# Patient Record
Sex: FEMALE | Race: WHITE | NOT HISPANIC OR LATINO | Employment: FULL TIME | URBAN - METROPOLITAN AREA
[De-identification: names, ages, dates, MRNs, and addresses within clinical notes are randomized per-mention and may not be internally consistent; named-entity substitution may affect disease eponyms.]

---

## 2017-04-21 ENCOUNTER — HOSPITAL ENCOUNTER (OUTPATIENT)
Dept: ONCOLOGY | Facility: CLINIC | Age: 57
Discharge: HOME | End: 2017-04-21
Attending: NURSE PRACTITIONER | Admitting: NURSE PRACTITIONER

## 2017-04-21 LAB
ALBUMIN SERPL-MCNC: 4 G/DL (ref 3.5–4.8)
ALBUMIN/GLOB SERPL: 1.3 {RATIO} (ref 1–1.7)
ALP SERPL-CCNC: 80 IU/L (ref 32–91)
ALT SERPL-CCNC: 20 IU/L (ref 14–54)
ANION GAP SERPL CALC-SCNC: 16.9 MMOL/L (ref 10–20)
AST SERPL-CCNC: 20 IU/L (ref 15–41)
BILIRUB SERPL-MCNC: 0.8 MG/DL (ref 0.3–1.2)
BUN SERPL-MCNC: 18 MG/DL (ref 8–20)
BUN/CREAT SERPL: 16.4 (ref 5.4–26.2)
CALCIUM SERPL-MCNC: 9.1 MG/DL (ref 8.9–10.3)
CEA SERPL-MCNC: NORMAL NG/ML (ref 0–5)
CHLORIDE SERPL-SCNC: 102 MMOL/L (ref 101–111)
CONV CO2: 27 MMOL/L (ref 22–32)
CONV TOTAL PROTEIN: 7.1 G/DL (ref 6.1–7.9)
CREAT UR-MCNC: 1.1 MG/DL (ref 0.4–1)
GLOBULIN UR ELPH-MCNC: 3.1 G/DL (ref 2.5–3.8)
GLUCOSE SERPL-MCNC: 91 MG/DL (ref 65–99)
POTASSIUM SERPL-SCNC: 3.9 MMOL/L (ref 3.6–5.1)
SODIUM SERPL-SCNC: 142 MMOL/L (ref 136–144)

## 2017-08-23 ENCOUNTER — HOSPITAL ENCOUNTER (OUTPATIENT)
Dept: ONCOLOGY | Facility: HOSPITAL | Age: 57
Discharge: HOME | End: 2017-08-23
Attending: INTERNAL MEDICINE | Admitting: INTERNAL MEDICINE

## 2017-09-27 ENCOUNTER — HOSPITAL ENCOUNTER (OUTPATIENT)
Dept: ONCOLOGY | Facility: HOSPITAL | Age: 57
Discharge: HOME | End: 2017-09-27
Attending: INTERNAL MEDICINE | Admitting: INTERNAL MEDICINE

## 2017-10-25 ENCOUNTER — HOSPITAL ENCOUNTER (OUTPATIENT)
Dept: ONCOLOGY | Facility: HOSPITAL | Age: 57
Discharge: HOME | End: 2017-10-25
Attending: INTERNAL MEDICINE | Admitting: INTERNAL MEDICINE

## 2017-10-25 ENCOUNTER — CLINICAL SUPPORT (OUTPATIENT)
Dept: ONCOLOGY | Facility: HOSPITAL | Age: 57
End: 2017-10-25

## 2017-10-25 ENCOUNTER — HOSPITAL ENCOUNTER (OUTPATIENT)
Dept: ONCOLOGY | Facility: CLINIC | Age: 57
Setting detail: INFUSION SERIES
Discharge: HOME | End: 2017-10-25
Attending: INTERNAL MEDICINE | Admitting: INTERNAL MEDICINE

## 2017-10-25 LAB
ALBUMIN SERPL-MCNC: 3.8 G/DL (ref 3.5–4.8)
ALBUMIN/GLOB SERPL: 1.3 {RATIO} (ref 1–1.7)
ALP SERPL-CCNC: 85 IU/L (ref 32–91)
ALT SERPL-CCNC: 18 IU/L (ref 14–54)
ANION GAP SERPL CALC-SCNC: 12.8 MMOL/L (ref 10–20)
AST SERPL-CCNC: 22 IU/L (ref 15–41)
BILIRUB SERPL-MCNC: 0.6 MG/DL (ref 0.3–1.2)
BUN SERPL-MCNC: 16 MG/DL (ref 8–20)
BUN/CREAT SERPL: 16 (ref 5.4–26.2)
CALCIUM SERPL-MCNC: 9.2 MG/DL (ref 8.9–10.3)
CEA SERPL-MCNC: NORMAL NG/ML (ref 0–5)
CHLORIDE SERPL-SCNC: 102 MMOL/L (ref 101–111)
CONV CO2: 27 MMOL/L (ref 22–32)
CONV TOTAL PROTEIN: 6.8 G/DL (ref 6.1–7.9)
CREAT UR-MCNC: 1 MG/DL (ref 0.4–1)
GLOBULIN UR ELPH-MCNC: 3 G/DL (ref 2.5–3.8)
GLUCOSE SERPL-MCNC: 125 MG/DL (ref 65–99)
POTASSIUM SERPL-SCNC: 3.8 MMOL/L (ref 3.6–5.1)
SODIUM SERPL-SCNC: 138 MMOL/L (ref 136–144)

## 2017-10-25 NOTE — PROGRESS NOTES
PATIENTS ONCOLOGY RECORD LOCATED IN [facility]      Subjective     Name:  ANDREAS SIMENTAL     Date:  10/25/2017  Address:  [address] [place] [address] [place] [address]  Home: [phone]  :  1960 AGE:  57 y.o.        RECORDS OBTAINED:  Patients Oncology Record is located in [facility]

## 2017-11-13 ENCOUNTER — HOSPITAL ENCOUNTER (OUTPATIENT)
Dept: MRI IMAGING | Facility: HOSPITAL | Age: 57
Discharge: HOME | End: 2017-11-13
Attending: INTERNAL MEDICINE | Admitting: INTERNAL MEDICINE

## 2017-11-17 ENCOUNTER — HOSPITAL ENCOUNTER (OUTPATIENT)
Dept: ONCOLOGY | Facility: HOSPITAL | Age: 57
Discharge: HOME | End: 2017-11-17
Attending: INTERNAL MEDICINE | Admitting: INTERNAL MEDICINE

## 2017-11-17 LAB — GLUCOSE BLD-MCNC: 92 MG/DL (ref 70–105)

## 2017-11-22 ENCOUNTER — CLINICAL SUPPORT (OUTPATIENT)
Dept: ONCOLOGY | Facility: HOSPITAL | Age: 57
End: 2017-11-22

## 2017-11-22 ENCOUNTER — HOSPITAL ENCOUNTER (OUTPATIENT)
Dept: ONCOLOGY | Facility: HOSPITAL | Age: 57
Discharge: HOME | End: 2017-11-22
Attending: INTERNAL MEDICINE | Admitting: INTERNAL MEDICINE

## 2017-11-22 ENCOUNTER — HOSPITAL ENCOUNTER (OUTPATIENT)
Dept: ONCOLOGY | Facility: CLINIC | Age: 57
Setting detail: INFUSION SERIES
Discharge: HOME | End: 2017-11-22
Attending: INTERNAL MEDICINE | Admitting: INTERNAL MEDICINE

## 2017-11-22 NOTE — PROGRESS NOTES
PATIENTS ONCOLOGY RECORD LOCATED IN [facility]      Subjective     Name:  ANDREAS SIMENTAL     Date:  2017  Address:  [address] [place] [address]  Home: [phone]  :  1960 AGE:  57 y.o.        RECORDS OBTAINED:  Patients Oncology Record is located in [facility]

## 2017-11-27 ENCOUNTER — HOSPITAL ENCOUNTER (OUTPATIENT)
Dept: ONCOLOGY | Facility: HOSPITAL | Age: 57
Discharge: HOME | End: 2017-11-27
Attending: INTERNAL MEDICINE | Admitting: INTERNAL MEDICINE

## 2017-11-27 ENCOUNTER — HOSPITAL ENCOUNTER (OUTPATIENT)
Dept: ONCOLOGY | Facility: CLINIC | Age: 57
Setting detail: INFUSION SERIES
Discharge: HOME | End: 2017-11-27
Attending: INTERNAL MEDICINE | Admitting: INTERNAL MEDICINE

## 2017-11-27 ENCOUNTER — CLINICAL SUPPORT (OUTPATIENT)
Dept: ONCOLOGY | Facility: HOSPITAL | Age: 57
End: 2017-11-27

## 2017-11-27 LAB
ALBUMIN SERPL-MCNC: 3.7 G/DL (ref 3.5–4.8)
ALBUMIN/GLOB SERPL: 1.2 {RATIO} (ref 1–1.7)
ALP SERPL-CCNC: 79 IU/L (ref 32–91)
ALT SERPL-CCNC: 20 IU/L (ref 14–54)
ANION GAP SERPL CALC-SCNC: 11.3 MMOL/L (ref 10–20)
AST SERPL-CCNC: 23 IU/L (ref 15–41)
BILIRUB SERPL-MCNC: 0.5 MG/DL (ref 0.3–1.2)
BUN SERPL-MCNC: 12 MG/DL (ref 8–20)
BUN/CREAT SERPL: 13.3 (ref 5.4–26.2)
CALCIUM SERPL-MCNC: 9.2 MG/DL (ref 8.9–10.3)
CHLORIDE SERPL-SCNC: 105 MMOL/L (ref 101–111)
CONV CO2: 25 MMOL/L (ref 22–32)
CONV TOTAL PROTEIN: 6.8 G/DL (ref 6.1–7.9)
CREAT UR-MCNC: 0.9 MG/DL (ref 0.4–1)
GLOBULIN UR ELPH-MCNC: 3.1 G/DL (ref 2.5–3.8)
GLUCOSE SERPL-MCNC: 96 MG/DL (ref 65–99)
POTASSIUM SERPL-SCNC: 4.3 MMOL/L (ref 3.6–5.1)
SODIUM SERPL-SCNC: 137 MMOL/L (ref 136–144)

## 2017-11-27 NOTE — PROGRESS NOTES
PATIENTS ONCOLOGY RECORD LOCATED IN [facility]      Subjective     Name:  ANDREAS SIMENTAL     Date:  2017  Address:  [address] [place] [address]  Home: [phone]  :  1960 AGE:  57 y.o.        RECORDS OBTAINED:  Patients Oncology Record is located in [facility]

## 2017-11-29 ENCOUNTER — HOSPITAL ENCOUNTER (OUTPATIENT)
Dept: ONCOLOGY | Facility: HOSPITAL | Age: 57
Discharge: HOME | End: 2017-11-29
Attending: INTERNAL MEDICINE | Admitting: INTERNAL MEDICINE

## 2017-11-29 ENCOUNTER — CLINICAL SUPPORT (OUTPATIENT)
Dept: ONCOLOGY | Facility: HOSPITAL | Age: 57
End: 2017-11-29

## 2017-11-29 ENCOUNTER — HOSPITAL ENCOUNTER (OUTPATIENT)
Dept: ONCOLOGY | Facility: CLINIC | Age: 57
Setting detail: INFUSION SERIES
Discharge: HOME | End: 2017-11-29
Attending: INTERNAL MEDICINE | Admitting: INTERNAL MEDICINE

## 2017-11-29 NOTE — PROGRESS NOTES
PATIENTS ONCOLOGY RECORD LOCATED IN [facility]      Subjective     Name:  ANDREAS SIMENTAL     Date:  2017  Address:  [address] [place] [address]  Home: [phone]  :  1960 AGE:  57 y.o.        RECORDS OBTAINED:  Patients Oncology Record is located in [facility]

## 2017-12-04 ENCOUNTER — HOSPITAL ENCOUNTER (OUTPATIENT)
Dept: ONCOLOGY | Facility: HOSPITAL | Age: 57
Discharge: HOME | End: 2017-12-04
Attending: INTERNAL MEDICINE | Admitting: INTERNAL MEDICINE

## 2017-12-04 ENCOUNTER — CLINICAL SUPPORT (OUTPATIENT)
Dept: ONCOLOGY | Facility: HOSPITAL | Age: 57
End: 2017-12-04

## 2017-12-04 ENCOUNTER — HOSPITAL ENCOUNTER (OUTPATIENT)
Dept: ONCOLOGY | Facility: CLINIC | Age: 57
Setting detail: INFUSION SERIES
Discharge: HOME | End: 2017-12-04
Attending: INTERNAL MEDICINE | Admitting: INTERNAL MEDICINE

## 2017-12-04 NOTE — PROGRESS NOTES
PATIENTS ONCOLOGY RECORD LOCATED IN [facility]      Subjective     Name:  ANDREAS SIMENTAL     Date:  2017  Address:  [address] [place] [address]  Home: [phone]  :  1960 AGE:  57 y.o.        RECORDS OBTAINED:  Patients Oncology Record is located in [facility]

## 2017-12-11 ENCOUNTER — CLINICAL SUPPORT (OUTPATIENT)
Dept: ONCOLOGY | Facility: HOSPITAL | Age: 57
End: 2017-12-11

## 2017-12-11 ENCOUNTER — HOSPITAL ENCOUNTER (OUTPATIENT)
Dept: ONCOLOGY | Facility: CLINIC | Age: 57
Setting detail: INFUSION SERIES
Discharge: HOME | End: 2017-12-11
Attending: INTERNAL MEDICINE | Admitting: INTERNAL MEDICINE

## 2017-12-11 ENCOUNTER — HOSPITAL ENCOUNTER (OUTPATIENT)
Dept: ONCOLOGY | Facility: HOSPITAL | Age: 57
Discharge: HOME | End: 2017-12-11
Attending: INTERNAL MEDICINE | Admitting: INTERNAL MEDICINE

## 2017-12-11 NOTE — PROGRESS NOTES
PATIENTS ONCOLOGY RECORD LOCATED IN [facility]      Subjective     Name:  ANDREAS SIMENTAL     Date:  2017  Address:  [address] [place] [address]  Home: [phone]  :  1960 AGE:  57 y.o.        RECORDS OBTAINED:  Patients Oncology Record is located in [facility]

## 2017-12-13 ENCOUNTER — HOSPITAL ENCOUNTER (OUTPATIENT)
Dept: ONCOLOGY | Facility: CLINIC | Age: 57
Setting detail: INFUSION SERIES
Discharge: HOME | End: 2017-12-13
Attending: INTERNAL MEDICINE | Admitting: INTERNAL MEDICINE

## 2017-12-13 ENCOUNTER — CLINICAL SUPPORT (OUTPATIENT)
Dept: ONCOLOGY | Facility: HOSPITAL | Age: 57
End: 2017-12-13

## 2017-12-13 ENCOUNTER — HOSPITAL ENCOUNTER (OUTPATIENT)
Dept: ONCOLOGY | Facility: HOSPITAL | Age: 57
Discharge: HOME | End: 2017-12-13
Attending: INTERNAL MEDICINE | Admitting: INTERNAL MEDICINE

## 2017-12-13 LAB
ALBUMIN SERPL-MCNC: 3.5 G/DL (ref 3.5–4.8)
ALBUMIN/GLOB SERPL: 1.3 {RATIO} (ref 1–1.7)
ALP SERPL-CCNC: 74 IU/L (ref 32–91)
ALT SERPL-CCNC: 35 IU/L (ref 14–54)
ANION GAP SERPL CALC-SCNC: 10.6 MMOL/L (ref 10–20)
AST SERPL-CCNC: 45 IU/L (ref 15–41)
BILIRUB SERPL-MCNC: 0.7 MG/DL (ref 0.3–1.2)
BUN SERPL-MCNC: 29 MG/DL (ref 8–20)
BUN/CREAT SERPL: 24.2 (ref 5.4–26.2)
CALCIUM SERPL-MCNC: 8.6 MG/DL (ref 8.9–10.3)
CHLORIDE SERPL-SCNC: 107 MMOL/L (ref 101–111)
CONV CO2: 25 MMOL/L (ref 22–32)
CONV TOTAL PROTEIN: 6.2 G/DL (ref 6.1–7.9)
CREAT UR-MCNC: 1.2 MG/DL (ref 0.4–1)
GLOBULIN UR ELPH-MCNC: 2.7 G/DL (ref 2.5–3.8)
GLUCOSE SERPL-MCNC: 97 MG/DL (ref 65–99)
MAGNESIUM SERPL-MCNC: 2.5 MG/DL (ref 1.8–2.5)
POTASSIUM SERPL-SCNC: 3.6 MMOL/L (ref 3.6–5.1)
SODIUM SERPL-SCNC: 139 MMOL/L (ref 136–144)

## 2017-12-13 NOTE — PROGRESS NOTES
PATIENTS ONCOLOGY RECORD LOCATED IN [facility]      Subjective     Name:  ANDREAS SIMENTAL     Date:  2017  Address:  [address] [place] [address]  Home: [phone]  :  1960 AGE:  57 y.o.        RECORDS OBTAINED:  Patients Oncology Record is located in [facility]

## 2017-12-20 ENCOUNTER — HOSPITAL ENCOUNTER (OUTPATIENT)
Dept: ONCOLOGY | Facility: CLINIC | Age: 57
Setting detail: INFUSION SERIES
Discharge: HOME | End: 2017-12-20
Attending: NURSE PRACTITIONER | Admitting: NURSE PRACTITIONER

## 2017-12-20 ENCOUNTER — CLINICAL SUPPORT (OUTPATIENT)
Dept: ONCOLOGY | Facility: HOSPITAL | Age: 57
End: 2017-12-20

## 2017-12-20 ENCOUNTER — HOSPITAL ENCOUNTER (OUTPATIENT)
Dept: ONCOLOGY | Facility: HOSPITAL | Age: 57
Discharge: HOME | End: 2017-12-20
Attending: NURSE PRACTITIONER | Admitting: NURSE PRACTITIONER

## 2017-12-20 LAB
IRON SATN MFR SERPL: 8 % (ref 15–50)
IRON SERPL-MCNC: 28 UG/DL (ref 28–170)
MAGNESIUM UR-MCNC: 1.2 % (ref 0.5–1.5)
RETICS/RBC NFR MANUAL: 0.04 10*6/UL
TIBC SERPL-MCNC: 348 UG/DL (ref 228–428)

## 2017-12-20 NOTE — PROGRESS NOTES
PATIENTS ONCOLOGY RECORD LOCATED IN [facility]      Subjective     Name:  ANDREAS SIMENTAL     Date:  2017  Address:  [address] [place] [address]  Home: [phone]  :  1960 AGE:  57 y.o.        RECORDS OBTAINED:  Patients Oncology Record is located in [facility]

## 2017-12-22 LAB — HAPTOGLOB SERPL-MCNC: 105 MG/DL (ref 36–195)

## 2017-12-26 ENCOUNTER — HOSPITAL ENCOUNTER (OUTPATIENT)
Dept: ONCOLOGY | Facility: CLINIC | Age: 57
Setting detail: INFUSION SERIES
Discharge: HOME | End: 2017-12-26
Attending: INTERNAL MEDICINE | Admitting: INTERNAL MEDICINE

## 2017-12-26 ENCOUNTER — HOSPITAL ENCOUNTER (OUTPATIENT)
Dept: ONCOLOGY | Facility: HOSPITAL | Age: 57
Discharge: HOME | End: 2017-12-26
Attending: INTERNAL MEDICINE | Admitting: INTERNAL MEDICINE

## 2017-12-26 ENCOUNTER — CLINICAL SUPPORT (OUTPATIENT)
Dept: ONCOLOGY | Facility: HOSPITAL | Age: 57
End: 2017-12-26

## 2017-12-26 NOTE — PROGRESS NOTES
PATIENTS ONCOLOGY RECORD LOCATED IN [facility]      Subjective     Name:  ANDREAS SIMENTAL     Date:  2017  Address:  [address] [place] [address]  Home: [phone]  :  1960 AGE:  57 y.o.        RECORDS OBTAINED:  Patients Oncology Record is located in [facility]

## 2018-01-02 ENCOUNTER — CLINICAL SUPPORT (OUTPATIENT)
Dept: ONCOLOGY | Facility: HOSPITAL | Age: 58
End: 2018-01-02

## 2018-01-02 ENCOUNTER — HOSPITAL ENCOUNTER (OUTPATIENT)
Dept: ONCOLOGY | Facility: HOSPITAL | Age: 58
Discharge: HOME | End: 2018-01-02
Attending: INTERNAL MEDICINE | Admitting: INTERNAL MEDICINE

## 2018-01-02 ENCOUNTER — HOSPITAL ENCOUNTER (OUTPATIENT)
Dept: ONCOLOGY | Facility: CLINIC | Age: 58
Setting detail: INFUSION SERIES
Discharge: HOME | End: 2018-01-02
Attending: INTERNAL MEDICINE | Admitting: INTERNAL MEDICINE

## 2018-01-02 LAB
ALBUMIN SERPL-MCNC: 3.7 G/DL (ref 3.5–4.8)
ALBUMIN/GLOB SERPL: 1.2 {RATIO} (ref 1–1.7)
ALP SERPL-CCNC: 81 IU/L (ref 32–91)
ALT SERPL-CCNC: 25 IU/L (ref 14–54)
ANION GAP SERPL CALC-SCNC: 12.7 MMOL/L (ref 10–20)
AST SERPL-CCNC: 26 IU/L (ref 15–41)
BACTERIA SPEC AEROBE CULT: NORMAL
BILIRUB SERPL-MCNC: 0.8 MG/DL (ref 0.3–1.2)
BUN SERPL-MCNC: 15 MG/DL (ref 8–20)
BUN/CREAT SERPL: 15 (ref 5.4–26.2)
CALCIUM SERPL-MCNC: 9.2 MG/DL (ref 8.9–10.3)
CHLORIDE SERPL-SCNC: 105 MMOL/L (ref 101–111)
CONV CO2: 24 MMOL/L (ref 22–32)
CONV TOTAL PROTEIN: 6.8 G/DL (ref 6.1–7.9)
CREAT UR-MCNC: 1 MG/DL (ref 0.4–1)
GLOBULIN UR ELPH-MCNC: 3.1 G/DL (ref 2.5–3.8)
GLUCOSE SERPL-MCNC: 90 MG/DL (ref 65–99)
Lab: NORMAL
MAGNESIUM SERPL-MCNC: 2.3 MG/DL (ref 1.8–2.5)
MICRO REPORT STATUS: NORMAL
POTASSIUM SERPL-SCNC: 3.7 MMOL/L (ref 3.6–5.1)
SODIUM SERPL-SCNC: 138 MMOL/L (ref 136–144)
SPECIMEN SOURCE: NORMAL

## 2018-01-02 NOTE — PROGRESS NOTES
PATIENTS ONCOLOGY RECORD LOCATED IN [facility]      Subjective     Name:  ANDREAS SIMENTAL     Date:  2018  Address:  [address] [place] [address]  Home: [phone]  :  1960 AGE:  57 y.o.        RECORDS OBTAINED:  Patients Oncology Record is located in [facility]

## 2018-01-04 ENCOUNTER — HOSPITAL ENCOUNTER (OUTPATIENT)
Dept: ONCOLOGY | Facility: CLINIC | Age: 58
Setting detail: INFUSION SERIES
Discharge: HOME | End: 2018-01-04
Attending: INTERNAL MEDICINE | Admitting: INTERNAL MEDICINE

## 2018-01-04 ENCOUNTER — CLINICAL SUPPORT (OUTPATIENT)
Dept: ONCOLOGY | Facility: HOSPITAL | Age: 58
End: 2018-01-04

## 2018-01-04 NOTE — PROGRESS NOTES
PATIENTS ONCOLOGY RECORD LOCATED IN [facility]      Subjective     Name:  ANDREAS SIMENTAL     Date:  2018  Address:  [address] [place] [address]  Home: [phone]  :  1960 AGE:  57 y.o.        RECORDS OBTAINED:  Patients Oncology Record is located in [facility]

## 2018-01-08 ENCOUNTER — HOSPITAL ENCOUNTER (OUTPATIENT)
Dept: LAB | Facility: HOSPITAL | Age: 58
Discharge: HOME | End: 2018-01-08
Attending: INTERNAL MEDICINE | Admitting: INTERNAL MEDICINE

## 2018-01-08 LAB
ALBUMIN SERPL-MCNC: 3.8 G/DL (ref 3.5–4.8)
ALBUMIN/GLOB SERPL: 1.2 {RATIO} (ref 1–1.7)
ALP SERPL-CCNC: 76 IU/L (ref 32–91)
ALT SERPL-CCNC: 24 IU/L (ref 14–54)
ANION GAP SERPL CALC-SCNC: 14 MMOL/L (ref 10–20)
AST SERPL-CCNC: 18 IU/L (ref 15–41)
BASOPHILS # BLD AUTO: 0 10*3/UL (ref 0–0.2)
BASOPHILS NFR BLD AUTO: 0 % (ref 0–2)
BILIRUB SERPL-MCNC: 0.6 MG/DL (ref 0.3–1.2)
BILIRUB UR QL STRIP: NEGATIVE MG/DL
BUN SERPL-MCNC: 71 MG/DL (ref 8–20)
BUN/CREAT SERPL: 35.5 (ref 5.4–26.2)
CALCIUM SERPL-MCNC: 9 MG/DL (ref 8.9–10.3)
CASTS URNS QL MICRO: NORMAL /[LPF]
CHLORIDE SERPL-SCNC: 109 MMOL/L (ref 101–111)
CK SERPL-CCNC: 8 IU/L (ref 38–234)
COLOR UR: YELLOW
CONV BACTERIA IN URINE MICRO: NEGATIVE
CONV CLARITY OF URINE: CLEAR
CONV CO2: 23 MMOL/L (ref 22–32)
CONV HYALINE CASTS IN URINE MICRO: 3 /[LPF] (ref 0–5)
CONV PROTEIN IN URINE BY AUTOMATED TEST STRIP: NEGATIVE MG/DL
CONV SMALL ROUND CELLS: NORMAL /[HPF]
CONV SMEAR REVIEW: (no result)
CONV TOTAL PROTEIN: 6.9 G/DL (ref 6.1–7.9)
CONV UROBILINOGEN IN URINE BY AUTOMATED TEST STRIP: 0.2 MG/DL
CREAT UR-MCNC: 2 MG/DL (ref 0.4–1)
DIFFERENTIAL METHOD BLD: (no result)
EOSINOPHIL # BLD AUTO: 0 % (ref 0–3)
EOSINOPHIL # BLD AUTO: 0 10*3/UL (ref 0–0.3)
ERYTHROCYTE [DISTWIDTH] IN BLOOD BY AUTOMATED COUNT: 18 % (ref 11.5–14.5)
GLOBULIN UR ELPH-MCNC: 3.1 G/DL (ref 2.5–3.8)
GLUCOSE SERPL-MCNC: 148 MG/DL (ref 65–99)
GLUCOSE UR QL: NEGATIVE MG/DL
HCT VFR BLD AUTO: 33 % (ref 35–49)
HGB BLD-MCNC: 11 G/DL (ref 12–15)
HGB UR QL STRIP: NEGATIVE
KETONES UR QL STRIP: NEGATIVE MG/DL
LEUKOCYTE ESTERASE UR QL STRIP: NEGATIVE
LYMPHOCYTES # BLD AUTO: 0.5 10*3/UL (ref 0.8–4.8)
LYMPHOCYTES NFR BLD AUTO: 21 % (ref 18–42)
MAGNESIUM SERPL-MCNC: 2.5 MG/DL (ref 1.8–2.5)
MCH RBC QN AUTO: 28.6 PG (ref 26–32)
MCHC RBC AUTO-ENTMCNC: 33.4 G/DL (ref 32–36)
MCV RBC AUTO: 85.7 FL (ref 80–94)
MONOCYTES # BLD AUTO: 0 10*3/UL (ref 0.1–1.3)
MONOCYTES NFR BLD AUTO: 1 % (ref 2–11)
NEUTROPHILS # BLD AUTO: 2 10*3/UL (ref 2.3–8.6)
NEUTROPHILS NFR BLD AUTO: 78 % (ref 50–75)
NITRITE UR QL STRIP: NEGATIVE
NRBC BLD AUTO-RTO: 0 /100{WBCS}
PH UR STRIP.AUTO: 5 [PH] (ref 4.5–8)
PHOSPHATE SERPL-MCNC: 5.6 MG/DL (ref 2.4–4.7)
PLATELET # BLD AUTO: (no result) 10*3/UL (ref 150–450)
PLT COMMENT: (no result)
PMV BLD AUTO: 9.3 FL (ref 7.4–10.4)
POTASSIUM SERPL-SCNC: 5 MMOL/L (ref 3.6–5.1)
RBC # BLD AUTO: 3.85 10*6/UL (ref 4–5.4)
RBC #/AREA URNS HPF: 2 /[HPF] (ref 0–3)
SODIUM SERPL-SCNC: 141 MMOL/L (ref 136–144)
SP GR UR: 1.02 (ref 1–1.03)
SPERM URNS QL MICRO: NORMAL /[HPF]
SQUAMOUS SPT QL MICRO: 1 /[HPF] (ref 0–5)
UNIDENT CRYS URNS QL MICRO: NORMAL /[HPF]
WBC # BLD AUTO: 2.5 10*3/UL (ref 4.5–11.5)
WBC #/AREA URNS HPF: 1 /[HPF] (ref 0–5)
YEAST SPEC QL WET PREP: NORMAL /[HPF]

## 2018-01-10 ENCOUNTER — HOSPITAL ENCOUNTER (OUTPATIENT)
Dept: LAB | Facility: HOSPITAL | Age: 58
Discharge: HOME | End: 2018-01-10
Attending: INTERNAL MEDICINE | Admitting: INTERNAL MEDICINE

## 2018-01-10 LAB
ANION GAP SERPL CALC-SCNC: 11.7 MMOL/L (ref 10–20)
BUN SERPL-MCNC: 67 MG/DL (ref 8–20)
BUN/CREAT SERPL: 31.9 (ref 5.4–26.2)
CALCIUM SERPL-MCNC: 8.3 MG/DL (ref 8.9–10.3)
CHLORIDE SERPL-SCNC: 109 MMOL/L (ref 101–111)
CONV CO2: 24 MMOL/L (ref 22–32)
CREAT UR-MCNC: 2.1 MG/DL (ref 0.4–1)
GLUCOSE SERPL-MCNC: 94 MG/DL (ref 65–99)
POTASSIUM SERPL-SCNC: 4.7 MMOL/L (ref 3.6–5.1)
SODIUM SERPL-SCNC: 140 MMOL/L (ref 136–144)

## 2018-01-11 ENCOUNTER — HOSPITAL ENCOUNTER (OUTPATIENT)
Dept: ONCOLOGY | Facility: CLINIC | Age: 58
Setting detail: INFUSION SERIES
Discharge: HOME | End: 2018-01-11
Attending: INTERNAL MEDICINE | Admitting: INTERNAL MEDICINE

## 2018-01-11 ENCOUNTER — HOSPITAL ENCOUNTER (OUTPATIENT)
Dept: ONCOLOGY | Facility: HOSPITAL | Age: 58
Discharge: HOME | End: 2018-01-11
Attending: INTERNAL MEDICINE | Admitting: INTERNAL MEDICINE

## 2018-01-11 ENCOUNTER — CLINICAL SUPPORT (OUTPATIENT)
Dept: ONCOLOGY | Facility: HOSPITAL | Age: 58
End: 2018-01-11

## 2018-01-11 NOTE — PROGRESS NOTES
PATIENTS ONCOLOGY RECORD LOCATED IN [facility]      Subjective     Name:  ANDREAS SIMENTAL     Date:  2018  Address:  [address] [place] [address]  Home: [phone]  :  1960 AGE:  57 y.o.        RECORDS OBTAINED:  Patients Oncology Record is located in [facility]

## 2018-01-15 ENCOUNTER — CLINICAL SUPPORT (OUTPATIENT)
Dept: ONCOLOGY | Facility: HOSPITAL | Age: 58
End: 2018-01-15

## 2018-01-15 ENCOUNTER — HOSPITAL ENCOUNTER (OUTPATIENT)
Dept: ONCOLOGY | Facility: HOSPITAL | Age: 58
Discharge: HOME | End: 2018-01-15
Attending: INTERNAL MEDICINE | Admitting: INTERNAL MEDICINE

## 2018-01-15 ENCOUNTER — HOSPITAL ENCOUNTER (OUTPATIENT)
Dept: ONCOLOGY | Facility: CLINIC | Age: 58
Setting detail: INFUSION SERIES
Discharge: HOME | End: 2018-01-15
Attending: INTERNAL MEDICINE | Admitting: INTERNAL MEDICINE

## 2018-01-15 LAB
ANION GAP SERPL CALC-SCNC: 10.3 MMOL/L (ref 10–20)
BUN SERPL-MCNC: 25 MG/DL (ref 8–20)
BUN/CREAT SERPL: 15.6 (ref 5.4–26.2)
CALCIUM SERPL-MCNC: 8.6 MG/DL (ref 8.9–10.3)
CHLORIDE SERPL-SCNC: 107 MMOL/L (ref 101–111)
CONV CO2: 26 MMOL/L (ref 22–32)
CREAT UR-MCNC: 1.6 MG/DL (ref 0.4–1)
GLUCOSE SERPL-MCNC: 114 MG/DL (ref 65–99)
POTASSIUM SERPL-SCNC: 4.3 MMOL/L (ref 3.6–5.1)
SODIUM SERPL-SCNC: 139 MMOL/L (ref 136–144)

## 2018-01-15 NOTE — PROGRESS NOTES
PATIENTS ONCOLOGY RECORD LOCATED IN [facility]      Subjective     Name:  ANDREAS SIMENTAL     Date:  01/15/2018  Address:  [address] [place] [address]  Home: [phone]  :  1960 AGE:  57 y.o.        RECORDS OBTAINED:  Patients Oncology Record is located in [facility]

## 2018-01-22 ENCOUNTER — CLINICAL SUPPORT (OUTPATIENT)
Dept: ONCOLOGY | Facility: HOSPITAL | Age: 58
End: 2018-01-22

## 2018-01-22 ENCOUNTER — HOSPITAL ENCOUNTER (OUTPATIENT)
Dept: ONCOLOGY | Facility: HOSPITAL | Age: 58
Discharge: HOME | End: 2018-01-22
Attending: INTERNAL MEDICINE | Admitting: INTERNAL MEDICINE

## 2018-01-22 ENCOUNTER — HOSPITAL ENCOUNTER (OUTPATIENT)
Dept: ONCOLOGY | Facility: CLINIC | Age: 58
Setting detail: INFUSION SERIES
Discharge: HOME | End: 2018-01-22
Attending: INTERNAL MEDICINE | Admitting: INTERNAL MEDICINE

## 2018-01-22 LAB
ALBUMIN SERPL-MCNC: 3.5 G/DL (ref 3.5–4.8)
ALBUMIN/GLOB SERPL: 1.1 {RATIO} (ref 1–1.7)
ALP SERPL-CCNC: 77 IU/L (ref 32–91)
ALT SERPL-CCNC: 26 IU/L (ref 14–54)
ANION GAP SERPL CALC-SCNC: 10.9 MMOL/L (ref 10–20)
AST SERPL-CCNC: 26 IU/L (ref 15–41)
BILIRUB SERPL-MCNC: 0.6 MG/DL (ref 0.3–1.2)
BUN SERPL-MCNC: 12 MG/DL (ref 8–20)
BUN/CREAT SERPL: 9.2 (ref 5.4–26.2)
CALCIUM SERPL-MCNC: 8.8 MG/DL (ref 8.9–10.3)
CHLORIDE SERPL-SCNC: 107 MMOL/L (ref 101–111)
CONV CO2: 25 MMOL/L (ref 22–32)
CONV TOTAL PROTEIN: 6.7 G/DL (ref 6.1–7.9)
CREAT UR-MCNC: 1.3 MG/DL (ref 0.4–1)
GLOBULIN UR ELPH-MCNC: 3.2 G/DL (ref 2.5–3.8)
GLUCOSE SERPL-MCNC: 111 MG/DL (ref 65–99)
POTASSIUM SERPL-SCNC: 3.9 MMOL/L (ref 3.6–5.1)
SODIUM SERPL-SCNC: 139 MMOL/L (ref 136–144)

## 2018-01-22 NOTE — PROGRESS NOTES
PATIENTS ONCOLOGY RECORD LOCATED IN [facility]      Subjective     Name:  ANDREAS SIMENTAL     Date:  2018  Address:  [address] [place] [address]  Home: [phone]  :  1960 AGE:  57 y.o.        RECORDS OBTAINED:  Patients Oncology Record is located in [facility]

## 2018-02-05 ENCOUNTER — CLINICAL SUPPORT (OUTPATIENT)
Dept: ONCOLOGY | Facility: HOSPITAL | Age: 58
End: 2018-02-05

## 2018-02-05 ENCOUNTER — HOSPITAL ENCOUNTER (OUTPATIENT)
Dept: ONCOLOGY | Facility: CLINIC | Age: 58
Discharge: HOME | End: 2018-02-05
Attending: INTERNAL MEDICINE | Admitting: INTERNAL MEDICINE

## 2018-02-05 ENCOUNTER — HOSPITAL ENCOUNTER (OUTPATIENT)
Dept: ONCOLOGY | Facility: CLINIC | Age: 58
Setting detail: INFUSION SERIES
Discharge: HOME | End: 2018-02-05
Attending: INTERNAL MEDICINE | Admitting: INTERNAL MEDICINE

## 2018-02-05 NOTE — PROGRESS NOTES
PATIENTS ONCOLOGY RECORD LOCATED IN [facility]      Subjective     Name:  ANDREAS SIMENTAL     Date:  2018  Address:  [address] [place] [address]  Home: [phone]  :  1960 AGE:  57 y.o.        RECORDS OBTAINED:  Patients Oncology Record is located in [facility]

## 2018-03-01 ENCOUNTER — HOSPITAL ENCOUNTER (OUTPATIENT)
Dept: ONCOLOGY | Facility: CLINIC | Age: 58
Setting detail: INFUSION SERIES
Discharge: HOME | End: 2018-03-01
Attending: INTERNAL MEDICINE | Admitting: INTERNAL MEDICINE

## 2018-03-01 ENCOUNTER — CLINICAL SUPPORT (OUTPATIENT)
Dept: ONCOLOGY | Facility: HOSPITAL | Age: 58
End: 2018-03-01

## 2018-03-01 ENCOUNTER — HOSPITAL ENCOUNTER (OUTPATIENT)
Dept: ONCOLOGY | Facility: HOSPITAL | Age: 58
Discharge: HOME | End: 2018-03-01
Attending: INTERNAL MEDICINE | Admitting: INTERNAL MEDICINE

## 2018-03-01 NOTE — PROGRESS NOTES
PATIENTS ONCOLOGY RECORD LOCATED IN [facility]      Subjective     Name:  ANDREAS SIMENTAL     Date:  2018  Address:  [address] [place] [address]  Home: [phone]  :  1960 AGE:  57 y.o.        RECORDS OBTAINED:  Patients Oncology Record is located in [facility]

## 2018-04-02 ENCOUNTER — HOSPITAL ENCOUNTER (OUTPATIENT)
Dept: ONCOLOGY | Facility: CLINIC | Age: 58
Setting detail: INFUSION SERIES
Discharge: HOME | End: 2018-04-02
Attending: INTERNAL MEDICINE | Admitting: INTERNAL MEDICINE

## 2018-04-02 ENCOUNTER — HOSPITAL ENCOUNTER (OUTPATIENT)
Dept: ONCOLOGY | Facility: HOSPITAL | Age: 58
Discharge: HOME | End: 2018-04-02
Attending: INTERNAL MEDICINE | Admitting: INTERNAL MEDICINE

## 2018-04-02 ENCOUNTER — CLINICAL SUPPORT (OUTPATIENT)
Dept: ONCOLOGY | Facility: HOSPITAL | Age: 58
End: 2018-04-02

## 2018-04-02 NOTE — PROGRESS NOTES
PATIENTS ONCOLOGY RECORD LOCATED IN [facility]      Subjective     Name:  ANDREAS SIMENTAL     Date:  2018  Address:  [address] [place] [address]  Home: [phone]  :  1960 AGE:  57 y.o.        RECORDS OBTAINED:  Patients Oncology Record is located in [facility]

## 2018-05-01 ENCOUNTER — HOSPITAL ENCOUNTER (OUTPATIENT)
Dept: ONCOLOGY | Facility: HOSPITAL | Age: 58
Discharge: HOME | End: 2018-05-01
Attending: NURSE PRACTITIONER | Admitting: NURSE PRACTITIONER

## 2018-05-01 ENCOUNTER — HOSPITAL ENCOUNTER (OUTPATIENT)
Dept: ONCOLOGY | Facility: CLINIC | Age: 58
Setting detail: INFUSION SERIES
Discharge: HOME | End: 2018-05-01
Attending: NURSE PRACTITIONER | Admitting: NURSE PRACTITIONER

## 2018-05-01 ENCOUNTER — CLINICAL SUPPORT (OUTPATIENT)
Dept: ONCOLOGY | Facility: HOSPITAL | Age: 58
End: 2018-05-01

## 2018-05-01 LAB
ALBUMIN SERPL-MCNC: 3.6 G/DL (ref 3.5–4.8)
ALBUMIN/GLOB SERPL: 1.1 {RATIO} (ref 1–1.7)
ALP SERPL-CCNC: 78 IU/L (ref 32–91)
ALT SERPL-CCNC: 22 IU/L (ref 14–54)
ANION GAP SERPL CALC-SCNC: 10.4 MMOL/L (ref 10–20)
AST SERPL-CCNC: 30 IU/L (ref 15–41)
BILIRUB SERPL-MCNC: 0.7 MG/DL (ref 0.3–1.2)
BUN SERPL-MCNC: 11 MG/DL (ref 8–20)
BUN/CREAT SERPL: 12.2 (ref 5.4–26.2)
CALCIUM SERPL-MCNC: 8.8 MG/DL (ref 8.9–10.3)
CHLORIDE SERPL-SCNC: 106 MMOL/L (ref 101–111)
CONV CO2: 26 MMOL/L (ref 22–32)
CONV TOTAL PROTEIN: 6.8 G/DL (ref 6.1–7.9)
CREAT UR-MCNC: 0.9 MG/DL (ref 0.4–1)
GLOBULIN UR ELPH-MCNC: 3.2 G/DL (ref 2.5–3.8)
GLUCOSE SERPL-MCNC: 111 MG/DL (ref 65–99)
POTASSIUM SERPL-SCNC: 3.4 MMOL/L (ref 3.6–5.1)
SODIUM SERPL-SCNC: 139 MMOL/L (ref 136–144)

## 2018-05-01 NOTE — PROGRESS NOTES
PATIENTS ONCOLOGY RECORD LOCATED IN [facility]      Subjective     Name:  ANDREAS SIMENTAL     Date:  2018  Address:  [address] [place] [address]  Home: [phone]  :  1960 AGE:  57 y.o.        RECORDS OBTAINED:  Patients Oncology Record is located in [facility]

## 2018-05-31 ENCOUNTER — HOSPITAL ENCOUNTER (OUTPATIENT)
Dept: ONCOLOGY | Facility: CLINIC | Age: 58
Setting detail: INFUSION SERIES
Discharge: HOME | End: 2018-05-31
Attending: INTERNAL MEDICINE | Admitting: INTERNAL MEDICINE

## 2018-05-31 ENCOUNTER — CLINICAL SUPPORT (OUTPATIENT)
Dept: ONCOLOGY | Facility: HOSPITAL | Age: 58
End: 2018-05-31

## 2018-05-31 ENCOUNTER — HOSPITAL ENCOUNTER (OUTPATIENT)
Dept: ONCOLOGY | Facility: HOSPITAL | Age: 58
Discharge: HOME | End: 2018-05-31
Attending: INTERNAL MEDICINE | Admitting: INTERNAL MEDICINE

## 2018-05-31 LAB
ALBUMIN SERPL-MCNC: 3.6 G/DL (ref 3.5–4.8)
ALBUMIN/GLOB SERPL: 1.2 {RATIO} (ref 1–1.7)
ALP SERPL-CCNC: 82 IU/L (ref 32–91)
ALT SERPL-CCNC: 24 IU/L (ref 14–54)
ANION GAP SERPL CALC-SCNC: 9.7 MMOL/L (ref 10–20)
AST SERPL-CCNC: 28 IU/L (ref 15–41)
BILIRUB SERPL-MCNC: 0.7 MG/DL (ref 0.3–1.2)
BUN SERPL-MCNC: 19 MG/DL (ref 8–20)
BUN/CREAT SERPL: 17.3 (ref 5.4–26.2)
CALCIUM SERPL-MCNC: 9.1 MG/DL (ref 8.9–10.3)
CHLORIDE SERPL-SCNC: 104 MMOL/L (ref 101–111)
CONV CO2: 27 MMOL/L (ref 22–32)
CONV TOTAL PROTEIN: 6.6 G/DL (ref 6.1–7.9)
CREAT UR-MCNC: 1.1 MG/DL (ref 0.4–1)
GLOBULIN UR ELPH-MCNC: 3 G/DL (ref 2.5–3.8)
GLUCOSE SERPL-MCNC: 96 MG/DL (ref 65–99)
POTASSIUM SERPL-SCNC: 3.7 MMOL/L (ref 3.6–5.1)
SODIUM SERPL-SCNC: 137 MMOL/L (ref 136–144)

## 2018-05-31 NOTE — PROGRESS NOTES
PATIENTS ONCOLOGY RECORD LOCATED IN [facility]      Subjective     Name:  ANDREAS SIMENTAL     Date:  2018  Address:  [address] [place] [address]  Home: [phone]  :  1960 AGE:  57 y.o.        RECORDS OBTAINED:  Patients Oncology Record is located in [facility]

## 2018-06-21 ENCOUNTER — HOSPITAL ENCOUNTER (OUTPATIENT)
Dept: ONCOLOGY | Facility: CLINIC | Age: 58
Setting detail: INFUSION SERIES
Discharge: HOME | End: 2018-06-21
Attending: NURSE PRACTITIONER | Admitting: NURSE PRACTITIONER

## 2018-06-21 ENCOUNTER — HOSPITAL ENCOUNTER (OUTPATIENT)
Dept: ONCOLOGY | Facility: HOSPITAL | Age: 58
Discharge: HOME | End: 2018-06-21
Attending: NURSE PRACTITIONER | Admitting: NURSE PRACTITIONER

## 2018-06-21 ENCOUNTER — CLINICAL SUPPORT (OUTPATIENT)
Dept: ONCOLOGY | Facility: HOSPITAL | Age: 58
End: 2018-06-21

## 2018-06-21 LAB
ALBUMIN SERPL-MCNC: 3.8 G/DL (ref 3.5–4.8)
ALBUMIN/GLOB SERPL: 1.5 {RATIO} (ref 1–1.7)
ALP SERPL-CCNC: 84 IU/L (ref 32–91)
ALT SERPL-CCNC: 23 IU/L (ref 14–54)
ANION GAP SERPL CALC-SCNC: 10.6 MMOL/L (ref 10–20)
AST SERPL-CCNC: 36 IU/L (ref 15–41)
BILIRUB SERPL-MCNC: 0.7 MG/DL (ref 0.3–1.2)
BUN SERPL-MCNC: 14 MG/DL (ref 8–20)
BUN/CREAT SERPL: 14 (ref 5.4–26.2)
CALCIUM SERPL-MCNC: 8.9 MG/DL (ref 8.9–10.3)
CHLORIDE SERPL-SCNC: 108 MMOL/L (ref 101–111)
CONV CO2: 24 MMOL/L (ref 22–32)
CONV TOTAL PROTEIN: 6.4 G/DL (ref 6.1–7.9)
CREAT UR-MCNC: 1 MG/DL (ref 0.4–1)
GLOBULIN UR ELPH-MCNC: 2.6 G/DL (ref 2.5–3.8)
GLUCOSE SERPL-MCNC: 83 MG/DL (ref 65–99)
IRON SATN MFR SERPL: 14 % (ref 15–50)
IRON SERPL-MCNC: 52 UG/DL (ref 28–170)
POTASSIUM SERPL-SCNC: 3.6 MMOL/L (ref 3.6–5.1)
SODIUM SERPL-SCNC: 139 MMOL/L (ref 136–144)
TIBC SERPL-MCNC: 374 UG/DL (ref 228–428)

## 2018-06-21 NOTE — PROGRESS NOTES
PATIENTS ONCOLOGY RECORD LOCATED IN [facility]      Subjective     Name:  ANDREAS SIMENTAL     Date:  2018  Address:  [address] [place] [address]  Home: [phone]  :  1960 AGE:  57 y.o.        RECORDS OBTAINED:  Patients Oncology Record is located in [facility]

## 2018-07-26 ENCOUNTER — HOSPITAL ENCOUNTER (OUTPATIENT)
Dept: ONCOLOGY | Facility: CLINIC | Age: 58
Setting detail: INFUSION SERIES
Discharge: HOME | End: 2018-07-26
Attending: INTERNAL MEDICINE | Admitting: INTERNAL MEDICINE

## 2018-07-26 ENCOUNTER — CLINICAL SUPPORT (OUTPATIENT)
Dept: ONCOLOGY | Facility: HOSPITAL | Age: 58
End: 2018-07-26

## 2018-07-26 NOTE — PROGRESS NOTES
PATIENTS ONCOLOGY RECORD LOCATED IN [facility]      Subjective     Name:  ANDREAS SIMENTAL     Date:  2018  Address:  [address] [place] [address]  Home: [phone]  :  1960 AGE:  57 y.o.        RECORDS OBTAINED:  Patients Oncology Record is located in [facility]

## 2018-08-23 ENCOUNTER — HOSPITAL ENCOUNTER (OUTPATIENT)
Dept: ONCOLOGY | Facility: CLINIC | Age: 58
Setting detail: INFUSION SERIES
Discharge: HOME | End: 2018-08-23
Attending: INTERNAL MEDICINE | Admitting: INTERNAL MEDICINE

## 2018-08-23 ENCOUNTER — CLINICAL SUPPORT (OUTPATIENT)
Dept: ONCOLOGY | Facility: HOSPITAL | Age: 58
End: 2018-08-23

## 2018-08-23 NOTE — PROGRESS NOTES
PATIENTS ONCOLOGY RECORD LOCATED IN [facility]      Subjective     Name:  ANDREAS SIMENTAL     Date:  2018  Address:  [address] [place] [address]  Home: [phone]  :  1960 AGE:  57 y.o.        RECORDS OBTAINED:  Patients Oncology Record is located in [facility]

## 2018-09-18 ENCOUNTER — HOSPITAL ENCOUNTER (OUTPATIENT)
Dept: ONCOLOGY | Facility: CLINIC | Age: 58
Setting detail: INFUSION SERIES
Discharge: HOME | End: 2018-09-18
Attending: INTERNAL MEDICINE | Admitting: INTERNAL MEDICINE

## 2018-09-18 ENCOUNTER — CLINICAL SUPPORT (OUTPATIENT)
Dept: ONCOLOGY | Facility: HOSPITAL | Age: 58
End: 2018-09-18

## 2018-09-18 ENCOUNTER — HOSPITAL ENCOUNTER (OUTPATIENT)
Dept: ONCOLOGY | Facility: HOSPITAL | Age: 58
Discharge: HOME | End: 2018-09-18
Attending: INTERNAL MEDICINE | Admitting: INTERNAL MEDICINE

## 2018-09-18 LAB
ALBUMIN SERPL-MCNC: 3.7 G/DL (ref 3.5–4.8)
ALBUMIN/GLOB SERPL: 1.5 {RATIO} (ref 1–1.7)
ALP SERPL-CCNC: 71 IU/L (ref 32–91)
ALT SERPL-CCNC: 22 IU/L (ref 14–54)
ANION GAP SERPL CALC-SCNC: 12.7 MMOL/L (ref 10–20)
AST SERPL-CCNC: 28 IU/L (ref 15–41)
BILIRUB SERPL-MCNC: 1 MG/DL (ref 0.3–1.2)
BUN SERPL-MCNC: 17 MG/DL (ref 8–20)
BUN/CREAT SERPL: 15.5 (ref 5.4–26.2)
CALCIUM SERPL-MCNC: 9.2 MG/DL (ref 8.9–10.3)
CHLORIDE SERPL-SCNC: 105 MMOL/L (ref 101–111)
CONV CO2: 27 MMOL/L (ref 22–32)
CONV TOTAL PROTEIN: 6.2 G/DL (ref 6.1–7.9)
CREAT UR-MCNC: 1.1 MG/DL (ref 0.4–1)
GLOBULIN UR ELPH-MCNC: 2.5 G/DL (ref 2.5–3.8)
GLUCOSE SERPL-MCNC: 164 MG/DL (ref 65–99)
POTASSIUM SERPL-SCNC: 3.7 MMOL/L (ref 3.6–5.1)
SODIUM SERPL-SCNC: 141 MMOL/L (ref 136–144)

## 2018-09-18 NOTE — PROGRESS NOTES
PATIENTS ONCOLOGY RECORD LOCATED IN [facility]      Subjective     Name:  ANDREAS SIMENTAL     Date:  2018  Address:  [address] [place] [address]  Home: [phone]  :  1960 AGE:  57 y.o.        RECORDS OBTAINED:  Patients Oncology Record is located in [facility]

## 2018-10-16 ENCOUNTER — HOSPITAL ENCOUNTER (OUTPATIENT)
Dept: ONCOLOGY | Facility: HOSPITAL | Age: 58
Discharge: HOME | End: 2018-10-16
Attending: NURSE PRACTITIONER | Admitting: NURSE PRACTITIONER

## 2018-10-16 ENCOUNTER — CLINICAL SUPPORT (OUTPATIENT)
Dept: ONCOLOGY | Facility: HOSPITAL | Age: 58
End: 2018-10-16

## 2018-10-16 ENCOUNTER — HOSPITAL ENCOUNTER (OUTPATIENT)
Dept: ONCOLOGY | Facility: CLINIC | Age: 58
Setting detail: INFUSION SERIES
Discharge: HOME | End: 2018-10-16
Attending: NURSE PRACTITIONER | Admitting: NURSE PRACTITIONER

## 2018-10-16 NOTE — PROGRESS NOTES
PATIENTS ONCOLOGY RECORD LOCATED IN [facility]      Subjective     Name:  ANDREAS SIMENTAL     Date:  10/16/2018  Address:  [address] [place] [address] [place] [address]  Home: [phone]  :  1960 AGE:  58 y.o.        RECORDS OBTAINED:  Patients Oncology Record is located in [facility]

## 2018-11-29 ENCOUNTER — CLINICAL SUPPORT (OUTPATIENT)
Dept: ONCOLOGY | Facility: HOSPITAL | Age: 58
End: 2018-11-29

## 2018-11-29 ENCOUNTER — HOSPITAL ENCOUNTER (OUTPATIENT)
Dept: ONCOLOGY | Facility: CLINIC | Age: 58
Setting detail: INFUSION SERIES
Discharge: HOME | End: 2018-11-29
Attending: INTERNAL MEDICINE | Admitting: INTERNAL MEDICINE

## 2018-11-29 NOTE — PROGRESS NOTES
PATIENTS ONCOLOGY RECORD LOCATED IN [facility]      Subjective     Name:  ANDREAS SIMENTAL     Date:  2018  Address:  [address] [place] [address]  Home: [unfilled]  :  1960 AGE:  58 y.o.        RECORDS OBTAINED:  Patients Oncology Record is located in [facility]

## 2018-12-24 ENCOUNTER — CLINICAL SUPPORT (OUTPATIENT)
Dept: ONCOLOGY | Facility: HOSPITAL | Age: 58
End: 2018-12-24

## 2018-12-24 ENCOUNTER — HOSPITAL ENCOUNTER (OUTPATIENT)
Dept: ONCOLOGY | Facility: CLINIC | Age: 58
Setting detail: INFUSION SERIES
Discharge: HOME | End: 2018-12-24
Attending: INTERNAL MEDICINE | Admitting: INTERNAL MEDICINE

## 2018-12-24 NOTE — PROGRESS NOTES
PATIENTS ONCOLOGY RECORD LOCATED IN [facility]      Subjective     Name:  ANDREAS SIMENTAL     Date:  2018  Address:  [address] [place] [address]  Home: [unfilled]  :  1960 AGE:  58 y.o.        RECORDS OBTAINED:  Patients Oncology Record is located in [facility]

## 2019-01-21 ENCOUNTER — CLINICAL SUPPORT (OUTPATIENT)
Dept: ONCOLOGY | Facility: HOSPITAL | Age: 59
End: 2019-01-21

## 2019-01-21 ENCOUNTER — HOSPITAL ENCOUNTER (OUTPATIENT)
Dept: ONCOLOGY | Facility: HOSPITAL | Age: 59
Discharge: HOME | End: 2019-01-21
Attending: INTERNAL MEDICINE | Admitting: INTERNAL MEDICINE

## 2019-01-21 ENCOUNTER — HOSPITAL ENCOUNTER (OUTPATIENT)
Dept: ONCOLOGY | Facility: CLINIC | Age: 59
Setting detail: INFUSION SERIES
Discharge: HOME | End: 2019-01-21
Attending: INTERNAL MEDICINE | Admitting: INTERNAL MEDICINE

## 2019-01-21 LAB
ALBUMIN SERPL-MCNC: 3.9 G/DL (ref 3.5–4.8)
ALBUMIN/GLOB SERPL: 1.3 {RATIO} (ref 1–1.7)
ALP SERPL-CCNC: 87 IU/L (ref 32–91)
ALT SERPL-CCNC: 22 IU/L (ref 14–54)
ANION GAP SERPL CALC-SCNC: 15.5 MMOL/L (ref 10–20)
AST SERPL-CCNC: 29 IU/L (ref 15–41)
BILIRUB SERPL-MCNC: 0.5 MG/DL (ref 0.3–1.2)
BUN SERPL-MCNC: 17 MG/DL (ref 8–20)
BUN/CREAT SERPL: 17 (ref 5.4–26.2)
CALCIUM SERPL-MCNC: 9.3 MG/DL (ref 8.9–10.3)
CHLORIDE SERPL-SCNC: 104 MMOL/L (ref 101–111)
CONV CO2: 23 MMOL/L (ref 22–32)
CONV TOTAL PROTEIN: 6.8 G/DL (ref 6.1–7.9)
CREAT UR-MCNC: 1 MG/DL (ref 0.4–1)
GLOBULIN UR ELPH-MCNC: 2.9 G/DL (ref 2.5–3.8)
GLUCOSE SERPL-MCNC: 150 MG/DL (ref 65–99)
POTASSIUM SERPL-SCNC: 3.5 MMOL/L (ref 3.6–5.1)
SODIUM SERPL-SCNC: 139 MMOL/L (ref 136–144)

## 2019-01-21 NOTE — PROGRESS NOTES
PATIENTS ONCOLOGY RECORD LOCATED IN [facility]      Subjective     Name:  ANDREAS SIMENTAL     Date:  2019  Address:  [address] [place] [address]  Home: [unfilled]  :  1960 AGE:  58 y.o.        RECORDS OBTAINED:  Patients Oncology Record is located in [facility]

## 2019-02-19 ENCOUNTER — CLINICAL SUPPORT (OUTPATIENT)
Dept: ONCOLOGY | Facility: HOSPITAL | Age: 59
End: 2019-02-19

## 2019-02-19 ENCOUNTER — HOSPITAL ENCOUNTER (OUTPATIENT)
Dept: ONCOLOGY | Facility: CLINIC | Age: 59
Setting detail: INFUSION SERIES
Discharge: HOME | End: 2019-02-19
Attending: INTERNAL MEDICINE | Admitting: INTERNAL MEDICINE

## 2019-02-19 NOTE — PROGRESS NOTES
PATIENTS ONCOLOGY RECORD LOCATED IN [facility]      Subjective     Name:  ANDREAS SIMENTAL     Date:  2019  Address:  [address] [place] [address]  Home: [unfilled]  :  1960 AGE:  58 y.o.        RECORDS OBTAINED:  Patients Oncology Record is located in [facility]

## 2019-03-26 ENCOUNTER — HOSPITAL ENCOUNTER (OUTPATIENT)
Dept: ONCOLOGY | Facility: CLINIC | Age: 59
Setting detail: INFUSION SERIES
Discharge: HOME | End: 2019-03-26
Attending: INTERNAL MEDICINE | Admitting: INTERNAL MEDICINE

## 2019-03-26 ENCOUNTER — CLINICAL SUPPORT (OUTPATIENT)
Dept: ONCOLOGY | Facility: HOSPITAL | Age: 59
End: 2019-03-26

## 2019-03-26 NOTE — PROGRESS NOTES
PATIENTS ONCOLOGY RECORD LOCATED IN [facility]      Subjective     Name:  ANDREAS SIMENTAL     Date:  2019  Address:  [address] [place] [address]  Home: [unfilled]  :  1960 AGE:  58 y.o.        RECORDS OBTAINED:  Patients Oncology Record is located in [facility]

## 2019-04-29 ENCOUNTER — CLINICAL SUPPORT (OUTPATIENT)
Dept: ONCOLOGY | Facility: HOSPITAL | Age: 59
End: 2019-04-29

## 2019-04-29 ENCOUNTER — HOSPITAL ENCOUNTER (OUTPATIENT)
Dept: ONCOLOGY | Facility: CLINIC | Age: 59
Setting detail: INFUSION SERIES
Discharge: HOME | End: 2019-04-29
Attending: INTERNAL MEDICINE | Admitting: INTERNAL MEDICINE

## 2019-04-29 NOTE — PROGRESS NOTES
PATIENTS ONCOLOGY RECORD LOCATED IN [facility]      Subjective     Name:  ANDREAS SIMENTAL     Date:  2019  Address:  [address] [place] [address] [place] [address]  Home: [unfilled]  :  1960 AGE:  58 y.o.        RECORDS OBTAINED:  Patients Oncology Record is located in [facility]

## 2019-06-03 ENCOUNTER — HOSPITAL ENCOUNTER (OUTPATIENT)
Dept: ONCOLOGY | Facility: HOSPITAL | Age: 59
Discharge: HOME | End: 2019-06-03
Attending: NURSE PRACTITIONER | Admitting: NURSE PRACTITIONER

## 2019-06-03 ENCOUNTER — HOSPITAL ENCOUNTER (OUTPATIENT)
Dept: ONCOLOGY | Facility: CLINIC | Age: 59
Setting detail: INFUSION SERIES
Discharge: HOME | End: 2019-06-03
Attending: NURSE PRACTITIONER | Admitting: NURSE PRACTITIONER

## 2019-06-03 LAB
ALBUMIN SERPL-MCNC: 3.6 G/DL (ref 3.5–4.8)
ALBUMIN/GLOB SERPL: 1.1 {RATIO} (ref 1–1.7)
ALP SERPL-CCNC: 80 IU/L (ref 32–91)
ALT SERPL-CCNC: 16 IU/L (ref 14–54)
ANION GAP SERPL CALC-SCNC: 15.6 MMOL/L (ref 10–20)
AST SERPL-CCNC: 21 IU/L (ref 15–41)
BILIRUB SERPL-MCNC: 0.6 MG/DL (ref 0.3–1.2)
BUN SERPL-MCNC: 14 MG/DL (ref 8–20)
BUN/CREAT SERPL: 15.6 (ref 5.4–26.2)
CALCIUM SERPL-MCNC: 8.8 MG/DL (ref 8.9–10.3)
CEA SERPL-MCNC: 0.7 NG/ML (ref 0–5)
CHLORIDE SERPL-SCNC: 104 MMOL/L (ref 101–111)
CONV CO2: 22 MMOL/L (ref 22–32)
CONV TOTAL PROTEIN: 7 G/DL (ref 6.1–7.9)
CREAT UR-MCNC: 0.9 MG/DL (ref 0.4–1)
GLOBULIN UR ELPH-MCNC: 3.4 G/DL (ref 2.5–3.8)
GLUCOSE SERPL-MCNC: 89 MG/DL (ref 65–99)
POTASSIUM SERPL-SCNC: 3.6 MMOL/L (ref 3.6–5.1)
SODIUM SERPL-SCNC: 138 MMOL/L (ref 136–144)

## 2019-06-21 ENCOUNTER — OFFICE VISIT (OUTPATIENT)
Dept: ONCOLOGY | Facility: CLINIC | Age: 59
End: 2019-06-21

## 2019-06-21 VITALS
SYSTOLIC BLOOD PRESSURE: 171 MMHG | DIASTOLIC BLOOD PRESSURE: 79 MMHG | TEMPERATURE: 98.2 F | RESPIRATION RATE: 18 BRPM | HEART RATE: 96 BPM | WEIGHT: 138 LBS | BODY MASS INDEX: 25.4 KG/M2 | HEIGHT: 62 IN

## 2019-06-21 DIAGNOSIS — T50.8X5D ALLERGIC REACTION TO CONTRAST MATERIAL, SUBSEQUENT ENCOUNTER: ICD-10-CM

## 2019-06-21 DIAGNOSIS — D64.9 ANEMIA, UNSPECIFIED TYPE: ICD-10-CM

## 2019-06-21 DIAGNOSIS — C18.7 MALIGNANT NEOPLASM OF SIGMOID COLON (HCC): Primary | ICD-10-CM

## 2019-06-21 PROBLEM — C18.9 CANCER OF COLON (HCC): Status: ACTIVE | Noted: 2019-06-21

## 2019-06-21 PROBLEM — T50.8X5A ALLERGIC REACTION TO CONTRAST DYE: Status: ACTIVE | Noted: 2019-06-21

## 2019-06-21 PROCEDURE — 99215 OFFICE O/P EST HI 40 MIN: CPT | Performed by: INTERNAL MEDICINE

## 2019-06-21 PROCEDURE — G0463 HOSPITAL OUTPT CLINIC VISIT: HCPCS | Performed by: INTERNAL MEDICINE

## 2019-06-21 RX ORDER — ATROPINE SULFATE 1 MG/ML
0.25 INJECTION, SOLUTION INTRAMUSCULAR; INTRAVENOUS; SUBCUTANEOUS
Status: CANCELLED | OUTPATIENT
Start: 2019-07-08

## 2019-06-21 RX ORDER — FLUOROURACIL 50 MG/ML
400 INJECTION, SOLUTION INTRAVENOUS ONCE
Status: CANCELLED | OUTPATIENT
Start: 2019-07-08

## 2019-06-21 RX ORDER — PALONOSETRON 0.05 MG/ML
0.25 INJECTION, SOLUTION INTRAVENOUS ONCE
Status: CANCELLED | OUTPATIENT
Start: 2019-07-08

## 2019-06-21 RX ORDER — SODIUM CHLORIDE 9 MG/ML
250 INJECTION, SOLUTION INTRAVENOUS ONCE
Status: CANCELLED | OUTPATIENT
Start: 2019-07-08

## 2019-06-21 NOTE — PROGRESS NOTES
Subjective /cc:Patient is here to follow up on his colon cancer. VINI Fischer present during office visit. Reports to pain in right sided chest wall. Its almost always painful and gets worse with deep breaths and sneezing. Nothing seems to make it better.       History of Present Illness   1. Colon cancer with liver metastasis diagnosis established January 2012.  • Ms. Massey claimed to have developed intermittent diarrhea and constipation in July 2011. She eventually saw Dr. Diana Spencer in January 2012. A chemistry profile revealed normal liver enzymes.  CEA on 1/2/12 was 5.9 ng/mL (< 5, smoker); hemoglobin was 12.1 gm/dL, and MCV 86.2 fl.  A CT scan of the abdomen and pelvis was performed on 1/2/12 revealing innumerable mass lesions throughout the liver, thickening of the distal sigmoid colon wall, nonspecific non-pathologically enlarged lymph nodes in the sigmoid mesentery and left iliac chain. She was referred to Dr. Regina Pack and had a repeat CEA done on 1/9/12 revealing a value of 7.5 ng/mL. A colonoscopy was performed on 1/9/12 by Dr. Pack revealing 10 cm distal colon mass, which on biopsy revealed the presence of invasive moderately differentiated adenocarcinoma which demonstrated micro satellite stability. The patient was hospitalized on 1/13/12 and underwent laparoscopic low anterior resection with mobilization of splenic flexure, which on pathology revealed invasive, moderate to focally poorly differentiated adenocarcinoma of the rectosigmoid colon with invasion through muscularis propria and extension into mesocolon. The surgical margins were free of tumor. Mesenteric lymph nodes, 14, were negative for metastatic tumor. Lymphovascular invasion was not identified. An [id number] was placed on 1/17/12 and a CT-guided liver biopsy was performed the same day by ·Dr. Hernandez Espino. Pathology revealed metastatic adenocarcinoma consistent with colonic primary. The patient was then  referred here for further evaluation.  • 2/13/12 - Order to start chemotherapy to consist of Leucovorin 500mg IV day 1, Oxaliplatin 120 mg IV day1, Avastin 230 mg IV day 1, 5FU 575mg IV day 1 followed by 345 mg CIV, cycle q 2 weeks.  • 2/13/12 - CEA 5.6 (H).  • 2/13/12 - Chest x-ray revealed no acute cardiopulmonary disease.  • 2/17/12 - CT abdomen and pelvis revealed no evidence of metastatic disease of the chest, innumerable hepatic metastases.  Bowel staple line at the rectosigmoid junction with adjacent stranding of fat consistent with postoperative change.  • 2/18/12 - KRAS testing performed on rectum biopsy: no mutation detected.  • 2/21/12 - Patient started Chemotherapy cycle #1 consisting of Avastin, Leucovorin, 5FU, and Oxaliplatin.  • 2/23/12 - CT chest revealed no pulmonary embolus, no active lung disease. Hepatic nodularity.  • 2/24/12 - Per note from [facility] patient enrolled in clinical trail per Dr. Eldridge randomized to the control arm of the study. Which consist of standard FOLFOX chemotherapy that will be given at our office.  • 2124/12 - PET scan revealed interval resection of known primary colonic carcinoma at the rectosigmoid junction without evidence of residual tumor in this location. The patient is believed to be status post resection of the previously described left internal iliac lymph node without evidence of hypermetabolic metastatic lymphadenopathy. Innumerable hypermetabolic hepatic metastases.  • 3/6/12 - Patient started Chemotherapy cycle #2 consisting of Avastin, Leucovorin, 5FU, and Oxaliplatin  • 3/20/12 - Patient   started   Chemotherapy    cycle   #3     consisting    of   Avastin, leucovorin, 5FU, and Oxaliplatin  • 4/3/12 - Patient started Chemotherapy cycle #4 consisting of Avastin, Leucovorin, 5FU, and Oxaliplatin  • 4/3/12 - Orders written to decrease chemotherapy dose by 20% at her next cycle and to give Neulasta injection day after each cycle.  Hemoglobin 11.9.  • 4/3112 -  CEA 1.0 (N).  • 4/6/12 - CT C/A/P - Chest - unremarkable, Abdomen/Pelvis - Overall moderate to marked decrease in the size of the multiple hepatic metastatic lesions. This is when compared to a study of 1/2/12. Post-op changes in the region of the rectosigmoid junction Small amount of free fluid in the cul-de-sac with a small amount of fluid apparently within the endometrial cavity. These findings are not unusual premenopausal.                                                                                           • 4/17/12 - Patient started on cycle 5 of Oxaliplatin, Leucovorin, 5-FU, Avastin.  • 5/1/12 - Patient started on cycle 6 of Oxaliplatin, Leucovorin, 5-FU, Avastin.  • 5/14/12 - CEA 1.0 (N).  • 5/17/12 - Patient started on cycle 7 of Oxaliplatin, Leucovorin, 5-FU, Avastin.  • 5/29/12 - 5/31/12 - Patient admitted to [facility] due to acute febrile illness, sepsis syndrome, urinary tract infection, CA colon with liver mets, leukocytosis, hypotension, Elevated liver function tests, and acute kidney injury. TSH 8.66 (H), Ferritin 528(H), Folate 24.8 (H), Haptoglobin 312 (H), Iron 40 (N), Hep panel negative, Relic count1.42 (N), TIBC 281 (N), 812 1500(H), Lipase 22(N). Chest x-ray revealed no acute cardiopulmonary abnormality.  • 5/29/12 - Patient started on cycle 8 of Oxaliplatin, Leucovorin, 5-FU, Avastin.  • 6/13/12 - Patient started on cycle 9 of Oxaliplalin, Leucovorin, 5-FU, Avastin.  • 6/25/12 - CT scan of the abdomen and pelvis with hepatic lesions appearing smaller as compared to the prior study, which could relate to underlying metastatic disease. Sclerotic lesion of the right iliac bone that was present previously and could relate to bone mets. Postsurgical change.  • 6/26/12 - Patient started on cycle 10 of Oxaliplatin, Leucovorin, 5-FU, Avastin.  • 7/10/12 - Patient started on cycle 11 of Leucovorin, 5-FU, Oxaliplatin, Avaslin.  • 7/17/12 - Bone scan no evidence of metastatic disease including  sclerotic lesion right Iliac area.  • 7/24/12 - CEA 0.7.  • 7/24/12 - Patient started on cycle 12 of Leucovorin, 5-FU, Oxaliplatin, Avastin.  • 8/7/12 - Patient started on cycle 13 of Leucovorin, 5-FU, Oxaliplatin, Avastin.  • 8/21/12 - Patient started on cycle 14 of Leucovorin, 5-FU, Oxaliplatin, Avastin.  • 8/28/12 - CT scan of the abdomen and pelvis revealed stable size and appearance of multiple hypodense hepatic lesions, likely representing metastatic disease. Otherwise stable abdomen and pelvis.  • 8/23/12 - CEA 0.8 (N).  • 9/4/12 - Orders written to continue chemotherapy until seen by Dr. Eldridge.  • 9/11/12 - Patient started on cycle 15 of Leucovorin, 5-FU, Oxaliplatin, Avastin.  • 9/11/12 - Orders written to give chemotherapy today with a dose reduction of 20% due to low ANC.  • 9/18/12 - Orders written to discontinue chemotherapy and start maintenance Xeloda 1500 mg po bid x 14 days cycle q 3 weeks, per Dr. Eldridge.  • 11/7/12 - Patient evaluation by Dr. Montoya which recommended hepatic directed radiation therapy. She underwent TheraSpheres targeting the right lobe. Will treat the left hepatic lobe in 3 to 4 weeks if she is able to tolerate this procedure.  • 12/20/12 - CEA 1.3 (N).  • 12/31/12 - CT scan of the abdomen and pelvis revealed that there has been a significant partial response in both lobes of the liver.  Activity of remaining lesions is uncertain and could be assessed by follow-up studies for PET/CT correlation.  Heterogeneous enhancement of the right lobe of the liver is consistent with previous embolization therapy.  No evidence of extrahepatic metastatic disease.  Focal non-enhancement and bulging of the lateral wall of the gallbladder body.       • 1/22/13 - CEA 0.8.  • 1/22/13 - AST 52, ALT 47, ALK PHOS 157.  • 3/25/13 - Xeloda dose decreased to 1,250 mg p.o. b.i.d. x 14 days to cycle every three weeks due to hand foot syndrome.  • 4/27/13 - CT scan of abdomen and pelvis revealed overall  continued improvement in hepatic metastatic disease.  The single largest lesion in the right lobe is stable in appearance when compared to prior study although other lesions within the liver are more hypodense and some are smaller suggesting continued response to treatment.  Many of these lesions may no longer harbor viable malignancy.  A follow-up PET/CT scan could be performed to further evaluate the full extent of the viable metastasis versus continued attenuation on CT follow-up.  Contracted gallbladder with either focal areas of bulging of the wall or necrosis.  The appearance of the current exam is more suggesting a bulging rather than necrosis.  • 5/6/13 - Order written to decrease Xeloda to 1000 mg by mouth twice a day ×14 days every three weeks due to hand-foot syndrome and chemotherapy induced cytopenias and diarrhea.  • 5/28/13 - PET/CT scan negative for hypermetabolic foci in the neck, chest, abdomen, or pelvis to suggest recurrent, progressive, or metastatic tumor.  • 7/9/13 - CEA 2.4 (N).  • 8/5/13 - CT of the chest showed no evidence of metastatic disease.  CT scan of abdomen and pelvis show continued improvement in metastatic disease involving both lobes of the liver.  No evidence of extrahepatic metastatic disease in the abdomen or pelvis.  The gallbladder is contracted.  The previously suspected defect of the gallbladder wall against the liver surface is smaller.  There is no evidence of gallbladder leak into the peritoneal cavity.  Contained perforation is not excluded but is increasingly less likely.  • 12/7/13 - CT scan of the abdomen and pelvis revealed stable appearance of hepatic metastatic disease, most of which is likely treated.  A PET/CT scan could be performed to evaluate for any sites of active metastatic disease.  No evidence of recurrent disease at the anastomosis.  The gallbladder is contracted and not optimally evaluated on this exam, but is stable in appearance compared to prior  exam.  • 12/23/13 - CEA 1.4 normal.  • 4/28/14 - CEA 1.6 (N).  • 6/26/14 - Patient underwent a colonoscopy by Dr. Pack, which revealed no evidence of recurrence.  • 7/15/14 - Patient advised to continue chemotherapy with Xeloda.  • 7/30/14 - Echocardiogram revealed left ventricular size and contractility is within normal limits and ejection fraction is 55-60%.  • 8/18/14 - CT scan of the abdomen and pelvis revealed stable previously treated metastases in both lobes of the liver.  The activity of individual lesions is not determined and may be assessed by follow-up CT scans are PET/CT correlation.  No evidence of extrahepatic metastatic disease.  No signs of local tumor recurrence at the bowel resection site.  • 8/24/14 - Xeloda discontinued due to remission of disease.  • 9/16/14 - CEA 1.0 (N).  • 11/26/14 - CEA 1  • 12/23/14 - CT abdomen and pelvis with contrast: There are several tiny low attenuation lesions within the liver, but overall are believed to be unchanged compared to the prior exam in April 2013.  • 12/23/14 - CXR: No acute process seen.  • 12/23/14 - CT abdomen and pelvis with several tiny low attenuation lesions in the liver, unchanged in comparison to prior CT’s.  Chest x-ray with no acute process seen.    • 5/26/15 - WBC 6, hemoglobin 12.1, platelet count 173,000, MCV 85.8.  • 6/25/15 - CT scan of the abdomen and pelvis, right lobe of the liver is decreased in size and the margin is nodular. Three metal densities in the area of the hepatic artery. Duodenal lipoma. Gallbladder contracted.   • 8/24/15 - CEA 0.8 (0-5). Comprehensive metabolic panel normal.    • 2/15/16 - CEA 1.0 (0-5). Comprehensive metabolic panel with potassium 3.3 (3.6-5.1). Patient asked to go on potassium supplements but wanted to try high potassium diet.     • 8/15/16 - Patient advised to have the Infusaport removed if CT is stable. CEA 0.9 (N). CMP normal, creatinine 1.0. 9/2/16 - CT scan of the chest, abdomen and pelvis  without contrast revealed stable chest CT with no evidence of mediastinal, hilar or pulmonary metastases or mass. Interval improvement of the appearance of the liver with no hepatic metastases seen and no evidence of adrenal enlargement or upper abdominal lymphadenopathy. Post treatment change was noted to the right lobe of the liver and hepatic artery, stable duodenal lipoma, moderate stool suggesting constipation.   • 10/6/16 - Infusaport removal by Dr. Pack.   • 12/22/16 - WBC 5.17, hemoglobin 13.2, MCV 86.5, platelets 234,000, ANC 3.3.   • 4/21/17 - CEA 0.9 (0-3). CMP with insignificant abnormalities - creatinine 1.1.   • 8/23/17 - WBC 6.79, hemoglobin 11.7, MCV 86.8, platelets 185,000, ANC 3.78.    • 9/27/17 - CT chest, abdomen and pelvis with mild coronary artery calcification, enlarging low-attenuation ill-defined mass in the left hepatic lobe. More ill-defined area of hypoattenuation within liver segments 2-3. Evidence of partial distal colon resection with patent anastomosis.   • 10/16/17 - CT-guided liver biopsy by Jay Baer M.D. revealed on pathology metastatic adenocarcinoma consistent with colon primary.   • 10/25/17 - Orders written for mFOLFOX6 plus Bevacizumab, Oxaliplatin 85 mg/M2 IV piggyback day 1, Leucovorin 400 mg/M2 IV piggyback day 1, 5- mg/M2 IV piggyback day 1 then 2400 mg/M2 CIV over 46 hours, Bevacizumab 5 mg/kg IV day 1 to repeat cycle every two weeks. CEA 1.2 (0-5).    • 11/2/17 - Infusaport placement under fluoroscopic guidance by Regina Pack M.D.   • 11/13/17 - MRI abdomen with 5.8 cm heterogenously enhancing lesion within hepatic segment 4 compatible with recently-biopsied lesion. Just adjacent to this is an 8 mm satellite lesion. No hepatic lesion identified within segments 2 or 3.   • 11/17/17 - PET scan with large hypermetabolic mass in liver segment 4 compatible with metastasis.   • 11/27/17 - Patient received cycle 1 chemotherapy.   • 12/11/17 - Patient  received cycle 2 chemotherapy.   • 12/13/17 - Patient called complaining of feeling of her jaw being tight and tongue thick along with some leg cramps on the prior day, 12/12/17. The patient took Benadryl and reported the symptoms improved throughout the day.   • 12/20/17 - Patient reports fever following both chemotherapy infusions. She reports a temperature of 101 post the 12/11/17 infusion that resolved; however, the patient continued to have flu-like achiness for several days afterward. Blood cultures ordered to be obtained with next port access. Dexamethasone 4 mg p.o. b.i.d. x3 days on days 2-4 of chemotherapy ordered due to neuropathies with cramping of the jaw and some nausea. Z-Pack prescribed for URI and Benzonatate prescribed for cough.   • 12/27/17 - Patient seen in followup by Doug Eldridge M.D. with plans of repeating CT scan of the liver post third FOLFOX treatment.   • 1/2/18 - Creatinine 1.0 (0.4-1.0).  Patient received cycle 3 chemotherapy.   • 1/8/18 - Chemotherapy delayed and Nephrology consulted for acute rise in creatinine from 1.0 to 2.5.   • 1/10/18 - Creatinine 2.1 (0.4-1.0).    • 1/15/18 - Creatinine 1.6 (0.4-1.0).    • 1/16/18 - Received orders from Dr. Riojas’s office for patient to receive Mucomyst 1200 mg p.o. b.i.d. for four days starting the day before CT scans in the future as well as IV hydration with normal saline 150 cc per hour for two hours prior to procedure and for four hours postprocedure.   • 1/22/18 - Current chemotherapy discontinued. New chemotherapy orders written for Irinotecan 180 mg/M2 IV piggyback day 1, Leucovorin 400 mg/M2 IV piggyback day 1, 5- mg/M2 IV piggyback day 1 followed by 2400 mg/M2 CIV days 1-3 and Bevacizumab 5 mg/kg IV day 1 to repeat cycle every two weeks. Chemotherapy to start post CT abdomen to determine whether patient would go for surgery now or later. Creatinine 1.3 (0.4-1.0).    • 1/29/18 - CT chest with new 2 mm nodule in the right  middle lobe and 6 mm short axis anterior epicardial lymph node. CT abdomen and pelvis with two new metastases of the left lobe of the liver, largest measuring 4.4 cm.   • 2/5/18 - Chemotherapy placed on hold pending surgical resection of liver lesions.   • 2/8/18 - Patient underwent diagnostic laparoscopy, laparoscopic intraoperative ultrasound of the liver, exploratory laparotomy, left hepatectomy, intraoperative ultrasound and omental pedicle flap by Doug Eldridge II, M.D. at [facility] with pathology revealing the left hepatic lobectomy specimen to contain a single nodule of metastatic adenocarcinoma consistent with colonic primary 4.5 cm in size with surgical margins negative for carcinoma. Tumor consisted of 30% viable tumor, 60% necrosis and 10% peripheral fibrosis.   • 3/1/18 - Discussed options in detail with the patient. Mutually decided to treat with adjuvant Xeloda for six months at a dose of 1000 mg p.o. b.i.d. x14 days every 21 days based on her previous dose that she tolerated. CEA 0.8 (0-5). Liver enzymes normal.    • 3/1/18 - Patient started cycle 1 chemotherapy.   • 5/12/18 - CT chest with stable punctate indeterminate right upper lobe and right middle lobe nodules and abdomen and pelvis with interval left partial hepatectomy with no signs of active disease in the liver.   • 5/31/18 - Patient claims to be taking Xeloda two weeks on, three weeks off. Asked to change to two weeks on, one week off. Patient starting cycle 3 Capecitabine (Xeloda).    • 6/21/18 - Reporting grade I palmar erythema with Capecitabine. Advised to use Urea Lotion t.i.d., cold therapy and to call for worsening symptoms.   • 7/28/18 - CT abdomen and pelvis at [facility] revealed slight increasing geographic areas of low attenuation within the inferior aspect of the hepatic segment 5 without obvious well-defined lesions and areas of hepatic steatosis are more likely favored. No evidence of metastatic disease  elsewhere in the abdomen or pelvis.   • 9/18/18 - Patient currently on cycle 6 Capecitabine (Xeloda). Asked to discontinue after completion of this cycle.  CEA 1.0 (0.5-1.5).   • 10/8/18 - [facility] changes noted in the chest, stable appearance of the liver without metastatic disease detected, development of small ventral hernia was noted without evidence of bowel obstruction, stable-appearing intraluminal fatty lesion of the duodenum was present.   • 1/21/19 - CEA 1.1 (0-3 non-smoker).    • 1/29/19 - BRAF negative performed on biopsy originally dated 10/16/17.   • 5/13/19 - CT chest, abdomen and pelvis with contrast at [facility] showed metastatic disease to the chest with a new conglomerate of likely necrotic subcarinal lymphadenopathy measuring 2.7 cm and new right hilar lymphadenopathy measuring 7 mm. There were multiple new right upper lobe lung nodules with solid appearance. One nodule measured 8 mm. A second nodule had a solid component measuring 5 mm. There was a subpleural nodule anteriorly in the right upper lobe that measured 7 mm and a new left lower lobe nodule measuring 5 mm. There was nodular contour of the liver with increased hyperdensity of the liver measuring 2.3 cm where it had previously measured 2 cm and an ill-defined region of hyperdensity anteriorly in the right lobe of the liver that was unchanged. There were periumbilical hernias containing mesenteric fat and loops of bowel without evidence of obstruction. There was a lipoma in the duodenum. There were no mesenteric or retroperitoneal lymphadenopathy present. There was evidence of a probable mesenteric implant at the right upper quadrant abdomen adjacent to the right hemidiaphragm measuring 2.3 x 2.1 cm. This had increased in size from the prior exam and was subtly seen on the prior exam measuring 8 mm. Patient seen by Dr. Doug Eldridge at [facility] with note  stating there was evidence of pulmonary progression and possible abdominal progression with recommendation to followup with Medical Oncology to resume systemic chemotherapy.   • 5/16/19 - PET CT at [facility] showed hypermetabolic subcarinal mass concerning for metastatic disease. There were scattered pulmonary densities extending down to the right hilar region which were hypermetabolic ranging in SUV of 5.1 to 5.3. This was concerning for metastatic disease, although inflammatory process was considered a second possibility. There were several tiny nodular densities seen in the lungs which did not demonstrate significant metabolic activity but could be inflammatory or metastatic. There we intercostal masses on the right concerning for metastatic disease. There was a presumed peritoneal implant on the right upper quadrant between the liver and the diaphragm that was hypermetabolic and concerning for metastatic disease. There were some small subcentimeter lymph nodes in the left neck that were non-specific and indeterminate and felt to be reactive versus metastatic. There was hypermetabolic activity seen in the soft tissues and bone marrow.   • 6/3/19 - Reviewed recent CT scans and PET scan showing likely progression with mesenteric implants and likely metastatic lymphadenopathy and lung nodules. Patient with recent bronchitis and infectious symptoms. Will plan to give a course of antibiotics. Discussed CT-guided biopsy of the anterior right upper quadrant mesenteric mass to confirm presence of malignancy. Discussed past treatments and side effects with plan to pursue further systemic therapy once biopsy-proven progression is confirmed. If biopsy is negative will plan to follow on repeat scan.  CEA 0.7 (0-5).  • 6/13/19 CT-guided core biopsy of abdominal mesenteric soft tissue mass by Jay Baer MD revealed metastatic adenocarcinoma consistent with colon primary.  • 6/21/19 discussed results of biopsy with the  patient and recommended systemic chemotherapy.  Discussed starting FOLFOX 6 again versus FOLFIRI.  Given the fact that she did have elevation of renal function as well as neuropathy with FOLFOX in the past mutually decided to treat her with FOLFIRI plus bevacizumab.  2. Anemia and intermittent thrombocytopenia established February 2012.  • 2/13/12 - Relic count 0.8(N), Iron 14 (L), TIBC 245 (L), % sat 6 (L), Ferritin 376 (H), Hemoglobin 9.3 (L).  • 2/28/12 - Hemoglobin 8.8 (L), Retic count 1.1 (N), Iron 28(L), TIBC 311 (N), %sat 9 (L), Ferritin 320 (H), Vitamin b12 453, Folate > 1000(N), Haptoglobin 403 (H). Patient started on Procrit 40,000 units weekly.  • 5/29/12 - 5/31/12 - Patient admitted to [facility] due to acute febrile illness, sepsis syndrome, Urinary tract infection, CA colon with liver mets, leukocytosis, hypotension, Elevated liver function tests, and acute kidney Injury. TSH 8.66 (H), Ferritin 528(H), Folate 24.8 (H), Haptoglobin 312 (H), Iron 40 (N), Hep panel negative, Retic count 1.42 (N), TIBC 281 (N), 812 1500(H), Lipase 22(N),  • 9/4/12 - Hemoglobin 10.6.  • 10/02/12 - [id number], hematocrit 31.2  • 12/20/12 - Folate RBC Hematocrit 28.8 (N), Folate 782 (N). Retic 2.93 (H), Haptoglobin 54 (N), Iron 50 (N), TIBC 363 (N), Iron Sat% 14 (L), Vitamin 812 893 (N), Ferritin 296 (N)  • 7/9/13 - Hemoglobin 12.7, Hematocrit 35.8.  • 12/23/13 - Hemoglobin 13.3, hematocrit 37.1, MCV of 94.6.  Ferritin 280.4 (N).  • 7/15/14 - Hemoglobin 11.9, hematocrit 34.1, MCV 96.3.  • 11/16/15 - WBC 7.3, hemoglobin 12.1, platelet count 205,000. Anemia resolved.   • 8/15/16 - Ferritin 86 (N), folate 17.5 (N), haptoglobin 142 (N), retic 2.18 (H), Vitamin B12 747 (N), iron 49 (N),  (N), iron saturation 15% (N), SPEP with normal electrophoresis pattern.   • 12/20/17 - Hemoglobin 9.5. Anemia labs ordered. Procrit 40,000 units subq weekly ordered for chemotherapy-induced anemia. Iron 28 (), TIBC 348  "(228-428), iron saturation 8 (15-50), ferritin 293 (), folate >24.8 (5.9-24.8), Vitamin B12 of 516 (211-911), haptoglobin 105 (), retic count 1.2 (0.5-1.5).           • 3/1/18 - Ferritin 135 (), creatinine 1.1 (0.4-1.0).     • 6/21/18 - Hemoglobin 11.3, MCV 91.6. Ferritin 40 (), iron 52 (), TIBC 374 (228-428), iron saturation 14 (15-50).         Past Medical History, Past Surgical History, Social History, Family History have been reviewed and are without significant changes except as mentioned.    Review of Systems   Constitutional: Negative for activity change, chills and fever.   HENT: Negative for ear discharge, mouth sores, nosebleeds and sore throat.    Eyes: Negative for photophobia and visual disturbance.   Respiratory: Negative for cough, choking, shortness of breath and wheezing.         Pain in her right sided chest wall that feels deeper than that.    Cardiovascular: Negative for chest pain and palpitations.   Gastrointestinal: Negative for abdominal pain, diarrhea, nausea and vomiting.   Genitourinary: Negative for dysuria.   Musculoskeletal: Negative for neck stiffness.   Skin: Negative for rash.   Neurological: Negative for seizures, syncope and speech difficulty.   Psychiatric/Behavioral: Negative for agitation, confusion and hallucinations.      A comprehensive 14 point review of systems was performed and was negative except as mentioned.    Medications:  The current medication list was reviewed in the EMR    ALLERGIES:    Allergies   Allergen Reactions   • Iodinated Diagnostic Agents Hives and Itching     PT HAD SOME HIVES DURING SCAN.  PRIOR TO SCAN 8-3-2013.   • Latex Rash     Blisters    • Penicillins Hives       Objective      Vitals:    06/21/19 1153   BP: 171/79   Pulse: 96   Resp: 18   Temp: 98.2 °F (36.8 °C)   Weight: 62.6 kg (138 lb)   Height: 157.5 cm (62\")   PainSc: 10-Worst pain ever   PainLoc: Comment: right rib. comes and goes.     No flowsheet data " found.    Physical Exam   Constitutional: She is oriented to person, place, and time.   HENT:   Head: Normocephalic and atraumatic.   Dental fillings   Eyes: Conjunctivae and EOM are normal. Right eye exhibits no discharge. No scleral icterus.   Neck: Normal range of motion. Neck supple. No thyromegaly present.   Cardiovascular: Normal rate, regular rhythm and normal heart sounds.   No murmur heard.  Pulmonary/Chest: Effort normal. No stridor. No respiratory distress. She has no wheezes.   Abdominal: Soft. Bowel sounds are normal. She exhibits no mass. There is no tenderness. There is no rebound and no guarding.   vertical midline upper abdominal scar. Liver edges non-tender.    Musculoskeletal: Normal range of motion.   Lymphadenopathy:     She has no cervical adenopathy.   Neurological: She is alert and oriented to person, place, and time.   Skin: Skin is warm. No rash noted. No erythema.   Psychiatric: She has a normal mood and affect. Her behavior is normal.   Nursing note and vitals reviewed.       RECENT LABS:  Hematology WBC   Date Value Ref Range Status   06/13/2019 5.9 4.5 - 11.5 10*3/uL Final   02/12/2018 5.46 4.5 - 11.0 10*3/uL Final     RBC   Date Value Ref Range Status   06/13/2019 4.08 4.00 - 5.40 10*6/uL Final   02/12/2018 2.82 (L) 4.0 - 5.2 10*6/uL Final     Hemoglobin   Date Value Ref Range Status   06/13/2019 11.9 (L) 12.0 - 15.0 g/dL Final   02/12/2018 8.1 (L) 12.0 - 16.0 g/dL Final     Hematocrit   Date Value Ref Range Status   06/13/2019 35.0 35 - 49 % Final   02/12/2018 25.3 (L) 36.0 - 46.0 % Final     Platelets   Date Value Ref Range Status   06/13/2019 162 150 - 450 10*3/uL Final   02/12/2018 157 140 - 440 10*3/uL Final     Lab Results   Component Value Date    GLUCOSE 89 06/03/2019    BUN 14 06/03/2019    CREATININE 0.9 06/03/2019    BCR 15.6 06/03/2019    K 3.6 06/03/2019    CO2 22 06/03/2019    CALCIUM 8.8 (L) 06/03/2019    ALBUMIN 3.6 06/03/2019    LABIL2 1.1 06/03/2019    AST 21  06/03/2019    ALT 16 06/03/2019              Assessment/Plan    Encounter Diagnoses   Name Primary?   • Malignant neoplasm of sigmoid colon (CMS/HCC) Yes   • Anemia, unspecified type    • Allergic reaction to contrast material, subsequent encounter        I let her know that her cancer is back. It is in multiple areas. I talked to her about restarting chemotherapy. I explained that in one way we bigg at her past chemo and think brandt we should used the same chemo because we know it works. However; it caused her problems with her kidneys. I let her know that I do think the best option is to try something new due to possible resistace. She is in agreement with that. Her family member that is present asked if surgery is an option and I explained that it is not due to her multiple areas around her body. I talked to her about the new regimen can cause diarrhea. The treatment plan is every two weeks. That will continue for about 5-6 weeks and then a CT scan would need to be done. That will let us know how she is responding to the Avastin regimen.  We will monitor her hemoglobin during treatment and use Procrit as needed.  We will need to obtain CTs without contrast or premedicate her.  We will see her in follow-up in 1 month after she has started on chemotherapy.    I have reviewed and validated the information above.   Mark Perera M.D., F.A.C.P.                  6/21/2019

## 2019-07-02 ENCOUNTER — TELEPHONE (OUTPATIENT)
Dept: ONCOLOGY | Facility: HOSPITAL | Age: 59
End: 2019-07-02

## 2019-07-08 ENCOUNTER — HOSPITAL ENCOUNTER (OUTPATIENT)
Dept: ONCOLOGY | Facility: HOSPITAL | Age: 59
Setting detail: INFUSION SERIES
Discharge: HOME | End: 2019-07-08

## 2019-07-08 ENCOUNTER — DOCUMENTATION (OUTPATIENT)
Dept: ONCOLOGY | Facility: HOSPITAL | Age: 59
End: 2019-07-08

## 2019-07-08 ENCOUNTER — TELEPHONE (OUTPATIENT)
Dept: ONCOLOGY | Facility: CLINIC | Age: 59
End: 2019-07-08

## 2019-07-08 VITALS
WEIGHT: 138 LBS | SYSTOLIC BLOOD PRESSURE: 167 MMHG | DIASTOLIC BLOOD PRESSURE: 81 MMHG | HEIGHT: 62 IN | RESPIRATION RATE: 18 BRPM | HEART RATE: 87 BPM | TEMPERATURE: 97.7 F | BODY MASS INDEX: 25.4 KG/M2

## 2019-07-08 DIAGNOSIS — C18.7 MALIGNANT NEOPLASM OF SIGMOID COLON (HCC): Primary | ICD-10-CM

## 2019-07-08 LAB
ALBUMIN SERPL-MCNC: 3.6 G/DL (ref 3.5–4.8)
ALBUMIN/GLOB SERPL: 1.2 G/DL (ref 1–1.7)
ALP SERPL-CCNC: 85 U/L (ref 32–91)
ALT SERPL W P-5'-P-CCNC: 21 U/L (ref 14–54)
ANION GAP SERPL CALCULATED.3IONS-SCNC: 11.4 MMOL/L (ref 10–20)
AST SERPL-CCNC: 22 U/L (ref 15–41)
BASOPHILS # BLD AUTO: 0.02 10*3/MM3 (ref 0–0.2)
BASOPHILS NFR BLD AUTO: 0.3 % (ref 0–1.5)
BILIRUB SERPL-MCNC: 0.8 MG/DL (ref 0.3–1.2)
BILIRUB UR QL STRIP: NEGATIVE
BUN BLD-MCNC: 13 MG/DL (ref 8–20)
BUN/CREAT SERPL: 13 (ref 5.4–26.2)
CALCIUM SPEC-SCNC: 9 MG/DL (ref 8.9–10.3)
CHLORIDE SERPL-SCNC: 107 MMOL/L (ref 101–111)
CLARITY UR: CLEAR
CO2 SERPL-SCNC: 24 MMOL/L (ref 22–32)
COLOR UR: YELLOW
CREAT BLD-MCNC: 1 MG/DL (ref 0.4–1)
DEPRECATED RDW RBC AUTO: 43.9 FL (ref 37–54)
EOSINOPHIL # BLD AUTO: 0.18 10*3/MM3 (ref 0–0.4)
EOSINOPHIL NFR BLD AUTO: 2.8 % (ref 0.3–6.2)
ERYTHROCYTE [DISTWIDTH] IN BLOOD BY AUTOMATED COUNT: 14.1 % (ref 12.3–15.4)
GFR SERPL CREATININE-BSD FRML MDRD: 57 ML/MIN/1.73
GLOBULIN UR ELPH-MCNC: 3.1 GM/DL (ref 2.5–3.8)
GLUCOSE BLD-MCNC: 126 MG/DL (ref 65–99)
GLUCOSE UR STRIP-MCNC: NEGATIVE MG/DL
HCT VFR BLD AUTO: 38.3 % (ref 34–46.6)
HGB BLD-MCNC: 12.7 G/DL (ref 12–15.9)
HGB UR QL STRIP.AUTO: NEGATIVE
KETONES UR QL STRIP: NEGATIVE
LEUKOCYTE ESTERASE UR QL STRIP.AUTO: NEGATIVE
LYMPHOCYTES # BLD AUTO: 1 10*3/MM3 (ref 0.7–3.1)
LYMPHOCYTES NFR BLD AUTO: 15.3 % (ref 19.6–45.3)
MCH RBC QN AUTO: 28.9 PG (ref 26.6–33)
MCHC RBC AUTO-ENTMCNC: 33.2 G/DL (ref 31.5–35.7)
MCV RBC AUTO: 87 FL (ref 79–97)
MONOCYTES # BLD AUTO: 0.79 10*3/MM3 (ref 0.1–0.9)
MONOCYTES NFR BLD AUTO: 12.1 % (ref 5–12)
NEUTROPHILS # BLD AUTO: 4.54 10*3/MM3 (ref 1.7–7)
NEUTROPHILS NFR BLD AUTO: 69.5 % (ref 42.7–76)
NITRITE UR QL STRIP: NEGATIVE
PH UR STRIP.AUTO: 6.5 [PH] (ref 5–8)
PLATELET # BLD AUTO: 138 10*3/MM3 (ref 140–450)
PMV BLD AUTO: 9.9 FL (ref 6–12)
POTASSIUM BLD-SCNC: 3.4 MMOL/L (ref 3.6–5.1)
PROT SERPL-MCNC: 6.7 G/DL (ref 6.1–7.9)
PROT UR QL STRIP: NEGATIVE
RBC # BLD AUTO: 4.4 10*6/MM3 (ref 3.77–5.28)
SODIUM BLD-SCNC: 139 MMOL/L (ref 136–144)
SP GR UR STRIP: 1.01 (ref 1–1.03)
UROBILINOGEN UR QL STRIP: NORMAL
WBC NRBC COR # BLD: 6.53 10*3/MM3 (ref 3.4–10.8)

## 2019-07-08 PROCEDURE — 25010000002 DEXAMETHASONE SODIUM PHOSPHATE 120 MG/30ML SOLUTION: Performed by: INTERNAL MEDICINE

## 2019-07-08 PROCEDURE — 25010000002 LEUCOVORIN CALCIUM PER 50 MG: Performed by: INTERNAL MEDICINE

## 2019-07-08 PROCEDURE — 81003 URINALYSIS AUTO W/O SCOPE: CPT | Performed by: INTERNAL MEDICINE

## 2019-07-08 PROCEDURE — 85025 COMPLETE CBC W/AUTO DIFF WBC: CPT | Performed by: INTERNAL MEDICINE

## 2019-07-08 PROCEDURE — 25010000002 FLUOROURACIL PER 500 MG: Performed by: INTERNAL MEDICINE

## 2019-07-08 PROCEDURE — 25010000002 FOSAPREPITANT PER 1 MG: Performed by: INTERNAL MEDICINE

## 2019-07-08 PROCEDURE — 96413 CHEMO IV INFUSION 1 HR: CPT | Performed by: INTERNAL MEDICINE

## 2019-07-08 PROCEDURE — 96411 CHEMO IV PUSH ADDL DRUG: CPT | Performed by: INTERNAL MEDICINE

## 2019-07-08 PROCEDURE — 25010000002 PALONOSETRON 0.25 MG/5ML SOLUTION PREFILLED SYRINGE: Performed by: INTERNAL MEDICINE

## 2019-07-08 PROCEDURE — 80053 COMPREHEN METABOLIC PANEL: CPT | Performed by: INTERNAL MEDICINE

## 2019-07-08 PROCEDURE — 25010000002 BEVACIZUMAB PER 10 MG: Performed by: INTERNAL MEDICINE

## 2019-07-08 PROCEDURE — 96367 TX/PROPH/DG ADDL SEQ IV INF: CPT | Performed by: INTERNAL MEDICINE

## 2019-07-08 PROCEDURE — 25010000002 IRINOTECAN PER 20 MG: Performed by: INTERNAL MEDICINE

## 2019-07-08 PROCEDURE — 96368 THER/DIAG CONCURRENT INF: CPT | Performed by: INTERNAL MEDICINE

## 2019-07-08 PROCEDURE — 96375 TX/PRO/DX INJ NEW DRUG ADDON: CPT | Performed by: INTERNAL MEDICINE

## 2019-07-08 PROCEDURE — 96415 CHEMO IV INFUSION ADDL HR: CPT | Performed by: INTERNAL MEDICINE

## 2019-07-08 PROCEDURE — 96417 CHEMO IV INFUS EACH ADDL SEQ: CPT | Performed by: INTERNAL MEDICINE

## 2019-07-08 PROCEDURE — 96416 CHEMO PROLONG INFUSE W/PUMP: CPT | Performed by: INTERNAL MEDICINE

## 2019-07-08 RX ORDER — TRAMADOL HYDROCHLORIDE 50 MG/1
50 TABLET ORAL EVERY 6 HOURS PRN
Qty: 30 TABLET | Refills: 0 | Status: SHIPPED | OUTPATIENT
Start: 2019-07-08 | End: 2019-12-11

## 2019-07-08 RX ORDER — PROMETHAZINE HYDROCHLORIDE 12.5 MG/1
25 TABLET ORAL EVERY 6 HOURS PRN
Qty: 30 TABLET | Refills: 2 | Status: SHIPPED | OUTPATIENT
Start: 2019-07-08 | End: 2019-07-29 | Stop reason: SDUPTHER

## 2019-07-08 RX ORDER — ATROPINE SULFATE 1 MG/ML
0.25 INJECTION, SOLUTION INTRAMUSCULAR; INTRAVENOUS; SUBCUTANEOUS
Status: DISCONTINUED | OUTPATIENT
Start: 2019-07-08 | End: 2019-07-08

## 2019-07-08 RX ORDER — SODIUM CHLORIDE 9 MG/ML
250 INJECTION, SOLUTION INTRAVENOUS ONCE
Status: COMPLETED | OUTPATIENT
Start: 2019-07-08 | End: 2019-07-08

## 2019-07-08 RX ORDER — ONDANSETRON 4 MG/1
8 TABLET, FILM COATED ORAL EVERY 8 HOURS PRN
Qty: 30 TABLET | Refills: 2 | Status: SHIPPED | OUTPATIENT
Start: 2019-07-08 | End: 2019-07-29 | Stop reason: SDUPTHER

## 2019-07-08 RX ORDER — PALONOSETRON HYDROCHLORIDE 0.05 MG/ML
0.25 INJECTION, SOLUTION INTRAVENOUS ONCE
Status: COMPLETED | OUTPATIENT
Start: 2019-07-08 | End: 2019-07-08

## 2019-07-08 RX ORDER — FLUOROURACIL 50 MG/ML
400 INJECTION, SOLUTION INTRAVENOUS ONCE
Status: COMPLETED | OUTPATIENT
Start: 2019-07-08 | End: 2019-07-08

## 2019-07-08 RX ADMIN — FLUOROURACIL 3910 MG: 50 INJECTION, SOLUTION INTRAVENOUS at 14:33

## 2019-07-08 RX ADMIN — ATROPINE SULFATE 295 MG: 0.4 INJECTION, SOLUTION INTRAMUSCULAR; INTRAVENOUS; SUBCUTANEOUS at 12:53

## 2019-07-08 RX ADMIN — LEUCOVORIN CALCIUM 650 MG: 350 INJECTION, POWDER, LYOPHILIZED, FOR SOLUTION INTRAMUSCULAR; INTRAVENOUS at 12:51

## 2019-07-08 RX ADMIN — BEVACIZUMAB 310 MG: 400 INJECTION, SOLUTION INTRAVENOUS at 11:01

## 2019-07-08 RX ADMIN — PALONOSETRON 0.25 MG: 0.25 INJECTION, SOLUTION INTRAVENOUS at 09:49

## 2019-07-08 RX ADMIN — SODIUM CHLORIDE 250 ML: 900 INJECTION, SOLUTION INTRAVENOUS at 09:47

## 2019-07-08 RX ADMIN — FLUOROURACIL 650 MG: 50 INJECTION, SOLUTION INTRAVENOUS at 14:33

## 2019-07-08 RX ADMIN — SODIUM CHLORIDE 100 ML: 900 INJECTION, SOLUTION INTRAVENOUS at 09:51

## 2019-07-08 RX ADMIN — DEXAMETHASONE SODIUM PHOSPHATE 12 MG: 4 INJECTION, SOLUTION INTRA-ARTICULAR; INTRALESIONAL; INTRAMUSCULAR; INTRAVENOUS; SOFT TISSUE at 10:39

## 2019-07-08 NOTE — PROGRESS NOTES
"Case Management/ Note    Patient Name: Samantha Massey  YOB: 1960  MRN #: [id number]    OSW met with patient and her friend, Molly. She is alert and oriented to person, place and time. She is pleasant. Mood and affect are congruent. She talked about her change in regimen. She said she does well but has, \"my moments\". At times, she has friends who come along beside her who provide support. Molly said, \"She's in denial\" and Samantha added that from the beginning she has always wanted this to go away and at times, she does not want to think about this. Feelings were validated and supportive care provided.     She has questions about SSD. She works full time and admits this is hard to do as her job is physically demanding. She wants to know if she is able to work part time and collect SSD. We discussed this and she was encouraged to call SSA office for all her questions. She was given a SSA booklet. She is concerned with the side effects for this regimen will be such that she will not be able to work as she did with the last regimen. Currently, her basic needs are met and she is not concerned about medical bills as these are paid. However, she said she would struggle to meet basic needs if she choose to work part time. Molly said she helps with the bills and assured her their basic needs would be met.     She was given SSA booklet, managing stress booklet, community resources, resources for support group. OSW will remain available.       Electronically signed by:   Kristi Hendricks LCSW, OSW-C  07/08/19, 9:46 AM        "

## 2019-07-08 NOTE — PROGRESS NOTES
[facility] Medical Oncology     Education for Administration of Chemotherapy and/or Biotherapy     07/08/19    Samantha Massey  [id number]    Ms.Lillian ARETHA Massey is here today for education on their upcoming Chemotherapy and/or Biotherapy.     I will be going over their treatment options, obtain signed consent and answer any questions that they may have in regards to the administration of Avastin (bevacizumab), Irinotecan (camptosar) Leucovorin,  Fluorouracil (5-Fluorouracil). (Specific drug names listed below).     Samantha ARETHA Bryson has already consulted with Dr. Mark Perera for the treatment of metastatic colon cancer. The provider has gone over the same treatment options with the patient and answered their question prior to today's visit.   The goal of the treatment is to:    [] Cure my cancer - means treatment that kills cancer cells to the point my doctor                                     cannot find them in my body and they will not grow back.    [x] Control my cancer - means treatment that keeps cancer from spreading or growing.    [x] Relieve my cancer symptoms - means treatment that helps problems such as pain or                                     pressure.     This treatment has been explained to Samantha Massey. Alternative methods of treatment, if any, have been explained to Samantha Massey as have the benefits and risks of each. Based on the physician's explanation of the benefits and risks of this treatment and any alternatives available, The patient agrees that the potential benefit's out weighs the risks involved. I have explained to the patient the most likely complications that might occur from this treatment. The patient understands that along with the treatment additional medications may be necessary to lesson the side effects. Possible side effect may include but are not limited to, any of the following, or a combination of the following:      Allergic Reaction Vision/Eye Changes  Sexual Effects    [x]  High Blood Pressure  [x]  Skin and nail changes  [x]  Menopausal symptoms   []  Hearing Loss []  Ulceration at injection site [x]  Menstrual irregularities   [x]  Fatigue []  Skin rash   [x]  Fertility effects   [x]  Constipation  [x]  Diarrhea [x]  Hair loss  [x]  Light/temperature sensitivity []  Heart damage  []  Liver damage   [x]  Loss of appetite [x]  Dizziness []  Lung damage   [x]  Mouth Sores [x]  Muscle aching or weakness []  Kidney damage   [x]  Taste Changes []  Forgetfulness []  Nerve damage   [x]  Nausea or Vomiting [x]  Risk of blood clots [x]  Weight gain/loss   []  Secondary malignancies []  Risk of anemia  [x]  Risk of bleeding/bruising      While receiving treatment, it has been explained to the patient with regards to their blood counts. This may include but not limited to CBC, Neutropenia ,Anemia, Thrombocytopenia. This handout has been explained and given to the patient.     It was explained to the patient about nutrition and how important it is while undergoing Chemotherapy and/or Biotherapy. Certain medications will be prescribed during the treatment which may change the way foods taste or smell. These changes may cause poor or no appetite. Food is fuel for your body, and if it does not get the fuel it needs, your body may become mal-nourished, which can lead to sever fatigue.It was discussed with the patient about calories and how to add high-calorie foods to their diet.  Protein was also mentioned in regards to how this will help make new cells for the body. Information was given to Samantha Massey in regards to some good protein sources.   We also discussed with the patient how important it was to drink/eat every 2-3 hours while awake. We discussed fluid intake of at least 6 8 ounce glassed of liquids per day to stay hydrated. Some of those are listed below:     Water  Juice (fruit or vegetable)  Soda Sport Drinks Soup   Milk  Ensure, Boost, Glucerna Ice Cream  Popjean pierre Scanlonllo   Milkshakes Pudding  Gatorade Sherbert Yogurt     It has been discussed with the patient the risks of becoming pregnant while receiving Chemotherapy and/or Biotherapy. We also discussed the importance of using reliable barrier methods while participating in intimate activities as this may expose their partners to a potentially harmful drug.     Further home instructions were given to the patient in regards to symptoms, treatment and how to handle those situations as well as when to contact the treatment team or the providers office.     Samantha Massey was given handouts on:   1. Complete Blood Counts and terminology  2. Nutrition during Cancer Therapy   3. Home Instructions  4. Patient instructions for home pumps and Emergency kit provided    I have discussed and gone over the full consent with the patient and answered all their questions regarding the medication they are to receive.  Written information has been provided and reviewed with Samantha Massey. The patient and their family had a chance to ask any questions about the treatment medications and are satisfied with the information that was provided to them.     The patient has read and completed the consent form. They understand the possible risks and benefits of the recommended treatment plan and voluntarily agree to undergo the planned treatment. Should they change their mind and decide to stop treatment at any time, they will notify the providers office.       Nory Palmer RN  07/08/2019  9:08 AM

## 2019-07-08 NOTE — PROGRESS NOTES
Patient with hypokalemia (potassium 3.4).  Instruct patient on high potassium diet.  If patient is having diarrhea or vomiting, then send potassium chloride 10 mEq by mouth daily to her pharmacy and check BMP in 1 week.

## 2019-07-08 NOTE — PROGRESS NOTES
Pt here for treatment and reports pain level of 6 to right abdomen,  Reports started after trying to keep granddaughter out of pool and keep her safe.  Reviewed with Dai Dutta NP and new orders noted and reviewed with pt.  Reviewed risk for constipation with pt and advised prescription sent to pharmacy.

## 2019-07-08 NOTE — TELEPHONE ENCOUNTER
----- Message from FATUMA Fuens sent at 7/8/2019 12:46 PM EDT -----  Patient with hypokalemia (potassium 3.4).  Instruct patient on high potassium diet.  If patient is having diarrhea or vomiting, then send potassium chloride 10 mEq by mouth daily to her pharmacy and check BMP in 1 week.

## 2019-07-10 ENCOUNTER — HOSPITAL ENCOUNTER (OUTPATIENT)
Dept: ONCOLOGY | Facility: HOSPITAL | Age: 59
Setting detail: INFUSION SERIES
Discharge: HOME | End: 2019-07-10

## 2019-07-10 VITALS
WEIGHT: 138 LBS | HEART RATE: 80 BPM | TEMPERATURE: 97.7 F | HEIGHT: 62 IN | DIASTOLIC BLOOD PRESSURE: 71 MMHG | SYSTOLIC BLOOD PRESSURE: 157 MMHG | BODY MASS INDEX: 25.4 KG/M2 | RESPIRATION RATE: 16 BRPM

## 2019-07-10 DIAGNOSIS — C18.7 MALIGNANT NEOPLASM OF SIGMOID COLON (HCC): Primary | ICD-10-CM

## 2019-07-10 PROCEDURE — 96523 IRRIG DRUG DELIVERY DEVICE: CPT | Performed by: INTERNAL MEDICINE

## 2019-07-10 RX ORDER — SODIUM CHLORIDE 0.9 % (FLUSH) 0.9 %
10 SYRINGE (ML) INJECTION AS NEEDED
Status: CANCELLED | OUTPATIENT
Start: 2019-07-10

## 2019-07-10 RX ORDER — SODIUM CHLORIDE 0.9 % (FLUSH) 0.9 %
10 SYRINGE (ML) INJECTION AS NEEDED
Status: DISCONTINUED | OUTPATIENT
Start: 2019-07-10 | End: 2019-07-11 | Stop reason: HOSPADM

## 2019-07-10 RX ADMIN — Medication 500 UNITS: at 15:49

## 2019-07-10 RX ADMIN — Medication 10 ML: at 15:49

## 2019-07-18 DIAGNOSIS — C18.7 MALIGNANT NEOPLASM OF SIGMOID COLON (HCC): ICD-10-CM

## 2019-07-18 RX ORDER — SODIUM CHLORIDE 9 MG/ML
250 INJECTION, SOLUTION INTRAVENOUS ONCE
Status: CANCELLED | OUTPATIENT
Start: 2019-07-22

## 2019-07-18 RX ORDER — PALONOSETRON 0.05 MG/ML
0.25 INJECTION, SOLUTION INTRAVENOUS ONCE
Status: CANCELLED | OUTPATIENT
Start: 2019-07-22

## 2019-07-18 RX ORDER — ATROPINE SULFATE 1 MG/ML
0.25 INJECTION, SOLUTION INTRAMUSCULAR; INTRAVENOUS; SUBCUTANEOUS
Status: CANCELLED | OUTPATIENT
Start: 2019-07-22

## 2019-07-18 RX ORDER — FLUOROURACIL 50 MG/ML
400 INJECTION, SOLUTION INTRAVENOUS ONCE
Status: CANCELLED | OUTPATIENT
Start: 2019-07-22

## 2019-07-22 ENCOUNTER — HOSPITAL ENCOUNTER (OUTPATIENT)
Dept: ONCOLOGY | Facility: HOSPITAL | Age: 59
Setting detail: INFUSION SERIES
Discharge: HOME | End: 2019-07-22

## 2019-07-22 VITALS
HEIGHT: 62 IN | TEMPERATURE: 97.6 F | SYSTOLIC BLOOD PRESSURE: 152 MMHG | RESPIRATION RATE: 16 BRPM | BODY MASS INDEX: 25.76 KG/M2 | WEIGHT: 140 LBS | HEART RATE: 97 BPM | DIASTOLIC BLOOD PRESSURE: 87 MMHG

## 2019-07-22 DIAGNOSIS — C18.7 MALIGNANT NEOPLASM OF SIGMOID COLON (HCC): Primary | ICD-10-CM

## 2019-07-22 LAB
ALBUMIN SERPL-MCNC: 3.5 G/DL (ref 3.5–4.8)
ALBUMIN/GLOB SERPL: 1.3 G/DL (ref 1–1.7)
ALP SERPL-CCNC: 93 U/L (ref 32–91)
ALT SERPL W P-5'-P-CCNC: 43 U/L (ref 14–54)
ANION GAP SERPL CALCULATED.3IONS-SCNC: 14.7 MMOL/L (ref 5–15)
AST SERPL-CCNC: 41 U/L (ref 15–41)
BASOPHILS # BLD AUTO: 0.01 10*3/MM3 (ref 0–0.2)
BASOPHILS NFR BLD AUTO: 0.5 % (ref 0–1.5)
BILIRUB SERPL-MCNC: 0.8 MG/DL (ref 0.3–1.2)
BILIRUB UR QL STRIP: NEGATIVE
BUN BLD-MCNC: 12 MG/DL (ref 8–20)
BUN/CREAT SERPL: 10.9 (ref 5.4–26.2)
CALCIUM SPEC-SCNC: 8.5 MG/DL (ref 8.9–10.3)
CHLORIDE SERPL-SCNC: 106 MMOL/L (ref 101–111)
CLARITY UR: CLEAR
CO2 SERPL-SCNC: 23 MMOL/L (ref 22–32)
COLOR UR: YELLOW
CREAT BLD-MCNC: 1.1 MG/DL (ref 0.4–1)
DEPRECATED RDW RBC AUTO: 42 FL (ref 37–54)
EOSINOPHIL # BLD AUTO: 0.1 10*3/MM3 (ref 0–0.4)
EOSINOPHIL NFR BLD AUTO: 4.6 % (ref 0.3–6.2)
ERYTHROCYTE [DISTWIDTH] IN BLOOD BY AUTOMATED COUNT: 14.2 % (ref 12.3–15.4)
GFR SERPL CREATININE-BSD FRML MDRD: 51 ML/MIN/1.73
GLOBULIN UR ELPH-MCNC: 2.8 GM/DL (ref 2.5–3.8)
GLUCOSE BLD-MCNC: 193 MG/DL (ref 65–99)
GLUCOSE UR STRIP-MCNC: NEGATIVE MG/DL
HCT VFR BLD AUTO: 36.7 % (ref 34–46.6)
HGB BLD-MCNC: 12.2 G/DL (ref 12–15.9)
HGB UR QL STRIP.AUTO: NEGATIVE
KETONES UR QL STRIP: NEGATIVE
LEUKOCYTE ESTERASE UR QL STRIP.AUTO: NEGATIVE
LYMPHOCYTES # BLD AUTO: 0.68 10*3/MM3 (ref 0.7–3.1)
LYMPHOCYTES NFR BLD AUTO: 31.5 % (ref 19.6–45.3)
MAGNESIUM SERPL-MCNC: 2.1 MG/DL (ref 1.8–2.5)
MCH RBC QN AUTO: 28.9 PG (ref 26.6–33)
MCHC RBC AUTO-ENTMCNC: 33.2 G/DL (ref 31.5–35.7)
MCV RBC AUTO: 87 FL (ref 79–97)
MONOCYTES # BLD AUTO: 0.24 10*3/MM3 (ref 0.1–0.9)
MONOCYTES NFR BLD AUTO: 11.1 % (ref 5–12)
NEUTROPHILS # BLD AUTO: 1.13 10*3/MM3 (ref 1.7–7)
NEUTROPHILS NFR BLD AUTO: 52.3 % (ref 42.7–76)
NITRITE UR QL STRIP: NEGATIVE
PH UR STRIP.AUTO: 6 [PH] (ref 5–8)
PLATELET # BLD AUTO: 142 10*3/MM3 (ref 140–450)
PMV BLD AUTO: 9.4 FL (ref 6–12)
POTASSIUM BLD-SCNC: 3.7 MMOL/L (ref 3.6–5.1)
PROT SERPL-MCNC: 6.3 G/DL (ref 6.1–7.9)
PROT UR QL STRIP: NEGATIVE
RBC # BLD AUTO: 4.22 10*6/MM3 (ref 3.77–5.28)
SODIUM BLD-SCNC: 140 MMOL/L (ref 136–144)
SP GR UR STRIP: 1.01 (ref 1–1.03)
UROBILINOGEN UR QL STRIP: NORMAL
WBC NRBC COR # BLD: 2.16 10*3/MM3 (ref 3.4–10.8)

## 2019-07-22 PROCEDURE — 25010000002 FOSAPREPITANT PER 1 MG: Performed by: NURSE PRACTITIONER

## 2019-07-22 PROCEDURE — 80053 COMPREHEN METABOLIC PANEL: CPT | Performed by: INTERNAL MEDICINE

## 2019-07-22 PROCEDURE — 36591 DRAW BLOOD OFF VENOUS DEVICE: CPT | Performed by: INTERNAL MEDICINE

## 2019-07-22 PROCEDURE — 25010000002 DEXAMETHASONE SODIUM PHOSPHATE 120 MG/30ML SOLUTION: Performed by: NURSE PRACTITIONER

## 2019-07-22 PROCEDURE — 25010000002 IRINOTECAN PER 20 MG: Performed by: NURSE PRACTITIONER

## 2019-07-22 PROCEDURE — 25010000002 LEUCOVORIN CALCIUM PER 50 MG: Performed by: NURSE PRACTITIONER

## 2019-07-22 PROCEDURE — 96415 CHEMO IV INFUSION ADDL HR: CPT | Performed by: INTERNAL MEDICINE

## 2019-07-22 PROCEDURE — 25010000002 PALONOSETRON 0.25 MG/5ML SOLUTION PREFILLED SYRINGE: Performed by: NURSE PRACTITIONER

## 2019-07-22 PROCEDURE — 25010000002 FLUOROURACIL PER 500 MG: Performed by: NURSE PRACTITIONER

## 2019-07-22 PROCEDURE — 25010000002 ATROPINE PER 0.01 MG: Performed by: NURSE PRACTITIONER

## 2019-07-22 PROCEDURE — 85025 COMPLETE CBC W/AUTO DIFF WBC: CPT | Performed by: NURSE PRACTITIONER

## 2019-07-22 PROCEDURE — 83735 ASSAY OF MAGNESIUM: CPT | Performed by: INTERNAL MEDICINE

## 2019-07-22 PROCEDURE — 25010000002 BEVACIZUMAB PER 10 MG: Performed by: NURSE PRACTITIONER

## 2019-07-22 PROCEDURE — 96411 CHEMO IV PUSH ADDL DRUG: CPT | Performed by: INTERNAL MEDICINE

## 2019-07-22 PROCEDURE — 96417 CHEMO IV INFUS EACH ADDL SEQ: CPT | Performed by: INTERNAL MEDICINE

## 2019-07-22 PROCEDURE — 81003 URINALYSIS AUTO W/O SCOPE: CPT | Performed by: NURSE PRACTITIONER

## 2019-07-22 PROCEDURE — 96416 CHEMO PROLONG INFUSE W/PUMP: CPT | Performed by: INTERNAL MEDICINE

## 2019-07-22 PROCEDURE — 96375 TX/PRO/DX INJ NEW DRUG ADDON: CPT | Performed by: INTERNAL MEDICINE

## 2019-07-22 PROCEDURE — 96368 THER/DIAG CONCURRENT INF: CPT | Performed by: INTERNAL MEDICINE

## 2019-07-22 PROCEDURE — 96413 CHEMO IV INFUSION 1 HR: CPT | Performed by: INTERNAL MEDICINE

## 2019-07-22 RX ORDER — ATROPINE SULFATE 1 MG/ML
0.25 INJECTION, SOLUTION INTRAMUSCULAR; INTRAVENOUS; SUBCUTANEOUS
Status: DISCONTINUED | OUTPATIENT
Start: 2019-07-22 | End: 2019-07-22 | Stop reason: SDUPTHER

## 2019-07-22 RX ORDER — SODIUM CHLORIDE 9 MG/ML
250 INJECTION, SOLUTION INTRAVENOUS ONCE
Status: COMPLETED | OUTPATIENT
Start: 2019-07-22 | End: 2019-07-22

## 2019-07-22 RX ORDER — PALONOSETRON HYDROCHLORIDE 0.05 MG/ML
0.25 INJECTION, SOLUTION INTRAVENOUS ONCE
Status: COMPLETED | OUTPATIENT
Start: 2019-07-22 | End: 2019-07-22

## 2019-07-22 RX ORDER — FLUOROURACIL 50 MG/ML
400 INJECTION, SOLUTION INTRAVENOUS ONCE
Status: COMPLETED | OUTPATIENT
Start: 2019-07-22 | End: 2019-07-22

## 2019-07-22 RX ADMIN — SODIUM CHLORIDE 250 ML: 900 INJECTION, SOLUTION INTRAVENOUS at 09:03

## 2019-07-22 RX ADMIN — FLUOROURACIL 3910 MG: 50 INJECTION, SOLUTION INTRAVENOUS at 13:02

## 2019-07-22 RX ADMIN — LEUCOVORIN CALCIUM 650 MG: 350 INJECTION, POWDER, LYOPHILIZED, FOR SOLUTION INTRAMUSCULAR; INTRAVENOUS at 11:10

## 2019-07-22 RX ADMIN — DEXAMETHASONE SODIUM PHOSPHATE 12 MG: 4 INJECTION, SOLUTION INTRA-ARTICULAR; INTRALESIONAL; INTRAMUSCULAR; INTRAVENOUS; SOFT TISSUE at 09:45

## 2019-07-22 RX ADMIN — PALONOSETRON 0.25 MG: 0.25 INJECTION, SOLUTION INTRAVENOUS at 09:03

## 2019-07-22 RX ADMIN — BEVACIZUMAB 310 MG: 400 INJECTION, SOLUTION INTRAVENOUS at 10:06

## 2019-07-22 RX ADMIN — FLUOROURACIL 650 MG: 50 INJECTION, SOLUTION INTRAVENOUS at 12:56

## 2019-07-22 RX ADMIN — SODIUM CHLORIDE 100 ML: 9 INJECTION, SOLUTION INTRAVENOUS at 09:05

## 2019-07-22 RX ADMIN — ATROPINE SULFATE 295 MG: 0.4 INJECTION, SOLUTION INTRAMUSCULAR; INTRAVENOUS; SUBCUTANEOUS at 11:10

## 2019-07-24 ENCOUNTER — HOSPITAL ENCOUNTER (OUTPATIENT)
Dept: ONCOLOGY | Facility: HOSPITAL | Age: 59
Setting detail: INFUSION SERIES
Discharge: HOME | End: 2019-07-24

## 2019-07-24 ENCOUNTER — APPOINTMENT (OUTPATIENT)
Dept: ONCOLOGY | Facility: HOSPITAL | Age: 59
End: 2019-07-24

## 2019-07-24 VITALS
TEMPERATURE: 97.5 F | SYSTOLIC BLOOD PRESSURE: 183 MMHG | WEIGHT: 140 LBS | RESPIRATION RATE: 18 BRPM | HEART RATE: 87 BPM | DIASTOLIC BLOOD PRESSURE: 89 MMHG | HEIGHT: 62 IN | BODY MASS INDEX: 25.76 KG/M2

## 2019-07-24 DIAGNOSIS — C18.7 MALIGNANT NEOPLASM OF SIGMOID COLON (HCC): Primary | ICD-10-CM

## 2019-07-24 PROCEDURE — 96523 IRRIG DRUG DELIVERY DEVICE: CPT | Performed by: INTERNAL MEDICINE

## 2019-07-24 RX ORDER — SODIUM CHLORIDE 0.9 % (FLUSH) 0.9 %
10 SYRINGE (ML) INJECTION AS NEEDED
Status: CANCELLED | OUTPATIENT
Start: 2019-07-24

## 2019-07-24 RX ORDER — SODIUM CHLORIDE 0.9 % (FLUSH) 0.9 %
10 SYRINGE (ML) INJECTION AS NEEDED
Status: DISCONTINUED | OUTPATIENT
Start: 2019-07-24 | End: 2019-07-25 | Stop reason: HOSPADM

## 2019-07-24 RX ADMIN — HEPARIN 500 UNITS: 100 SYRINGE at 15:30

## 2019-07-24 RX ADMIN — Medication 10 ML: at 15:30

## 2019-07-29 ENCOUNTER — OFFICE VISIT (OUTPATIENT)
Dept: ONCOLOGY | Facility: CLINIC | Age: 59
End: 2019-07-29

## 2019-07-29 ENCOUNTER — HOSPITAL ENCOUNTER (OUTPATIENT)
Dept: ONCOLOGY | Facility: HOSPITAL | Age: 59
Discharge: HOME | End: 2019-07-29
Admitting: INTERNAL MEDICINE

## 2019-07-29 VITALS
RESPIRATION RATE: 16 BRPM | HEIGHT: 62 IN | BODY MASS INDEX: 26.02 KG/M2 | WEIGHT: 141.4 LBS | TEMPERATURE: 98 F | DIASTOLIC BLOOD PRESSURE: 92 MMHG | SYSTOLIC BLOOD PRESSURE: 182 MMHG | HEART RATE: 109 BPM

## 2019-07-29 DIAGNOSIS — T45.1X5A CHEMOTHERAPY-INDUCED THROMBOCYTOPENIA: ICD-10-CM

## 2019-07-29 DIAGNOSIS — C18.7 MALIGNANT NEOPLASM OF SIGMOID COLON (HCC): Primary | ICD-10-CM

## 2019-07-29 DIAGNOSIS — D69.59 CHEMOTHERAPY-INDUCED THROMBOCYTOPENIA: ICD-10-CM

## 2019-07-29 DIAGNOSIS — J02.9 ACUTE SORE THROAT: ICD-10-CM

## 2019-07-29 DIAGNOSIS — T45.1X5A CHEMOTHERAPY-INDUCED NAUSEA: ICD-10-CM

## 2019-07-29 DIAGNOSIS — R11.0 CHEMOTHERAPY-INDUCED NAUSEA: ICD-10-CM

## 2019-07-29 DIAGNOSIS — T73.2XXA FATIGUE DUE TO EXPOSURE, INITIAL ENCOUNTER: ICD-10-CM

## 2019-07-29 DIAGNOSIS — D64.9 ANEMIA, UNSPECIFIED TYPE: ICD-10-CM

## 2019-07-29 DIAGNOSIS — Z51.11 ENCOUNTER FOR CHEMOTHERAPY MANAGEMENT: ICD-10-CM

## 2019-07-29 LAB
BASOPHILS # BLD AUTO: 0.02 10*3/MM3 (ref 0–0.2)
BASOPHILS NFR BLD AUTO: 0.5 % (ref 0–1.5)
DEPRECATED RDW RBC AUTO: 43 FL (ref 37–54)
EOSINOPHIL # BLD AUTO: 0.1 10*3/MM3 (ref 0–0.4)
EOSINOPHIL NFR BLD AUTO: 2.7 % (ref 0.3–6.2)
ERYTHROCYTE [DISTWIDTH] IN BLOOD BY AUTOMATED COUNT: 14.1 % (ref 12.3–15.4)
HCT VFR BLD AUTO: 37.7 % (ref 34–46.6)
HGB BLD-MCNC: 12.6 G/DL (ref 12–15.9)
LYMPHOCYTES # BLD AUTO: 1.26 10*3/MM3 (ref 0.7–3.1)
LYMPHOCYTES NFR BLD AUTO: 33.9 % (ref 19.6–45.3)
MCH RBC QN AUTO: 28.6 PG (ref 26.6–33)
MCHC RBC AUTO-ENTMCNC: 33.4 G/DL (ref 31.5–35.7)
MCV RBC AUTO: 85.5 FL (ref 79–97)
MONOCYTES # BLD AUTO: 0.57 10*3/MM3 (ref 0.1–0.9)
MONOCYTES NFR BLD AUTO: 15.3 % (ref 5–12)
NEUTROPHILS # BLD AUTO: 1.77 10*3/MM3 (ref 1.7–7)
NEUTROPHILS NFR BLD AUTO: 47.6 % (ref 42.7–76)
PLATELET # BLD AUTO: 129 10*3/MM3 (ref 140–450)
PMV BLD AUTO: 9.1 FL (ref 6–12)
RBC # BLD AUTO: 4.41 10*6/MM3 (ref 3.77–5.28)
TSH SERPL DL<=0.05 MIU/L-ACNC: 4.13 MIU/ML (ref 0.34–5.6)
WBC NRBC COR # BLD: 3.72 10*3/MM3 (ref 3.4–10.8)

## 2019-07-29 PROCEDURE — 99215 OFFICE O/P EST HI 40 MIN: CPT | Performed by: INTERNAL MEDICINE

## 2019-07-29 PROCEDURE — 84443 ASSAY THYROID STIM HORMONE: CPT | Performed by: NURSE PRACTITIONER

## 2019-07-29 PROCEDURE — 85025 COMPLETE CBC W/AUTO DIFF WBC: CPT | Performed by: INTERNAL MEDICINE

## 2019-07-29 PROCEDURE — 36415 COLL VENOUS BLD VENIPUNCTURE: CPT | Performed by: INTERNAL MEDICINE

## 2019-07-29 RX ORDER — PROMETHAZINE HYDROCHLORIDE 12.5 MG/1
25 TABLET ORAL EVERY 6 HOURS PRN
Qty: 30 TABLET | Refills: 2
Start: 2019-07-29 | End: 2019-12-11

## 2019-07-29 RX ORDER — AMERICAN GINSENG ROOT
200 POWDER (GRAM) MISCELLANEOUS 2 TIMES DAILY
Qty: 1 BOTTLE | Refills: 2 | Status: SHIPPED | OUTPATIENT
Start: 2019-07-29 | End: 2021-11-01

## 2019-07-29 RX ORDER — ONDANSETRON 4 MG/1
8 TABLET, FILM COATED ORAL EVERY 8 HOURS PRN
Qty: 30 TABLET | Refills: 2
Start: 2019-07-29 | End: 2019-12-11

## 2019-07-29 NOTE — PROGRESS NOTES
Hematology/Oncology Outpatient Follow Up    PATIENT NAME:Samantha Massey  :1960  MRN: [id number]  PRIMARY CARE PHYSICIAN: Diana Spencer MD  REFERRING PHYSICIAN: Diana Spencer MD    Chief Complaint   Patient presents with   • Follow-up     for colon cancer      HISTORY OF PRESENT ILLNESS:   1. Colon cancer with liver metastasis diagnosis established 2012.  · Ms. Massey claimed to have developed intermittent diarrhea and constipation in 2011. She eventually saw Dr. Diana Spencer in 2012. A chemistry profile revealed normal liver enzymes.  CEA on 12 was 5.9 ng/mL (< 5, smoker); hemoglobin was 12.1 gm/dL, and MCV 86.2 fl.  A CT scan of the abdomen and pelvis was performed on 12 revealing innumerable mass lesions throughout the liver, thickening of the distal sigmoid colon wall, nonspecific non-pathologically enlarged lymph nodes in the sigmoid mesentery and left iliac chain. She was referred to Dr. Regina Pack and had a repeat CEA done on 12 revealing a value of 7.5 ng/mL. A colonoscopy was performed on 12 by Dr. Pack revealing 10 cm distal colon mass, which on biopsy revealed the presence of invasive moderately differentiated adenocarcinoma which demonstrated micro satellite stability. The patient was hospitalized on 12 and underwent laparoscopic low anterior resection with mobilization of splenic flexure, which on pathology revealed invasive, moderate to focally poorly differentiated adenocarcinoma of the rectosigmoid colon with invasion through muscularis propria and extension into mesocolon. The surgical margins were free of tumor. Mesenteric lymph nodes, 14, were negative for metastatic tumor. Lymphovascular invasion was not identified. An padml-s-dago was placed on 12 and a CT-guided liver biopsy was performed the same day by ·Dr. Hernandez Espino. Pathology revealed metastatic adenocarcinoma consistent with colonic primary. The patient was  then referred here for further evaluation.  · 2/13/12 - Order to start chemotherapy to consist of Leucovorin 500mg IV day 1, Oxaliplatin 120 mg IV day1, Avastin 230 mg IV day 1, 5FU 575mg IV day 1 followed by 345 mg CIV, cycle q 2 weeks.  · 2/13/12 - CEA 5.6 (H).  · 2/13/12 - Chest x-ray revealed no acute cardiopulmonary disease.  · 2/17/12 - CT abdomen and pelvis revealed no evidence of metastatic disease of the chest, innumerable hepatic metastases.  Bowel staple line at the rectosigmoid junction with adjacent stranding of fat consistent with postoperative change.  · 2/18/12 - KRAS testing performed on rectum biopsy: no mutation detected.  · 2/21/12 - Patient started Chemotherapy cycle #1 consisting of Avastin, Leucovorin, 5FU, and Oxaliplatin.  · 2/23/12 - CT chest revealed no pulmonary embolus, no active lung disease. Hepatic nodularity.  · 2/24/12 - Per note from [facility] patient enrolled in clinical trail per Dr. Eldridge randomized to the control arm of the study. Which consist of standard FOLFOX chemotherapy that will be given at our office.  · 2124/12 - PET scan revealed interval resection of known primary colonic carcinoma at the rectosigmoid junction without evidence of residual tumor in this location. The patient is believed to be status post resection of the previously described left internal iliac lymph node without evidence of hypermetabolic metastatic lymphadenopathy. Innumerable hypermetabolic hepatic metastases.  · 3/6/12 - Patient started Chemotherapy cycle #2 consisting of Avastin, Leucovorin, 5FU, and Oxaliplatin  · 3/20/12 - Patient   started   Chemotherapy    cycle   #3     consisting    of   Avastin, leucovorin, 5FU, and Oxaliplatin  · 4/3/12 - Patient started Chemotherapy cycle #4 consisting of Avastin, Leucovorin, 5FU, and Oxaliplatin  · 4/3/12 - Orders written to decrease chemotherapy dose by 20% at her next cycle and to give Neulasta injection day after each cycle.  Hemoglobin 11.9.  · 4/3112  - CEA 1.0 (N).  · 4/6/12 - CT C/A/P - Chest - unremarkable, Abdomen/Pelvis - Overall moderate to marked decrease in the size of the multiple hepatic metastatic lesions. This is when compared to a study of 1/2/12. Post-op changes in the region of the rectosigmoid junction Small amount of free fluid in the cul-de-sac with a small amount of fluid apparently within the endometrial cavity. These findings are not unusual premenopausal.                                                                                           · 4/17/12 - Patient started on cycle 5 of Oxaliplatin, Leucovorin, 5-FU, Avastin.  · 5/1/12 - Patient started on cycle 6 of Oxaliplatin, Leucovorin, 5-FU, Avastin.  · 5/14/12 - CEA 1.0 (N).  · 5/17/12 - Patient started on cycle 7 of Oxaliplatin, Leucovorin, 5-FU, Avastin.  · 5/29/12 - 5/31/12 - Patient admitted to [facility] due to acute febrile illness, sepsis syndrome, urinary tract infection, CA colon with liver mets, leukocytosis, hypotension, Elevated liver function tests, and acute kidney injury. TSH 8.66 (H), Ferritin 528(H), Folate 24.8 (H), Haptoglobin 312 (H), Iron 40 (N), Hep panel negative, Relic count1.42 (N), TIBC 281 (N), 812 1500(H), Lipase 22(N). Chest x-ray revealed no acute cardiopulmonary abnormality.  · 5/29/12 - Patient started on cycle 8 of Oxaliplatin, Leucovorin, 5-FU, Avastin.  · 6/13/12 - Patient started on cycle 9 of Oxaliplalin, Leucovorin, 5-FU, Avastin.  · 6/25/12 - CT scan of the abdomen and pelvis with hepatic lesions appearing smaller as compared to the prior study, which could relate to underlying metastatic disease. Sclerotic lesion of the right iliac bone that was present previously and could relate to bone mets. Postsurgical change.  · 6/26/12 - Patient started on cycle 10 of Oxaliplatin, Leucovorin, 5-FU, Avastin.  · 7/10/12 - Patient started on cycle 11 of Leucovorin, 5-FU, Oxaliplatin, Avaslin.  · 7/17/12 - Bone scan no evidence of metastatic disease  including sclerotic lesion right Iliac area.  · 7/24/12 - CEA 0.7.  · 7/24/12 - Patient started on cycle 12 of Leucovorin, 5-FU, Oxaliplatin, Avastin.  · 8/7/12 - Patient started on cycle 13 of Leucovorin, 5-FU, Oxaliplatin, Avastin.  · 8/21/12 - Patient started on cycle 14 of Leucovorin, 5-FU, Oxaliplatin, Avastin.  · 8/28/12 - CT scan of the abdomen and pelvis revealed stable size and appearance of multiple hypodense hepatic lesions, likely representing metastatic disease. Otherwise stable abdomen and pelvis.  · 8/23/12 - CEA 0.8 (N).  · 9/4/12 - Orders written to continue chemotherapy until seen by Dr. Eldridge.  · 9/11/12 - Patient started on cycle 15 of Leucovorin, 5-FU, Oxaliplatin, Avastin.  · 9/11/12 - Orders written to give chemotherapy today with a dose reduction of 20% due to low ANC.  · 9/18/12 - Orders written to discontinue chemotherapy and start maintenance Xeloda 1500 mg po bid x 14 days cycle q 3 weeks, per Dr. Eldridge.  · 11/7/12 - Patient evaluation by Dr. Montoya which recommended hepatic directed radiation therapy. She underwent TheraSpheres targeting the right lobe. Will treat the left hepatic lobe in 3 to 4 weeks if she is able to tolerate this procedure.  · 12/20/12 - CEA 1.3 (N).  · 12/31/12 - CT scan of the abdomen and pelvis revealed that there has been a significant partial response in both lobes of the liver.  Activity of remaining lesions is uncertain and could be assessed by follow-up studies for PET/CT correlation.  Heterogeneous enhancement of the right lobe of the liver is consistent with previous embolization therapy.  No evidence of extrahepatic metastatic disease.  Focal non-enhancement and bulging of the lateral wall of the gallbladder body.       · 1/22/13 - CEA 0.8.  · 1/22/13 - AST 52, ALT 47, ALK PHOS 157.  · 3/25/13 - Xeloda dose decreased to 1,250 mg p.o. b.i.d. x 14 days to cycle every three weeks due to hand foot syndrome.  · 4/27/13 - CT scan of abdomen and pelvis revealed  overall continued improvement in hepatic metastatic disease.  The single largest lesion in the right lobe is stable in appearance when compared to prior study although other lesions within the liver are more hypodense and some are smaller suggesting continued response to treatment.  Many of these lesions may no longer harbor viable malignancy.  A follow-up PET/CT scan could be performed to further evaluate the full extent of the viable metastasis versus continued attenuation on CT follow-up.  Contracted gallbladder with either focal areas of bulging of the wall or necrosis.  The appearance of the current exam is more suggesting a bulging rather than necrosis.  · 5/6/13 - Order written to decrease Xeloda to 1000 mg by mouth twice a day ×14 days every three weeks due to hand-foot syndrome and chemotherapy induced cytopenias and diarrhea.  · 5/28/13 - PET/CT scan negative for hypermetabolic foci in the neck, chest, abdomen, or pelvis to suggest recurrent, progressive, or metastatic tumor.  · 7/9/13 - CEA 2.4 (N).  · 8/5/13 - CT of the chest showed no evidence of metastatic disease.  CT scan of abdomen and pelvis show continued improvement in metastatic disease involving both lobes of the liver.  No evidence of extrahepatic metastatic disease in the abdomen or pelvis.  The gallbladder is contracted.  The previously suspected defect of the gallbladder wall against the liver surface is smaller.  There is no evidence of gallbladder leak into the peritoneal cavity.  Contained perforation is not excluded but is increasingly less likely.  · 12/7/13 - CT scan of the abdomen and pelvis revealed stable appearance of hepatic metastatic disease, most of which is likely treated.  A PET/CT scan could be performed to evaluate for any sites of active metastatic disease.  No evidence of recurrent disease at the anastomosis.  The gallbladder is contracted and not optimally evaluated on this exam, but is stable in appearance compared to  prior exam.  · 12/23/13 - CEA 1.4 normal.  · 4/28/14 - CEA 1.6 (N).  · 6/26/14 - Patient underwent a colonoscopy by Dr. Pack, which revealed no evidence of recurrence.  · 7/15/14 - Patient advised to continue chemotherapy with Xeloda.  · 7/30/14 - Echocardiogram revealed left ventricular size and contractility is within normal limits and ejection fraction is 55-60%.  · 8/18/14 - CT scan of the abdomen and pelvis revealed stable previously treated metastases in both lobes of the liver.  The activity of individual lesions is not determined and may be assessed by follow-up CT scans are PET/CT correlation.  No evidence of extrahepatic metastatic disease.  No signs of local tumor recurrence at the bowel resection site.  · 8/24/14 - Xeloda discontinued due to remission of disease.  · 9/16/14 - CEA 1.0 (N).  · 11/26/14 - CEA 1  · 12/23/14 - CT abdomen and pelvis with contrast: There are several tiny low attenuation lesions within the liver, but overall are believed to be unchanged compared to the prior exam in April 2013.  · 12/23/14 - CXR: No acute process seen.  · 12/23/14 - CT abdomen and pelvis with several tiny low attenuation lesions in the liver, unchanged in comparison to prior CT’s.  Chest x-ray with no acute process seen.    · 5/26/15 - WBC 6, hemoglobin 12.1, platelet count 173,000, MCV 85.8.  · 6/25/15 - CT scan of the abdomen and pelvis, right lobe of the liver is decreased in size and the margin is nodular. Three metal densities in the area of the hepatic artery. Duodenal lipoma. Gallbladder contracted.   · 8/24/15 - CEA 0.8 (0-5). Comprehensive metabolic panel normal.    · 2/15/16 - CEA 1.0 (0-5). Comprehensive metabolic panel with potassium 3.3 (3.6-5.1). Patient asked to go on potassium supplements but wanted to try high potassium diet.     · 8/15/16 - Patient advised to have the Infusaport removed if CT is stable. CEA 0.9 (N). CMP normal, creatinine 1.0. 9/2/16 - CT scan of the chest, abdomen and  pelvis without contrast revealed stable chest CT with no evidence of mediastinal, hilar or pulmonary metastases or mass. Interval improvement of the appearance of the liver with no hepatic metastases seen and no evidence of adrenal enlargement or upper abdominal lymphadenopathy. Post treatment change was noted to the right lobe of the liver and hepatic artery, stable duodenal lipoma, moderate stool suggesting constipation.   · 10/6/16 - Infusaport removal by Dr. Pack.   · 12/22/16 - WBC 5.17, hemoglobin 13.2, MCV 86.5, platelets 234,000, ANC 3.3.   · 4/21/17 - CEA 0.9 (0-3). CMP with insignificant abnormalities - creatinine 1.1.   · 8/23/17 - WBC 6.79, hemoglobin 11.7, MCV 86.8, platelets 185,000, ANC 3.78.    · 9/27/17 - CT chest, abdomen and pelvis with mild coronary artery calcification, enlarging low-attenuation ill-defined mass in the left hepatic lobe. More ill-defined area of hypoattenuation within liver segments 2-3. Evidence of partial distal colon resection with patent anastomosis.   · 10/16/17 - CT-guided liver biopsy by Jay Baer M.D. revealed on pathology metastatic adenocarcinoma consistent with colon primary.   · 10/25/17 - Orders written for mFOLFOX6 plus Bevacizumab, Oxaliplatin 85 mg/M2 IV piggyback day 1, Leucovorin 400 mg/M2 IV piggyback day 1, 5- mg/M2 IV piggyback day 1 then 2400 mg/M2 CIV over 46 hours, Bevacizumab 5 mg/kg IV day 1 to repeat cycle every two weeks. CEA 1.2 (0-5).    · 11/2/17 - Infusaport placement under fluoroscopic guidance by Regina Pack M.D.   · 11/13/17 - MRI abdomen with 5.8 cm heterogenously enhancing lesion within hepatic segment 4 compatible with recently-biopsied lesion. Just adjacent to this is an 8 mm satellite lesion. No hepatic lesion identified within segments 2 or 3.   · 11/17/17 - PET scan with large hypermetabolic mass in liver segment 4 compatible with metastasis.   · 11/27/17 - Patient received cycle 1 chemotherapy.   · 12/11/17 -  Patient received cycle 2 chemotherapy.   · 12/13/17 - Patient called complaining of feeling of her jaw being tight and tongue thick along with some leg cramps on the prior day, 12/12/17. The patient took Benadryl and reported the symptoms improved throughout the day.   · 12/20/17 - Patient reports fever following both chemotherapy infusions. She reports a temperature of 101 post the 12/11/17 infusion that resolved; however, the patient continued to have flu-like achiness for several days afterward. Blood cultures ordered to be obtained with next port access. Dexamethasone 4 mg p.o. b.i.d. x3 days on days 2-4 of chemotherapy ordered due to neuropathies with cramping of the jaw and some nausea. Z-Pack prescribed for URI and Benzonatate prescribed for cough.   · 12/27/17 - Patient seen in followup by Doug Eldridge M.D. with plans of repeating CT scan of the liver post third FOLFOX treatment.   · 1/2/18 - Creatinine 1.0 (0.4-1.0).  Patient received cycle 3 chemotherapy.   · 1/8/18 - Chemotherapy delayed and Nephrology consulted for acute rise in creatinine from 1.0 to 2.5.   · 1/10/18 - Creatinine 2.1 (0.4-1.0).    · 1/15/18 - Creatinine 1.6 (0.4-1.0).    · 1/16/18 - Received orders from Dr. Riojas’s office for patient to receive Mucomyst 1200 mg p.o. b.i.d. for four days starting the day before CT scans in the future as well as IV hydration with normal saline 150 cc per hour for two hours prior to procedure and for four hours postprocedure.   · 1/22/18 - Current chemotherapy discontinued. New chemotherapy orders written for Irinotecan 180 mg/M2 IV piggyback day 1, Leucovorin 400 mg/M2 IV piggyback day 1, 5- mg/M2 IV piggyback day 1 followed by 2400 mg/M2 CIV days 1-3 and Bevacizumab 5 mg/kg IV day 1 to repeat cycle every two weeks. Chemotherapy to start post CT abdomen to determine whether patient would go for surgery now or later. Creatinine 1.3 (0.4-1.0).    · 1/29/18 - CT chest with new 2 mm nodule in the  right middle lobe and 6 mm short axis anterior epicardial lymph node. CT abdomen and pelvis with two new metastases of the left lobe of the liver, largest measuring 4.4 cm.   · 2/5/18 - Chemotherapy placed on hold pending surgical resection of liver lesions.   · 2/8/18 - Patient underwent diagnostic laparoscopy, laparoscopic intraoperative ultrasound of the liver, exploratory laparotomy, left hepatectomy, intraoperative ultrasound and omental pedicle flap by Doug Eldridge II, M.D. at [facility] with pathology revealing the left hepatic lobectomy specimen to contain a single nodule of metastatic adenocarcinoma consistent with colonic primary 4.5 cm in size with surgical margins negative for carcinoma. Tumor consisted of 30% viable tumor, 60% necrosis and 10% peripheral fibrosis.   · 3/1/18 - Discussed options in detail with the patient. Mutually decided to treat with adjuvant Xeloda for six months at a dose of 1000 mg p.o. b.i.d. x14 days every 21 days based on her previous dose that she tolerated. CEA 0.8 (0-5). Liver enzymes normal.    · 3/1/18 - Patient started cycle 1 chemotherapy.   · 5/12/18 - CT chest with stable punctate indeterminate right upper lobe and right middle lobe nodules and abdomen and pelvis with interval left partial hepatectomy with no signs of active disease in the liver.   · 5/31/18 - Patient claims to be taking Xeloda two weeks on, three weeks off. Asked to change to two weeks on, one week off. Patient starting cycle 3 Capecitabine (Xeloda).    · 6/21/18 - Reporting grade I palmar erythema with Capecitabine. Advised to use Urea Lotion t.i.d., cold therapy and to call for worsening symptoms.   · 7/28/18 - CT abdomen and pelvis at [facility] revealed slight increasing geographic areas of low attenuation within the inferior aspect of the hepatic segment 5 without obvious well-defined lesions and areas of hepatic steatosis are more likely favored. No evidence of metastatic  disease elsewhere in the abdomen or pelvis.   · 9/18/18 - Patient currently on cycle 6 Capecitabine (Xeloda). Asked to discontinue after completion of this cycle.  CEA 1.0 (0.5-1.5).   · 10/8/18 - CT chest, abdomen and pelvis at [facility] with stable chronic changes noted in the chest, stable appearance of the liver without metastatic disease detected, development of small ventral hernia was noted without evidence of bowel obstruction, stable-appearing intraluminal fatty lesion of the duodenum was present.   · 1/21/19 - CEA 1.1 (0-3 non-smoker).    · 1/29/19 - BRAF negative performed on biopsy originally dated 10/16/17.   · 5/13/19 - CT chest, abdomen and pelvis with contrast at [facility] showed metastatic disease to the chest with a new conglomerate of likely necrotic subcarinal lymphadenopathy measuring 2.7 cm and new right hilar lymphadenopathy measuring 7 mm. There were multiple new right upper lobe lung nodules with solid appearance. One nodule measured 8 mm. A second nodule had a solid component measuring 5 mm. There was a subpleural nodule anteriorly in the right upper lobe that measured 7 mm and a new left lower lobe nodule measuring 5 mm. There was nodular contour of the liver with increased hyperdensity of the liver measuring 2.3 cm where it had previously measured 2 cm and an ill-defined region of hyperdensity anteriorly in the right lobe of the liver that was unchanged. There were periumbilical hernias containing mesenteric fat and loops of bowel without evidence of obstruction. There was a lipoma in the duodenum. There were no mesenteric or retroperitoneal lymphadenopathy present. There was evidence of a probable mesenteric implant at the right upper quadrant abdomen adjacent to the right hemidiaphragm measuring 2.3 x 2.1 cm. This had increased in size from the prior exam and was subtly seen on the prior exam measuring 8 mm. Patient seen by Dr. Doug Eldridge at [facility] with  note stating there was evidence of pulmonary progression and possible abdominal progression with recommendation to followup with Medical Oncology to resume systemic chemotherapy.   · 5/16/19 - PET CT at [facility] showed hypermetabolic subcarinal mass concerning for metastatic disease. There were scattered pulmonary densities extending down to the right hilar region which were hypermetabolic ranging in SUV of 5.1 to 5.3. This was concerning for metastatic disease, although inflammatory process was considered a second possibility. There were several tiny nodular densities seen in the lungs which did not demonstrate significant metabolic activity but could be inflammatory or metastatic. There we intercostal masses on the right concerning for metastatic disease. There was a presumed peritoneal implant on the right upper quadrant between the liver and the diaphragm that was hypermetabolic and concerning for metastatic disease. There were some small subcentimeter lymph nodes in the left neck that were non-specific and indeterminate and felt to be reactive versus metastatic. There was hypermetabolic activity seen in the soft tissues and bone marrow.   · 6/3/19 - Reviewed recent CT scans and PET scan showing likely progression with mesenteric implants and likely metastatic lymphadenopathy and lung nodules. Patient with recent bronchitis and infectious symptoms. Will plan to give a course of antibiotics. Discussed CT-guided biopsy of the anterior right upper quadrant mesenteric mass to confirm presence of malignancy. Discussed past treatments and side effects with plan to pursue further systemic therapy once biopsy-proven progression is confirmed. If biopsy is negative will plan to follow on repeat scan.  CEA 0.7 (0-5).  · 6/13/19 CT-guided core biopsy of abdominal mesenteric soft tissue mass by Jay Baer MD revealed metastatic adenocarcinoma consistent with colon primary.  · 6/21/19 discussed results of biopsy with  the patient and recommended systemic chemotherapy.  Discussed starting FOLFOX 6 again versus FOLFIRI.  Given the fact that she did have elevation of renal function as well as neuropathy with FOLFOX in the past mutually decided to treat her with FOLFIRI plus bevacizumab.  · 7/8/2019-FOLFIRI and Avastin cycle 1 initiated.  · 7/22/2019-FOLFIRI and Avastin cycle 2 initiated.  · 7/29/2019-Patient tolerating treatment with some expected mild cytopenias and nausea.     2. Anemia and intermittent thrombocytopenia established February 2012.  · 2/13/12 - Relic count 0.8(N), Iron 14 (L), TIBC 245 (L), % sat 6 (L), Ferritin 376 (H), Hemoglobin 9.3 (L).  · 2/28/12 - Hemoglobin 8.8 (L), Retic count 1.1 (N), Iron 28(L), TIBC 311 (N), %sat 9 (L), Ferritin 320 (H), Vitamin b12 453, Folate > 1000(N), Haptoglobin 403 (H). Patient started on Procrit 40,000 units weekly.  · 5/29/12 - 5/31/12 - Patient admitted to [facility] due to acute febrile illness, sepsis syndrome, Urinary tract infection, CA colon with liver mets, leukocytosis, hypotension, Elevated liver function tests, and acute kidney Injury. TSH 8.66 (H), Ferritin 528(H), Folate 24.8 (H), Haptoglobin 312 (H), Iron 40 (N), Hep panel negative, Retic count 1.42 (N), TIBC 281 (N), 812 1500(H), Lipase 22(N),  · 9/4/12 - Hemoglobin 10.6.  · 10/02/12 - [id number], hematocrit 31.2  · 12/20/12 - Folate RBC Hematocrit 28.8 (N), Folate 782 (N). Retic 2.93 (H), Haptoglobin 54 (N), Iron 50 (N), TIBC 363 (N), Iron Sat% 14 (L), Vitamin 812 893 (N), Ferritin 296 (N)  · 7/9/13 - Hemoglobin 12.7, Hematocrit 35.8.  · 12/23/13 - Hemoglobin 13.3, hematocrit 37.1, MCV of 94.6.  Ferritin 280.4 (N).  · 7/15/14 - Hemoglobin 11.9, hematocrit 34.1, MCV 96.3.  · 11/16/15 - WBC 7.3, hemoglobin 12.1, platelet count 205,000. Anemia resolved.   · 8/15/16 - Ferritin 86 (N), folate 17.5 (N), haptoglobin 142 (N), retic 2.18 (H), Vitamin B12 747 (N), iron 49 (N),  (N), iron saturation 15% (N),  SPEP with normal electrophoresis pattern.   · 12/20/17 - Hemoglobin 9.5. Anemia labs ordered. Procrit 40,000 units subq weekly ordered for chemotherapy-induced anemia. Iron 28 (), TIBC 348 (228-428), iron saturation 8 (15-50), ferritin 293 (), folate >24.8 (5.9-24.8), Vitamin B12 of 516 (211-911), haptoglobin 105 (), retic count 1.2 (0.5-1.5).           · 3/1/18 - Ferritin 135 (), creatinine 1.1 (0.4-1.0).     · 6/21/18 - Hemoglobin 11.3, MCV 91.6. Ferritin 40 (), iron 52 (), TIBC 374 (228-428), iron saturation 14 (15-50).         Past Medical History:   Diagnosis Date   • History of colon cancer, stage IV     Stage SHELL with progression   • Radiculopathy 07/2012    Right L5/S1   • Thyromegaly 10/2013     Past Surgical History:   Procedure Laterality Date   • CYST REMOVAL  1998    cyst removed from bacck in 1998       Current Outpatient Medications:   •  American Ginseng powder, 200 mg 2 (Two) Times a Day., Disp: 1 bottle, Rfl: 2  •  ondansetron (ZOFRAN) 4 MG tablet, Take 2 tablets by mouth Every 8 (Eight) Hours As Needed for Nausea or Vomiting., Disp: 30 tablet, Rfl: 2  •  promethazine (PHENERGAN) 12.5 MG tablet, Take 2 tablets by mouth Every 6 (Six) Hours As Needed for Nausea or Vomiting (Take 0.5-1 tablet every 6 hours as needed)., Disp: 30 tablet, Rfl: 2  •  traMADol (ULTRAM) 50 MG tablet, Take 1 tablet by mouth Every 6 (Six) Hours As Needed for Moderate Pain ., Disp: 30 tablet, Rfl: 0    Allergies   Allergen Reactions   • Hydrocodone Nausea And Vomiting   • Iodinated Diagnostic Agents Hives and Itching     PT HAD SOME HIVES DURING SCAN.  PRIOR TO SCAN 8-3-2013.   • Latex Rash     Blisters    • Penicillins Hives       Family History   Problem Relation Age of Onset   • Cancer Sister         Bladder cancer     Cancer-related family history includes Cancer in her sister.    Social History     Tobacco Use   • Smoking status: Former Smoker     Years: 30.00     Last attempt to quit:  2012     Years since quittin.5   Substance Use Topics   • Alcohol use: Yes     Frequency: Never     Comment: socially   • Drug use: Not on file     I have reviewed the history of present illness, past medical history, family history, social history, lab results, all notes and other records since the patient was last seen on 19.    SUBJECTIVE: Patient is here for follow up of colon cancer.  She has noticed fatigue since starting chemotherapy.  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  She had some right sided pain in early July after reaching for her granddaughter who was headed towards a pool.  That has resolved.  She had a few mouth sores which have resolved.  She has a mild sore scratchy throat.  She has some intermittent nausea which has been controlled with PRN antiemetics.        REVIEW OF SYSTEMS:  Review of Systems   Constitutional: Positive for fatigue. Negative for activity change, chills, fever and unexpected weight change.   HENT: Positive for rhinorrhea ( Blood mixed nasal drainage) and sore throat. Negative for dental problem, ear pain, mouth sores and nosebleeds.    Eyes: Negative for photophobia and visual disturbance.   Respiratory: Negative for cough, chest tightness, shortness of breath, wheezing and stridor.    Cardiovascular: Negative for chest pain, palpitations and leg swelling.   Gastrointestinal: Negative for abdominal pain, blood in stool, diarrhea, nausea and vomiting.   Endocrine: Negative for cold intolerance and heat intolerance.   Genitourinary: Negative for difficulty urinating, dysuria, frequency and hematuria.   Musculoskeletal: Negative for joint swelling and neck stiffness.   Skin: Negative for color change and rash.   Neurological: Negative for seizures, syncope, weakness, light-headedness and headaches.   Hematological: Negative for adenopathy. Does not bruise/bleed easily.        No obvious bleeding   Psychiatric/Behavioral: Negative for agitation, confusion and  "hallucinations. The patient is not nervous/anxious.    All other systems reviewed and are negative.      OBJECTIVE:    Vitals:    07/29/19 1643   BP: (!) 182/92   Pulse: 109   Resp: 16   Temp: 98 °F (36.7 °C)   Weight: 64.1 kg (141 lb 6.4 oz)   Height: 157.5 cm (62\")   PainSc: 0-No pain       ECOG  (1) Restricted in physically strenuous activity, ambulatory and able to do work of light nature    Physical Exam   Constitutional: She is oriented to person, place, and time. She appears well-developed and well-nourished. No distress.   HENT:   Head: Normocephalic and atraumatic.   Nose: Nose normal.   Mouth/Throat: Oropharynx is clear and moist. No oropharyngeal exudate.   Dental fillings.    Eyes: Conjunctivae and EOM are normal. Pupils are equal, round, and reactive to light. Right eye exhibits no discharge. Left eye exhibits no discharge. No scleral icterus.   Neck: Normal range of motion. Neck supple. No thyromegaly present.   Cardiovascular: Normal rate, regular rhythm, normal heart sounds and intact distal pulses. Exam reveals no gallop and no friction rub.   No murmur heard.  Pulmonary/Chest: Effort normal and breath sounds normal. No stridor. No respiratory distress. She has no wheezes. She has no rales.   Abdominal: Soft. Bowel sounds are normal. She exhibits no mass. There is no tenderness. There is no rebound and no guarding.   Vertical midline upper abdominal scar.    Musculoskeletal: Normal range of motion. She exhibits no edema, tenderness or deformity.   Lymphadenopathy:     She has no cervical adenopathy.   Neurological: She is alert and oriented to person, place, and time. She exhibits normal muscle tone. Coordination normal.   Skin: Skin is warm and dry. Capillary refill takes less than 2 seconds. No rash noted. She is not diaphoretic. No erythema. No pallor.   Psychiatric: She has a normal mood and affect. Her behavior is normal.   Nursing note and vitals reviewed.      RECENT LABS  WBC   Date Value " Ref Range Status   07/29/2019 3.72 3.40 - 10.80 10*3/mm3 Final   02/12/2018 5.46 4.5 - 11.0 10*3/uL Final     RBC   Date Value Ref Range Status   07/29/2019 4.41 3.77 - 5.28 10*6/mm3 Final   02/12/2018 2.82 (L) 4.0 - 5.2 10*6/uL Final     Hemoglobin   Date Value Ref Range Status   07/29/2019 12.6 12.0 - 15.9 g/dL Final   02/12/2018 8.1 (L) 12.0 - 16.0 g/dL Final     Hematocrit   Date Value Ref Range Status   07/29/2019 37.7 34.0 - 46.6 % Final   02/12/2018 25.3 (L) 36.0 - 46.0 % Final     MCV   Date Value Ref Range Status   07/29/2019 85.5 79.0 - 97.0 fL Final   02/12/2018 89.7 80.0 - 100.0 fL Final     MCH   Date Value Ref Range Status   07/29/2019 28.6 26.6 - 33.0 pg Final   02/12/2018 28.7 26.0 - 34.0 pg Final     MCHC   Date Value Ref Range Status   07/29/2019 33.4 31.5 - 35.7 g/dL Final   02/12/2018 32.0 31.0 - 37.0 g/dL Final     RDW   Date Value Ref Range Status   07/29/2019 14.1 12.3 - 15.4 % Final   02/12/2018 17.5 (H) 12.0 - 16.8 % Final     RDW-SD   Date Value Ref Range Status   07/29/2019 43.0 37.0 - 54.0 fl Final     MPV   Date Value Ref Range Status   07/29/2019 9.1 6.0 - 12.0 fL Final   02/12/2018 10.0 6.7 - 10.8 fL Final     Platelets   Date Value Ref Range Status   07/29/2019 129 (L) 140 - 450 10*3/mm3 Final   02/12/2018 157 140 - 440 10*3/uL Final     Neutrophil Rel %   Date Value Ref Range Status   02/12/2018 67.2 45 - 80 % Final   02/11/2018 73.3 45 - 80 % Final     Neutrophil %   Date Value Ref Range Status   07/29/2019 47.6 42.7 - 76.0 % Final     Lymphocyte Rel %   Date Value Ref Range Status   02/12/2018 18.5 15 - 50 % Final     Lymphocyte %   Date Value Ref Range Status   07/29/2019 33.9 19.6 - 45.3 % Final     Monocyte Rel %   Date Value Ref Range Status   02/12/2018 11.2 0 - 15 % Final     Monocyte %   Date Value Ref Range Status   07/29/2019 15.3 (H) 5.0 - 12.0 % Final     Eosinophil %   Date Value Ref Range Status   07/29/2019 2.7 0.3 - 6.2 % Final   02/12/2018 2.7 0 - 7 % Final      Basophil Rel %   Date Value Ref Range Status   02/12/2018 0.2 0 - 2 % Final     Basophil %   Date Value Ref Range Status   07/29/2019 0.5 0.0 - 1.5 % Final     Immature Grans %   Date Value Ref Range Status   02/12/2018 0.2 (H) 0 % Final     Neutrophils Absolute   Date Value Ref Range Status   02/12/2018 3.67 2.0 - 8.8 10*3/uL Final     Neutrophils, Absolute   Date Value Ref Range Status   07/29/2019 1.77 1.70 - 7.00 10*3/mm3 Final     Lymphocytes Absolute   Date Value Ref Range Status   02/12/2018 1.01 0.7 - 5.5 10*3/uL Final     Lymphocytes, Absolute   Date Value Ref Range Status   07/29/2019 1.26 0.70 - 3.10 10*3/mm3 Final     Monocytes Absolute   Date Value Ref Range Status   02/12/2018 0.61 0.0 - 1.7 10*3/uL Final     Monocytes, Absolute   Date Value Ref Range Status   07/29/2019 0.57 0.10 - 0.90 10*3/mm3 Final     Eosinophils Absolute   Date Value Ref Range Status   02/12/2018 0.15 0.0 - 0.8 10*3/uL Final     Eosinophils, Absolute   Date Value Ref Range Status   07/29/2019 0.10 0.00 - 0.40 10*3/mm3 Final     Basophils Absolute   Date Value Ref Range Status   02/12/2018 0.01 0.0 - 0.2 10*3/uL Final     Basophils, Absolute   Date Value Ref Range Status   07/29/2019 0.02 0.00 - 0.20 10*3/mm3 Final     Immature Grans, Absolute   Date Value Ref Range Status   02/12/2018 0.01 <1 10*3/uL Final     nRBC   Date Value Ref Range Status   06/13/2019 0 0 /100[WBCs] Final   02/12/2018 0 0 /100(WBC) Final       Lab Results   Component Value Date    GLUCOSE 193 (H) 07/22/2019    BUN 12 07/22/2019    CREATININE 1.10 (H) 07/22/2019    EGFRIFNONA 51 (L) 07/22/2019    BCR 10.9 07/22/2019    K 3.7 07/22/2019    CO2 23.0 07/22/2019    CALCIUM 8.5 (L) 07/22/2019    ALBUMIN 3.50 07/22/2019    LABIL2 1.1 06/03/2019    AST 41 07/22/2019    ALT 43 07/22/2019     ASSESSMENT:    Assessment/Plan     Malignant neoplasm of sigmoid colon (CMS/HCC)  - CBC & Differential  - CBC Auto Differential  - CT Chest Without Contrast  - CT Abdomen  Pelvis Without Contrast    Chemotherapy-induced thrombocytopenia    Anemia, unspecified type    Encounter for chemotherapy management    Acute sore throat    Fatigue due to exposure, initial encounter  - TSH  - American Ginseng powder  - TSH  - TSH    Chemotherapy-induced nausea    Chart reviewed with noted mild neutropenia and intermittent thrombocytopenia post chemotherapy.  Her hemoglobin okay.  She has some fairly significant fatigue but is able to continue working at present.  She has some mild nausea controlled with PRN antiemetics and has some heartburn symptoms.  She has not experienced any significant diarrhea.  She has some bloody mixed nasal drainage without overt nosebleeds.  She denies fever chills.  She treated a mouth sore that has resolved and has a scratchy throat. She is declining Molly's Magic lotion prescription at present.  She will be due for follow-up CT scans in about 6 to 7 weeks.    PLAN:  1. TSH today.  2. American ginseng 200 mg by mouth twice a day for fatigue.  3. Continue chemotherapy at current doses and schedule.  4. Plan to add Neulasta showed significant neutropenia occur with a future cycle.  5. Continue PRN antiemetics.  I have advised her to start over-the-counter Pepcid, Prilosec or something similar.     Electronically signed by FATUMA Funes, 07/29/19, 5:44 PM.    I have reviewed labs results, imaging, vitals, and medications with the patient today. Will follow up in one month with the nurse practitioner and two months with me.     Patient verbalized understanding and is in agreement of the above plan.    I have personally performed a face-to-face diagnostic evaluation on this patient.  I have discussed the case with Iram Reina NP, have edited/reviewed the note,  and agree with the care plan.  The patient's main complaint seems to be 1 of fatigue.  On examination she has no abdominal tenderness and her blood counts are significant for antiplatelet cytopenias  postchemotherapy.  Overall for stage IV colon cancer, she seems to be tolerating FOLFIRI plus Avastin well.        Mark Perera M.D., F.A.C.P.     Much of the above report is an electronic transcription//translation of the spoken language to printed text using Dragon Software. As such, the subtleties and finesse of the spoken language may permit erroneous, or at times, nonsensical words or phrases to be inadvertently transcribed; thus changes may be made at a later date to rectify these errors.

## 2019-08-01 DIAGNOSIS — C18.7 MALIGNANT NEOPLASM OF SIGMOID COLON (HCC): ICD-10-CM

## 2019-08-01 RX ORDER — PALONOSETRON 0.05 MG/ML
0.25 INJECTION, SOLUTION INTRAVENOUS ONCE
Status: CANCELLED | OUTPATIENT
Start: 2019-08-12

## 2019-08-01 RX ORDER — FLUOROURACIL 50 MG/ML
400 INJECTION, SOLUTION INTRAVENOUS ONCE
Status: CANCELLED | OUTPATIENT
Start: 2019-08-12

## 2019-08-01 RX ORDER — SODIUM CHLORIDE 9 MG/ML
250 INJECTION, SOLUTION INTRAVENOUS ONCE
Status: CANCELLED | OUTPATIENT
Start: 2019-08-12

## 2019-08-01 RX ORDER — ATROPINE SULFATE 1 MG/ML
0.25 INJECTION, SOLUTION INTRAMUSCULAR; INTRAVENOUS; SUBCUTANEOUS
Status: CANCELLED | OUTPATIENT
Start: 2019-08-12

## 2019-08-05 ENCOUNTER — HOSPITAL ENCOUNTER (OUTPATIENT)
Dept: ONCOLOGY | Facility: HOSPITAL | Age: 59
Setting detail: INFUSION SERIES
Discharge: HOME | End: 2019-08-05

## 2019-08-05 VITALS
HEIGHT: 62 IN | WEIGHT: 140.8 LBS | RESPIRATION RATE: 16 BRPM | TEMPERATURE: 97.7 F | SYSTOLIC BLOOD PRESSURE: 166 MMHG | DIASTOLIC BLOOD PRESSURE: 89 MMHG | BODY MASS INDEX: 25.91 KG/M2 | HEART RATE: 80 BPM

## 2019-08-05 DIAGNOSIS — C18.7 MALIGNANT NEOPLASM OF SIGMOID COLON (HCC): Primary | ICD-10-CM

## 2019-08-05 DIAGNOSIS — D70.1 CHEMOTHERAPY-INDUCED NEUTROPENIA (HCC): Primary | ICD-10-CM

## 2019-08-05 DIAGNOSIS — T73.2XXA FATIGUE DUE TO EXPOSURE, INITIAL ENCOUNTER: ICD-10-CM

## 2019-08-05 DIAGNOSIS — T45.1X5A CHEMOTHERAPY-INDUCED NEUTROPENIA (HCC): Primary | ICD-10-CM

## 2019-08-05 LAB
ALBUMIN SERPL-MCNC: 3.3 G/DL (ref 3.5–4.8)
ALBUMIN/GLOB SERPL: 1.2 G/DL (ref 1–1.7)
ALP SERPL-CCNC: 84 U/L (ref 32–91)
ALT SERPL W P-5'-P-CCNC: 28 U/L (ref 14–54)
ANION GAP SERPL CALCULATED.3IONS-SCNC: 12.6 MMOL/L (ref 5–15)
AST SERPL-CCNC: 20 U/L (ref 15–41)
BASOPHILS # BLD AUTO: 0.03 10*3/MM3 (ref 0–0.2)
BASOPHILS NFR BLD AUTO: 1.3 % (ref 0–1.5)
BILIRUB SERPL-MCNC: 0.7 MG/DL (ref 0.3–1.2)
BILIRUB UR QL STRIP: NEGATIVE
BUN BLD-MCNC: 14 MG/DL (ref 8–20)
BUN/CREAT SERPL: 15.6 (ref 5.4–26.2)
CALCIUM SPEC-SCNC: 8.7 MG/DL (ref 8.9–10.3)
CHLORIDE SERPL-SCNC: 105 MMOL/L (ref 101–111)
CLARITY UR: CLEAR
CO2 SERPL-SCNC: 23 MMOL/L (ref 22–32)
COLOR UR: YELLOW
CREAT BLD-MCNC: 0.9 MG/DL (ref 0.4–1)
DEPRECATED RDW RBC AUTO: 44.7 FL (ref 37–54)
EOSINOPHIL # BLD AUTO: 0.04 10*3/MM3 (ref 0–0.4)
EOSINOPHIL NFR BLD AUTO: 1.7 % (ref 0.3–6.2)
ERYTHROCYTE [DISTWIDTH] IN BLOOD BY AUTOMATED COUNT: 15.2 % (ref 12.3–15.4)
GFR SERPL CREATININE-BSD FRML MDRD: 65 ML/MIN/1.73
GLOBULIN UR ELPH-MCNC: 2.8 GM/DL (ref 2.5–3.8)
GLUCOSE BLD-MCNC: 136 MG/DL (ref 65–99)
GLUCOSE UR STRIP-MCNC: NEGATIVE MG/DL
HCT VFR BLD AUTO: 33.8 % (ref 34–46.6)
HGB BLD-MCNC: 11.1 G/DL (ref 12–15.9)
HGB UR QL STRIP.AUTO: NEGATIVE
KETONES UR QL STRIP: NEGATIVE
LEUKOCYTE ESTERASE UR QL STRIP.AUTO: NEGATIVE
LYMPHOCYTES # BLD AUTO: 0.98 10*3/MM3 (ref 0.7–3.1)
LYMPHOCYTES NFR BLD AUTO: 42.2 % (ref 19.6–45.3)
MCH RBC QN AUTO: 28.9 PG (ref 26.6–33)
MCHC RBC AUTO-ENTMCNC: 32.8 G/DL (ref 31.5–35.7)
MCV RBC AUTO: 88 FL (ref 79–97)
MONOCYTES # BLD AUTO: 0.58 10*3/MM3 (ref 0.1–0.9)
MONOCYTES NFR BLD AUTO: 25 % (ref 5–12)
NEUTROPHILS # BLD AUTO: 0.69 10*3/MM3 (ref 1.7–7)
NEUTROPHILS NFR BLD AUTO: 29.8 % (ref 42.7–76)
NITRITE UR QL STRIP: NEGATIVE
PH UR STRIP.AUTO: 6 [PH] (ref 5–8)
PLATELET # BLD AUTO: 155 10*3/MM3 (ref 140–450)
PMV BLD AUTO: 9.1 FL (ref 6–12)
POTASSIUM BLD-SCNC: 3.6 MMOL/L (ref 3.6–5.1)
PROT SERPL-MCNC: 6.1 G/DL (ref 6.1–7.9)
PROT UR QL STRIP: NEGATIVE
RBC # BLD AUTO: 3.84 10*6/MM3 (ref 3.77–5.28)
SODIUM BLD-SCNC: 137 MMOL/L (ref 136–144)
SP GR UR STRIP: 1.01 (ref 1–1.03)
TSH SERPL DL<=0.05 MIU/L-ACNC: 6.05 MIU/ML (ref 0.34–5.6)
UROBILINOGEN UR QL STRIP: NORMAL
WBC NRBC COR # BLD: 2.32 10*3/MM3 (ref 3.4–10.8)

## 2019-08-05 PROCEDURE — 84443 ASSAY THYROID STIM HORMONE: CPT | Performed by: NURSE PRACTITIONER

## 2019-08-05 PROCEDURE — 36591 DRAW BLOOD OFF VENOUS DEVICE: CPT | Performed by: INTERNAL MEDICINE

## 2019-08-05 PROCEDURE — 81003 URINALYSIS AUTO W/O SCOPE: CPT | Performed by: NURSE PRACTITIONER

## 2019-08-05 PROCEDURE — 80053 COMPREHEN METABOLIC PANEL: CPT | Performed by: NURSE PRACTITIONER

## 2019-08-05 PROCEDURE — 85025 COMPLETE CBC W/AUTO DIFF WBC: CPT | Performed by: NURSE PRACTITIONER

## 2019-08-05 RX ORDER — SODIUM CHLORIDE 0.9 % (FLUSH) 0.9 %
10 SYRINGE (ML) INJECTION AS NEEDED
Status: CANCELLED | OUTPATIENT
Start: 2019-08-05

## 2019-08-05 RX ORDER — SODIUM CHLORIDE 0.9 % (FLUSH) 0.9 %
10 SYRINGE (ML) INJECTION AS NEEDED
Status: DISCONTINUED | OUTPATIENT
Start: 2019-08-05 | End: 2019-08-06 | Stop reason: HOSPADM

## 2019-08-05 RX ADMIN — Medication 10 ML: at 09:18

## 2019-08-05 NOTE — PROGRESS NOTES
Patient in office for C3 FOLFIRI/Avastin.  ANC 0.69.  Received notification from Dr. Perera to hold treatment for one week and add Neulasta to treatment plan.

## 2019-08-05 NOTE — PROGRESS NOTES
Pt here for C3D1 Avastin/Folfiri.  ANC 0.69 and reviewed with Cristy Pizarro NP.  Pt to have treatment held today and will add Neulasta.   Reviewed with pt and pt v/u and agreement.

## 2019-08-12 ENCOUNTER — HOSPITAL ENCOUNTER (OUTPATIENT)
Dept: ONCOLOGY | Facility: HOSPITAL | Age: 59
Setting detail: INFUSION SERIES
Discharge: HOME | End: 2019-08-12

## 2019-08-12 ENCOUNTER — TELEPHONE (OUTPATIENT)
Dept: ONCOLOGY | Facility: CLINIC | Age: 59
End: 2019-08-12

## 2019-08-12 VITALS
WEIGHT: 141 LBS | RESPIRATION RATE: 18 BRPM | HEART RATE: 85 BPM | HEIGHT: 62 IN | SYSTOLIC BLOOD PRESSURE: 159 MMHG | DIASTOLIC BLOOD PRESSURE: 84 MMHG | TEMPERATURE: 97.7 F | BODY MASS INDEX: 25.95 KG/M2

## 2019-08-12 DIAGNOSIS — C18.7 MALIGNANT NEOPLASM OF SIGMOID COLON (HCC): Primary | ICD-10-CM

## 2019-08-12 LAB
ALBUMIN SERPL-MCNC: 3.7 G/DL (ref 3.5–4.8)
ALBUMIN/GLOB SERPL: 1.3 G/DL (ref 1–1.7)
ALP SERPL-CCNC: 93 U/L (ref 32–91)
ALT SERPL W P-5'-P-CCNC: 22 U/L (ref 14–54)
ANION GAP SERPL CALCULATED.3IONS-SCNC: 12.6 MMOL/L (ref 5–15)
AST SERPL-CCNC: 25 U/L (ref 15–41)
BASOPHILS # BLD AUTO: 0.02 10*3/MM3 (ref 0–0.2)
BASOPHILS NFR BLD AUTO: 0.5 % (ref 0–1.5)
BILIRUB SERPL-MCNC: 0.5 MG/DL (ref 0.3–1.2)
BILIRUB UR QL STRIP: ABNORMAL
BUN BLD-MCNC: 14 MG/DL (ref 8–20)
BUN/CREAT SERPL: 14 (ref 5.4–26.2)
CALCIUM SPEC-SCNC: 9 MG/DL (ref 8.9–10.3)
CHLORIDE SERPL-SCNC: 109 MMOL/L (ref 101–111)
CLARITY UR: CLEAR
CO2 SERPL-SCNC: 22 MMOL/L (ref 22–32)
COLOR UR: ABNORMAL
CREAT BLD-MCNC: 1 MG/DL (ref 0.4–1)
DEPRECATED RDW RBC AUTO: 51.4 FL (ref 37–54)
EOSINOPHIL # BLD AUTO: 0.15 10*3/MM3 (ref 0–0.4)
EOSINOPHIL NFR BLD AUTO: 3.7 % (ref 0.3–6.2)
ERYTHROCYTE [DISTWIDTH] IN BLOOD BY AUTOMATED COUNT: 16.8 % (ref 12.3–15.4)
GFR SERPL CREATININE-BSD FRML MDRD: 57 ML/MIN/1.73
GLOBULIN UR ELPH-MCNC: 2.9 GM/DL (ref 2.5–3.8)
GLUCOSE BLD-MCNC: 149 MG/DL (ref 65–99)
GLUCOSE UR STRIP-MCNC: NEGATIVE MG/DL
HCT VFR BLD AUTO: 36.8 % (ref 34–46.6)
HGB BLD-MCNC: 12.5 G/DL (ref 12–15.9)
HGB UR QL STRIP.AUTO: NEGATIVE
KETONES UR QL STRIP: ABNORMAL
LEUKOCYTE ESTERASE UR QL STRIP.AUTO: ABNORMAL
LYMPHOCYTES # BLD AUTO: 1.31 10*3/MM3 (ref 0.7–3.1)
LYMPHOCYTES NFR BLD AUTO: 32 % (ref 19.6–45.3)
MCH RBC QN AUTO: 29.9 PG (ref 26.6–33)
MCHC RBC AUTO-ENTMCNC: 34 G/DL (ref 31.5–35.7)
MCV RBC AUTO: 88 FL (ref 79–97)
MONOCYTES # BLD AUTO: 0.71 10*3/MM3 (ref 0.1–0.9)
MONOCYTES NFR BLD AUTO: 17.4 % (ref 5–12)
NEUTROPHILS # BLD AUTO: 1.9 10*3/MM3 (ref 1.7–7)
NEUTROPHILS NFR BLD AUTO: 46.4 % (ref 42.7–76)
NITRITE UR QL STRIP: NEGATIVE
PH UR STRIP.AUTO: 5.5 [PH] (ref 5–8)
PLATELET # BLD AUTO: 170 10*3/MM3 (ref 140–450)
PMV BLD AUTO: 9.8 FL (ref 6–12)
POTASSIUM BLD-SCNC: 3.6 MMOL/L (ref 3.6–5.1)
PROT SERPL-MCNC: 6.6 G/DL (ref 6.1–7.9)
PROT UR QL STRIP: ABNORMAL
RBC # BLD AUTO: 4.18 10*6/MM3 (ref 3.77–5.28)
SODIUM BLD-SCNC: 140 MMOL/L (ref 136–144)
SP GR UR STRIP: 1.02 (ref 1–1.03)
UROBILINOGEN UR QL STRIP: ABNORMAL
WBC NRBC COR # BLD: 4.09 10*3/MM3 (ref 3.4–10.8)

## 2019-08-12 PROCEDURE — 25010000002 ATROPINE PER 0.01 MG: Performed by: NURSE PRACTITIONER

## 2019-08-12 PROCEDURE — 96375 TX/PRO/DX INJ NEW DRUG ADDON: CPT | Performed by: INTERNAL MEDICINE

## 2019-08-12 PROCEDURE — 96368 THER/DIAG CONCURRENT INF: CPT | Performed by: INTERNAL MEDICINE

## 2019-08-12 PROCEDURE — 96415 CHEMO IV INFUSION ADDL HR: CPT | Performed by: INTERNAL MEDICINE

## 2019-08-12 PROCEDURE — 96417 CHEMO IV INFUS EACH ADDL SEQ: CPT | Performed by: INTERNAL MEDICINE

## 2019-08-12 PROCEDURE — 25010000002 FLUOROURACIL PER 500 MG: Performed by: NURSE PRACTITIONER

## 2019-08-12 PROCEDURE — 96367 TX/PROPH/DG ADDL SEQ IV INF: CPT | Performed by: INTERNAL MEDICINE

## 2019-08-12 PROCEDURE — 81003 URINALYSIS AUTO W/O SCOPE: CPT | Performed by: NURSE PRACTITIONER

## 2019-08-12 PROCEDURE — 25010000002 BEVACIZUMAB PER 10 MG: Performed by: NURSE PRACTITIONER

## 2019-08-12 PROCEDURE — 25010000002 IRINOTECAN PER 20 MG: Performed by: NURSE PRACTITIONER

## 2019-08-12 PROCEDURE — 25010000002 FOSAPREPITANT PER 1 MG: Performed by: NURSE PRACTITIONER

## 2019-08-12 PROCEDURE — 25010000002 DEXAMETHASONE SODIUM PHOSPHATE 120 MG/30ML SOLUTION: Performed by: NURSE PRACTITIONER

## 2019-08-12 PROCEDURE — 96411 CHEMO IV PUSH ADDL DRUG: CPT | Performed by: INTERNAL MEDICINE

## 2019-08-12 PROCEDURE — 80053 COMPREHEN METABOLIC PANEL: CPT | Performed by: NURSE PRACTITIONER

## 2019-08-12 PROCEDURE — 85025 COMPLETE CBC W/AUTO DIFF WBC: CPT | Performed by: NURSE PRACTITIONER

## 2019-08-12 PROCEDURE — 25010000002 PALONOSETRON 0.25 MG/5ML SOLUTION PREFILLED SYRINGE: Performed by: NURSE PRACTITIONER

## 2019-08-12 PROCEDURE — 25010000002 LEUCOVORIN CALCIUM PER 50 MG: Performed by: NURSE PRACTITIONER

## 2019-08-12 PROCEDURE — 96416 CHEMO PROLONG INFUSE W/PUMP: CPT | Performed by: INTERNAL MEDICINE

## 2019-08-12 PROCEDURE — 96413 CHEMO IV INFUSION 1 HR: CPT | Performed by: INTERNAL MEDICINE

## 2019-08-12 RX ORDER — PALONOSETRON HYDROCHLORIDE 0.05 MG/ML
0.25 INJECTION, SOLUTION INTRAVENOUS ONCE
Status: COMPLETED | OUTPATIENT
Start: 2019-08-12 | End: 2019-08-12

## 2019-08-12 RX ORDER — ATROPINE SULFATE 1 MG/ML
0.25 INJECTION, SOLUTION INTRAMUSCULAR; INTRAVENOUS; SUBCUTANEOUS
Status: DISCONTINUED | OUTPATIENT
Start: 2019-08-12 | End: 2019-08-12 | Stop reason: SDUPTHER

## 2019-08-12 RX ORDER — SODIUM CHLORIDE 9 MG/ML
250 INJECTION, SOLUTION INTRAVENOUS ONCE
Status: COMPLETED | OUTPATIENT
Start: 2019-08-12 | End: 2019-08-12

## 2019-08-12 RX ORDER — FLUOROURACIL 50 MG/ML
400 INJECTION, SOLUTION INTRAVENOUS ONCE
Status: COMPLETED | OUTPATIENT
Start: 2019-08-12 | End: 2019-08-12

## 2019-08-12 RX ADMIN — FLUOROURACIL 3910 MG: 50 INJECTION, SOLUTION INTRAVENOUS at 13:22

## 2019-08-12 RX ADMIN — SODIUM CHLORIDE 100 ML: 900 INJECTION, SOLUTION INTRAVENOUS at 09:27

## 2019-08-12 RX ADMIN — PALONOSETRON 0.25 MG: 0.25 INJECTION, SOLUTION INTRAVENOUS at 09:25

## 2019-08-12 RX ADMIN — BEVACIZUMAB 320 MG: 400 INJECTION, SOLUTION INTRAVENOUS at 10:53

## 2019-08-12 RX ADMIN — LEUCOVORIN CALCIUM 650 MG: 350 INJECTION, POWDER, LYOPHILIZED, FOR SOLUTION INTRAMUSCULAR; INTRAVENOUS at 11:31

## 2019-08-12 RX ADMIN — DEXAMETHASONE SODIUM PHOSPHATE 12 MG: 4 INJECTION, SOLUTION INTRA-ARTICULAR; INTRALESIONAL; INTRAMUSCULAR; INTRAVENOUS; SOFT TISSUE at 10:10

## 2019-08-12 RX ADMIN — FLUOROURACIL 650 MG: 50 INJECTION, SOLUTION INTRAVENOUS at 13:20

## 2019-08-12 RX ADMIN — ATROPINE SULFATE 0.4 MG: 0.4 INJECTION, SOLUTION INTRAMUSCULAR; INTRAVENOUS; SUBCUTANEOUS at 11:32

## 2019-08-12 RX ADMIN — SODIUM CHLORIDE 250 ML: 900 INJECTION, SOLUTION INTRAVENOUS at 09:24

## 2019-08-12 NOTE — TELEPHONE ENCOUNTER
Discussed an Avastin copay card with patient.  She states that she has a secondary policy/cancer policy.  She is going to bring the paper work with her and have me review.  We put the copay card on hold until we determine if this is a secondary policy or if it is a cancer policy.

## 2019-08-14 ENCOUNTER — HOSPITAL ENCOUNTER (OUTPATIENT)
Dept: ONCOLOGY | Facility: HOSPITAL | Age: 59
Setting detail: INFUSION SERIES
Discharge: HOME | End: 2019-08-14

## 2019-08-14 VITALS
BODY MASS INDEX: 25.95 KG/M2 | RESPIRATION RATE: 18 BRPM | TEMPERATURE: 97.4 F | WEIGHT: 141 LBS | DIASTOLIC BLOOD PRESSURE: 89 MMHG | HEART RATE: 87 BPM | SYSTOLIC BLOOD PRESSURE: 155 MMHG | HEIGHT: 62 IN

## 2019-08-14 DIAGNOSIS — C18.7 MALIGNANT NEOPLASM OF SIGMOID COLON (HCC): Primary | ICD-10-CM

## 2019-08-14 PROCEDURE — 96377 APPLICATON ON-BODY INJECTOR: CPT | Performed by: INTERNAL MEDICINE

## 2019-08-14 PROCEDURE — 25010000002 PEGFILGRASTIM 6 MG/0.6ML PREFILLED SYRINGE KIT: Performed by: INTERNAL MEDICINE

## 2019-08-14 RX ORDER — LORATADINE 10 MG/1
10 TABLET ORAL DAILY
COMMUNITY
End: 2023-02-17

## 2019-08-14 RX ORDER — SODIUM CHLORIDE 0.9 % (FLUSH) 0.9 %
10 SYRINGE (ML) INJECTION AS NEEDED
Status: CANCELLED | OUTPATIENT
Start: 2019-08-14

## 2019-08-14 RX ORDER — SODIUM CHLORIDE 0.9 % (FLUSH) 0.9 %
10 SYRINGE (ML) INJECTION AS NEEDED
Status: DISCONTINUED | OUTPATIENT
Start: 2019-08-14 | End: 2019-08-15 | Stop reason: HOSPADM

## 2019-08-14 RX ADMIN — Medication 10 ML: at 15:41

## 2019-08-14 RX ADMIN — PEGFILGRASTIM 6 MG: KIT SUBCUTANEOUS at 15:56

## 2019-08-19 ENCOUNTER — HOSPITAL ENCOUNTER (OUTPATIENT)
Dept: ONCOLOGY | Facility: HOSPITAL | Age: 59
Discharge: HOME | End: 2019-08-19
Admitting: NURSE PRACTITIONER

## 2019-08-19 VITALS — SYSTOLIC BLOOD PRESSURE: 188 MMHG | DIASTOLIC BLOOD PRESSURE: 89 MMHG | HEART RATE: 110 BPM | TEMPERATURE: 98 F

## 2019-08-19 DIAGNOSIS — C18.7 MALIGNANT NEOPLASM OF SIGMOID COLON (HCC): Primary | ICD-10-CM

## 2019-08-19 LAB
BASOPHILS # BLD AUTO: 0.03 10*3/MM3 (ref 0–0.2)
BASOPHILS NFR BLD AUTO: 0.2 % (ref 0–1.5)
DEPRECATED RDW RBC AUTO: 49.9 FL (ref 37–54)
EOSINOPHIL # BLD AUTO: 0.27 10*3/MM3 (ref 0–0.4)
EOSINOPHIL NFR BLD AUTO: 1.9 % (ref 0.3–6.2)
ERYTHROCYTE [DISTWIDTH] IN BLOOD BY AUTOMATED COUNT: 15.9 % (ref 12.3–15.4)
HCT VFR BLD AUTO: 34 % (ref 34–46.6)
HGB BLD-MCNC: 11.3 G/DL (ref 12–15.9)
LYMPHOCYTES # BLD AUTO: 3.07 10*3/MM3 (ref 0.7–3.1)
LYMPHOCYTES NFR BLD AUTO: 21.8 % (ref 19.6–45.3)
MCH RBC QN AUTO: 28.8 PG (ref 26.6–33)
MCHC RBC AUTO-ENTMCNC: 33.2 G/DL (ref 31.5–35.7)
MCV RBC AUTO: 86.5 FL (ref 79–97)
MONOCYTES # BLD AUTO: 2.25 10*3/MM3 (ref 0.1–0.9)
MONOCYTES NFR BLD AUTO: 16 % (ref 5–12)
NEUTROPHILS # BLD AUTO: 8.45 10*3/MM3 (ref 1.7–7)
NEUTROPHILS NFR BLD AUTO: 60.1 % (ref 42.7–76)
PLATELET # BLD AUTO: 121 10*3/MM3 (ref 140–450)
PMV BLD AUTO: 10.4 FL (ref 6–12)
RBC # BLD AUTO: 3.93 10*6/MM3 (ref 3.77–5.28)
WBC NRBC COR # BLD: 14.07 10*3/MM3 (ref 3.4–10.8)

## 2019-08-19 PROCEDURE — 96523 IRRIG DRUG DELIVERY DEVICE: CPT | Performed by: INTERNAL MEDICINE

## 2019-08-19 PROCEDURE — 85025 COMPLETE CBC W/AUTO DIFF WBC: CPT | Performed by: NURSE PRACTITIONER

## 2019-08-19 RX ORDER — SODIUM CHLORIDE 0.9 % (FLUSH) 0.9 %
10 SYRINGE (ML) INJECTION AS NEEDED
Status: DISCONTINUED | OUTPATIENT
Start: 2019-08-19 | End: 2019-08-20 | Stop reason: HOSPADM

## 2019-08-19 RX ORDER — SODIUM CHLORIDE 0.9 % (FLUSH) 0.9 %
10 SYRINGE (ML) INJECTION AS NEEDED
Status: CANCELLED | OUTPATIENT
Start: 2019-08-19

## 2019-08-19 RX ADMIN — Medication 30 ML: at 15:38

## 2019-08-19 RX ADMIN — HEPARIN 500 UNITS: 100 SYRINGE at 15:42

## 2019-08-23 DIAGNOSIS — C18.7 MALIGNANT NEOPLASM OF SIGMOID COLON (HCC): ICD-10-CM

## 2019-08-23 RX ORDER — SODIUM CHLORIDE 9 MG/ML
250 INJECTION, SOLUTION INTRAVENOUS ONCE
Status: CANCELLED | OUTPATIENT
Start: 2019-08-26

## 2019-08-23 RX ORDER — FLUOROURACIL 50 MG/ML
400 INJECTION, SOLUTION INTRAVENOUS ONCE
Status: CANCELLED | OUTPATIENT
Start: 2019-08-26

## 2019-08-23 RX ORDER — PALONOSETRON 0.05 MG/ML
0.25 INJECTION, SOLUTION INTRAVENOUS ONCE
Status: CANCELLED | OUTPATIENT
Start: 2019-08-26

## 2019-08-23 RX ORDER — ATROPINE SULFATE 1 MG/ML
0.25 INJECTION, SOLUTION INTRAMUSCULAR; INTRAVENOUS; SUBCUTANEOUS
Status: CANCELLED | OUTPATIENT
Start: 2019-08-26

## 2019-08-26 ENCOUNTER — TELEPHONE (OUTPATIENT)
Dept: ONCOLOGY | Facility: CLINIC | Age: 59
End: 2019-08-26

## 2019-08-26 ENCOUNTER — HOSPITAL ENCOUNTER (OUTPATIENT)
Dept: ONCOLOGY | Facility: HOSPITAL | Age: 59
Setting detail: INFUSION SERIES
Discharge: HOME | End: 2019-08-26

## 2019-08-26 VITALS
BODY MASS INDEX: 25.57 KG/M2 | DIASTOLIC BLOOD PRESSURE: 85 MMHG | SYSTOLIC BLOOD PRESSURE: 136 MMHG | TEMPERATURE: 98.1 F | HEART RATE: 90 BPM | WEIGHT: 139.8 LBS

## 2019-08-26 DIAGNOSIS — C18.7 MALIGNANT NEOPLASM OF SIGMOID COLON (HCC): Primary | ICD-10-CM

## 2019-08-26 LAB
ALBUMIN SERPL-MCNC: 3.6 G/DL (ref 3.5–4.8)
ALBUMIN/GLOB SERPL: 1.3 G/DL (ref 1–1.7)
ALP SERPL-CCNC: 133 U/L (ref 32–91)
ALT SERPL W P-5'-P-CCNC: 35 U/L (ref 14–54)
ANION GAP SERPL CALCULATED.3IONS-SCNC: 14.7 MMOL/L (ref 5–15)
AST SERPL-CCNC: 25 U/L (ref 15–41)
BASOPHILS # BLD AUTO: 0.04 10*3/MM3 (ref 0–0.2)
BASOPHILS NFR BLD AUTO: 0.5 % (ref 0–1.5)
BILIRUB SERPL-MCNC: 0.8 MG/DL (ref 0.3–1.2)
BILIRUB UR QL STRIP: NEGATIVE
BUN BLD-MCNC: 17 MG/DL (ref 8–20)
BUN/CREAT SERPL: 15.5 (ref 5.4–26.2)
CALCIUM SPEC-SCNC: 8.6 MG/DL (ref 8.9–10.3)
CHLORIDE SERPL-SCNC: 106 MMOL/L (ref 101–111)
CLARITY UR: CLEAR
CO2 SERPL-SCNC: 24 MMOL/L (ref 22–32)
COLOR UR: YELLOW
CREAT BLD-MCNC: 1.1 MG/DL (ref 0.4–1)
DEPRECATED RDW RBC AUTO: 54.6 FL (ref 37–54)
EOSINOPHIL # BLD AUTO: 0.18 10*3/MM3 (ref 0–0.4)
EOSINOPHIL NFR BLD AUTO: 2.2 % (ref 0.3–6.2)
ERYTHROCYTE [DISTWIDTH] IN BLOOD BY AUTOMATED COUNT: 17.5 % (ref 12.3–15.4)
GFR SERPL CREATININE-BSD FRML MDRD: 51 ML/MIN/1.73
GLOBULIN UR ELPH-MCNC: 2.7 GM/DL (ref 2.5–3.8)
GLUCOSE BLD-MCNC: 152 MG/DL (ref 65–99)
GLUCOSE UR STRIP-MCNC: NEGATIVE MG/DL
HCT VFR BLD AUTO: 36.4 % (ref 34–46.6)
HGB BLD-MCNC: 11.7 G/DL (ref 12–15.9)
HGB UR QL STRIP.AUTO: NEGATIVE
KETONES UR QL STRIP: NEGATIVE
LEUKOCYTE ESTERASE UR QL STRIP.AUTO: NEGATIVE
LYMPHOCYTES # BLD AUTO: 1.63 10*3/MM3 (ref 0.7–3.1)
LYMPHOCYTES NFR BLD AUTO: 20.2 % (ref 19.6–45.3)
MCH RBC QN AUTO: 28.7 PG (ref 26.6–33)
MCHC RBC AUTO-ENTMCNC: 32.1 G/DL (ref 31.5–35.7)
MCV RBC AUTO: 89.4 FL (ref 79–97)
MONOCYTES # BLD AUTO: 0.63 10*3/MM3 (ref 0.1–0.9)
MONOCYTES NFR BLD AUTO: 7.8 % (ref 5–12)
NEUTROPHILS # BLD AUTO: 5.58 10*3/MM3 (ref 1.7–7)
NEUTROPHILS NFR BLD AUTO: 69.3 % (ref 42.7–76)
NITRITE UR QL STRIP: NEGATIVE
PH UR STRIP.AUTO: 6.5 [PH] (ref 5–8)
PLATELET # BLD AUTO: 132 10*3/MM3 (ref 140–450)
PMV BLD AUTO: 9.2 FL (ref 6–12)
POTASSIUM BLD-SCNC: 3.7 MMOL/L (ref 3.6–5.1)
PROT SERPL-MCNC: 6.3 G/DL (ref 6.1–7.9)
PROT UR QL STRIP: NEGATIVE
RBC # BLD AUTO: 4.07 10*6/MM3 (ref 3.77–5.28)
SODIUM BLD-SCNC: 141 MMOL/L (ref 136–144)
SP GR UR STRIP: 1.01 (ref 1–1.03)
UROBILINOGEN UR QL STRIP: NORMAL
WBC NRBC COR # BLD: 8.06 10*3/MM3 (ref 3.4–10.8)

## 2019-08-26 PROCEDURE — 25010000002 ATROPINE PER 0.01 MG: Performed by: NURSE PRACTITIONER

## 2019-08-26 PROCEDURE — 25010000002 FOSAPREPITANT PER 1 MG: Performed by: NURSE PRACTITIONER

## 2019-08-26 PROCEDURE — 81003 URINALYSIS AUTO W/O SCOPE: CPT | Performed by: NURSE PRACTITIONER

## 2019-08-26 PROCEDURE — 25010000002 LEUCOVORIN CALCIUM PER 50 MG: Performed by: NURSE PRACTITIONER

## 2019-08-26 PROCEDURE — 25010000002 FLUOROURACIL PER 500 MG: Performed by: NURSE PRACTITIONER

## 2019-08-26 PROCEDURE — 25010000002 PALONOSETRON 0.25 MG/5ML SOLUTION PREFILLED SYRINGE: Performed by: NURSE PRACTITIONER

## 2019-08-26 PROCEDURE — 96415 CHEMO IV INFUSION ADDL HR: CPT | Performed by: INTERNAL MEDICINE

## 2019-08-26 PROCEDURE — 96417 CHEMO IV INFUS EACH ADDL SEQ: CPT | Performed by: INTERNAL MEDICINE

## 2019-08-26 PROCEDURE — 96368 THER/DIAG CONCURRENT INF: CPT | Performed by: INTERNAL MEDICINE

## 2019-08-26 PROCEDURE — 96411 CHEMO IV PUSH ADDL DRUG: CPT | Performed by: INTERNAL MEDICINE

## 2019-08-26 PROCEDURE — 25010000002 DEXAMETHASONE SODIUM PHOSPHATE 120 MG/30ML SOLUTION: Performed by: NURSE PRACTITIONER

## 2019-08-26 PROCEDURE — 85025 COMPLETE CBC W/AUTO DIFF WBC: CPT | Performed by: NURSE PRACTITIONER

## 2019-08-26 PROCEDURE — 96367 TX/PROPH/DG ADDL SEQ IV INF: CPT | Performed by: INTERNAL MEDICINE

## 2019-08-26 PROCEDURE — 96413 CHEMO IV INFUSION 1 HR: CPT | Performed by: INTERNAL MEDICINE

## 2019-08-26 PROCEDURE — 96416 CHEMO PROLONG INFUSE W/PUMP: CPT | Performed by: INTERNAL MEDICINE

## 2019-08-26 PROCEDURE — 80053 COMPREHEN METABOLIC PANEL: CPT | Performed by: NURSE PRACTITIONER

## 2019-08-26 PROCEDURE — 25010000002 BEVACIZUMAB PER 10 MG: Performed by: NURSE PRACTITIONER

## 2019-08-26 PROCEDURE — 96375 TX/PRO/DX INJ NEW DRUG ADDON: CPT | Performed by: INTERNAL MEDICINE

## 2019-08-26 PROCEDURE — 25010000002 IRINOTECAN PER 20 MG: Performed by: NURSE PRACTITIONER

## 2019-08-26 RX ORDER — FLUOROURACIL 50 MG/ML
400 INJECTION, SOLUTION INTRAVENOUS ONCE
Status: COMPLETED | OUTPATIENT
Start: 2019-08-26 | End: 2019-08-26

## 2019-08-26 RX ORDER — PALONOSETRON HYDROCHLORIDE 0.05 MG/ML
0.25 INJECTION, SOLUTION INTRAVENOUS ONCE
Status: COMPLETED | OUTPATIENT
Start: 2019-08-26 | End: 2019-08-26

## 2019-08-26 RX ORDER — SODIUM CHLORIDE 9 MG/ML
250 INJECTION, SOLUTION INTRAVENOUS ONCE
Status: COMPLETED | OUTPATIENT
Start: 2019-08-26 | End: 2019-08-26

## 2019-08-26 RX ADMIN — SODIUM CHLORIDE 250 ML: 900 INJECTION, SOLUTION INTRAVENOUS at 08:58

## 2019-08-26 RX ADMIN — PALONOSETRON 0.25 MG: 0.25 INJECTION, SOLUTION INTRAVENOUS at 09:00

## 2019-08-26 RX ADMIN — FLUOROURACIL 650 MG: 50 INJECTION, SOLUTION INTRAVENOUS at 12:15

## 2019-08-26 RX ADMIN — ATROPINE SULFATE 295 MG: 0.4 INJECTION, SOLUTION INTRAMUSCULAR; INTRAVENOUS; SUBCUTANEOUS at 10:38

## 2019-08-26 RX ADMIN — SODIUM CHLORIDE 100 ML: 900 INJECTION, SOLUTION INTRAVENOUS at 09:03

## 2019-08-26 RX ADMIN — BEVACIZUMAB 310 MG: 400 INJECTION, SOLUTION INTRAVENOUS at 09:57

## 2019-08-26 RX ADMIN — DEXAMETHASONE SODIUM PHOSPHATE 12 MG: 4 INJECTION, SOLUTION INTRA-ARTICULAR; INTRALESIONAL; INTRAMUSCULAR; INTRAVENOUS; SOFT TISSUE at 09:35

## 2019-08-26 RX ADMIN — FLUOROURACIL 3910 MG: 50 INJECTION, SOLUTION INTRAVENOUS at 12:17

## 2019-08-26 RX ADMIN — LEUCOVORIN CALCIUM 650 MG: 350 INJECTION, POWDER, LYOPHILIZED, FOR SOLUTION INTRAMUSCULAR; INTRAVENOUS at 10:38

## 2019-08-26 NOTE — TELEPHONE ENCOUNTER
Patient has cancer policy.  I provided her with copies of radiology reports to supply to her insurer.  After review of her accounts, she does not need a copay card at this time.  Her insurance is paying 100%.

## 2019-08-27 NOTE — PROGRESS NOTES
Hematology/Oncology Outpatient Follow Up    PATIENT NAME:Samantha Massey  :1960  MRN: [id number]  PRIMARY CARE PHYSICIAN: Diana Spencer MD  REFERRING PHYSICIAN: Diana Spencer MD    Chief Complaint   Patient presents with   • Follow-up     Malignant neoplasm of sigmoid colon,  Chemotherapy-induced thrombocytopenia, Anemia, Encounter for chemotherapy management, Fatigue due to exposure,  Chemotherapy-induced nausea          HISTORY OF PRESENT ILLNESS:   1. Colon cancer with liver metastasis diagnosis established 2012.  · Ms. Massey claimed to have developed intermittent diarrhea and constipation in 2011. She eventually saw Dr. Diana Spencer in 2012. A chemistry profile revealed normal liver enzymes.  CEA on 12 was 5.9 ng/mL (< 5, smoker); hemoglobin was 12.1 gm/dL, and MCV 86.2 fl.  A CT scan of the abdomen and pelvis was performed on 12 revealing innumerable mass lesions throughout the liver, thickening of the distal sigmoid colon wall, nonspecific non-pathologically enlarged lymph nodes in the sigmoid mesentery and left iliac chain. She was referred to Dr. Regina Pack and had a repeat CEA done on 12 revealing a value of 7.5 ng/mL. A colonoscopy was performed on 12 by Dr. Pack revealing 10 cm distal colon mass, which on biopsy revealed the presence of invasive moderately differentiated adenocarcinoma which demonstrated micro satellite stability. The patient was hospitalized on 12 and underwent laparoscopic low anterior resection with mobilization of splenic flexure, which on pathology revealed invasive, moderate to focally poorly differentiated adenocarcinoma of the rectosigmoid colon with invasion through muscularis propria and extension into mesocolon. The surgical margins were free of tumor. Mesenteric lymph nodes, 14, were negative for metastatic tumor. Lymphovascular invasion was not identified. An [id number] was placed on 12 and a  CT-guided liver biopsy was performed the same day by ·Dr. Hernandez Espino. Pathology revealed metastatic adenocarcinoma consistent with colonic primary. The patient was then referred here for further evaluation.  · 2/13/12 - Order to start chemotherapy to consist of Leucovorin 500mg IV day 1, Oxaliplatin 120 mg IV day1, Avastin 230 mg IV day 1, 5FU 575mg IV day 1 followed by 345 mg CIV, cycle q 2 weeks.  · 2/13/12 - CEA 5.6 (H).  · 2/13/12 - Chest x-ray revealed no acute cardiopulmonary disease.  · 2/17/12 - CT abdomen and pelvis revealed no evidence of metastatic disease of the chest, innumerable hepatic metastases.  Bowel staple line at the rectosigmoid junction with adjacent stranding of fat consistent with postoperative change.  · 2/18/12 - KRAS testing performed on rectum biopsy: no mutation detected.  · 2/21/12 - Patient started Chemotherapy cycle #1 consisting of Avastin, Leucovorin, 5FU, and Oxaliplatin.  · 2/23/12 - CT chest revealed no pulmonary embolus, no active lung disease. Hepatic nodularity.  · 2/24/12 - Per note from [facility] patient enrolled in clinical trail per Dr. Eldridge randomized to the control arm of the study. Which consist of standard FOLFOX chemotherapy that will be given at our office.  · 2124/12 - PET scan revealed interval resection of known primary colonic carcinoma at the rectosigmoid junction without evidence of residual tumor in this location. The patient is believed to be status post resection of the previously described left internal iliac lymph node without evidence of hypermetabolic metastatic lymphadenopathy. Innumerable hypermetabolic hepatic metastases.  · 3/6/12 - Patient started Chemotherapy cycle #2 consisting of Avastin, Leucovorin, 5FU, and Oxaliplatin  · 3/20/12 - Patient   started   Chemotherapy    cycle   #3     consisting    of   Avastin, leucovorin, 5FU, and Oxaliplatin  · 4/3/12 - Patient started Chemotherapy cycle #4 consisting of Avastin, Leucovorin, 5FU, and  Oxaliplatin  · 4/3/12 - Orders written to decrease chemotherapy dose by 20% at her next cycle and to give Neulasta injection day after each cycle.  Hemoglobin 11.9.  · 4/3112 - CEA 1.0 (N).  · 4/6/12 - CT C/A/P - Chest - unremarkable, Abdomen/Pelvis - Overall moderate to marked decrease in the size of the multiple hepatic metastatic lesions. This is when compared to a study of 1/2/12. Post-op changes in the region of the rectosigmoid junction Small amount of free fluid in the cul-de-sac with a small amount of fluid apparently within the endometrial cavity. These findings are not unusual premenopausal.                                                                                           · 4/17/12 - Patient started on cycle 5 of Oxaliplatin, Leucovorin, 5-FU, Avastin.  · 5/1/12 - Patient started on cycle 6 of Oxaliplatin, Leucovorin, 5-FU, Avastin.  · 5/14/12 - CEA 1.0 (N).  · 5/17/12 - Patient started on cycle 7 of Oxaliplatin, Leucovorin, 5-FU, Avastin.  · 5/29/12 - 5/31/12 - Patient admitted to [facility] due to acute febrile illness, sepsis syndrome, urinary tract infection, CA colon with liver mets, leukocytosis, hypotension, Elevated liver function tests, and acute kidney injury. TSH 8.66 (H), Ferritin 528(H), Folate 24.8 (H), Haptoglobin 312 (H), Iron 40 (N), Hep panel negative, Relic count1.42 (N), TIBC 281 (N), 812 1500(H), Lipase 22(N). Chest x-ray revealed no acute cardiopulmonary abnormality.  · 5/29/12 - Patient started on cycle 8 of Oxaliplatin, Leucovorin, 5-FU, Avastin.  · 6/13/12 - Patient started on cycle 9 of Oxaliplalin, Leucovorin, 5-FU, Avastin.  · 6/25/12 - CT scan of the abdomen and pelvis with hepatic lesions appearing smaller as compared to the prior study, which could relate to underlying metastatic disease. Sclerotic lesion of the right iliac bone that was present previously and could relate to bone mets. Postsurgical change.  · 6/26/12 - Patient started on cycle 10 of Oxaliplatin,  Leucovorin, 5-FU, Avastin.  · 7/10/12 - Patient started on cycle 11 of Leucovorin, 5-FU, Oxaliplatin, Avaslin.  · 7/17/12 - Bone scan no evidence of metastatic disease including sclerotic lesion right Iliac area.  · 7/24/12 - CEA 0.7.  · 7/24/12 - Patient started on cycle 12 of Leucovorin, 5-FU, Oxaliplatin, Avastin.  · 8/7/12 - Patient started on cycle 13 of Leucovorin, 5-FU, Oxaliplatin, Avastin.  · 8/21/12 - Patient started on cycle 14 of Leucovorin, 5-FU, Oxaliplatin, Avastin.  · 8/28/12 - CT scan of the abdomen and pelvis revealed stable size and appearance of multiple hypodense hepatic lesions, likely representing metastatic disease. Otherwise stable abdomen and pelvis.  · 8/23/12 - CEA 0.8 (N).  · 9/4/12 - Orders written to continue chemotherapy until seen by Dr. Eldridge.  · 9/11/12 - Patient started on cycle 15 of Leucovorin, 5-FU, Oxaliplatin, Avastin.  · 9/11/12 - Orders written to give chemotherapy today with a dose reduction of 20% due to low ANC.  · 9/18/12 - Orders written to discontinue chemotherapy and start maintenance Xeloda 1500 mg po bid x 14 days cycle q 3 weeks, per Dr. Eldridge.  · 11/7/12 - Patient evaluation by Dr. Montoya which recommended hepatic directed radiation therapy. She underwent TheraSpheres targeting the right lobe. Will treat the left hepatic lobe in 3 to 4 weeks if she is able to tolerate this procedure.  · 12/20/12 - CEA 1.3 (N).  · 12/31/12 - CT scan of the abdomen and pelvis revealed that there has been a significant partial response in both lobes of the liver.  Activity of remaining lesions is uncertain and could be assessed by follow-up studies for PET/CT correlation.  Heterogeneous enhancement of the right lobe of the liver is consistent with previous embolization therapy.  No evidence of extrahepatic metastatic disease.  Focal non-enhancement and bulging of the lateral wall of the gallbladder body.       · 1/22/13 - CEA 0.8.  · 1/22/13 - AST 52, ALT 47, ALK PHOS  157.  · 3/25/13 - Xeloda dose decreased to 1,250 mg p.o. b.i.d. x 14 days to cycle every three weeks due to hand foot syndrome.  · 4/27/13 - CT scan of abdomen and pelvis revealed overall continued improvement in hepatic metastatic disease.  The single largest lesion in the right lobe is stable in appearance when compared to prior study although other lesions within the liver are more hypodense and some are smaller suggesting continued response to treatment.  Many of these lesions may no longer harbor viable malignancy.  A follow-up PET/CT scan could be performed to further evaluate the full extent of the viable metastasis versus continued attenuation on CT follow-up.  Contracted gallbladder with either focal areas of bulging of the wall or necrosis.  The appearance of the current exam is more suggesting a bulging rather than necrosis.  · 5/6/13 - Order written to decrease Xeloda to 1000 mg by mouth twice a day ×14 days every three weeks due to hand-foot syndrome and chemotherapy induced cytopenias and diarrhea.  · 5/28/13 - PET/CT scan negative for hypermetabolic foci in the neck, chest, abdomen, or pelvis to suggest recurrent, progressive, or metastatic tumor.  · 7/9/13 - CEA 2.4 (N).  · 8/5/13 - CT of the chest showed no evidence of metastatic disease.  CT scan of abdomen and pelvis show continued improvement in metastatic disease involving both lobes of the liver.  No evidence of extrahepatic metastatic disease in the abdomen or pelvis.  The gallbladder is contracted.  The previously suspected defect of the gallbladder wall against the liver surface is smaller.  There is no evidence of gallbladder leak into the peritoneal cavity.  Contained perforation is not excluded but is increasingly less likely.  · 12/7/13 - CT scan of the abdomen and pelvis revealed stable appearance of hepatic metastatic disease, most of which is likely treated.  A PET/CT scan could be performed to evaluate for any sites of active  metastatic disease.  No evidence of recurrent disease at the anastomosis.  The gallbladder is contracted and not optimally evaluated on this exam, but is stable in appearance compared to prior exam.  · 12/23/13 - CEA 1.4 normal.  · 4/28/14 - CEA 1.6 (N).  · 6/26/14 - Patient underwent a colonoscopy by Dr. Pack, which revealed no evidence of recurrence.  · 7/15/14 - Patient advised to continue chemotherapy with Xeloda.  · 7/30/14 - Echocardiogram revealed left ventricular size and contractility is within normal limits and ejection fraction is 55-60%.  · 8/18/14 - CT scan of the abdomen and pelvis revealed stable previously treated metastases in both lobes of the liver.  The activity of individual lesions is not determined and may be assessed by follow-up CT scans are PET/CT correlation.  No evidence of extrahepatic metastatic disease.  No signs of local tumor recurrence at the bowel resection site.  · 8/24/14 - Xeloda discontinued due to remission of disease.  · 9/16/14 - CEA 1.0 (N).  · 11/26/14 - CEA 1  · 12/23/14 - CT abdomen and pelvis with contrast: There are several tiny low attenuation lesions within the liver, but overall are believed to be unchanged compared to the prior exam in April 2013.  · 12/23/14 - CXR: No acute process seen.  · 12/23/14 - CT abdomen and pelvis with several tiny low attenuation lesions in the liver, unchanged in comparison to prior CT’s.  Chest x-ray with no acute process seen.    · 5/26/15 - WBC 6, hemoglobin 12.1, platelet count 173,000, MCV 85.8.  · 6/25/15 - CT scan of the abdomen and pelvis, right lobe of the liver is decreased in size and the margin is nodular. Three metal densities in the area of the hepatic artery. Duodenal lipoma. Gallbladder contracted.   · 8/24/15 - CEA 0.8 (0-5). Comprehensive metabolic panel normal.    · 2/15/16 - CEA 1.0 (0-5). Comprehensive metabolic panel with potassium 3.3 (3.6-5.1). Patient asked to go on potassium supplements but wanted to try  high potassium diet.     · 8/15/16 - Patient advised to have the Infusaport removed if CT is stable. CEA 0.9 (N). CMP normal, creatinine 1.0. 9/2/16 - CT scan of the chest, abdomen and pelvis without contrast revealed stable chest CT with no evidence of mediastinal, hilar or pulmonary metastases or mass. Interval improvement of the appearance of the liver with no hepatic metastases seen and no evidence of adrenal enlargement or upper abdominal lymphadenopathy. Post treatment change was noted to the right lobe of the liver and hepatic artery, stable duodenal lipoma, moderate stool suggesting constipation.   · 10/6/16 - Infusaport removal by Dr. Pack.   · 12/22/16 - WBC 5.17, hemoglobin 13.2, MCV 86.5, platelets 234,000, ANC 3.3.   · 4/21/17 - CEA 0.9 (0-3). CMP with insignificant abnormalities - creatinine 1.1.   · 8/23/17 - WBC 6.79, hemoglobin 11.7, MCV 86.8, platelets 185,000, ANC 3.78.    · 9/27/17 - CT chest, abdomen and pelvis with mild coronary artery calcification, enlarging low-attenuation ill-defined mass in the left hepatic lobe. More ill-defined area of hypoattenuation within liver segments 2-3. Evidence of partial distal colon resection with patent anastomosis.   · 10/16/17 - CT-guided liver biopsy by Jay Baer M.D. revealed on pathology metastatic adenocarcinoma consistent with colon primary.   · 10/25/17 - Orders written for mFOLFOX6 plus Bevacizumab, Oxaliplatin 85 mg/M2 IV piggyback day 1, Leucovorin 400 mg/M2 IV piggyback day 1, 5- mg/M2 IV piggyback day 1 then 2400 mg/M2 CIV over 46 hours, Bevacizumab 5 mg/kg IV day 1 to repeat cycle every two weeks. CEA 1.2 (0-5).    · 11/2/17 - Infusaport placement under fluoroscopic guidance by Regina Pack M.D.   · 11/13/17 - MRI abdomen with 5.8 cm heterogenously enhancing lesion within hepatic segment 4 compatible with recently-biopsied lesion. Just adjacent to this is an 8 mm satellite lesion. No hepatic lesion identified within  segments 2 or 3.   · 11/17/17 - PET scan with large hypermetabolic mass in liver segment 4 compatible with metastasis.   · 11/27/17 - Patient received cycle 1 chemotherapy.   · 12/11/17 - Patient received cycle 2 chemotherapy.   · 12/13/17 - Patient called complaining of feeling of her jaw being tight and tongue thick along with some leg cramps on the prior day, 12/12/17. The patient took Benadryl and reported the symptoms improved throughout the day.   · 12/20/17 - Patient reports fever following both chemotherapy infusions. She reports a temperature of 101 post the 12/11/17 infusion that resolved; however, the patient continued to have flu-like achiness for several days afterward. Blood cultures ordered to be obtained with next port access. Dexamethasone 4 mg p.o. b.i.d. x3 days on days 2-4 of chemotherapy ordered due to neuropathies with cramping of the jaw and some nausea. Z-Pack prescribed for URI and Benzonatate prescribed for cough.   · 12/27/17 - Patient seen in followup by Doug Eldridge M.D. with plans of repeating CT scan of the liver post third FOLFOX treatment.   · 1/2/18 - Creatinine 1.0 (0.4-1.0).  Patient received cycle 3 chemotherapy.   · 1/8/18 - Chemotherapy delayed and Nephrology consulted for acute rise in creatinine from 1.0 to 2.5.   · 1/10/18 - Creatinine 2.1 (0.4-1.0).    · 1/15/18 - Creatinine 1.6 (0.4-1.0).    · 1/16/18 - Received orders from Dr. Riojas’s office for patient to receive Mucomyst 1200 mg p.o. b.i.d. for four days starting the day before CT scans in the future as well as IV hydration with normal saline 150 cc per hour for two hours prior to procedure and for four hours postprocedure.   · 1/22/18 - Current chemotherapy discontinued. New chemotherapy orders written for Irinotecan 180 mg/M2 IV piggyback day 1, Leucovorin 400 mg/M2 IV piggyback day 1, 5- mg/M2 IV piggyback day 1 followed by 2400 mg/M2 CIV days 1-3 and Bevacizumab 5 mg/kg IV day 1 to repeat cycle every two  weeks. Chemotherapy to start post CT abdomen to determine whether patient would go for surgery now or later. Creatinine 1.3 (0.4-1.0).    · 1/29/18 - CT chest with new 2 mm nodule in the right middle lobe and 6 mm short axis anterior epicardial lymph node. CT abdomen and pelvis with two new metastases of the left lobe of the liver, largest measuring 4.4 cm.   · 2/5/18 - Chemotherapy placed on hold pending surgical resection of liver lesions.   · 2/8/18 - Patient underwent diagnostic laparoscopy, laparoscopic intraoperative ultrasound of the liver, exploratory laparotomy, left hepatectomy, intraoperative ultrasound and omental pedicle flap by Doug Eldridge II, M.D. at [facility] with pathology revealing the left hepatic lobectomy specimen to contain a single nodule of metastatic adenocarcinoma consistent with colonic primary 4.5 cm in size with surgical margins negative for carcinoma. Tumor consisted of 30% viable tumor, 60% necrosis and 10% peripheral fibrosis.   · 3/1/18 - Discussed options in detail with the patient. Mutually decided to treat with adjuvant Xeloda for six months at a dose of 1000 mg p.o. b.i.d. x14 days every 21 days based on her previous dose that she tolerated. CEA 0.8 (0-5). Liver enzymes normal.    · 3/1/18 - Patient started cycle 1 chemotherapy.   · 5/12/18 - CT chest with stable punctate indeterminate right upper lobe and right middle lobe nodules and abdomen and pelvis with interval left partial hepatectomy with no signs of active disease in the liver.   · 5/31/18 - Patient claims to be taking Xeloda two weeks on, three weeks off. Asked to change to two weeks on, one week off. Patient starting cycle 3 Capecitabine (Xeloda).    · 6/21/18 - Reporting grade I palmar erythema with Capecitabine. Advised to use Urea Lotion t.i.d., cold therapy and to call for worsening symptoms.   · 7/28/18 - CT abdomen and pelvis at [facility] revealed slight increasing geographic areas of low  attenuation within the inferior aspect of the hepatic segment 5 without obvious well-defined lesions and areas of hepatic steatosis are more likely favored. No evidence of metastatic disease elsewhere in the abdomen or pelvis.   · 9/18/18 - Patient currently on cycle 6 Capecitabine (Xeloda). Asked to discontinue after completion of this cycle.  CEA 1.0 (0.5-1.5).   · 10/8/18 - CT chest, abdomen and pelvis at [facility] with stable chronic changes noted in the chest, stable appearance of the liver without metastatic disease detected, development of small ventral hernia was noted without evidence of bowel obstruction, stable-appearing intraluminal fatty lesion of the duodenum was present.   · 1/21/19 - CEA 1.1 (0-3 non-smoker).    · 1/29/19 - BRAF negative performed on biopsy originally dated 10/16/17.   · 5/13/19 - CT chest, abdomen and pelvis with contrast at [facility] showed metastatic disease to the chest with a new conglomerate of likely necrotic subcarinal lymphadenopathy measuring 2.7 cm and new right hilar lymphadenopathy measuring 7 mm. There were multiple new right upper lobe lung nodules with solid appearance. One nodule measured 8 mm. A second nodule had a solid component measuring 5 mm. There was a subpleural nodule anteriorly in the right upper lobe that measured 7 mm and a new left lower lobe nodule measuring 5 mm. There was nodular contour of the liver with increased hyperdensity of the liver measuring 2.3 cm where it had previously measured 2 cm and an ill-defined region of hyperdensity anteriorly in the right lobe of the liver that was unchanged. There were periumbilical hernias containing mesenteric fat and loops of bowel without evidence of obstruction. There was a lipoma in the duodenum. There were no mesenteric or retroperitoneal lymphadenopathy present. There was evidence of a probable mesenteric implant at the right upper quadrant abdomen adjacent to the right hemidiaphragm measuring  2.3 x 2.1 cm. This had increased in size from the prior exam and was subtly seen on the prior exam measuring 8 mm. Patient seen by Dr. Doug Eldridge at [facility] with note stating there was evidence of pulmonary progression and possible abdominal progression with recommendation to followup with Medical Oncology to resume systemic chemotherapy.   · 5/16/19 - PET CT at [facility] showed hypermetabolic subcarinal mass concerning for metastatic disease. There were scattered pulmonary densities extending down to the right hilar region which were hypermetabolic ranging in SUV of 5.1 to 5.3. This was concerning for metastatic disease, although inflammatory process was considered a second possibility. There were several tiny nodular densities seen in the lungs which did not demonstrate significant metabolic activity but could be inflammatory or metastatic. There we intercostal masses on the right concerning for metastatic disease. There was a presumed peritoneal implant on the right upper quadrant between the liver and the diaphragm that was hypermetabolic and concerning for metastatic disease. There were some small subcentimeter lymph nodes in the left neck that were non-specific and indeterminate and felt to be reactive versus metastatic. There was hypermetabolic activity seen in the soft tissues and bone marrow.   · 6/3/19 - Reviewed recent CT scans and PET scan showing likely progression with mesenteric implants and likely metastatic lymphadenopathy and lung nodules. Patient with recent bronchitis and infectious symptoms. Will plan to give a course of antibiotics. Discussed CT-guided biopsy of the anterior right upper quadrant mesenteric mass to confirm presence of malignancy. Discussed past treatments and side effects with plan to pursue further systemic therapy once biopsy-proven progression is confirmed. If biopsy is negative will plan to follow on repeat scan.  CEA 0.7 (0-5).  · 6/13/19  CT-guided core biopsy of abdominal mesenteric soft tissue mass by Jay Baer MD revealed metastatic adenocarcinoma consistent with colon primary.  · 6/21/19 discussed results of biopsy with the patient and recommended systemic chemotherapy.  Discussed starting FOLFOX 6 again versus FOLFIRI.  Given the fact that she did have elevation of renal function as well as neuropathy with FOLFOX in the past mutually decided to treat her with FOLFIRI plus bevacizumab.  · 7/8/2019-FOLFIRI and Avastin cycle 1 initiated.  · 7/22/2019-FOLFIRI and Avastin cycle 2 initiated.  · 7/29/2019-Patient tolerating treatment with some expected mild cytopenias and nausea.  · 8/5/2019- cycle 3 chemotherapy delayed x1 week for neutropenia (ANC 0.69).  Neulasta added to treatment plan.  · 8/12/2019- cycle 3 FOLFIRI and Avastin with Neulasta support initiated.   · 8/26/2019-cycle 4 FOLFIRI and Avastin with Neulasta support initiated.     2. Anemia and intermittent thrombocytopenia established February 2012.  · 2/13/12 - Relic count 0.8(N), Iron 14 (L), TIBC 245 (L), % sat 6 (L), Ferritin 376 (H), Hemoglobin 9.3 (L).  · 2/28/12 - Hemoglobin 8.8 (L), Retic count 1.1 (N), Iron 28(L), TIBC 311 (N), %sat 9 (L), Ferritin 320 (H), Vitamin b12 453, Folate > 1000(N), Haptoglobin 403 (H). Patient started on Procrit 40,000 units weekly.  · 5/29/12 - 5/31/12 - Patient admitted to [facility] due to acute febrile illness, sepsis syndrome, Urinary tract infection, CA colon with liver mets, leukocytosis, hypotension, Elevated liver function tests, and acute kidney Injury. TSH 8.66 (H), Ferritin 528(H), Folate 24.8 (H), Haptoglobin 312 (H), Iron 40 (N), Hep panel negative, Retic count 1.42 (N), TIBC 281 (N), 812 1500(H), Lipase 22(N),  · 9/4/12 - Hemoglobin 10.6.  · 10/02/12 - [id number], hematocrit 31.2  · 12/20/12 - Folate RBC Hematocrit 28.8 (N), Folate 782 (N). Retic 2.93 (H), Haptoglobin 54 (N), Iron 50 (N), TIBC 363 (N), Iron Sat% 14 (L), Vitamin  812 893 (N), Ferritin 296 (N)  · 7/9/13 - Hemoglobin 12.7, Hematocrit 35.8.  · 12/23/13 - Hemoglobin 13.3, hematocrit 37.1, MCV of 94.6.  Ferritin 280.4 (N).  · 7/15/14 - Hemoglobin 11.9, hematocrit 34.1, MCV 96.3.  · 11/16/15 - WBC 7.3, hemoglobin 12.1, platelet count 205,000. Anemia resolved.   · 8/15/16 - Ferritin 86 (N), folate 17.5 (N), haptoglobin 142 (N), retic 2.18 (H), Vitamin B12 747 (N), iron 49 (N),  (N), iron saturation 15% (N), SPEP with normal electrophoresis pattern.   · 12/20/17 - Hemoglobin 9.5. Anemia labs ordered. Procrit 40,000 units subq weekly ordered for chemotherapy-induced anemia. Iron 28 (), TIBC 348 (228-428), iron saturation 8 (15-50), ferritin 293 (), folate >24.8 (5.9-24.8), Vitamin B12 of 516 (211-911), haptoglobin 105 (), retic count 1.2 (0.5-1.5).           · 3/1/18 - Ferritin 135 (), creatinine 1.1 (0.4-1.0).     · 6/21/18 - Hemoglobin 11.3, MCV 91.6. Ferritin 40 (), iron 52 (), TIBC 374 (228-428), iron saturation 14 (15-50).         Past Medical History:   Diagnosis Date   • History of colon cancer, stage IV     Stage SHELL with progression   • Radiculopathy 07/2012    Right L5/S1   • Thyromegaly 10/2013       Past Surgical History:   Procedure Laterality Date   • CYST REMOVAL  1998    cyst removed from bacck in 1998       Current Outpatient Medications:   •  American Ginseng powder, 200 mg 2 (Two) Times a Day., Disp: 1 bottle, Rfl: 2  •  Loperamide HCl (IMODIUM PO), Take  by mouth., Disp: , Rfl:   •  loratadine (CLARITIN) 10 MG tablet, Take 10 mg by mouth Daily., Disp: , Rfl:   •  ondansetron (ZOFRAN) 4 MG tablet, Take 2 tablets by mouth Every 8 (Eight) Hours As Needed for Nausea or Vomiting., Disp: 30 tablet, Rfl: 2  •  promethazine (PHENERGAN) 12.5 MG tablet, Take 2 tablets by mouth Every 6 (Six) Hours As Needed for Nausea or Vomiting (Take 0.5-1 tablet every 6 hours as needed)., Disp: 30 tablet, Rfl: 2  •  traMADol (ULTRAM) 50 MG  tablet, Take 1 tablet by mouth Every 6 (Six) Hours As Needed for Moderate Pain ., Disp: 30 tablet, Rfl: 0    Allergies   Allergen Reactions   • Hydrocodone Nausea And Vomiting   • Iodinated Diagnostic Agents Hives and Itching     PT HAD SOME HIVES DURING SCAN.  PRIOR TO SCAN 8-3-2013.   • Latex Rash     Blisters    • Penicillins Hives       Family History   Problem Relation Age of Onset   • Cancer Sister         Bladder cancer       Cancer-related family history includes Cancer in her sister.    Social History     Tobacco Use   • Smoking status: Former Smoker     Years: 30.00     Last attempt to quit: 2012     Years since quittin.6   Substance Use Topics   • Alcohol use: Yes     Frequency: Never     Comment: socially   • Drug use: Not on file       I have reviewed the history of present illness, past medical history, family history, social history, lab results, all notes and other records since the patient was last seen on 2019.    SUBJECTIVE:  Patient states that she is taking Claritin with Neulasta and she does not have bone pains. She states that she has constipation and diarrhea on and off.  She is trying to drink as much fluids as possible. She states that she feels like her skin is getting darker. She still has fatigue and American Ginseng is helping some.     Mari Michaels CMA ([facility]) was present during office visit.           REVIEW OF SYSTEMS:  Review of Systems   Constitutional: Positive for fatigue. Negative for activity change, appetite change, chills, diaphoresis, fever and unexpected weight change.   HENT: Negative for congestion, mouth sores, nosebleeds, rhinorrhea, sinus pain, sore throat and trouble swallowing.    Eyes: Negative.  Negative for discharge and visual disturbance.   Respiratory: Positive for shortness of breath (with exertion. ). Negative for cough and chest tightness.    Cardiovascular: Negative for chest pain, palpitations and leg swelling.   Gastrointestinal: Positive for  "constipation and diarrhea. Negative for abdominal pain, blood in stool, nausea and vomiting.        Hernia, achy   Endocrine: Negative for cold intolerance and heat intolerance.   Genitourinary: Negative for difficulty urinating, dysuria and hematuria.   Musculoskeletal: Negative for arthralgias and joint swelling.   Skin: Positive for color change (darker). Negative for rash and wound.   Neurological: Negative for dizziness, weakness, numbness and headaches.   Hematological: Does not bruise/bleed easily.   Psychiatric/Behavioral: Negative for confusion. The patient is not nervous/anxious.    All other systems reviewed and are negative.      OBJECTIVE:    Vitals:    08/28/19 1519   BP: 170/85   Pulse: 82   Resp: 16   Temp: 97.9 °F (36.6 °C)   Weight: 64.5 kg (142 lb 3.2 oz)   Height: 152.4 cm (60\")   PainSc: 0-No pain       ECOG  (1) Restricted in physically strenuous activity, ambulatory and able to do work of light nature    Physical Exam   Constitutional: She is oriented to person, place, and time. She appears well-developed and well-nourished. No distress.   Female present during office visit.    HENT:   Head: Normocephalic and atraumatic.   Nose: Nose normal.   Mouth/Throat: Oropharynx is clear and moist. No oropharyngeal exudate.   Dental fillings.   Eyes: Conjunctivae, EOM and lids are normal. Pupils are equal, round, and reactive to light. Right eye exhibits no discharge. Left eye exhibits no discharge. No scleral icterus.   Neck: Normal range of motion. Neck supple. No thyromegaly present.   Cardiovascular: Normal rate, regular rhythm, normal heart sounds and intact distal pulses.   No murmur heard.  Right chest wall port   Pulmonary/Chest: Effort normal and breath sounds normal. No respiratory distress.   Abdominal: Soft. Normal appearance and bowel sounds are normal. She exhibits no distension and no mass. There is no tenderness. There is no rebound and no guarding. A hernia ( Soft nontender right " abdominal wall hernia) is present.   Genitourinary:   Genitourinary Comments: Deferred.   Musculoskeletal: Normal range of motion. She exhibits no edema, tenderness or deformity.   Lymphadenopathy:     She has no cervical adenopathy.     She has no axillary adenopathy.        Right: No supraclavicular adenopathy present.        Left: No supraclavicular adenopathy present.   Neurological: She is alert and oriented to person, place, and time. Coordination normal.   Skin: Skin is warm and dry. Capillary refill takes less than 2 seconds. No bruising, no petechiae and no rash noted. She is not diaphoretic. No erythema. No pallor.   Mild hyperpigmentation of the skin   Psychiatric: She has a normal mood and affect. Her speech is normal and behavior is normal. Judgment and thought content normal. Cognition and memory are normal.   Nursing note and vitals reviewed.    RECENT LABS  WBC   Date Value Ref Range Status   08/26/2019 8.06 3.40 - 10.80 10*3/mm3 Final   02/12/2018 5.46 4.5 - 11.0 10*3/uL Final     RBC   Date Value Ref Range Status   08/26/2019 4.07 3.77 - 5.28 10*6/mm3 Final   02/12/2018 2.82 (L) 4.0 - 5.2 10*6/uL Final     Hemoglobin   Date Value Ref Range Status   08/26/2019 11.7 (L) 12.0 - 15.9 g/dL Final   02/12/2018 8.1 (L) 12.0 - 16.0 g/dL Final     Hematocrit   Date Value Ref Range Status   08/26/2019 36.4 34.0 - 46.6 % Final   02/12/2018 25.3 (L) 36.0 - 46.0 % Final     MCV   Date Value Ref Range Status   08/26/2019 89.4 79.0 - 97.0 fL Final   02/12/2018 89.7 80.0 - 100.0 fL Final     MCH   Date Value Ref Range Status   08/26/2019 28.7 26.6 - 33.0 pg Final   02/12/2018 28.7 26.0 - 34.0 pg Final     MCHC   Date Value Ref Range Status   08/26/2019 32.1 31.5 - 35.7 g/dL Final   02/12/2018 32.0 31.0 - 37.0 g/dL Final     RDW   Date Value Ref Range Status   08/26/2019 17.5 (H) 12.3 - 15.4 % Final   02/12/2018 17.5 (H) 12.0 - 16.8 % Final     RDW-SD   Date Value Ref Range Status   08/26/2019 54.6 (H) 37.0 - 54.0  fl Final     MPV   Date Value Ref Range Status   08/26/2019 9.2 6.0 - 12.0 fL Final   02/12/2018 10.0 6.7 - 10.8 fL Final     Platelets   Date Value Ref Range Status   08/26/2019 132 (L) 140 - 450 10*3/mm3 Final   02/12/2018 157 140 - 440 10*3/uL Final     Neutrophil Rel %   Date Value Ref Range Status   02/12/2018 67.2 45 - 80 % Final   02/11/2018 73.3 45 - 80 % Final     Neutrophil %   Date Value Ref Range Status   08/26/2019 69.3 42.7 - 76.0 % Final     Lymphocyte Rel %   Date Value Ref Range Status   02/12/2018 18.5 15 - 50 % Final     Lymphocyte %   Date Value Ref Range Status   08/26/2019 20.2 19.6 - 45.3 % Final     Monocyte Rel %   Date Value Ref Range Status   02/12/2018 11.2 0 - 15 % Final     Monocyte %   Date Value Ref Range Status   08/26/2019 7.8 5.0 - 12.0 % Final     Eosinophil %   Date Value Ref Range Status   08/26/2019 2.2 0.3 - 6.2 % Final   02/12/2018 2.7 0 - 7 % Final     Basophil Rel %   Date Value Ref Range Status   02/12/2018 0.2 0 - 2 % Final     Basophil %   Date Value Ref Range Status   08/26/2019 0.5 0.0 - 1.5 % Final     Immature Grans %   Date Value Ref Range Status   02/12/2018 0.2 (H) 0 % Final     Neutrophils Absolute   Date Value Ref Range Status   02/12/2018 3.67 2.0 - 8.8 10*3/uL Final     Neutrophils, Absolute   Date Value Ref Range Status   08/26/2019 5.58 1.70 - 7.00 10*3/mm3 Final     Lymphocytes Absolute   Date Value Ref Range Status   02/12/2018 1.01 0.7 - 5.5 10*3/uL Final     Lymphocytes, Absolute   Date Value Ref Range Status   08/26/2019 1.63 0.70 - 3.10 10*3/mm3 Final     Monocytes Absolute   Date Value Ref Range Status   02/12/2018 0.61 0.0 - 1.7 10*3/uL Final     Monocytes, Absolute   Date Value Ref Range Status   08/26/2019 0.63 0.10 - 0.90 10*3/mm3 Final     Eosinophils Absolute   Date Value Ref Range Status   02/12/2018 0.15 0.0 - 0.8 10*3/uL Final     Eosinophils, Absolute   Date Value Ref Range Status   08/26/2019 0.18 0.00 - 0.40 10*3/mm3 Final     Basophils  Absolute   Date Value Ref Range Status   02/12/2018 0.01 0.0 - 0.2 10*3/uL Final     Basophils, Absolute   Date Value Ref Range Status   08/26/2019 0.04 0.00 - 0.20 10*3/mm3 Final     Immature Grans, Absolute   Date Value Ref Range Status   02/12/2018 0.01 <1 10*3/uL Final     nRBC   Date Value Ref Range Status   06/13/2019 0 0 /100[WBCs] Final   02/12/2018 0 0 /100(WBC) Final       Lab Results   Component Value Date    GLUCOSE 152 (H) 08/26/2019    BUN 17 08/26/2019    CREATININE 1.10 (H) 08/26/2019    EGFRIFNONA 51 (L) 08/26/2019    BCR 15.5 08/26/2019    K 3.7 08/26/2019    CO2 24.0 08/26/2019    CALCIUM 8.6 (L) 08/26/2019    ALBUMIN 3.60 08/26/2019    LABIL2 1.1 06/03/2019    AST 25 08/26/2019    ALT 35 08/26/2019         ASSESSMENT:  Malignant neoplasm of sigmoid colon (CMS/HCC)    Chemotherapy-induced thrombocytopenia    Anemia, unspecified type    Encounter for chemotherapy management    Fatigue due to exposure, initial encounter    Chemotherapy-induced nausea      Cycle 3 of chemotherapy was delayed due to neutropenia.  Neulasta was added and subsequent cycle was not delayed.  She has had some thrombocytopenia that has not been dose-limiting to date.  She had some mild fluctuating anemia.  She remains fatigued but the American ginseng is helping some.  TSH done with her last visit was within the normal range however a repeat was slightly elevated and will be followed.  She has had some constipation but also some diarrhea. There has not been significant protein in her urine to date.  Two days ago her creatinine was mildly elevated and she is advised to increase oral fluids.    PLAN:     · Continue American ginseng 200 mg by mouth twice a day for fatigue.  · Continue FOLFIRI, Avastin, and Neulasta.    · Increase fluid intake.       I have reviewed labs results, imaging, vitals, and medications with the patient today and have reviewed information entered by Mari Michaels CMA ([facility]).   Will follow up in one  months with the physician and a CBC.     Patient verbalized understanding and is in agreement of the above plan.    Electronically signed by FATUMA Funes, 08/28/19, 4:09 PM.    Much of the above report is an electronic transcription/translation of the spoken language to printed text using Dragon Software. As such, the subtleties and finesse of the spoken language may permit erroneous, or at times, nonsensical words or phrases to be inadvertently transcribed; thus changes may be made at a later date to rectify these errors.

## 2019-08-28 ENCOUNTER — HOSPITAL ENCOUNTER (OUTPATIENT)
Dept: ONCOLOGY | Facility: HOSPITAL | Age: 59
Setting detail: INFUSION SERIES
Discharge: HOME | End: 2019-08-28

## 2019-08-28 ENCOUNTER — HOSPITAL ENCOUNTER (OUTPATIENT)
Dept: ONCOLOGY | Facility: HOSPITAL | Age: 59
Discharge: HOME | End: 2019-08-28

## 2019-08-28 ENCOUNTER — OFFICE VISIT (OUTPATIENT)
Dept: ONCOLOGY | Facility: CLINIC | Age: 59
End: 2019-08-28

## 2019-08-28 ENCOUNTER — APPOINTMENT (OUTPATIENT)
Dept: LAB | Facility: HOSPITAL | Age: 59
End: 2019-08-28

## 2019-08-28 VITALS
HEART RATE: 82 BPM | WEIGHT: 142.2 LBS | DIASTOLIC BLOOD PRESSURE: 85 MMHG | RESPIRATION RATE: 16 BRPM | BODY MASS INDEX: 27.92 KG/M2 | TEMPERATURE: 97.9 F | HEIGHT: 60 IN | SYSTOLIC BLOOD PRESSURE: 170 MMHG

## 2019-08-28 DIAGNOSIS — C18.7 MALIGNANT NEOPLASM OF SIGMOID COLON (HCC): Primary | ICD-10-CM

## 2019-08-28 DIAGNOSIS — T45.1X5A CHEMOTHERAPY-INDUCED THROMBOCYTOPENIA: ICD-10-CM

## 2019-08-28 DIAGNOSIS — Z51.11 ENCOUNTER FOR CHEMOTHERAPY MANAGEMENT: ICD-10-CM

## 2019-08-28 DIAGNOSIS — T73.2XXA FATIGUE DUE TO EXPOSURE, INITIAL ENCOUNTER: ICD-10-CM

## 2019-08-28 DIAGNOSIS — D69.59 CHEMOTHERAPY-INDUCED THROMBOCYTOPENIA: ICD-10-CM

## 2019-08-28 DIAGNOSIS — D64.9 ANEMIA, UNSPECIFIED TYPE: ICD-10-CM

## 2019-08-28 DIAGNOSIS — R11.0 CHEMOTHERAPY-INDUCED NAUSEA: ICD-10-CM

## 2019-08-28 DIAGNOSIS — T45.1X5A CHEMOTHERAPY-INDUCED NAUSEA: ICD-10-CM

## 2019-08-28 PROCEDURE — 96372 THER/PROPH/DIAG INJ SC/IM: CPT | Performed by: NURSE PRACTITIONER

## 2019-08-28 PROCEDURE — 99215 OFFICE O/P EST HI 40 MIN: CPT | Performed by: NURSE PRACTITIONER

## 2019-08-28 PROCEDURE — 25010000002 PEGFILGRASTIM 6 MG/0.6ML PREFILLED SYRINGE KIT: Performed by: NURSE PRACTITIONER

## 2019-08-28 PROCEDURE — G0463 HOSPITAL OUTPT CLINIC VISIT: HCPCS | Performed by: NURSE PRACTITIONER

## 2019-08-28 RX ORDER — SODIUM CHLORIDE 0.9 % (FLUSH) 0.9 %
10 SYRINGE (ML) INJECTION AS NEEDED
Status: DISCONTINUED | OUTPATIENT
Start: 2019-08-28 | End: 2019-08-29 | Stop reason: HOSPADM

## 2019-08-28 RX ORDER — SODIUM CHLORIDE 0.9 % (FLUSH) 0.9 %
10 SYRINGE (ML) INJECTION AS NEEDED
Status: CANCELLED | OUTPATIENT
Start: 2019-08-28

## 2019-08-28 RX ADMIN — Medication 10 ML: at 16:23

## 2019-08-28 RX ADMIN — PEGFILGRASTIM 6 MG: KIT SUBCUTANEOUS at 16:23

## 2019-09-03 ENCOUNTER — LAB (OUTPATIENT)
Dept: LAB | Facility: HOSPITAL | Age: 59
End: 2019-09-03

## 2019-09-03 DIAGNOSIS — C18.7 MALIGNANT NEOPLASM OF SIGMOID COLON (HCC): ICD-10-CM

## 2019-09-03 LAB
BASOPHILS # BLD AUTO: 0.03 10*3/MM3 (ref 0–0.2)
BASOPHILS NFR BLD AUTO: 0.3 % (ref 0–1.5)
DEPRECATED RDW RBC AUTO: 53.3 FL (ref 37–54)
EOSINOPHIL # BLD AUTO: 0.27 10*3/MM3 (ref 0–0.4)
EOSINOPHIL NFR BLD AUTO: 2.4 % (ref 0.3–6.2)
ERYTHROCYTE [DISTWIDTH] IN BLOOD BY AUTOMATED COUNT: 16.7 % (ref 12.3–15.4)
HCT VFR BLD AUTO: 33.6 % (ref 34–46.6)
HGB BLD-MCNC: 10.9 G/DL (ref 12–15.9)
LYMPHOCYTES # BLD AUTO: 1.72 10*3/MM3 (ref 0.7–3.1)
LYMPHOCYTES NFR BLD AUTO: 15 % (ref 19.6–45.3)
MCH RBC QN AUTO: 28.9 PG (ref 26.6–33)
MCHC RBC AUTO-ENTMCNC: 32.4 G/DL (ref 31.5–35.7)
MCV RBC AUTO: 89.1 FL (ref 79–97)
MONOCYTES # BLD AUTO: 1.93 10*3/MM3 (ref 0.1–0.9)
MONOCYTES NFR BLD AUTO: 16.9 % (ref 5–12)
NEUTROPHILS # BLD AUTO: 7.48 10*3/MM3 (ref 1.7–7)
NEUTROPHILS NFR BLD AUTO: 65.4 % (ref 42.7–76)
PLATELET # BLD AUTO: 107 10*3/MM3 (ref 140–450)
PMV BLD AUTO: 10 FL (ref 6–12)
RBC # BLD AUTO: 3.77 10*6/MM3 (ref 3.77–5.28)
WBC NRBC COR # BLD: 11.43 10*3/MM3 (ref 3.4–10.8)

## 2019-09-03 PROCEDURE — 85025 COMPLETE CBC W/AUTO DIFF WBC: CPT | Performed by: NURSE PRACTITIONER

## 2019-09-03 PROCEDURE — 36415 COLL VENOUS BLD VENIPUNCTURE: CPT | Performed by: NURSE PRACTITIONER

## 2019-09-05 DIAGNOSIS — C18.7 MALIGNANT NEOPLASM OF SIGMOID COLON (HCC): ICD-10-CM

## 2019-09-05 RX ORDER — FLUOROURACIL 50 MG/ML
400 INJECTION, SOLUTION INTRAVENOUS ONCE
Status: CANCELLED | OUTPATIENT
Start: 2019-09-09

## 2019-09-05 RX ORDER — PALONOSETRON 0.05 MG/ML
0.25 INJECTION, SOLUTION INTRAVENOUS ONCE
Status: CANCELLED | OUTPATIENT
Start: 2019-09-09

## 2019-09-05 RX ORDER — ATROPINE SULFATE 1 MG/ML
0.25 INJECTION, SOLUTION INTRAMUSCULAR; INTRAVENOUS; SUBCUTANEOUS
Status: CANCELLED | OUTPATIENT
Start: 2019-09-09

## 2019-09-05 RX ORDER — SODIUM CHLORIDE 9 MG/ML
250 INJECTION, SOLUTION INTRAVENOUS ONCE
Status: CANCELLED | OUTPATIENT
Start: 2019-09-09

## 2019-09-09 ENCOUNTER — HOSPITAL ENCOUNTER (OUTPATIENT)
Dept: ONCOLOGY | Facility: HOSPITAL | Age: 59
Setting detail: INFUSION SERIES
Discharge: HOME | End: 2019-09-09

## 2019-09-09 ENCOUNTER — HOSPITAL ENCOUNTER (OUTPATIENT)
Dept: PET IMAGING | Facility: HOSPITAL | Age: 59
Discharge: HOME | End: 2019-09-09
Admitting: NURSE PRACTITIONER

## 2019-09-09 VITALS
SYSTOLIC BLOOD PRESSURE: 137 MMHG | RESPIRATION RATE: 18 BRPM | DIASTOLIC BLOOD PRESSURE: 90 MMHG | HEART RATE: 99 BPM | BODY MASS INDEX: 25.4 KG/M2 | TEMPERATURE: 98.2 F | HEIGHT: 62 IN | WEIGHT: 138 LBS

## 2019-09-09 DIAGNOSIS — C18.7 MALIGNANT NEOPLASM OF SIGMOID COLON (HCC): Primary | ICD-10-CM

## 2019-09-09 DIAGNOSIS — C18.7 MALIGNANT NEOPLASM OF SIGMOID COLON (HCC): ICD-10-CM

## 2019-09-09 LAB
ALBUMIN SERPL-MCNC: 3.5 G/DL (ref 3.5–4.8)
ALBUMIN/GLOB SERPL: 1.2 G/DL (ref 1–1.7)
ALP SERPL-CCNC: 150 U/L (ref 32–91)
ALT SERPL W P-5'-P-CCNC: 32 U/L (ref 14–54)
ANION GAP SERPL CALCULATED.3IONS-SCNC: 11.6 MMOL/L (ref 5–15)
AST SERPL-CCNC: 29 U/L (ref 15–41)
BASOPHILS # BLD AUTO: 0.03 10*3/MM3 (ref 0–0.2)
BASOPHILS NFR BLD AUTO: 0.3 % (ref 0–1.5)
BILIRUB SERPL-MCNC: 0.7 MG/DL (ref 0.3–1.2)
BILIRUB UR QL STRIP: NEGATIVE
BUN BLD-MCNC: 11 MG/DL (ref 8–20)
BUN/CREAT SERPL: 10 (ref 5.4–26.2)
CALCIUM SPEC-SCNC: 8.9 MG/DL (ref 8.9–10.3)
CHLORIDE SERPL-SCNC: 108 MMOL/L (ref 101–111)
CLARITY UR: CLEAR
CO2 SERPL-SCNC: 24 MMOL/L (ref 22–32)
COLOR UR: YELLOW
CREAT BLD-MCNC: 1.1 MG/DL (ref 0.4–1)
DEPRECATED RDW RBC AUTO: 59.2 FL (ref 37–54)
EOSINOPHIL # BLD AUTO: 0.13 10*3/MM3 (ref 0–0.4)
EOSINOPHIL NFR BLD AUTO: 1.2 % (ref 0.3–6.2)
ERYTHROCYTE [DISTWIDTH] IN BLOOD BY AUTOMATED COUNT: 18.5 % (ref 12.3–15.4)
GFR SERPL CREATININE-BSD FRML MDRD: 51 ML/MIN/1.73
GLOBULIN UR ELPH-MCNC: 3 GM/DL (ref 2.5–3.8)
GLUCOSE BLD-MCNC: 163 MG/DL (ref 65–99)
GLUCOSE UR STRIP-MCNC: NEGATIVE MG/DL
HCT VFR BLD AUTO: 36.9 % (ref 34–46.6)
HGB BLD-MCNC: 12.4 G/DL (ref 12–15.9)
HGB UR QL STRIP.AUTO: NEGATIVE
KETONES UR QL STRIP: NEGATIVE
LEUKOCYTE ESTERASE UR QL STRIP.AUTO: NEGATIVE
LYMPHOCYTES # BLD AUTO: 1.48 10*3/MM3 (ref 0.7–3.1)
LYMPHOCYTES NFR BLD AUTO: 13.5 % (ref 19.6–45.3)
MCH RBC QN AUTO: 30.4 PG (ref 26.6–33)
MCHC RBC AUTO-ENTMCNC: 33.6 G/DL (ref 31.5–35.7)
MCV RBC AUTO: 90.4 FL (ref 79–97)
MONOCYTES # BLD AUTO: 0.86 10*3/MM3 (ref 0.1–0.9)
MONOCYTES NFR BLD AUTO: 7.8 % (ref 5–12)
NEUTROPHILS # BLD AUTO: 8.47 10*3/MM3 (ref 1.7–7)
NEUTROPHILS NFR BLD AUTO: 77.2 % (ref 42.7–76)
NITRITE UR QL STRIP: NEGATIVE
PH UR STRIP.AUTO: 6.5 [PH] (ref 5–8)
PLATELET # BLD AUTO: 150 10*3/MM3 (ref 140–450)
PMV BLD AUTO: 9.5 FL (ref 6–12)
POTASSIUM BLD-SCNC: 3.6 MMOL/L (ref 3.6–5.1)
PROT SERPL-MCNC: 6.5 G/DL (ref 6.1–7.9)
PROT UR QL STRIP: NEGATIVE
RBC # BLD AUTO: 4.08 10*6/MM3 (ref 3.77–5.28)
SODIUM BLD-SCNC: 140 MMOL/L (ref 136–144)
SP GR UR STRIP: <=1.005 (ref 1–1.03)
UROBILINOGEN UR QL STRIP: NORMAL
WBC NRBC COR # BLD: 10.97 10*3/MM3 (ref 3.4–10.8)

## 2019-09-09 PROCEDURE — 25010000002 BEVACIZUMAB PER 10 MG: Performed by: NURSE PRACTITIONER

## 2019-09-09 PROCEDURE — 25010000002 PALONOSETRON 0.25 MG/5ML SOLUTION PREFILLED SYRINGE: Performed by: NURSE PRACTITIONER

## 2019-09-09 PROCEDURE — 71250 CT THORAX DX C-: CPT

## 2019-09-09 PROCEDURE — 74176 CT ABD & PELVIS W/O CONTRAST: CPT

## 2019-09-09 PROCEDURE — 25010000002 ATROPINE PER 0.01 MG: Performed by: NURSE PRACTITIONER

## 2019-09-09 PROCEDURE — 80053 COMPREHEN METABOLIC PANEL: CPT | Performed by: NURSE PRACTITIONER

## 2019-09-09 PROCEDURE — 81003 URINALYSIS AUTO W/O SCOPE: CPT | Performed by: NURSE PRACTITIONER

## 2019-09-09 PROCEDURE — 96413 CHEMO IV INFUSION 1 HR: CPT | Performed by: INTERNAL MEDICINE

## 2019-09-09 PROCEDURE — 25010000002 LEUCOVORIN CALCIUM PER 50 MG: Performed by: NURSE PRACTITIONER

## 2019-09-09 PROCEDURE — 96416 CHEMO PROLONG INFUSE W/PUMP: CPT | Performed by: INTERNAL MEDICINE

## 2019-09-09 PROCEDURE — 96417 CHEMO IV INFUS EACH ADDL SEQ: CPT | Performed by: INTERNAL MEDICINE

## 2019-09-09 PROCEDURE — 25010000002 IRINOTECAN PER 20 MG: Performed by: NURSE PRACTITIONER

## 2019-09-09 PROCEDURE — 96415 CHEMO IV INFUSION ADDL HR: CPT | Performed by: INTERNAL MEDICINE

## 2019-09-09 PROCEDURE — 25010000002 FOSAPREPITANT PER 1 MG: Performed by: NURSE PRACTITIONER

## 2019-09-09 PROCEDURE — 25010000002 DEXAMETHASONE SODIUM PHOSPHATE 120 MG/30ML SOLUTION: Performed by: NURSE PRACTITIONER

## 2019-09-09 PROCEDURE — 96411 CHEMO IV PUSH ADDL DRUG: CPT | Performed by: INTERNAL MEDICINE

## 2019-09-09 PROCEDURE — 96368 THER/DIAG CONCURRENT INF: CPT | Performed by: INTERNAL MEDICINE

## 2019-09-09 PROCEDURE — 96375 TX/PRO/DX INJ NEW DRUG ADDON: CPT | Performed by: INTERNAL MEDICINE

## 2019-09-09 PROCEDURE — 85025 COMPLETE CBC W/AUTO DIFF WBC: CPT | Performed by: NURSE PRACTITIONER

## 2019-09-09 PROCEDURE — 96367 TX/PROPH/DG ADDL SEQ IV INF: CPT | Performed by: INTERNAL MEDICINE

## 2019-09-09 PROCEDURE — 25010000002 FLUOROURACIL PER 500 MG: Performed by: NURSE PRACTITIONER

## 2019-09-09 RX ORDER — FLUOROURACIL 50 MG/ML
400 INJECTION, SOLUTION INTRAVENOUS ONCE
Status: COMPLETED | OUTPATIENT
Start: 2019-09-09 | End: 2019-09-09

## 2019-09-09 RX ORDER — SODIUM CHLORIDE 0.9 % (FLUSH) 0.9 %
10 SYRINGE (ML) INJECTION AS NEEDED
Status: CANCELLED | OUTPATIENT
Start: 2019-09-09

## 2019-09-09 RX ORDER — ATROPINE SULFATE 1 MG/ML
0.25 INJECTION, SOLUTION INTRAMUSCULAR; INTRAVENOUS; SUBCUTANEOUS
Status: DISCONTINUED | OUTPATIENT
Start: 2019-09-09 | End: 2019-09-09

## 2019-09-09 RX ORDER — SODIUM CHLORIDE 9 MG/ML
250 INJECTION, SOLUTION INTRAVENOUS ONCE
Status: COMPLETED | OUTPATIENT
Start: 2019-09-09 | End: 2019-09-09

## 2019-09-09 RX ORDER — PALONOSETRON HYDROCHLORIDE 0.05 MG/ML
0.25 INJECTION, SOLUTION INTRAVENOUS ONCE
Status: COMPLETED | OUTPATIENT
Start: 2019-09-09 | End: 2019-09-09

## 2019-09-09 RX ADMIN — BEVACIZUMAB 310 MG: 400 INJECTION, SOLUTION INTRAVENOUS at 12:16

## 2019-09-09 RX ADMIN — SODIUM CHLORIDE 250 ML: 900 INJECTION, SOLUTION INTRAVENOUS at 10:36

## 2019-09-09 RX ADMIN — LEUCOVORIN CALCIUM 650 MG: 350 INJECTION, POWDER, LYOPHILIZED, FOR SOLUTION INTRAMUSCULAR; INTRAVENOUS at 13:03

## 2019-09-09 RX ADMIN — DEXAMETHASONE SODIUM PHOSPHATE 12 MG: 4 INJECTION, SOLUTION INTRA-ARTICULAR; INTRALESIONAL; INTRAMUSCULAR; INTRAVENOUS; SOFT TISSUE at 11:42

## 2019-09-09 RX ADMIN — PALONOSETRON 0.25 MG: 0.25 INJECTION, SOLUTION INTRAVENOUS at 10:36

## 2019-09-09 RX ADMIN — SODIUM CHLORIDE 100 ML: 900 INJECTION, SOLUTION INTRAVENOUS at 10:37

## 2019-09-09 RX ADMIN — FLUOROURACIL 3910 MG: 50 INJECTION, SOLUTION INTRAVENOUS at 14:49

## 2019-09-09 RX ADMIN — ATROPINE SULFATE 295 MG: 0.4 INJECTION, SOLUTION INTRAMUSCULAR; INTRAVENOUS; SUBCUTANEOUS at 13:03

## 2019-09-09 RX ADMIN — FLUOROURACIL 650 MG: 50 INJECTION, SOLUTION INTRAVENOUS at 14:49

## 2019-09-09 NOTE — PROGRESS NOTES
Pleaes ask radiology to compare CT chest abdomen pelvis from 9/9/2019 with PET CT at [facility] on 5/16/2019 and CTs at [facility] on 5/13/2019.

## 2019-09-10 ENCOUNTER — HOSPITAL ENCOUNTER (OUTPATIENT)
Dept: OTHER | Facility: HOSPITAL | Age: 59
Discharge: HOME | End: 2019-09-10

## 2019-09-10 ENCOUNTER — APPOINTMENT (OUTPATIENT)
Dept: CT IMAGING | Facility: HOSPITAL | Age: 59
End: 2019-09-10

## 2019-09-10 DIAGNOSIS — Z00.6 EXAMINATION FOR NORMAL COMPARISON OR CONTROL IN CLINICAL RESEARCH: ICD-10-CM

## 2019-09-11 ENCOUNTER — HOSPITAL ENCOUNTER (OUTPATIENT)
Dept: ONCOLOGY | Facility: HOSPITAL | Age: 59
Setting detail: INFUSION SERIES
Discharge: HOME | End: 2019-09-11

## 2019-09-11 VITALS
WEIGHT: 141 LBS | SYSTOLIC BLOOD PRESSURE: 127 MMHG | HEART RATE: 86 BPM | DIASTOLIC BLOOD PRESSURE: 78 MMHG | TEMPERATURE: 97.7 F | RESPIRATION RATE: 18 BRPM | HEIGHT: 62 IN | BODY MASS INDEX: 25.95 KG/M2

## 2019-09-11 DIAGNOSIS — C18.7 MALIGNANT NEOPLASM OF SIGMOID COLON (HCC): Primary | ICD-10-CM

## 2019-09-11 PROCEDURE — 96377 APPLICATON ON-BODY INJECTOR: CPT | Performed by: INTERNAL MEDICINE

## 2019-09-11 PROCEDURE — 25010000002 PEGFILGRASTIM 6 MG/0.6ML PREFILLED SYRINGE KIT: Performed by: INTERNAL MEDICINE

## 2019-09-11 RX ORDER — SODIUM CHLORIDE 0.9 % (FLUSH) 0.9 %
10 SYRINGE (ML) INJECTION AS NEEDED
Status: DISCONTINUED | OUTPATIENT
Start: 2019-09-11 | End: 2019-09-12 | Stop reason: HOSPADM

## 2019-09-11 RX ORDER — SODIUM CHLORIDE 0.9 % (FLUSH) 0.9 %
10 SYRINGE (ML) INJECTION AS NEEDED
Status: CANCELLED | OUTPATIENT
Start: 2019-09-11

## 2019-09-11 RX ADMIN — SODIUM CHLORIDE, PRESERVATIVE FREE 500 UNITS: 5 INJECTION INTRAVENOUS at 15:55

## 2019-09-11 RX ADMIN — Medication 10 ML: at 15:55

## 2019-09-11 RX ADMIN — PEGFILGRASTIM 6 MG: KIT SUBCUTANEOUS at 15:58

## 2019-09-17 NOTE — PROGRESS NOTES
Hematology/Oncology Outpatient Follow Up    PATIENT NAME:Samantha Massey  :1960  MRN: [id number]  PRIMARY CARE PHYSICIAN: Diana Spencer MD  REFERRING PHYSICIAN: Diana Spencer MD    Chief Complaint   Patient presents with   • Follow-up     Colon cancer with liver metastasis       HISTORY OF PRESENT ILLNESS:   1. Colon cancer with liver metastasis diagnosis established 2012.  · Ms. Massey claimed to have developed intermittent diarrhea and constipation in 2011. She eventually saw Dr. Diana Spencer in 2012. A chemistry profile revealed normal liver enzymes.  CEA on 12 was 5.9 ng/mL (< 5, smoker); hemoglobin was 12.1 gm/dL, and MCV 86.2 fl.  A CT scan of the abdomen and pelvis was performed on 12 revealing innumerable mass lesions throughout the liver, thickening of the distal sigmoid colon wall, nonspecific non-pathologically enlarged lymph nodes in the sigmoid mesentery and left iliac chain. She was referred to Dr. Regina Pack and had a repeat CEA done on 12 revealing a value of 7.5 ng/mL. A colonoscopy was performed on 12 by Dr. Pack revealing 10 cm distal colon mass, which on biopsy revealed the presence of invasive moderately differentiated adenocarcinoma which demonstrated micro satellite stability. The patient was hospitalized on 12 and underwent laparoscopic low anterior resection with mobilization of splenic flexure, which on pathology revealed invasive, moderate to focally poorly differentiated adenocarcinoma of the rectosigmoid colon with invasion through muscularis propria and extension into mesocolon. The surgical margins were free of tumor. Mesenteric lymph nodes, 14, were negative for metastatic tumor. Lymphovascular invasion was not identified. An [id number] was placed on 12 and a CT-guided liver biopsy was performed the same day by ·Dr. Hernandez Espino. Pathology revealed metastatic adenocarcinoma consistent with colonic primary.  The patient was then referred here for further evaluation.  · 2/13/12 - Order to start chemotherapy to consist of Leucovorin 500mg IV day 1, Oxaliplatin 120 mg IV day1, Avastin 230 mg IV day 1, 5FU 575mg IV day 1 followed by 345 mg CIV, cycle q 2 weeks.  · 2/13/12 - CEA 5.6 (H).  · 2/13/12 - Chest x-ray revealed no acute cardiopulmonary disease.  · 2/17/12 - CT abdomen and pelvis revealed no evidence of metastatic disease of the chest, innumerable hepatic metastases.  Bowel staple line at the rectosigmoid junction with adjacent stranding of fat consistent with postoperative change.  · 2/18/12 - KRAS testing performed on rectum biopsy: no mutation detected.  · 2/21/12 - Patient started Chemotherapy cycle #1 consisting of Avastin, Leucovorin, 5FU, and Oxaliplatin.  · 2/23/12 - CT chest revealed no pulmonary embolus, no active lung disease. Hepatic nodularity.  · 2/24/12 - Per note from [facility] patient enrolled in clinical trail per Dr. Eldridge randomized to the control arm of the study. Which consist of standard FOLFOX chemotherapy that will be given at our office.  · 2124/12 - PET scan revealed interval resection of known primary colonic carcinoma at the rectosigmoid junction without evidence of residual tumor in this location. The patient is believed to be status post resection of the previously described left internal iliac lymph node without evidence of hypermetabolic metastatic lymphadenopathy. Innumerable hypermetabolic hepatic metastases.  · 3/6/12 - Patient started Chemotherapy cycle #2 consisting of Avastin, Leucovorin, 5FU, and Oxaliplatin  · 3/20/12 - Patient   started   Chemotherapy    cycle   #3     consisting    of   Avastin, leucovorin, 5FU, and Oxaliplatin  · 4/3/12 - Patient started Chemotherapy cycle #4 consisting of Avastin, Leucovorin, 5FU, and Oxaliplatin  · 4/3/12 - Orders written to decrease chemotherapy dose by 20% at her next cycle and to give Neulasta injection day after each cycle.  Hemoglobin  11.9.  · 4/3112 - CEA 1.0 (N).  · 4/6/12 - CT C/A/P - Chest - unremarkable, Abdomen/Pelvis - Overall moderate to marked decrease in the size of the multiple hepatic metastatic lesions. This is when compared to a study of 1/2/12. Post-op changes in the region of the rectosigmoid junction Small amount of free fluid in the cul-de-sac with a small amount of fluid apparently within the endometrial cavity. These findings are not unusual premenopausal.                                                                                           · 4/17/12 - Patient started on cycle 5 of Oxaliplatin, Leucovorin, 5-FU, Avastin.  · 5/1/12 - Patient started on cycle 6 of Oxaliplatin, Leucovorin, 5-FU, Avastin.  · 5/14/12 - CEA 1.0 (N).  · 5/17/12 - Patient started on cycle 7 of Oxaliplatin, Leucovorin, 5-FU, Avastin.  · 5/29/12 - 5/31/12 - Patient admitted to [facility] due to acute febrile illness, sepsis syndrome, urinary tract infection, CA colon with liver mets, leukocytosis, hypotension, Elevated liver function tests, and acute kidney injury. TSH 8.66 (H), Ferritin 528(H), Folate 24.8 (H), Haptoglobin 312 (H), Iron 40 (N), Hep panel negative, Relic count1.42 (N), TIBC 281 (N), 812 1500(H), Lipase 22(N). Chest x-ray revealed no acute cardiopulmonary abnormality.  · 5/29/12 - Patient started on cycle 8 of Oxaliplatin, Leucovorin, 5-FU, Avastin.  · 6/13/12 - Patient started on cycle 9 of Oxaliplalin, Leucovorin, 5-FU, Avastin.  · 6/25/12 - CT scan of the abdomen and pelvis with hepatic lesions appearing smaller as compared to the prior study, which could relate to underlying metastatic disease. Sclerotic lesion of the right iliac bone that was present previously and could relate to bone mets. Postsurgical change.  · 6/26/12 - Patient started on cycle 10 of Oxaliplatin, Leucovorin, 5-FU, Avastin.  · 7/10/12 - Patient started on cycle 11 of Leucovorin, 5-FU, Oxaliplatin, Avaslin.  · 7/17/12 - Bone scan no evidence of metastatic  disease including sclerotic lesion right Iliac area.  · 7/24/12 - CEA 0.7.  · 7/24/12 - Patient started on cycle 12 of Leucovorin, 5-FU, Oxaliplatin, Avastin.  · 8/7/12 - Patient started on cycle 13 of Leucovorin, 5-FU, Oxaliplatin, Avastin.  · 8/21/12 - Patient started on cycle 14 of Leucovorin, 5-FU, Oxaliplatin, Avastin.  · 8/28/12 - CT scan of the abdomen and pelvis revealed stable size and appearance of multiple hypodense hepatic lesions, likely representing metastatic disease. Otherwise stable abdomen and pelvis.  · 8/23/12 - CEA 0.8 (N).  · 9/4/12 - Orders written to continue chemotherapy until seen by Dr. Eldridge.  · 9/11/12 - Patient started on cycle 15 of Leucovorin, 5-FU, Oxaliplatin, Avastin.  · 9/11/12 - Orders written to give chemotherapy today with a dose reduction of 20% due to low ANC.  · 9/18/12 - Orders written to discontinue chemotherapy and start maintenance Xeloda 1500 mg po bid x 14 days cycle q 3 weeks, per Dr. Eldridge.  · 11/7/12 - Patient evaluation by Dr. Montoya which recommended hepatic directed radiation therapy. She underwent TheraSpheres targeting the right lobe. Will treat the left hepatic lobe in 3 to 4 weeks if she is able to tolerate this procedure.  · 12/20/12 - CEA 1.3 (N).  · 12/31/12 - CT scan of the abdomen and pelvis revealed that there has been a significant partial response in both lobes of the liver.  Activity of remaining lesions is uncertain and could be assessed by follow-up studies for PET/CT correlation.  Heterogeneous enhancement of the right lobe of the liver is consistent with previous embolization therapy.  No evidence of extrahepatic metastatic disease.  Focal non-enhancement and bulging of the lateral wall of the gallbladder body.       · 1/22/13 - CEA 0.8.  · 1/22/13 - AST 52, ALT 47, ALK PHOS 157.  · 3/25/13 - Xeloda dose decreased to 1,250 mg p.o. b.i.d. x 14 days to cycle every three weeks due to hand foot syndrome.  · 4/27/13 - CT scan of abdomen and pelvis  revealed overall continued improvement in hepatic metastatic disease.  The single largest lesion in the right lobe is stable in appearance when compared to prior study although other lesions within the liver are more hypodense and some are smaller suggesting continued response to treatment.  Many of these lesions may no longer harbor viable malignancy.  A follow-up PET/CT scan could be performed to further evaluate the full extent of the viable metastasis versus continued attenuation on CT follow-up.  Contracted gallbladder with either focal areas of bulging of the wall or necrosis.  The appearance of the current exam is more suggesting a bulging rather than necrosis.  · 5/6/13 - Order written to decrease Xeloda to 1000 mg by mouth twice a day ×14 days every three weeks due to hand-foot syndrome and chemotherapy induced cytopenias and diarrhea.  · 5/28/13 - PET/CT scan negative for hypermetabolic foci in the neck, chest, abdomen, or pelvis to suggest recurrent, progressive, or metastatic tumor.  · 7/9/13 - CEA 2.4 (N).  · 8/5/13 - CT of the chest showed no evidence of metastatic disease.  CT scan of abdomen and pelvis show continued improvement in metastatic disease involving both lobes of the liver.  No evidence of extrahepatic metastatic disease in the abdomen or pelvis.  The gallbladder is contracted.  The previously suspected defect of the gallbladder wall against the liver surface is smaller.  There is no evidence of gallbladder leak into the peritoneal cavity.  Contained perforation is not excluded but is increasingly less likely.  · 12/7/13 - CT scan of the abdomen and pelvis revealed stable appearance of hepatic metastatic disease, most of which is likely treated.  A PET/CT scan could be performed to evaluate for any sites of active metastatic disease.  No evidence of recurrent disease at the anastomosis.  The gallbladder is contracted and not optimally evaluated on this exam, but is stable in appearance  compared to prior exam.  · 12/23/13 - CEA 1.4 normal.  · 4/28/14 - CEA 1.6 (N).  · 6/26/14 - Patient underwent a colonoscopy by Dr. Pack, which revealed no evidence of recurrence.  · 7/15/14 - Patient advised to continue chemotherapy with Xeloda.  · 7/30/14 - Echocardiogram revealed left ventricular size and contractility is within normal limits and ejection fraction is 55-60%.  · 8/18/14 - CT scan of the abdomen and pelvis revealed stable previously treated metastases in both lobes of the liver.  The activity of individual lesions is not determined and may be assessed by follow-up CT scans are PET/CT correlation.  No evidence of extrahepatic metastatic disease.  No signs of local tumor recurrence at the bowel resection site.  · 8/24/14 - Xeloda discontinued due to remission of disease.  · 9/16/14 - CEA 1.0 (N).  · 11/26/14 - CEA 1  · 12/23/14 - CT abdomen and pelvis with contrast: There are several tiny low attenuation lesions within the liver, but overall are believed to be unchanged compared to the prior exam in April 2013.  · 12/23/14 - CXR: No acute process seen.  · 12/23/14 - CT abdomen and pelvis with several tiny low attenuation lesions in the liver, unchanged in comparison to prior CT’s.  Chest x-ray with no acute process seen.    · 5/26/15 - WBC 6, hemoglobin 12.1, platelet count 173,000, MCV 85.8.  · 6/25/15 - CT scan of the abdomen and pelvis, right lobe of the liver is decreased in size and the margin is nodular. Three metal densities in the area of the hepatic artery. Duodenal lipoma. Gallbladder contracted.   · 8/24/15 - CEA 0.8 (0-5). Comprehensive metabolic panel normal.    · 2/15/16 - CEA 1.0 (0-5). Comprehensive metabolic panel with potassium 3.3 (3.6-5.1). Patient asked to go on potassium supplements but wanted to try high potassium diet.     · 8/15/16 - Patient advised to have the Infusaport removed if CT is stable. CEA 0.9 (N). CMP normal, creatinine 1.0. 9/2/16 - CT scan of the chest,  abdomen and pelvis without contrast revealed stable chest CT with no evidence of mediastinal, hilar or pulmonary metastases or mass. Interval improvement of the appearance of the liver with no hepatic metastases seen and no evidence of adrenal enlargement or upper abdominal lymphadenopathy. Post treatment change was noted to the right lobe of the liver and hepatic artery, stable duodenal lipoma, moderate stool suggesting constipation.   · 10/6/16 - Infusaport removal by Dr. Pack.   · 12/22/16 - WBC 5.17, hemoglobin 13.2, MCV 86.5, platelets 234,000, ANC 3.3.   · 4/21/17 - CEA 0.9 (0-3). CMP with insignificant abnormalities - creatinine 1.1.   · 8/23/17 - WBC 6.79, hemoglobin 11.7, MCV 86.8, platelets 185,000, ANC 3.78.    · 9/27/17 - CT chest, abdomen and pelvis with mild coronary artery calcification, enlarging low-attenuation ill-defined mass in the left hepatic lobe. More ill-defined area of hypoattenuation within liver segments 2-3. Evidence of partial distal colon resection with patent anastomosis.   · 10/16/17 - CT-guided liver biopsy by Jay Baer M.D. revealed on pathology metastatic adenocarcinoma consistent with colon primary.   · 10/25/17 - Orders written for mFOLFOX6 plus Bevacizumab, Oxaliplatin 85 mg/M2 IV piggyback day 1, Leucovorin 400 mg/M2 IV piggyback day 1, 5- mg/M2 IV piggyback day 1 then 2400 mg/M2 CIV over 46 hours, Bevacizumab 5 mg/kg IV day 1 to repeat cycle every two weeks. CEA 1.2 (0-5).    · 11/2/17 - Infusaport placement under fluoroscopic guidance by Regina Pack M.D.   · 11/13/17 - MRI abdomen with 5.8 cm heterogenously enhancing lesion within hepatic segment 4 compatible with recently-biopsied lesion. Just adjacent to this is an 8 mm satellite lesion. No hepatic lesion identified within segments 2 or 3.   · 11/17/17 - PET scan with large hypermetabolic mass in liver segment 4 compatible with metastasis.   · 11/27/17 - Patient received cycle 1 chemotherapy.    · 12/11/17 - Patient received cycle 2 chemotherapy.   · 12/13/17 - Patient called complaining of feeling of her jaw being tight and tongue thick along with some leg cramps on the prior day, 12/12/17. The patient took Benadryl and reported the symptoms improved throughout the day.   · 12/20/17 - Patient reports fever following both chemotherapy infusions. She reports a temperature of 101 post the 12/11/17 infusion that resolved; however, the patient continued to have flu-like achiness for several days afterward. Blood cultures ordered to be obtained with next port access. Dexamethasone 4 mg p.o. b.i.d. x3 days on days 2-4 of chemotherapy ordered due to neuropathies with cramping of the jaw and some nausea. Z-Pack prescribed for URI and Benzonatate prescribed for cough.   · 12/27/17 - Patient seen in followup by Doug Eldridge M.D. with plans of repeating CT scan of the liver post third FOLFOX treatment.   · 1/2/18 - Creatinine 1.0 (0.4-1.0).  Patient received cycle 3 chemotherapy.   · 1/8/18 - Chemotherapy delayed and Nephrology consulted for acute rise in creatinine from 1.0 to 2.5.   · 1/10/18 - Creatinine 2.1 (0.4-1.0).    · 1/15/18 - Creatinine 1.6 (0.4-1.0).    · 1/16/18 - Received orders from Dr. Riojas’s office for patient to receive Mucomyst 1200 mg p.o. b.i.d. for four days starting the day before CT scans in the future as well as IV hydration with normal saline 150 cc per hour for two hours prior to procedure and for four hours postprocedure.   · 1/22/18 - Current chemotherapy discontinued. New chemotherapy orders written for Irinotecan 180 mg/M2 IV piggyback day 1, Leucovorin 400 mg/M2 IV piggyback day 1, 5- mg/M2 IV piggyback day 1 followed by 2400 mg/M2 CIV days 1-3 and Bevacizumab 5 mg/kg IV day 1 to repeat cycle every two weeks. Chemotherapy to start post CT abdomen to determine whether patient would go for surgery now or later. Creatinine 1.3 (0.4-1.0).    · 1/29/18 - CT chest with new 2 mm  nodule in the right middle lobe and 6 mm short axis anterior epicardial lymph node. CT abdomen and pelvis with two new metastases of the left lobe of the liver, largest measuring 4.4 cm.   · 2/5/18 - Chemotherapy placed on hold pending surgical resection of liver lesions.   · 2/8/18 - Patient underwent diagnostic laparoscopy, laparoscopic intraoperative ultrasound of the liver, exploratory laparotomy, left hepatectomy, intraoperative ultrasound and omental pedicle flap by Doug Eldridge II, M.D. at [facility] with pathology revealing the left hepatic lobectomy specimen to contain a single nodule of metastatic adenocarcinoma consistent with colonic primary 4.5 cm in size with surgical margins negative for carcinoma. Tumor consisted of 30% viable tumor, 60% necrosis and 10% peripheral fibrosis.   · 3/1/18 - Discussed options in detail with the patient. Mutually decided to treat with adjuvant Xeloda for six months at a dose of 1000 mg p.o. b.i.d. x14 days every 21 days based on her previous dose that she tolerated. CEA 0.8 (0-5). Liver enzymes normal.    · 3/1/18 - Patient started cycle 1 chemotherapy.   · 5/12/18 - CT chest with stable punctate indeterminate right upper lobe and right middle lobe nodules and abdomen and pelvis with interval left partial hepatectomy with no signs of active disease in the liver.   · 5/31/18 - Patient claims to be taking Xeloda two weeks on, three weeks off. Asked to change to two weeks on, one week off. Patient starting cycle 3 Capecitabine (Xeloda).    · 6/21/18 - Reporting grade I palmar erythema with Capecitabine. Advised to use Urea Lotion t.i.d., cold therapy and to call for worsening symptoms.   · 7/28/18 - CT abdomen and pelvis at [facility] revealed slight increasing geographic areas of low attenuation within the inferior aspect of the hepatic segment 5 without obvious well-defined lesions and areas of hepatic steatosis are more likely favored. No evidence of  metastatic disease elsewhere in the abdomen or pelvis.   · 9/18/18 - Patient currently on cycle 6 Capecitabine (Xeloda). Asked to discontinue after completion of this cycle.  CEA 1.0 (0.5-1.5).   · 10/8/18 - CT chest, abdomen and pelvis at [facility] with stable chronic changes noted in the chest, stable appearance of the liver without metastatic disease detected, development of small ventral hernia was noted without evidence of bowel obstruction, stable-appearing intraluminal fatty lesion of the duodenum was present.   · 1/21/19 - CEA 1.1 (0-3 non-smoker).    · 1/29/19 - BRAF negative performed on biopsy originally dated 10/16/17.   · 5/13/19 - CT chest, abdomen and pelvis with contrast at [facility] showed metastatic disease to the chest with a new conglomerate of likely necrotic subcarinal lymphadenopathy measuring 2.7 cm and new right hilar lymphadenopathy measuring 7 mm. There were multiple new right upper lobe lung nodules with solid appearance. One nodule measured 8 mm. A second nodule had a solid component measuring 5 mm. There was a subpleural nodule anteriorly in the right upper lobe that measured 7 mm and a new left lower lobe nodule measuring 5 mm. There was nodular contour of the liver with increased hyperdensity of the liver measuring 2.3 cm where it had previously measured 2 cm and an ill-defined region of hyperdensity anteriorly in the right lobe of the liver that was unchanged. There were periumbilical hernias containing mesenteric fat and loops of bowel without evidence of obstruction. There was a lipoma in the duodenum. There were no mesenteric or retroperitoneal lymphadenopathy present. There was evidence of a probable mesenteric implant at the right upper quadrant abdomen adjacent to the right hemidiaphragm measuring 2.3 x 2.1 cm. This had increased in size from the prior exam and was subtly seen on the prior exam measuring 8 mm. Patient seen by Dr. Doug Eldridge at [facility]  Surgery with note stating there was evidence of pulmonary progression and possible abdominal progression with recommendation to followup with Medical Oncology to resume systemic chemotherapy.   · 5/16/19 - PET CT at [facility] showed hypermetabolic subcarinal mass concerning for metastatic disease. There were scattered pulmonary densities extending down to the right hilar region which were hypermetabolic ranging in SUV of 5.1 to 5.3. This was concerning for metastatic disease, although inflammatory process was considered a second possibility. There were several tiny nodular densities seen in the lungs which did not demonstrate significant metabolic activity but could be inflammatory or metastatic. There we intercostal masses on the right concerning for metastatic disease. There was a presumed peritoneal implant on the right upper quadrant between the liver and the diaphragm that was hypermetabolic and concerning for metastatic disease. There were some small subcentimeter lymph nodes in the left neck that were non-specific and indeterminate and felt to be reactive versus metastatic. There was hypermetabolic activity seen in the soft tissues and bone marrow.   · 6/3/19 - Reviewed recent CT scans and PET scan showing likely progression with mesenteric implants and likely metastatic lymphadenopathy and lung nodules. Patient with recent bronchitis and infectious symptoms. Will plan to give a course of antibiotics. Discussed CT-guided biopsy of the anterior right upper quadrant mesenteric mass to confirm presence of malignancy. Discussed past treatments and side effects with plan to pursue further systemic therapy once biopsy-proven progression is confirmed. If biopsy is negative will plan to follow on repeat scan.  CEA 0.7 (0-5).  · 6/13/19 CT-guided core biopsy of abdominal mesenteric soft tissue mass by Jay Baer MD revealed metastatic adenocarcinoma consistent with colon primary.  · 6/21/19 discussed results of  biopsy with the patient and recommended systemic chemotherapy.  Discussed starting FOLFOX 6 again versus FOLFIRI.  Given the fact that she did have elevation of renal function as well as neuropathy with FOLFOX in the past mutually decided to treat her with FOLFIRI plus bevacizumab.  · 7/8/2019-FOLFIRI and Avastin cycle 1 initiated.  · 7/22/2019-FOLFIRI and Avastin cycle 2 initiated.  · 7/29/2019-Patient tolerating treatment with some expected mild cytopenias and nausea.  · 8/5/2019- cycle 3 chemotherapy delayed x1 week for neutropenia (ANC 0.69).  Neulasta added to treatment plan.  TSH 6.05 (0.34-5.6).  · 8/12/2019- cycle 3 FOLFIRI and Avastin with Neulasta support initiated.   · 8/26/2019-cycle 4 FOLFIRI and Avastin with Neulasta support initiated.   · 9/9/19 -cycle 5 FOLFIRI plus bevacizumab (Avastin) given.  CT chest, abdomen and pelvis: suspicious for metastatic disease in the chest. New enlarged subcarinal lymph node have developed. New small right upper lobe and left lower lobe nodules have developed. No convincing evidence of recurrent or metastatic disease in the abdomen or pelvis. There are 2 new ventral abdominal wall hernias containing nonobstructed bowel.  Additional CT findings include: Left hepatectomy without new focal liver lesions seen, distal colectomy with colocolic anastomosis, presumed gastroduodenal artery embolization changes, duodenal lipoma.  · 9/18/2019 discussed CT results with the patient.  While awaiting comparison CTs to more recent ones from [place], discussed change in Avastin to cetuximab while continuing FOLFIRI to which patient is in agreement.     2. Anemia and intermittent thrombocytopenia established February 2012.  · 2/13/12 - Retic count 0.8(N), Iron 14 (L), TIBC 245 (L), % sat 6 (L), Ferritin 376 (H), Hemoglobin 9.3 (L).  · 2/28/12 - Hemoglobin 8.8 (L), Retic count 1.1 (N), Iron 28(L), TIBC 311 (N), %sat 9 (L), Ferritin 320 (H), Vitamin b12 453, Folate > 1000(N), Haptoglobin  403 (H). Patient started on Procrit 40,000 units weekly.  · 5/29/12 - 5/31/12 - Patient admitted to [facility] due to acute febrile illness, sepsis syndrome, Urinary tract infection, CA colon with liver mets, leukocytosis, hypotension, Elevated liver function tests, and acute kidney Injury. TSH 8.66 (H), Ferritin 528(H), Folate 24.8 (H), Haptoglobin 312 (H), Iron 40 (N), Hep panel negative, Retic count 1.42 (N), TIBC 281 (N), 812 1500(H), Lipase 22(N),  · 9/4/12 - Hemoglobin 10.6.  · 10/02/12 - [id number], hematocrit 31.2  · 12/20/12 - Folate RBC Hematocrit 28.8 (N), Folate 782 (N). Retic 2.93 (H), Haptoglobin 54 (N), Iron 50 (N), TIBC 363 (N), Iron Sat% 14 (L), Vitamin 812 893 (N), Ferritin 296 (N)  · 7/9/13 - Hemoglobin 12.7, Hematocrit 35.8.  · 12/23/13 - Hemoglobin 13.3, hematocrit 37.1, MCV of 94.6.  Ferritin 280.4 (N).  · 7/15/14 - Hemoglobin 11.9, hematocrit 34.1, MCV 96.3.  · 11/16/15 - WBC 7.3, hemoglobin 12.1, platelet count 205,000. Anemia resolved.   · 8/15/16 - Ferritin 86 (N), folate 17.5 (N), haptoglobin 142 (N), retic 2.18 (H), Vitamin B12 747 (N), iron 49 (N),  (N), iron saturation 15% (N), SPEP with normal electrophoresis pattern.   · 12/20/17 - Hemoglobin 9.5. Anemia labs ordered. Procrit 40,000 units subq weekly ordered for chemotherapy-induced anemia. Iron 28 (), TIBC 348 (228-428), iron saturation 8 (15-50), ferritin 293 (), folate >24.8 (5.9-24.8), Vitamin B12 of 516 (211-911), haptoglobin 105 (), retic count 1.2 (0.5-1.5).           · 3/1/18 - Ferritin 135 (), creatinine 1.1 (0.4-1.0).     · 6/21/18 - Hemoglobin 11.3, MCV 91.6. Ferritin 40 (), iron 52 (), TIBC 374 (228-428), iron saturation 14 (15-50).         Past Medical History:   Diagnosis Date   • History of colon cancer, stage IV     Stage SHELL with progression   • Radiculopathy 07/2012    Right L5/S1   • Thyromegaly 10/2013       Past Surgical History:   Procedure Laterality Date   •  CYST REMOVAL      cyst removed from bacck in        Current Outpatient Medications:   •  American Ginseng powder, 200 mg 2 (Two) Times a Day., Disp: 1 bottle, Rfl: 2  •  Loperamide HCl (IMODIUM PO), Take  by mouth., Disp: , Rfl:   •  loratadine (CLARITIN) 10 MG tablet, Take 10 mg by mouth Daily., Disp: , Rfl:   •  ondansetron (ZOFRAN) 4 MG tablet, Take 2 tablets by mouth Every 8 (Eight) Hours As Needed for Nausea or Vomiting., Disp: 30 tablet, Rfl: 2  •  promethazine (PHENERGAN) 12.5 MG tablet, Take 2 tablets by mouth Every 6 (Six) Hours As Needed for Nausea or Vomiting (Take 0.5-1 tablet every 6 hours as needed)., Disp: 30 tablet, Rfl: 2  •  traMADol (ULTRAM) 50 MG tablet, Take 1 tablet by mouth Every 6 (Six) Hours As Needed for Moderate Pain ., Disp: 30 tablet, Rfl: 0    Allergies   Allergen Reactions   • Hydrocodone Nausea And Vomiting   • Iodinated Diagnostic Agents Hives and Itching     PT HAD SOME HIVES DURING SCAN.  PRIOR TO SCAN 8-3-2013.   • Latex Rash     Blisters    • Penicillins Hives       Family History   Problem Relation Age of Onset   • Cancer Sister         Bladder cancer       Cancer-related family history includes Cancer in her sister.    Social History     Tobacco Use   • Smoking status: Former Smoker     Years: 30.00     Last attempt to quit: 2012     Years since quittin.7   Substance Use Topics   • Alcohol use: Yes     Frequency: Never     Comment: socially   • Drug use: Not on file       I have reviewed the history of present illness, past medical history, family history, social history, lab results, all notes and other records since the patient was last seen on 19.    SUBJECTIVE: Patient is here for follow up of colon cancer. Reports that she has a lot of indigestion. Soy milk seems to help that. The medications she was given for that did not help so she stopped those.     VINI Fischer present during office visit.       REVIEW OF SYSTEMS:  Review of Systems  "  Constitutional: Negative for activity change, appetite change, chills, diaphoresis, fever and unexpected weight change.   HENT: Negative for congestion, mouth sores, nosebleeds, rhinorrhea, sinus pain, sore throat and trouble swallowing.    Eyes: Negative.  Negative for discharge and visual disturbance.   Respiratory: Negative for cough and chest tightness.    Cardiovascular: Negative for chest pain, palpitations and leg swelling.   Gastrointestinal: Negative for abdominal pain, blood in stool, nausea and vomiting.        Reflux   Endocrine: Negative for cold intolerance and heat intolerance.   Genitourinary: Negative for difficulty urinating, dysuria and hematuria.   Musculoskeletal: Negative for arthralgias and joint swelling.   Skin: Negative for rash and wound.   Neurological: Negative for dizziness, weakness, numbness and headaches.   Hematological: Does not bruise/bleed easily.   Psychiatric/Behavioral: Negative for confusion. The patient is not nervous/anxious.    All other systems reviewed and are negative.      OBJECTIVE:    Vitals:    09/18/19 1633   BP: 149/77   Pulse: 106   Resp: 16   Temp: 97.5 °F (36.4 °C)   Weight: 64 kg (141 lb)   Height: 157.5 cm (62\")   PainSc: 0-No pain       ECOG  (1) Restricted in physically strenuous activity, ambulatory and able to do work of light nature    Physical Exam   Constitutional: No distress.   Female and child accompanied patient today.    HENT:   Mouth/Throat: No oropharyngeal exudate.   Dental fillings.   Eyes: Right eye exhibits no discharge. Left eye exhibits no discharge. No scleral icterus.   Neck: No thyromegaly present.   Cardiovascular:   No murmur heard.  Pulmonary/Chest: No respiratory distress.   Right chest wall port.   Abdominal: She exhibits no distension and no mass. There is no tenderness. There is no rebound and no guarding.   Musculoskeletal: She exhibits no edema, tenderness or deformity.   Lymphadenopathy:     She has no cervical adenopathy. "     She has no axillary adenopathy.        Right: No supraclavicular adenopathy present.        Left: No supraclavicular adenopathy present.   Neurological: Coordination normal.   Skin: No bruising, no petechiae and no rash noted. She is not diaphoretic. No erythema. No pallor.     RECENT LABS  WBC   Date Value Ref Range Status   09/18/2019 12.52 (H) 3.40 - 10.80 10*3/mm3 Final   02/12/2018 5.46 4.5 - 11.0 10*3/uL Final     RBC   Date Value Ref Range Status   09/18/2019 3.79 3.77 - 5.28 10*6/mm3 Final   02/12/2018 2.82 (L) 4.0 - 5.2 10*6/uL Final     Hemoglobin   Date Value Ref Range Status   09/18/2019 11.8 (L) 12.0 - 15.9 g/dL Final   02/12/2018 8.1 (L) 12.0 - 16.0 g/dL Final     Hematocrit   Date Value Ref Range Status   09/18/2019 34.7 34.0 - 46.6 % Final   02/12/2018 25.3 (L) 36.0 - 46.0 % Final     MCV   Date Value Ref Range Status   09/18/2019 91.6 79.0 - 97.0 fL Final   02/12/2018 89.7 80.0 - 100.0 fL Final     MCH   Date Value Ref Range Status   09/18/2019 31.1 26.6 - 33.0 pg Final   02/12/2018 28.7 26.0 - 34.0 pg Final     MCHC   Date Value Ref Range Status   09/18/2019 34.0 31.5 - 35.7 g/dL Final   02/12/2018 32.0 31.0 - 37.0 g/dL Final     RDW   Date Value Ref Range Status   09/18/2019 17.1 (H) 12.3 - 15.4 % Final   02/12/2018 17.5 (H) 12.0 - 16.8 % Final     RDW-SD   Date Value Ref Range Status   09/18/2019 55.1 (H) 37.0 - 54.0 fl Final     MPV   Date Value Ref Range Status   09/18/2019 10.7 6.0 - 12.0 fL Final   02/12/2018 10.0 6.7 - 10.8 fL Final     Platelets   Date Value Ref Range Status   09/18/2019 118 (L) 140 - 450 10*3/mm3 Final   02/12/2018 157 140 - 440 10*3/uL Final     Neutrophil Rel %   Date Value Ref Range Status   02/12/2018 67.2 45 - 80 % Final   02/11/2018 73.3 45 - 80 % Final     Neutrophil %   Date Value Ref Range Status   09/18/2019 68.6 42.7 - 76.0 % Final     Lymphocyte Rel %   Date Value Ref Range Status   02/12/2018 18.5 15 - 50 % Final     Lymphocyte %   Date Value Ref Range  Status   09/18/2019 16.0 (L) 19.6 - 45.3 % Final     Monocyte Rel %   Date Value Ref Range Status   02/12/2018 11.2 0 - 15 % Final     Monocyte %   Date Value Ref Range Status   09/18/2019 13.6 (H) 5.0 - 12.0 % Final     Eosinophil %   Date Value Ref Range Status   09/18/2019 1.5 0.3 - 6.2 % Final   02/12/2018 2.7 0 - 7 % Final     Basophil Rel %   Date Value Ref Range Status   02/12/2018 0.2 0 - 2 % Final     Basophil %   Date Value Ref Range Status   09/18/2019 0.3 0.0 - 1.5 % Final     Immature Grans %   Date Value Ref Range Status   02/12/2018 0.2 (H) 0 % Final     Neutrophils Absolute   Date Value Ref Range Status   02/12/2018 3.67 2.0 - 8.8 10*3/uL Final     Neutrophils, Absolute   Date Value Ref Range Status   09/18/2019 8.59 (H) 1.70 - 7.00 10*3/mm3 Final     Lymphocytes Absolute   Date Value Ref Range Status   02/12/2018 1.01 0.7 - 5.5 10*3/uL Final     Lymphocytes, Absolute   Date Value Ref Range Status   09/18/2019 2.00 0.70 - 3.10 10*3/mm3 Final     Monocytes Absolute   Date Value Ref Range Status   02/12/2018 0.61 0.0 - 1.7 10*3/uL Final     Monocytes, Absolute   Date Value Ref Range Status   09/18/2019 1.70 (H) 0.10 - 0.90 10*3/mm3 Final     Eosinophils Absolute   Date Value Ref Range Status   02/12/2018 0.15 0.0 - 0.8 10*3/uL Final     Eosinophils, Absolute   Date Value Ref Range Status   09/18/2019 0.19 0.00 - 0.40 10*3/mm3 Final     Basophils Absolute   Date Value Ref Range Status   02/12/2018 0.01 0.0 - 0.2 10*3/uL Final     Basophils, Absolute   Date Value Ref Range Status   09/18/2019 0.04 0.00 - 0.20 10*3/mm3 Final     Immature Grans, Absolute   Date Value Ref Range Status   02/12/2018 0.01 <1 10*3/uL Final     nRBC   Date Value Ref Range Status   06/13/2019 0 0 /100[WBCs] Final   02/12/2018 0 0 /100(WBC) Final       Lab Results   Component Value Date    GLUCOSE 163 (H) 09/09/2019    BUN 11 09/09/2019    CREATININE 1.10 (H) 09/09/2019    EGFRIFNONA 51 (L) 09/09/2019    BCR 10.0 09/09/2019    K  3.6 09/09/2019    CO2 24.0 09/09/2019    CALCIUM 8.9 09/09/2019    ALBUMIN 3.50 09/09/2019    LABIL2 1.1 06/03/2019    AST 29 09/09/2019    ALT 32 09/09/2019         Malignant neoplasm of sigmoid colon (CMS/HCC)  - CBC & Differential  - CBC Auto Differential    Chemotherapy-induced thrombocytopenia    Anemia, unspecified type  - TSH    Encounter for chemotherapy management    Chemotherapy-induced nausea    Allergic reaction to contrast material, subsequent encounter    Fatigue due to exposure, subsequent encounter      ASSESSMENT:  Have discussed CT results with the patient.  While waiting for comparison, have suggested substituting bevacizumab with cetuximab while continuing the FOLFIRI regimen.  The patient is tolerating current chemotherapy well except for cytopenias which are manageable.  She is also having some indigestion which she is controlling with soy milk as she claims that PPIs have not helped much.  Will check paradigm testing on the patient.  CEA is not being checked routinely as she has never had a rise in it.  TSH will be followed.      PLAN:  Continue chemo with FOLFIRI but change bevacizumab to cetuximab..   Obtain CT comparison addendum.  Paradigm testing.  TSH next visit.       I have reviewed lab results, imaging, vitals, and medications with the patient today. Will follow up in 1 month.     Patient verbalized understanding and is in agreement of the above plan.    I have reviewed and agree with the above information.   Mark Perera M.D., F.A.C.P.     Much of the above report is an electronic transcription/translation of the spoken language to printed text using Dragon Software. As such, the subtleties and finesse of the spoken language may permit erroneous, or at times, nonsensical words or phrases to be inadvertently transcribed; thus changes may be made at a later date to rectify these errors.

## 2019-09-18 ENCOUNTER — APPOINTMENT (OUTPATIENT)
Dept: LAB | Facility: HOSPITAL | Age: 59
End: 2019-09-18

## 2019-09-18 ENCOUNTER — OFFICE VISIT (OUTPATIENT)
Dept: ONCOLOGY | Facility: CLINIC | Age: 59
End: 2019-09-18

## 2019-09-18 VITALS
BODY MASS INDEX: 25.95 KG/M2 | TEMPERATURE: 97.5 F | DIASTOLIC BLOOD PRESSURE: 77 MMHG | WEIGHT: 141 LBS | SYSTOLIC BLOOD PRESSURE: 149 MMHG | HEART RATE: 106 BPM | RESPIRATION RATE: 16 BRPM | HEIGHT: 62 IN

## 2019-09-18 DIAGNOSIS — T45.1X5A CHEMOTHERAPY-INDUCED NAUSEA: ICD-10-CM

## 2019-09-18 DIAGNOSIS — T73.2XXD FATIGUE DUE TO EXPOSURE, SUBSEQUENT ENCOUNTER: ICD-10-CM

## 2019-09-18 DIAGNOSIS — R11.0 CHEMOTHERAPY-INDUCED NAUSEA: ICD-10-CM

## 2019-09-18 DIAGNOSIS — T50.8X5D ALLERGIC REACTION TO CONTRAST MATERIAL, SUBSEQUENT ENCOUNTER: ICD-10-CM

## 2019-09-18 DIAGNOSIS — Z51.11 ENCOUNTER FOR CHEMOTHERAPY MANAGEMENT: ICD-10-CM

## 2019-09-18 DIAGNOSIS — D69.59 CHEMOTHERAPY-INDUCED THROMBOCYTOPENIA: ICD-10-CM

## 2019-09-18 DIAGNOSIS — D64.9 ANEMIA, UNSPECIFIED TYPE: ICD-10-CM

## 2019-09-18 DIAGNOSIS — C18.7 MALIGNANT NEOPLASM OF SIGMOID COLON (HCC): Primary | ICD-10-CM

## 2019-09-18 DIAGNOSIS — T45.1X5A CHEMOTHERAPY-INDUCED THROMBOCYTOPENIA: ICD-10-CM

## 2019-09-18 LAB
BASOPHILS # BLD AUTO: 0.04 10*3/MM3 (ref 0–0.2)
BASOPHILS NFR BLD AUTO: 0.3 % (ref 0–1.5)
DEPRECATED RDW RBC AUTO: 55.1 FL (ref 37–54)
EOSINOPHIL # BLD AUTO: 0.19 10*3/MM3 (ref 0–0.4)
EOSINOPHIL NFR BLD AUTO: 1.5 % (ref 0.3–6.2)
ERYTHROCYTE [DISTWIDTH] IN BLOOD BY AUTOMATED COUNT: 17.1 % (ref 12.3–15.4)
HCT VFR BLD AUTO: 34.7 % (ref 34–46.6)
HGB BLD-MCNC: 11.8 G/DL (ref 12–15.9)
LYMPHOCYTES # BLD AUTO: 2 10*3/MM3 (ref 0.7–3.1)
LYMPHOCYTES NFR BLD AUTO: 16 % (ref 19.6–45.3)
MCH RBC QN AUTO: 31.1 PG (ref 26.6–33)
MCHC RBC AUTO-ENTMCNC: 34 G/DL (ref 31.5–35.7)
MCV RBC AUTO: 91.6 FL (ref 79–97)
MONOCYTES # BLD AUTO: 1.7 10*3/MM3 (ref 0.1–0.9)
MONOCYTES NFR BLD AUTO: 13.6 % (ref 5–12)
NEUTROPHILS # BLD AUTO: 8.59 10*3/MM3 (ref 1.7–7)
NEUTROPHILS NFR BLD AUTO: 68.6 % (ref 42.7–76)
PLATELET # BLD AUTO: 118 10*3/MM3 (ref 140–450)
PMV BLD AUTO: 10.7 FL (ref 6–12)
RBC # BLD AUTO: 3.79 10*6/MM3 (ref 3.77–5.28)
WBC NRBC COR # BLD: 12.52 10*3/MM3 (ref 3.4–10.8)

## 2019-09-18 PROCEDURE — 85025 COMPLETE CBC W/AUTO DIFF WBC: CPT | Performed by: INTERNAL MEDICINE

## 2019-09-18 PROCEDURE — 99215 OFFICE O/P EST HI 40 MIN: CPT | Performed by: INTERNAL MEDICINE

## 2019-09-18 PROCEDURE — 36415 COLL VENOUS BLD VENIPUNCTURE: CPT | Performed by: INTERNAL MEDICINE

## 2019-09-19 NOTE — PROGRESS NOTES
Treatment plan entered per Dr. Perera for Cetuximab 500 mg/kg IV day 1, Irinotecan 180 mg/mw IV day 1, Leucovorin 400 mg/m2 IV day 1, 5FU 400 mg/m2 bolus day 1 and 2400 mg/m2 CIV x 46 hours with Neulasta support on day 3 to cycle every 14 days.

## 2019-09-20 ENCOUNTER — TELEPHONE (OUTPATIENT)
Dept: ONCOLOGY | Facility: CLINIC | Age: 59
End: 2019-09-20

## 2019-09-20 DIAGNOSIS — C18.7 MALIGNANT NEOPLASM OF SIGMOID COLON (HCC): ICD-10-CM

## 2019-09-20 RX ORDER — ATROPINE SULFATE 1 MG/ML
0.25 INJECTION, SOLUTION INTRAMUSCULAR; INTRAVENOUS; SUBCUTANEOUS
Status: CANCELLED | OUTPATIENT
Start: 2019-09-23

## 2019-09-20 RX ORDER — PALONOSETRON 0.05 MG/ML
0.25 INJECTION, SOLUTION INTRAVENOUS ONCE
Status: CANCELLED | OUTPATIENT
Start: 2019-09-23

## 2019-09-20 RX ORDER — FLUOROURACIL 50 MG/ML
400 INJECTION, SOLUTION INTRAVENOUS ONCE
Status: CANCELLED | OUTPATIENT
Start: 2019-09-23

## 2019-09-20 RX ORDER — SODIUM CHLORIDE 9 MG/ML
250 INJECTION, SOLUTION INTRAVENOUS ONCE
Status: CANCELLED | OUTPATIENT
Start: 2019-09-23

## 2019-09-20 NOTE — TELEPHONE ENCOUNTER
Called pt to let her know that her CT scan comparison came back and it is showing stable disease in some areas and improved in some and Dr. Perera wants her to continue on the treatment she was on. Had to [facility] for her to call me back. mh

## 2019-09-22 DIAGNOSIS — C18.7 MALIGNANT NEOPLASM OF SIGMOID COLON (HCC): ICD-10-CM

## 2019-09-22 RX ORDER — ATROPINE SULFATE 1 MG/ML
0.25 INJECTION, SOLUTION INTRAMUSCULAR; INTRAVENOUS; SUBCUTANEOUS
Status: CANCELLED | OUTPATIENT
Start: 2019-10-07

## 2019-09-22 RX ORDER — SODIUM CHLORIDE 9 MG/ML
250 INJECTION, SOLUTION INTRAVENOUS ONCE
Status: CANCELLED | OUTPATIENT
Start: 2019-10-07

## 2019-09-22 RX ORDER — PALONOSETRON 0.05 MG/ML
0.25 INJECTION, SOLUTION INTRAVENOUS ONCE
Status: CANCELLED | OUTPATIENT
Start: 2019-10-07

## 2019-09-22 RX ORDER — FLUOROURACIL 50 MG/ML
400 INJECTION, SOLUTION INTRAVENOUS ONCE
Status: CANCELLED | OUTPATIENT
Start: 2019-10-07

## 2019-09-23 ENCOUNTER — HOSPITAL ENCOUNTER (OUTPATIENT)
Dept: ONCOLOGY | Facility: HOSPITAL | Age: 59
Setting detail: INFUSION SERIES
Discharge: HOME | End: 2019-09-23

## 2019-09-23 ENCOUNTER — LAB REQUISITION (OUTPATIENT)
Dept: LAB | Facility: HOSPITAL | Age: 59
End: 2019-09-23

## 2019-09-23 VITALS
BODY MASS INDEX: 25.58 KG/M2 | HEART RATE: 94 BPM | RESPIRATION RATE: 18 BRPM | TEMPERATURE: 97.6 F | WEIGHT: 139 LBS | HEIGHT: 62 IN | DIASTOLIC BLOOD PRESSURE: 82 MMHG | SYSTOLIC BLOOD PRESSURE: 135 MMHG

## 2019-09-23 DIAGNOSIS — C18.7 MALIGNANT NEOPLASM OF SIGMOID COLON (HCC): Primary | ICD-10-CM

## 2019-09-23 DIAGNOSIS — C18.9 MALIGNANT NEOPLASM OF COLON (HCC): ICD-10-CM

## 2019-09-23 DIAGNOSIS — D64.9 ANEMIA, UNSPECIFIED TYPE: ICD-10-CM

## 2019-09-23 LAB
ALBUMIN SERPL-MCNC: 3.5 G/DL (ref 3.5–4.8)
ALBUMIN/GLOB SERPL: 1.3 G/DL (ref 1–1.7)
ALP SERPL-CCNC: 149 U/L (ref 32–91)
ALT SERPL W P-5'-P-CCNC: 34 U/L (ref 14–54)
ANION GAP SERPL CALCULATED.3IONS-SCNC: 12.9 MMOL/L (ref 5–15)
AST SERPL-CCNC: 30 U/L (ref 15–41)
BASOPHILS # BLD AUTO: 0.04 10*3/MM3 (ref 0–0.2)
BASOPHILS NFR BLD AUTO: 0.4 % (ref 0–1.5)
BILIRUB SERPL-MCNC: 0.6 MG/DL (ref 0.3–1.2)
BILIRUB UR QL STRIP: ABNORMAL
BUN BLD-MCNC: 13 MG/DL (ref 8–20)
BUN/CREAT SERPL: 11.8 (ref 5.4–26.2)
CALCIUM SPEC-SCNC: 8.7 MG/DL (ref 8.9–10.3)
CHLORIDE SERPL-SCNC: 107 MMOL/L (ref 101–111)
CLARITY UR: CLEAR
CO2 SERPL-SCNC: 25 MMOL/L (ref 22–32)
COLOR UR: YELLOW
CREAT BLD-MCNC: 1.1 MG/DL (ref 0.4–1)
DEPRECATED RDW RBC AUTO: 59 FL (ref 37–54)
EOSINOPHIL # BLD AUTO: 0.18 10*3/MM3 (ref 0–0.4)
EOSINOPHIL NFR BLD AUTO: 1.9 % (ref 0.3–6.2)
ERYTHROCYTE [DISTWIDTH] IN BLOOD BY AUTOMATED COUNT: 18.2 % (ref 12.3–15.4)
GFR SERPL CREATININE-BSD FRML MDRD: 51 ML/MIN/1.73
GLOBULIN UR ELPH-MCNC: 2.6 GM/DL (ref 2.5–3.8)
GLUCOSE BLD-MCNC: 118 MG/DL (ref 65–99)
GLUCOSE UR STRIP-MCNC: NEGATIVE MG/DL
HCT VFR BLD AUTO: 36.8 % (ref 34–46.6)
HGB BLD-MCNC: 12 G/DL (ref 12–15.9)
HGB UR QL STRIP.AUTO: NEGATIVE
KETONES UR QL STRIP: ABNORMAL
LEUKOCYTE ESTERASE UR QL STRIP.AUTO: NEGATIVE
LYMPHOCYTES # BLD AUTO: 1.42 10*3/MM3 (ref 0.7–3.1)
LYMPHOCYTES NFR BLD AUTO: 14.6 % (ref 19.6–45.3)
MCH RBC QN AUTO: 30.4 PG (ref 26.6–33)
MCHC RBC AUTO-ENTMCNC: 32.6 G/DL (ref 31.5–35.7)
MCV RBC AUTO: 93.2 FL (ref 79–97)
MONOCYTES # BLD AUTO: 1.06 10*3/MM3 (ref 0.1–0.9)
MONOCYTES NFR BLD AUTO: 10.9 % (ref 5–12)
NEUTROPHILS # BLD AUTO: 7 10*3/MM3 (ref 1.7–7)
NEUTROPHILS NFR BLD AUTO: 72.2 % (ref 42.7–76)
NITRITE UR QL STRIP: NEGATIVE
PH UR STRIP.AUTO: 6 [PH] (ref 5–8)
PLATELET # BLD AUTO: 158 10*3/MM3 (ref 140–450)
PMV BLD AUTO: 9.6 FL (ref 6–12)
POTASSIUM BLD-SCNC: 3.9 MMOL/L (ref 3.6–5.1)
PROT SERPL-MCNC: 6.1 G/DL (ref 6.1–7.9)
PROT UR QL STRIP: NEGATIVE
RBC # BLD AUTO: 3.95 10*6/MM3 (ref 3.77–5.28)
SODIUM BLD-SCNC: 141 MMOL/L (ref 136–144)
SP GR UR STRIP: 1.02 (ref 1–1.03)
TSH SERPL DL<=0.05 MIU/L-ACNC: 4.14 UIU/ML (ref 0.34–5.6)
UROBILINOGEN UR QL STRIP: ABNORMAL
WBC NRBC COR # BLD: 9.7 10*3/MM3 (ref 3.4–10.8)

## 2019-09-23 PROCEDURE — 96368 THER/DIAG CONCURRENT INF: CPT | Performed by: INTERNAL MEDICINE

## 2019-09-23 PROCEDURE — 96411 CHEMO IV PUSH ADDL DRUG: CPT | Performed by: INTERNAL MEDICINE

## 2019-09-23 PROCEDURE — 25010000002 DEXAMETHASONE SODIUM PHOSPHATE 120 MG/30ML SOLUTION: Performed by: INTERNAL MEDICINE

## 2019-09-23 PROCEDURE — 96416 CHEMO PROLONG INFUSE W/PUMP: CPT | Performed by: INTERNAL MEDICINE

## 2019-09-23 PROCEDURE — 25010000002 PALONOSETRON 0.25 MG/5ML SOLUTION PREFILLED SYRINGE: Performed by: INTERNAL MEDICINE

## 2019-09-23 PROCEDURE — 96417 CHEMO IV INFUS EACH ADDL SEQ: CPT | Performed by: INTERNAL MEDICINE

## 2019-09-23 PROCEDURE — 25010000002 FOSAPREPITANT PER 1 MG: Performed by: INTERNAL MEDICINE

## 2019-09-23 PROCEDURE — 85025 COMPLETE CBC W/AUTO DIFF WBC: CPT | Performed by: INTERNAL MEDICINE

## 2019-09-23 PROCEDURE — 84443 ASSAY THYROID STIM HORMONE: CPT | Performed by: INTERNAL MEDICINE

## 2019-09-23 PROCEDURE — 25010000002 BEVACIZUMAB PER 10 MG: Performed by: INTERNAL MEDICINE

## 2019-09-23 PROCEDURE — 25010000002 IRINOTECAN PER 20 MG: Performed by: INTERNAL MEDICINE

## 2019-09-23 PROCEDURE — 25010000002 FLUOROURACIL PER 500 MG: Performed by: INTERNAL MEDICINE

## 2019-09-23 PROCEDURE — 25010000002 LEUCOVORIN CALCIUM PER 50 MG: Performed by: INTERNAL MEDICINE

## 2019-09-23 PROCEDURE — 25010000002 ATROPINE PER 0.01 MG: Performed by: INTERNAL MEDICINE

## 2019-09-23 PROCEDURE — 96367 TX/PROPH/DG ADDL SEQ IV INF: CPT | Performed by: INTERNAL MEDICINE

## 2019-09-23 PROCEDURE — 96375 TX/PRO/DX INJ NEW DRUG ADDON: CPT | Performed by: INTERNAL MEDICINE

## 2019-09-23 PROCEDURE — 81003 URINALYSIS AUTO W/O SCOPE: CPT | Performed by: NURSE PRACTITIONER

## 2019-09-23 PROCEDURE — 80053 COMPREHEN METABOLIC PANEL: CPT | Performed by: INTERNAL MEDICINE

## 2019-09-23 PROCEDURE — 96415 CHEMO IV INFUSION ADDL HR: CPT | Performed by: INTERNAL MEDICINE

## 2019-09-23 PROCEDURE — 96413 CHEMO IV INFUSION 1 HR: CPT | Performed by: INTERNAL MEDICINE

## 2019-09-23 RX ORDER — SODIUM CHLORIDE 0.9 % (FLUSH) 0.9 %
10 SYRINGE (ML) INJECTION AS NEEDED
Status: DISCONTINUED | OUTPATIENT
Start: 2019-09-23 | End: 2019-09-24 | Stop reason: HOSPADM

## 2019-09-23 RX ORDER — SODIUM CHLORIDE 0.9 % (FLUSH) 0.9 %
10 SYRINGE (ML) INJECTION AS NEEDED
Status: CANCELLED | OUTPATIENT
Start: 2019-09-23

## 2019-09-23 RX ORDER — FLUOROURACIL 50 MG/ML
400 INJECTION, SOLUTION INTRAVENOUS ONCE
Status: COMPLETED | OUTPATIENT
Start: 2019-09-23 | End: 2019-09-23

## 2019-09-23 RX ORDER — PALONOSETRON HYDROCHLORIDE 0.05 MG/ML
0.25 INJECTION, SOLUTION INTRAVENOUS ONCE
Status: COMPLETED | OUTPATIENT
Start: 2019-09-23 | End: 2019-09-23

## 2019-09-23 RX ADMIN — DEXAMETHASONE SODIUM PHOSPHATE 12 MG: 4 INJECTION, SOLUTION INTRA-ARTICULAR; INTRALESIONAL; INTRAMUSCULAR; INTRAVENOUS; SOFT TISSUE at 09:12

## 2019-09-23 RX ADMIN — BEVACIZUMAB 320 MG: 400 INJECTION, SOLUTION INTRAVENOUS at 09:29

## 2019-09-23 RX ADMIN — SODIUM CHLORIDE 100 ML: 900 INJECTION, SOLUTION INTRAVENOUS at 08:38

## 2019-09-23 RX ADMIN — PALONOSETRON 0.25 MG: 0.25 INJECTION, SOLUTION INTRAVENOUS at 08:38

## 2019-09-23 RX ADMIN — LEUCOVORIN CALCIUM 660 MG: 350 INJECTION, POWDER, LYOPHILIZED, FOR SOLUTION INTRAMUSCULAR; INTRAVENOUS at 10:10

## 2019-09-23 RX ADMIN — FLUOROURACIL 3960 MG: 50 INJECTION, SOLUTION INTRAVENOUS at 11:54

## 2019-09-23 RX ADMIN — ATROPINE SULFATE 295 MG: 0.4 INJECTION, SOLUTION INTRAMUSCULAR; INTRAVENOUS; SUBCUTANEOUS at 10:10

## 2019-09-23 RX ADMIN — FLUOROURACIL 660 MG: 50 INJECTION, SOLUTION INTRAVENOUS at 11:54

## 2019-09-23 NOTE — PROGRESS NOTES
Patient returned call.  Explained that once the CT comparison was received it was noted that she is responding to the current treatment and Dr. Perera would like to continue the same treatment without change at this time.  Patient v/u.

## 2019-09-25 ENCOUNTER — HOSPITAL ENCOUNTER (OUTPATIENT)
Dept: ONCOLOGY | Facility: HOSPITAL | Age: 59
Setting detail: INFUSION SERIES
Discharge: HOME | End: 2019-09-25

## 2019-09-25 VITALS
TEMPERATURE: 97.6 F | HEIGHT: 62 IN | DIASTOLIC BLOOD PRESSURE: 79 MMHG | RESPIRATION RATE: 18 BRPM | BODY MASS INDEX: 25.42 KG/M2 | SYSTOLIC BLOOD PRESSURE: 134 MMHG | HEART RATE: 93 BPM

## 2019-09-25 DIAGNOSIS — C18.7 MALIGNANT NEOPLASM OF SIGMOID COLON (HCC): Primary | ICD-10-CM

## 2019-09-25 PROCEDURE — 25010000002 PEGFILGRASTIM 6 MG/0.6ML PREFILLED SYRINGE KIT: Performed by: NURSE PRACTITIONER

## 2019-09-25 PROCEDURE — 96377 APPLICATON ON-BODY INJECTOR: CPT | Performed by: INTERNAL MEDICINE

## 2019-09-25 RX ORDER — SODIUM CHLORIDE 0.9 % (FLUSH) 0.9 %
10 SYRINGE (ML) INJECTION AS NEEDED
Status: DISCONTINUED | OUTPATIENT
Start: 2019-09-25 | End: 2019-09-26 | Stop reason: HOSPADM

## 2019-09-25 RX ORDER — SODIUM CHLORIDE 0.9 % (FLUSH) 0.9 %
10 SYRINGE (ML) INJECTION AS NEEDED
Status: CANCELLED | OUTPATIENT
Start: 2019-09-25

## 2019-09-25 RX ADMIN — Medication 500 UNITS: at 15:54

## 2019-09-25 RX ADMIN — PEGFILGRASTIM 6 MG: KIT SUBCUTANEOUS at 15:53

## 2019-09-25 RX ADMIN — Medication 10 ML: at 15:54

## 2019-09-25 NOTE — PROGRESS NOTES
Patient is here for 5FU CIV D/C. She does not have any new complaints of side effects. She states she feels fatigue which is how she normally feels after chemo.

## 2019-09-30 ENCOUNTER — LAB (OUTPATIENT)
Dept: LAB | Facility: HOSPITAL | Age: 59
End: 2019-09-30

## 2019-09-30 DIAGNOSIS — C18.7 MALIGNANT NEOPLASM OF SIGMOID COLON (HCC): ICD-10-CM

## 2019-09-30 LAB
BASOPHILS # BLD AUTO: 0.03 10*3/MM3 (ref 0–0.2)
BASOPHILS NFR BLD AUTO: 0.3 % (ref 0–1.5)
DEPRECATED RDW RBC AUTO: 54.3 FL (ref 37–54)
EOSINOPHIL # BLD AUTO: 0.25 10*3/MM3 (ref 0–0.4)
EOSINOPHIL NFR BLD AUTO: 2.4 % (ref 0.3–6.2)
ERYTHROCYTE [DISTWIDTH] IN BLOOD BY AUTOMATED COUNT: 16.7 % (ref 12.3–15.4)
HCT VFR BLD AUTO: 32.2 % (ref 34–46.6)
HGB BLD-MCNC: 10.5 G/DL (ref 12–15.9)
LAB AP CASE REPORT: NORMAL
LYMPHOCYTES # BLD AUTO: 1.76 10*3/MM3 (ref 0.7–3.1)
LYMPHOCYTES NFR BLD AUTO: 16.8 % (ref 19.6–45.3)
MCH RBC QN AUTO: 30.1 PG (ref 26.6–33)
MCHC RBC AUTO-ENTMCNC: 32.6 G/DL (ref 31.5–35.7)
MCV RBC AUTO: 92.3 FL (ref 79–97)
MONOCYTES # BLD AUTO: 1.49 10*3/MM3 (ref 0.1–0.9)
MONOCYTES NFR BLD AUTO: 14.2 % (ref 5–12)
NEUTROPHILS # BLD AUTO: 6.96 10*3/MM3 (ref 1.7–7)
NEUTROPHILS NFR BLD AUTO: 66.3 % (ref 42.7–76)
PATH REPORT.FINAL DX SPEC: NORMAL
PLATELET # BLD AUTO: 93 10*3/MM3 (ref 140–450)
PMV BLD AUTO: 10.3 FL (ref 6–12)
RBC # BLD AUTO: 3.49 10*6/MM3 (ref 3.77–5.28)
WBC NRBC COR # BLD: 10.49 10*3/MM3 (ref 3.4–10.8)

## 2019-09-30 PROCEDURE — 85025 COMPLETE CBC W/AUTO DIFF WBC: CPT | Performed by: NURSE PRACTITIONER

## 2019-09-30 PROCEDURE — 36415 COLL VENOUS BLD VENIPUNCTURE: CPT | Performed by: NURSE PRACTITIONER

## 2019-10-07 ENCOUNTER — HOSPITAL ENCOUNTER (OUTPATIENT)
Dept: ONCOLOGY | Facility: HOSPITAL | Age: 59
Setting detail: INFUSION SERIES
Discharge: HOME | End: 2019-10-07

## 2019-10-07 VITALS
SYSTOLIC BLOOD PRESSURE: 146 MMHG | HEIGHT: 62 IN | RESPIRATION RATE: 18 BRPM | WEIGHT: 138 LBS | TEMPERATURE: 98.3 F | DIASTOLIC BLOOD PRESSURE: 75 MMHG | BODY MASS INDEX: 25.4 KG/M2 | HEART RATE: 101 BPM

## 2019-10-07 DIAGNOSIS — C18.7 MALIGNANT NEOPLASM OF SIGMOID COLON (HCC): Primary | ICD-10-CM

## 2019-10-07 LAB
ALBUMIN SERPL-MCNC: 3.4 G/DL (ref 3.5–4.8)
ALBUMIN/GLOB SERPL: 1.3 G/DL (ref 1–1.7)
ALP SERPL-CCNC: 140 U/L (ref 32–91)
ALT SERPL W P-5'-P-CCNC: 32 U/L (ref 14–54)
ANION GAP SERPL CALCULATED.3IONS-SCNC: 11.3 MMOL/L (ref 5–15)
AST SERPL-CCNC: 28 U/L (ref 15–41)
BASOPHILS # BLD AUTO: 0.05 10*3/MM3 (ref 0–0.2)
BASOPHILS NFR BLD AUTO: 0.4 % (ref 0–1.5)
BILIRUB SERPL-MCNC: 0.5 MG/DL (ref 0.3–1.2)
BILIRUB UR QL STRIP: ABNORMAL
BUN BLD-MCNC: 14 MG/DL (ref 8–20)
BUN/CREAT SERPL: 15.6 (ref 5.4–26.2)
CALCIUM SPEC-SCNC: 8.3 MG/DL (ref 8.9–10.3)
CHLORIDE SERPL-SCNC: 108 MMOL/L (ref 101–111)
CLARITY UR: CLEAR
CO2 SERPL-SCNC: 23 MMOL/L (ref 22–32)
COLOR UR: YELLOW
CREAT BLD-MCNC: 0.9 MG/DL (ref 0.4–1)
DEPRECATED RDW RBC AUTO: 57.1 FL (ref 37–54)
EOSINOPHIL # BLD AUTO: 0.22 10*3/MM3 (ref 0–0.4)
EOSINOPHIL NFR BLD AUTO: 1.6 % (ref 0.3–6.2)
ERYTHROCYTE [DISTWIDTH] IN BLOOD BY AUTOMATED COUNT: 17.2 % (ref 12.3–15.4)
GFR SERPL CREATININE-BSD FRML MDRD: 65 ML/MIN/1.73
GLOBULIN UR ELPH-MCNC: 2.6 GM/DL (ref 2.5–3.8)
GLUCOSE BLD-MCNC: 141 MG/DL (ref 65–99)
GLUCOSE UR STRIP-MCNC: NEGATIVE MG/DL
HCT VFR BLD AUTO: 33.7 % (ref 34–46.6)
HGB BLD-MCNC: 11 G/DL (ref 12–15.9)
HGB UR QL STRIP.AUTO: NEGATIVE
KETONES UR QL STRIP: ABNORMAL
LEUKOCYTE ESTERASE UR QL STRIP.AUTO: NEGATIVE
LYMPHOCYTES # BLD AUTO: 1.23 10*3/MM3 (ref 0.7–3.1)
LYMPHOCYTES NFR BLD AUTO: 8.9 % (ref 19.6–45.3)
MCH RBC QN AUTO: 30.7 PG (ref 26.6–33)
MCHC RBC AUTO-ENTMCNC: 32.6 G/DL (ref 31.5–35.7)
MCV RBC AUTO: 94.1 FL (ref 79–97)
MONOCYTES # BLD AUTO: 1.18 10*3/MM3 (ref 0.1–0.9)
MONOCYTES NFR BLD AUTO: 8.5 % (ref 5–12)
NEUTROPHILS # BLD AUTO: 11.17 10*3/MM3 (ref 1.7–7)
NEUTROPHILS NFR BLD AUTO: 80.6 % (ref 42.7–76)
NITRITE UR QL STRIP: NEGATIVE
PH UR STRIP.AUTO: 6 [PH] (ref 5–8)
PLATELET # BLD AUTO: 145 10*3/MM3 (ref 140–450)
PMV BLD AUTO: 9.6 FL (ref 6–12)
POTASSIUM BLD-SCNC: 3.3 MMOL/L (ref 3.6–5.1)
PROT SERPL-MCNC: 6 G/DL (ref 6.1–7.9)
PROT UR QL STRIP: ABNORMAL
RBC # BLD AUTO: 3.58 10*6/MM3 (ref 3.77–5.28)
SODIUM BLD-SCNC: 139 MMOL/L (ref 136–144)
SP GR UR STRIP: 1.02 (ref 1–1.03)
UROBILINOGEN UR QL STRIP: ABNORMAL
WBC NRBC COR # BLD: 13.85 10*3/MM3 (ref 3.4–10.8)

## 2019-10-07 PROCEDURE — 96409 CHEMO IV PUSH SNGL DRUG: CPT | Performed by: INTERNAL MEDICINE

## 2019-10-07 PROCEDURE — 25010000002 LEUCOVORIN CALCIUM PER 50 MG: Performed by: INTERNAL MEDICINE

## 2019-10-07 PROCEDURE — 96416 CHEMO PROLONG INFUSE W/PUMP: CPT | Performed by: INTERNAL MEDICINE

## 2019-10-07 PROCEDURE — 25010000002 FLUOROURACIL PER 500 MG: Performed by: INTERNAL MEDICINE

## 2019-10-07 PROCEDURE — 96417 CHEMO IV INFUS EACH ADDL SEQ: CPT | Performed by: INTERNAL MEDICINE

## 2019-10-07 PROCEDURE — 25010000002 IRINOTECAN PER 20 MG: Performed by: INTERNAL MEDICINE

## 2019-10-07 PROCEDURE — 96415 CHEMO IV INFUSION ADDL HR: CPT | Performed by: INTERNAL MEDICINE

## 2019-10-07 PROCEDURE — 96375 TX/PRO/DX INJ NEW DRUG ADDON: CPT | Performed by: INTERNAL MEDICINE

## 2019-10-07 PROCEDURE — 25010000002 BEVACIZUMAB PER 10 MG: Performed by: NURSE PRACTITIONER

## 2019-10-07 PROCEDURE — 80053 COMPREHEN METABOLIC PANEL: CPT | Performed by: NURSE PRACTITIONER

## 2019-10-07 PROCEDURE — 25010000002 ATROPINE PER 0.01 MG: Performed by: INTERNAL MEDICINE

## 2019-10-07 PROCEDURE — 96413 CHEMO IV INFUSION 1 HR: CPT | Performed by: INTERNAL MEDICINE

## 2019-10-07 PROCEDURE — 96366 THER/PROPH/DIAG IV INF ADDON: CPT | Performed by: INTERNAL MEDICINE

## 2019-10-07 PROCEDURE — 25010000002 FOSAPREPITANT PER 1 MG: Performed by: NURSE PRACTITIONER

## 2019-10-07 PROCEDURE — 85025 COMPLETE CBC W/AUTO DIFF WBC: CPT | Performed by: NURSE PRACTITIONER

## 2019-10-07 PROCEDURE — 25010000002 DEXAMETHASONE SODIUM PHOSPHATE 120 MG/30ML SOLUTION: Performed by: NURSE PRACTITIONER

## 2019-10-07 PROCEDURE — 81003 URINALYSIS AUTO W/O SCOPE: CPT | Performed by: INTERNAL MEDICINE

## 2019-10-07 PROCEDURE — 96367 TX/PROPH/DG ADDL SEQ IV INF: CPT | Performed by: INTERNAL MEDICINE

## 2019-10-07 PROCEDURE — 25010000002 PALONOSETRON 0.25 MG/5ML SOLUTION PREFILLED SYRINGE: Performed by: NURSE PRACTITIONER

## 2019-10-07 RX ORDER — ATROPINE SULFATE 1 MG/ML
0.25 INJECTION, SOLUTION INTRAMUSCULAR; INTRAVENOUS; SUBCUTANEOUS
Status: DISCONTINUED | OUTPATIENT
Start: 2019-10-07 | End: 2019-10-07

## 2019-10-07 RX ORDER — HYDROCHLOROTHIAZIDE 12.5 MG/1
12.5 CAPSULE, GELATIN COATED ORAL EVERY MORNING
Qty: 30 CAPSULE | Refills: 3 | Status: SHIPPED | OUTPATIENT
Start: 2019-10-07 | End: 2020-11-28

## 2019-10-07 RX ORDER — PALONOSETRON HYDROCHLORIDE 0.05 MG/ML
0.25 INJECTION, SOLUTION INTRAVENOUS ONCE
Status: COMPLETED | OUTPATIENT
Start: 2019-10-07 | End: 2019-10-07

## 2019-10-07 RX ORDER — SODIUM CHLORIDE 0.9 % (FLUSH) 0.9 %
10 SYRINGE (ML) INJECTION AS NEEDED
Status: CANCELLED | OUTPATIENT
Start: 2019-10-07

## 2019-10-07 RX ORDER — FLUOROURACIL 50 MG/ML
400 INJECTION, SOLUTION INTRAVENOUS ONCE
Status: COMPLETED | OUTPATIENT
Start: 2019-10-07 | End: 2019-10-07

## 2019-10-07 RX ORDER — SODIUM CHLORIDE 9 MG/ML
250 INJECTION, SOLUTION INTRAVENOUS ONCE
Status: DISCONTINUED | OUTPATIENT
Start: 2019-10-07 | End: 2019-10-08 | Stop reason: HOSPADM

## 2019-10-07 RX ORDER — SODIUM CHLORIDE 0.9 % (FLUSH) 0.9 %
10 SYRINGE (ML) INJECTION AS NEEDED
Status: DISCONTINUED | OUTPATIENT
Start: 2019-10-07 | End: 2019-10-08 | Stop reason: HOSPADM

## 2019-10-07 RX ORDER — POTASSIUM CHLORIDE 750 MG/1
10 TABLET, FILM COATED, EXTENDED RELEASE ORAL DAILY
Qty: 30 TABLET | Refills: 1 | Status: SHIPPED | OUTPATIENT
Start: 2019-10-07 | End: 2019-10-21

## 2019-10-07 RX ADMIN — FLUOROURACIL 660 MG: 50 INJECTION, SOLUTION INTRAVENOUS at 14:40

## 2019-10-07 RX ADMIN — BEVACIZUMAB 320 MG: 400 INJECTION, SOLUTION INTRAVENOUS at 10:08

## 2019-10-07 RX ADMIN — DEXAMETHASONE SODIUM PHOSPHATE 12 MG: 4 INJECTION, SOLUTION INTRA-ARTICULAR; INTRALESIONAL; INTRAMUSCULAR; INTRAVENOUS; SOFT TISSUE at 09:40

## 2019-10-07 RX ADMIN — LEUCOVORIN CALCIUM 660 MG: 350 INJECTION, POWDER, LYOPHILIZED, FOR SOLUTION INTRAMUSCULAR; INTRAVENOUS at 10:52

## 2019-10-07 RX ADMIN — SODIUM CHLORIDE 100 ML: 900 INJECTION, SOLUTION INTRAVENOUS at 09:06

## 2019-10-07 RX ADMIN — PALONOSETRON 0.25 MG: 0.25 INJECTION, SOLUTION INTRAVENOUS at 09:05

## 2019-10-07 RX ADMIN — ATROPINE SULFATE 295 MG: 0.4 INJECTION, SOLUTION INTRAMUSCULAR; INTRAVENOUS; SUBCUTANEOUS at 13:00

## 2019-10-07 RX ADMIN — FLUOROURACIL 3960 MG: 50 INJECTION, SOLUTION INTRAVENOUS at 14:42

## 2019-10-07 NOTE — PROGRESS NOTES
Pt here today for chemo with elevated SPB at 146. Consulted with DUC Perez NP and orders to treat today with Avastin, plus add HCTZ 12.5 mg daily. Pt instructed to take new medication daily and check her B/P daily at the [employer] she works at, she [employer]. HALEY

## 2019-10-07 NOTE — PROGRESS NOTES
Patient with hypokalemia (3.3).  Please notify her that a prescription of potassium chloride 10 mEq by mouth daily was sent to her pharmacy.  (Message sent to clinical pool–Dr. Perera)

## 2019-10-08 ENCOUNTER — TELEPHONE (OUTPATIENT)
Dept: ONCOLOGY | Facility: CLINIC | Age: 59
End: 2019-10-08

## 2019-10-08 NOTE — TELEPHONE ENCOUNTER
----- Message from FATUMA Funes sent at 10/7/2019  1:39 PM EDT -----  Patient with hypokalemia (3.3).  Please notify her that a prescription of potassium chloride 10 mEq by mouth daily was sent to her pharmacy.  (Message sent to clinical pool–Dr. Perera)

## 2019-10-08 NOTE — TELEPHONE ENCOUNTER
Spoke with the patient regarding her low potassium level. She is in agreement to  the RX for KCL 10meq to take once a day.

## 2019-10-09 ENCOUNTER — HOSPITAL ENCOUNTER (OUTPATIENT)
Dept: ONCOLOGY | Facility: HOSPITAL | Age: 59
Setting detail: INFUSION SERIES
Discharge: HOME | End: 2019-10-09

## 2019-10-09 VITALS
DIASTOLIC BLOOD PRESSURE: 85 MMHG | HEIGHT: 62 IN | BODY MASS INDEX: 25.21 KG/M2 | SYSTOLIC BLOOD PRESSURE: 146 MMHG | HEART RATE: 85 BPM | TEMPERATURE: 98.6 F | WEIGHT: 137 LBS | RESPIRATION RATE: 18 BRPM

## 2019-10-09 DIAGNOSIS — C18.7 MALIGNANT NEOPLASM OF SIGMOID COLON (HCC): Primary | ICD-10-CM

## 2019-10-09 PROCEDURE — 25010000002 PEGFILGRASTIM 6 MG/0.6ML PREFILLED SYRINGE KIT: Performed by: NURSE PRACTITIONER

## 2019-10-09 PROCEDURE — 96523 IRRIG DRUG DELIVERY DEVICE: CPT | Performed by: INTERNAL MEDICINE

## 2019-10-09 RX ORDER — SODIUM CHLORIDE 0.9 % (FLUSH) 0.9 %
10 SYRINGE (ML) INJECTION AS NEEDED
Status: CANCELLED | OUTPATIENT
Start: 2019-10-09

## 2019-10-09 RX ORDER — SODIUM CHLORIDE 0.9 % (FLUSH) 0.9 %
10 SYRINGE (ML) INJECTION AS NEEDED
Status: DISCONTINUED | OUTPATIENT
Start: 2019-10-09 | End: 2019-10-10 | Stop reason: HOSPADM

## 2019-10-09 RX ADMIN — HEPARIN 500 UNITS: 100 SYRINGE at 15:24

## 2019-10-09 RX ADMIN — Medication 10 ML: at 15:23

## 2019-10-09 RX ADMIN — PEGFILGRASTIM 6 MG: KIT SUBCUTANEOUS at 15:22

## 2019-10-09 NOTE — PROGRESS NOTES
Patient to office today for civ dc. Patient has not complaints. Pump infused completely and blood return noted on de access. Neulasta applied to patient left arm

## 2019-10-16 ENCOUNTER — OFFICE VISIT (OUTPATIENT)
Dept: ONCOLOGY | Facility: CLINIC | Age: 59
End: 2019-10-16

## 2019-10-16 ENCOUNTER — APPOINTMENT (OUTPATIENT)
Dept: LAB | Facility: HOSPITAL | Age: 59
End: 2019-10-16

## 2019-10-16 VITALS
HEIGHT: 62 IN | WEIGHT: 139.2 LBS | DIASTOLIC BLOOD PRESSURE: 80 MMHG | HEART RATE: 96 BPM | BODY MASS INDEX: 25.62 KG/M2 | RESPIRATION RATE: 16 BRPM | SYSTOLIC BLOOD PRESSURE: 157 MMHG | TEMPERATURE: 97.6 F

## 2019-10-16 DIAGNOSIS — T45.1X5A CHEMOTHERAPY-INDUCED THROMBOCYTOPENIA: ICD-10-CM

## 2019-10-16 DIAGNOSIS — Z51.11 ENCOUNTER FOR CHEMOTHERAPY MANAGEMENT: ICD-10-CM

## 2019-10-16 DIAGNOSIS — D64.9 ANEMIA, UNSPECIFIED TYPE: ICD-10-CM

## 2019-10-16 DIAGNOSIS — C18.7 MALIGNANT NEOPLASM OF SIGMOID COLON (HCC): Primary | ICD-10-CM

## 2019-10-16 DIAGNOSIS — T50.8X5D ALLERGIC REACTION TO CONTRAST MATERIAL, SUBSEQUENT ENCOUNTER: ICD-10-CM

## 2019-10-16 DIAGNOSIS — D69.59 CHEMOTHERAPY-INDUCED THROMBOCYTOPENIA: ICD-10-CM

## 2019-10-16 LAB
BASOPHILS # BLD AUTO: 0.02 10*3/MM3 (ref 0–0.2)
BASOPHILS NFR BLD AUTO: 0.2 % (ref 0–1.5)
DEPRECATED RDW RBC AUTO: 53.1 FL (ref 37–54)
EOSINOPHIL # BLD AUTO: 0.26 10*3/MM3 (ref 0–0.4)
EOSINOPHIL NFR BLD AUTO: 2.6 % (ref 0.3–6.2)
ERYTHROCYTE [DISTWIDTH] IN BLOOD BY AUTOMATED COUNT: 16 % (ref 12.3–15.4)
HCT VFR BLD AUTO: 31.6 % (ref 34–46.6)
HGB BLD-MCNC: 10.5 G/DL (ref 12–15.9)
LYMPHOCYTES # BLD AUTO: 1.33 10*3/MM3 (ref 0.7–3.1)
LYMPHOCYTES NFR BLD AUTO: 13.1 % (ref 19.6–45.3)
MCH RBC QN AUTO: 31.4 PG (ref 26.6–33)
MCHC RBC AUTO-ENTMCNC: 33.2 G/DL (ref 31.5–35.7)
MCV RBC AUTO: 94.6 FL (ref 79–97)
MONOCYTES # BLD AUTO: 1.54 10*3/MM3 (ref 0.1–0.9)
MONOCYTES NFR BLD AUTO: 15.2 % (ref 5–12)
NEUTROPHILS # BLD AUTO: 6.98 10*3/MM3 (ref 1.7–7)
NEUTROPHILS NFR BLD AUTO: 68.9 % (ref 42.7–76)
PLATELET # BLD AUTO: 115 10*3/MM3 (ref 140–450)
PMV BLD AUTO: 10.9 FL (ref 6–12)
RBC # BLD AUTO: 3.34 10*6/MM3 (ref 3.77–5.28)
WBC NRBC COR # BLD: 10.13 10*3/MM3 (ref 3.4–10.8)

## 2019-10-16 PROCEDURE — 85025 COMPLETE CBC W/AUTO DIFF WBC: CPT | Performed by: INTERNAL MEDICINE

## 2019-10-16 PROCEDURE — 99215 OFFICE O/P EST HI 40 MIN: CPT | Performed by: INTERNAL MEDICINE

## 2019-10-16 PROCEDURE — 36415 COLL VENOUS BLD VENIPUNCTURE: CPT | Performed by: INTERNAL MEDICINE

## 2019-10-16 NOTE — PROGRESS NOTES
Hematology/Oncology Outpatient Follow Up    PATIENT NAME:Samantha Massey  :1960  MRN: [id number]  PRIMARY CARE PHYSICIAN: Diana Spencer MD  REFERRING PHYSICIAN: Diana Spencer MD    Chief Complaint   Patient presents with   • Follow-up     for colon cancer with mets      HISTORY OF PRESENT ILLNESS:   1. Colon cancer with liver metastasis diagnosis established 2012.  · Ms. Massey claimed to have developed intermittent diarrhea and constipation in 2011. She eventually saw Dr. Diana Spencer in 2012. A chemistry profile revealed normal liver enzymes.  CEA on 12 was 5.9 ng/mL (< 5, smoker); hemoglobin was 12.1 gm/dL, and MCV 86.2 fl.  A CT scan of the abdomen and pelvis was performed on 12 revealing innumerable mass lesions throughout the liver, thickening of the distal sigmoid colon wall, nonspecific non-pathologically enlarged lymph nodes in the sigmoid mesentery and left iliac chain. She was referred to Dr. Regina Pack and had a repeat CEA done on 12 revealing a value of 7.5 ng/mL. A colonoscopy was performed on 12 by Dr. Pack revealing 10 cm distal colon mass, which on biopsy revealed the presence of invasive moderately differentiated adenocarcinoma which demonstrated micro satellite stability. The patient was hospitalized on 12 and underwent laparoscopic low anterior resection with mobilization of splenic flexure, which on pathology revealed invasive, moderate to focally poorly differentiated adenocarcinoma of the rectosigmoid colon with invasion through muscularis propria and extension into mesocolon. The surgical margins were free of tumor. Mesenteric lymph nodes, 14, were negative for metastatic tumor. Lymphovascular invasion was not identified. An [id number] was placed on 12 and a CT-guided liver biopsy was performed the same day by ·Dr. Hernandez Espino. Pathology revealed metastatic adenocarcinoma consistent with colonic primary. The  patient was then referred here for further evaluation.  · 2/13/12 - Order to start chemotherapy to consist of Leucovorin 500mg IV day 1, Oxaliplatin 120 mg IV day1, Avastin 230 mg IV day 1, 5FU 575mg IV day 1 followed by 345 mg CIV, cycle q 2 weeks.  · 2/13/12 - CEA 5.6 (H).  · 2/13/12 - Chest x-ray revealed no acute cardiopulmonary disease.  · 2/17/12 - CT abdomen and pelvis revealed no evidence of metastatic disease of the chest, innumerable hepatic metastases.  Bowel staple line at the rectosigmoid junction with adjacent stranding of fat consistent with postoperative change.  · 2/18/12 - KRAS testing performed on rectum biopsy: no mutation detected.  · 2/21/12 - Patient started Chemotherapy cycle #1 consisting of Avastin, Leucovorin, 5FU, and Oxaliplatin.  · 2/23/12 - CT chest revealed no pulmonary embolus, no active lung disease. Hepatic nodularity.  · 2/24/12 - Per note from [facility] patient enrolled in clinical trail per Dr. Eldridge randomized to the control arm of the study. Which consist of standard FOLFOX chemotherapy that will be given at our office.  · 2124/12 - PET scan revealed interval resection of known primary colonic carcinoma at the rectosigmoid junction without evidence of residual tumor in this location. The patient is believed to be status post resection of the previously described left internal iliac lymph node without evidence of hypermetabolic metastatic lymphadenopathy. Innumerable hypermetabolic hepatic metastases.  · 3/6/12 - Patient started Chemotherapy cycle #2 consisting of Avastin, Leucovorin, 5FU, and Oxaliplatin  · 3/20/12 - Patient   started   Chemotherapy    cycle   #3     consisting    of   Avastin, leucovorin, 5FU, and Oxaliplatin  · 4/3/12 - Patient started Chemotherapy cycle #4 consisting of Avastin, Leucovorin, 5FU, and Oxaliplatin  · 4/3/12 - Orders written to decrease chemotherapy dose by 20% at her next cycle and to give Neulasta injection day after each cycle.  Hemoglobin  11.9.  · 4/3112 - CEA 1.0 (N).  · 4/6/12 - CT C/A/P - Chest - unremarkable, Abdomen/Pelvis - Overall moderate to marked decrease in the size of the multiple hepatic metastatic lesions. This is when compared to a study of 1/2/12. Post-op changes in the region of the rectosigmoid junction Small amount of free fluid in the cul-de-sac with a small amount of fluid apparently within the endometrial cavity. These findings are not unusual premenopausal.                                                                                           · 4/17/12 - Patient started on cycle 5 of Oxaliplatin, Leucovorin, 5-FU, Avastin.  · 5/1/12 - Patient started on cycle 6 of Oxaliplatin, Leucovorin, 5-FU, Avastin.  · 5/14/12 - CEA 1.0 (N).  · 5/17/12 - Patient started on cycle 7 of Oxaliplatin, Leucovorin, 5-FU, Avastin.  · 5/29/12 - 5/31/12 - Patient admitted to [facility] due to acute febrile illness, sepsis syndrome, urinary tract infection, CA colon with liver mets, leukocytosis, hypotension, Elevated liver function tests, and acute kidney injury. TSH 8.66 (H), Ferritin 528(H), Folate 24.8 (H), Haptoglobin 312 (H), Iron 40 (N), Hep panel negative, Relic count1.42 (N), TIBC 281 (N), 812 1500(H), Lipase 22(N). Chest x-ray revealed no acute cardiopulmonary abnormality.  · 5/29/12 - Patient started on cycle 8 of Oxaliplatin, Leucovorin, 5-FU, Avastin.  · 6/13/12 - Patient started on cycle 9 of Oxaliplalin, Leucovorin, 5-FU, Avastin.  · 6/25/12 - CT scan of the abdomen and pelvis with hepatic lesions appearing smaller as compared to the prior study, which could relate to underlying metastatic disease. Sclerotic lesion of the right iliac bone that was present previously and could relate to bone mets. Postsurgical change.  · 6/26/12 - Patient started on cycle 10 of Oxaliplatin, Leucovorin, 5-FU, Avastin.  · 7/10/12 - Patient started on cycle 11 of Leucovorin, 5-FU, Oxaliplatin, Avaslin.  · 7/17/12 - Bone scan no evidence of metastatic  disease including sclerotic lesion right Iliac area.  · 7/24/12 - CEA 0.7.  · 7/24/12 - Patient started on cycle 12 of Leucovorin, 5-FU, Oxaliplatin, Avastin.  · 8/7/12 - Patient started on cycle 13 of Leucovorin, 5-FU, Oxaliplatin, Avastin.  · 8/21/12 - Patient started on cycle 14 of Leucovorin, 5-FU, Oxaliplatin, Avastin.  · 8/28/12 - CT scan of the abdomen and pelvis revealed stable size and appearance of multiple hypodense hepatic lesions, likely representing metastatic disease. Otherwise stable abdomen and pelvis.  · 8/23/12 - CEA 0.8 (N).  · 9/4/12 - Orders written to continue chemotherapy until seen by Dr. Eldridge.  · 9/11/12 - Patient started on cycle 15 of Leucovorin, 5-FU, Oxaliplatin, Avastin.  · 9/11/12 - Orders written to give chemotherapy today with a dose reduction of 20% due to low ANC.  · 9/18/12 - Orders written to discontinue chemotherapy and start maintenance Xeloda 1500 mg po bid x 14 days cycle q 3 weeks, per Dr. Eldridge.  · 11/7/12 - Patient evaluation by Dr. Montoya which recommended hepatic directed radiation therapy. She underwent TheraSpheres targeting the right lobe. Will treat the left hepatic lobe in 3 to 4 weeks if she is able to tolerate this procedure.  · 12/20/12 - CEA 1.3 (N).  · 12/31/12 - CT scan of the abdomen and pelvis revealed that there has been a significant partial response in both lobes of the liver.  Activity of remaining lesions is uncertain and could be assessed by follow-up studies for PET/CT correlation.  Heterogeneous enhancement of the right lobe of the liver is consistent with previous embolization therapy.  No evidence of extrahepatic metastatic disease.  Focal non-enhancement and bulging of the lateral wall of the gallbladder body.       · 1/22/13 - CEA 0.8.  · 1/22/13 - AST 52, ALT 47, ALK PHOS 157.  · 3/25/13 - Xeloda dose decreased to 1,250 mg p.o. b.i.d. x 14 days to cycle every three weeks due to hand foot syndrome.  · 4/27/13 - CT scan of abdomen and pelvis  revealed overall continued improvement in hepatic metastatic disease.  The single largest lesion in the right lobe is stable in appearance when compared to prior study although other lesions within the liver are more hypodense and some are smaller suggesting continued response to treatment.  Many of these lesions may no longer harbor viable malignancy.  A follow-up PET/CT scan could be performed to further evaluate the full extent of the viable metastasis versus continued attenuation on CT follow-up.  Contracted gallbladder with either focal areas of bulging of the wall or necrosis.  The appearance of the current exam is more suggesting a bulging rather than necrosis.  · 5/6/13 - Order written to decrease Xeloda to 1000 mg by mouth twice a day ×14 days every three weeks due to hand-foot syndrome and chemotherapy induced cytopenias and diarrhea.  · 5/28/13 - PET/CT scan negative for hypermetabolic foci in the neck, chest, abdomen, or pelvis to suggest recurrent, progressive, or metastatic tumor.  · 7/9/13 - CEA 2.4 (N).  · 8/5/13 - CT of the chest showed no evidence of metastatic disease.  CT scan of abdomen and pelvis show continued improvement in metastatic disease involving both lobes of the liver.  No evidence of extrahepatic metastatic disease in the abdomen or pelvis.  The gallbladder is contracted.  The previously suspected defect of the gallbladder wall against the liver surface is smaller.  There is no evidence of gallbladder leak into the peritoneal cavity.  Contained perforation is not excluded but is increasingly less likely.  · 12/7/13 - CT scan of the abdomen and pelvis revealed stable appearance of hepatic metastatic disease, most of which is likely treated.  A PET/CT scan could be performed to evaluate for any sites of active metastatic disease.  No evidence of recurrent disease at the anastomosis.  The gallbladder is contracted and not optimally evaluated on this exam, but is stable in appearance  compared to prior exam.  · 12/23/13 - CEA 1.4 normal.  · 4/28/14 - CEA 1.6 (N).  · 6/26/14 - Patient underwent a colonoscopy by Dr. Pack, which revealed no evidence of recurrence.  · 7/15/14 - Patient advised to continue chemotherapy with Xeloda.  · 7/30/14 - Echocardiogram revealed left ventricular size and contractility is within normal limits and ejection fraction is 55-60%.  · 8/18/14 - CT scan of the abdomen and pelvis revealed stable previously treated metastases in both lobes of the liver.  The activity of individual lesions is not determined and may be assessed by follow-up CT scans are PET/CT correlation.  No evidence of extrahepatic metastatic disease.  No signs of local tumor recurrence at the bowel resection site.  · 8/24/14 - Xeloda discontinued due to remission of disease.  · 9/16/14 - CEA 1.0 (N).  · 11/26/14 - CEA 1  · 12/23/14 - CT abdomen and pelvis with contrast: There are several tiny low attenuation lesions within the liver, but overall are believed to be unchanged compared to the prior exam in April 2013.  · 12/23/14 - CXR: No acute process seen.  · 12/23/14 - CT abdomen and pelvis with several tiny low attenuation lesions in the liver, unchanged in comparison to prior CT’s.  Chest x-ray with no acute process seen.    · 5/26/15 - WBC 6, hemoglobin 12.1, platelet count 173,000, MCV 85.8.  · 6/25/15 - CT scan of the abdomen and pelvis, right lobe of the liver is decreased in size and the margin is nodular. Three metal densities in the area of the hepatic artery. Duodenal lipoma. Gallbladder contracted.   · 8/24/15 - CEA 0.8 (0-5). Comprehensive metabolic panel normal.    · 2/15/16 - CEA 1.0 (0-5). Comprehensive metabolic panel with potassium 3.3 (3.6-5.1). Patient asked to go on potassium supplements but wanted to try high potassium diet.     · 8/15/16 - Patient advised to have the Infusaport removed if CT is stable. CEA 0.9 (N). CMP normal, creatinine 1.0. 9/2/16 - CT scan of the chest,  abdomen and pelvis without contrast revealed stable chest CT with no evidence of mediastinal, hilar or pulmonary metastases or mass. Interval improvement of the appearance of the liver with no hepatic metastases seen and no evidence of adrenal enlargement or upper abdominal lymphadenopathy. Post treatment change was noted to the right lobe of the liver and hepatic artery, stable duodenal lipoma, moderate stool suggesting constipation.   · 10/6/16 - Infusaport removal by Dr. Pack.   · 12/22/16 - WBC 5.17, hemoglobin 13.2, MCV 86.5, platelets 234,000, ANC 3.3.   · 4/21/17 - CEA 0.9 (0-3). CMP with insignificant abnormalities - creatinine 1.1.   · 8/23/17 - WBC 6.79, hemoglobin 11.7, MCV 86.8, platelets 185,000, ANC 3.78.    · 9/27/17 - CT chest, abdomen and pelvis with mild coronary artery calcification, enlarging low-attenuation ill-defined mass in the left hepatic lobe. More ill-defined area of hypoattenuation within liver segments 2-3. Evidence of partial distal colon resection with patent anastomosis.   · 10/16/17 - CT-guided liver biopsy by Jay Baer M.D. revealed on pathology metastatic adenocarcinoma consistent with colon primary.   · 10/25/17 - Orders written for mFOLFOX6 plus Bevacizumab, Oxaliplatin 85 mg/M2 IV piggyback day 1, Leucovorin 400 mg/M2 IV piggyback day 1, 5- mg/M2 IV piggyback day 1 then 2400 mg/M2 CIV over 46 hours, Bevacizumab 5 mg/kg IV day 1 to repeat cycle every two weeks. CEA 1.2 (0-5).    · 11/2/17 - Infusaport placement under fluoroscopic guidance by Regina Pack M.D.   · 11/13/17 - MRI abdomen with 5.8 cm heterogenously enhancing lesion within hepatic segment 4 compatible with recently-biopsied lesion. Just adjacent to this is an 8 mm satellite lesion. No hepatic lesion identified within segments 2 or 3.   · 11/17/17 - PET scan with large hypermetabolic mass in liver segment 4 compatible with metastasis.   · 11/27/17 - Patient received cycle 1 chemotherapy.    · 12/11/17 - Patient received cycle 2 chemotherapy.   · 12/13/17 - Patient called complaining of feeling of her jaw being tight and tongue thick along with some leg cramps on the prior day, 12/12/17. The patient took Benadryl and reported the symptoms improved throughout the day.   · 12/20/17 - Patient reports fever following both chemotherapy infusions. She reports a temperature of 101 post the 12/11/17 infusion that resolved; however, the patient continued to have flu-like achiness for several days afterward. Blood cultures ordered to be obtained with next port access. Dexamethasone 4 mg p.o. b.i.d. x3 days on days 2-4 of chemotherapy ordered due to neuropathies with cramping of the jaw and some nausea. Z-Pack prescribed for URI and Benzonatate prescribed for cough.   · 12/27/17 - Patient seen in followup by Doug Eldridge M.D. with plans of repeating CT scan of the liver post third FOLFOX treatment.   · 1/2/18 - Creatinine 1.0 (0.4-1.0).  Patient received cycle 3 chemotherapy.   · 1/8/18 - Chemotherapy delayed and Nephrology consulted for acute rise in creatinine from 1.0 to 2.5.   · 1/10/18 - Creatinine 2.1 (0.4-1.0).    · 1/15/18 - Creatinine 1.6 (0.4-1.0).    · 1/16/18 - Received orders from Dr. Riojas’s office for patient to receive Mucomyst 1200 mg p.o. b.i.d. for four days starting the day before CT scans in the future as well as IV hydration with normal saline 150 cc per hour for two hours prior to procedure and for four hours postprocedure.   · 1/22/18 - Current chemotherapy discontinued. New chemotherapy orders written for Irinotecan 180 mg/M2 IV piggyback day 1, Leucovorin 400 mg/M2 IV piggyback day 1, 5- mg/M2 IV piggyback day 1 followed by 2400 mg/M2 CIV days 1-3 and Bevacizumab 5 mg/kg IV day 1 to repeat cycle every two weeks. Chemotherapy to start post CT abdomen to determine whether patient would go for surgery now or later. Creatinine 1.3 (0.4-1.0).    · 1/29/18 - CT chest with new 2 mm  nodule in the right middle lobe and 6 mm short axis anterior epicardial lymph node. CT abdomen and pelvis with two new metastases of the left lobe of the liver, largest measuring 4.4 cm.   · 2/5/18 - Chemotherapy placed on hold pending surgical resection of liver lesions.   · 2/8/18 - Patient underwent diagnostic laparoscopy, laparoscopic intraoperative ultrasound of the liver, exploratory laparotomy, left hepatectomy, intraoperative ultrasound and omental pedicle flap by Doug Eldridge II, M.D. at [facility] with pathology revealing the left hepatic lobectomy specimen to contain a single nodule of metastatic adenocarcinoma consistent with colonic primary 4.5 cm in size with surgical margins negative for carcinoma. Tumor consisted of 30% viable tumor, 60% necrosis and 10% peripheral fibrosis.   · 3/1/18 - Discussed options in detail with the patient. Mutually decided to treat with adjuvant Xeloda for six months at a dose of 1000 mg p.o. b.i.d. x14 days every 21 days based on her previous dose that she tolerated. CEA 0.8 (0-5). Liver enzymes normal.   · 3/1/18 - Patient started cycle 1 chemotherapy.   · 5/12/18 - CT chest with stable punctate indeterminate right upper lobe and right middle lobe nodules and abdomen and pelvis with interval left partial hepatectomy with no signs of active disease in the liver.   · 5/31/18 - Patient claims to be taking Xeloda two weeks on, three weeks off. Asked to change to two weeks on, one week off. Patient starting cycle 3 Capecitabine (Xeloda).    · 6/21/18 - Reporting grade I palmar erythema with Capecitabine. Advised to use Urea Lotion t.i.d., cold therapy and to call for worsening symptoms.   · 7/28/18 - CT abdomen and pelvis at [facility] revealed slight increasing geographic areas of low attenuation within the inferior aspect of the hepatic segment 5 without obvious well-defined lesions and areas of hepatic steatosis are more likely favored. No evidence of  metastatic disease elsewhere in the abdomen or pelvis.   · 9/18/18 - Patient currently on cycle 6 Capecitabine (Xeloda). Asked to discontinue after completion of this cycle.  CEA 1.0 (0.5-1.5).   · 10/8/18 - CT chest, abdomen and pelvis at [facility] with stable chronic changes noted in the chest, stable appearance of the liver without metastatic disease detected, development of small ventral hernia was noted without evidence of bowel obstruction, stable-appearing intraluminal fatty lesion of the duodenum was present.   · 1/21/19 - CEA 1.1 (0-3 non-smoker).    · 1/29/19 - BRAF negative performed on biopsy originally dated 10/16/17.   · 5/13/19 - CT chest, abdomen and pelvis with contrast at [facility] showed metastatic disease to the chest with a new conglomerate of likely necrotic subcarinal lymphadenopathy measuring 2.7 cm and new right hilar lymphadenopathy measuring 7 mm. There were multiple new right upper lobe lung nodules with solid appearance. One nodule measured 8 mm. A second nodule had a solid component measuring 5 mm. There was a subpleural nodule anteriorly in the right upper lobe that measured 7 mm and a new left lower lobe nodule measuring 5 mm. There was nodular contour of the liver with increased hyperdensity of the liver measuring 2.3 cm where it had previously measured 2 cm and an ill-defined region of hyperdensity anteriorly in the right lobe of the liver that was unchanged. There were periumbilical hernias containing mesenteric fat and loops of bowel without evidence of obstruction. There was a lipoma in the duodenum. There were no mesenteric or retroperitoneal lymphadenopathy present. There was evidence of a probable mesenteric implant at the right upper quadrant abdomen adjacent to the right hemidiaphragm measuring 2.3 x 2.1 cm. This had increased in size from the prior exam and was subtly seen on the prior exam measuring 8 mm. Patient seen by Dr. Doug Eldridge at [facility]  Surgery with note stating there was evidence of pulmonary progression and possible abdominal progression with recommendation to followup with Medical Oncology to resume systemic chemotherapy.   · 5/16/19 - PET CT at [facility] showed hypermetabolic subcarinal mass concerning for metastatic disease. There were scattered pulmonary densities extending down to the right hilar region which were hypermetabolic ranging in SUV of 5.1 to 5.3. This was concerning for metastatic disease, although inflammatory process was considered a second possibility. There were several tiny nodular densities seen in the lungs which did not demonstrate significant metabolic activity but could be inflammatory or metastatic. There we intercostal masses on the right concerning for metastatic disease. There was a presumed peritoneal implant on the right upper quadrant between the liver and the diaphragm that was hypermetabolic and concerning for metastatic disease. There were some small subcentimeter lymph nodes in the left neck that were non-specific and indeterminate and felt to be reactive versus metastatic. There was hypermetabolic activity seen in the soft tissues and bone marrow.   · 6/3/19 - Reviewed recent CT scans and PET scan showing likely progression with mesenteric implants and likely metastatic lymphadenopathy and lung nodules. Patient with recent bronchitis and infectious symptoms. Will plan to give a course of antibiotics. Discussed CT-guided biopsy of the anterior right upper quadrant mesenteric mass to confirm presence of malignancy. Discussed past treatments and side effects with plan to pursue further systemic therapy once biopsy-proven progression is confirmed. If biopsy is negative will plan to follow on repeat scan.  CEA 0.7 (0-5).  · 6/13/19 CT-guided core biopsy of abdominal mesenteric soft tissue mass by Jay Baer MD revealed metastatic adenocarcinoma consistent with colon primary.  · 6/21/19 discussed results of  biopsy with the patient and recommended systemic chemotherapy.  Discussed starting FOLFOX 6 again versus FOLFIRI.  Given the fact that she did have elevation of renal function as well as neuropathy with FOLFOX in the past mutually decided to treat her with FOLFIRI plus bevacizumab.  · 7/8/2019-FOLFIRI and Avastin cycle 1 initiated.  · 7/22/2019-FOLFIRI and Avastin cycle 2 initiated.  · 7/29/2019-Patient tolerating treatment with some expected mild cytopenias and nausea.  · 8/5/2019- cycle 3 chemotherapy delayed x1 week for neutropenia (ANC 0.69).  Neulasta added to treatment plan.  TSH 6.05 (0.34-5.6).  · 8/12/2019- cycle 3 FOLFIRI and Avastin with Neulasta support initiated.   · 8/26/2019-cycle 4 FOLFIRI and Avastin with Neulasta support initiated.   · 9/9/19 -cycle 5 FOLFIRI plus bevacizumab (Avastin) given.  CT chest, abdomen and pelvis: suspicious for metastatic disease in the chest. New enlarged subcarinal lymph node have developed. New small right upper lobe and left lower lobe nodules have developed. No convincing evidence of recurrent or metastatic disease in the abdomen or pelvis. There are 2 new ventral abdominal wall hernias containing nonobstructed bowel.  Additional CT findings include: Left hepatectomy without new focal liver lesions seen, distal colectomy with colocolic anastomosis, presumed gastroduodenal artery embolization changes, duodenal lipoma.   · 9/18/2019 discussed CT results with the patient.  While awaiting comparison CTs to more recent ones from [place], discussed change in Avastin to cetuximab while continuing FOLFIRI to which patient is in agreement.   · 9/20/19 - ADDENDUM on CT scan of chest, abdomen and pelvis: comparison with outside CT chest, abdomen and pelvis from [facility] dated 5/13/19. There appears to be slight improvement since 5/13/19. Subcarinal adenopathy has slightly diminished, and a right upper quadrant mesenteric implant is no longer visualized. Pulmonary nodules  are unchanged in size and number. Previously described low density areas in the liver are not visible on today's examination, which may be simply due to the lack of IV contrast.  Given the CT findings change in chemotherapy canceled and patient to continue on FOLFIRI plus bevacizumab.  · 9/23/19 - TSH 4.140 (0.340-5.600).  Cycle 6 chemotherapy initiated.   · 9/27/2019 [facility] testing revealed 6 actionable genomic findings including APC R876*, APC [id number], AURKA gain, CCND3 gain, SMAD4 loss, TP53 R273H.  BRAF, K-shannon, NRAS and PIK3CA were wild-type.  Additional findings included HER2, PDL1, TRKpan negative and PTEN, MGMT and TOPO1 positive.  8 therapies with potential increased benefit included capecitabine, cetuximab, irinotecan, oxaliplatin, Panitumumab, regorafenib, everolimus and topotecan.  · 10/7/2019 cycle 7 chemotherapy initiated.    2. Anemia and intermittent thrombocytopenia established February 2012.  · 2/13/12 - Retic count 0.8(N), Iron 14 (L), TIBC 245 (L), % sat 6 (L), Ferritin 376 (H), Hemoglobin 9.3 (L).  · 2/28/12 - Hemoglobin 8.8 (L), Retic count 1.1 (N), Iron 28(L), TIBC 311 (N), %sat 9 (L), Ferritin 320 (H), Vitamin b12 453, Folate > 1000(N), Haptoglobin 403 (H). Patient started on Procrit 40,000 units weekly.  · 5/29/12 - 5/31/12 - Patient admitted to [facility] due to acute febrile illness, sepsis syndrome, Urinary tract infection, CA colon with liver mets, leukocytosis, hypotension, Elevated liver function tests, and acute kidney Injury. TSH 8.66 (H), Ferritin 528(H), Folate 24.8 (H), Haptoglobin 312 (H), Iron 40 (N), Hep panel negative, Retic count 1.42 (N), TIBC 281 (N), 812 1500(H), Lipase 22(N),  · 9/4/12 - Hemoglobin 10.6.  · 10/02/12 - Acjcexqdzb44.8, hematocrit 31.2  · 12/20/12 - Folate RBC Hematocrit 28.8 (N), Folate 782 (N). Retic 2.93 (H), Haptoglobin 54 (N), Iron 50 (N), TIBC 363 (N), Iron Sat% 14 (L), Vitamin 812 893 (N), Ferritin 296 (N)  · 7/9/13 - Hemoglobin 12.7,  Hematocrit 35.8.  · 12/23/13 - Hemoglobin 13.3, hematocrit 37.1, MCV of 94.6.  Ferritin 280.4 (N).  · 7/15/14 - Hemoglobin 11.9, hematocrit 34.1, MCV 96.3.  · 11/16/15 - WBC 7.3, hemoglobin 12.1, platelet count 205,000. Anemia resolved.   · 8/15/16 - Ferritin 86 (N), folate 17.5 (N), haptoglobin 142 (N), retic 2.18 (H), Vitamin B12 747 (N), iron 49 (N),  (N), iron saturation 15% (N), SPEP with normal electrophoresis pattern.   · 12/20/17 - Hemoglobin 9.5. Anemia labs ordered. Procrit 40,000 units subq weekly ordered for chemotherapy-induced anemia. Iron 28 (), TIBC 348 (228-428), iron saturation 8 (15-50), ferritin 293 (), folate >24.8 (5.9-24.8), Vitamin B12 of 516 (211-911), haptoglobin 105 (), retic count 1.2 (0.5-1.5).  · 3/1/18 - Ferritin 135 (), creatinine 1.1 (0.4-1.0).     · 6/21/18 - Hemoglobin 11.3, MCV 91.6. Ferritin 40 (), iron 52 (), TIBC 374 (228-428), iron saturation 14 (15-50).         Past Medical History:   Diagnosis Date   • History of colon cancer, stage IV     Stage SHELL with progression   • Radiculopathy 07/2012    Right L5/S1   • Thyromegaly 10/2013       Past Surgical History:   Procedure Laterality Date   • CYST REMOVAL  1998    cyst removed from bacck in 1998       Current Outpatient Medications:   •  American Ginseng powder, 200 mg 2 (Two) Times a Day., Disp: 1 bottle, Rfl: 2  •  hydroCHLOROthiazide (MICROZIDE) 12.5 MG capsule, Take 1 capsule by mouth Every Morning., Disp: 30 capsule, Rfl: 3  •  Loperamide HCl (IMODIUM PO), Take  by mouth., Disp: , Rfl:   •  loratadine (CLARITIN) 10 MG tablet, Take 10 mg by mouth Daily., Disp: , Rfl:   •  ondansetron (ZOFRAN) 4 MG tablet, Take 2 tablets by mouth Every 8 (Eight) Hours As Needed for Nausea or Vomiting., Disp: 30 tablet, Rfl: 2  •  potassium chloride (K-DUR) 10 MEQ CR tablet, Take 1 tablet by mouth Daily., Disp: 30 tablet, Rfl: 1  •  promethazine (PHENERGAN) 12.5 MG tablet, Take 2 tablets by mouth  Every 6 (Six) Hours As Needed for Nausea or Vomiting (Take 0.5-1 tablet every 6 hours as needed)., Disp: 30 tablet, Rfl: 2  •  traMADol (ULTRAM) 50 MG tablet, Take 1 tablet by mouth Every 6 (Six) Hours As Needed for Moderate Pain ., Disp: 30 tablet, Rfl: 0    Allergies   Allergen Reactions   • Hydrocodone Nausea And Vomiting   • Iodinated Diagnostic Agents Hives and Itching     PT HAD SOME HIVES DURING SCAN.  PRIOR TO SCAN 8-3-2013.   • Latex Rash     Blisters    • Penicillins Hives       Family History   Problem Relation Age of Onset   • Cancer Sister         Bladder cancer       Cancer-related family history includes Cancer in her sister.    Social History     Tobacco Use   • Smoking status: Former Smoker     Years: 30.00     Last attempt to quit: 2012     Years since quittin.7   Substance Use Topics   • Alcohol use: Yes     Frequency: Never     Comment: socially   • Drug use: Not on file       I have reviewed the history of present illness, past medical history, family history, social history, lab results, all notes and other records since the patient was last seen on 19.    SUBJECTIVE: Patient is here for follow up of colon cancer with mets. Reports to fatigue. Is taking American Ginseng and thinks it is helping. 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       Adult female and young female accompanied patient today.   VINI Huizar present during office visit.        REVIEW OF SYSTEMS:  Review of Systems   Constitutional: Positive for fatigue. Negative for activity change, appetite change, chills, diaphoresis, fever and unexpected weight change.   HENT: Negative for congestion, ear pain, mouth sores, nosebleeds, rhinorrhea, sinus pain, sore throat and trouble swallowing.    Eyes: Negative for photophobia, discharge and visual disturbance.   Respiratory: Negative for cough, chest tightness, wheezing and stridor.    Cardiovascular: Negative  "for chest pain, palpitations and leg swelling.   Gastrointestinal: Negative for abdominal pain, blood in stool, diarrhea, nausea and vomiting.   Endocrine: Negative for cold intolerance and heat intolerance.   Genitourinary: Negative for difficulty urinating, dysuria and hematuria.   Musculoskeletal: Negative for arthralgias, joint swelling and neck stiffness.   Skin: Negative for color change, rash and wound.   Neurological: Negative for dizziness, seizures, syncope, weakness, numbness and headaches.   Hematological: Negative for adenopathy. Does not bruise/bleed easily.        No obvious bleeding   Psychiatric/Behavioral: Negative for agitation, confusion and hallucinations. The patient is not nervous/anxious.        OBJECTIVE:    Vitals:    10/16/19 1624   BP: 157/80   Pulse: 96   Resp: 16   Temp: 97.6 °F (36.4 °C)   Weight: 63.1 kg (139 lb 3.2 oz)   Height: 157.5 cm (62\")   PainSc: 0-No pain       ECOG  (1) Restricted in physically strenuous activity, ambulatory and able to do work of light nature    Physical Exam   Constitutional: She is oriented to person, place, and time. No distress.   HENT:   Head: Normocephalic and atraumatic.   Mouth/Throat: No oropharyngeal exudate.   Dental fillings.   Eyes: Conjunctivae and EOM are normal. Right eye exhibits no discharge. Left eye exhibits no discharge. No scleral icterus.   Neck: Normal range of motion. Neck supple. No thyromegaly present.   Cardiovascular: Normal rate, regular rhythm and normal heart sounds. Exam reveals no gallop and no friction rub.   No murmur heard.  Pulmonary/Chest: Effort normal. No stridor. No respiratory distress. She has no wheezes.   Right chest wall port.   Abdominal: Soft. Bowel sounds are normal. She exhibits no distension and no mass. There is no tenderness. There is no rebound and no guarding.   Musculoskeletal: Normal range of motion. She exhibits no edema, tenderness or deformity.   Lymphadenopathy:     She has no cervical " adenopathy.     She has no axillary adenopathy.        Right: No supraclavicular adenopathy present.        Left: No supraclavicular adenopathy present.   Neurological: She is alert and oriented to person, place, and time. She exhibits normal muscle tone. Coordination normal.   Skin: Skin is warm. No bruising, no petechiae and no rash noted. She is not diaphoretic. No erythema. No pallor.   Psychiatric: She has a normal mood and affect. Her behavior is normal.   Nursing note and vitals reviewed.    RECENT LABS  WBC   Date Value Ref Range Status   10/16/2019 10.13 3.40 - 10.80 10*3/mm3 Final   02/12/2018 5.46 4.5 - 11.0 10*3/uL Final     RBC   Date Value Ref Range Status   10/16/2019 3.34 (L) 3.77 - 5.28 10*6/mm3 Final   02/12/2018 2.82 (L) 4.0 - 5.2 10*6/uL Final     Hemoglobin   Date Value Ref Range Status   10/16/2019 10.5 (L) 12.0 - 15.9 g/dL Final   02/12/2018 8.1 (L) 12.0 - 16.0 g/dL Final     Hematocrit   Date Value Ref Range Status   10/16/2019 31.6 (L) 34.0 - 46.6 % Final   02/12/2018 25.3 (L) 36.0 - 46.0 % Final     MCV   Date Value Ref Range Status   10/16/2019 94.6 79.0 - 97.0 fL Final   02/12/2018 89.7 80.0 - 100.0 fL Final     MCH   Date Value Ref Range Status   10/16/2019 31.4 26.6 - 33.0 pg Final   02/12/2018 28.7 26.0 - 34.0 pg Final     MCHC   Date Value Ref Range Status   10/16/2019 33.2 31.5 - 35.7 g/dL Final   02/12/2018 32.0 31.0 - 37.0 g/dL Final     RDW   Date Value Ref Range Status   10/16/2019 16.0 (H) 12.3 - 15.4 % Final   02/12/2018 17.5 (H) 12.0 - 16.8 % Final     RDW-SD   Date Value Ref Range Status   10/16/2019 53.1 37.0 - 54.0 fl Final     MPV   Date Value Ref Range Status   10/16/2019 10.9 6.0 - 12.0 fL Final   02/12/2018 10.0 6.7 - 10.8 fL Final     Platelets   Date Value Ref Range Status   10/16/2019 115 (L) 140 - 450 10*3/mm3 Final   02/12/2018 157 140 - 440 10*3/uL Final     Neutrophil Rel %   Date Value Ref Range Status   02/12/2018 67.2 45 - 80 % Final   02/11/2018 73.3 45 - 80  % Final     Neutrophil %   Date Value Ref Range Status   10/16/2019 68.9 42.7 - 76.0 % Final     Lymphocyte Rel %   Date Value Ref Range Status   02/12/2018 18.5 15 - 50 % Final     Lymphocyte %   Date Value Ref Range Status   10/16/2019 13.1 (L) 19.6 - 45.3 % Final     Monocyte Rel %   Date Value Ref Range Status   02/12/2018 11.2 0 - 15 % Final     Monocyte %   Date Value Ref Range Status   10/16/2019 15.2 (H) 5.0 - 12.0 % Final     Eosinophil %   Date Value Ref Range Status   10/16/2019 2.6 0.3 - 6.2 % Final   02/12/2018 2.7 0 - 7 % Final     Basophil Rel %   Date Value Ref Range Status   02/12/2018 0.2 0 - 2 % Final     Basophil %   Date Value Ref Range Status   10/16/2019 0.2 0.0 - 1.5 % Final     Immature Grans %   Date Value Ref Range Status   02/12/2018 0.2 (H) 0 % Final     Neutrophils Absolute   Date Value Ref Range Status   02/12/2018 3.67 2.0 - 8.8 10*3/uL Final     Neutrophils, Absolute   Date Value Ref Range Status   10/16/2019 6.98 1.70 - 7.00 10*3/mm3 Final     Lymphocytes Absolute   Date Value Ref Range Status   02/12/2018 1.01 0.7 - 5.5 10*3/uL Final     Lymphocytes, Absolute   Date Value Ref Range Status   10/16/2019 1.33 0.70 - 3.10 10*3/mm3 Final     Monocytes Absolute   Date Value Ref Range Status   02/12/2018 0.61 0.0 - 1.7 10*3/uL Final     Monocytes, Absolute   Date Value Ref Range Status   10/16/2019 1.54 (H) 0.10 - 0.90 10*3/mm3 Final     Eosinophils Absolute   Date Value Ref Range Status   02/12/2018 0.15 0.0 - 0.8 10*3/uL Final     Eosinophils, Absolute   Date Value Ref Range Status   10/16/2019 0.26 0.00 - 0.40 10*3/mm3 Final     Basophils Absolute   Date Value Ref Range Status   02/12/2018 0.01 0.0 - 0.2 10*3/uL Final     Basophils, Absolute   Date Value Ref Range Status   10/16/2019 0.02 0.00 - 0.20 10*3/mm3 Final     Immature Grans, Absolute   Date Value Ref Range Status   02/12/2018 0.01 <1 10*3/uL Final     nRBC   Date Value Ref Range Status   06/13/2019 0 0 /100[WBCs] Final    02/12/2018 0 0 /100(WBC) Final       Lab Results   Component Value Date    GLUCOSE 141 (H) 10/07/2019    BUN 14 10/07/2019    CREATININE 0.90 10/07/2019    EGFRIFNONA 65 10/07/2019    BCR 15.6 10/07/2019    K 3.3 (L) 10/07/2019    CO2 23.0 10/07/2019    CALCIUM 8.3 (L) 10/07/2019    ALBUMIN 3.40 (L) 10/07/2019    LABIL2 1.1 06/03/2019    AST 28 10/07/2019    ALT 32 10/07/2019         Malignant neoplasm of sigmoid colon (CMS/HCC)  - CBC & Differential  - CBC Auto Differential  - NM Pet Skull Base To Mid Thigh    Chemotherapy-induced thrombocytopenia    Anemia, unspecified type    Encounter for chemotherapy management    Allergic reaction to contrast material, subsequent encounter      ASSESSMENT:  The patient claims to be tolerating chemotherapy well except for fatigue for which she takes American ginseng with improvement.  Her last TSH level was normal.  Have discussed the CT comparison which had appeared better and hence I have asked her to continue on current chemotherapy regimen with plans of doing a PET scan before the end of the year.  She claims that a child at school was diagnosed with whooping cough and she is not sure if she has been in contact with the child or not.  Asked her to watch for any cough and to let us know so we can give her a Z-Tulio.      PLAN:  Continue chemo.   Plan on PET scan in December.          I have reviewed lab results, imaging, vitals and medications with the patient today. Will follow up in 1 month with NP.     Patient verbalized understanding and is in agreement of the above plan.    I have reviewed and agree with the above information.   Mark Perera M.D., F.A.C.P.     Much of the above report is an electronic transcription/translation of the spoken language to printed text using Dragon Software. As such, the subtleties and finesse of the spoken language may permit erroneous, or at times, nonsensical words or phrases to be inadvertently transcribed; thus changes may be  made at a later date to rectify these errors.

## 2019-10-18 DIAGNOSIS — C18.7 MALIGNANT NEOPLASM OF SIGMOID COLON (HCC): ICD-10-CM

## 2019-10-18 RX ORDER — FLUOROURACIL 50 MG/ML
400 INJECTION, SOLUTION INTRAVENOUS ONCE
Status: CANCELLED | OUTPATIENT
Start: 2019-10-21

## 2019-10-18 RX ORDER — PALONOSETRON 0.05 MG/ML
0.25 INJECTION, SOLUTION INTRAVENOUS ONCE
Status: CANCELLED | OUTPATIENT
Start: 2019-10-21

## 2019-10-18 RX ORDER — ATROPINE SULFATE 1 MG/ML
0.25 INJECTION, SOLUTION INTRAMUSCULAR; INTRAVENOUS; SUBCUTANEOUS
Status: CANCELLED | OUTPATIENT
Start: 2019-10-21

## 2019-10-18 RX ORDER — SODIUM CHLORIDE 9 MG/ML
250 INJECTION, SOLUTION INTRAVENOUS ONCE
Status: CANCELLED | OUTPATIENT
Start: 2019-10-21

## 2019-10-21 ENCOUNTER — HOSPITAL ENCOUNTER (OUTPATIENT)
Dept: ONCOLOGY | Facility: HOSPITAL | Age: 59
Setting detail: INFUSION SERIES
Discharge: HOME | End: 2019-10-21

## 2019-10-21 VITALS
SYSTOLIC BLOOD PRESSURE: 123 MMHG | TEMPERATURE: 97.9 F | WEIGHT: 137 LBS | HEART RATE: 102 BPM | BODY MASS INDEX: 25.21 KG/M2 | HEIGHT: 62 IN | RESPIRATION RATE: 18 BRPM | DIASTOLIC BLOOD PRESSURE: 82 MMHG

## 2019-10-21 DIAGNOSIS — C18.7 MALIGNANT NEOPLASM OF SIGMOID COLON (HCC): Primary | ICD-10-CM

## 2019-10-21 DIAGNOSIS — E87.6 HYPOKALEMIA: Primary | ICD-10-CM

## 2019-10-21 LAB
ALBUMIN SERPL-MCNC: 3.8 G/DL (ref 3.5–5.2)
ALBUMIN/GLOB SERPL: 1.6 G/DL
ALP SERPL-CCNC: 132 U/L (ref 39–117)
ALT SERPL W P-5'-P-CCNC: 25 U/L (ref 1–33)
ANION GAP SERPL CALCULATED.3IONS-SCNC: 11 MMOL/L (ref 5–15)
AST SERPL-CCNC: 18 U/L (ref 1–32)
BASOPHILS # BLD AUTO: 0.05 10*3/MM3 (ref 0–0.2)
BASOPHILS NFR BLD AUTO: 0.6 % (ref 0–1.5)
BILIRUB SERPL-MCNC: 0.3 MG/DL (ref 0.2–1.2)
BILIRUB UR QL STRIP: NEGATIVE
BUN BLD-MCNC: 12 MG/DL (ref 6–20)
BUN/CREAT SERPL: 12.2 (ref 7–25)
CALCIUM SPEC-SCNC: 8.5 MG/DL (ref 8.6–10.5)
CHLORIDE SERPL-SCNC: 101 MMOL/L (ref 98–107)
CLARITY UR: CLEAR
CO2 SERPL-SCNC: 30 MMOL/L (ref 22–29)
COLOR UR: YELLOW
CREAT BLD-MCNC: 0.98 MG/DL (ref 0.57–1)
DEPRECATED RDW RBC AUTO: 54.5 FL (ref 37–54)
EOSINOPHIL # BLD AUTO: 0.29 10*3/MM3 (ref 0–0.4)
EOSINOPHIL NFR BLD AUTO: 3.6 % (ref 0.3–6.2)
ERYTHROCYTE [DISTWIDTH] IN BLOOD BY AUTOMATED COUNT: 16.4 % (ref 12.3–15.4)
GFR SERPL CREATININE-BSD FRML MDRD: 58 ML/MIN/1.73
GLOBULIN UR ELPH-MCNC: 2.4 GM/DL
GLUCOSE BLD-MCNC: 110 MG/DL (ref 65–99)
GLUCOSE UR STRIP-MCNC: NEGATIVE MG/DL
HCT VFR BLD AUTO: 34.2 % (ref 34–46.6)
HGB BLD-MCNC: 11.3 G/DL (ref 12–15.9)
HGB UR QL STRIP.AUTO: NEGATIVE
KETONES UR QL STRIP: NEGATIVE
LEUKOCYTE ESTERASE UR QL STRIP.AUTO: NEGATIVE
LYMPHOCYTES # BLD AUTO: 1.23 10*3/MM3 (ref 0.7–3.1)
LYMPHOCYTES NFR BLD AUTO: 15.2 % (ref 19.6–45.3)
MAGNESIUM SERPL-MCNC: 2 MG/DL (ref 1.6–2.6)
MCH RBC QN AUTO: 31 PG (ref 26.6–33)
MCHC RBC AUTO-ENTMCNC: 33 G/DL (ref 31.5–35.7)
MCV RBC AUTO: 94 FL (ref 79–97)
MONOCYTES # BLD AUTO: 1.03 10*3/MM3 (ref 0.1–0.9)
MONOCYTES NFR BLD AUTO: 12.8 % (ref 5–12)
NEUTROPHILS # BLD AUTO: 5.47 10*3/MM3 (ref 1.7–7)
NEUTROPHILS NFR BLD AUTO: 67.8 % (ref 42.7–76)
NITRITE UR QL STRIP: NEGATIVE
PH UR STRIP.AUTO: 7 [PH] (ref 5–8)
PLATELET # BLD AUTO: 171 10*3/MM3 (ref 140–450)
PMV BLD AUTO: 10.1 FL (ref 6–12)
POTASSIUM BLD-SCNC: 3.3 MMOL/L (ref 3.5–5.2)
PROT SERPL-MCNC: 6.2 G/DL (ref 6–8.5)
PROT UR QL STRIP: NEGATIVE
RBC # BLD AUTO: 3.64 10*6/MM3 (ref 3.77–5.28)
SODIUM BLD-SCNC: 142 MMOL/L (ref 136–145)
SP GR UR STRIP: 1.01 (ref 1–1.03)
UROBILINOGEN UR QL STRIP: NORMAL
WBC NRBC COR # BLD: 8.07 10*3/MM3 (ref 3.4–10.8)

## 2019-10-21 PROCEDURE — 25010000002 IRINOTECAN PER 20 MG: Performed by: INTERNAL MEDICINE

## 2019-10-21 PROCEDURE — 96413 CHEMO IV INFUSION 1 HR: CPT | Performed by: INTERNAL MEDICINE

## 2019-10-21 PROCEDURE — 85025 COMPLETE CBC W/AUTO DIFF WBC: CPT | Performed by: INTERNAL MEDICINE

## 2019-10-21 PROCEDURE — 96416 CHEMO PROLONG INFUSE W/PUMP: CPT | Performed by: INTERNAL MEDICINE

## 2019-10-21 PROCEDURE — 96368 THER/DIAG CONCURRENT INF: CPT | Performed by: INTERNAL MEDICINE

## 2019-10-21 PROCEDURE — 25010000002 FOSAPREPITANT PER 1 MG: Performed by: INTERNAL MEDICINE

## 2019-10-21 PROCEDURE — 25010000002 LEUCOVORIN CALCIUM PER 50 MG: Performed by: INTERNAL MEDICINE

## 2019-10-21 PROCEDURE — 96367 TX/PROPH/DG ADDL SEQ IV INF: CPT | Performed by: INTERNAL MEDICINE

## 2019-10-21 PROCEDURE — 96415 CHEMO IV INFUSION ADDL HR: CPT | Performed by: INTERNAL MEDICINE

## 2019-10-21 PROCEDURE — 25010000002 DEXAMETHASONE SODIUM PHOSPHATE 120 MG/30ML SOLUTION: Performed by: INTERNAL MEDICINE

## 2019-10-21 PROCEDURE — 96375 TX/PRO/DX INJ NEW DRUG ADDON: CPT | Performed by: INTERNAL MEDICINE

## 2019-10-21 PROCEDURE — 81003 URINALYSIS AUTO W/O SCOPE: CPT | Performed by: INTERNAL MEDICINE

## 2019-10-21 PROCEDURE — 25010000002 PALONOSETRON 0.25 MG/5ML SOLUTION PREFILLED SYRINGE: Performed by: INTERNAL MEDICINE

## 2019-10-21 PROCEDURE — 80053 COMPREHEN METABOLIC PANEL: CPT | Performed by: INTERNAL MEDICINE

## 2019-10-21 PROCEDURE — 25010000002 FLUOROURACIL PER 500 MG: Performed by: INTERNAL MEDICINE

## 2019-10-21 PROCEDURE — 96417 CHEMO IV INFUS EACH ADDL SEQ: CPT | Performed by: INTERNAL MEDICINE

## 2019-10-21 PROCEDURE — 83735 ASSAY OF MAGNESIUM: CPT | Performed by: INTERNAL MEDICINE

## 2019-10-21 PROCEDURE — 25010000002 BEVACIZUMAB PER 10 MG: Performed by: INTERNAL MEDICINE

## 2019-10-21 PROCEDURE — 96411 CHEMO IV PUSH ADDL DRUG: CPT | Performed by: INTERNAL MEDICINE

## 2019-10-21 PROCEDURE — 25010000002 ATROPINE PER 0.01 MG: Performed by: INTERNAL MEDICINE

## 2019-10-21 RX ORDER — ATROPINE SULFATE 1 MG/ML
0.25 INJECTION, SOLUTION INTRAMUSCULAR; INTRAVENOUS; SUBCUTANEOUS
Status: DISCONTINUED | OUTPATIENT
Start: 2019-10-21 | End: 2019-10-21

## 2019-10-21 RX ORDER — FLUOROURACIL 50 MG/ML
400 INJECTION, SOLUTION INTRAVENOUS ONCE
Status: COMPLETED | OUTPATIENT
Start: 2019-10-21 | End: 2019-10-21

## 2019-10-21 RX ORDER — POTASSIUM CHLORIDE 750 MG/1
20 TABLET, FILM COATED, EXTENDED RELEASE ORAL DAILY
Qty: 30 TABLET | Refills: 1
Start: 2019-10-21 | End: 2019-12-11 | Stop reason: SDUPTHER

## 2019-10-21 RX ORDER — PALONOSETRON HYDROCHLORIDE 0.05 MG/ML
0.25 INJECTION, SOLUTION INTRAVENOUS ONCE
Status: COMPLETED | OUTPATIENT
Start: 2019-10-21 | End: 2019-10-21

## 2019-10-21 RX ORDER — SODIUM CHLORIDE 9 MG/ML
250 INJECTION, SOLUTION INTRAVENOUS ONCE
Status: COMPLETED | OUTPATIENT
Start: 2019-10-21 | End: 2019-10-21

## 2019-10-21 RX ADMIN — BEVACIZUMAB 320 MG: 400 INJECTION, SOLUTION INTRAVENOUS at 10:03

## 2019-10-21 RX ADMIN — LEUCOVORIN CALCIUM 660 MG: 350 INJECTION, POWDER, LYOPHILIZED, FOR SOLUTION INTRAMUSCULAR; INTRAVENOUS at 10:40

## 2019-10-21 RX ADMIN — FLUOROURACIL 660 MG: 50 INJECTION, SOLUTION INTRAVENOUS at 12:24

## 2019-10-21 RX ADMIN — SODIUM CHLORIDE 250 ML: 0.9 INJECTION, SOLUTION INTRAVENOUS at 08:54

## 2019-10-21 RX ADMIN — FLUOROURACIL 3960 MG: 50 INJECTION, SOLUTION INTRAVENOUS at 12:25

## 2019-10-21 RX ADMIN — PALONOSETRON 0.25 MG: 0.25 INJECTION, SOLUTION INTRAVENOUS at 08:54

## 2019-10-21 RX ADMIN — SODIUM CHLORIDE 100 ML: 900 INJECTION, SOLUTION INTRAVENOUS at 08:55

## 2019-10-21 RX ADMIN — ATROPINE SULFATE 295 MG: 0.4 INJECTION, SOLUTION INTRAMUSCULAR; INTRAVENOUS; SUBCUTANEOUS at 10:40

## 2019-10-21 RX ADMIN — DEXAMETHASONE SODIUM PHOSPHATE 12 MG: 4 INJECTION, SOLUTION INTRA-ARTICULAR; INTRALESIONAL; INTRAMUSCULAR; INTRAVENOUS; SOFT TISSUE at 09:28

## 2019-10-22 DIAGNOSIS — C18.7 MALIGNANT NEOPLASM OF SIGMOID COLON (HCC): ICD-10-CM

## 2019-10-22 RX ORDER — ATROPINE SULFATE 1 MG/ML
0.25 INJECTION, SOLUTION INTRAMUSCULAR; INTRAVENOUS; SUBCUTANEOUS
Status: CANCELLED | OUTPATIENT
Start: 2019-11-18

## 2019-10-22 RX ORDER — PALONOSETRON 0.05 MG/ML
0.25 INJECTION, SOLUTION INTRAVENOUS ONCE
Status: CANCELLED | OUTPATIENT
Start: 2019-11-04

## 2019-10-22 RX ORDER — SODIUM CHLORIDE 9 MG/ML
250 INJECTION, SOLUTION INTRAVENOUS ONCE
Status: CANCELLED | OUTPATIENT
Start: 2019-11-04

## 2019-10-22 RX ORDER — ATROPINE SULFATE 1 MG/ML
0.25 INJECTION, SOLUTION INTRAMUSCULAR; INTRAVENOUS; SUBCUTANEOUS
Status: CANCELLED | OUTPATIENT
Start: 2019-11-04

## 2019-10-22 RX ORDER — FLUOROURACIL 50 MG/ML
400 INJECTION, SOLUTION INTRAVENOUS ONCE
Status: CANCELLED | OUTPATIENT
Start: 2019-11-18

## 2019-10-22 RX ORDER — SODIUM CHLORIDE 9 MG/ML
250 INJECTION, SOLUTION INTRAVENOUS ONCE
Status: CANCELLED | OUTPATIENT
Start: 2019-11-18

## 2019-10-22 RX ORDER — FLUOROURACIL 50 MG/ML
400 INJECTION, SOLUTION INTRAVENOUS ONCE
Status: CANCELLED | OUTPATIENT
Start: 2019-11-04

## 2019-10-22 RX ORDER — PALONOSETRON 0.05 MG/ML
0.25 INJECTION, SOLUTION INTRAVENOUS ONCE
Status: CANCELLED | OUTPATIENT
Start: 2019-11-18

## 2019-10-23 ENCOUNTER — HOSPITAL ENCOUNTER (OUTPATIENT)
Dept: ONCOLOGY | Facility: HOSPITAL | Age: 59
Setting detail: INFUSION SERIES
Discharge: HOME | End: 2019-10-23

## 2019-10-23 VITALS
WEIGHT: 137 LBS | RESPIRATION RATE: 18 BRPM | HEIGHT: 62 IN | BODY MASS INDEX: 25.21 KG/M2 | SYSTOLIC BLOOD PRESSURE: 131 MMHG | HEART RATE: 80 BPM | TEMPERATURE: 97.6 F | DIASTOLIC BLOOD PRESSURE: 82 MMHG

## 2019-10-23 DIAGNOSIS — C18.7 MALIGNANT NEOPLASM OF SIGMOID COLON (HCC): Primary | ICD-10-CM

## 2019-10-23 PROCEDURE — 25010000002 PEGFILGRASTIM 6 MG/0.6ML PREFILLED SYRINGE KIT: Performed by: INTERNAL MEDICINE

## 2019-10-23 PROCEDURE — 96377 APPLICATON ON-BODY INJECTOR: CPT | Performed by: INTERNAL MEDICINE

## 2019-10-23 RX ORDER — SODIUM CHLORIDE 0.9 % (FLUSH) 0.9 %
10 SYRINGE (ML) INJECTION AS NEEDED
Status: CANCELLED | OUTPATIENT
Start: 2019-10-23

## 2019-10-23 RX ORDER — SODIUM CHLORIDE 0.9 % (FLUSH) 0.9 %
10 SYRINGE (ML) INJECTION AS NEEDED
Status: DISCONTINUED | OUTPATIENT
Start: 2019-10-23 | End: 2019-10-24 | Stop reason: HOSPADM

## 2019-10-23 RX ADMIN — Medication 10 ML: at 15:32

## 2019-10-23 RX ADMIN — HEPARIN 500 UNITS: 100 SYRINGE at 15:32

## 2019-10-23 RX ADMIN — PEGFILGRASTIM 6 MG: KIT SUBCUTANEOUS at 15:32

## 2019-10-28 ENCOUNTER — LAB (OUTPATIENT)
Dept: LAB | Facility: HOSPITAL | Age: 59
End: 2019-10-28

## 2019-10-28 DIAGNOSIS — E87.6 HYPOKALEMIA: ICD-10-CM

## 2019-10-28 DIAGNOSIS — C18.7 MALIGNANT NEOPLASM OF SIGMOID COLON (HCC): ICD-10-CM

## 2019-10-28 LAB
BASOPHILS # BLD AUTO: 0.03 10*3/MM3 (ref 0–0.2)
BASOPHILS NFR BLD AUTO: 0.2 % (ref 0–1.5)
DEPRECATED RDW RBC AUTO: 49.5 FL (ref 37–54)
EOSINOPHIL # BLD AUTO: 0.26 10*3/MM3 (ref 0–0.4)
EOSINOPHIL NFR BLD AUTO: 2.1 % (ref 0.3–6.2)
ERYTHROCYTE [DISTWIDTH] IN BLOOD BY AUTOMATED COUNT: 15.1 % (ref 12.3–15.4)
HCT VFR BLD AUTO: 28.6 % (ref 34–46.6)
HGB BLD-MCNC: 9.4 G/DL (ref 12–15.9)
LYMPHOCYTES # BLD AUTO: 1.67 10*3/MM3 (ref 0.7–3.1)
LYMPHOCYTES NFR BLD AUTO: 13.5 % (ref 19.6–45.3)
MCH RBC QN AUTO: 31 PG (ref 26.6–33)
MCHC RBC AUTO-ENTMCNC: 32.9 G/DL (ref 31.5–35.7)
MCV RBC AUTO: 94.4 FL (ref 79–97)
MONOCYTES # BLD AUTO: 1.41 10*3/MM3 (ref 0.1–0.9)
MONOCYTES NFR BLD AUTO: 11.4 % (ref 5–12)
NEUTROPHILS # BLD AUTO: 8.99 10*3/MM3 (ref 1.7–7)
NEUTROPHILS NFR BLD AUTO: 72.8 % (ref 42.7–76)
PLATELET # BLD AUTO: 103 10*3/MM3 (ref 140–450)
PMV BLD AUTO: 10.5 FL (ref 6–12)
POTASSIUM BLD-SCNC: 3.4 MMOL/L (ref 3.5–5.2)
RBC # BLD AUTO: 3.03 10*6/MM3 (ref 3.77–5.28)
WBC NRBC COR # BLD: 12.36 10*3/MM3 (ref 3.4–10.8)

## 2019-10-28 PROCEDURE — 36415 COLL VENOUS BLD VENIPUNCTURE: CPT

## 2019-10-28 PROCEDURE — 85025 COMPLETE CBC W/AUTO DIFF WBC: CPT

## 2019-10-28 PROCEDURE — 84132 ASSAY OF SERUM POTASSIUM: CPT | Performed by: NURSE PRACTITIONER

## 2019-11-04 ENCOUNTER — HOSPITAL ENCOUNTER (OUTPATIENT)
Dept: ONCOLOGY | Facility: HOSPITAL | Age: 59
Setting detail: INFUSION SERIES
Discharge: HOME | End: 2019-11-04

## 2019-11-04 VITALS
WEIGHT: 136 LBS | HEART RATE: 114 BPM | RESPIRATION RATE: 18 BRPM | DIASTOLIC BLOOD PRESSURE: 81 MMHG | TEMPERATURE: 97.6 F | HEIGHT: 62 IN | BODY MASS INDEX: 25.03 KG/M2 | SYSTOLIC BLOOD PRESSURE: 113 MMHG

## 2019-11-04 DIAGNOSIS — C18.7 MALIGNANT NEOPLASM OF SIGMOID COLON (HCC): Primary | ICD-10-CM

## 2019-11-04 LAB
ALBUMIN SERPL-MCNC: 3.7 G/DL (ref 3.5–5.2)
ALBUMIN/GLOB SERPL: 1.4 G/DL
ALP SERPL-CCNC: 155 U/L (ref 39–117)
ALT SERPL W P-5'-P-CCNC: 20 U/L (ref 1–33)
ANION GAP SERPL CALCULATED.3IONS-SCNC: 11 MMOL/L (ref 5–15)
AST SERPL-CCNC: 20 U/L (ref 1–32)
BASOPHILS # BLD AUTO: 0.04 10*3/MM3 (ref 0–0.2)
BASOPHILS NFR BLD AUTO: 0.5 % (ref 0–1.5)
BILIRUB SERPL-MCNC: 0.3 MG/DL (ref 0.2–1.2)
BILIRUB UR QL STRIP: NEGATIVE
BUN BLD-MCNC: 13 MG/DL (ref 6–20)
BUN/CREAT SERPL: 15.1 (ref 7–25)
CALCIUM SPEC-SCNC: 9.6 MG/DL (ref 8.6–10.5)
CHLORIDE SERPL-SCNC: 103 MMOL/L (ref 98–107)
CLARITY UR: CLEAR
CO2 SERPL-SCNC: 26 MMOL/L (ref 22–29)
COLOR UR: YELLOW
CREAT BLD-MCNC: 0.86 MG/DL (ref 0.57–1)
DEPRECATED RDW RBC AUTO: 54.3 FL (ref 37–54)
EOSINOPHIL # BLD AUTO: 0.17 10*3/MM3 (ref 0–0.4)
EOSINOPHIL NFR BLD AUTO: 2.1 % (ref 0.3–6.2)
ERYTHROCYTE [DISTWIDTH] IN BLOOD BY AUTOMATED COUNT: 16.5 % (ref 12.3–15.4)
GFR SERPL CREATININE-BSD FRML MDRD: 68 ML/MIN/1.73
GLOBULIN UR ELPH-MCNC: 2.6 GM/DL
GLUCOSE BLD-MCNC: 108 MG/DL (ref 65–99)
GLUCOSE UR STRIP-MCNC: NEGATIVE MG/DL
HCT VFR BLD AUTO: 31.9 % (ref 34–46.6)
HGB BLD-MCNC: 10.4 G/DL (ref 12–15.9)
HGB UR QL STRIP.AUTO: NEGATIVE
KETONES UR QL STRIP: NEGATIVE
LEUKOCYTE ESTERASE UR QL STRIP.AUTO: NEGATIVE
LYMPHOCYTES # BLD AUTO: 1.04 10*3/MM3 (ref 0.7–3.1)
LYMPHOCYTES NFR BLD AUTO: 13 % (ref 19.6–45.3)
MCH RBC QN AUTO: 31 PG (ref 26.6–33)
MCHC RBC AUTO-ENTMCNC: 32.6 G/DL (ref 31.5–35.7)
MCV RBC AUTO: 95.2 FL (ref 79–97)
MONOCYTES # BLD AUTO: 0.8 10*3/MM3 (ref 0.1–0.9)
MONOCYTES NFR BLD AUTO: 10 % (ref 5–12)
NEUTROPHILS # BLD AUTO: 5.97 10*3/MM3 (ref 1.7–7)
NEUTROPHILS NFR BLD AUTO: 74.4 % (ref 42.7–76)
NITRITE UR QL STRIP: NEGATIVE
PH UR STRIP.AUTO: 6.5 [PH] (ref 5–8)
PLATELET # BLD AUTO: 181 10*3/MM3 (ref 140–450)
PMV BLD AUTO: 9.2 FL (ref 6–12)
POTASSIUM BLD-SCNC: 3.8 MMOL/L (ref 3.5–5.2)
PROT SERPL-MCNC: 6.3 G/DL (ref 6–8.5)
PROT UR QL STRIP: NEGATIVE
RBC # BLD AUTO: 3.35 10*6/MM3 (ref 3.77–5.28)
SODIUM BLD-SCNC: 140 MMOL/L (ref 136–145)
SP GR UR STRIP: 1.02 (ref 1–1.03)
UROBILINOGEN UR QL STRIP: NORMAL
WBC NRBC COR # BLD: 8.02 10*3/MM3 (ref 3.4–10.8)

## 2019-11-04 PROCEDURE — 80053 COMPREHEN METABOLIC PANEL: CPT | Performed by: NURSE PRACTITIONER

## 2019-11-04 PROCEDURE — 96415 CHEMO IV INFUSION ADDL HR: CPT | Performed by: INTERNAL MEDICINE

## 2019-11-04 PROCEDURE — 96375 TX/PRO/DX INJ NEW DRUG ADDON: CPT | Performed by: INTERNAL MEDICINE

## 2019-11-04 PROCEDURE — 96416 CHEMO PROLONG INFUSE W/PUMP: CPT | Performed by: INTERNAL MEDICINE

## 2019-11-04 PROCEDURE — 81003 URINALYSIS AUTO W/O SCOPE: CPT | Performed by: NURSE PRACTITIONER

## 2019-11-04 PROCEDURE — 25010000002 BEVACIZUMAB PER 10 MG: Performed by: NURSE PRACTITIONER

## 2019-11-04 PROCEDURE — 96417 CHEMO IV INFUS EACH ADDL SEQ: CPT | Performed by: INTERNAL MEDICINE

## 2019-11-04 PROCEDURE — 96413 CHEMO IV INFUSION 1 HR: CPT | Performed by: INTERNAL MEDICINE

## 2019-11-04 PROCEDURE — 25010000002 FLUOROURACIL PER 500 MG: Performed by: NURSE PRACTITIONER

## 2019-11-04 PROCEDURE — 25010000002 FOSAPREPITANT PER 1 MG: Performed by: NURSE PRACTITIONER

## 2019-11-04 PROCEDURE — 25010000002 IRINOTECAN PER 20 MG: Performed by: NURSE PRACTITIONER

## 2019-11-04 PROCEDURE — 25010000002 PALONOSETRON 0.25 MG/5ML SOLUTION PREFILLED SYRINGE: Performed by: NURSE PRACTITIONER

## 2019-11-04 PROCEDURE — 25010000002 ATROPINE PER 0.01 MG: Performed by: NURSE PRACTITIONER

## 2019-11-04 PROCEDURE — 96368 THER/DIAG CONCURRENT INF: CPT | Performed by: INTERNAL MEDICINE

## 2019-11-04 PROCEDURE — 96411 CHEMO IV PUSH ADDL DRUG: CPT | Performed by: INTERNAL MEDICINE

## 2019-11-04 PROCEDURE — 85025 COMPLETE CBC W/AUTO DIFF WBC: CPT | Performed by: NURSE PRACTITIONER

## 2019-11-04 PROCEDURE — 25010000002 LEUCOVORIN CALCIUM PER 50 MG: Performed by: NURSE PRACTITIONER

## 2019-11-04 PROCEDURE — 25010000002 DEXAMETHASONE SODIUM PHOSPHATE 120 MG/30ML SOLUTION: Performed by: NURSE PRACTITIONER

## 2019-11-04 PROCEDURE — 96367 TX/PROPH/DG ADDL SEQ IV INF: CPT | Performed by: INTERNAL MEDICINE

## 2019-11-04 RX ORDER — FLUOROURACIL 50 MG/ML
400 INJECTION, SOLUTION INTRAVENOUS ONCE
Status: COMPLETED | OUTPATIENT
Start: 2019-11-04 | End: 2019-11-04

## 2019-11-04 RX ORDER — ATROPINE SULFATE 1 MG/ML
0.25 INJECTION, SOLUTION INTRAMUSCULAR; INTRAVENOUS; SUBCUTANEOUS
Status: DISCONTINUED | OUTPATIENT
Start: 2019-11-04 | End: 2019-11-04 | Stop reason: SDUPTHER

## 2019-11-04 RX ORDER — PALONOSETRON HYDROCHLORIDE 0.05 MG/ML
0.25 INJECTION, SOLUTION INTRAVENOUS ONCE
Status: COMPLETED | OUTPATIENT
Start: 2019-11-04 | End: 2019-11-04

## 2019-11-04 RX ADMIN — FLUOROURACIL 660 MG: 50 INJECTION, SOLUTION INTRAVENOUS at 12:19

## 2019-11-04 RX ADMIN — PALONOSETRON 0.25 MG: 0.25 INJECTION, SOLUTION INTRAVENOUS at 09:07

## 2019-11-04 RX ADMIN — SODIUM CHLORIDE 100 ML: 900 INJECTION, SOLUTION INTRAVENOUS at 09:26

## 2019-11-04 RX ADMIN — ATROPINE SULFATE 295 MG: 0.4 INJECTION, SOLUTION INTRAMUSCULAR; INTRAVENOUS; SUBCUTANEOUS at 10:44

## 2019-11-04 RX ADMIN — DEXAMETHASONE SODIUM PHOSPHATE 12 MG: 4 INJECTION, SOLUTION INTRA-ARTICULAR; INTRALESIONAL; INTRAMUSCULAR; INTRAVENOUS; SOFT TISSUE at 09:07

## 2019-11-04 RX ADMIN — BEVACIZUMAB 320 MG: 400 INJECTION, SOLUTION INTRAVENOUS at 10:00

## 2019-11-04 RX ADMIN — FLUOROURACIL 3960 MG: 50 INJECTION, SOLUTION INTRAVENOUS at 12:19

## 2019-11-04 RX ADMIN — LEUCOVORIN CALCIUM 660 MG: 350 INJECTION, POWDER, LYOPHILIZED, FOR SOLUTION INTRAMUSCULAR; INTRAVENOUS at 10:43

## 2019-11-06 ENCOUNTER — HOSPITAL ENCOUNTER (OUTPATIENT)
Dept: ONCOLOGY | Facility: HOSPITAL | Age: 59
Setting detail: INFUSION SERIES
Discharge: HOME | End: 2019-11-06

## 2019-11-06 VITALS
TEMPERATURE: 97.8 F | BODY MASS INDEX: 25.21 KG/M2 | SYSTOLIC BLOOD PRESSURE: 112 MMHG | DIASTOLIC BLOOD PRESSURE: 74 MMHG | WEIGHT: 137 LBS | HEART RATE: 93 BPM | HEIGHT: 62 IN | RESPIRATION RATE: 18 BRPM

## 2019-11-06 DIAGNOSIS — C18.7 MALIGNANT NEOPLASM OF SIGMOID COLON (HCC): Primary | ICD-10-CM

## 2019-11-06 PROCEDURE — 25010000002 PEGFILGRASTIM 6 MG/0.6ML PREFILLED SYRINGE KIT: Performed by: NURSE PRACTITIONER

## 2019-11-06 PROCEDURE — 96377 APPLICATON ON-BODY INJECTOR: CPT | Performed by: INTERNAL MEDICINE

## 2019-11-06 RX ORDER — SODIUM CHLORIDE 0.9 % (FLUSH) 0.9 %
10 SYRINGE (ML) INJECTION AS NEEDED
Status: CANCELLED | OUTPATIENT
Start: 2019-11-06

## 2019-11-06 RX ORDER — SODIUM CHLORIDE 0.9 % (FLUSH) 0.9 %
10 SYRINGE (ML) INJECTION AS NEEDED
Status: DISCONTINUED | OUTPATIENT
Start: 2019-11-06 | End: 2019-11-07 | Stop reason: HOSPADM

## 2019-11-06 RX ADMIN — Medication 10 ML: at 15:58

## 2019-11-06 RX ADMIN — PEGFILGRASTIM 6 MG: KIT SUBCUTANEOUS at 15:59

## 2019-11-06 RX ADMIN — HEPARIN 500 UNITS: 100 SYRINGE at 15:58

## 2019-11-13 ENCOUNTER — OFFICE VISIT (OUTPATIENT)
Dept: ONCOLOGY | Facility: CLINIC | Age: 59
End: 2019-11-13

## 2019-11-13 ENCOUNTER — APPOINTMENT (OUTPATIENT)
Dept: LAB | Facility: HOSPITAL | Age: 59
End: 2019-11-13

## 2019-11-13 VITALS
HEIGHT: 62 IN | DIASTOLIC BLOOD PRESSURE: 72 MMHG | RESPIRATION RATE: 18 BRPM | WEIGHT: 136 LBS | BODY MASS INDEX: 25.03 KG/M2 | SYSTOLIC BLOOD PRESSURE: 150 MMHG | TEMPERATURE: 98.4 F | HEART RATE: 116 BPM

## 2019-11-13 DIAGNOSIS — Z79.899 ENCOUNTER FOR LONG-TERM (CURRENT) USE OF MEDICATIONS: ICD-10-CM

## 2019-11-13 DIAGNOSIS — C18.7 MALIGNANT NEOPLASM OF SIGMOID COLON (HCC): Primary | ICD-10-CM

## 2019-11-13 DIAGNOSIS — D69.59 CHEMOTHERAPY-INDUCED THROMBOCYTOPENIA: ICD-10-CM

## 2019-11-13 DIAGNOSIS — Z51.11 ENCOUNTER FOR CHEMOTHERAPY MANAGEMENT: ICD-10-CM

## 2019-11-13 DIAGNOSIS — T45.1X5A CHEMOTHERAPY-INDUCED THROMBOCYTOPENIA: ICD-10-CM

## 2019-11-13 DIAGNOSIS — D64.9 ANEMIA, UNSPECIFIED TYPE: ICD-10-CM

## 2019-11-13 DIAGNOSIS — T50.8X5D ALLERGIC REACTION TO CONTRAST MATERIAL, SUBSEQUENT ENCOUNTER: ICD-10-CM

## 2019-11-13 LAB
BASOPHILS # BLD AUTO: 0.03 10*3/MM3 (ref 0–0.2)
BASOPHILS NFR BLD AUTO: 0.2 % (ref 0–1.5)
DEPRECATED RDW RBC AUTO: 53 FL (ref 37–54)
EOSINOPHIL # BLD AUTO: 0.17 10*3/MM3 (ref 0–0.4)
EOSINOPHIL NFR BLD AUTO: 1.3 % (ref 0.3–6.2)
ERYTHROCYTE [DISTWIDTH] IN BLOOD BY AUTOMATED COUNT: 16 % (ref 12.3–15.4)
HCT VFR BLD AUTO: 30.7 % (ref 34–46.6)
HGB BLD-MCNC: 9.8 G/DL (ref 12–15.9)
LYMPHOCYTES # BLD AUTO: 1.77 10*3/MM3 (ref 0.7–3.1)
LYMPHOCYTES NFR BLD AUTO: 14 % (ref 19.6–45.3)
MCH RBC QN AUTO: 30.5 PG (ref 26.6–33)
MCHC RBC AUTO-ENTMCNC: 31.9 G/DL (ref 31.5–35.7)
MCV RBC AUTO: 95.6 FL (ref 79–97)
MONOCYTES # BLD AUTO: 1.76 10*3/MM3 (ref 0.1–0.9)
MONOCYTES NFR BLD AUTO: 14 % (ref 5–12)
NEUTROPHILS # BLD AUTO: 8.88 10*3/MM3 (ref 1.7–7)
NEUTROPHILS NFR BLD AUTO: 70.5 % (ref 42.7–76)
PLATELET # BLD AUTO: 128 10*3/MM3 (ref 140–450)
PMV BLD AUTO: 9.8 FL (ref 6–12)
RBC # BLD AUTO: 3.21 10*6/MM3 (ref 3.77–5.28)
WBC NRBC COR # BLD: 12.61 10*3/MM3 (ref 3.4–10.8)

## 2019-11-13 PROCEDURE — 99215 OFFICE O/P EST HI 40 MIN: CPT | Performed by: NURSE PRACTITIONER

## 2019-11-13 PROCEDURE — 85025 COMPLETE CBC W/AUTO DIFF WBC: CPT | Performed by: NURSE PRACTITIONER

## 2019-11-13 PROCEDURE — 36415 COLL VENOUS BLD VENIPUNCTURE: CPT | Performed by: NURSE PRACTITIONER

## 2019-11-15 ENCOUNTER — TELEPHONE (OUTPATIENT)
Dept: ONCOLOGY | Facility: CLINIC | Age: 59
End: 2019-11-15

## 2019-11-15 NOTE — TELEPHONE ENCOUNTER
Pt called and LVM wanting to know when her chemo appointments are. I called pt back and LVM letting her know when her chemo was.

## 2019-11-15 NOTE — TELEPHONE ENCOUNTER
Received call from the patient stating that she was unsure if she had a chemotherapy appointment for 11/18/19.  Explained that she does have an 0800 appointment.  Patient marely/u.

## 2019-11-18 ENCOUNTER — HOSPITAL ENCOUNTER (OUTPATIENT)
Dept: ONCOLOGY | Facility: HOSPITAL | Age: 59
Setting detail: INFUSION SERIES
Discharge: HOME | End: 2019-11-18

## 2019-11-18 VITALS
DIASTOLIC BLOOD PRESSURE: 77 MMHG | TEMPERATURE: 97.8 F | WEIGHT: 135 LBS | HEART RATE: 97 BPM | BODY MASS INDEX: 24.84 KG/M2 | HEIGHT: 62 IN | SYSTOLIC BLOOD PRESSURE: 129 MMHG | RESPIRATION RATE: 16 BRPM

## 2019-11-18 DIAGNOSIS — C18.7 MALIGNANT NEOPLASM OF SIGMOID COLON (HCC): Primary | ICD-10-CM

## 2019-11-18 DIAGNOSIS — D64.9 ANEMIA, UNSPECIFIED TYPE: ICD-10-CM

## 2019-11-18 LAB
ALBUMIN SERPL-MCNC: 3.9 G/DL (ref 3.5–5.2)
ALBUMIN/GLOB SERPL: 1.6 G/DL
ALP SERPL-CCNC: 146 U/L (ref 39–117)
ALT SERPL W P-5'-P-CCNC: 20 U/L (ref 1–33)
ANION GAP SERPL CALCULATED.3IONS-SCNC: 11 MMOL/L (ref 5–15)
AST SERPL-CCNC: 20 U/L (ref 1–32)
BASOPHILS # BLD AUTO: 0.03 10*3/MM3 (ref 0–0.2)
BASOPHILS NFR BLD AUTO: 0.4 % (ref 0–1.5)
BILIRUB SERPL-MCNC: 0.3 MG/DL (ref 0.2–1.2)
BILIRUB UR QL STRIP: NEGATIVE
BUN BLD-MCNC: 12 MG/DL (ref 6–20)
BUN/CREAT SERPL: 13.8 (ref 7–25)
CALCIUM SPEC-SCNC: 9.3 MG/DL (ref 8.6–10.5)
CHLORIDE SERPL-SCNC: 106 MMOL/L (ref 98–107)
CLARITY UR: CLEAR
CO2 SERPL-SCNC: 25 MMOL/L (ref 22–29)
COLOR UR: YELLOW
CREAT BLD-MCNC: 0.87 MG/DL (ref 0.57–1)
DEPRECATED RDW RBC AUTO: 58.4 FL (ref 37–54)
EOSINOPHIL # BLD AUTO: 0.17 10*3/MM3 (ref 0–0.4)
EOSINOPHIL NFR BLD AUTO: 2.3 % (ref 0.3–6.2)
ERYTHROCYTE [DISTWIDTH] IN BLOOD BY AUTOMATED COUNT: 17.2 % (ref 12.3–15.4)
FERRITIN SERPL-MCNC: 198.3 NG/ML (ref 13–150)
FOLATE SERPL-MCNC: >20 NG/ML (ref 4.78–24.2)
GFR SERPL CREATININE-BSD FRML MDRD: 67 ML/MIN/1.73
GLOBULIN UR ELPH-MCNC: 2.4 GM/DL
GLUCOSE BLD-MCNC: 125 MG/DL (ref 65–99)
GLUCOSE UR STRIP-MCNC: NEGATIVE MG/DL
HCT VFR BLD AUTO: 33.7 % (ref 34–46.6)
HGB BLD-MCNC: 10.8 G/DL (ref 12–15.9)
HGB UR QL STRIP.AUTO: NEGATIVE
IRON 24H UR-MRATE: 63 MCG/DL (ref 37–145)
IRON SATN MFR SERPL: 15 % (ref 20–50)
KETONES UR QL STRIP: NEGATIVE
LEUKOCYTE ESTERASE UR QL STRIP.AUTO: NEGATIVE
LYMPHOCYTES # BLD AUTO: 1 10*3/MM3 (ref 0.7–3.1)
LYMPHOCYTES NFR BLD AUTO: 13.4 % (ref 19.6–45.3)
MCH RBC QN AUTO: 31.1 PG (ref 26.6–33)
MCHC RBC AUTO-ENTMCNC: 32 G/DL (ref 31.5–35.7)
MCV RBC AUTO: 97.1 FL (ref 79–97)
MONOCYTES # BLD AUTO: 0.74 10*3/MM3 (ref 0.1–0.9)
MONOCYTES NFR BLD AUTO: 9.9 % (ref 5–12)
NEUTROPHILS # BLD AUTO: 5.52 10*3/MM3 (ref 1.7–7)
NEUTROPHILS NFR BLD AUTO: 74 % (ref 42.7–76)
NITRITE UR QL STRIP: NEGATIVE
PH UR STRIP.AUTO: 7 [PH] (ref 5–8)
PLATELET # BLD AUTO: 140 10*3/MM3 (ref 140–450)
PMV BLD AUTO: 9.9 FL (ref 6–12)
POTASSIUM BLD-SCNC: 4.1 MMOL/L (ref 3.5–5.2)
PROT SERPL-MCNC: 6.3 G/DL (ref 6–8.5)
PROT UR QL STRIP: NEGATIVE
RBC # BLD AUTO: 3.47 10*6/MM3 (ref 3.77–5.28)
SODIUM BLD-SCNC: 142 MMOL/L (ref 136–145)
SP GR UR STRIP: 1.02 (ref 1–1.03)
TIBC SERPL-MCNC: 410 MCG/DL (ref 298–536)
TRANSFERRIN SERPL-MCNC: 275 MG/DL (ref 200–360)
UROBILINOGEN UR QL STRIP: NORMAL
WBC NRBC COR # BLD: 7.46 10*3/MM3 (ref 3.4–10.8)

## 2019-11-18 PROCEDURE — 96417 CHEMO IV INFUS EACH ADDL SEQ: CPT | Performed by: INTERNAL MEDICINE

## 2019-11-18 PROCEDURE — 81003 URINALYSIS AUTO W/O SCOPE: CPT | Performed by: NURSE PRACTITIONER

## 2019-11-18 PROCEDURE — 85025 COMPLETE CBC W/AUTO DIFF WBC: CPT | Performed by: NURSE PRACTITIONER

## 2019-11-18 PROCEDURE — 96413 CHEMO IV INFUSION 1 HR: CPT | Performed by: INTERNAL MEDICINE

## 2019-11-18 PROCEDURE — 96415 CHEMO IV INFUSION ADDL HR: CPT | Performed by: INTERNAL MEDICINE

## 2019-11-18 PROCEDURE — 25010000002 LEUCOVORIN CALCIUM PER 50 MG: Performed by: NURSE PRACTITIONER

## 2019-11-18 PROCEDURE — 96367 TX/PROPH/DG ADDL SEQ IV INF: CPT | Performed by: INTERNAL MEDICINE

## 2019-11-18 PROCEDURE — 82728 ASSAY OF FERRITIN: CPT | Performed by: NURSE PRACTITIONER

## 2019-11-18 PROCEDURE — 25010000002 BEVACIZUMAB 100 MG/4ML SOLUTION 4 ML VIAL: Performed by: NURSE PRACTITIONER

## 2019-11-18 PROCEDURE — 96375 TX/PRO/DX INJ NEW DRUG ADDON: CPT | Performed by: INTERNAL MEDICINE

## 2019-11-18 PROCEDURE — 80053 COMPREHEN METABOLIC PANEL: CPT | Performed by: NURSE PRACTITIONER

## 2019-11-18 PROCEDURE — 25010000002 IRINOTECAN PER 20 MG: Performed by: NURSE PRACTITIONER

## 2019-11-18 PROCEDURE — 83540 ASSAY OF IRON: CPT | Performed by: NURSE PRACTITIONER

## 2019-11-18 PROCEDURE — 25010000002 FLUOROURACIL PER 500 MG: Performed by: NURSE PRACTITIONER

## 2019-11-18 PROCEDURE — 25010000002 FOSAPREPITANT PER 1 MG: Performed by: NURSE PRACTITIONER

## 2019-11-18 PROCEDURE — 25010000002 PALONOSETRON 0.25 MG/5ML SOLUTION PREFILLED SYRINGE: Performed by: NURSE PRACTITIONER

## 2019-11-18 PROCEDURE — 25010000002 ATROPINE PER 0.01 MG: Performed by: NURSE PRACTITIONER

## 2019-11-18 PROCEDURE — 96411 CHEMO IV PUSH ADDL DRUG: CPT | Performed by: INTERNAL MEDICINE

## 2019-11-18 PROCEDURE — 96416 CHEMO PROLONG INFUSE W/PUMP: CPT | Performed by: INTERNAL MEDICINE

## 2019-11-18 PROCEDURE — 82746 ASSAY OF FOLIC ACID SERUM: CPT | Performed by: NURSE PRACTITIONER

## 2019-11-18 PROCEDURE — 84466 ASSAY OF TRANSFERRIN: CPT | Performed by: NURSE PRACTITIONER

## 2019-11-18 PROCEDURE — 96368 THER/DIAG CONCURRENT INF: CPT | Performed by: INTERNAL MEDICINE

## 2019-11-18 PROCEDURE — 25010000002 DEXAMETHASONE SODIUM PHOSPHATE 120 MG/30ML SOLUTION: Performed by: NURSE PRACTITIONER

## 2019-11-18 RX ORDER — FLUOROURACIL 50 MG/ML
400 INJECTION, SOLUTION INTRAVENOUS ONCE
Status: COMPLETED | OUTPATIENT
Start: 2019-11-18 | End: 2019-11-18

## 2019-11-18 RX ORDER — PALONOSETRON HYDROCHLORIDE 0.05 MG/ML
0.25 INJECTION, SOLUTION INTRAVENOUS ONCE
Status: COMPLETED | OUTPATIENT
Start: 2019-11-18 | End: 2019-11-18

## 2019-11-18 RX ORDER — SODIUM CHLORIDE 9 MG/ML
250 INJECTION, SOLUTION INTRAVENOUS ONCE
Status: DISCONTINUED | OUTPATIENT
Start: 2019-11-18 | End: 2019-11-19 | Stop reason: HOSPADM

## 2019-11-18 RX ORDER — ATROPINE SULFATE 1 MG/ML
0.25 INJECTION, SOLUTION INTRAMUSCULAR; INTRAVENOUS; SUBCUTANEOUS
Status: DISCONTINUED | OUTPATIENT
Start: 2019-11-18 | End: 2019-11-18 | Stop reason: SDUPTHER

## 2019-11-18 RX ADMIN — DEXAMETHASONE SODIUM PHOSPHATE 12 MG: 4 INJECTION, SOLUTION INTRA-ARTICULAR; INTRALESIONAL; INTRAMUSCULAR; INTRAVENOUS; SOFT TISSUE at 09:23

## 2019-11-18 RX ADMIN — FLUOROURACIL 660 MG: 50 INJECTION, SOLUTION INTRAVENOUS at 12:12

## 2019-11-18 RX ADMIN — PALONOSETRON 0.25 MG: 0.25 INJECTION, SOLUTION INTRAVENOUS at 08:47

## 2019-11-18 RX ADMIN — BEVACIZUMAB 300 MG: 100 INJECTION, SOLUTION INTRAVENOUS at 09:43

## 2019-11-18 RX ADMIN — LEUCOVORIN CALCIUM 660 MG: 350 INJECTION, POWDER, LYOPHILIZED, FOR SOLUTION INTRAMUSCULAR; INTRAVENOUS at 10:29

## 2019-11-18 RX ADMIN — FLUOROURACIL 3960 MG: 50 INJECTION, SOLUTION INTRAVENOUS at 12:15

## 2019-11-18 RX ADMIN — ATROPINE SULFATE 295 MG: 0.4 INJECTION, SOLUTION INTRAMUSCULAR; INTRAVENOUS; SUBCUTANEOUS at 10:26

## 2019-11-18 RX ADMIN — SODIUM CHLORIDE 100 ML: 900 INJECTION, SOLUTION INTRAVENOUS at 08:49

## 2019-11-20 ENCOUNTER — RESULTS ENCOUNTER (OUTPATIENT)
Dept: ONCOLOGY | Facility: CLINIC | Age: 59
End: 2019-11-20

## 2019-11-20 ENCOUNTER — HOSPITAL ENCOUNTER (OUTPATIENT)
Dept: ONCOLOGY | Facility: HOSPITAL | Age: 59
Setting detail: INFUSION SERIES
Discharge: HOME | End: 2019-11-20

## 2019-11-20 VITALS
TEMPERATURE: 98 F | HEART RATE: 88 BPM | RESPIRATION RATE: 18 BRPM | HEIGHT: 62 IN | DIASTOLIC BLOOD PRESSURE: 83 MMHG | SYSTOLIC BLOOD PRESSURE: 139 MMHG | BODY MASS INDEX: 24.69 KG/M2

## 2019-11-20 DIAGNOSIS — D64.9 ANEMIA, UNSPECIFIED TYPE: ICD-10-CM

## 2019-11-20 DIAGNOSIS — C18.7 MALIGNANT NEOPLASM OF SIGMOID COLON (HCC): Primary | ICD-10-CM

## 2019-11-20 DIAGNOSIS — Z79.899 ENCOUNTER FOR LONG-TERM (CURRENT) USE OF MEDICATIONS: ICD-10-CM

## 2019-11-20 PROCEDURE — 96377 APPLICATON ON-BODY INJECTOR: CPT

## 2019-11-20 PROCEDURE — 25010000002 PEGFILGRASTIM 6 MG/0.6ML PREFILLED SYRINGE KIT: Performed by: NURSE PRACTITIONER

## 2019-11-20 PROCEDURE — 96523 IRRIG DRUG DELIVERY DEVICE: CPT | Performed by: NURSE PRACTITIONER

## 2019-11-20 RX ORDER — SODIUM CHLORIDE 0.9 % (FLUSH) 0.9 %
10 SYRINGE (ML) INJECTION AS NEEDED
Status: DISCONTINUED | OUTPATIENT
Start: 2019-11-20 | End: 2019-11-21 | Stop reason: HOSPADM

## 2019-11-20 RX ORDER — SODIUM CHLORIDE 0.9 % (FLUSH) 0.9 %
10 SYRINGE (ML) INJECTION AS NEEDED
Status: CANCELLED | OUTPATIENT
Start: 2019-11-20

## 2019-11-20 RX ADMIN — HEPARIN 500 UNITS: 100 SYRINGE at 15:41

## 2019-11-20 RX ADMIN — PEGFILGRASTIM 6 MG: KIT SUBCUTANEOUS at 15:49

## 2019-11-20 RX ADMIN — Medication 10 ML: at 15:41

## 2019-11-25 ENCOUNTER — LAB (OUTPATIENT)
Dept: LAB | Facility: HOSPITAL | Age: 59
End: 2019-11-25

## 2019-11-25 DIAGNOSIS — C18.7 MALIGNANT NEOPLASM OF SIGMOID COLON (HCC): ICD-10-CM

## 2019-11-25 LAB
BASOPHILS # BLD AUTO: 0.02 10*3/MM3 (ref 0–0.2)
BASOPHILS NFR BLD AUTO: 0.2 % (ref 0–1.5)
DEPRECATED RDW RBC AUTO: 53.2 FL (ref 37–54)
EOSINOPHIL # BLD AUTO: 0.2 10*3/MM3 (ref 0–0.4)
EOSINOPHIL NFR BLD AUTO: 2 % (ref 0.3–6.2)
ERYTHROCYTE [DISTWIDTH] IN BLOOD BY AUTOMATED COUNT: 15.7 % (ref 12.3–15.4)
HCT VFR BLD AUTO: 32.4 % (ref 34–46.6)
HGB BLD-MCNC: 10.3 G/DL (ref 12–15.9)
LYMPHOCYTES # BLD AUTO: 1.58 10*3/MM3 (ref 0.7–3.1)
LYMPHOCYTES NFR BLD AUTO: 15.5 % (ref 19.6–45.3)
MCH RBC QN AUTO: 30.7 PG (ref 26.6–33)
MCHC RBC AUTO-ENTMCNC: 31.8 G/DL (ref 31.5–35.7)
MCV RBC AUTO: 96.7 FL (ref 79–97)
MONOCYTES # BLD AUTO: 1.48 10*3/MM3 (ref 0.1–0.9)
MONOCYTES NFR BLD AUTO: 14.5 % (ref 5–12)
NEUTROPHILS # BLD AUTO: 6.92 10*3/MM3 (ref 1.7–7)
NEUTROPHILS NFR BLD AUTO: 67.8 % (ref 42.7–76)
PLATELET # BLD AUTO: 71 10*3/MM3 (ref 140–450)
PMV BLD AUTO: 10.7 FL (ref 6–12)
RBC # BLD AUTO: 3.35 10*6/MM3 (ref 3.77–5.28)
WBC NRBC COR # BLD: 10.2 10*3/MM3 (ref 3.4–10.8)

## 2019-11-25 PROCEDURE — 36415 COLL VENOUS BLD VENIPUNCTURE: CPT

## 2019-11-25 PROCEDURE — 85025 COMPLETE CBC W/AUTO DIFF WBC: CPT

## 2019-12-02 ENCOUNTER — HOSPITAL ENCOUNTER (OUTPATIENT)
Dept: PET IMAGING | Facility: HOSPITAL | Age: 59
Discharge: HOME | End: 2019-12-02
Admitting: INTERNAL MEDICINE

## 2019-12-02 ENCOUNTER — HOSPITAL ENCOUNTER (OUTPATIENT)
Dept: ONCOLOGY | Facility: HOSPITAL | Age: 59
Setting detail: INFUSION SERIES
Discharge: HOME | End: 2019-12-02

## 2019-12-02 VITALS
TEMPERATURE: 97.8 F | WEIGHT: 135 LBS | RESPIRATION RATE: 18 BRPM | HEIGHT: 62 IN | DIASTOLIC BLOOD PRESSURE: 83 MMHG | HEART RATE: 97 BPM | SYSTOLIC BLOOD PRESSURE: 128 MMHG | BODY MASS INDEX: 24.84 KG/M2

## 2019-12-02 DIAGNOSIS — C18.7 MALIGNANT NEOPLASM OF SIGMOID COLON (HCC): ICD-10-CM

## 2019-12-02 DIAGNOSIS — C18.7 MALIGNANT NEOPLASM OF SIGMOID COLON (HCC): Primary | ICD-10-CM

## 2019-12-02 LAB
ALBUMIN SERPL-MCNC: 3.9 G/DL (ref 3.5–5.2)
ALBUMIN/GLOB SERPL: 1.4 G/DL
ALP SERPL-CCNC: 145 U/L (ref 39–117)
ALT SERPL W P-5'-P-CCNC: 30 U/L (ref 1–33)
ANION GAP SERPL CALCULATED.3IONS-SCNC: 14 MMOL/L (ref 5–15)
AST SERPL-CCNC: 25 U/L (ref 1–32)
BASOPHILS # BLD AUTO: 0.03 10*3/MM3 (ref 0–0.2)
BASOPHILS NFR BLD AUTO: 0.3 % (ref 0–1.5)
BILIRUB SERPL-MCNC: 0.4 MG/DL (ref 0.2–1.2)
BILIRUB UR QL STRIP: NEGATIVE
BUN BLD-MCNC: 10 MG/DL (ref 6–20)
BUN/CREAT SERPL: 11.1 (ref 7–25)
CALCIUM SPEC-SCNC: 8.9 MG/DL (ref 8.6–10.5)
CHLORIDE SERPL-SCNC: 103 MMOL/L (ref 98–107)
CLARITY UR: CLEAR
CO2 SERPL-SCNC: 23 MMOL/L (ref 22–29)
COLOR UR: YELLOW
CREAT BLD-MCNC: 0.9 MG/DL (ref 0.57–1)
DEPRECATED RDW RBC AUTO: 57.7 FL (ref 37–54)
EOSINOPHIL # BLD AUTO: 0.19 10*3/MM3 (ref 0–0.4)
EOSINOPHIL NFR BLD AUTO: 2.1 % (ref 0.3–6.2)
ERYTHROCYTE [DISTWIDTH] IN BLOOD BY AUTOMATED COUNT: 16.9 % (ref 12.3–15.4)
GFR SERPL CREATININE-BSD FRML MDRD: 64 ML/MIN/1.73
GLOBULIN UR ELPH-MCNC: 2.7 GM/DL
GLUCOSE BLD-MCNC: 150 MG/DL (ref 65–99)
GLUCOSE BLDC GLUCOMTR-MCNC: 89 MG/DL (ref 70–105)
GLUCOSE UR STRIP-MCNC: NEGATIVE MG/DL
HCT VFR BLD AUTO: 36 % (ref 34–46.6)
HGB BLD-MCNC: 11.6 G/DL (ref 12–15.9)
HGB UR QL STRIP.AUTO: NEGATIVE
KETONES UR QL STRIP: NEGATIVE
LEUKOCYTE ESTERASE UR QL STRIP.AUTO: NEGATIVE
LYMPHOCYTES # BLD AUTO: 1.35 10*3/MM3 (ref 0.7–3.1)
LYMPHOCYTES NFR BLD AUTO: 14.8 % (ref 19.6–45.3)
MCH RBC QN AUTO: 31 PG (ref 26.6–33)
MCHC RBC AUTO-ENTMCNC: 32.2 G/DL (ref 31.5–35.7)
MCV RBC AUTO: 96.3 FL (ref 79–97)
MONOCYTES # BLD AUTO: 0.68 10*3/MM3 (ref 0.1–0.9)
MONOCYTES NFR BLD AUTO: 7.5 % (ref 5–12)
NEUTROPHILS # BLD AUTO: 6.85 10*3/MM3 (ref 1.7–7)
NEUTROPHILS NFR BLD AUTO: 75.3 % (ref 42.7–76)
NITRITE UR QL STRIP: NEGATIVE
PH UR STRIP.AUTO: 5.5 [PH] (ref 5–8)
PLATELET # BLD AUTO: 128 10*3/MM3 (ref 140–450)
PMV BLD AUTO: 9.8 FL (ref 6–12)
POTASSIUM BLD-SCNC: 3.2 MMOL/L (ref 3.5–5.2)
PROT SERPL-MCNC: 6.6 G/DL (ref 6–8.5)
PROT UR QL STRIP: NEGATIVE
RBC # BLD AUTO: 3.74 10*6/MM3 (ref 3.77–5.28)
SODIUM BLD-SCNC: 140 MMOL/L (ref 136–145)
SP GR UR STRIP: 1.02 (ref 1–1.03)
UROBILINOGEN UR QL STRIP: NORMAL
WBC NRBC COR # BLD: 9.1 10*3/MM3 (ref 3.4–10.8)

## 2019-12-02 PROCEDURE — 96367 TX/PROPH/DG ADDL SEQ IV INF: CPT | Performed by: INTERNAL MEDICINE

## 2019-12-02 PROCEDURE — 25010000002 ATROPINE PER 0.01 MG: Performed by: INTERNAL MEDICINE

## 2019-12-02 PROCEDURE — 96415 CHEMO IV INFUSION ADDL HR: CPT | Performed by: INTERNAL MEDICINE

## 2019-12-02 PROCEDURE — 96411 CHEMO IV PUSH ADDL DRUG: CPT | Performed by: INTERNAL MEDICINE

## 2019-12-02 PROCEDURE — 81003 URINALYSIS AUTO W/O SCOPE: CPT | Performed by: NURSE PRACTITIONER

## 2019-12-02 PROCEDURE — 25010000002 LEUCOVORIN CALCIUM PER 50 MG: Performed by: INTERNAL MEDICINE

## 2019-12-02 PROCEDURE — 85025 COMPLETE CBC W/AUTO DIFF WBC: CPT | Performed by: NURSE PRACTITIONER

## 2019-12-02 PROCEDURE — 82962 GLUCOSE BLOOD TEST: CPT

## 2019-12-02 PROCEDURE — 96416 CHEMO PROLONG INFUSE W/PUMP: CPT | Performed by: INTERNAL MEDICINE

## 2019-12-02 PROCEDURE — 25010000002 BEVACIZUMAB PER 10 MG: Performed by: INTERNAL MEDICINE

## 2019-12-02 PROCEDURE — 96413 CHEMO IV INFUSION 1 HR: CPT | Performed by: INTERNAL MEDICINE

## 2019-12-02 PROCEDURE — 96375 TX/PRO/DX INJ NEW DRUG ADDON: CPT | Performed by: INTERNAL MEDICINE

## 2019-12-02 PROCEDURE — 0 FLUDEOXYGLUCOSE F18 SOLUTION: Performed by: INTERNAL MEDICINE

## 2019-12-02 PROCEDURE — 25010000002 PALONOSETRON 0.25 MG/5ML SOLUTION PREFILLED SYRINGE: Performed by: NURSE PRACTITIONER

## 2019-12-02 PROCEDURE — 78815 PET IMAGE W/CT SKULL-THIGH: CPT

## 2019-12-02 PROCEDURE — A9552 F18 FDG: HCPCS | Performed by: INTERNAL MEDICINE

## 2019-12-02 PROCEDURE — 96417 CHEMO IV INFUS EACH ADDL SEQ: CPT | Performed by: INTERNAL MEDICINE

## 2019-12-02 PROCEDURE — 25010000002 FLUOROURACIL PER 500 MG: Performed by: INTERNAL MEDICINE

## 2019-12-02 PROCEDURE — 25010000002 DEXAMETHASONE SODIUM PHOSPHATE 120 MG/30ML SOLUTION: Performed by: INTERNAL MEDICINE

## 2019-12-02 PROCEDURE — 25010000002 FOSAPREPITANT PER 1 MG: Performed by: INTERNAL MEDICINE

## 2019-12-02 PROCEDURE — 96368 THER/DIAG CONCURRENT INF: CPT | Performed by: INTERNAL MEDICINE

## 2019-12-02 PROCEDURE — 80053 COMPREHEN METABOLIC PANEL: CPT | Performed by: NURSE PRACTITIONER

## 2019-12-02 PROCEDURE — 25010000002 IRINOTECAN PER 20 MG: Performed by: INTERNAL MEDICINE

## 2019-12-02 RX ORDER — PALONOSETRON 0.05 MG/ML
0.25 INJECTION, SOLUTION INTRAVENOUS ONCE
Status: CANCELLED | OUTPATIENT
Start: 2019-12-02

## 2019-12-02 RX ORDER — ATROPINE SULFATE 1 MG/ML
0.25 INJECTION, SOLUTION INTRAMUSCULAR; INTRAVENOUS; SUBCUTANEOUS
Status: CANCELLED | OUTPATIENT
Start: 2019-12-02

## 2019-12-02 RX ORDER — PALONOSETRON 0.05 MG/ML
0.25 INJECTION, SOLUTION INTRAVENOUS ONCE
Status: DISCONTINUED | OUTPATIENT
Start: 2019-12-02 | End: 2019-12-02 | Stop reason: SDUPTHER

## 2019-12-02 RX ORDER — SODIUM CHLORIDE 9 MG/ML
250 INJECTION, SOLUTION INTRAVENOUS ONCE
Status: DISCONTINUED | OUTPATIENT
Start: 2019-12-02 | End: 2019-12-03 | Stop reason: HOSPADM

## 2019-12-02 RX ORDER — SODIUM CHLORIDE 9 MG/ML
250 INJECTION, SOLUTION INTRAVENOUS ONCE
Status: CANCELLED | OUTPATIENT
Start: 2019-12-02

## 2019-12-02 RX ORDER — ATROPINE SULFATE 1 MG/ML
0.25 INJECTION, SOLUTION INTRAMUSCULAR; INTRAVENOUS; SUBCUTANEOUS
Status: DISCONTINUED | OUTPATIENT
Start: 2019-12-02 | End: 2019-12-02 | Stop reason: SDUPTHER

## 2019-12-02 RX ORDER — FLUOROURACIL 50 MG/ML
400 INJECTION, SOLUTION INTRAVENOUS ONCE
Status: COMPLETED | OUTPATIENT
Start: 2019-12-02 | End: 2019-12-02

## 2019-12-02 RX ORDER — FLUOROURACIL 50 MG/ML
400 INJECTION, SOLUTION INTRAVENOUS ONCE
Status: CANCELLED | OUTPATIENT
Start: 2019-12-02

## 2019-12-02 RX ORDER — POTASSIUM CHLORIDE 750 MG/1
20 TABLET, FILM COATED, EXTENDED RELEASE ORAL DAILY
Qty: 60 TABLET | Refills: 3 | Status: SHIPPED | OUTPATIENT
Start: 2019-12-02 | End: 2020-11-28

## 2019-12-02 RX ORDER — PALONOSETRON HYDROCHLORIDE 0.05 MG/ML
0.25 INJECTION, SOLUTION INTRAVENOUS ONCE
Status: COMPLETED | OUTPATIENT
Start: 2019-12-02 | End: 2019-12-02

## 2019-12-02 RX ADMIN — LEUCOVORIN CALCIUM 660 MG: 350 INJECTION, POWDER, LYOPHILIZED, FOR SOLUTION INTRAMUSCULAR; INTRAVENOUS at 12:49

## 2019-12-02 RX ADMIN — FLUDEOXYGLUCOSE F18 1 DOSE: 300 INJECTION INTRAVENOUS at 07:53

## 2019-12-02 RX ADMIN — ATROPINE SULFATE 295 MG: 0.4 INJECTION, SOLUTION INTRAMUSCULAR; INTRAVENOUS; SUBCUTANEOUS at 12:41

## 2019-12-02 RX ADMIN — DEXAMETHASONE SODIUM PHOSPHATE 12 MG: 4 INJECTION, SOLUTION INTRA-ARTICULAR; INTRALESIONAL; INTRAMUSCULAR; INTRAVENOUS; SOFT TISSUE at 11:29

## 2019-12-02 RX ADMIN — PALONOSETRON 0.25 MG: 0.25 INJECTION, SOLUTION INTRAVENOUS at 10:52

## 2019-12-02 RX ADMIN — FLUOROURACIL 3960 MG: 50 INJECTION, SOLUTION INTRAVENOUS at 14:29

## 2019-12-02 RX ADMIN — SODIUM CHLORIDE 100 ML: 900 INJECTION, SOLUTION INTRAVENOUS at 10:56

## 2019-12-02 RX ADMIN — FLUOROURACIL 660 MG: 50 INJECTION, SOLUTION INTRAVENOUS at 14:27

## 2019-12-02 RX ADMIN — BEVACIZUMAB 320 MG: 400 INJECTION, SOLUTION INTRAVENOUS at 11:50

## 2019-12-02 NOTE — PROGRESS NOTES
Please ensure the patient has been taking her potassium supplementation 20meq twice a day and if so please have her take an additional dose today and tomorrow. Please notify MD/NP if patient having significant diarrhea, nausea, or vomiting.  Message sent to [facility].

## 2019-12-04 ENCOUNTER — HOSPITAL ENCOUNTER (OUTPATIENT)
Dept: ONCOLOGY | Facility: HOSPITAL | Age: 59
Setting detail: INFUSION SERIES
Discharge: HOME | End: 2019-12-04

## 2019-12-04 DIAGNOSIS — C18.7 MALIGNANT NEOPLASM OF SIGMOID COLON (HCC): Primary | ICD-10-CM

## 2019-12-04 PROCEDURE — 25010000002 PEGFILGRASTIM 6 MG/0.6ML PREFILLED SYRINGE KIT: Performed by: INTERNAL MEDICINE

## 2019-12-04 PROCEDURE — 96377 APPLICATON ON-BODY INJECTOR: CPT | Performed by: INTERNAL MEDICINE

## 2019-12-04 RX ORDER — SODIUM CHLORIDE 0.9 % (FLUSH) 0.9 %
10 SYRINGE (ML) INJECTION AS NEEDED
Status: DISCONTINUED | OUTPATIENT
Start: 2019-12-04 | End: 2019-12-05 | Stop reason: HOSPADM

## 2019-12-04 RX ORDER — SODIUM CHLORIDE 0.9 % (FLUSH) 0.9 %
10 SYRINGE (ML) INJECTION AS NEEDED
Status: CANCELLED | OUTPATIENT
Start: 2019-12-04

## 2019-12-04 RX ADMIN — HEPARIN 500 UNITS: 100 SYRINGE at 15:43

## 2019-12-04 RX ADMIN — Medication 10 ML: at 15:43

## 2019-12-04 RX ADMIN — PEGFILGRASTIM 6 MG: KIT SUBCUTANEOUS at 15:41

## 2019-12-04 NOTE — PROGRESS NOTES
Patient is here for 5FU CIV D/C. She states she has tolerated treatment fine and just feels tired. She is getting neulasta today.

## 2019-12-09 NOTE — PROGRESS NOTES
Hematology/Oncology Outpatient Follow Up    PATIENT NAME:Samantha Massey  :1960  MRN: [id number]  PRIMARY CARE PHYSICIAN: Diana Spencer MD  REFERRING PHYSICIAN: Diana Spencer MD    Chief Complaint   Patient presents with   • Follow-up     Colon cancer with liver metastasis   • Follow-up     Anemia and intermittent thrombocytopenia       HISTORY OF PRESENT ILLNESS:   1. Colon cancer with liver metastasis: diagnosis established 2012  · The patient claimed to have developed intermittent diarrhea and constipation in 2011. She eventually saw Dr. Diana Spencer in 2012. A chemistry profile revealed normal liver enzymes.  CEA on 12 was 5.9 ng/mL (< 5, smoker); hemoglobin was 12.1 gm/dL, and MCV 86.2 fl.  A CT scan of the abdomen and pelvis was performed on 12 revealing innumerable mass lesions throughout the liver, thickening of the distal sigmoid colon wall, nonspecific non-pathologically enlarged lymph nodes in the sigmoid mesentery and left iliac chain. She was referred to Dr. Regina Pack and had a repeat CEA done on 12 revealing a value of 7.5 ng/mL. A colonoscopy was performed on 12 by Dr. Pack revealing 10 cm distal colon mass, which on biopsy revealed the presence of invasive moderately differentiated adenocarcinoma which demonstrated micro satellite stability. The patient was hospitalized on 12 and underwent laparoscopic low anterior resection with mobilization of splenic flexure, which on pathology revealed invasive, moderate to focally poorly differentiated adenocarcinoma of the rectosigmoid colon with invasion through muscularis propria and extension into mesocolon. The surgical margins were free of tumor. Mesenteric lymph nodes, 14, were negative for metastatic tumor. Lymphovascular invasion was not identified. An kapia-r-ypud was placed on 12 and a CT-guided liver biopsy was performed the same day by ·Dr. Hernandez Espino. Pathology revealed  metastatic adenocarcinoma consistent with colonic primary. The patient was then referred here for further evaluation.  · 2/13/12 - Order to start chemotherapy to consist of Leucovorin 500mg IV day 1, Oxaliplatin 120 mg IV day1, Avastin 230 mg IV day 1, 5FU 575mg IV day 1 followed by 345 mg CIV, cycle q 2 weeks.  · 2/13/12 - CEA 5.6 (H).  · 2/13/12 - Chest x-ray revealed no acute cardiopulmonary disease.  · 2/17/12 - CT abdomen and pelvis revealed no evidence of metastatic disease of the chest, innumerable hepatic metastases.  Bowel staple line at the rectosigmoid junction with adjacent stranding of fat consistent with postoperative change.  · 2/18/12 - KRAS testing performed on rectum biopsy: no mutation detected.  · 2/21/12 - Patient started Chemotherapy cycle #1 consisting of Avastin, Leucovorin, 5FU, and Oxaliplatin.  · 2/23/12 - CT chest revealed no pulmonary embolus, no active lung disease. Hepatic nodularity.  · 2/24/12 - Per note from [facility] patient enrolled in clinical trail per Dr. Eldridge randomized to the control arm of the study. Which consist of standard FOLFOX chemotherapy that will be given at our office.  · 2124/12 - PET scan revealed interval resection of known primary colonic carcinoma at the rectosigmoid junction without evidence of residual tumor in this location. The patient is believed to be status post resection of the previously described left internal iliac lymph node without evidence of hypermetabolic metastatic lymphadenopathy. Innumerable hypermetabolic hepatic metastases.  · 3/6/12 - Patient started Chemotherapy cycle #2 consisting of Avastin, Leucovorin, 5FU, and Oxaliplatin  · 3/20/12 - Patient   started   Chemotherapy    cycle   #3     consisting    of   Avastin, leucovorin, 5FU, and Oxaliplatin  · 4/3/12 - Patient started Chemotherapy cycle #4 consisting of Avastin, Leucovorin, 5FU, and Oxaliplatin  · 4/3/12 - Orders written to decrease chemotherapy dose by 20% at her next cycle and  to give Neulasta injection day after each cycle.  Hemoglobin 11.9.  · 4/3112 - CEA 1.0 (N).  · 4/6/12 - CT C/A/P - Chest - unremarkable, Abdomen/Pelvis - Overall moderate to marked decrease in the size of the multiple hepatic metastatic lesions. This is when compared to a study of 1/2/12. Post-op changes in the region of the rectosigmoid junction Small amount of free fluid in the cul-de-sac with a small amount of fluid apparently within the endometrial cavity. These findings are not unusual premenopausal.                                                                                           · 4/17/12 - Patient started on cycle 5 of Oxaliplatin, Leucovorin, 5-FU, Avastin.  · 5/1/12 - Patient started on cycle 6 of Oxaliplatin, Leucovorin, 5-FU, Avastin.  · 5/14/12 - CEA 1.0 (N).  · 5/17/12 - Patient started on cycle 7 of Oxaliplatin, Leucovorin, 5-FU, Avastin.  · 5/29/12 - 5/31/12 - Patient admitted to [facility] due to acute febrile illness, sepsis syndrome, urinary tract infection, CA colon with liver mets, leukocytosis, hypotension, Elevated liver function tests, and acute kidney injury. TSH 8.66 (H), Ferritin 528(H), Folate 24.8 (H), Haptoglobin 312 (H), Iron 40 (N), Hep panel negative, Relic count1.42 (N), TIBC 281 (N), 812 1500(H), Lipase 22(N). Chest x-ray revealed no acute cardiopulmonary abnormality.  · 5/29/12 - Patient started on cycle 8 of Oxaliplatin, Leucovorin, 5-FU, Avastin.  · 6/13/12 - Patient started on cycle 9 of Oxaliplalin, Leucovorin, 5-FU, Avastin.  · 6/25/12 - CT scan of the abdomen and pelvis with hepatic lesions appearing smaller as compared to the prior study, which could relate to underlying metastatic disease. Sclerotic lesion of the right iliac bone that was present previously and could relate to bone mets. Postsurgical change.  · 6/26/12 - Patient started on cycle 10 of Oxaliplatin, Leucovorin, 5-FU, Avastin.  · 7/10/12 - Patient started on cycle 11 of Leucovorin, 5-FU,  Oxaliplatin, Avaslin.  · 7/17/12 - Bone scan no evidence of metastatic disease including sclerotic lesion right Iliac area.  · 7/24/12 - CEA 0.7.  · 7/24/12 - Patient started on cycle 12 of Leucovorin, 5-FU, Oxaliplatin, Avastin.  · 8/7/12 - Patient started on cycle 13 of Leucovorin, 5-FU, Oxaliplatin, Avastin.  · 8/21/12 - Patient started on cycle 14 of Leucovorin, 5-FU, Oxaliplatin, Avastin.  · 8/28/12 - CT scan of the abdomen and pelvis revealed stable size and appearance of multiple hypodense hepatic lesions, likely representing metastatic disease. Otherwise stable abdomen and pelvis.  · 8/23/12 - CEA 0.8 (N).  · 9/4/12 - Orders written to continue chemotherapy until seen by Dr. Eldridge.  · 9/11/12 - Patient started on cycle 15 of Leucovorin, 5-FU, Oxaliplatin, Avastin.  · 9/11/12 - Orders written to give chemotherapy today with a dose reduction of 20% due to low ANC.  · 9/18/12 - Orders written to discontinue chemotherapy and start maintenance Xeloda 1500 mg po bid x 14 days cycle q 3 weeks, per Dr. Eldridge.  · 11/7/12 - Patient evaluation by Dr. Montoya which recommended hepatic directed radiation therapy. She underwent TheraSpheres targeting the right lobe. Will treat the left hepatic lobe in 3 to 4 weeks if she is able to tolerate this procedure.  · 12/20/12 - CEA 1.3 (N).  · 12/31/12 - CT scan of the abdomen and pelvis revealed that there has been a significant partial response in both lobes of the liver.  Activity of remaining lesions is uncertain and could be assessed by follow-up studies for PET/CT correlation.  Heterogeneous enhancement of the right lobe of the liver is consistent with previous embolization therapy.  No evidence of extrahepatic metastatic disease.  Focal non-enhancement and bulging of the lateral wall of the gallbladder body.       · 1/22/13 - CEA 0.8.  · 1/22/13 - AST 52, ALT 47, ALK PHOS 157.  · 3/25/13 - Xeloda dose decreased to 1,250 mg p.o. b.i.d. x 14 days to cycle every three weeks  due to hand foot syndrome.  · 4/27/13 - CT scan of abdomen and pelvis revealed overall continued improvement in hepatic metastatic disease.  The single largest lesion in the right lobe is stable in appearance when compared to prior study although other lesions within the liver are more hypodense and some are smaller suggesting continued response to treatment.  Many of these lesions may no longer harbor viable malignancy.  A follow-up PET/CT scan could be performed to further evaluate the full extent of the viable metastasis versus continued attenuation on CT follow-up.  Contracted gallbladder with either focal areas of bulging of the wall or necrosis.  The appearance of the current exam is more suggesting a bulging rather than necrosis.  · 5/6/13 - Order written to decrease Xeloda to 1000 mg by mouth twice a day ×14 days every three weeks due to hand-foot syndrome and chemotherapy induced cytopenias and diarrhea.  · 5/28/13 - PET/CT scan negative for hypermetabolic foci in the neck, chest, abdomen, or pelvis to suggest recurrent, progressive, or metastatic tumor.  · 7/9/13 - CEA 2.4 (N).  · 8/5/13 - CT of the chest showed no evidence of metastatic disease.  CT scan of abdomen and pelvis show continued improvement in metastatic disease involving both lobes of the liver.  No evidence of extrahepatic metastatic disease in the abdomen or pelvis.  The gallbladder is contracted.  The previously suspected defect of the gallbladder wall against the liver surface is smaller.  There is no evidence of gallbladder leak into the peritoneal cavity.  Contained perforation is not excluded but is increasingly less likely.  · 12/7/13 - CT scan of the abdomen and pelvis revealed stable appearance of hepatic metastatic disease, most of which is likely treated.  A PET/CT scan could be performed to evaluate for any sites of active metastatic disease.  No evidence of recurrent disease at the anastomosis.  The gallbladder is contracted and  not optimally evaluated on this exam, but is stable in appearance compared to prior exam.  · 12/23/13 - CEA 1.4 normal.  · 4/28/14 - CEA 1.6 (N).  · 6/26/14 - Patient underwent a colonoscopy by Dr. Pack, which revealed no evidence of recurrence.  · 7/15/14 - Patient advised to continue chemotherapy with Xeloda.  · 7/30/14 - Echocardiogram revealed left ventricular size and contractility is within normal limits and ejection fraction is 55-60%.  · 8/18/14 - CT scan of the abdomen and pelvis revealed stable previously treated metastases in both lobes of the liver.  The activity of individual lesions is not determined and may be assessed by follow-up CT scans are PET/CT correlation.  No evidence of extrahepatic metastatic disease.  No signs of local tumor recurrence at the bowel resection site.  · 8/24/14 - Xeloda discontinued due to remission of disease.  · 9/16/14 - CEA 1.0 (N).  · 11/26/14 - CEA 1  · 12/23/14 - CT abdomen and pelvis with contrast: There are several tiny low attenuation lesions within the liver, but overall are believed to be unchanged compared to the prior exam in April 2013.  · 12/23/14 - CXR: No acute process seen.  · 12/23/14 - CT abdomen and pelvis with several tiny low attenuation lesions in the liver, unchanged in comparison to prior CT’s.  Chest x-ray with no acute process seen.    · 5/26/15 - WBC 6, hemoglobin 12.1, platelet count 173,000, MCV 85.8.  · 6/25/15 - CT scan of the abdomen and pelvis, right lobe of the liver is decreased in size and the margin is nodular. Three metal densities in the area of the hepatic artery. Duodenal lipoma. Gallbladder contracted.   · 8/24/15 - CEA 0.8 (0-5). Comprehensive metabolic panel normal.    · 2/15/16 - CEA 1.0 (0-5). Comprehensive metabolic panel with potassium 3.3 (3.6-5.1). Patient asked to go on potassium supplements but wanted to try high potassium diet.     · 8/15/16 - Patient advised to have the Infusaport removed if CT is stable. CEA 0.9  (N). CMP normal, creatinine 1.0. 9/2/16 - CT scan of the chest, abdomen and pelvis without contrast revealed stable chest CT with no evidence of mediastinal, hilar or pulmonary metastases or mass. Interval improvement of the appearance of the liver with no hepatic metastases seen and no evidence of adrenal enlargement or upper abdominal lymphadenopathy. Post treatment change was noted to the right lobe of the liver and hepatic artery, stable duodenal lipoma, moderate stool suggesting constipation.   · 10/6/16 - Infusaport removal by Dr. Pack.   · 12/22/16 - WBC 5.17, hemoglobin 13.2, MCV 86.5, platelets 234,000, ANC 3.3.   · 4/21/17 - CEA 0.9 (0-3). CMP with insignificant abnormalities - creatinine 1.1.   · 8/23/17 - WBC 6.79, hemoglobin 11.7, MCV 86.8, platelets 185,000, ANC 3.78.    · 9/27/17 - CT chest, abdomen and pelvis with mild coronary artery calcification, enlarging low-attenuation ill-defined mass in the left hepatic lobe. More ill-defined area of hypoattenuation within liver segments 2-3. Evidence of partial distal colon resection with patent anastomosis.   · 10/16/17 - CT-guided liver biopsy by Jay Baer M.D. revealed on pathology metastatic adenocarcinoma consistent with colon primary.   · 10/25/17 - Orders written for mFOLFOX6 plus Bevacizumab, Oxaliplatin 85 mg/M2 IV piggyback day 1, Leucovorin 400 mg/M2 IV piggyback day 1, 5- mg/M2 IV piggyback day 1 then 2400 mg/M2 CIV over 46 hours, Bevacizumab 5 mg/kg IV day 1 to repeat cycle every two weeks. CEA 1.2 (0-5).    · 11/2/17 - Infusaport placement under fluoroscopic guidance by Regina Pack M.D.   · 11/13/17 - MRI abdomen with 5.8 cm heterogenously enhancing lesion within hepatic segment 4 compatible with recently-biopsied lesion. Just adjacent to this is an 8 mm satellite lesion. No hepatic lesion identified within segments 2 or 3.   · 11/17/17 - PET scan with large hypermetabolic mass in liver segment 4 compatible with  metastasis.   · 11/27/17 - Patient received cycle 1 chemotherapy.   · 12/11/17 - Patient received cycle 2 chemotherapy.   · 12/13/17 - Patient called complaining of feeling of her jaw being tight and tongue thick along with some leg cramps on the prior day, 12/12/17. The patient took Benadryl and reported the symptoms improved throughout the day.   · 12/20/17 - Patient reports fever following both chemotherapy infusions. She reports a temperature of 101 post the 12/11/17 infusion that resolved; however, the patient continued to have flu-like achiness for several days afterward. Blood cultures ordered to be obtained with next port access. Dexamethasone 4 mg p.o. b.i.d. x3 days on days 2-4 of chemotherapy ordered due to neuropathies with cramping of the jaw and some nausea. Z-Pack prescribed for URI and Benzonatate prescribed for cough.   · 12/27/17 - Patient seen in followup by Doug Eldridge M.D. with plans of repeating CT scan of the liver post third FOLFOX treatment.   · 1/2/18 - Creatinine 1.0 (0.4-1.0).  Patient received cycle 3 chemotherapy.   · 1/8/18 - Chemotherapy delayed and Nephrology consulted for acute rise in creatinine from 1.0 to 2.5.   · 1/10/18 - Creatinine 2.1 (0.4-1.0).    · 1/15/18 - Creatinine 1.6 (0.4-1.0).    · 1/16/18 - Received orders from Dr. Riojas’s office for patient to receive Mucomyst 1200 mg p.o. b.i.d. for four days starting the day before CT scans in the future as well as IV hydration with normal saline 150 cc per hour for two hours prior to procedure and for four hours postprocedure.   · 1/22/18 - Current chemotherapy discontinued. New chemotherapy orders written for Irinotecan 180 mg/M2 IV piggyback day 1, Leucovorin 400 mg/M2 IV piggyback day 1, 5- mg/M2 IV piggyback day 1 followed by 2400 mg/M2 CIV days 1-3 and Bevacizumab 5 mg/kg IV day 1 to repeat cycle every two weeks. Chemotherapy to start post CT abdomen to determine whether patient would go for surgery now or later.  Creatinine 1.3 (0.4-1.0).    · 1/29/18 - CT chest with new 2 mm nodule in the right middle lobe and 6 mm short axis anterior epicardial lymph node. CT abdomen and pelvis with two new metastases of the left lobe of the liver, largest measuring 4.4 cm.   · 2/5/18 - Chemotherapy placed on hold pending surgical resection of liver lesions.   · 2/8/18 - Patient underwent diagnostic laparoscopy, laparoscopic intraoperative ultrasound of the liver, exploratory laparotomy, left hepatectomy, intraoperative ultrasound and omental pedicle flap by Doug Eldridge II, M.D. at [facility] with pathology revealing the left hepatic lobectomy specimen to contain a single nodule of metastatic adenocarcinoma consistent with colonic primary 4.5 cm in size with surgical margins negative for carcinoma. Tumor consisted of 30% viable tumor, 60% necrosis and 10% peripheral fibrosis.   · 3/1/18 - Discussed options in detail with the patient. Mutually decided to treat with adjuvant Xeloda for six months at a dose of 1000 mg p.o. b.i.d. x14 days every 21 days based on her previous dose that she tolerated. CEA 0.8 (0-5). Liver enzymes normal.   · 3/1/18 - Patient started cycle 1 chemotherapy.   · 5/12/18 - CT chest with stable punctate indeterminate right upper lobe and right middle lobe nodules and abdomen and pelvis with interval left partial hepatectomy with no signs of active disease in the liver.   · 5/31/18 - Patient claims to be taking Xeloda two weeks on, three weeks off. Asked to change to two weeks on, one week off. Patient starting cycle 3 Capecitabine (Xeloda).    · 6/21/18 - Reporting grade I palmar erythema with Capecitabine. Advised to use Urea Lotion t.i.d., cold therapy and to call for worsening symptoms.   · 7/28/18 - CT abdomen and pelvis at [facility] revealed slight increasing geographic areas of low attenuation within the inferior aspect of the hepatic segment 5 without obvious well-defined lesions and areas  of hepatic steatosis are more likely favored. No evidence of metastatic disease elsewhere in the abdomen or pelvis.   · 9/18/18 - Patient currently on cycle 6 Capecitabine (Xeloda). Asked to discontinue after completion of this cycle.  CEA 1.0 (0.5-1.5).   · 10/8/18 - CT chest, abdomen and pelvis at [facility] with stable chronic changes noted in the chest, stable appearance of the liver without metastatic disease detected, development of small ventral hernia was noted without evidence of bowel obstruction, stable-appearing intraluminal fatty lesion of the duodenum was present.   · 1/21/19 - CEA 1.1 (0-3 non-smoker).    · 1/29/19 - BRAF negative performed on biopsy originally dated 10/16/17.   · 5/13/19 - CT chest, abdomen and pelvis with contrast at [facility] showed metastatic disease to the chest with a new conglomerate of likely necrotic subcarinal lymphadenopathy measuring 2.7 cm and new right hilar lymphadenopathy measuring 7 mm. There were multiple new right upper lobe lung nodules with solid appearance. One nodule measured 8 mm. A second nodule had a solid component measuring 5 mm. There was a subpleural nodule anteriorly in the right upper lobe that measured 7 mm and a new left lower lobe nodule measuring 5 mm. There was nodular contour of the liver with increased hyperdensity of the liver measuring 2.3 cm where it had previously measured 2 cm and an ill-defined region of hyperdensity anteriorly in the right lobe of the liver that was unchanged. There were periumbilical hernias containing mesenteric fat and loops of bowel without evidence of obstruction. There was a lipoma in the duodenum. There were no mesenteric or retroperitoneal lymphadenopathy present. There was evidence of a probable mesenteric implant at the right upper quadrant abdomen adjacent to the right hemidiaphragm measuring 2.3 x 2.1 cm. This had increased in size from the prior exam and was subtly seen on the prior exam measuring 8  mm. Patient seen by Dr. Doug Eldridge at [facility] with note stating there was evidence of pulmonary progression and possible abdominal progression with recommendation to followup with Medical Oncology to resume systemic chemotherapy.   · 5/16/19 - PET CT at [facility] showed hypermetabolic subcarinal mass concerning for metastatic disease. There were scattered pulmonary densities extending down to the right hilar region which were hypermetabolic ranging in SUV of 5.1 to 5.3. This was concerning for metastatic disease, although inflammatory process was considered a second possibility. There were several tiny nodular densities seen in the lungs which did not demonstrate significant metabolic activity but could be inflammatory or metastatic. There we intercostal masses on the right concerning for metastatic disease. There was a presumed peritoneal implant on the right upper quadrant between the liver and the diaphragm that was hypermetabolic and concerning for metastatic disease. There were some small subcentimeter lymph nodes in the left neck that were non-specific and indeterminate and felt to be reactive versus metastatic. There was hypermetabolic activity seen in the soft tissues and bone marrow.   · 6/3/19 - Reviewed recent CT scans and PET scan showing likely progression with mesenteric implants and likely metastatic lymphadenopathy and lung nodules. Patient with recent bronchitis and infectious symptoms. Will plan to give a course of antibiotics. Discussed CT-guided biopsy of the anterior right upper quadrant mesenteric mass to confirm presence of malignancy. Discussed past treatments and side effects with plan to pursue further systemic therapy once biopsy-proven progression is confirmed. If biopsy is negative will plan to follow on repeat scan.  CEA 0.7 (0-5).  · 6/13/19 CT-guided core biopsy of abdominal mesenteric soft tissue mass by Jay Baer MD revealed metastatic adenocarcinoma  consistent with colon primary.  · 6/21/19 discussed results of biopsy with the patient and recommended systemic chemotherapy.  Discussed starting FOLFOX 6 again versus FOLFIRI.  Given the fact that she did have elevation of renal function as well as neuropathy with FOLFOX in the past mutually decided to treat her with FOLFIRI plus bevacizumab.  · 7/8/2019-FOLFIRI and Avastin cycle 1 initiated.  · 7/22/2019-FOLFIRI and Avastin cycle 2 initiated.  · 7/29/2019-Patient tolerating treatment with some expected mild cytopenias and nausea.  · 8/5/2019- cycle 3 chemotherapy delayed x1 week for neutropenia (ANC 0.69).  Neulasta added to treatment plan.  TSH 6.05 (0.34-5.6).  · 8/12/2019- cycle 3 FOLFIRI and Avastin with Neulasta support initiated.   · 8/26/2019-cycle 4 FOLFIRI and Avastin with Neulasta support initiated.   · 9/9/19 -cycle 5 FOLFIRI plus bevacizumab (Avastin) given.  CT chest, abdomen and pelvis: suspicious for metastatic disease in the chest. New enlarged subcarinal lymph node have developed. New small right upper lobe and left lower lobe nodules have developed. No convincing evidence of recurrent or metastatic disease in the abdomen or pelvis. There are 2 new ventral abdominal wall hernias containing nonobstructed bowel.  Additional CT findings include: Left hepatectomy without new focal liver lesions seen, distal colectomy with colocolic anastomosis, presumed gastroduodenal artery embolization changes, duodenal lipoma.   · 9/18/2019 discussed CT results with the patient.  While awaiting comparison CTs to more recent ones from [place], discussed change in Avastin to cetuximab while continuing FOLFIRI to which patient is in agreement.   · 9/20/19 - ADDENDUM on CT scan of chest, abdomen and pelvis: comparison with outside CT chest, abdomen and pelvis from [facility] dated 5/13/19. There appears to be slight improvement since 5/13/19. Subcarinal adenopathy has slightly diminished, and a right upper quadrant  mesenteric implant is no longer visualized. Pulmonary nodules are unchanged in size and number. Previously described low density areas in the liver are not visible on today's examination, which may be simply due to the lack of IV contrast.  Given the CT findings change in chemotherapy canceled and patient to continue on FOLFIRI plus bevacizumab.  · 9/23/19 - TSH 4.140 (0.340-5.600).  Cycle 6 chemotherapy initiated.   · 9/27/2019 [facility] testing revealed 6 actionable genomic findings including APC R876*, APC [id number], AURKA gain, CCND3 gain, SMAD4 loss, TP53 R273H.  BRAF, K-shannon, NRAS and PIK3CA were wild-type.  Additional findings included HER2, PDL1, TRKpan negative and PTEN, MGMT and TOPO1 positive.  8 therapies with potential increased benefit included capecitabine, cetuximab, irinotecan, oxaliplatin, Panitumumab, regorafenib, everolimus and topotecan.  · 10/7/2019 cycle 7 chemotherapy initiated.  · 10/21/2019- cycle 8 chemotherapy with FOLFIRI and Avastin with Neulasta support administered.  · 11/4/2019 cycle 9 chemotherapy with FOLFIRI and Avastin with Neulasta support administered.  · 11/18/2019 cycle 10 chemotherapy with FOLFIRI and Avastin with Neulasta support administered.  · 12/2/2019 cycle 11 chemotherapy with FOLFIRI and Avastin with Neulasta support administered.  · 12/2/19 PET/CT - Findings indicate positive response to therapy since the PET/CT of 05/16/2019. The subcarinal jacques mass has significantly diminished in size and isno longer FDG avid. No abnormal right or left hilar jacques uptake is identified and no pathologically enlarged hilar lymph nodes are seen.  FDG avid right upper lobe pulmonary nodule seen on the previous 05/16/2019 PET/CT has largely resolved. A 4 mm nodule remains, diminished in size. No FDG avid pulmonary nodules are identified.. The peritoneal or intercostal hypermetabolic soft tissue implants described on the previous PET/CT have resolved.5. No evidence of metastatic  disease in liver. Partial left hepatectomy. Diffuse hypermetabolic activity throughout the axial and appendicularskeleton without associated osteolytic or osteosclerotic abnormality.These findings may represent reactive marrow response to chemotherapy.Diffuse marrow malignant infiltration could have a similar imaging appearance in the absence of history of recent chemotherapy administration.  · 12/11/2019 - discontinued treatment with FOLFIRI and Avastin with Neulasta support with plan to change to maintenance 5FU+Avastin.    2. Anemia and intermittent thrombocytopenia: established February 2012  · 2/13/12 - Retic count 0.8(N), Iron 14 (L), TIBC 245 (L), % sat 6 (L), Ferritin 376 (H), Hemoglobin 9.3 (L).  · 2/28/12 - Hemoglobin 8.8 (L), Retic count 1.1 (N), Iron 28(L), TIBC 311 (N), %sat 9 (L), Ferritin 320 (H), Vitamin b12 453, Folate > 1000(N), Haptoglobin 403 (H). Patient started on Procrit 40,000 units weekly.  · 5/29/12 - 5/31/12 - Patient admitted to [facility] due to acute febrile illness, sepsis syndrome, Urinary tract infection, CA colon with liver mets, leukocytosis, hypotension, Elevated liver function tests, and acute kidney Injury. TSH 8.66 (H), Ferritin 528(H), Folate 24.8 (H), Haptoglobin 312 (H), Iron 40 (N), Hep panel negative, Retic count 1.42 (N), TIBC 281 (N), 812 1500(H), Lipase 22(N),  · 9/4/12 - Hemoglobin 10.6.  · 10/02/12 - Iicelixxka02.8, hematocrit 31.2  · 12/20/12 - Folate RBC Hematocrit 28.8 (N), Folate 782 (N). Retic 2.93 (H), Haptoglobin 54 (N), Iron 50 (N), TIBC 363 (N), Iron Sat% 14 (L), Vitamin 812 893 (N), Ferritin 296 (N)  · 7/9/13 - Hemoglobin 12.7, Hematocrit 35.8.  · 12/23/13 - Hemoglobin 13.3, hematocrit 37.1, MCV of 94.6.  Ferritin 280.4 (N).  · 7/15/14 - Hemoglobin 11.9, hematocrit 34.1, MCV 96.3.  · 11/16/15 - WBC 7.3, hemoglobin 12.1, platelet count 205,000. Anemia resolved.   · 8/15/16 - Ferritin 86 (N), folate 17.5 (N), haptoglobin 142 (N), retic 2.18 (H), Vitamin  B12 747 (N), iron 49 (N),  (N), iron saturation 15% (N), SPEP with normal electrophoresis pattern.   · 12/20/17 - Hemoglobin 9.5. Anemia labs ordered. Procrit 40,000 units subq weekly ordered for chemotherapy-induced anemia. Iron 28 (), TIBC 348 (228-428), iron saturation 8 (15-50), ferritin 293 (), folate >24.8 (5.9-24.8), Vitamin B12 of 516 (211-911), haptoglobin 105 (), retic count 1.2 (0.5-1.5).  · 3/1/18 - Ferritin 135 (), creatinine 1.1 (0.4-1.0).     · 6/21/18 - Hemoglobin 11.3, MCV 91.6. Ferritin 40 (), iron 52 (), TIBC 374 (228-428), iron saturation 14 (15-50).      · 12/11/19 - Hemoglobin 12.5, MCV 93.2, platelets 105,000    Past Medical History:   Diagnosis Date   • History of colon cancer, stage IV     Stage SHELL with progression   • Radiculopathy 07/2012    Right L5/S1   • Thyromegaly 10/2013       Past Surgical History:   Procedure Laterality Date   • CYST REMOVAL  1998    cyst removed from [facility] in 1998       Current Outpatient Medications:   •  American Ginseng powder, 200 mg 2 (Two) Times a Day., Disp: 1 bottle, Rfl: 2  •  dimenhyDRINATE (DRAMAMINE PO), Take  by mouth., Disp: , Rfl:   •  hydroCHLOROthiazide (MICROZIDE) 12.5 MG capsule, Take 1 capsule by mouth Every Morning., Disp: 30 capsule, Rfl: 3  •  Loperamide HCl (IMODIUM PO), Take  by mouth., Disp: , Rfl:   •  loratadine (CLARITIN) 10 MG tablet, Take 10 mg by mouth Daily., Disp: , Rfl:   •  potassium chloride (K-DUR) 10 MEQ CR tablet, Take 2 tablets by mouth Daily., Disp: 60 tablet, Rfl: 3    Allergies   Allergen Reactions   • Hydrocodone Nausea And Vomiting   • Iodinated Diagnostic Agents Hives and Itching     PT HAD SOME HIVES DURING SCAN.  PRIOR TO SCAN 8-3-2013.   • Latex Rash     Blisters    • Penicillins Hives       Family History   Problem Relation Age of Onset   • Cancer Sister         Bladder cancer       Cancer-related family history includes Cancer in her sister.    Social History      Tobacco Use   • Smoking status: Former Smoker     Years: 30.00     Last attempt to quit: 2012     Years since quittin.9   • Smokeless tobacco: Never Used   Substance Use Topics   • Alcohol use: Yes     Frequency: Never     Comment: socially   • Drug use: Defer       I have reviewed the history of present illness, past medical history, family history, social history, lab results, all notes, and other records since the patient was last seen on 19.    SUBJECTIVE:   The patient presented for a scheduled follow up appointment accompanied by her roommate to discuss her PET/CT results.  She complained of a recent GI illness with nausea, vomiting, and diarrhea.  She was no longer vomiting today but was ill starting Monday of this week. Felt tired.  Prior to that she had felt well other than some chronic abdominal pain with standing. She also had increased bouts of diarrhea after the past two cycles.  Has a hernia on her right side that bothers her and would like to eventually see surgeon.  She was not interested in pursuing an EGD as suggested at her last office visit.  Denied any fevers.         REVIEW OF SYSTEMS:  Review of Systems   Constitutional: Positive for fatigue. Negative for activity change, appetite change, chills, diaphoresis, fever and unexpected weight change.   HENT: Positive for postnasal drip. Negative for congestion, ear pain, mouth sores, nosebleeds (occasional nosebleeds ), rhinorrhea, sinus pain, sore throat and trouble swallowing.         Sinus drainage   Eyes: Negative.  Negative for photophobia, discharge and visual disturbance (after chemo ).   Respiratory: Negative for cough, chest tightness, shortness of breath, wheezing and stridor.    Cardiovascular: Negative for chest pain, palpitations and leg swelling.   Gastrointestinal: Positive for abdominal pain, diarrhea ( with last couple of cycles ), nausea and vomiting. Negative for blood in stool and constipation.   Endocrine:  "Negative for cold intolerance and heat intolerance.   Genitourinary: Negative for difficulty urinating, dysuria and hematuria.   Musculoskeletal: Negative for arthralgias, back pain, gait problem, joint swelling, neck pain and neck stiffness.   Skin: Negative for color change, rash and wound.        Dry skin    Neurological: Negative for dizziness, seizures, syncope, weakness, numbness and headaches.   Hematological: Negative for adenopathy. Does not bruise/bleed easily.        No obvious bleeding   Psychiatric/Behavioral: Negative for agitation, confusion and hallucinations. The patient is not nervous/anxious.    All other systems reviewed and are negative.    OBJECTIVE:  Vitals:  Vitals:    12/11/19 1557   BP: 118/80   Pulse: (!) 134   Resp: 18   Temp: 97.7 °F (36.5 °C)   Weight: 58.5 kg (129 lb)   Height: 157.5 cm (62\")   PainSc: 10-Worst pain ever   PainLoc: Abdomen       ECOG  Eastern Cooperative Oncology Group (ECOG, [place], World Health Organization) performance scale  0 Fully active; no performance restrictions.  1 Strenuous physical activity restricted; fully ambulatory and able to carry out light work.  2 Capable of all self-care but unable to carry out any work activities. Up and about >50% of waking hours.  3 Capable of only limited self-care; confined to bed or chair >50% of waking hours.  4 Completely disabled; cannot carry out any self-care; totally confined to bed or chair.    Physical Exam   Constitutional: She is oriented to person, place, and time. She appears well-developed and well-nourished. No distress.   HENT:   Head: Normocephalic and atraumatic.   Nose: Nose normal.   Mouth/Throat: Oropharynx is clear and moist. No oropharyngeal exudate.   Eyes: Pupils are equal, round, and reactive to light. Conjunctivae, EOM and lids are normal. Right eye exhibits no discharge. Left eye exhibits no discharge. No scleral icterus.   Neck: Normal range of motion. Neck supple. No thyromegaly present. "   Cardiovascular: Normal rate, regular rhythm, normal heart sounds and intact distal pulses. Exam reveals no gallop and no friction rub.   No murmur heard.  Pulmonary/Chest: Effort normal and breath sounds normal. No stridor. No respiratory distress. She has no wheezes.   Right chest wall port.   Abdominal: Soft. Normal appearance and bowel sounds are normal. She exhibits no distension and no mass. There is no tenderness. There is no rebound and no guarding. A hernia ( right side ) is present.   Genitourinary:   Genitourinary Comments: Deferred.   Musculoskeletal: Normal range of motion. She exhibits no edema, tenderness or deformity.   Lymphadenopathy:        Head (right side): No preauricular adenopathy present.   Neurological: She is alert and oriented to person, place, and time. She exhibits normal muscle tone. Coordination normal.   Skin: Skin is warm and dry. Capillary refill takes less than 2 seconds. No bruising, no petechiae and no rash noted. She is not diaphoretic. No erythema. No pallor.   Hyperpigmentation of the skin   Psychiatric: She has a normal mood and affect. Her speech is normal and behavior is normal. Judgment and thought content normal. Cognition and memory are normal.   Nursing note and vitals reviewed.    RECENT LABS  WBC   Date Value Ref Range Status   12/11/2019 5.90 3.40 - 10.80 10*3/mm3 Final   02/12/2018 5.46 4.5 - 11.0 10*3/uL Final     RBC   Date Value Ref Range Status   12/11/2019 4.14 3.77 - 5.28 10*6/mm3 Final   02/12/2018 2.82 (L) 4.0 - 5.2 10*6/uL Final     Hemoglobin   Date Value Ref Range Status   12/11/2019 12.5 12.0 - 15.9 g/dL Final   02/12/2018 8.1 (L) 12.0 - 16.0 g/dL Final     Hematocrit   Date Value Ref Range Status   12/11/2019 38.6 34.0 - 46.6 % Final   02/12/2018 25.3 (L) 36.0 - 46.0 % Final     MCV   Date Value Ref Range Status   12/11/2019 93.2 79.0 - 97.0 fL Final   02/12/2018 89.7 80.0 - 100.0 fL Final     MCH   Date Value Ref Range Status   12/11/2019 30.2 26.6 -  33.0 pg Final   02/12/2018 28.7 26.0 - 34.0 pg Final     MCHC   Date Value Ref Range Status   12/11/2019 32.4 31.5 - 35.7 g/dL Final   02/12/2018 32.0 31.0 - 37.0 g/dL Final     RDW   Date Value Ref Range Status   12/11/2019 16.4 (H) 12.3 - 15.4 % Final   02/12/2018 17.5 (H) 12.0 - 16.8 % Final     RDW-SD   Date Value Ref Range Status   12/11/2019 54.0 37.0 - 54.0 fl Final     MPV   Date Value Ref Range Status   12/11/2019 10.8 6.0 - 12.0 fL Final   02/12/2018 10.0 6.7 - 10.8 fL Final     Platelets   Date Value Ref Range Status   12/11/2019 105 (L) 140 - 450 10*3/mm3 Final   02/12/2018 157 140 - 440 10*3/uL Final     Neutrophil Rel %   Date Value Ref Range Status   02/12/2018 67.2 45 - 80 % Final     Neutrophil %   Date Value Ref Range Status   12/11/2019 57.1 42.7 - 76.0 % Final     Lymphocyte Rel %   Date Value Ref Range Status   02/12/2018 18.5 15 - 50 % Final     Lymphocyte %   Date Value Ref Range Status   12/11/2019 19.5 (L) 19.6 - 45.3 % Final     Monocyte Rel %   Date Value Ref Range Status   02/12/2018 11.2 0 - 15 % Final     Monocyte %   Date Value Ref Range Status   12/11/2019 22.4 (H) 5.0 - 12.0 % Final     Eosinophil %   Date Value Ref Range Status   12/11/2019 0.7 0.3 - 6.2 % Final   02/12/2018 2.7 0 - 7 % Final     Basophil Rel %   Date Value Ref Range Status   02/12/2018 0.2 0 - 2 % Final     Basophil %   Date Value Ref Range Status   12/11/2019 0.3 0.0 - 1.5 % Final     Immature Grans %   Date Value Ref Range Status   02/12/2018 0.2 (H) 0 % Final     Neutrophils Absolute   Date Value Ref Range Status   02/12/2018 3.67 2.0 - 8.8 10*3/uL Final     Neutrophils, Absolute   Date Value Ref Range Status   12/11/2019 3.37 1.70 - 7.00 10*3/mm3 Final     Lymphocytes Absolute   Date Value Ref Range Status   02/12/2018 1.01 0.7 - 5.5 10*3/uL Final     Lymphocytes, Absolute   Date Value Ref Range Status   12/11/2019 1.15 0.70 - 3.10 10*3/mm3 Final     Monocytes Absolute   Date Value Ref Range Status   02/12/2018  0.61 0.0 - 1.7 10*3/uL Final     Monocytes, Absolute   Date Value Ref Range Status   12/11/2019 1.32 (H) 0.10 - 0.90 10*3/mm3 Final     Eosinophils Absolute   Date Value Ref Range Status   02/12/2018 0.15 0.0 - 0.8 10*3/uL Final     Eosinophils, Absolute   Date Value Ref Range Status   12/11/2019 0.04 0.00 - 0.40 10*3/mm3 Final     Basophils Absolute   Date Value Ref Range Status   02/12/2018 0.01 0.0 - 0.2 10*3/uL Final     Basophils, Absolute   Date Value Ref Range Status   12/11/2019 0.02 0.00 - 0.20 10*3/mm3 Final     Immature Grans, Absolute   Date Value Ref Range Status   02/12/2018 0.01 <1 10*3/uL Final     nRBC   Date Value Ref Range Status   06/13/2019 0 0 /100[WBCs] Final   02/12/2018 0 0 /100(WBC) Final       Lab Results   Component Value Date    GLUCOSE 150 (H) 12/02/2019    BUN 10 12/02/2019    CREATININE 0.90 12/02/2019    EGFRIFNONA 64 12/02/2019    BCR 11.1 12/02/2019    K 3.2 (L) 12/02/2019    CO2 23.0 12/02/2019    CALCIUM 8.9 12/02/2019    ALBUMIN 3.90 12/02/2019    LABIL2 1.1 06/03/2019    AST 25 12/02/2019    ALT 30 12/02/2019     ASSESSMENT:  1. Colon cancer with liver metastasis diagnosis established January 2012.  K-shannon BRAF and NRAS wild-type.  2. Anemia and intermittent thrombocytopenia  3. Recent GI virus with nausea and vomiting - patient had prior GI issues     Malignant neoplasm of sigmoid colon (CMS/HCC)  - CBC & Differential  - CBC Auto Differential  - Comprehensive Metabolic Panel  - CEA  - CEA    Anemia, unspecified type  - Ferritin  - Iron Profile  - Vitamin B12  - Folate  - Magnesium  - Methylmalonic Acid, Serum  - Methylmalonic Acid, Serum    PLAN:  1. CBC, CMP, magnesium, ferritin, folate, iron profile, vitamin B12, MMA, and CEA today    2. Discontinue treatment with FOLFIRI + Avastin + Neulasta   3. Change treatment to maintenance 5FU + Avastin, next chemotherapy is due 12/16/19 - message sent to Earline Holden to change treatment plan   4. Reviewed fever precautions   5. Plan  repeat CT scans in around 3 months   6. RTC in 4 weeks     Electronically signed by ARNOLDO White, 12/11/19, 4:35 PM.    Patient seen and examined.  Agree with the above documentation by nurse practitioner.  Changes made.  Plan discussed.  Patient has metastatic colon cancer, and has been responding extremely well to FOLFIRI plus Avastin combination.  Reviewed the recent CT scan results.  She has a near complete response and therefore I will change the patient's treatment to maintenance 5-FU with Avastin.  This will help minimize treatment-related toxicities including diarrhea and will also help patient tolerate the treatments much longer..  Plan discussed and explained..  Will treat order anemia work-up           I  have reviewed lab results, imaging, vitals and medications with the patient today.    Patient verbalized understanding and is in agreement of the above plan.

## 2019-12-11 ENCOUNTER — APPOINTMENT (OUTPATIENT)
Dept: LAB | Facility: HOSPITAL | Age: 59
End: 2019-12-11

## 2019-12-11 ENCOUNTER — HOSPITAL ENCOUNTER (OUTPATIENT)
Dept: ONCOLOGY | Facility: HOSPITAL | Age: 59
Discharge: HOME | End: 2019-12-11
Admitting: NURSE PRACTITIONER

## 2019-12-11 ENCOUNTER — OFFICE VISIT (OUTPATIENT)
Dept: ONCOLOGY | Facility: CLINIC | Age: 59
End: 2019-12-11

## 2019-12-11 VITALS
HEART RATE: 134 BPM | TEMPERATURE: 97.7 F | SYSTOLIC BLOOD PRESSURE: 118 MMHG | HEIGHT: 62 IN | BODY MASS INDEX: 23.74 KG/M2 | WEIGHT: 129 LBS | DIASTOLIC BLOOD PRESSURE: 80 MMHG | RESPIRATION RATE: 18 BRPM

## 2019-12-11 DIAGNOSIS — D64.9 ANEMIA, UNSPECIFIED TYPE: Primary | ICD-10-CM

## 2019-12-11 DIAGNOSIS — C18.7 MALIGNANT NEOPLASM OF SIGMOID COLON (HCC): ICD-10-CM

## 2019-12-11 LAB
ALBUMIN SERPL-MCNC: 3.8 G/DL (ref 3.5–5.2)
ALBUMIN/GLOB SERPL: 1.4 G/DL
ALP SERPL-CCNC: 121 U/L (ref 39–117)
ALT SERPL W P-5'-P-CCNC: 22 U/L (ref 1–33)
ANION GAP SERPL CALCULATED.3IONS-SCNC: 11 MMOL/L (ref 5–15)
AST SERPL-CCNC: 14 U/L (ref 1–32)
BASOPHILS # BLD AUTO: 0.02 10*3/MM3 (ref 0–0.2)
BASOPHILS NFR BLD AUTO: 0.3 % (ref 0–1.5)
BILIRUB SERPL-MCNC: 0.8 MG/DL (ref 0.2–1.2)
BUN BLD-MCNC: 22 MG/DL (ref 6–20)
BUN/CREAT SERPL: 22 (ref 7–25)
CALCIUM SPEC-SCNC: 8.2 MG/DL (ref 8.6–10.5)
CHLORIDE SERPL-SCNC: 101 MMOL/L (ref 98–107)
CO2 SERPL-SCNC: 26 MMOL/L (ref 22–29)
CREAT BLD-MCNC: 1 MG/DL (ref 0.57–1)
DEPRECATED RDW RBC AUTO: 54 FL (ref 37–54)
EOSINOPHIL # BLD AUTO: 0.04 10*3/MM3 (ref 0–0.4)
EOSINOPHIL NFR BLD AUTO: 0.7 % (ref 0.3–6.2)
ERYTHROCYTE [DISTWIDTH] IN BLOOD BY AUTOMATED COUNT: 16.4 % (ref 12.3–15.4)
FERRITIN SERPL-MCNC: 490.6 NG/ML (ref 13–150)
GFR SERPL CREATININE-BSD FRML MDRD: 57 ML/MIN/1.73
GLOBULIN UR ELPH-MCNC: 2.7 GM/DL
GLUCOSE BLD-MCNC: 117 MG/DL (ref 65–99)
HCT VFR BLD AUTO: 38.6 % (ref 34–46.6)
HGB BLD-MCNC: 12.5 G/DL (ref 12–15.9)
IRON 24H UR-MRATE: 45 MCG/DL (ref 37–145)
IRON SATN MFR SERPL: 12 % (ref 20–50)
LYMPHOCYTES # BLD AUTO: 1.15 10*3/MM3 (ref 0.7–3.1)
LYMPHOCYTES NFR BLD AUTO: 19.5 % (ref 19.6–45.3)
MAGNESIUM SERPL-MCNC: 1.9 MG/DL (ref 1.6–2.6)
MCH RBC QN AUTO: 30.2 PG (ref 26.6–33)
MCHC RBC AUTO-ENTMCNC: 32.4 G/DL (ref 31.5–35.7)
MCV RBC AUTO: 93.2 FL (ref 79–97)
MONOCYTES # BLD AUTO: 1.32 10*3/MM3 (ref 0.1–0.9)
MONOCYTES NFR BLD AUTO: 22.4 % (ref 5–12)
NEUTROPHILS # BLD AUTO: 3.37 10*3/MM3 (ref 1.7–7)
NEUTROPHILS NFR BLD AUTO: 57.1 % (ref 42.7–76)
PLATELET # BLD AUTO: 105 10*3/MM3 (ref 140–450)
PMV BLD AUTO: 10.8 FL (ref 6–12)
POTASSIUM BLD-SCNC: 2.8 MMOL/L (ref 3.5–5.2)
PROT SERPL-MCNC: 6.5 G/DL (ref 6–8.5)
RBC # BLD AUTO: 4.14 10*6/MM3 (ref 3.77–5.28)
SODIUM BLD-SCNC: 138 MMOL/L (ref 136–145)
TIBC SERPL-MCNC: 368 MCG/DL (ref 298–536)
TRANSFERRIN SERPL-MCNC: 247 MG/DL (ref 200–360)
WBC NRBC COR # BLD: 5.9 10*3/MM3 (ref 3.4–10.8)

## 2019-12-11 PROCEDURE — 80053 COMPREHEN METABOLIC PANEL: CPT | Performed by: NURSE PRACTITIONER

## 2019-12-11 PROCEDURE — 36415 COLL VENOUS BLD VENIPUNCTURE: CPT | Performed by: INTERNAL MEDICINE

## 2019-12-11 PROCEDURE — 82746 ASSAY OF FOLIC ACID SERUM: CPT | Performed by: NURSE PRACTITIONER

## 2019-12-11 PROCEDURE — 83735 ASSAY OF MAGNESIUM: CPT | Performed by: NURSE PRACTITIONER

## 2019-12-11 PROCEDURE — 99215 OFFICE O/P EST HI 40 MIN: CPT | Performed by: NURSE PRACTITIONER

## 2019-12-11 PROCEDURE — 82378 CARCINOEMBRYONIC ANTIGEN: CPT | Performed by: NURSE PRACTITIONER

## 2019-12-11 PROCEDURE — 82728 ASSAY OF FERRITIN: CPT | Performed by: NURSE PRACTITIONER

## 2019-12-11 PROCEDURE — 82607 VITAMIN B-12: CPT | Performed by: NURSE PRACTITIONER

## 2019-12-11 PROCEDURE — 85025 COMPLETE CBC W/AUTO DIFF WBC: CPT | Performed by: INTERNAL MEDICINE

## 2019-12-11 PROCEDURE — 83540 ASSAY OF IRON: CPT | Performed by: NURSE PRACTITIONER

## 2019-12-11 PROCEDURE — 84466 ASSAY OF TRANSFERRIN: CPT | Performed by: NURSE PRACTITIONER

## 2019-12-11 RX ORDER — FLUOROURACIL 50 MG/ML
400 INJECTION, SOLUTION INTRAVENOUS ONCE
Status: CANCELLED | OUTPATIENT
Start: 2019-12-16

## 2019-12-11 RX ORDER — PALONOSETRON 0.05 MG/ML
0.25 INJECTION, SOLUTION INTRAVENOUS ONCE
Status: CANCELLED | OUTPATIENT
Start: 2019-12-16

## 2019-12-11 RX ORDER — SODIUM CHLORIDE 9 MG/ML
250 INJECTION, SOLUTION INTRAVENOUS ONCE
Status: CANCELLED | OUTPATIENT
Start: 2019-12-16

## 2019-12-11 RX ORDER — ATROPINE SULFATE 1 MG/ML
0.25 INJECTION, SOLUTION INTRAMUSCULAR; INTRAVENOUS; SUBCUTANEOUS
Status: CANCELLED | OUTPATIENT
Start: 2019-12-16

## 2019-12-12 DIAGNOSIS — D50.9 IRON DEFICIENCY ANEMIA, UNSPECIFIED IRON DEFICIENCY ANEMIA TYPE: Primary | ICD-10-CM

## 2019-12-12 PROBLEM — E87.6 HYPOKALEMIA: Status: ACTIVE | Noted: 2019-12-12

## 2019-12-12 LAB
CEA SERPL-MCNC: 1.47 NG/ML
FOLATE SERPL-MCNC: >20 NG/ML (ref 4.78–24.2)
VIT B12 BLD-MCNC: >2000 PG/ML (ref 211–946)

## 2019-12-12 RX ORDER — MULTIVIT WITH MINERALS/LUTEIN
250 TABLET ORAL DAILY
Qty: 30 TABLET | Refills: 5 | Status: SHIPPED | OUTPATIENT
Start: 2019-12-12 | End: 2021-11-01

## 2019-12-12 RX ORDER — FERROUS SULFATE 325(65) MG
325 TABLET ORAL
Qty: 30 TABLET | Refills: 5 | Status: SHIPPED | OUTPATIENT
Start: 2019-12-12 | End: 2020-11-28

## 2019-12-16 ENCOUNTER — HOSPITAL ENCOUNTER (OUTPATIENT)
Dept: ONCOLOGY | Facility: HOSPITAL | Age: 59
Setting detail: INFUSION SERIES
Discharge: HOME | End: 2019-12-16

## 2019-12-16 ENCOUNTER — HOSPITAL ENCOUNTER (OUTPATIENT)
Dept: ONCOLOGY | Facility: HOSPITAL | Age: 59
Setting detail: INFUSION SERIES
End: 2019-12-16

## 2019-12-16 VITALS
TEMPERATURE: 97.7 F | RESPIRATION RATE: 18 BRPM | HEIGHT: 67 IN | HEART RATE: 102 BPM | WEIGHT: 132 LBS | DIASTOLIC BLOOD PRESSURE: 81 MMHG | BODY MASS INDEX: 20.72 KG/M2 | SYSTOLIC BLOOD PRESSURE: 115 MMHG

## 2019-12-16 DIAGNOSIS — C18.7 MALIGNANT NEOPLASM OF SIGMOID COLON (HCC): Primary | ICD-10-CM

## 2019-12-16 LAB
ALBUMIN SERPL-MCNC: 3.6 G/DL (ref 3.5–5.2)
ALBUMIN/GLOB SERPL: 1.5 G/DL
ALP SERPL-CCNC: 138 U/L (ref 39–117)
ALT SERPL W P-5'-P-CCNC: 14 U/L (ref 1–33)
ANION GAP SERPL CALCULATED.3IONS-SCNC: 11 MMOL/L (ref 5–15)
AST SERPL-CCNC: 15 U/L (ref 1–32)
BASOPHILS # BLD AUTO: 0.01 10*3/MM3 (ref 0–0.2)
BASOPHILS NFR BLD AUTO: 0.1 % (ref 0–1.5)
BILIRUB SERPL-MCNC: 0.2 MG/DL (ref 0.2–1.2)
BILIRUB UR QL STRIP: ABNORMAL
BUN BLD-MCNC: 9 MG/DL (ref 6–20)
BUN/CREAT SERPL: 10.5 (ref 7–25)
CALCIUM SPEC-SCNC: 8.7 MG/DL (ref 8.6–10.5)
CHLORIDE SERPL-SCNC: 106 MMOL/L (ref 98–107)
CLARITY UR: CLEAR
CO2 SERPL-SCNC: 24 MMOL/L (ref 22–29)
COLOR UR: YELLOW
CREAT BLD-MCNC: 0.86 MG/DL (ref 0.57–1)
DEPRECATED RDW RBC AUTO: 57.3 FL (ref 37–54)
EOSINOPHIL # BLD AUTO: 0.09 10*3/MM3 (ref 0–0.4)
EOSINOPHIL NFR BLD AUTO: 1.2 % (ref 0.3–6.2)
ERYTHROCYTE [DISTWIDTH] IN BLOOD BY AUTOMATED COUNT: 16.9 % (ref 12.3–15.4)
GFR SERPL CREATININE-BSD FRML MDRD: 68 ML/MIN/1.73
GLOBULIN UR ELPH-MCNC: 2.4 GM/DL
GLUCOSE BLD-MCNC: 137 MG/DL (ref 65–99)
GLUCOSE UR STRIP-MCNC: NEGATIVE MG/DL
HCT VFR BLD AUTO: 31.9 % (ref 34–46.6)
HGB BLD-MCNC: 10.2 G/DL (ref 12–15.9)
HGB UR QL STRIP.AUTO: NEGATIVE
KETONES UR QL STRIP: NEGATIVE
LEUKOCYTE ESTERASE UR QL STRIP.AUTO: NEGATIVE
LYMPHOCYTES # BLD AUTO: 1.16 10*3/MM3 (ref 0.7–3.1)
LYMPHOCYTES NFR BLD AUTO: 15.8 % (ref 19.6–45.3)
Lab: NORMAL
MCH RBC QN AUTO: 31 PG (ref 26.6–33)
MCHC RBC AUTO-ENTMCNC: 32 G/DL (ref 31.5–35.7)
MCV RBC AUTO: 97 FL (ref 79–97)
METHYLMALONATE SERPL-SCNC: 338 NMOL/L (ref 0–378)
MONOCYTES # BLD AUTO: 0.81 10*3/MM3 (ref 0.1–0.9)
MONOCYTES NFR BLD AUTO: 11.1 % (ref 5–12)
NEUTROPHILS # BLD AUTO: 5.25 10*3/MM3 (ref 1.7–7)
NEUTROPHILS NFR BLD AUTO: 71.8 % (ref 42.7–76)
NITRITE UR QL STRIP: NEGATIVE
PH UR STRIP.AUTO: 6 [PH] (ref 5–8)
PLATELET # BLD AUTO: 146 10*3/MM3 (ref 140–450)
PMV BLD AUTO: 10.1 FL (ref 6–12)
POTASSIUM BLD-SCNC: 3.6 MMOL/L (ref 3.5–5.2)
PROT SERPL-MCNC: 6 G/DL (ref 6–8.5)
PROT UR QL STRIP: NEGATIVE
RBC # BLD AUTO: 3.29 10*6/MM3 (ref 3.77–5.28)
SODIUM BLD-SCNC: 141 MMOL/L (ref 136–145)
SP GR UR STRIP: 1.02 (ref 1–1.03)
UROBILINOGEN UR QL STRIP: ABNORMAL
WBC NRBC COR # BLD: 7.32 10*3/MM3 (ref 3.4–10.8)

## 2019-12-16 PROCEDURE — 81003 URINALYSIS AUTO W/O SCOPE: CPT | Performed by: INTERNAL MEDICINE

## 2019-12-16 PROCEDURE — 80053 COMPREHEN METABOLIC PANEL: CPT | Performed by: INTERNAL MEDICINE

## 2019-12-16 PROCEDURE — 36591 DRAW BLOOD OFF VENOUS DEVICE: CPT

## 2019-12-16 PROCEDURE — 25010000002 LEUCOVORIN CALCIUM PER 50 MG: Performed by: INTERNAL MEDICINE

## 2019-12-16 PROCEDURE — 96366 THER/PROPH/DIAG IV INF ADDON: CPT | Performed by: INTERNAL MEDICINE

## 2019-12-16 PROCEDURE — 25010000002 DEXAMETHASONE SODIUM PHOSPHATE 120 MG/30ML SOLUTION: Performed by: INTERNAL MEDICINE

## 2019-12-16 PROCEDURE — 85025 COMPLETE CBC W/AUTO DIFF WBC: CPT | Performed by: INTERNAL MEDICINE

## 2019-12-16 PROCEDURE — 96416 CHEMO PROLONG INFUSE W/PUMP: CPT | Performed by: INTERNAL MEDICINE

## 2019-12-16 PROCEDURE — 96411 CHEMO IV PUSH ADDL DRUG: CPT | Performed by: INTERNAL MEDICINE

## 2019-12-16 PROCEDURE — 25010000002 FLUOROURACIL PER 500 MG: Performed by: INTERNAL MEDICINE

## 2019-12-16 PROCEDURE — 25010000002 BEVACIZUMAB 100 MG/4ML SOLUTION 4 ML VIAL: Performed by: INTERNAL MEDICINE

## 2019-12-16 PROCEDURE — 96413 CHEMO IV INFUSION 1 HR: CPT | Performed by: INTERNAL MEDICINE

## 2019-12-16 PROCEDURE — 96367 TX/PROPH/DG ADDL SEQ IV INF: CPT | Performed by: INTERNAL MEDICINE

## 2019-12-16 RX ORDER — FLUOROURACIL 50 MG/ML
400 INJECTION, SOLUTION INTRAVENOUS ONCE
Status: COMPLETED | OUTPATIENT
Start: 2019-12-16 | End: 2019-12-16

## 2019-12-16 RX ORDER — ONDANSETRON HYDROCHLORIDE 8 MG/1
8 TABLET, FILM COATED ORAL 3 TIMES DAILY PRN
Qty: 30 TABLET | Refills: 5 | Status: SHIPPED | OUTPATIENT
Start: 2019-12-16 | End: 2019-12-18

## 2019-12-16 RX ORDER — SODIUM CHLORIDE 0.9 % (FLUSH) 0.9 %
10 SYRINGE (ML) INJECTION AS NEEDED
Status: DISCONTINUED | OUTPATIENT
Start: 2019-12-16 | End: 2019-12-17 | Stop reason: HOSPADM

## 2019-12-16 RX ORDER — FLUOROURACIL 50 MG/ML
400 INJECTION, SOLUTION INTRAVENOUS ONCE
Status: CANCELLED | OUTPATIENT
Start: 2019-12-16

## 2019-12-16 RX ORDER — SODIUM CHLORIDE 0.9 % (FLUSH) 0.9 %
10 SYRINGE (ML) INJECTION AS NEEDED
Status: CANCELLED | OUTPATIENT
Start: 2019-12-16

## 2019-12-16 RX ADMIN — BEVACIZUMAB 290 MG: 100 INJECTION, SOLUTION INTRAVENOUS at 09:29

## 2019-12-16 RX ADMIN — FLUOROURACIL 640 MG: 50 INJECTION, SOLUTION INTRAVENOUS at 12:13

## 2019-12-16 RX ADMIN — LEUCOVORIN CALCIUM 640 MG: 350 INJECTION, POWDER, LYOPHILIZED, FOR SOLUTION INTRAMUSCULAR; INTRAVENOUS at 10:08

## 2019-12-16 RX ADMIN — DEXAMETHASONE SODIUM PHOSPHATE 12 MG: 4 INJECTION, SOLUTION INTRA-ARTICULAR; INTRALESIONAL; INTRAMUSCULAR; INTRAVENOUS; SOFT TISSUE at 08:45

## 2019-12-16 RX ADMIN — FLUOROURACIL 3820 MG: 50 INJECTION, SOLUTION INTRAVENOUS at 12:15

## 2019-12-16 NOTE — PROGRESS NOTES
Patient to office today for chemo. Patient states that her diarrhea has subsided and she has been eating with no difficulties for the past two days. Patient states that she normally takes 10 meq Potassium po daily but has increased to 20 meq po daily for the past 3 days. Patient asked to repeat labs first today to see if potassium has improved before getting IV potassium. Notified NP. Patient repeat potassium level is 3.6 ,notified Lydia and order to hold IV potassium infusion received.

## 2019-12-18 ENCOUNTER — HOSPITAL ENCOUNTER (OUTPATIENT)
Dept: ONCOLOGY | Facility: HOSPITAL | Age: 59
Setting detail: INFUSION SERIES
Discharge: HOME | End: 2019-12-18

## 2019-12-18 VITALS
WEIGHT: 134 LBS | RESPIRATION RATE: 18 BRPM | BODY MASS INDEX: 24.66 KG/M2 | TEMPERATURE: 97.8 F | SYSTOLIC BLOOD PRESSURE: 167 MMHG | DIASTOLIC BLOOD PRESSURE: 75 MMHG | HEART RATE: 93 BPM | HEIGHT: 62 IN

## 2019-12-18 DIAGNOSIS — C18.7 MALIGNANT NEOPLASM OF SIGMOID COLON (HCC): Primary | ICD-10-CM

## 2019-12-18 PROCEDURE — 96523 IRRIG DRUG DELIVERY DEVICE: CPT | Performed by: NURSE PRACTITIONER

## 2019-12-18 RX ORDER — SODIUM CHLORIDE 0.9 % (FLUSH) 0.9 %
10 SYRINGE (ML) INJECTION AS NEEDED
Status: CANCELLED | OUTPATIENT
Start: 2019-12-18

## 2019-12-18 RX ORDER — SODIUM CHLORIDE 0.9 % (FLUSH) 0.9 %
10 SYRINGE (ML) INJECTION AS NEEDED
Status: DISCONTINUED | OUTPATIENT
Start: 2019-12-18 | End: 2019-12-19 | Stop reason: HOSPADM

## 2019-12-18 RX ADMIN — HEPARIN 500 UNITS: 100 SYRINGE at 15:39

## 2019-12-18 RX ADMIN — Medication 10 ML: at 15:39

## 2019-12-23 ENCOUNTER — LAB (OUTPATIENT)
Dept: LAB | Facility: HOSPITAL | Age: 59
End: 2019-12-23

## 2019-12-23 DIAGNOSIS — C18.7 MALIGNANT NEOPLASM OF SIGMOID COLON (HCC): ICD-10-CM

## 2019-12-23 LAB
BASOPHILS # BLD AUTO: 0.03 10*3/MM3 (ref 0–0.2)
BASOPHILS NFR BLD AUTO: 0.6 % (ref 0–1.5)
DEPRECATED RDW RBC AUTO: 52.8 FL (ref 37–54)
EOSINOPHIL # BLD AUTO: 0.19 10*3/MM3 (ref 0–0.4)
EOSINOPHIL NFR BLD AUTO: 3.5 % (ref 0.3–6.2)
ERYTHROCYTE [DISTWIDTH] IN BLOOD BY AUTOMATED COUNT: 15.7 % (ref 12.3–15.4)
HCT VFR BLD AUTO: 31.4 % (ref 34–46.6)
HGB BLD-MCNC: 10.3 G/DL (ref 12–15.9)
LYMPHOCYTES # BLD AUTO: 1.27 10*3/MM3 (ref 0.7–3.1)
LYMPHOCYTES NFR BLD AUTO: 23.3 % (ref 19.6–45.3)
MCH RBC QN AUTO: 31.6 PG (ref 26.6–33)
MCHC RBC AUTO-ENTMCNC: 32.8 G/DL (ref 31.5–35.7)
MCV RBC AUTO: 96.3 FL (ref 79–97)
MONOCYTES # BLD AUTO: 0.49 10*3/MM3 (ref 0.1–0.9)
MONOCYTES NFR BLD AUTO: 9 % (ref 5–12)
NEUTROPHILS # BLD AUTO: 3.47 10*3/MM3 (ref 1.7–7)
NEUTROPHILS NFR BLD AUTO: 63.6 % (ref 42.7–76)
PLATELET # BLD AUTO: 148 10*3/MM3 (ref 140–450)
PMV BLD AUTO: 9.1 FL (ref 6–12)
RBC # BLD AUTO: 3.26 10*6/MM3 (ref 3.77–5.28)
WBC NRBC COR # BLD: 5.45 10*3/MM3 (ref 3.4–10.8)

## 2019-12-23 PROCEDURE — 85025 COMPLETE CBC W/AUTO DIFF WBC: CPT

## 2019-12-23 PROCEDURE — 36415 COLL VENOUS BLD VENIPUNCTURE: CPT

## 2019-12-30 DIAGNOSIS — C18.7 MALIGNANT NEOPLASM OF SIGMOID COLON (HCC): ICD-10-CM

## 2019-12-30 RX ORDER — FLUOROURACIL 50 MG/ML
400 INJECTION, SOLUTION INTRAVENOUS ONCE
Status: CANCELLED | OUTPATIENT
Start: 2019-12-31

## 2019-12-30 RX ORDER — SODIUM CHLORIDE 9 MG/ML
250 INJECTION, SOLUTION INTRAVENOUS ONCE
Status: CANCELLED | OUTPATIENT
Start: 2019-12-31

## 2019-12-31 ENCOUNTER — HOSPITAL ENCOUNTER (OUTPATIENT)
Dept: ONCOLOGY | Facility: HOSPITAL | Age: 59
Setting detail: INFUSION SERIES
Discharge: HOME | End: 2019-12-31

## 2019-12-31 VITALS
DIASTOLIC BLOOD PRESSURE: 77 MMHG | RESPIRATION RATE: 18 BRPM | BODY MASS INDEX: 24.29 KG/M2 | SYSTOLIC BLOOD PRESSURE: 122 MMHG | HEIGHT: 62 IN | WEIGHT: 132 LBS | HEART RATE: 94 BPM

## 2019-12-31 DIAGNOSIS — C18.7 MALIGNANT NEOPLASM OF SIGMOID COLON (HCC): Primary | ICD-10-CM

## 2019-12-31 LAB
ALBUMIN SERPL-MCNC: 3.9 G/DL (ref 3.5–5.2)
ALBUMIN/GLOB SERPL: 1.5 G/DL
ALP SERPL-CCNC: 98 U/L (ref 39–117)
ALT SERPL W P-5'-P-CCNC: 47 U/L (ref 1–33)
ANION GAP SERPL CALCULATED.3IONS-SCNC: 9 MMOL/L (ref 5–15)
AST SERPL-CCNC: 42 U/L (ref 1–32)
BASOPHILS # BLD AUTO: 0.04 10*3/MM3 (ref 0–0.2)
BASOPHILS NFR BLD AUTO: 1 % (ref 0–1.5)
BILIRUB SERPL-MCNC: 0.6 MG/DL (ref 0.2–1.2)
BILIRUB UR QL STRIP: NEGATIVE
BUN BLD-MCNC: 12 MG/DL (ref 6–20)
BUN/CREAT SERPL: 15.6 (ref 7–25)
CALCIUM SPEC-SCNC: 9.3 MG/DL (ref 8.6–10.5)
CHLORIDE SERPL-SCNC: 107 MMOL/L (ref 98–107)
CLARITY UR: CLEAR
CO2 SERPL-SCNC: 24 MMOL/L (ref 22–29)
COLOR UR: YELLOW
CREAT BLD-MCNC: 0.77 MG/DL (ref 0.57–1)
DEPRECATED RDW RBC AUTO: 52.9 FL (ref 37–54)
EOSINOPHIL # BLD AUTO: 0.11 10*3/MM3 (ref 0–0.4)
EOSINOPHIL NFR BLD AUTO: 2.6 % (ref 0.3–6.2)
ERYTHROCYTE [DISTWIDTH] IN BLOOD BY AUTOMATED COUNT: 16 % (ref 12.3–15.4)
GFR SERPL CREATININE-BSD FRML MDRD: 77 ML/MIN/1.73
GLOBULIN UR ELPH-MCNC: 2.6 GM/DL
GLUCOSE BLD-MCNC: 91 MG/DL (ref 65–99)
GLUCOSE UR STRIP-MCNC: NEGATIVE MG/DL
HCT VFR BLD AUTO: 34 % (ref 34–46.6)
HGB BLD-MCNC: 11.1 G/DL (ref 12–15.9)
HGB UR QL STRIP.AUTO: NEGATIVE
KETONES UR QL STRIP: NEGATIVE
LEUKOCYTE ESTERASE UR QL STRIP.AUTO: NEGATIVE
LYMPHOCYTES # BLD AUTO: 1.09 10*3/MM3 (ref 0.7–3.1)
LYMPHOCYTES NFR BLD AUTO: 26.2 % (ref 19.6–45.3)
MCH RBC QN AUTO: 30.7 PG (ref 26.6–33)
MCHC RBC AUTO-ENTMCNC: 32.6 G/DL (ref 31.5–35.7)
MCV RBC AUTO: 93.9 FL (ref 79–97)
MONOCYTES # BLD AUTO: 0.86 10*3/MM3 (ref 0.1–0.9)
MONOCYTES NFR BLD AUTO: 20.7 % (ref 5–12)
NEUTROPHILS # BLD AUTO: 2.06 10*3/MM3 (ref 1.7–7)
NEUTROPHILS NFR BLD AUTO: 49.5 % (ref 42.7–76)
NITRITE UR QL STRIP: NEGATIVE
PH UR STRIP.AUTO: 6 [PH] (ref 5–8)
PLATELET # BLD AUTO: 119 10*3/MM3 (ref 140–450)
PMV BLD AUTO: 10 FL (ref 6–12)
POTASSIUM BLD-SCNC: 4.2 MMOL/L (ref 3.5–5.2)
PROT SERPL-MCNC: 6.5 G/DL (ref 6–8.5)
PROT UR QL STRIP: NEGATIVE
RBC # BLD AUTO: 3.62 10*6/MM3 (ref 3.77–5.28)
SODIUM BLD-SCNC: 140 MMOL/L (ref 136–145)
SP GR UR STRIP: 1.01 (ref 1–1.03)
UROBILINOGEN UR QL STRIP: NORMAL
WBC NRBC COR # BLD: 4.16 10*3/MM3 (ref 3.4–10.8)

## 2019-12-31 PROCEDURE — 96416 CHEMO PROLONG INFUSE W/PUMP: CPT | Performed by: INTERNAL MEDICINE

## 2019-12-31 PROCEDURE — 85025 COMPLETE CBC W/AUTO DIFF WBC: CPT | Performed by: NURSE PRACTITIONER

## 2019-12-31 PROCEDURE — 80053 COMPREHEN METABOLIC PANEL: CPT | Performed by: NURSE PRACTITIONER

## 2019-12-31 PROCEDURE — 96411 CHEMO IV PUSH ADDL DRUG: CPT | Performed by: INTERNAL MEDICINE

## 2019-12-31 PROCEDURE — 96413 CHEMO IV INFUSION 1 HR: CPT | Performed by: INTERNAL MEDICINE

## 2019-12-31 PROCEDURE — 25010000002 DEXAMETHASONE SODIUM PHOSPHATE 120 MG/30ML SOLUTION: Performed by: NURSE PRACTITIONER

## 2019-12-31 PROCEDURE — 96367 TX/PROPH/DG ADDL SEQ IV INF: CPT | Performed by: INTERNAL MEDICINE

## 2019-12-31 PROCEDURE — 25010000002 BEVACIZUMAB 100 MG/4ML SOLUTION 4 ML VIAL: Performed by: NURSE PRACTITIONER

## 2019-12-31 PROCEDURE — 81003 URINALYSIS AUTO W/O SCOPE: CPT | Performed by: NURSE PRACTITIONER

## 2019-12-31 PROCEDURE — 25010000002 LEUCOVORIN CALCIUM PER 50 MG: Performed by: NURSE PRACTITIONER

## 2019-12-31 PROCEDURE — 25010000002 FLUOROURACIL PER 500 MG: Performed by: NURSE PRACTITIONER

## 2019-12-31 RX ORDER — FLUOROURACIL 50 MG/ML
400 INJECTION, SOLUTION INTRAVENOUS ONCE
Status: COMPLETED | OUTPATIENT
Start: 2019-12-31 | End: 2019-12-31

## 2019-12-31 RX ADMIN — DEXAMETHASONE SODIUM PHOSPHATE 12 MG: 4 INJECTION, SOLUTION INTRA-ARTICULAR; INTRALESIONAL; INTRAMUSCULAR; INTRAVENOUS; SOFT TISSUE at 14:27

## 2019-12-31 RX ADMIN — FLUOROURACIL 3860 MG: 50 INJECTION, SOLUTION INTRAVENOUS at 16:23

## 2019-12-31 RX ADMIN — LEUCOVORIN CALCIUM 640 MG: 350 INJECTION, POWDER, LYOPHILIZED, FOR SOLUTION INTRAMUSCULAR; INTRAVENOUS at 15:35

## 2019-12-31 RX ADMIN — FLUOROURACIL 640 MG: 50 INJECTION, SOLUTION INTRAVENOUS at 16:17

## 2019-12-31 RX ADMIN — BEVACIZUMAB 300 MG: 100 INJECTION, SOLUTION INTRAVENOUS at 14:49

## 2020-01-02 ENCOUNTER — HOSPITAL ENCOUNTER (OUTPATIENT)
Dept: ONCOLOGY | Facility: HOSPITAL | Age: 60
Setting detail: INFUSION SERIES
Discharge: HOME | End: 2020-01-02

## 2020-01-02 VITALS
DIASTOLIC BLOOD PRESSURE: 77 MMHG | HEART RATE: 105 BPM | SYSTOLIC BLOOD PRESSURE: 138 MMHG | RESPIRATION RATE: 18 BRPM | BODY MASS INDEX: 24.48 KG/M2 | WEIGHT: 133 LBS | TEMPERATURE: 98.3 F | HEIGHT: 62 IN

## 2020-01-02 DIAGNOSIS — C18.7 MALIGNANT NEOPLASM OF SIGMOID COLON (HCC): Primary | ICD-10-CM

## 2020-01-02 PROCEDURE — 96523 IRRIG DRUG DELIVERY DEVICE: CPT | Performed by: NURSE PRACTITIONER

## 2020-01-02 RX ORDER — SODIUM CHLORIDE 0.9 % (FLUSH) 0.9 %
10 SYRINGE (ML) INJECTION AS NEEDED
Status: CANCELLED | OUTPATIENT
Start: 2020-01-02

## 2020-01-02 RX ORDER — SODIUM CHLORIDE 0.9 % (FLUSH) 0.9 %
10 SYRINGE (ML) INJECTION AS NEEDED
Status: DISCONTINUED | OUTPATIENT
Start: 2020-01-02 | End: 2020-01-03 | Stop reason: HOSPADM

## 2020-01-02 RX ADMIN — Medication 10 ML: at 15:20

## 2020-01-02 RX ADMIN — HEPARIN 500 UNITS: 100 SYRINGE at 15:20

## 2020-01-02 NOTE — PROGRESS NOTES
Hematology/Oncology Outpatient Follow Up    PATIENT NAME:Samantha Massey  :1960  MRN: [id number]  PRIMARY CARE PHYSICIAN: Diana Spencer MD  REFERRING PHYSICIAN: Diana Spencer MD    Chief Complaint   Patient presents with   • Follow-up     Malignant neoplasm of sigmoid colon      HISTORY OF PRESENT ILLNESS:   1. Colon cancer with liver metastasis: diagnosis established 2012  · The patient claimed to have developed intermittent diarrhea and constipation in 2011. She eventually saw Dr. Diana Spencer in 2012. A chemistry profile revealed normal liver enzymes.  CEA on 12 was 5.9 ng/mL (< 5, smoker); hemoglobin was 12.1 gm/dL, and MCV 86.2 fl.  A CT scan of the abdomen and pelvis was performed on 12 revealing innumerable mass lesions throughout the liver, thickening of the distal sigmoid colon wall, nonspecific non-pathologically enlarged lymph nodes in the sigmoid mesentery and left iliac chain. She was referred to Dr. Regina Pack and had a repeat CEA done on 12 revealing a value of 7.5 ng/mL. A colonoscopy was performed on 12 by Dr. Pack revealing 10 cm distal colon mass, which on biopsy revealed the presence of invasive moderately differentiated adenocarcinoma which demonstrated micro satellite stability. The patient was hospitalized on 12 and underwent laparoscopic low anterior resection with mobilization of splenic flexure, which on pathology revealed invasive, moderate to focally poorly differentiated adenocarcinoma of the rectosigmoid colon with invasion through muscularis propria and extension into mesocolon. The surgical margins were free of tumor. Mesenteric lymph nodes, 14, were negative for metastatic tumor. Lymphovascular invasion was not identified. An [id number] was placed on 12 and a CT-guided liver biopsy was performed the same day by ·Dr. Hernandez Espino. Pathology revealed metastatic adenocarcinoma consistent with colonic primary.  The patient was then referred here for further evaluation.  · 2/13/12 - Order to start chemotherapy to consist of Leucovorin 500mg IV day 1, Oxaliplatin 120 mg IV day1, Avastin 230 mg IV day 1, 5FU 575mg IV day 1 followed by 345 mg CIV, cycle q 2 weeks.  · 2/13/12 - CEA 5.6 (H).  · 2/13/12 - Chest x-ray revealed no acute cardiopulmonary disease.  · 2/17/12 - CT abdomen and pelvis revealed no evidence of metastatic disease of the chest, innumerable hepatic metastases.  Bowel staple line at the rectosigmoid junction with adjacent stranding of fat consistent with postoperative change.  · 2/18/12 - KRAS testing performed on rectum biopsy: no mutation detected.  · 2/21/12 - Patient started Chemotherapy cycle #1 consisting of Avastin, Leucovorin, 5FU, and Oxaliplatin.  · 2/23/12 - CT chest revealed no pulmonary embolus, no active lung disease. Hepatic nodularity.  · 2/24/12 - Per note from [facility] patient enrolled in clinical trail per Dr. Eldridge randomized to the control arm of the study. Which consist of standard FOLFOX chemotherapy that will be given at our office.  · 2124/12 - PET scan revealed interval resection of known primary colonic carcinoma at the rectosigmoid junction without evidence of residual tumor in this location. The patient is believed to be status post resection of the previously described left internal iliac lymph node without evidence of hypermetabolic metastatic lymphadenopathy. Innumerable hypermetabolic hepatic metastases.  · 3/6/12 - Patient started Chemotherapy cycle #2 consisting of Avastin, Leucovorin, 5FU, and Oxaliplatin  · 3/20/12 - Patient   started   Chemotherapy    cycle   #3     consisting    of   Avastin, leucovorin, 5FU, and Oxaliplatin  · 4/3/12 - Patient started Chemotherapy cycle #4 consisting of Avastin, Leucovorin, 5FU, and Oxaliplatin  · 4/3/12 - Orders written to decrease chemotherapy dose by 20% at her next cycle and to give Neulasta injection day after each cycle.  Hemoglobin  11.9.  · 4/3112 - CEA 1.0 (N).  · 4/6/12 - CT C/A/P - Chest - unremarkable, Abdomen/Pelvis - Overall moderate to marked decrease in the size of the multiple hepatic metastatic lesions. This is when compared to a study of 1/2/12. Post-op changes in the region of the rectosigmoid junction Small amount of free fluid in the cul-de-sac with a small amount of fluid apparently within the endometrial cavity. These findings are not unusual premenopausal.                                                                                           · 4/17/12 - Patient started on cycle 5 of Oxaliplatin, Leucovorin, 5-FU, Avastin.  · 5/1/12 - Patient started on cycle 6 of Oxaliplatin, Leucovorin, 5-FU, Avastin.  · 5/14/12 - CEA 1.0 (N).  · 5/17/12 - Patient started on cycle 7 of Oxaliplatin, Leucovorin, 5-FU, Avastin.  · 5/29/12 - 5/31/12 - Patient admitted to [facility] due to acute febrile illness, sepsis syndrome, urinary tract infection, CA colon with liver mets, leukocytosis, hypotension, Elevated liver function tests, and acute kidney injury. TSH 8.66 (H), Ferritin 528(H), Folate 24.8 (H), Haptoglobin 312 (H), Iron 40 (N), Hep panel negative, Relic count1.42 (N), TIBC 281 (N), 812 1500(H), Lipase 22(N). Chest x-ray revealed no acute cardiopulmonary abnormality.  · 5/29/12 - Patient started on cycle 8 of Oxaliplatin, Leucovorin, 5-FU, Avastin.  · 6/13/12 - Patient started on cycle 9 of Oxaliplalin, Leucovorin, 5-FU, Avastin.  · 6/25/12 - CT scan of the abdomen and pelvis with hepatic lesions appearing smaller as compared to the prior study, which could relate to underlying metastatic disease. Sclerotic lesion of the right iliac bone that was present previously and could relate to bone mets. Postsurgical change.  · 6/26/12 - Patient started on cycle 10 of Oxaliplatin, Leucovorin, 5-FU, Avastin.  · 7/10/12 - Patient started on cycle 11 of Leucovorin, 5-FU, Oxaliplatin, Avaslin.  · 7/17/12 - Bone scan no evidence of metastatic  disease including sclerotic lesion right Iliac area.  · 7/24/12 - CEA 0.7.  · 7/24/12 - Patient started on cycle 12 of Leucovorin, 5-FU, Oxaliplatin, Avastin.  · 8/7/12 - Patient started on cycle 13 of Leucovorin, 5-FU, Oxaliplatin, Avastin.  · 8/21/12 - Patient started on cycle 14 of Leucovorin, 5-FU, Oxaliplatin, Avastin.  · 8/28/12 - CT scan of the abdomen and pelvis revealed stable size and appearance of multiple hypodense hepatic lesions, likely representing metastatic disease. Otherwise stable abdomen and pelvis.  · 8/23/12 - CEA 0.8 (N).  · 9/4/12 - Orders written to continue chemotherapy until seen by Dr. Eldridge.  · 9/11/12 - Patient started on cycle 15 of Leucovorin, 5-FU, Oxaliplatin, Avastin.  · 9/11/12 - Orders written to give chemotherapy today with a dose reduction of 20% due to low ANC.  · 9/18/12 - Orders written to discontinue chemotherapy and start maintenance Xeloda 1500 mg po bid x 14 days cycle q 3 weeks, per Dr. Eldridge.  · 11/7/12 - Patient evaluation by Dr. Montoay which recommended hepatic directed radiation therapy. She underwent TheraSpheres targeting the right lobe. Will treat the left hepatic lobe in 3 to 4 weeks if she is able to tolerate this procedure.  · 12/20/12 - CEA 1.3 (N).  · 12/31/12 - CT scan of the abdomen and pelvis revealed that there has been a significant partial response in both lobes of the liver.  Activity of remaining lesions is uncertain and could be assessed by follow-up studies for PET/CT correlation.  Heterogeneous enhancement of the right lobe of the liver is consistent with previous embolization therapy.  No evidence of extrahepatic metastatic disease.  Focal non-enhancement and bulging of the lateral wall of the gallbladder body.       · 1/22/13 - CEA 0.8.  · 1/22/13 - AST 52, ALT 47, ALK PHOS 157.  · 3/25/13 - Xeloda dose decreased to 1,250 mg p.o. b.i.d. x 14 days to cycle every three weeks due to hand foot syndrome.  · 4/27/13 - CT scan of abdomen and pelvis  revealed overall continued improvement in hepatic metastatic disease.  The single largest lesion in the right lobe is stable in appearance when compared to prior study although other lesions within the liver are more hypodense and some are smaller suggesting continued response to treatment.  Many of these lesions may no longer harbor viable malignancy.  A follow-up PET/CT scan could be performed to further evaluate the full extent of the viable metastasis versus continued attenuation on CT follow-up.  Contracted gallbladder with either focal areas of bulging of the wall or necrosis.  The appearance of the current exam is more suggesting a bulging rather than necrosis.  · 5/6/13 - Order written to decrease Xeloda to 1000 mg by mouth twice a day ×14 days every three weeks due to hand-foot syndrome and chemotherapy induced cytopenias and diarrhea.  · 5/28/13 - PET/CT scan negative for hypermetabolic foci in the neck, chest, abdomen, or pelvis to suggest recurrent, progressive, or metastatic tumor.  · 7/9/13 - CEA 2.4 (N).  · 8/5/13 - CT of the chest showed no evidence of metastatic disease.  CT scan of abdomen and pelvis show continued improvement in metastatic disease involving both lobes of the liver.  No evidence of extrahepatic metastatic disease in the abdomen or pelvis.  The gallbladder is contracted.  The previously suspected defect of the gallbladder wall against the liver surface is smaller.  There is no evidence of gallbladder leak into the peritoneal cavity.  Contained perforation is not excluded but is increasingly less likely.  · 12/7/13 - CT scan of the abdomen and pelvis revealed stable appearance of hepatic metastatic disease, most of which is likely treated.  A PET/CT scan could be performed to evaluate for any sites of active metastatic disease.  No evidence of recurrent disease at the anastomosis.  The gallbladder is contracted and not optimally evaluated on this exam, but is stable in appearance  compared to prior exam.  · 12/23/13 - CEA 1.4 normal.  · 4/28/14 - CEA 1.6 (N).  · 6/26/14 - Patient underwent a colonoscopy by Dr. Pack, which revealed no evidence of recurrence.  · 7/15/14 - Patient advised to continue chemotherapy with Xeloda.  · 7/30/14 - Echocardiogram revealed left ventricular size and contractility is within normal limits and ejection fraction is 55-60%.  · 8/18/14 - CT scan of the abdomen and pelvis revealed stable previously treated metastases in both lobes of the liver.  The activity of individual lesions is not determined and may be assessed by follow-up CT scans are PET/CT correlation.  No evidence of extrahepatic metastatic disease.  No signs of local tumor recurrence at the bowel resection site.  · 8/24/14 - Xeloda discontinued due to remission of disease.  · 9/16/14 - CEA 1.0 (N).  · 11/26/14 - CEA 1  · 12/23/14 - CT abdomen and pelvis with contrast: There are several tiny low attenuation lesions within the liver, but overall are believed to be unchanged compared to the prior exam in April 2013.  · 12/23/14 - CXR: No acute process seen.  · 12/23/14 - CT abdomen and pelvis with several tiny low attenuation lesions in the liver, unchanged in comparison to prior CT’s.  Chest x-ray with no acute process seen.    · 5/26/15 - WBC 6, hemoglobin 12.1, platelet count 173,000, MCV 85.8.  · 6/25/15 - CT scan of the abdomen and pelvis, right lobe of the liver is decreased in size and the margin is nodular. Three metal densities in the area of the hepatic artery. Duodenal lipoma. Gallbladder contracted.   · 8/24/15 - CEA 0.8 (0-5). Comprehensive metabolic panel normal.    · 2/15/16 - CEA 1.0 (0-5). Comprehensive metabolic panel with potassium 3.3 (3.6-5.1). Patient asked to go on potassium supplements but wanted to try high potassium diet.     · 8/15/16 - Patient advised to have the Infusaport removed if CT is stable. CEA 0.9 (N). CMP normal, creatinine 1.0. 9/2/16 - CT scan of the chest,  abdomen and pelvis without contrast revealed stable chest CT with no evidence of mediastinal, hilar or pulmonary metastases or mass. Interval improvement of the appearance of the liver with no hepatic metastases seen and no evidence of adrenal enlargement or upper abdominal lymphadenopathy. Post treatment change was noted to the right lobe of the liver and hepatic artery, stable duodenal lipoma, moderate stool suggesting constipation.   · 10/6/16 - Infusaport removal by Dr. Pack.   · 12/22/16 - WBC 5.17, hemoglobin 13.2, MCV 86.5, platelets 234,000, ANC 3.3.   · 4/21/17 - CEA 0.9 (0-3). CMP with insignificant abnormalities - creatinine 1.1.   · 8/23/17 - WBC 6.79, hemoglobin 11.7, MCV 86.8, platelets 185,000, ANC 3.78.    · 9/27/17 - CT chest, abdomen and pelvis with mild coronary artery calcification, enlarging low-attenuation ill-defined mass in the left hepatic lobe. More ill-defined area of hypoattenuation within liver segments 2-3. Evidence of partial distal colon resection with patent anastomosis.   · 10/16/17 - CT-guided liver biopsy by Jay Baer M.D. revealed on pathology metastatic adenocarcinoma consistent with colon primary.   · 10/25/17 - Orders written for mFOLFOX6 plus Bevacizumab, Oxaliplatin 85 mg/M2 IV piggyback day 1, Leucovorin 400 mg/M2 IV piggyback day 1, 5- mg/M2 IV piggyback day 1 then 2400 mg/M2 CIV over 46 hours, Bevacizumab 5 mg/kg IV day 1 to repeat cycle every two weeks. CEA 1.2 (0-5).    · 11/2/17 - Infusaport placement under fluoroscopic guidance by Regina Pack M.D.   · 11/13/17 - MRI abdomen with 5.8 cm heterogenously enhancing lesion within hepatic segment 4 compatible with recently-biopsied lesion. Just adjacent to this is an 8 mm satellite lesion. No hepatic lesion identified within segments 2 or 3.   · 11/17/17 - PET scan with large hypermetabolic mass in liver segment 4 compatible with metastasis.   · 11/27/17 - Patient received cycle 1 chemotherapy.    · 12/11/17 - Patient received cycle 2 chemotherapy.   · 12/13/17 - Patient called complaining of feeling of her jaw being tight and tongue thick along with some leg cramps on the prior day, 12/12/17. The patient took Benadryl and reported the symptoms improved throughout the day.   · 12/20/17 - Patient reports fever following both chemotherapy infusions. She reports a temperature of 101 post the 12/11/17 infusion that resolved; however, the patient continued to have flu-like achiness for several days afterward. Blood cultures ordered to be obtained with next port access. Dexamethasone 4 mg p.o. b.i.d. x3 days on days 2-4 of chemotherapy ordered due to neuropathies with cramping of the jaw and some nausea. Z-Pack prescribed for URI and Benzonatate prescribed for cough.   · 12/27/17 - Patient seen in followup by Doug Eldridge M.D. with plans of repeating CT scan of the liver post third FOLFOX treatment.   · 1/2/18 - Creatinine 1.0 (0.4-1.0).  Patient received cycle 3 chemotherapy.   · 1/8/18 - Chemotherapy delayed and Nephrology consulted for acute rise in creatinine from 1.0 to 2.5.   · 1/10/18 - Creatinine 2.1 (0.4-1.0).    · 1/15/18 - Creatinine 1.6 (0.4-1.0).    · 1/16/18 - Received orders from Dr. Riojas’s office for patient to receive Mucomyst 1200 mg p.o. b.i.d. for four days starting the day before CT scans in the future as well as IV hydration with normal saline 150 cc per hour for two hours prior to procedure and for four hours postprocedure.   · 1/22/18 - Current chemotherapy discontinued. New chemotherapy orders written for Irinotecan 180 mg/M2 IV piggyback day 1, Leucovorin 400 mg/M2 IV piggyback day 1, 5- mg/M2 IV piggyback day 1 followed by 2400 mg/M2 CIV days 1-3 and Bevacizumab 5 mg/kg IV day 1 to repeat cycle every two weeks. Chemotherapy to start post CT abdomen to determine whether patient would go for surgery now or later. Creatinine 1.3 (0.4-1.0).    · 1/29/18 - CT chest with new 2 mm  nodule in the right middle lobe and 6 mm short axis anterior epicardial lymph node. CT abdomen and pelvis with two new metastases of the left lobe of the liver, largest measuring 4.4 cm.   · 2/5/18 - Chemotherapy placed on hold pending surgical resection of liver lesions.   · 2/8/18 - Patient underwent diagnostic laparoscopy, laparoscopic intraoperative ultrasound of the liver, exploratory laparotomy, left hepatectomy, intraoperative ultrasound and omental pedicle flap by Doug Eldridge II, M.D. at [facility] with pathology revealing the left hepatic lobectomy specimen to contain a single nodule of metastatic adenocarcinoma consistent with colonic primary 4.5 cm in size with surgical margins negative for carcinoma. Tumor consisted of 30% viable tumor, 60% necrosis and 10% peripheral fibrosis.   · 3/1/18 - Discussed options in detail with the patient. Mutually decided to treat with adjuvant Xeloda for six months at a dose of 1000 mg p.o. b.i.d. x14 days every 21 days based on her previous dose that she tolerated. CEA 0.8 (0-5). Liver enzymes normal.   · 3/1/18 - Patient started cycle 1 chemotherapy.   · 5/12/18 - CT chest with stable punctate indeterminate right upper lobe and right middle lobe nodules and abdomen and pelvis with interval left partial hepatectomy with no signs of active disease in the liver.   · 5/31/18 - Patient claims to be taking Xeloda two weeks on, three weeks off. Asked to change to two weeks on, one week off. Patient starting cycle 3 Capecitabine (Xeloda).    · 6/21/18 - Reporting grade I palmar erythema with Capecitabine. Advised to use Urea Lotion t.i.d., cold therapy and to call for worsening symptoms.   · 7/28/18 - CT abdomen and pelvis at [facility] revealed slight increasing geographic areas of low attenuation within the inferior aspect of the hepatic segment 5 without obvious well-defined lesions and areas of hepatic steatosis are more likely favored. No evidence of  metastatic disease elsewhere in the abdomen or pelvis.   · 9/18/18 - Patient currently on cycle 6 Capecitabine (Xeloda). Asked to discontinue after completion of this cycle.  CEA 1.0 (0.5-1.5).   · 10/8/18 - CT chest, abdomen and pelvis at [facility] with stable chronic changes noted in the chest, stable appearance of the liver without metastatic disease detected, development of small ventral hernia was noted without evidence of bowel obstruction, stable-appearing intraluminal fatty lesion of the duodenum was present.   · 1/21/19 - CEA 1.1 (0-3 non-smoker).    · 1/29/19 - BRAF negative performed on biopsy originally dated 10/16/17.   · 5/13/19 - CT chest, abdomen and pelvis with contrast at [facility] showed metastatic disease to the chest with a new conglomerate of likely necrotic subcarinal lymphadenopathy measuring 2.7 cm and new right hilar lymphadenopathy measuring 7 mm. There were multiple new right upper lobe lung nodules with solid appearance. One nodule measured 8 mm. A second nodule had a solid component measuring 5 mm. There was a subpleural nodule anteriorly in the right upper lobe that measured 7 mm and a new left lower lobe nodule measuring 5 mm. There was nodular contour of the liver with increased hyperdensity of the liver measuring 2.3 cm where it had previously measured 2 cm and an ill-defined region of hyperdensity anteriorly in the right lobe of the liver that was unchanged. There were periumbilical hernias containing mesenteric fat and loops of bowel without evidence of obstruction. There was a lipoma in the duodenum. There were no mesenteric or retroperitoneal lymphadenopathy present. There was evidence of a probable mesenteric implant at the right upper quadrant abdomen adjacent to the right hemidiaphragm measuring 2.3 x 2.1 cm. This had increased in size from the prior exam and was subtly seen on the prior exam measuring 8 mm. Patient seen by Dr. Doug Eldridge at [facility] with note stating there was evidence of pulmonary progression and possible abdominal progression with recommendation to followup with Medical Oncology to resume systemic chemotherapy.   · 5/16/19 - PET CT at [facility] showed hypermetabolic subcarinal mass concerning for metastatic disease. There were scattered pulmonary densities extending down to the right hilar region which were hypermetabolic ranging in SUV of 5.1 to 5.3. This was concerning for metastatic disease, although inflammatory process was considered a second possibility. There were several tiny nodular densities seen in the lungs which did not demonstrate significant metabolic activity but could be inflammatory or metastatic. There we intercostal masses on the right concerning for metastatic disease. There was a presumed peritoneal implant on the right upper quadrant between the liver and the diaphragm that was hypermetabolic and concerning for metastatic disease. There were some small subcentimeter lymph nodes in the left neck that were non-specific and indeterminate and felt to be reactive versus metastatic. There was hypermetabolic activity seen in the soft tissues and bone marrow.   · 6/3/19 - Reviewed recent CT scans and PET scan showing likely progression with mesenteric implants and likely metastatic lymphadenopathy and lung nodules. Patient with recent bronchitis and infectious symptoms. Will plan to give a course of antibiotics. Discussed CT-guided biopsy of the anterior right upper quadrant mesenteric mass to confirm presence of malignancy. Discussed past treatments and side effects with plan to pursue further systemic therapy once biopsy-proven progression is confirmed. If biopsy is negative will plan to follow on repeat scan.  CEA 0.7 (0-5).  · 6/13/19 CT-guided core biopsy of abdominal mesenteric soft tissue mass by Jay Baer MD revealed metastatic adenocarcinoma consistent with colon primary.  · 6/21/19 discussed results of  biopsy with the patient and recommended systemic chemotherapy.  Discussed starting FOLFOX 6 again versus FOLFIRI.  Given the fact that she did have elevation of renal function as well as neuropathy with FOLFOX in the past mutually decided to treat her with FOLFIRI plus bevacizumab.  · 7/8/2019-FOLFIRI and Avastin cycle 1 initiated.  · 7/22/2019-FOLFIRI and Avastin cycle 2 initiated.  · 7/29/2019-Patient tolerating treatment with some expected mild cytopenias and nausea.  · 8/5/2019- cycle 3 chemotherapy delayed x1 week for neutropenia (ANC 0.69).  Neulasta added to treatment plan.  TSH 6.05 (0.34-5.6).  · 8/12/2019- cycle 3 FOLFIRI and Avastin with Neulasta support initiated.   · 8/26/2019-cycle 4 FOLFIRI and Avastin with Neulasta support initiated.   · 9/9/19 -cycle 5 FOLFIRI plus bevacizumab (Avastin) given.  CT chest, abdomen and pelvis: suspicious for metastatic disease in the chest. New enlarged subcarinal lymph node have developed. New small right upper lobe and left lower lobe nodules have developed. No convincing evidence of recurrent or metastatic disease in the abdomen or pelvis. There are 2 new ventral abdominal wall hernias containing nonobstructed bowel.  Additional CT findings include: Left hepatectomy without new focal liver lesions seen, distal colectomy with colocolic anastomosis, presumed gastroduodenal artery embolization changes, duodenal lipoma.   · 9/18/2019 discussed CT results with the patient.  While awaiting comparison CTs to more recent ones from [place], discussed change in Avastin to cetuximab while continuing FOLFIRI to which patient is in agreement.   · 9/20/19 - ADDENDUM on CT scan of chest, abdomen and pelvis: comparison with outside CT chest, abdomen and pelvis from [facility] dated 5/13/19. There appears to be slight improvement since 5/13/19. Subcarinal adenopathy has slightly diminished, and a right upper quadrant mesenteric implant is no longer visualized. Pulmonary nodules  are unchanged in size and number. Previously described low density areas in the liver are not visible on today's examination, which may be simply due to the lack of IV contrast.  Given the CT findings change in chemotherapy canceled and patient to continue on FOLFIRI plus bevacizumab.  · 9/23/19 - TSH 4.140 (0.340-5.600).  Cycle 6 chemotherapy initiated.   · 9/27/2019 [facility] testing revealed 6 actionable genomic findings including APC R876*, APC [id number], AURKA gain, CCND3 gain, SMAD4 loss, TP53 R273H.  BRAF, K-shannon, NRAS and PIK3CA were wild-type.  Additional findings included HER2, PDL1, TRKpan negative and PTEN, MGMT and TOPO1 positive.  8 therapies with potential increased benefit included capecitabine, cetuximab, irinotecan, oxaliplatin, Panitumumab, regorafenib, everolimus and topotecan.  · 10/7/2019 cycle 7 chemotherapy initiated.  · 10/21/2019- cycle 8 chemotherapy with FOLFIRI and Avastin with Neulasta support administered.  · 11/4/2019 cycle 9 chemotherapy with FOLFIRI and Avastin with Neulasta support administered.  · 11/18/2019 cycle 10 chemotherapy with FOLFIRI and Avastin with Neulasta support administered.  · 12/2/2019 cycle 11 chemotherapy with FOLFIRI and Avastin with Neulasta support administered.  · 12/2/19 PET/CT - Findings indicate positive response to therapy since the PET/CT of 05/16/2019. The subcarinal jacques mass has significantly diminished in size and isno longer FDG avid. No abnormal right or left hilar jacques uptake is identified and no pathologically enlarged hilar lymph nodes are seen.  FDG avid right upper lobe pulmonary nodule seen on the previous 05/16/2019 PET/CT has largely resolved. A 4 mm nodule remains, diminished in size. No FDG avid pulmonary nodules are identified.. The peritoneal or intercostal hypermetabolic soft tissue implants described on the previous PET/CT have resolved.5. No evidence of metastatic disease in liver. Partial left hepatectomy. Diffuse hypermetabolic  activity throughout the axial and appendicularskeleton without associated osteolytic or osteosclerotic abnormality.These findings may represent reactive marrow response to chemotherapy.Diffuse marrow malignant infiltration could have a similar imaging appearance in the absence of history of recent chemotherapy administration.  · 12/11/2019 -switched chemotherapy from FOLFIRI and Avastin with Neulasta support with plan to change to maintenance 5FU+Avastin.  CEA 1.47  · 12/11/2019 iron 45, ferritin 490 high, iron saturation 12% low, TIBC 368, folate greater than 20, B12 greater than 2000,  · 12/16/2019 patient received maintenance 5-FU plus Avastin  · 12/31/2019 patient received maintenance 5-FU plus Avastin, WBC 4.1, hemoglobin 11.1, platelets 119, creatinine 0.77  · 1/8/2020 WBC 4.8, hemoglobin 10.9, platelets 123    2. Anemia and intermittent thrombocytopenia: established February 2012  · 2/13/12 - Retic count 0.8(N), Iron 14 (L), TIBC 245 (L), % sat 6 (L), Ferritin 376 (H), Hemoglobin 9.3 (L).  · 2/28/12 - Hemoglobin 8.8 (L), Retic count 1.1 (N), Iron 28(L), TIBC 311 (N), %sat 9 (L), Ferritin 320 (H), Vitamin b12 453, Folate > 1000(N), Haptoglobin 403 (H). Patient started on Procrit 40,000 units weekly.  · 5/29/12 - 5/31/12 - Patient admitted to [facility] due to acute febrile illness, sepsis syndrome, Urinary tract infection, CA colon with liver mets, leukocytosis, hypotension, Elevated liver function tests, and acute kidney Injury. TSH 8.66 (H), Ferritin 528(H), Folate 24.8 (H), Haptoglobin 312 (H), Iron 40 (N), Hep panel negative, Retic count 1.42 (N), TIBC 281 (N), 812 1500(H), Lipase 22(N),  · 9/4/12 - Hemoglobin 10.6.  · 10/02/12 - Yyhrsnuicr83.8, hematocrit 31.2  · 12/20/12 - Folate RBC Hematocrit 28.8 (N), Folate 782 (N). Retic 2.93 (H), Haptoglobin 54 (N), Iron 50 (N), TIBC 363 (N), Iron Sat% 14 (L), Vitamin 812 893 (N), Ferritin 296 (N)  · 7/9/13 - Hemoglobin 12.7, Hematocrit 35.8.  · 12/23/13 -  Hemoglobin 13.3, hematocrit 37.1, MCV of 94.6.  Ferritin 280.4 (N).  · 7/15/14 - Hemoglobin 11.9, hematocrit 34.1, MCV 96.3.  · 11/16/15 - WBC 7.3, hemoglobin 12.1, platelet count 205,000. Anemia resolved.   · 8/15/16 - Ferritin 86 (N), folate 17.5 (N), haptoglobin 142 (N), retic 2.18 (H), Vitamin B12 747 (N), iron 49 (N),  (N), iron saturation 15% (N), SPEP with normal electrophoresis pattern.   · 12/20/17 - Hemoglobin 9.5. Anemia labs ordered. Procrit 40,000 units subq weekly ordered for chemotherapy-induced anemia. Iron 28 (), TIBC 348 (228-428), iron saturation 8 (15-50), ferritin 293 (), folate >24.8 (5.9-24.8), Vitamin B12 of 516 (211-911), haptoglobin 105 (), retic count 1.2 (0.5-1.5).  · 3/1/18 - Ferritin 135 (), creatinine 1.1 (0.4-1.0).     · 6/21/18 - Hemoglobin 11.3, MCV 91.6. Ferritin 40 (), iron 52 (), TIBC 374 (228-428), iron saturation 14 (15-50).      · 12/11/19 - Hemoglobin 12.5, MCV 93.2, platelets 105,000  · 12/12/19-Cycle 11 FOLFIRI + Avastin + Neulasta   · 12/16/19-Cycle 1 FOLFIRI + Avastin + Neulasta   · 12/31/19-Cycle 2 FOLFIRI + Avastin + Neulasta     Past Medical History:   Diagnosis Date   • History of colon cancer, stage IV     Stage SHELL with progression   • Radiculopathy 07/2012    Right L5/S1   • Thyromegaly 10/2013       Past Surgical History:   Procedure Laterality Date   • CYST REMOVAL  1998    cyst removed from [facility] in 1998       Current Outpatient Medications:   •  American Ginseng powder, 200 mg 2 (Two) Times a Day., Disp: 1 bottle, Rfl: 2  •  dimenhyDRINATE (DRAMAMINE PO), Take  by mouth., Disp: , Rfl:   •  ferrous sulfate 325 (65 FE) MG tablet, Take 1 tablet by mouth Daily With Breakfast. Take along with vitamin C., Disp: 30 tablet, Rfl: 5  •  hydroCHLOROthiazide (MICROZIDE) 12.5 MG capsule, Take 1 capsule by mouth Every Morning., Disp: 30 capsule, Rfl: 3  •  Loperamide HCl (IMODIUM PO), Take  by mouth., Disp: , Rfl:   •  loratadine  (CLARITIN) 10 MG tablet, Take 10 mg by mouth Daily., Disp: , Rfl:   •  potassium chloride (K-DUR) 10 MEQ CR tablet, Take 2 tablets by mouth Daily., Disp: 60 tablet, Rfl: 3  •  vitamin C (ASCORBIC ACID) 250 MG tablet, Take 1 tablet by mouth Daily. Take along with oral iron., Disp: 30 tablet, Rfl: 5    Allergies   Allergen Reactions   • Hydrocodone Nausea And Vomiting   • Iodinated Diagnostic Agents Hives and Itching     PT HAD SOME HIVES DURING SCAN.  PRIOR TO SCAN 8-3-2013.   • Latex Rash     Blisters    • Penicillins Hives       Family History   Problem Relation Age of Onset   • Cancer Sister         Bladder cancer       Cancer-related family history includes Cancer in her sister.    Social History     Tobacco Use   • Smoking status: Former Smoker     Years: 30.00     Last attempt to quit: 2012     Years since quittin.0   • Smokeless tobacco: Never Used   Substance Use Topics   • Alcohol use: Yes     Frequency: Never     Comment: socially   • Drug use: Defer       I have reviewed the history of present illness, past medical history, family history, social history, lab results, all notes, and other records since the patient was last seen on 19.    SUBJECTIVE: 20  59 year old female presents to the office for a follow up. She is accompanied by her female friend and granddaughter. She states that she has been having less nausea, but it mostly depends on chemo. She states she takes medication for her diarrhea and constipation. She states she comes back Monday for Cycle 3 of her chemo. Her friend states it is more of a maintenance plan. She states she is overall feeling well.     Patient seen and examined.  Agree with the above documentation by nurse practitioner.  Changes made.  Plan discussed.  Patient has metastatic colon cancer, and has been responding extremely well to FOLFIRI plus Avastin combination.  Reviewed the recent CT scan results.  She has a near complete response and therefore I will  change the patient's treatment to maintenance 5-FU with Avastin.  This will help minimize treatment-related toxicities including diarrhea and will also help patient tolerate the treatments much longer..  Plan discussed and explained..  Will treat order anemia work-up      REVIEW OF SYSTEMS:  Review of Systems   Constitutional: Positive for fatigue. Negative for activity change, appetite change, chills, diaphoresis, fever and unexpected weight change.   HENT: Positive for postnasal drip. Negative for congestion, ear pain, mouth sores, nosebleeds (occasional nosebleeds ), rhinorrhea, sinus pain, sore throat and trouble swallowing.         Sinus drainage   Eyes: Negative.  Negative for photophobia, discharge and visual disturbance (after chemo ).   Respiratory: Negative for cough, chest tightness, shortness of breath, wheezing and stridor.    Cardiovascular: Negative for chest pain, palpitations and leg swelling.   Gastrointestinal: Positive for abdominal pain, diarrhea ( with last couple of cycles ), nausea (less nausea; depends on chemo) and vomiting. Negative for blood in stool and constipation.   Endocrine: Negative for cold intolerance and heat intolerance.   Genitourinary: Negative for difficulty urinating, dysuria and hematuria.   Musculoskeletal: Negative for arthralgias, back pain, gait problem, joint swelling, neck pain and neck stiffness.   Skin: Negative for color change, rash and wound.        Dry skin    Neurological: Negative for dizziness, seizures, syncope, weakness, numbness and headaches.   Hematological: Negative for adenopathy. Does not bruise/bleed easily.        No obvious bleeding   Psychiatric/Behavioral: Negative for agitation, confusion and hallucinations. The patient is not nervous/anxious.    All other systems reviewed and are negative.    OBJECTIVE:  Vitals:  Vitals:    01/08/20 1633   BP: 146/91   Pulse: 105   Resp: 18   Temp: 97.9 °F (36.6 °C)   Weight: 60.3 kg (133 lb)   Height: 157.5  "cm (62\")   PainSc: 0-No pain       ECOG  Eastern Cooperative Oncology Group (ECOG, [place], World Health Organization) performance scale  0 Fully active; no performance restrictions.  1 Strenuous physical activity restricted; fully ambulatory and able to carry out light work.  2 Capable of all self-care but unable to carry out any work activities. Up and about >50% of waking hours.  3 Capable of only limited self-care; confined to bed or chair >50% of waking hours.  4 Completely disabled; cannot carry out any self-care; totally confined to bed or chair.    Physical Exam   Constitutional: She is oriented to person, place, and time. She appears well-developed and well-nourished. No distress.   HENT:   Head: Normocephalic and atraumatic.   Nose: Nose normal.   Mouth/Throat: Oropharynx is clear and moist. No oropharyngeal exudate.   Eyes: Pupils are equal, round, and reactive to light. Conjunctivae, EOM and lids are normal. Right eye exhibits no discharge. Left eye exhibits no discharge. No scleral icterus.   Neck: Normal range of motion. Neck supple. No thyromegaly present.   Cardiovascular: Normal rate, regular rhythm, normal heart sounds and intact distal pulses. Exam reveals no gallop and no friction rub.   No murmur heard.  Pulmonary/Chest: Effort normal and breath sounds normal. No stridor. No respiratory distress. She has no wheezes.   Right chest wall port.   Abdominal: Soft. Normal appearance and bowel sounds are normal. She exhibits no distension and no mass. There is no tenderness. There is no rebound and no guarding. A hernia ( right side ) is present.   Genitourinary:   Genitourinary Comments: Deferred.   Musculoskeletal: Normal range of motion. She exhibits no edema, tenderness or deformity.   Lymphadenopathy:        Head (right side): No preauricular adenopathy present.   Neurological: She is alert and oriented to person, place, and time. She exhibits normal muscle tone. Coordination normal.   Skin: Skin " is warm and dry. Capillary refill takes less than 2 seconds. No bruising, no petechiae and no rash noted. She is not diaphoretic. No erythema. No pallor.   Hyperpigmentation of the skin   Psychiatric: She has a normal mood and affect. Her speech is normal and behavior is normal. Judgment and thought content normal. Cognition and memory are normal.   Nursing note and vitals reviewed.    RECENT LABS  Lab Results - Last 18 Months   Lab Units 01/08/20  1529 12/31/19  1316 12/23/19  1511   WBC 10*3/mm3 4.80 4.16 5.45   HEMOGLOBIN g/dL 10.9* 11.1* 10.3*   HEMATOCRIT % 33.4* 34.0 31.4*   PLATELETS 10*3/mm3 123* 119* 148   MCV fL 93.3 93.9 96.3     Lab Results - Last 18 Months   Lab Units 12/31/19  1316 12/16/19  0808 12/11/19  1649   SODIUM mmol/L 140 141 138   POTASSIUM mmol/L 4.2 3.6 2.8*   CHLORIDE mmol/L 107 106 101   CO2 mmol/L 24.0 24.0 26.0   BUN mg/dL 12 9 22*   CREATININE mg/dL 0.77 0.86 1.00   CALCIUM mg/dL 9.3 8.7 8.2*   BILIRUBIN mg/dL 0.6 0.2 0.8   ALK PHOS U/L 98 138* 121*   ALT (SGPT) U/L 47* 14 22   AST (SGOT) U/L 42* 15 14   GLUCOSE mg/dL 91 137* 117*       Lab Results   Component Value Date    GLUCOSE 91 12/31/2019    BUN 12 12/31/2019    CREATININE 0.77 12/31/2019    EGFRIFNONA 77 12/31/2019    BCR 15.6 12/31/2019    K 4.2 12/31/2019    CO2 24.0 12/31/2019    CALCIUM 9.3 12/31/2019    ALBUMIN 3.90 12/31/2019    LABIL2 1.1 06/03/2019    AST 42 (H) 12/31/2019    ALT 47 (H) 12/31/2019     Lab Results   Component Value Date    IRON 45 12/11/2019    TIBC 368 12/11/2019    FERRITIN 490.60 (H) 12/11/2019     Lab Results   Component Value Date    FOLATE >20.00 12/11/2019     No results found for: OCCULTBLD  Lab Results   Component Value Date    RETICCTPCT 1.20 12/20/2017     Lab Results   Component Value Date    EGFTEOTM57 >2,000 (H) 12/11/2019     No results found for: SPEP, UPEP  No results found for: LDH, URICACID  No results found for: MARJORIE, RF, SEDRATE  Lab Results   Component Value Date    HAPTOGLOBIN 105  12/20/2017     Lab Results   Component Value Date    PTT 25.4 (L) 06/13/2019    INR 0.9 06/13/2019     No results found for:   Lab Results   Component Value Date    CEA 1.47 12/11/2019     No components found for: CA-19-9  No results found for: PSA    ASSESSMENT:  1. Colon cancer with liver metastasis diagnosis established January 2012.  K-shannon BRAF and NRAS wild-type.  Patient received multiple lines of chemotherapy over the years.  PET CT scan in December 2018 showed significant response to FOLFIRI plus Avastin and treatment was de-intensified to 5-FU leucovorin plus Avastin.  2. Anemia and intermittent thrombocytopenia: Due to myelosuppression from chemotherapy..  We have de-intensified her chemo  3. Iron deficiency    PLAN:    1. Advised patient to start taking oral iron 1 tablet daily.  If tolerated, may increase to twice a day.  2. Continue 5-FU leucovorin plus Avastin  3. Plan repeat CT scans in around 2-3 months   4. Monitoring closely for chemotherapy related adverse events  5. RTC in 4 weeks          I  have reviewed lab results, imaging, vitals and medications with the patient today.    Patient verbalized understanding and is in agreement of the above plan.

## 2020-01-06 ENCOUNTER — APPOINTMENT (OUTPATIENT)
Dept: LAB | Facility: HOSPITAL | Age: 60
End: 2020-01-06

## 2020-01-08 ENCOUNTER — OFFICE VISIT (OUTPATIENT)
Dept: LAB | Facility: HOSPITAL | Age: 60
End: 2020-01-08

## 2020-01-08 ENCOUNTER — OFFICE VISIT (OUTPATIENT)
Dept: ONCOLOGY | Facility: CLINIC | Age: 60
End: 2020-01-08

## 2020-01-08 VITALS
WEIGHT: 133 LBS | DIASTOLIC BLOOD PRESSURE: 91 MMHG | SYSTOLIC BLOOD PRESSURE: 146 MMHG | TEMPERATURE: 97.9 F | RESPIRATION RATE: 18 BRPM | HEART RATE: 105 BPM | HEIGHT: 62 IN | BODY MASS INDEX: 24.48 KG/M2

## 2020-01-08 DIAGNOSIS — E61.1 IRON DEFICIENCY: ICD-10-CM

## 2020-01-08 DIAGNOSIS — Z51.11 ENCOUNTER FOR CHEMOTHERAPY MANAGEMENT: Primary | ICD-10-CM

## 2020-01-08 DIAGNOSIS — C19 METASTATIC COLORECTAL CANCER: ICD-10-CM

## 2020-01-08 DIAGNOSIS — C18.7 MALIGNANT NEOPLASM OF SIGMOID COLON (HCC): ICD-10-CM

## 2020-01-08 DIAGNOSIS — D64.9 ANEMIA, UNSPECIFIED TYPE: ICD-10-CM

## 2020-01-08 PROBLEM — C22.9 LIVER CANCER: Status: ACTIVE | Noted: 2018-01-10

## 2020-01-08 LAB
BASOPHILS # BLD AUTO: 0.03 10*3/MM3 (ref 0–0.2)
BASOPHILS NFR BLD AUTO: 0.6 % (ref 0–1.5)
DEPRECATED RDW RBC AUTO: 49.3 FL (ref 37–54)
EOSINOPHIL # BLD AUTO: 0.44 10*3/MM3 (ref 0–0.4)
EOSINOPHIL NFR BLD AUTO: 9.2 % (ref 0.3–6.2)
ERYTHROCYTE [DISTWIDTH] IN BLOOD BY AUTOMATED COUNT: 15.3 % (ref 12.3–15.4)
HCT VFR BLD AUTO: 33.4 % (ref 34–46.6)
HGB BLD-MCNC: 10.9 G/DL (ref 12–15.9)
LYMPHOCYTES # BLD AUTO: 1.46 10*3/MM3 (ref 0.7–3.1)
LYMPHOCYTES NFR BLD AUTO: 30.4 % (ref 19.6–45.3)
MCH RBC QN AUTO: 30.4 PG (ref 26.6–33)
MCHC RBC AUTO-ENTMCNC: 32.6 G/DL (ref 31.5–35.7)
MCV RBC AUTO: 93.3 FL (ref 79–97)
MONOCYTES # BLD AUTO: 0.58 10*3/MM3 (ref 0.1–0.9)
MONOCYTES NFR BLD AUTO: 12.1 % (ref 5–12)
NEUTROPHILS # BLD AUTO: 2.29 10*3/MM3 (ref 1.7–7)
NEUTROPHILS NFR BLD AUTO: 47.7 % (ref 42.7–76)
PLATELET # BLD AUTO: 123 10*3/MM3 (ref 140–450)
PMV BLD AUTO: 9.7 FL (ref 6–12)
RBC # BLD AUTO: 3.58 10*6/MM3 (ref 3.77–5.28)
WBC NRBC COR # BLD: 4.8 10*3/MM3 (ref 3.4–10.8)

## 2020-01-08 PROCEDURE — 85025 COMPLETE CBC W/AUTO DIFF WBC: CPT

## 2020-01-08 PROCEDURE — 36415 COLL VENOUS BLD VENIPUNCTURE: CPT

## 2020-01-08 PROCEDURE — 99215 OFFICE O/P EST HI 40 MIN: CPT | Performed by: INTERNAL MEDICINE

## 2020-01-09 DIAGNOSIS — C18.7 MALIGNANT NEOPLASM OF SIGMOID COLON (HCC): ICD-10-CM

## 2020-01-09 RX ORDER — FLUOROURACIL 50 MG/ML
400 INJECTION, SOLUTION INTRAVENOUS ONCE
Status: CANCELLED | OUTPATIENT
Start: 2020-01-13

## 2020-01-09 RX ORDER — SODIUM CHLORIDE 9 MG/ML
250 INJECTION, SOLUTION INTRAVENOUS ONCE
Status: CANCELLED | OUTPATIENT
Start: 2020-01-13

## 2020-01-13 ENCOUNTER — HOSPITAL ENCOUNTER (OUTPATIENT)
Dept: ONCOLOGY | Facility: HOSPITAL | Age: 60
Setting detail: INFUSION SERIES
Discharge: HOME | End: 2020-01-13

## 2020-01-13 VITALS
WEIGHT: 132 LBS | TEMPERATURE: 97.9 F | BODY MASS INDEX: 24.29 KG/M2 | HEIGHT: 62 IN | RESPIRATION RATE: 18 BRPM | SYSTOLIC BLOOD PRESSURE: 128 MMHG | HEART RATE: 99 BPM | DIASTOLIC BLOOD PRESSURE: 89 MMHG

## 2020-01-13 DIAGNOSIS — C18.7 MALIGNANT NEOPLASM OF SIGMOID COLON (HCC): Primary | ICD-10-CM

## 2020-01-13 LAB
ALBUMIN SERPL-MCNC: 4.2 G/DL (ref 3.5–5.2)
ALBUMIN/GLOB SERPL: 1.6 G/DL
ALP SERPL-CCNC: 80 U/L (ref 39–117)
ALT SERPL W P-5'-P-CCNC: 20 U/L (ref 1–33)
ANION GAP SERPL CALCULATED.3IONS-SCNC: 11 MMOL/L (ref 5–15)
AST SERPL-CCNC: 23 U/L (ref 1–32)
BASOPHILS # BLD AUTO: 0.02 10*3/MM3 (ref 0–0.2)
BASOPHILS NFR BLD AUTO: 0.4 % (ref 0–1.5)
BILIRUB SERPL-MCNC: 0.6 MG/DL (ref 0.2–1.2)
BILIRUB UR QL STRIP: NEGATIVE
BUN BLD-MCNC: 13 MG/DL (ref 6–20)
BUN/CREAT SERPL: 14 (ref 7–25)
CALCIUM SPEC-SCNC: 9.4 MG/DL (ref 8.6–10.5)
CHLORIDE SERPL-SCNC: 104 MMOL/L (ref 98–107)
CLARITY UR: CLEAR
CO2 SERPL-SCNC: 23 MMOL/L (ref 22–29)
COLOR UR: YELLOW
CREAT BLD-MCNC: 0.93 MG/DL (ref 0.57–1)
DEPRECATED RDW RBC AUTO: 50.7 FL (ref 37–54)
EOSINOPHIL # BLD AUTO: 0.26 10*3/MM3 (ref 0–0.4)
EOSINOPHIL NFR BLD AUTO: 5.2 % (ref 0.3–6.2)
ERYTHROCYTE [DISTWIDTH] IN BLOOD BY AUTOMATED COUNT: 16.1 % (ref 12.3–15.4)
GFR SERPL CREATININE-BSD FRML MDRD: 62 ML/MIN/1.73
GLOBULIN UR ELPH-MCNC: 2.7 GM/DL
GLUCOSE BLD-MCNC: 100 MG/DL (ref 65–99)
GLUCOSE UR STRIP-MCNC: NEGATIVE MG/DL
HCT VFR BLD AUTO: 34.4 % (ref 34–46.6)
HGB BLD-MCNC: 11.3 G/DL (ref 12–15.9)
HGB UR QL STRIP.AUTO: NEGATIVE
KETONES UR QL STRIP: NEGATIVE
LEUKOCYTE ESTERASE UR QL STRIP.AUTO: NEGATIVE
LYMPHOCYTES # BLD AUTO: 1.31 10*3/MM3 (ref 0.7–3.1)
LYMPHOCYTES NFR BLD AUTO: 26.1 % (ref 19.6–45.3)
MCH RBC QN AUTO: 30.5 PG (ref 26.6–33)
MCHC RBC AUTO-ENTMCNC: 32.8 G/DL (ref 31.5–35.7)
MCV RBC AUTO: 93 FL (ref 79–97)
MONOCYTES # BLD AUTO: 0.62 10*3/MM3 (ref 0.1–0.9)
MONOCYTES NFR BLD AUTO: 12.4 % (ref 5–12)
NEUTROPHILS # BLD AUTO: 2.8 10*3/MM3 (ref 1.7–7)
NEUTROPHILS NFR BLD AUTO: 55.9 % (ref 42.7–76)
NITRITE UR QL STRIP: NEGATIVE
PH UR STRIP.AUTO: 5.5 [PH] (ref 5–8)
PLATELET # BLD AUTO: 130 10*3/MM3 (ref 140–450)
PMV BLD AUTO: 9.7 FL (ref 6–12)
POTASSIUM BLD-SCNC: 3.7 MMOL/L (ref 3.5–5.2)
PROT SERPL-MCNC: 6.9 G/DL (ref 6–8.5)
PROT UR QL STRIP: NEGATIVE
RBC # BLD AUTO: 3.7 10*6/MM3 (ref 3.77–5.28)
SODIUM BLD-SCNC: 138 MMOL/L (ref 136–145)
SP GR UR STRIP: 1.01 (ref 1–1.03)
UROBILINOGEN UR QL STRIP: NORMAL
WBC NRBC COR # BLD: 5.01 10*3/MM3 (ref 3.4–10.8)

## 2020-01-13 PROCEDURE — 25010000002 LEUCOVORIN CALCIUM PER 50 MG: Performed by: INTERNAL MEDICINE

## 2020-01-13 PROCEDURE — 25010000002 FLUOROURACIL PER 500 MG: Performed by: INTERNAL MEDICINE

## 2020-01-13 PROCEDURE — 25010000002 DEXAMETHASONE SODIUM PHOSPHATE 120 MG/30ML SOLUTION: Performed by: INTERNAL MEDICINE

## 2020-01-13 PROCEDURE — 96411 CHEMO IV PUSH ADDL DRUG: CPT | Performed by: INTERNAL MEDICINE

## 2020-01-13 PROCEDURE — 80053 COMPREHEN METABOLIC PANEL: CPT | Performed by: INTERNAL MEDICINE

## 2020-01-13 PROCEDURE — 96367 TX/PROPH/DG ADDL SEQ IV INF: CPT | Performed by: INTERNAL MEDICINE

## 2020-01-13 PROCEDURE — 96416 CHEMO PROLONG INFUSE W/PUMP: CPT | Performed by: INTERNAL MEDICINE

## 2020-01-13 PROCEDURE — 81003 URINALYSIS AUTO W/O SCOPE: CPT | Performed by: INTERNAL MEDICINE

## 2020-01-13 PROCEDURE — 85025 COMPLETE CBC W/AUTO DIFF WBC: CPT | Performed by: INTERNAL MEDICINE

## 2020-01-13 PROCEDURE — 25010000002 BEVACIZUMAB 100 MG/4ML SOLUTION 4 ML VIAL: Performed by: INTERNAL MEDICINE

## 2020-01-13 PROCEDURE — 96413 CHEMO IV INFUSION 1 HR: CPT | Performed by: INTERNAL MEDICINE

## 2020-01-13 RX ORDER — SODIUM CHLORIDE 0.9 % (FLUSH) 0.9 %
10 SYRINGE (ML) INJECTION AS NEEDED
Status: CANCELLED | OUTPATIENT
Start: 2020-01-13

## 2020-01-13 RX ORDER — SODIUM CHLORIDE 0.9 % (FLUSH) 0.9 %
10 SYRINGE (ML) INJECTION AS NEEDED
Status: DISCONTINUED | OUTPATIENT
Start: 2020-01-13 | End: 2020-01-14 | Stop reason: HOSPADM

## 2020-01-13 RX ORDER — FLUOROURACIL 50 MG/ML
400 INJECTION, SOLUTION INTRAVENOUS ONCE
Status: COMPLETED | OUTPATIENT
Start: 2020-01-13 | End: 2020-01-13

## 2020-01-13 RX ORDER — SODIUM CHLORIDE 9 MG/ML
250 INJECTION, SOLUTION INTRAVENOUS ONCE
Status: DISCONTINUED | OUTPATIENT
Start: 2020-01-13 | End: 2020-01-14 | Stop reason: HOSPADM

## 2020-01-13 RX ADMIN — LEUCOVORIN CALCIUM 640 MG: 350 INJECTION, POWDER, LYOPHILIZED, FOR SOLUTION INTRAMUSCULAR; INTRAVENOUS at 14:33

## 2020-01-13 RX ADMIN — BEVACIZUMAB 300 MG: 100 INJECTION, SOLUTION INTRAVENOUS at 13:50

## 2020-01-13 RX ADMIN — FLUOROURACIL 640 MG: 50 INJECTION, SOLUTION INTRAVENOUS at 15:08

## 2020-01-13 RX ADMIN — DEXAMETHASONE SODIUM PHOSPHATE 12 MG: 4 INJECTION, SOLUTION INTRA-ARTICULAR; INTRALESIONAL; INTRAMUSCULAR; INTRAVENOUS; SOFT TISSUE at 13:29

## 2020-01-13 RX ADMIN — FLUOROURACIL 3860 MG: 50 INJECTION, SOLUTION INTRAVENOUS at 15:08

## 2020-01-15 ENCOUNTER — HOSPITAL ENCOUNTER (OUTPATIENT)
Dept: ONCOLOGY | Facility: HOSPITAL | Age: 60
Setting detail: INFUSION SERIES
Discharge: HOME | End: 2020-01-15

## 2020-01-15 VITALS
TEMPERATURE: 97.8 F | DIASTOLIC BLOOD PRESSURE: 84 MMHG | HEART RATE: 102 BPM | HEIGHT: 62 IN | WEIGHT: 133 LBS | SYSTOLIC BLOOD PRESSURE: 132 MMHG | BODY MASS INDEX: 24.48 KG/M2 | RESPIRATION RATE: 18 BRPM

## 2020-01-15 DIAGNOSIS — C18.7 MALIGNANT NEOPLASM OF SIGMOID COLON (HCC): Primary | ICD-10-CM

## 2020-01-15 PROCEDURE — 36593 DECLOT VASCULAR DEVICE: CPT | Performed by: INTERNAL MEDICINE

## 2020-01-15 RX ORDER — SODIUM CHLORIDE 0.9 % (FLUSH) 0.9 %
10 SYRINGE (ML) INJECTION AS NEEDED
OUTPATIENT
Start: 2020-01-15

## 2020-01-15 RX ORDER — SODIUM CHLORIDE 0.9 % (FLUSH) 0.9 %
10 SYRINGE (ML) INJECTION AS NEEDED
Status: DISCONTINUED | OUTPATIENT
Start: 2020-01-15 | End: 2020-01-16 | Stop reason: HOSPADM

## 2020-01-15 RX ORDER — HEPARIN SODIUM (PORCINE) LOCK FLUSH IV SOLN 100 UNIT/ML 100 UNIT/ML
500 SOLUTION INTRAVENOUS AS NEEDED
OUTPATIENT
Start: 2020-01-15

## 2020-01-15 RX ADMIN — Medication 10 ML: at 15:40

## 2020-01-15 RX ADMIN — HEPARIN 500 UNITS: 100 SYRINGE at 15:40

## 2020-01-22 ENCOUNTER — TELEPHONE (OUTPATIENT)
Dept: ONCOLOGY | Facility: CLINIC | Age: 60
End: 2020-01-22

## 2020-01-27 ENCOUNTER — APPOINTMENT (OUTPATIENT)
Dept: ONCOLOGY | Facility: HOSPITAL | Age: 60
End: 2020-01-27

## 2020-01-29 ENCOUNTER — APPOINTMENT (OUTPATIENT)
Dept: ONCOLOGY | Facility: HOSPITAL | Age: 60
End: 2020-01-29

## 2020-01-30 RX ORDER — HYDROCHLOROTHIAZIDE 12.5 MG/1
CAPSULE, GELATIN COATED ORAL
Qty: 30 CAPSULE | Refills: 3 | OUTPATIENT
Start: 2020-01-30

## 2020-03-09 ENCOUNTER — LAB REQUISITION (OUTPATIENT)
Dept: LAB | Facility: HOSPITAL | Age: 60
End: 2020-03-09

## 2020-03-09 DIAGNOSIS — C18.9 MALIGNANT NEOPLASM OF COLON, UNSPECIFIED (HCC): ICD-10-CM

## 2020-03-09 LAB
BASOPHILS # BLD AUTO: 0 10*3/MM3 (ref 0–0.2)
BASOPHILS NFR BLD AUTO: 0.7 % (ref 0–1.5)
DEPRECATED RDW RBC AUTO: 53.8 FL (ref 37–54)
EOSINOPHIL # BLD AUTO: 0.3 10*3/MM3 (ref 0–0.4)
EOSINOPHIL NFR BLD AUTO: 5.9 % (ref 0.3–6.2)
ERYTHROCYTE [DISTWIDTH] IN BLOOD BY AUTOMATED COUNT: 16.8 % (ref 12.3–15.4)
HCT VFR BLD AUTO: 36 % (ref 34–46.6)
HGB BLD-MCNC: 12.1 G/DL (ref 12–15.9)
LYMPHOCYTES # BLD AUTO: 1.2 10*3/MM3 (ref 0.7–3.1)
LYMPHOCYTES NFR BLD AUTO: 21.6 % (ref 19.6–45.3)
MCH RBC QN AUTO: 30.9 PG (ref 26.6–33)
MCHC RBC AUTO-ENTMCNC: 33.7 G/DL (ref 31.5–35.7)
MCV RBC AUTO: 91.8 FL (ref 79–97)
MONOCYTES # BLD AUTO: 0.7 10*3/MM3 (ref 0.1–0.9)
MONOCYTES NFR BLD AUTO: 12.8 % (ref 5–12)
NEUTROPHILS # BLD AUTO: 3.2 10*3/MM3 (ref 1.7–7)
NEUTROPHILS NFR BLD AUTO: 59 % (ref 42.7–76)
NRBC BLD AUTO-RTO: 0 /100 WBC (ref 0–0.2)
PLATELET # BLD AUTO: 139 10*3/MM3 (ref 140–450)
PMV BLD AUTO: 7.4 FL (ref 6–12)
RBC # BLD AUTO: 3.92 10*6/MM3 (ref 3.77–5.28)
WBC NRBC COR # BLD: 5.4 10*3/MM3 (ref 3.4–10.8)

## 2020-03-09 PROCEDURE — 85025 COMPLETE CBC W/AUTO DIFF WBC: CPT | Performed by: NURSE PRACTITIONER

## 2020-05-01 ENCOUNTER — TELEPHONE (OUTPATIENT)
Dept: FAMILY MEDICINE CLINIC | Facility: CLINIC | Age: 60
End: 2020-05-01

## 2020-05-01 DIAGNOSIS — L03.115 CELLULITIS OF RIGHT LOWER EXTREMITY: Primary | ICD-10-CM

## 2020-05-01 DIAGNOSIS — T63.441A ALLERGIC REACTION TO BEE STING: ICD-10-CM

## 2020-05-01 RX ORDER — CLINDAMYCIN HYDROCHLORIDE 300 MG/1
300 CAPSULE ORAL 3 TIMES DAILY
Qty: 30 CAPSULE | Refills: 0 | Status: SHIPPED | OUTPATIENT
Start: 2020-05-01 | End: 2020-09-09

## 2020-05-01 RX ORDER — PREDNISONE 10 MG/1
10 TABLET ORAL DAILY
Qty: 10 TABLET | Refills: 0 | Status: SHIPPED | OUTPATIENT
Start: 2020-05-01 | End: 2021-01-12 | Stop reason: HOSPADM

## 2020-05-01 NOTE — TELEPHONE ENCOUNTER
Mary calls to report a 1 day hx of inflammation over the mid portion of her right thigh. She reports feeling a insect bite there 2 days ago and since she has had increasing redness and swelling about the area, now with blisters. She denies fever. She is on ChemoRx for rectal cancer and has been dong well     Imp Cellulitis vs large wheel and Flare rxn from insect bite will cover for both with Clindamycin and system steroids at low dose.      Follow closely. She reports a oncology apt 05/04. She will present to urgent care or ED if sxs worsen. Especially fever shoud develop.

## 2020-08-17 ENCOUNTER — OFFICE VISIT (OUTPATIENT)
Dept: SURGERY | Facility: CLINIC | Age: 60
End: 2020-08-17

## 2020-08-17 VITALS
SYSTOLIC BLOOD PRESSURE: 195 MMHG | OXYGEN SATURATION: 98 % | WEIGHT: 135 LBS | HEIGHT: 62 IN | HEART RATE: 99 BPM | TEMPERATURE: 97.1 F | DIASTOLIC BLOOD PRESSURE: 112 MMHG | BODY MASS INDEX: 24.84 KG/M2

## 2020-08-17 DIAGNOSIS — T80.219A INFECTION OF VENOUS ACCESS PORT, INITIAL ENCOUNTER: Primary | ICD-10-CM

## 2020-08-17 PROCEDURE — 99204 OFFICE O/P NEW MOD 45 MIN: CPT | Performed by: SURGERY

## 2020-08-17 RX ORDER — SODIUM CHLORIDE 9 MG/ML
100 INJECTION, SOLUTION INTRAVENOUS CONTINUOUS
Status: CANCELLED | OUTPATIENT
Start: 2020-08-17

## 2020-08-17 RX ORDER — CHLORHEXIDINE GLUCONATE 0.12 MG/ML
15 RINSE ORAL EVERY 12 HOURS SCHEDULED
Status: CANCELLED | OUTPATIENT
Start: 2020-08-17

## 2020-08-18 RX ORDER — CIPROFLOXACIN 500 MG/1
500 TABLET, FILM COATED ORAL 2 TIMES DAILY
COMMUNITY
End: 2020-09-09

## 2020-08-18 NOTE — PROGRESS NOTES
GENERAL SURGERY CONSULTATION NOTE    Consult requested by: Dr. Elizalde    Patient Care Team:  Diana Spencer MD as PCP - General  Diana Spencer MD as PCP - Family Medicine    Reason for consult: port removal    Subjective     Patient is a 59 y.o. female presents with skin erosion overlying her Port-A-Cath.  The patient had a history of colon cancer with liver metastasis diagnosed initially in January 2012.  Since that time she has been treated with adjuvant chemotherapy and is now on maintenance chemotherapy.  However, the patient is requesting a potential break in her chemotherapy.  It was noted that the patient had a Port-A-Cath that was placed many years ago by Dr. Pack.  It has worked well without any complications, however now she is beginning to have skin breakdown overlying the area on her right chest wall.  She denies fevers, chills.  Her oncologist is requesting that we remove the Port-A-Cath as he is concerned that it may be infected.    Review of Systems   Constitutional: Negative for appetite change, chills and fever.   HENT: Negative for congestion and sore throat.    Respiratory: Negative for cough and shortness of breath.    Cardiovascular: Negative for chest pain and palpitations.   Gastrointestinal: Positive for abdominal pain and diarrhea. Negative for constipation, nausea, vomiting and GERD.   Genitourinary: Negative for difficulty urinating, dysuria and frequency.   Musculoskeletal: Negative for arthralgias and back pain.   Skin: Positive for color change, rash and skin lesions.   Neurological: Negative for dizziness, seizures and memory problem.   Hematological: Negative for adenopathy. Does not bruise/bleed easily.   Psychiatric/Behavioral: Negative for sleep disturbance and depressed mood.        History  Past Medical History:   Diagnosis Date   • History of colon cancer, stage IV     Stage SHELL with progression   • Radiculopathy 07/2012    Right L5/S1   • Thyromegaly 10/2013      Past Surgical History:   Procedure Laterality Date   • CYST REMOVAL      cyst removed from [facility] in    • PORTACATH PLACEMENT  2017     Family History   Problem Relation Age of Onset   • Cancer Sister         Bladder cancer     Social History     Tobacco Use   • Smoking status: Former Smoker     Years: 30.00     Last attempt to quit: 2012     Years since quittin.6   • Smokeless tobacco: Never Used   Substance Use Topics   • Alcohol use: Yes     Frequency: Never     Comment: socially   • Drug use: Defer       (Not in a hospital admission)  Allergies:  Hydrocodone; Iodinated diagnostic agents; Latex; and Penicillins    Objective     Vital Signs       Physical Exam   Constitutional: She is oriented to person, place, and time. She appears well-developed and well-nourished.   HENT:   Head: Normocephalic and atraumatic.   Eyes: Pupils are equal, round, and reactive to light.   Neck: Normal range of motion.   Cardiovascular: Normal rate and regular rhythm.   Pulmonary/Chest: Effort normal and breath sounds normal.   Right chest wall with scab overlying the Port-A-Cath.  No surrounding erythema or induration to suggest surrounding abscess, however given the appearance of the skin overlying the port with exposed hardware I believe that this is seeded.   Abdominal: Soft. She exhibits no distension. There is no tenderness. No hernia.   Musculoskeletal: Normal range of motion. She exhibits no edema.   Lymphadenopathy:     She has no cervical adenopathy.     She has no axillary adenopathy.   Neurological: She is alert and oriented to person, place, and time.   Skin: Skin is warm and dry. No rash noted.   Psychiatric: She has a normal mood and affect.   Vitals reviewed.      Results Review:   Lab Results (last 24 hours)     ** No results found for the last 24 hours. **        No radiology results for the last day      I reviewed the patient's new imaging results and agree with the interpretation.  I reviewed the  patient's other test results and agree with the interpretation    Assessment/Plan     Active Problems:  Port-A-Cath infection    Discussed with the patient the risk, benefits, and alternatives to surgery to remove the Port-A-Cath.  She understands that with a Port-A-Cath infection given the fact that it is a foreign body antibiotics are insufficient to clear the infection and most likely will need to be removed.  Once the Port-A-Cath is removed she will no longer have a conduit for which to undergo chemotherapy and she will need to have a another port placed once she wishes to resume chemotherapy.  This will be done once the patient has demonstrated that she no longer has an active bloodstream infection.  The risks of removing the port include bleeding, infection, pain, and wound healing complications.  The patient understands and agrees to proceed.    Jay Yeh MD  08/18/20  11:07

## 2020-08-25 ENCOUNTER — HOSPITAL ENCOUNTER (OUTPATIENT)
Dept: CARDIOLOGY | Facility: HOSPITAL | Age: 60
Discharge: HOME | End: 2020-08-25

## 2020-08-25 ENCOUNTER — HOSPITAL ENCOUNTER (OUTPATIENT)
Dept: GENERAL RADIOLOGY | Facility: HOSPITAL | Age: 60
Discharge: HOME | End: 2020-08-25
Admitting: SURGERY

## 2020-08-25 ENCOUNTER — LAB (OUTPATIENT)
Dept: LAB | Facility: HOSPITAL | Age: 60
End: 2020-08-25

## 2020-08-25 DIAGNOSIS — T80.219A INFECTION OF VENOUS ACCESS PORT, INITIAL ENCOUNTER: ICD-10-CM

## 2020-08-25 LAB
BASOPHILS # BLD AUTO: 0.05 10*3/MM3 (ref 0–0.2)
BASOPHILS NFR BLD AUTO: 0.8 % (ref 0–1.5)
DEPRECATED RDW RBC AUTO: 48.5 FL (ref 37–54)
EOSINOPHIL # BLD AUTO: 0.38 10*3/MM3 (ref 0–0.4)
EOSINOPHIL NFR BLD AUTO: 6 % (ref 0.3–6.2)
ERYTHROCYTE [DISTWIDTH] IN BLOOD BY AUTOMATED COUNT: 14.3 % (ref 12.3–15.4)
HCT VFR BLD AUTO: 39.1 % (ref 34–46.6)
HGB BLD-MCNC: 13.2 G/DL (ref 12–15.9)
IMM GRANULOCYTES # BLD AUTO: 0.02 10*3/MM3 (ref 0–0.05)
IMM GRANULOCYTES NFR BLD AUTO: 0.3 % (ref 0–0.5)
LYMPHOCYTES # BLD AUTO: 1.63 10*3/MM3 (ref 0.7–3.1)
LYMPHOCYTES NFR BLD AUTO: 25.7 % (ref 19.6–45.3)
MCH RBC QN AUTO: 31.5 PG (ref 26.6–33)
MCHC RBC AUTO-ENTMCNC: 33.8 G/DL (ref 31.5–35.7)
MCV RBC AUTO: 93.3 FL (ref 79–97)
MONOCYTES # BLD AUTO: 0.73 10*3/MM3 (ref 0.1–0.9)
MONOCYTES NFR BLD AUTO: 11.5 % (ref 5–12)
NEUTROPHILS NFR BLD AUTO: 3.53 10*3/MM3 (ref 1.7–7)
NEUTROPHILS NFR BLD AUTO: 55.7 % (ref 42.7–76)
NRBC BLD AUTO-RTO: 0 /100 WBC (ref 0–0.2)
PLATELET # BLD AUTO: 149 10*3/MM3 (ref 140–450)
PMV BLD AUTO: 10.2 FL (ref 6–12)
RBC # BLD AUTO: 4.19 10*6/MM3 (ref 3.77–5.28)
WBC # BLD AUTO: 6.34 10*3/MM3 (ref 3.4–10.8)

## 2020-08-25 PROCEDURE — 80053 COMPREHEN METABOLIC PANEL: CPT

## 2020-08-25 PROCEDURE — C9803 HOPD COVID-19 SPEC COLLECT: HCPCS

## 2020-08-25 PROCEDURE — 71046 X-RAY EXAM CHEST 2 VIEWS: CPT

## 2020-08-25 PROCEDURE — 93005 ELECTROCARDIOGRAM TRACING: CPT | Performed by: SURGERY

## 2020-08-25 PROCEDURE — 85025 COMPLETE CBC W/AUTO DIFF WBC: CPT

## 2020-08-25 PROCEDURE — 36415 COLL VENOUS BLD VENIPUNCTURE: CPT

## 2020-08-25 PROCEDURE — U0002 COVID-19 LAB TEST NON-CDC: HCPCS

## 2020-08-25 PROCEDURE — U0004 COV-19 TEST NON-CDC HGH THRU: HCPCS

## 2020-08-26 ENCOUNTER — ANESTHESIA EVENT (OUTPATIENT)
Dept: PERIOP | Facility: HOSPITAL | Age: 60
End: 2020-08-26

## 2020-08-26 LAB
ALBUMIN SERPL-MCNC: 4.2 G/DL (ref 3.5–5.2)
ALBUMIN/GLOB SERPL: 1.8 G/DL
ALP SERPL-CCNC: 74 U/L (ref 39–117)
ALT SERPL W P-5'-P-CCNC: 24 U/L (ref 1–33)
ANION GAP SERPL CALCULATED.3IONS-SCNC: 11.3 MMOL/L (ref 5–15)
AST SERPL-CCNC: 32 U/L (ref 1–32)
BILIRUB SERPL-MCNC: 0.6 MG/DL (ref 0–1.2)
BUN SERPL-MCNC: 19 MG/DL (ref 6–20)
BUN/CREAT SERPL: 18.3 (ref 7–25)
CALCIUM SPEC-SCNC: 9.5 MG/DL (ref 8.6–10.5)
CHLORIDE SERPL-SCNC: 101 MMOL/L (ref 98–107)
CO2 SERPL-SCNC: 26.7 MMOL/L (ref 22–29)
CREAT SERPL-MCNC: 1.04 MG/DL (ref 0.57–1)
GFR SERPL CREATININE-BSD FRML MDRD: 54 ML/MIN/1.73
GLOBULIN UR ELPH-MCNC: 2.4 GM/DL
GLUCOSE SERPL-MCNC: 87 MG/DL (ref 65–99)
POTASSIUM SERPL-SCNC: 4.1 MMOL/L (ref 3.5–5.2)
PROT SERPL-MCNC: 6.6 G/DL (ref 6–8.5)
REF LAB TEST METHOD: NORMAL
SARS-COV-2 RNA RESP QL NAA+PROBE: NOT DETECTED
SODIUM SERPL-SCNC: 139 MMOL/L (ref 136–145)

## 2020-08-26 PROCEDURE — 93010 ELECTROCARDIOGRAM REPORT: CPT | Performed by: INTERNAL MEDICINE

## 2020-08-27 ENCOUNTER — HOSPITAL ENCOUNTER (OUTPATIENT)
Facility: HOSPITAL | Age: 60
Setting detail: HOSPITAL OUTPATIENT SURGERY
Discharge: HOME | End: 2020-08-27
Attending: SURGERY | Admitting: SURGERY

## 2020-08-27 ENCOUNTER — ANESTHESIA (OUTPATIENT)
Dept: PERIOP | Facility: HOSPITAL | Age: 60
End: 2020-08-27

## 2020-08-27 VITALS
TEMPERATURE: 96.9 F | HEART RATE: 101 BPM | OXYGEN SATURATION: 97 % | SYSTOLIC BLOOD PRESSURE: 175 MMHG | HEIGHT: 62 IN | WEIGHT: 135 LBS | BODY MASS INDEX: 24.84 KG/M2 | RESPIRATION RATE: 15 BRPM | DIASTOLIC BLOOD PRESSURE: 74 MMHG

## 2020-08-27 DIAGNOSIS — T80.219A INFECTION OF VENOUS ACCESS PORT, INITIAL ENCOUNTER: ICD-10-CM

## 2020-08-27 PROCEDURE — 25010000002 FENTANYL CITRATE (PF) 100 MCG/2ML SOLUTION: Performed by: ANESTHESIOLOGY

## 2020-08-27 PROCEDURE — 36590 REMOVAL TUNNELED CV CATH: CPT | Performed by: SURGERY

## 2020-08-27 PROCEDURE — 87075 CULTR BACTERIA EXCEPT BLOOD: CPT | Performed by: SURGERY

## 2020-08-27 PROCEDURE — 25010000002 ONDANSETRON PER 1 MG: Performed by: ANESTHESIOLOGY

## 2020-08-27 PROCEDURE — 25010000002 PROPOFOL 10 MG/ML EMULSION: Performed by: ANESTHESIOLOGY

## 2020-08-27 PROCEDURE — 25010000002 DEXAMETHASONE PER 1 MG: Performed by: ANESTHESIOLOGY

## 2020-08-27 PROCEDURE — 25010000002 MIDAZOLAM PER 1 MG: Performed by: ANESTHESIOLOGY

## 2020-08-27 PROCEDURE — 87070 CULTURE OTHR SPECIMN AEROBIC: CPT | Performed by: SURGERY

## 2020-08-27 RX ORDER — FENTANYL CITRATE 50 UG/ML
INJECTION, SOLUTION INTRAMUSCULAR; INTRAVENOUS AS NEEDED
Status: DISCONTINUED | OUTPATIENT
Start: 2020-08-27 | End: 2020-08-27 | Stop reason: SURG

## 2020-08-27 RX ORDER — SODIUM CHLORIDE 9 MG/ML
100 INJECTION, SOLUTION INTRAVENOUS CONTINUOUS
Status: DISCONTINUED | OUTPATIENT
Start: 2020-08-27 | End: 2020-08-27 | Stop reason: HOSPADM

## 2020-08-27 RX ORDER — PROPOFOL 10 MG/ML
VIAL (ML) INTRAVENOUS AS NEEDED
Status: DISCONTINUED | OUTPATIENT
Start: 2020-08-27 | End: 2020-08-27 | Stop reason: SURG

## 2020-08-27 RX ORDER — MEPERIDINE HYDROCHLORIDE 25 MG/ML
12.5 INJECTION INTRAMUSCULAR; INTRAVENOUS; SUBCUTANEOUS
Status: DISCONTINUED | OUTPATIENT
Start: 2020-08-27 | End: 2020-08-27 | Stop reason: HOSPADM

## 2020-08-27 RX ORDER — SODIUM CHLORIDE 0.9 % (FLUSH) 0.9 %
10 SYRINGE (ML) INJECTION AS NEEDED
Status: DISCONTINUED | OUTPATIENT
Start: 2020-08-27 | End: 2020-08-27 | Stop reason: HOSPADM

## 2020-08-27 RX ORDER — CLINDAMYCIN PHOSPHATE 900 MG/50ML
900 INJECTION INTRAVENOUS ONCE
Status: COMPLETED | OUTPATIENT
Start: 2020-08-27 | End: 2020-08-27

## 2020-08-27 RX ORDER — HYDROCODONE BITARTRATE AND ACETAMINOPHEN 5; 325 MG/1; MG/1
1-2 TABLET ORAL EVERY 4 HOURS PRN
Qty: 15 TABLET | Refills: 0 | Status: SHIPPED | OUTPATIENT
Start: 2020-08-27 | End: 2021-01-12 | Stop reason: HOSPADM

## 2020-08-27 RX ORDER — SODIUM CHLORIDE, SODIUM LACTATE, POTASSIUM CHLORIDE, CALCIUM CHLORIDE 600; 310; 30; 20 MG/100ML; MG/100ML; MG/100ML; MG/100ML
9 INJECTION, SOLUTION INTRAVENOUS CONTINUOUS PRN
Status: DISCONTINUED | OUTPATIENT
Start: 2020-08-27 | End: 2020-08-27 | Stop reason: HOSPADM

## 2020-08-27 RX ORDER — LIDOCAINE HYDROCHLORIDE AND EPINEPHRINE 10; 10 MG/ML; UG/ML
INJECTION, SOLUTION INFILTRATION; PERINEURAL AS NEEDED
Status: DISCONTINUED | OUTPATIENT
Start: 2020-08-27 | End: 2020-08-27 | Stop reason: HOSPADM

## 2020-08-27 RX ORDER — DEXAMETHASONE SODIUM PHOSPHATE 4 MG/ML
INJECTION, SOLUTION INTRA-ARTICULAR; INTRALESIONAL; INTRAMUSCULAR; INTRAVENOUS; SOFT TISSUE AS NEEDED
Status: DISCONTINUED | OUTPATIENT
Start: 2020-08-27 | End: 2020-08-27 | Stop reason: SURG

## 2020-08-27 RX ORDER — CHLORHEXIDINE GLUCONATE 0.12 MG/ML
15 RINSE ORAL EVERY 12 HOURS SCHEDULED
Status: DISCONTINUED | OUTPATIENT
Start: 2020-08-27 | End: 2020-08-27 | Stop reason: HOSPADM

## 2020-08-27 RX ORDER — MIDAZOLAM HYDROCHLORIDE 1 MG/ML
INJECTION INTRAMUSCULAR; INTRAVENOUS AS NEEDED
Status: DISCONTINUED | OUTPATIENT
Start: 2020-08-27 | End: 2020-08-27 | Stop reason: SURG

## 2020-08-27 RX ORDER — SODIUM CHLORIDE 0.9 % (FLUSH) 0.9 %
10 SYRINGE (ML) INJECTION EVERY 12 HOURS SCHEDULED
Status: DISCONTINUED | OUTPATIENT
Start: 2020-08-27 | End: 2020-08-27 | Stop reason: HOSPADM

## 2020-08-27 RX ORDER — ONDANSETRON 2 MG/ML
4 INJECTION INTRAMUSCULAR; INTRAVENOUS ONCE AS NEEDED
Status: COMPLETED | OUTPATIENT
Start: 2020-08-27 | End: 2020-08-27

## 2020-08-27 RX ORDER — ONDANSETRON 2 MG/ML
INJECTION INTRAMUSCULAR; INTRAVENOUS AS NEEDED
Status: DISCONTINUED | OUTPATIENT
Start: 2020-08-27 | End: 2020-08-27 | Stop reason: SURG

## 2020-08-27 RX ORDER — MORPHINE SULFATE 4 MG/ML
2 INJECTION, SOLUTION INTRAMUSCULAR; INTRAVENOUS
Status: DISCONTINUED | OUTPATIENT
Start: 2020-08-27 | End: 2020-08-27 | Stop reason: HOSPADM

## 2020-08-27 RX ORDER — HYDROMORPHONE HCL 110MG/55ML
1 PATIENT CONTROLLED ANALGESIA SYRINGE INTRAVENOUS
Status: DISCONTINUED | OUTPATIENT
Start: 2020-08-27 | End: 2020-08-27 | Stop reason: HOSPADM

## 2020-08-27 RX ORDER — OXYCODONE HYDROCHLORIDE 5 MG/1
5 TABLET ORAL ONCE AS NEEDED
Status: DISCONTINUED | OUTPATIENT
Start: 2020-08-27 | End: 2020-08-27 | Stop reason: HOSPADM

## 2020-08-27 RX ADMIN — SODIUM CHLORIDE, SODIUM LACTATE, POTASSIUM CHLORIDE, AND CALCIUM CHLORIDE 9 ML/HR: 600; 310; 30; 20 INJECTION, SOLUTION INTRAVENOUS at 07:41

## 2020-08-27 RX ADMIN — MUPIROCIN: 20 OINTMENT TOPICAL at 07:40

## 2020-08-27 RX ADMIN — ONDANSETRON 4 MG: 2 INJECTION INTRAMUSCULAR; INTRAVENOUS at 09:28

## 2020-08-27 RX ADMIN — MIDAZOLAM 1 MG: 1 INJECTION INTRAMUSCULAR; INTRAVENOUS at 08:06

## 2020-08-27 RX ADMIN — DEXAMETHASONE SODIUM PHOSPHATE 4 MG: 4 INJECTION, SOLUTION INTRAMUSCULAR; INTRAVENOUS at 08:09

## 2020-08-27 RX ADMIN — ONDANSETRON 4 MG: 2 INJECTION INTRAMUSCULAR; INTRAVENOUS at 08:09

## 2020-08-27 RX ADMIN — CHLORHEXIDINE GLUCONATE 0.12% ORAL RINSE 15 ML: 1.2 LIQUID ORAL at 07:41

## 2020-08-27 RX ADMIN — MIDAZOLAM 1 MG: 1 INJECTION INTRAMUSCULAR; INTRAVENOUS at 08:09

## 2020-08-27 RX ADMIN — PROPOFOL 50 MG: 10 INJECTION, EMULSION INTRAVENOUS at 08:09

## 2020-08-27 RX ADMIN — FENTANYL CITRATE 100 MCG: 50 INJECTION, SOLUTION INTRAMUSCULAR; INTRAVENOUS at 08:17

## 2020-08-27 RX ADMIN — CLINDAMYCIN IN 5 PERCENT DEXTROSE 900 MG: 18 INJECTION, SOLUTION INTRAVENOUS at 08:10

## 2020-08-27 RX ADMIN — PROPOFOL 50 MG: 10 INJECTION, EMULSION INTRAVENOUS at 08:15

## 2020-08-27 NOTE — ANESTHESIA POSTPROCEDURE EVALUATION
Patient: Samantha Massey    Procedure Summary     Date:  08/27/20 Room / Location:  [facility] OR 06 / [facility] MAIN OR    Anesthesia Start:  0803 Anesthesia Stop:  0844    Procedure:  REMOVAL VENOUS ACCESS DEVICE (Right ) Diagnosis:       Infection of venous access port, initial encounter      (Infection of venous access port, initial encounter [T80.219A])    Surgeon:  Jay Yeh MD Provider:  Nikunj Delvalle DO    Anesthesia Type:  MAC ASA Status:  3          Anesthesia Type: MAC    Vitals  No vitals data found for the desired time range.          Post Anesthesia Care and Evaluation    Patient location during evaluation: PHASE II  Patient participation: complete - patient participated  Level of consciousness: awake  Pain scale: See nurse's notes for pain score.  Pain management: adequate  Airway patency: patent  Anesthetic complications: No anesthetic complications  PONV Status: none  Cardiovascular status: acceptable  Respiratory status: acceptable  Hydration status: acceptable    Comments: Patient seen and examined postoperatively; vital signs stable; SpO2 greater than or equal to 90%; cardiopulmonary status stable; nausea/vomiting adequately controlled; pain adequately controlled; no apparent anesthesia complications; patient discharged from anesthesia care when discharge criteria were met

## 2020-08-27 NOTE — ANESTHESIA PREPROCEDURE EVALUATION
Anesthesia Evaluation     Patient summary reviewed and Nursing notes reviewed   history of anesthetic complications: PONV  NPO Solid Status: > 8 hours  NPO Liquid Status: > 8 hours           Airway   Mallampati: II  TM distance: >3 FB  Neck ROM: full  No difficulty expected  Dental - normal exam   (+) partials    Pulmonary - negative pulmonary ROS and normal exam   Cardiovascular - normal exam    (+) hypertension,       Neuro/Psych  (+) numbness,     GI/Hepatic/Renal/Endo    (+)   liver disease,     Musculoskeletal     (+) radiculopathy  Abdominal  - normal exam    Bowel sounds: normal.   Substance History - negative use     OB/GYN negative ob/gyn ROS         Other      history of cancer active                    Anesthesia Plan    ASA 3     MAC   (Metastatic colon ca    Mac for port removal)  intravenous induction     Anesthetic plan, all risks, benefits, and alternatives have been provided, discussed and informed consent has been obtained with: patient.    Plan discussed with CRNA, CAA and attending.

## 2020-08-29 LAB — CATHETER CULTURE: NORMAL

## 2020-09-02 LAB — BACTERIA SPEC ANAEROBE CULT: NORMAL

## 2020-09-09 ENCOUNTER — OFFICE VISIT (OUTPATIENT)
Dept: SURGERY | Facility: CLINIC | Age: 60
End: 2020-09-09

## 2020-09-09 VITALS
DIASTOLIC BLOOD PRESSURE: 122 MMHG | HEART RATE: 99 BPM | WEIGHT: 137 LBS | OXYGEN SATURATION: 98 % | BODY MASS INDEX: 25.21 KG/M2 | HEIGHT: 62 IN | SYSTOLIC BLOOD PRESSURE: 188 MMHG | TEMPERATURE: 97.5 F

## 2020-09-09 DIAGNOSIS — K43.9 VENTRAL HERNIA WITHOUT OBSTRUCTION OR GANGRENE: ICD-10-CM

## 2020-09-09 DIAGNOSIS — T80.219A INFECTION OF VENOUS ACCESS PORT, INITIAL ENCOUNTER: Primary | ICD-10-CM

## 2020-09-09 PROCEDURE — 99024 POSTOP FOLLOW-UP VISIT: CPT | Performed by: SURGERY

## 2020-09-09 NOTE — PROGRESS NOTES
GENERAL SURGERY PROGRESS NOTE    9/9/2020    Patient Care Team:  Diana Spencer MD as PCP - General  Diana Spencer MD as PCP - Family Medicine    Chief Complaint: follow-up port removal    Subjective   HPI: Patient is a 59-year-old lady who underwent a removal of her Port-A-Cath on 8/27/2020 after it had eroded through the skin.  She has a history of colon cancer with liver metastasis which was diagnosed initially in January 2012.  She underwent surgery for this, and then underwent adjuvant chemotherapy and has now been on maintenance chemotherapy since that time.  She is followed with serial CT scans of the abdomen and pelvis.    Interval History:   Patient reports that she is feeling well since removal of her Port-A-Cath.  Her chest wall wound is healing well with minimal drainage.  She has been packing the wound daily.  Today she also brings up that she has a ventral hernia which she wishes for me to evaluate.  She states that is been present for many years, but it has not caused her any significant problems.  It is only slightly uncomfortable occasionally.  On physical exam the patient does appear to have a large ventral hernia within her vertical midline incision.  The hernia itself is soft, nontender, and without any overlying skin changes.  The hernia is so large that when we attempt to reduce the hernia, it immediately recurs.  She denies having any problems eating with nausea or vomiting, or difficulties passing her bowels.  She does report that she frequently does heavy lifting as she works as a  and bearing-down seems to force more of her hernia out.  It always seems to be able to reduce, and she never reports any incarceration.    Review of Systems -   General ROS: negative for - chills, fatigue or fever  Psychological ROS: negative for - anxiety or depression  Allergy and Immunology ROS: negative for - hives, nasal congestion or postnasal drip  Hematological and Lymphatic ROS: negative  for - bleeding problems or bruising  Respiratory ROS: no cough, shortness of breath, or wheezing  Cardiovascular ROS: no chest pain or dyspnea on exertion  Gastrointestinal ROS: no abdominal pain, change in bowel habits, or black or bloody stools  Genito-Urinary ROS: no dysuria, trouble voiding, or hematuria  Musculoskeletal ROS: negative for - joint pain or muscle pain  Neurological ROS: no TIA or stroke symptoms  Dermatological ROS: negative for dry skin and rash    Objective     Vital Signs  Temp:  [97.5 °F (36.4 °C)] 97.5 °F (36.4 °C)  Heart Rate:  [99] 99  BP: (188)/(122) 188/122    Physical Exam   Constitutional: She is oriented to person, place, and time. She appears well-developed and well-nourished.   HENT:   Head: Normocephalic and atraumatic.   Cardiovascular: Normal rate and regular rhythm.   Pulmonary/Chest: Effort normal and breath sounds normal.   On the patient's right chest wall, in the area of her Port-A-Cath there is an open wound with packing in it.  This packing was removed and the patient appears to have healthy, pink appearing granulation tissue without any surrounding erythema, or drainage to suggest infection.   Abdominal:   Abdomen is soft, nontender, and there is a large ventral incisional hernia.  This is soft, easily reducible, and there is no color change.   Musculoskeletal: Normal range of motion. She exhibits no edema.   Neurological: She is alert and oriented to person, place, and time.   Skin: Skin is warm and dry.   Psychiatric: She has a normal mood and affect. Her behavior is normal.   Vitals reviewed.       Results Review:    Lab Results (last 24 hours)     ** No results found for the last 24 hours. **        Imaging Results (Last 24 Hours)     ** No results found for the last 24 hours. **           I have reviewed the above results and noted them below    Medication Review:    Current Outpatient Medications:   •  American Ginseng powder, 200 mg 2 (Two) Times a Day., Disp: 1  bottle, Rfl: 2  •  dimenhyDRINATE (DRAMAMINE PO), Take  by mouth., Disp: , Rfl:   •  ferrous sulfate 325 (65 FE) MG tablet, Take 1 tablet by mouth Daily With Breakfast. Take along with vitamin C. (Patient taking differently: Take 325 mg by mouth Daily With Breakfast. Take along with vitamin C. Do not take after 8/20 for surgery), Disp: 30 tablet, Rfl: 5  •  hydroCHLOROthiazide (MICROZIDE) 12.5 MG capsule, Take 1 capsule by mouth Every Morning. (Patient taking differently: Take 12.5 mg by mouth Every Morning. Do not take day of surgery), Disp: 30 capsule, Rfl: 3  •  HYDROcodone-acetaminophen (NORCO) 5-325 MG per tablet, Take 1-2 tablets by mouth Every 4 (Four) Hours As Needed (Pain)., Disp: 15 tablet, Rfl: 0  •  Loperamide HCl (IMODIUM PO), Take  by mouth., Disp: , Rfl:   •  loratadine (CLARITIN) 10 MG tablet, Take 10 mg by mouth Daily., Disp: , Rfl:   •  Lysine 500 MG capsule, Take  by mouth., Disp: , Rfl:   •  potassium chloride (K-DUR) 10 MEQ CR tablet, Take 2 tablets by mouth Daily. (Patient taking differently: Take 20 mEq by mouth Daily. Do not take dos), Disp: 60 tablet, Rfl: 3  •  predniSONE (DELTASONE) 10 MG tablet, Take 1 tablet by mouth Daily., Disp: 10 tablet, Rfl: 0  •  vitamin C (ASCORBIC ACID) 250 MG tablet, Take 1 tablet by mouth Daily. Take along with oral iron., Disp: 30 tablet, Rfl: 5    CT CHEST ABDOMEN AND PELVIS WITHOUT CONTRAST    Date of Exam: 7/14/2020 7:32 EDT    Indication: Restaging colon cancer. History of bowel resection. Currently on chemotherapy. History of liver surgery 2017.    Comparison: CT chest abdomen pelvis dated 3/21/2020 [facility], CT of the chest without contrast 9/9/2019, whole body PET CT 12/2/2019 from [facility]    Technique: Contiguous axial CT images were obtained from the thoracic inlet through the pubic symphysis without IV contrast. Sagittal and coronal reconstructions were performed. Automated exposure control and iterative reconstruction methods  were used. Radiation audit for number of CT and nuclear cardiology exams performed in the last year: 3.    FINDINGS:  Limited evaluation of bowel and solid organs without contrast.  CHEST:    Hilum and Mediastinum: There are calcified hilar mediastinal lymph nodes consistent with prior granulomatous disease exposure. There is a subcarinal lymph node measuring 1.3 x 1.8 on image #48. This is unchanged from the previous CT. It has slightly increased signal intensity but is not calcified. It is decreased in size since September 9, 2019.. Normal heart size. Mild coronary artery calcification. Normal caliber thoracic aorta and pulmonary arteries. No significant pericardial effusion. Thyroid gland is within normal limits.    Lung Parenchyma and Pleura: There is an irregular marginated nodule in the right upper lobe on image #26. It measures about 6 mm. It is unchanged from previous study and the September 9, 2019 study. A pleural-based groundglass nodule in the anterior right upper lobe in image #34 measures about 5 mm. It is unchanged from previous exams. There is linear opacity in the lower lobes dependently consistent with scar. No new pulmonary nodules. No significant pleural effusion.    Soft Tissues: Right anterior chest wall port is present. The tip terminates at the cavoatrial junction.    Osseous Structures: No suspicious focal lytic or sclerotic osseous lesions.    ABDOMEN and PELVIS:    Peritoneum: No free air or free fluid.    Appendix: Appendix is well seen and is normal.    Kidneys, ureters, and urinary bladder: No hydronephrosis. No nephroureterolithiasis. No focal renal lesions. Normal appearance of urinary bladder given the amount of distention.    Liver, gallbladder, and bile ducts: There are findings of partial hepatic resection and left lobe. There is nodular contour of the right hepatic lobe. Small stones within the nondistended gallbladder. No bile duct dilatation.    Spleen: Spleen is normal size.  No focal splenic lesions.    Adrenal glands: Unremarkable.    Pancreas: No focal masses. No pancreatic duct dilation.    Abdominal aorta and vascular structures: No aneurysmal dilation. Moderate atherosclerotic disease. Embolization coils are seen within the gastroduodenal artery, unchanged.    Stomach and Bowel: No abnormally dilated loops of bowel. No significant hiatal hernia. No significant bowel wall thickening. Ligament of Treitz has normal anatomic position. Sutures at the rectosigmoid junction. Large stool burden.    Reproductive Organs: Within normal limits.    Lymph nodes: No pathologically enlarged lymph nodes.    Soft tissues: Anterior abdominal wall hernia containing portions of bowel. The more superior hernia has a neck left of midline measuring about 2.2 cm. Hernia size measures about 4.9 x 3.6 cm on axial image. Inferior to this there is a larger hernia with wider neck right of midline the neck measures about 6.9 cm. This hernia contains bowel loops. It measures 10.3 x 5.2 cm. It is unchanged.    Osseous structures: No aggressive focal lytic or sclerotic osseous lesions. Osteopenia.    IMPRESSION:  Chest:  No change in the right upper lobe pulmonary nodules.  The enlarged subcarinal lymph node is similar in size to the 3/21/2020 study. It Has decreased in size since September 2019.    Abdomen and pelvis:  No evidence of metastatic disease in the abdomen and pelvis.  Status post partial left hepatic lobe resection. Remaining right hepatic lobe is heterogeneous and has nodular contour.  2 large anterior abdominal wall hernias containing loops of bowel.  Large stool burden throughout the colon which can be seen with constipation.    Assessment/Plan     Active Problems:  Infected Port-A-Cath  Ventral hernia    The infected Port-A-Cath site appears to be healing as expected.  Continue local wound care with packing and covering the wound with a bandage.    With regards the patient's ventral hernia, she is  scheduled to have a repeat CT scan of the abdomen and pelvis performed in October.  Prior to doing any type of surgical intervention I would like for the patient to have her imaging performed to ensure that she does not need any attention with regards to her cancer.  If her scans demonstrate no progression of disease and the patient is cleared by her oncologist for surgery, then we could consider performing surgical intervention.  Upon reviewing [facility], the patient appears to have a large and complex ventral hernia.  I will see her back after she has had a repeat CT scan to discuss with her more detail exactly what her surgery will entail, however I anticipate that it would be an open repair with likely a component separation and placement of permanent mesh.  I reviewed with the patient the signs and symptoms of incarceration and strangulation and instructed her to call my office immediately or present to the emergency room should she develop any such symptoms.  I believe that this is unlikely given the size of her hernia, and the fact that her last CT scan demonstrated that there is no evidence of obstruction, however we did review this just in case.    Jay Yeh MD  09/09/20  14:33

## 2020-11-11 DIAGNOSIS — U07.1 LAB TEST POSITIVE FOR DETECTION OF COVID-19 VIRUS: Primary | ICD-10-CM

## 2020-11-11 RX ORDER — AZITHROMYCIN 250 MG/1
TABLET, FILM COATED ORAL
Qty: 6 TABLET | Refills: 0 | Status: SHIPPED | OUTPATIENT
Start: 2020-11-11 | End: 2021-01-12 | Stop reason: HOSPADM

## 2020-11-11 NOTE — TELEPHONE ENCOUNTER
Samantha called reports she tested positive for covid on 11/7/020 and is unable to leave home. Has   Sinus/head congestion, with low grade fever, head aches, slightly cough and is requested medication for sinus infection.    Per Dr Spencer, z pack was sent to pharmacy. Also will take over the counter ASA 81 mg, zinc one pill daily,vitamin D one pill daily, pepcid 20 mg daily. Call if any changes

## 2020-11-13 ENCOUNTER — TELEPHONE (OUTPATIENT)
Dept: FAMILY MEDICINE CLINIC | Facility: CLINIC | Age: 60
End: 2020-11-13

## 2020-11-13 NOTE — TELEPHONE ENCOUNTER
Spoke with Mary, she reports she is feeling better since satrting  z pck, no fever, cough and st all better.

## 2020-12-11 ENCOUNTER — OFFICE VISIT (OUTPATIENT)
Dept: SURGERY | Facility: CLINIC | Age: 60
End: 2020-12-11

## 2020-12-11 VITALS
HEIGHT: 62 IN | TEMPERATURE: 98.9 F | HEART RATE: 118 BPM | RESPIRATION RATE: 16 BRPM | OXYGEN SATURATION: 99 % | BODY MASS INDEX: 24 KG/M2 | SYSTOLIC BLOOD PRESSURE: 149 MMHG | DIASTOLIC BLOOD PRESSURE: 81 MMHG | WEIGHT: 130.4 LBS

## 2020-12-11 DIAGNOSIS — K43.9 VENTRAL HERNIA WITHOUT OBSTRUCTION OR GANGRENE: Primary | ICD-10-CM

## 2020-12-11 PROCEDURE — 99213 OFFICE O/P EST LOW 20 MIN: CPT | Performed by: SURGERY

## 2020-12-11 RX ORDER — TRIAMTERENE AND HYDROCHLOROTHIAZIDE 37.5; 25 MG/1; MG/1
1 CAPSULE ORAL DAILY
COMMUNITY
Start: 2020-11-27 | End: 2023-02-17

## 2020-12-11 RX ORDER — POTASSIUM CHLORIDE 1500 MG/1
20 TABLET, FILM COATED, EXTENDED RELEASE ORAL DAILY
COMMUNITY
Start: 2020-09-14 | End: 2023-02-17

## 2020-12-11 NOTE — PROGRESS NOTES
GENERAL SURGERY PROGRESS NOTE    12/11/2020    Patient Care Team:  Diana Spencer MD as PCP - General  Diana Spencer MD as PCP - Family Medicine    Chief Complaint: follow-up port removal    Subjective   HPI: Patient is a 59-year-old lady who underwent a removal of her Port-A-Cath on 8/27/2020 after it had eroded through the skin.  She has a history of colon cancer with liver metastasis which was diagnosed initially in January 2012.  She underwent surgery for this, and then underwent adjuvant chemotherapy and has now been on maintenance chemotherapy since that time.  She is followed with serial CT scans of the abdomen and pelvis.  Patient reports that she is feeling well since removal of her Port-A-Cath.  Her chest wall wound is healing well with minimal drainage.  She has been packing the wound daily.  Today she also brings up that she has a ventral hernia which she wishes for me to evaluate.  She states that is been present for many years, but it has not caused her any significant problems.  It is only slightly uncomfortable occasionally.  On physical exam the patient does appear to have a large ventral hernia within her vertical midline incision.  The hernia itself is soft, nontender, and without any overlying skin changes.  The hernia is so large that when we attempt to reduce the hernia, it immediately recurs.  She denies having any problems eating with nausea or vomiting, or difficulties passing her bowels.  She does report that she frequently does heavy lifting as she works as a  and bearing-down seems to force more of her hernia out.  It always seems to be able to reduce, and she never reports any incarceration.      Interval History:   Patient had her repeat PET scan performed earlier this week and followed up with Dr. Elizalde.  PET/CT demonstrated area of concern within the right hilar region of metastatic disease.  Patient denies having any nausea, vomiting, difficulty passing bowel  movements, or significant abdominal pain.  She does still have some discomfort especially when she is wearing a seatbelt across her abdomen.    Review of Systems -   General ROS: negative for - chills, fatigue or fever  Psychological ROS: negative for - anxiety or depression  Allergy and Immunology ROS: negative for - hives, nasal congestion or postnasal drip  Hematological and Lymphatic ROS: negative for - bleeding problems or bruising  Respiratory ROS: no cough, shortness of breath, or wheezing  Cardiovascular ROS: no chest pain or dyspnea on exertion  Gastrointestinal ROS: no abdominal pain, change in bowel habits, or black or bloody stools  Genito-Urinary ROS: no dysuria, trouble voiding, or hematuria  Musculoskeletal ROS: negative for - joint pain or muscle pain  Neurological ROS: no TIA or stroke symptoms  Dermatological ROS: negative for dry skin and rash    Objective     Vital Signs  Temp:  [98.9 °F (37.2 °C)] 98.9 °F (37.2 °C)  Heart Rate:  [118] 118  Resp:  [16] 16  BP: (149)/(81) 149/81    Physical Exam   Constitutional: She is oriented to person, place, and time. She appears well-developed.   HENT:   Head: Normocephalic and atraumatic.   Cardiovascular: Normal rate and regular rhythm.   Pulmonary/Chest: Effort normal and breath sounds normal.   Right chest wall Port-A-Cath incision is well-healed.   Abdominal:   Abdomen is soft, nontender, and there is a large ventral incisional hernia.  This is soft, easily reducible, and there is no color change.   Musculoskeletal: Normal range of motion.   Neurological: She is alert and oriented to person, place, and time.   Skin: Skin is warm and dry.   Psychiatric: Her behavior is normal.   Vitals reviewed.       Results Review:    Lab Results (last 24 hours)     ** No results found for the last 24 hours. **        Imaging Results (Last 24 Hours)     ** No results found for the last 24 hours. **           I have reviewed the above results and noted them  below    Medication Review:    Current Outpatient Medications:   •  dimenhyDRINATE (DRAMAMINE PO), Take  by mouth., Disp: , Rfl:   •  Loperamide HCl (IMODIUM PO), Take  by mouth., Disp: , Rfl:   •  Lysine 500 MG capsule, Take  by mouth., Disp: , Rfl:   •  triamterene-hydrochlorothiazide (DYAZIDE) 37.5-25 MG per capsule, Take 1 capsule by mouth Daily., Disp: , Rfl:   •  American Ginseng powder, 200 mg 2 (Two) Times a Day., Disp: 1 bottle, Rfl: 2  •  azithromycin (ZITHROMAX) 250 MG tablet, Take 2 tablets the first day, then 1 tablet daily for 4 days., Disp: 6 tablet, Rfl: 0  •  HYDROcodone-acetaminophen (NORCO) 5-325 MG per tablet, Take 1-2 tablets by mouth Every 4 (Four) Hours As Needed (Pain)., Disp: 15 tablet, Rfl: 0  •  loratadine (CLARITIN) 10 MG tablet, Take 10 mg by mouth Daily., Disp: , Rfl:   •  potassium chloride ER (K-TAB) 20 MEQ tablet controlled-release ER tablet, Take 20 mEq by mouth Daily., Disp: , Rfl:   •  predniSONE (DELTASONE) 10 MG tablet, Take 1 tablet by mouth Daily., Disp: 10 tablet, Rfl: 0  •  vitamin C (ASCORBIC ACID) 250 MG tablet, Take 1 tablet by mouth Daily. Take along with oral iron., Disp: 30 tablet, Rfl: 5    I have reviewed the PET/CT.  Although it was not commented specifically in the report, the ventral hernia does appear to be relatively unchanged and measuring approximately 10 x 5 cm in size.    Assessment/Plan     Active Problems:  Ventral hernia  Recurrent metastatic colon cancer    Discussed with patient that I will need to confirm with Dr. Elizalde exactly what he is planning from a treatment standpoint given the new concerns of thoracic metastatic disease.  Per the patient, he would like for me to place a port at the time of my ventral hernia repair so that she may receive adjuvant chemotherapy.  Had a long discussion with the patient today regarding the findings of the PET scan as well as my perception of what surgical repair of her ventral hernia will entail.  This will likely  involve abdominal wall reconstruction and placement of a mesh.  She will likely be in the hospital for up to a week postoperatively while she heals.  Right now, due to the COVID-19 pandemic, elective procedures which are likely to require inpatient hospitalization are being postponed through the first of the year, and thus we will need to be postponed.  I do think that it would be worthwhile to place her Port-A-Cath and begin therapy per Dr. Elizalde.  If this involves immunotherapy or chemotherapy, my preference would be to wait until completion of this therapy prior to proceeding with abdominal wall reconstruction.  If the patient is likely to be on lifelong therapy for maintenance, then we will have to weigh the risks and the benefits of repair versus continue watchful waiting.  Reviewed with the patient signs and symptoms of strangulation and incarceration and instructed her to call my office or present to the emergency room immediately should she develop such symptoms.  After discussing with Dr. Elizalde what the oncologic treatment course will entail, I will be better able to determine timing of the patient's ventral hernia repair.    Jay Yeh MD  12/11/20  16:16 EST

## 2020-12-29 ENCOUNTER — HOSPITAL ENCOUNTER (OUTPATIENT)
Dept: CT IMAGING | Facility: HOSPITAL | Age: 60
Discharge: HOME | End: 2020-12-29
Admitting: RADIOLOGY

## 2020-12-29 VITALS
RESPIRATION RATE: 10 BRPM | DIASTOLIC BLOOD PRESSURE: 79 MMHG | WEIGHT: 130 LBS | SYSTOLIC BLOOD PRESSURE: 161 MMHG | OXYGEN SATURATION: 98 % | TEMPERATURE: 98.2 F | HEART RATE: 79 BPM | HEIGHT: 62 IN | BODY MASS INDEX: 23.92 KG/M2

## 2020-12-29 DIAGNOSIS — M89.9 RIB LESION: ICD-10-CM

## 2020-12-29 LAB
APTT PPP: 25.6 SECONDS (ref 24–31)
BASOPHILS # BLD AUTO: 0.1 10*3/MM3 (ref 0–0.2)
BASOPHILS NFR BLD AUTO: 0.8 % (ref 0–1.5)
DEPRECATED RDW RBC AUTO: 48.1 FL (ref 37–54)
EOSINOPHIL # BLD AUTO: 0.3 10*3/MM3 (ref 0–0.4)
EOSINOPHIL NFR BLD AUTO: 4.2 % (ref 0.3–6.2)
ERYTHROCYTE [DISTWIDTH] IN BLOOD BY AUTOMATED COUNT: 16 % (ref 12.3–15.4)
HCT VFR BLD AUTO: 42.2 % (ref 34–46.6)
HGB BLD-MCNC: 14.2 G/DL (ref 12–15.9)
INR PPP: 0.94 (ref 0.93–1.1)
LYMPHOCYTES # BLD AUTO: 1.6 10*3/MM3 (ref 0.7–3.1)
LYMPHOCYTES NFR BLD AUTO: 21.8 % (ref 19.6–45.3)
MCH RBC QN AUTO: 28.9 PG (ref 26.6–33)
MCHC RBC AUTO-ENTMCNC: 33.6 G/DL (ref 31.5–35.7)
MCV RBC AUTO: 86.2 FL (ref 79–97)
MONOCYTES # BLD AUTO: 0.8 10*3/MM3 (ref 0.1–0.9)
MONOCYTES NFR BLD AUTO: 11 % (ref 5–12)
NEUTROPHILS NFR BLD AUTO: 4.6 10*3/MM3 (ref 1.7–7)
NEUTROPHILS NFR BLD AUTO: 62.2 % (ref 42.7–76)
NRBC BLD AUTO-RTO: 0.1 /100 WBC (ref 0–0.2)
PLATELET # BLD AUTO: 171 10*3/MM3 (ref 140–450)
PMV BLD AUTO: 7.4 FL (ref 6–12)
PROTHROMBIN TIME: 10.4 SECONDS (ref 9.6–11.7)
RBC # BLD AUTO: 4.9 10*6/MM3 (ref 3.77–5.28)
WBC # BLD AUTO: 7.3 10*3/MM3 (ref 3.4–10.8)

## 2020-12-29 PROCEDURE — 99153 MOD SED SAME PHYS/QHP EA: CPT

## 2020-12-29 PROCEDURE — 25010000002 ONDANSETRON PER 1 MG: Performed by: RADIOLOGY

## 2020-12-29 PROCEDURE — 88305 TISSUE EXAM BY PATHOLOGIST: CPT | Performed by: INTERNAL MEDICINE

## 2020-12-29 PROCEDURE — 99152 MOD SED SAME PHYS/QHP 5/>YRS: CPT

## 2020-12-29 PROCEDURE — 77012 CT SCAN FOR NEEDLE BIOPSY: CPT

## 2020-12-29 PROCEDURE — 85025 COMPLETE CBC W/AUTO DIFF WBC: CPT | Performed by: RADIOLOGY

## 2020-12-29 PROCEDURE — 85730 THROMBOPLASTIN TIME PARTIAL: CPT | Performed by: RADIOLOGY

## 2020-12-29 PROCEDURE — 88333 PATH CONSLTJ SURG CYTO XM 1: CPT | Performed by: INTERNAL MEDICINE

## 2020-12-29 PROCEDURE — 25010000002 MIDAZOLAM PER 1 MG: Performed by: RADIOLOGY

## 2020-12-29 PROCEDURE — 88342 IMHCHEM/IMCYTCHM 1ST ANTB: CPT | Performed by: INTERNAL MEDICINE

## 2020-12-29 PROCEDURE — 85610 PROTHROMBIN TIME: CPT | Performed by: RADIOLOGY

## 2020-12-29 PROCEDURE — 88341 IMHCHEM/IMCYTCHM EA ADD ANTB: CPT | Performed by: INTERNAL MEDICINE

## 2020-12-29 PROCEDURE — 25010000002 FENTANYL CITRATE (PF) 100 MCG/2ML SOLUTION: Performed by: RADIOLOGY

## 2020-12-29 RX ORDER — SODIUM CHLORIDE 9 MG/ML
75 INJECTION, SOLUTION INTRAVENOUS CONTINUOUS
Status: DISCONTINUED | OUTPATIENT
Start: 2020-12-29 | End: 2020-12-30 | Stop reason: HOSPADM

## 2020-12-29 RX ORDER — ONDANSETRON 2 MG/ML
INJECTION INTRAMUSCULAR; INTRAVENOUS
Status: COMPLETED | OUTPATIENT
Start: 2020-12-29 | End: 2020-12-29

## 2020-12-29 RX ORDER — MIDAZOLAM HYDROCHLORIDE 1 MG/ML
INJECTION INTRAMUSCULAR; INTRAVENOUS
Status: COMPLETED | OUTPATIENT
Start: 2020-12-29 | End: 2020-12-29

## 2020-12-29 RX ORDER — SODIUM CHLORIDE 0.9 % (FLUSH) 0.9 %
3 SYRINGE (ML) INJECTION EVERY 12 HOURS SCHEDULED
Status: DISCONTINUED | OUTPATIENT
Start: 2020-12-29 | End: 2020-12-29 | Stop reason: HOSPADM

## 2020-12-29 RX ORDER — FENTANYL CITRATE 50 UG/ML
INJECTION, SOLUTION INTRAMUSCULAR; INTRAVENOUS
Status: COMPLETED | OUTPATIENT
Start: 2020-12-29 | End: 2020-12-29

## 2020-12-29 RX ORDER — SODIUM CHLORIDE 0.9 % (FLUSH) 0.9 %
3-10 SYRINGE (ML) INJECTION AS NEEDED
Status: DISCONTINUED | OUTPATIENT
Start: 2020-12-29 | End: 2020-12-29 | Stop reason: HOSPADM

## 2020-12-29 RX ADMIN — ONDANSETRON 4 MG: 2 INJECTION INTRAMUSCULAR; INTRAVENOUS at 10:23

## 2020-12-29 RX ADMIN — MIDAZOLAM 1 MG: 1 INJECTION INTRAMUSCULAR; INTRAVENOUS at 10:23

## 2020-12-29 RX ADMIN — SODIUM CHLORIDE 75 ML/HR: 0.9 INJECTION, SOLUTION INTRAVENOUS at 08:23

## 2020-12-29 RX ADMIN — FENTANYL CITRATE 100 MCG: 50 INJECTION, SOLUTION INTRAMUSCULAR; INTRAVENOUS at 10:23

## 2020-12-29 RX ADMIN — FENTANYL CITRATE 50 MCG: 50 INJECTION, SOLUTION INTRAMUSCULAR; INTRAVENOUS at 10:27

## 2020-12-29 NOTE — DISCHARGE INSTRUCTIONS
A responsible adult should stay with you and you should rest quietly for the rest of the day. Do not drink alcohol, drive or cook for 24 hours following your procedure.  Progress your diet as tolerated.  Resume your usual medications including aspirin.  When you remove your dressing in 48 hours, a small amount of blood is to be expected. Do not be alarmed.  If you feel it is bleeding excessively apply pressure and proceed to the Emergency room.  Do not shower, bath, or get your dressing wet at all for 48 hours.  You may shower after the dressing is removed. No lifting more that 10 pounds for 48 hours.  If severe pain, increased shortness of air or racing heartbeat occur, seek immediate medical attention.  Follow up with Dr. Perera for results.

## 2020-12-29 NOTE — NURSING NOTE
Pt is supine on CT table for right rib biopsy. CT imaging performed by md to identify site. Right lateral chest site marked, prepped and draped in sterile fashion. Local numbing and imaging used to place biopsy needle. Multiple specimens removed and given to pathologist who is present in department for procedure. Biopsy needle removed and sterile dressing applied to clean, dry puncture site on  Right lateral chest. Sterile technique observed. Pt to return to pre/post IR with RN.

## 2021-01-07 DIAGNOSIS — C19 METASTATIC COLORECTAL CANCER: Primary | ICD-10-CM

## 2021-01-07 RX ORDER — SODIUM CHLORIDE 9 MG/ML
100 INJECTION, SOLUTION INTRAVENOUS CONTINUOUS
Status: CANCELLED | OUTPATIENT
Start: 2021-01-07

## 2021-01-07 RX ORDER — CHLORHEXIDINE GLUCONATE 0.12 MG/ML
15 RINSE ORAL EVERY 12 HOURS SCHEDULED
Status: CANCELLED | OUTPATIENT
Start: 2021-01-07

## 2021-01-08 ENCOUNTER — LAB (OUTPATIENT)
Dept: LAB | Facility: HOSPITAL | Age: 61
End: 2021-01-08

## 2021-01-08 DIAGNOSIS — C19 METASTATIC COLORECTAL CANCER: Primary | ICD-10-CM

## 2021-01-08 LAB
ALBUMIN SERPL-MCNC: 4.4 G/DL (ref 3.5–5.2)
ALBUMIN/GLOB SERPL: 1.6 G/DL
ALP SERPL-CCNC: 111 U/L (ref 39–117)
ALT SERPL W P-5'-P-CCNC: 36 U/L (ref 1–33)
ANION GAP SERPL CALCULATED.3IONS-SCNC: 9 MMOL/L (ref 5–15)
APTT PPP: 27.1 SECONDS (ref 24–31)
AST SERPL-CCNC: 26 U/L (ref 1–32)
BILIRUB SERPL-MCNC: 0.4 MG/DL (ref 0–1.2)
BUN SERPL-MCNC: 22 MG/DL (ref 6–20)
BUN/CREAT SERPL: 22.4 (ref 7–25)
CALCIUM SPEC-SCNC: 9.2 MG/DL (ref 8.6–10.5)
CHLORIDE SERPL-SCNC: 100 MMOL/L (ref 98–107)
CO2 SERPL-SCNC: 30 MMOL/L (ref 22–29)
CREAT SERPL-MCNC: 0.98 MG/DL (ref 0.57–1)
GFR SERPL CREATININE-BSD FRML MDRD: 58 ML/MIN/1.73
GLOBULIN UR ELPH-MCNC: 2.8 GM/DL
GLUCOSE SERPL-MCNC: 98 MG/DL (ref 65–99)
INR PPP: 0.97 (ref 0.93–1.1)
POTASSIUM SERPL-SCNC: 3.8 MMOL/L (ref 3.5–5.2)
PROT SERPL-MCNC: 7.2 G/DL (ref 6–8.5)
PROTHROMBIN TIME: 10.7 SECONDS (ref 9.6–11.7)
SODIUM SERPL-SCNC: 139 MMOL/L (ref 136–145)

## 2021-01-08 PROCEDURE — 80053 COMPREHEN METABOLIC PANEL: CPT | Performed by: SURGERY

## 2021-01-08 PROCEDURE — 85610 PROTHROMBIN TIME: CPT | Performed by: SURGERY

## 2021-01-08 PROCEDURE — 36415 COLL VENOUS BLD VENIPUNCTURE: CPT | Performed by: SURGERY

## 2021-01-08 PROCEDURE — 81003 URINALYSIS AUTO W/O SCOPE: CPT

## 2021-01-08 PROCEDURE — 85730 THROMBOPLASTIN TIME PARTIAL: CPT | Performed by: SURGERY

## 2021-01-09 LAB
BILIRUB UR QL STRIP: NEGATIVE
CLARITY UR: CLEAR
COLOR UR: YELLOW
GLUCOSE UR STRIP-MCNC: NEGATIVE MG/DL
HGB UR QL STRIP.AUTO: NEGATIVE
KETONES UR QL STRIP: NEGATIVE
LEUKOCYTE ESTERASE UR QL STRIP.AUTO: NEGATIVE
NITRITE UR QL STRIP: NEGATIVE
PH UR STRIP.AUTO: 8 [PH] (ref 5–8)
PROT UR QL STRIP: NEGATIVE
SP GR UR STRIP: 1.01 (ref 1–1.03)
UROBILINOGEN UR QL STRIP: NORMAL

## 2021-01-12 ENCOUNTER — ANESTHESIA EVENT (OUTPATIENT)
Dept: PERIOP | Facility: HOSPITAL | Age: 61
End: 2021-01-12

## 2021-01-12 ENCOUNTER — ANESTHESIA (OUTPATIENT)
Dept: PERIOP | Facility: HOSPITAL | Age: 61
End: 2021-01-12

## 2021-01-12 ENCOUNTER — HOSPITAL ENCOUNTER (OUTPATIENT)
Facility: HOSPITAL | Age: 61
Setting detail: HOSPITAL OUTPATIENT SURGERY
Discharge: HOME | End: 2021-01-12
Attending: SURGERY | Admitting: SURGERY

## 2021-01-12 ENCOUNTER — HOSPITAL ENCOUNTER (OUTPATIENT)
Dept: GENERAL RADIOLOGY | Facility: HOSPITAL | Age: 61
Discharge: HOME | End: 2021-01-12

## 2021-01-12 ENCOUNTER — APPOINTMENT (OUTPATIENT)
Dept: GENERAL RADIOLOGY | Facility: HOSPITAL | Age: 61
End: 2021-01-12

## 2021-01-12 VITALS
SYSTOLIC BLOOD PRESSURE: 141 MMHG | OXYGEN SATURATION: 98 % | RESPIRATION RATE: 16 BRPM | BODY MASS INDEX: 24.11 KG/M2 | TEMPERATURE: 93.6 F | HEART RATE: 85 BPM | WEIGHT: 131 LBS | HEIGHT: 62 IN | DIASTOLIC BLOOD PRESSURE: 81 MMHG

## 2021-01-12 DIAGNOSIS — C19 METASTATIC COLORECTAL CANCER: ICD-10-CM

## 2021-01-12 PROCEDURE — 25010000002 HEPARIN (PORCINE) PER 1000 UNITS: Performed by: SURGERY

## 2021-01-12 PROCEDURE — 25010000002 FENTANYL CITRATE (PF) 100 MCG/2ML SOLUTION: Performed by: NURSE ANESTHETIST, CERTIFIED REGISTERED

## 2021-01-12 PROCEDURE — 76937 US GUIDE VASCULAR ACCESS: CPT | Performed by: SURGERY

## 2021-01-12 PROCEDURE — 77001 FLUOROGUIDE FOR VEIN DEVICE: CPT | Performed by: SURGERY

## 2021-01-12 PROCEDURE — C1788 PORT, INDWELLING, IMP: HCPCS | Performed by: SURGERY

## 2021-01-12 PROCEDURE — 76000 FLUOROSCOPY <1 HR PHYS/QHP: CPT

## 2021-01-12 PROCEDURE — 36561 INSERT TUNNELED CV CATH: CPT | Performed by: SURGERY

## 2021-01-12 PROCEDURE — 25010000002 MIDAZOLAM PER 1 MG: Performed by: NURSE ANESTHETIST, CERTIFIED REGISTERED

## 2021-01-12 PROCEDURE — 25010000002 PROPOFOL 10 MG/ML EMULSION: Performed by: NURSE ANESTHETIST, CERTIFIED REGISTERED

## 2021-01-12 PROCEDURE — S0260 H&P FOR SURGERY: HCPCS | Performed by: SURGERY

## 2021-01-12 PROCEDURE — 71045 X-RAY EXAM CHEST 1 VIEW: CPT

## 2021-01-12 DEVICE — POWERPORT M.R.I. IMPLANTABLE PORT WITH ATTACHABLE 8F CHRONOFLEX OPEN-ENDED SINGLE-LUMEN VENOUS CATHETER INTERMEDIATE KIT  (WITH SUTURE PLUGS)
Type: IMPLANTABLE DEVICE | Site: CHEST | Status: FUNCTIONAL
Brand: POWERPORT M.R.I., CHRONOFLEX

## 2021-01-12 RX ORDER — FENTANYL CITRATE 50 UG/ML
INJECTION, SOLUTION INTRAMUSCULAR; INTRAVENOUS AS NEEDED
Status: DISCONTINUED | OUTPATIENT
Start: 2021-01-12 | End: 2021-01-12 | Stop reason: SURG

## 2021-01-12 RX ORDER — MIDAZOLAM HYDROCHLORIDE 1 MG/ML
INJECTION INTRAMUSCULAR; INTRAVENOUS AS NEEDED
Status: DISCONTINUED | OUTPATIENT
Start: 2021-01-12 | End: 2021-01-12 | Stop reason: SURG

## 2021-01-12 RX ORDER — CLINDAMYCIN PHOSPHATE 900 MG/50ML
900 INJECTION, SOLUTION INTRAVENOUS ONCE
Status: COMPLETED | OUTPATIENT
Start: 2021-01-12 | End: 2021-01-12

## 2021-01-12 RX ORDER — CHLORHEXIDINE GLUCONATE 0.12 MG/ML
15 RINSE ORAL EVERY 12 HOURS SCHEDULED
Status: DISCONTINUED | OUTPATIENT
Start: 2021-01-12 | End: 2021-01-12 | Stop reason: HOSPADM

## 2021-01-12 RX ORDER — ONDANSETRON 2 MG/ML
4 INJECTION INTRAMUSCULAR; INTRAVENOUS ONCE AS NEEDED
Status: DISCONTINUED | OUTPATIENT
Start: 2021-01-12 | End: 2021-01-12 | Stop reason: HOSPADM

## 2021-01-12 RX ORDER — PHENYLEPHRINE HCL IN 0.9% NACL 0.5 MG/5ML
SYRINGE (ML) INTRAVENOUS AS NEEDED
Status: DISCONTINUED | OUTPATIENT
Start: 2021-01-12 | End: 2021-01-12 | Stop reason: SURG

## 2021-01-12 RX ORDER — MORPHINE SULFATE 4 MG/ML
0.5 INJECTION, SOLUTION INTRAMUSCULAR; INTRAVENOUS
Status: DISCONTINUED | OUTPATIENT
Start: 2021-01-12 | End: 2021-01-12 | Stop reason: HOSPADM

## 2021-01-12 RX ORDER — LIDOCAINE HYDROCHLORIDE 10 MG/ML
INJECTION, SOLUTION EPIDURAL; INFILTRATION; INTRACAUDAL; PERINEURAL AS NEEDED
Status: DISCONTINUED | OUTPATIENT
Start: 2021-01-12 | End: 2021-01-12 | Stop reason: SURG

## 2021-01-12 RX ORDER — PROPOFOL 10 MG/ML
VIAL (ML) INTRAVENOUS AS NEEDED
Status: DISCONTINUED | OUTPATIENT
Start: 2021-01-12 | End: 2021-01-12 | Stop reason: SURG

## 2021-01-12 RX ORDER — DEXAMETHASONE SODIUM PHOSPHATE 4 MG/ML
8 INJECTION, SOLUTION INTRA-ARTICULAR; INTRALESIONAL; INTRAMUSCULAR; INTRAVENOUS; SOFT TISSUE ONCE AS NEEDED
Status: DISCONTINUED | OUTPATIENT
Start: 2021-01-12 | End: 2021-01-12 | Stop reason: HOSPADM

## 2021-01-12 RX ORDER — HYDROCODONE BITARTRATE AND ACETAMINOPHEN 5; 325 MG/1; MG/1
1-2 TABLET ORAL EVERY 4 HOURS PRN
Qty: 15 TABLET | Refills: 0 | Status: SHIPPED | OUTPATIENT
Start: 2021-01-12 | End: 2021-11-01

## 2021-01-12 RX ORDER — SODIUM CHLORIDE, SODIUM LACTATE, POTASSIUM CHLORIDE, CALCIUM CHLORIDE 600; 310; 30; 20 MG/100ML; MG/100ML; MG/100ML; MG/100ML
INJECTION, SOLUTION INTRAVENOUS CONTINUOUS PRN
Status: DISCONTINUED | OUTPATIENT
Start: 2021-01-12 | End: 2021-01-12 | Stop reason: SURG

## 2021-01-12 RX ORDER — FENTANYL CITRATE 50 UG/ML
25 INJECTION, SOLUTION INTRAMUSCULAR; INTRAVENOUS
Status: DISCONTINUED | OUTPATIENT
Start: 2021-01-12 | End: 2021-01-12 | Stop reason: HOSPADM

## 2021-01-12 RX ORDER — SODIUM CHLORIDE 9 MG/ML
100 INJECTION, SOLUTION INTRAVENOUS CONTINUOUS
Status: DISCONTINUED | OUTPATIENT
Start: 2021-01-12 | End: 2021-01-12 | Stop reason: HOSPADM

## 2021-01-12 RX ORDER — SODIUM CHLORIDE, SODIUM LACTATE, POTASSIUM CHLORIDE, CALCIUM CHLORIDE 600; 310; 30; 20 MG/100ML; MG/100ML; MG/100ML; MG/100ML
20 INJECTION, SOLUTION INTRAVENOUS ONCE
Status: COMPLETED | OUTPATIENT
Start: 2021-01-12 | End: 2021-01-12

## 2021-01-12 RX ORDER — IBUPROFEN 400 MG/1
600 TABLET ORAL ONCE AS NEEDED
Status: DISCONTINUED | OUTPATIENT
Start: 2021-01-12 | End: 2021-01-12 | Stop reason: HOSPADM

## 2021-01-12 RX ADMIN — MIDAZOLAM 2 MG: 1 INJECTION INTRAMUSCULAR; INTRAVENOUS at 07:33

## 2021-01-12 RX ADMIN — PHENYLEPHRINE HYDROCHLORIDE 100 MCG: 10 INJECTION INTRAVENOUS at 08:05

## 2021-01-12 RX ADMIN — PROPOFOL 40 MG: 10 INJECTION, EMULSION INTRAVENOUS at 07:39

## 2021-01-12 RX ADMIN — SODIUM CHLORIDE, SODIUM LACTATE, POTASSIUM CHLORIDE, AND CALCIUM CHLORIDE: .6; .31; .03; .02 INJECTION, SOLUTION INTRAVENOUS at 07:27

## 2021-01-12 RX ADMIN — PROPOFOL 80 MCG/KG/MIN: 10 INJECTION, EMULSION INTRAVENOUS at 07:37

## 2021-01-12 RX ADMIN — SODIUM CHLORIDE, POTASSIUM CHLORIDE, SODIUM LACTATE AND CALCIUM CHLORIDE 20 ML/HR: 600; 310; 30; 20 INJECTION, SOLUTION INTRAVENOUS at 07:15

## 2021-01-12 RX ADMIN — LIDOCAINE HYDROCHLORIDE 40 MG: 10 INJECTION, SOLUTION EPIDURAL; INFILTRATION; INTRACAUDAL; PERINEURAL at 07:36

## 2021-01-12 RX ADMIN — FENTANYL CITRATE 50 MCG: 50 INJECTION, SOLUTION INTRAMUSCULAR; INTRAVENOUS at 07:35

## 2021-01-12 RX ADMIN — CLINDAMYCIN PHOSPHATE 900 MG: 900 INJECTION, SOLUTION INTRAVENOUS at 07:37

## 2021-01-12 NOTE — ANESTHESIA POSTPROCEDURE EVALUATION
Patient: Samantha Massey    Procedure Summary     Date: 01/12/21 Room / Location: [facility] OR 09 / [facility] MAIN OR    Anesthesia Start: 0727 Anesthesia Stop: 0834    Procedure: INSERTION VENOUS ACCESS DEVICE (Left Chest) Diagnosis:       Metastatic colorectal cancer (CMS/HCC)      (Metastatic colorectal cancer (CMS/HCC) [C19])    Surgeon: Jay Yeh MD Provider: Raymundo Downs MD    Anesthesia Type: MAC ASA Status: 3          Anesthesia Type: MAC    Vitals  Vitals Value Taken Time   /71 01/12/21 0851   Temp 97 °F (36.1 °C) 01/12/21 0824   Pulse 84 01/12/21 0851   Resp 12 01/12/21 0851   SpO2 100 % 01/12/21 0851           Post Anesthesia Care and Evaluation    Patient location during evaluation: PACU  Patient participation: complete - patient participated  Level of consciousness: awake  Pain scale: See nurse's notes for pain score.  Pain management: adequate  Airway patency: patent  Anesthetic complications: No anesthetic complications  PONV Status: none  Cardiovascular status: acceptable  Respiratory status: acceptable  Hydration status: acceptable    Comments: Patient seen and examined postoperatively; vital signs stable; SpO2 greater than or equal to 90%; cardiopulmonary status stable; nausea/vomiting adequately controlled; pain adequately controlled; no apparent anesthesia complications; patient discharged from anesthesia care when discharge criteria were met

## 2021-01-12 NOTE — H&P
GENERAL SURGERY PROGRESS NOTE    1/12/2021    Patient Care Team:  Diana Spencer MD as PCP - General  Diana Spencer MD as PCP - Family Medicine    Chief Complaint: follow-up port removal    Subjective   HPI: Patient is a 59-year-old lady who underwent a removal of her Port-A-Cath on 8/27/2020 after it had eroded through the skin.  She has a history of colon cancer with liver metastasis which was diagnosed initially in January 2012.  She underwent surgery for this, and then underwent adjuvant chemotherapy and has now been on maintenance chemotherapy since that time.  She is followed with serial CT scans of the abdomen and pelvis.  Patient reports that she is feeling well since removal of her Port-A-Cath.  Her chest wall wound is healing well with minimal drainage.  She has been packing the wound daily.  Today she also brings up that she has a ventral hernia which she wishes for me to evaluate.  She states that is been present for many years, but it has not caused her any significant problems.  It is only slightly uncomfortable occasionally.  On physical exam the patient does appear to have a large ventral hernia within her vertical midline incision.  The hernia itself is soft, nontender, and without any overlying skin changes.  The hernia is so large that when we attempt to reduce the hernia, it immediately recurs.  She denies having any problems eating with nausea or vomiting, or difficulties passing her bowels.  She does report that she frequently does heavy lifting as she works as a  and bearing-down seems to force more of her hernia out.  It always seems to be able to reduce, and she never reports any incarceration.      Interval History:   Patient had her repeat PET scan performed earlier this week and followed up with Dr. Elizalde.  PET/CT demonstrated area of concern within the right hilar region of metastatic disease.  Patient denies having any nausea, vomiting, difficulty passing bowel  movements, or significant abdominal pain.  She does still have some discomfort especially when she is wearing a seatbelt across her abdomen.    Review of Systems -   General ROS: negative for - chills, fatigue or fever  Psychological ROS: negative for - anxiety or depression  Allergy and Immunology ROS: negative for - hives, nasal congestion or postnasal drip  Hematological and Lymphatic ROS: negative for - bleeding problems or bruising  Respiratory ROS: no cough, shortness of breath, or wheezing  Cardiovascular ROS: no chest pain or dyspnea on exertion  Gastrointestinal ROS: no abdominal pain, change in bowel habits, or black or bloody stools  Genito-Urinary ROS: no dysuria, trouble voiding, or hematuria  Musculoskeletal ROS: negative for - joint pain or muscle pain  Neurological ROS: no TIA or stroke symptoms  Dermatological ROS: negative for dry skin and rash    Objective     Vital Signs  Temp:  [97.4 °F (36.3 °C)] 97.4 °F (36.3 °C)  Heart Rate:  [93] 93  Resp:  [16] 16  BP: (143)/(87) 143/87    Physical Exam   Constitutional: She is oriented to person, place, and time. She appears well-developed.   HENT:   Head: Normocephalic and atraumatic.   Cardiovascular: Normal rate and regular rhythm.   Pulmonary/Chest: Effort normal and breath sounds normal.   Right chest wall Port-A-Cath incision is well-healed.   Abdominal:   Abdomen is soft, nontender, and there is a large ventral incisional hernia.  This is soft, easily reducible, and there is no color change.   Musculoskeletal: Normal range of motion.   Neurological: She is alert and oriented to person, place, and time.   Skin: Skin is warm and dry.   Psychiatric: Her behavior is normal.   Vitals reviewed.       Results Review:    Lab Results (last 24 hours)     ** No results found for the last 24 hours. **        Imaging Results (Last 24 Hours)     ** No results found for the last 24 hours. **           I have reviewed the above results and noted them  below    Medication Review:    Current Facility-Administered Medications:   •  chlorhexidine (PERIDEX) 0.12 % solution 15 mL, 15 mL, Mouth/Throat, Q12H, Jay Yeh MD  •  clindamycin (CLEOCIN) 900 mg in sodium chloride 0.9% 50 mL IVPB (premix), 900 mg, Intravenous, Once, Jay Yeh MD  •  lactated ringers infusion, 20 mL/hr, Intravenous, Once, Jay Yeh MD  •  mupirocin (BACTROBAN) 2 % nasal ointment, , Nasal, BID, Jay Yeh MD  •  sodium chloride 0.9 % infusion, 100 mL/hr, Intravenous, Continuous, Jay Yeh MD    I have reviewed the PET/CT.  Although it was not commented specifically in the report, the ventral hernia does appear to be relatively unchanged and measuring approximately 10 x 5 cm in size.    Assessment/Plan     Active Problems:  Ventral hernia  Recurrent metastatic colon cancer    Discussed with patient that I will need to confirm with Dr. Elizalde exactly what he is planning from a treatment standpoint given the new concerns of thoracic metastatic disease.  Per the patient, he would like for me to place a port at the time of my ventral hernia repair so that she may receive adjuvant chemotherapy.  Had a long discussion with the patient today regarding the findings of the PET scan as well as my perception of what surgical repair of her ventral hernia will entail.  This will likely involve abdominal wall reconstruction and placement of a mesh.  She will likely be in the hospital for up to a week postoperatively while she heals.  Right now, due to the COVID-19 pandemic, elective procedures which are likely to require inpatient hospitalization are being postponed through the first of the year, and thus we will need to be postponed.  I do think that it would be worthwhile to place her Port-A-Cath and begin therapy per Dr. Elizalde.  If this involves immunotherapy or chemotherapy, my preference would be to wait until completion of this  therapy prior to proceeding with abdominal wall reconstruction.  If the patient is likely to be on lifelong therapy for maintenance, then we will have to weigh the risks and the benefits of repair versus continue watchful waiting.  Reviewed with the patient signs and symptoms of strangulation and incarceration and instructed her to call my office or present to the emergency room immediately should she develop such symptoms.  After discussing with Dr. Elizalde what the oncologic treatment course will entail, I will be better able to determine timing of the patient's ventral hernia repair.  Patient and Dr. Elizalde have agreed that proceeding with port-a-cath insertion is beneficial. Will place port a cath today.    Jay Yeh MD  01/12/21  07:13 EST

## 2021-01-12 NOTE — OP NOTE
INSERTION VENOUS ACCESS DEVICE  Operative Note    Patient Name:  Samantha Massey  YOB: 1960    Date of Surgery:  1/12/2021     Indications:  The patient is a 60 y.o. female who presents for port-a-cath insertion for initiation of chemotherpay. The risks, benefits, and alternative to surgery were discussed with the patient prior to proceeding to the operating room.    Pre-op Diagnosis:   Metastatic colorectal cancer (CMS/HCC) [C19]    Post-op Diagnosis:  Post-Op Diagnosis Codes:     * Metastatic colorectal cancer (CMS/HCC) [C19]    Procedure/CPT® Codes:      Procedure(s):  INSERTION VENOUS ACCESS DEVICE    Staff:  Surgeon(s):  Jay Yeh MD         Anesthesia: General    Estimated Blood Loss: 5 mL    Implants:    Implant Name Type Inv. Item Serial No.  Lot No. LRB No. Used Action   POWERPORT MRI CATH/POLY INTERMED 1L VIANNEY/H 8F CLR - [id number] Implant POWERPORT MRI CATH/POLY INTERMED 1L VIANNEY/H 8F CLR  [employer] [id number] Left 1 Implanted       Specimen:          None    Findings: Normal anatomy, left internal jugular vein approach    Complications: none, immediately    Description of Procedure: After obtaining informed consent in the preop holding area the patient was brought to the operating room and placed in the supine position.  SCDs were applied, and preoperative antibiotics were administered.  The patient then underwent uncomplicated induction of general endotracheal anesthesia.  The arms were tucked in the left neck and chest were prepped and draped in the usual sterile fashion.  After a brief timeout the procedure began.  Using ultrasound guidance the left internal jugular vein was accessed using a finder needle and a wire was passed through the left internal jugular vein towards the heart.  Fluoroscopy was used to demonstrate a wire passing from the left neck down towards the right heart and through to the inferior vena cava.  We then turned our  attention to creation of a subcutaneous pocket for the port to sit.  An incision was made in the left chest and carried down through the subcutaneous tissues using electrocautery.  Subcutaneous fat was then divided using electrocautery to maintain hemostasis.  Once the pocket was of adequate size we turned our attention towards passing her catheter through the internal jugular vein.  Once again prior to dilating, ultrasound guidance was used to confirm location of the wire within the left internal jugular vein.  We then used a dilator and sheath over the wire and removed the dilator and wire leaving the sheath in place.  A tunneling device was used to tunnel the catheter from the neck to the port site.  The catheter was cut at 25 cm, and the catheter was attached to the port using the clear plastic piece.  There was a snap confirming adequate seating of the catheter and clear plastic securing piece on the port.  We then inserted the catheter through the sheath which was then broken away leaving the catheter in the left internal jugular vein.  The entire sheath was removed.  We then aspirated and flushed the catheter without any difficulty. A second view was obtained with fluoroscopy to confirm adequate positioning of the catheter. The port site was then closed in 2 layers, using interrupted 4-0 Vicryl stitches to reapproximate the dermis and 4-0 running Monocryl to reapproximate the epidermis.  The wound was dressed using skin glue.  A single 4-0 Monocryl stitch was applied in the right neck in a subcuticular fashion, and the skin was dressed using skin glue.  Prior to closing these incisions they were infiltrated with local anesthesia.  The patient tolerated the procedure well, was extubated, and taken to PACU in satisfactory condition.  She will have a chest x-ray performed prior to being discharged home to confirm proper placement of her port as well as to look for pneumothorax.    Jay Yeh MD      Date: 1/12/2021  Time: 08:19 EST

## 2021-01-12 NOTE — ANESTHESIA PREPROCEDURE EVALUATION
Anesthesia Evaluation     Patient summary reviewed and Nursing notes reviewed   history of anesthetic complications: PONV  NPO Solid Status: > 8 hours  NPO Liquid Status: > 8 hours           Airway   Mallampati: II  TM distance: >3 FB  Neck ROM: full  No difficulty expected  Dental - normal exam     Comment: Missing teeth    Pulmonary - normal exam   Cardiovascular - normal exam    ECG reviewed    (+) hypertension,       Neuro/Psych  GI/Hepatic/Renal/Endo    (+)   liver disease,     Musculoskeletal     Abdominal  - normal exam    Bowel sounds: normal.   Substance History      OB/GYN          Other      history of cancer active    ROS/Med Hx Other: Colon cancer Stage IV- hx liver mets - in remission                Anesthesia Plan    ASA 3     MAC     intravenous induction     Anesthetic plan, all risks, benefits, and alternatives have been provided, discussed and informed consent has been obtained with: patient.

## 2021-01-12 NOTE — DISCHARGE INSTRUCTIONS
Ok for discharge  Follow-up with me (Dr. Yeh) as needed for port removal  Regular diet as tolerated  Ok to shower starting tomorrow. No tub baths, pools, lakes, or streams for 1 week.  Call my office or present to the ED with: fevers greater than 101.5, redness around the incisions, drainage from the incisions, intractable nausea/vomiting, or pain that is getting worse instead of better    Implanted Port Insertion, Care After  This sheet gives you information about how to care for yourself after your procedure. Your health care provider may also give you more specific instructions. If you have problems or questions, contact your health care provider.  What can I expect after the procedure?  After the procedure, it is common to have:  · Discomfort at the port insertion site.  · Bruising on the skin over the port. This should improve over 3-4 days.  Follow these instructions at home:  Port care  · After your port is placed, you will get a 's information card. The card has information about your port. Keep this card with you at all times.  · Take care of the port as told by your health care provider. Ask your health care provider if you or a family member can get training for taking care of the port at home. A home health care nurse may also take care of the port.  · Make sure to remember what type of port you have.  Incision care         · Follow instructions from your health care provider about how to take care of your port insertion site. Make sure you:  ? Wash your hands with soap and water before and after you change your bandage (dressing). If soap and water are not available, use hand .  ? Change your dressing as told by your health care provider.  ? Leave stitches (sutures), skin glue, or adhesive strips in place. These skin closures may need to stay in place for 2 weeks or longer. If adhesive strip edges start to loosen and curl up, you may trim the loose edges. Do not remove  adhesive strips completely unless your health care provider tells you to do that.  · Check your port insertion site every day for signs of infection. Check for:  ? Redness, swelling, or pain.  ? Fluid or blood.  ? Warmth.  ? Pus or a bad smell.  Activity  · Return to your normal activities as told by your health care provider. Ask your health care provider what activities are safe for you.  · Do not lift anything that is heavier than 10 lb (4.5 kg), or the limit that you are told, until your health care provider says that it is safe.  General instructions  · Take over-the-counter and prescription medicines only as told by your health care provider.  · Do not take baths, swim, or use a hot tub until your health care provider approves. Ask your health care provider if you may take showers. You may only be allowed to take sponge baths.  · Do not drive for 24 hours if you were given a sedative during your procedure.  · Wear a medical alert bracelet in case of an emergency. This will tell any health care providers that you have a port.  · Keep all follow-up visits as told by your health care provider. This is important.  Contact a health care provider if:  · You cannot flush your port with saline as directed, or you cannot draw blood from the port.  · You have a fever or chills.  · You have redness, swelling, or pain around your port insertion site.  · You have fluid or blood coming from your port insertion site.  · Your port insertion site feels warm to the touch.  · You have pus or a bad smell coming from the port insertion site.  Get help right away if:  · You have chest pain or shortness of breath.  · You have bleeding from your port that you cannot control.  Summary  · Take care of the port as told by your health care provider. Keep the 's information card with you at all times.  · Change your dressing as told by your health care provider.  · Contact a health care provider if you have a fever or chills  or if you have redness, swelling, or pain around your port insertion site.  · Keep all follow-up visits as told by your health care provider.  This information is not intended to replace advice given to you by your health care provider. Make sure you discuss any questions you have with your health care provider.  Document Revised: 07/16/2019 Document Reviewed: 07/16/2019  Elsevier Patient Education © 2020 [employer]

## 2021-01-20 LAB
LAB AP CASE REPORT: NORMAL
LAB AP DIAGNOSIS COMMENT: NORMAL
Lab: NORMAL
PATH REPORT.ADDENDUM SPEC: NORMAL
PATH REPORT.FINAL DX SPEC: NORMAL
PATH REPORT.GROSS SPEC: NORMAL

## 2021-01-25 RX ORDER — ALPRAZOLAM 0.5 MG/1
0.5 TABLET ORAL 2 TIMES DAILY PRN
Qty: 30 TABLET | Refills: 0 | Status: SHIPPED | OUTPATIENT
Start: 2021-01-25

## 2021-08-19 ENCOUNTER — LAB REQUISITION (OUTPATIENT)
Dept: LAB | Facility: HOSPITAL | Age: 61
End: 2021-08-19

## 2021-08-19 DIAGNOSIS — C18.9 MALIGNANT NEOPLASM OF COLON, UNSPECIFIED (HCC): ICD-10-CM

## 2021-08-19 LAB
ALBUMIN SERPL-MCNC: 3.9 G/DL (ref 3.5–5.2)
ALBUMIN/GLOB SERPL: 1.9 G/DL
ALP SERPL-CCNC: 79 U/L (ref 39–117)
ALT SERPL W P-5'-P-CCNC: 21 U/L (ref 1–33)
ANION GAP SERPL CALCULATED.3IONS-SCNC: 9 MMOL/L (ref 5–15)
AST SERPL-CCNC: 23 U/L (ref 1–32)
BILIRUB SERPL-MCNC: 0.2 MG/DL (ref 0–1.2)
BUN SERPL-MCNC: 17 MG/DL (ref 6–20)
BUN/CREAT SERPL: 20.7 (ref 7–25)
CALCIUM SPEC-SCNC: 8.6 MG/DL (ref 8.6–10.5)
CHLORIDE SERPL-SCNC: 108 MMOL/L (ref 98–107)
CO2 SERPL-SCNC: 25 MMOL/L (ref 22–29)
CREAT SERPL-MCNC: 0.82 MG/DL (ref 0.57–1)
GFR SERPL CREATININE-BSD FRML MDRD: 71 ML/MIN/1.73
GLOBULIN UR ELPH-MCNC: 2.1 GM/DL
GLUCOSE SERPL-MCNC: 98 MG/DL (ref 65–99)
POTASSIUM SERPL-SCNC: 4.1 MMOL/L (ref 3.5–5.2)
PROT SERPL-MCNC: 6 G/DL (ref 6–8.5)
SODIUM SERPL-SCNC: 142 MMOL/L (ref 136–145)

## 2021-08-19 PROCEDURE — 80053 COMPREHEN METABOLIC PANEL: CPT | Performed by: NURSE PRACTITIONER

## 2021-11-01 ENCOUNTER — OFFICE VISIT (OUTPATIENT)
Dept: FAMILY MEDICINE CLINIC | Facility: CLINIC | Age: 61
End: 2021-11-01

## 2021-11-01 VITALS
WEIGHT: 144.8 LBS | TEMPERATURE: 97.2 F | HEART RATE: 107 BPM | RESPIRATION RATE: 18 BRPM | BODY MASS INDEX: 26.65 KG/M2 | OXYGEN SATURATION: 98 % | SYSTOLIC BLOOD PRESSURE: 138 MMHG | DIASTOLIC BLOOD PRESSURE: 82 MMHG | HEIGHT: 62 IN

## 2021-11-01 DIAGNOSIS — B34.9 VIRAL SYNDROME: ICD-10-CM

## 2021-11-01 DIAGNOSIS — H66.001 NON-RECURRENT ACUTE SUPPURATIVE OTITIS MEDIA OF RIGHT EAR WITHOUT SPONTANEOUS RUPTURE OF TYMPANIC MEMBRANE: Primary | ICD-10-CM

## 2021-11-01 DIAGNOSIS — C19 METASTATIC COLORECTAL CANCER: ICD-10-CM

## 2021-11-01 PROCEDURE — 99213 OFFICE O/P EST LOW 20 MIN: CPT | Performed by: FAMILY MEDICINE

## 2021-11-01 RX ORDER — DOXYCYCLINE HYCLATE 100 MG
100 TABLET ORAL 2 TIMES DAILY
COMMUNITY

## 2021-11-01 RX ORDER — SULFAMETHOXAZOLE AND TRIMETHOPRIM 800; 160 MG/1; MG/1
1 TABLET ORAL 2 TIMES DAILY
Qty: 20 TABLET | Refills: 0 | Status: SHIPPED | OUTPATIENT
Start: 2021-11-01 | End: 2023-02-17

## 2021-11-01 NOTE — PROGRESS NOTES
"Chief Complaint  Earache    Subjective     CC  Problem List  Visit Diagnosis   Encounters  Notes  Medications  Labs  Result Review Imaging  Media    Samantha Massey presents to [facility] for   Samantha is currently in chemotherapy. She is receiving treatments every two weeks. Samantha was last seen by Dr. Perera-Oncology in 08/2021 for follow up on colon cancer. She had a CT scan performed at [facility].    Samantha Massey declines flu vaccine at this time. Will get at a later date.    Earache   There is pain in the right ear. This is a new problem. The current episode started 1 to 4 weeks ago (1 month). The problem occurs constantly. The problem has been unchanged. There has been no fever. Pertinent negatives include no abdominal pain, ear discharge, headaches, rash, rhinorrhea, sore throat or vomiting. She has tried heat packs and ear drops for the symptoms. The treatment provided no relief.       Review of Systems   Constitutional: Negative for appetite change, fever, unexpected weight gain and unexpected weight loss.   HENT: Positive for ear pain. Negative for ear discharge, rhinorrhea and sore throat.    Eyes: Negative for visual disturbance.   Respiratory: Negative for shortness of breath and wheezing.    Cardiovascular: Negative for chest pain and palpitations.   Gastrointestinal: Negative for abdominal pain and vomiting.   Endocrine: Negative for cold intolerance, heat intolerance, polydipsia and polyuria.   Skin: Negative for rash.   Hematological: Negative for adenopathy. Does not bruise/bleed easily.        Objective   Vital Signs:   /82 (BP Location: Left arm, Patient Position: Sitting, Cuff Size: Adult)   Pulse 107   Temp 97.2 °F (36.2 °C) (Temporal)   Resp 18   Ht 157.5 cm (62\")   Wt 65.7 kg (144 lb 12.8 oz)   SpO2 98% Comment: Room air  BMI 26.48 kg/m²     Physical Exam  Constitutional:       General: She is not in acute " distress.  HENT:      Right Ear: Ear canal normal. A middle ear effusion is present. Tympanic membrane is bulging (with moderate white discoloration which likely represents puss ).      Left Ear: Ear canal normal.   Neck:      Thyroid: No thyromegaly.   Cardiovascular:      Rate and Rhythm: Normal rate and regular rhythm.      Pulses:           Dorsalis pedis pulses are 2+ on the right side and 2+ on the left side.   Pulmonary:      Effort: Pulmonary effort is normal. No respiratory distress.      Breath sounds: Normal breath sounds.   Abdominal:      Palpations: Abdomen is soft.   Musculoskeletal:      Cervical back: Neck supple.      Right lower leg: No edema.      Left lower leg: No edema.   Lymphadenopathy:      Cervical: No cervical adenopathy.   Neurological:      Mental Status: She is alert.        Result Review :Labs  Result Review  Imaging  Med Tab  Media                 Assessment and Plan CC Problem List  Visit Diagnosis  ROS  Review (Popup)  Health Maintenance  Quality  BestPractice  Medications  SmartSets  SnapShot Encounters  Media  Problem List Items Addressed This Visit        Unprioritized    Metastatic colorectal cancer (HCC)    Overview     Metastatic to the liver.   Immunocompromised, aware         Non-recurrent acute suppurative otitis media of right ear - Primary    Relevant Medications    sulfamethoxazole-trimethoprim (BACTRIM DS,SEPTRA DS) 800-160 MG per tablet      Other Visit Diagnoses     Viral syndrome              Follow Up Instructions Charge Capture  Follow-up Communications  Return in about 2 weeks (around 11/15/2021).  Patient was given instructions and counseling regarding her condition or for health maintenance advice. Please see specific information pulled into the AVS if appropriate.

## 2021-11-09 ENCOUNTER — OFFICE VISIT (OUTPATIENT)
Dept: FAMILY MEDICINE CLINIC | Facility: CLINIC | Age: 61
End: 2021-11-09

## 2021-11-09 VITALS
DIASTOLIC BLOOD PRESSURE: 80 MMHG | TEMPERATURE: 97.8 F | SYSTOLIC BLOOD PRESSURE: 132 MMHG | HEIGHT: 62 IN | OXYGEN SATURATION: 98 % | WEIGHT: 141.8 LBS | RESPIRATION RATE: 18 BRPM | HEART RATE: 108 BPM | BODY MASS INDEX: 26.09 KG/M2

## 2021-11-09 DIAGNOSIS — C19 METASTATIC COLORECTAL CANCER: ICD-10-CM

## 2021-11-09 DIAGNOSIS — H66.001 NON-RECURRENT ACUTE SUPPURATIVE OTITIS MEDIA OF RIGHT EAR WITHOUT SPONTANEOUS RUPTURE OF TYMPANIC MEMBRANE: Primary | ICD-10-CM

## 2021-11-09 PROBLEM — E66.3 OVERWEIGHT WITH BODY MASS INDEX (BMI) OF 25 TO 25.9 IN ADULT: Status: ACTIVE | Noted: 2021-11-09

## 2021-11-09 PROCEDURE — 99213 OFFICE O/P EST LOW 20 MIN: CPT | Performed by: FAMILY MEDICINE

## 2021-11-09 RX ORDER — NEOMYCIN SULFATE, POLYMYXIN B SULFATE, HYDROCORTISONE 3.5; 10000; 1 MG/ML; [USP'U]/ML; MG/ML
4 SOLUTION/ DROPS AURICULAR (OTIC) 2 TIMES DAILY
COMMUNITY
Start: 2021-11-01 | End: 2023-02-17

## 2021-11-09 NOTE — PROGRESS NOTES
"Chief Complaint  Earache    Subjective     CC  Problem List  Visit Diagnosis   Encounters  Notes  Medications  Labs  Result Review Imaging  Media    Samantha Massey presents to [facility] for   Samantha was last seen in office on 11/01/2021. She was prescribed bactrim and neomycin for right OM ear drops at that time with good relief.    Earache   There is pain in the right ear. This is a new problem. The current episode started 1 to 4 weeks ago. The problem has been rapidly improving. There has been no fever. The patient is experiencing no pain. Pertinent negatives include no abdominal pain, diarrhea, ear discharge, headaches, rhinorrhea or vomiting. She has tried ear drops and antibiotics for the symptoms. The treatment provided significant relief.       Review of Systems   Constitutional: Negative for fatigue, fever and unexpected weight loss.   HENT: Positive for ear pain ( resolving). Negative for congestion, ear discharge and rhinorrhea.    Respiratory: Negative for wheezing.    Cardiovascular: Negative for chest pain, palpitations and leg swelling.   Gastrointestinal: Negative for abdominal pain, diarrhea, nausea and vomiting.   Genitourinary: Negative for dysuria.   Hematological: Negative for adenopathy. Does not bruise/bleed easily.        Objective   Vital Signs:   /80 (BP Location: Right arm, Patient Position: Sitting, Cuff Size: Adult)   Pulse 108   Temp 97.8 °F (36.6 °C) (Temporal)   Resp 18   Ht 157.5 cm (62\")   Wt 64.3 kg (141 lb 12.8 oz)   SpO2 98% Comment: Room air  BMI 25.94 kg/m²     Physical Exam  Constitutional:       General: She is not in acute distress.  HENT:      Right Ear: Ear canal normal. No middle ear effusion (minimal erythema). Tympanic membrane is bulging (with moderate white discoloration which likely represents puss ).      Left Ear: Tympanic membrane and ear canal normal.   Neck:      Thyroid: No thyromegaly. "   Cardiovascular:      Rate and Rhythm: Normal rate and regular rhythm.      Pulses:           Dorsalis pedis pulses are 2+ on the right side and 2+ on the left side.   Pulmonary:      Effort: Pulmonary effort is normal. No respiratory distress.      Breath sounds: Normal breath sounds.   Abdominal:      Palpations: Abdomen is soft.   Musculoskeletal:      Cervical back: Neck supple.      Right lower leg: No edema.      Left lower leg: No edema.   Lymphadenopathy:      Cervical: No cervical adenopathy.   Neurological:      Mental Status: She is alert.        Result Review :Labs  Result Review  Imaging  Med Tab  Media                 Assessment and Plan CC Problem List  Visit Diagnosis  ROS  Review (Popup)  Health Maintenance  Quality  BestPractice  Medications  SmartSets  SnapShot Encounters  Media  Problem List Items Addressed This Visit        Unprioritized    Metastatic colorectal cancer (HCC)    Overview     Metastatic to the liver.   Stable disease         Non-recurrent acute suppurative otitis media of right ear - Primary          Follow Up Instructions Charge Capture  Follow-up Communications  Return if symptoms worsen or fail to improve.  Patient was given instructions and counseling regarding her condition or for health maintenance advice. Please see specific information pulled into the AVS if appropriate.

## 2022-03-25 ENCOUNTER — TRANSCRIBE ORDERS (OUTPATIENT)
Dept: ADMINISTRATIVE | Facility: HOSPITAL | Age: 62
End: 2022-03-25

## 2022-03-25 DIAGNOSIS — C79.51 BONE METASTASIS: Primary | ICD-10-CM

## 2022-03-25 DIAGNOSIS — C18.9 MALIGNANT NEOPLASM OF COLON, UNSPECIFIED PART OF COLON: ICD-10-CM

## 2022-04-01 ENCOUNTER — HOSPITAL ENCOUNTER (OUTPATIENT)
Dept: NUCLEAR MEDICINE | Facility: HOSPITAL | Age: 62
Discharge: HOME | End: 2022-04-01

## 2022-04-01 DIAGNOSIS — C18.9 MALIGNANT NEOPLASM OF COLON, UNSPECIFIED PART OF COLON: ICD-10-CM

## 2022-04-01 DIAGNOSIS — C79.51 BONE METASTASIS: ICD-10-CM

## 2022-04-01 PROCEDURE — A9503 TC99M MEDRONATE: HCPCS | Performed by: INTERNAL MEDICINE

## 2022-04-01 PROCEDURE — 0 TECHNETIUM MEDRONATE KIT: Performed by: INTERNAL MEDICINE

## 2022-04-01 PROCEDURE — 78306 BONE IMAGING WHOLE BODY: CPT

## 2022-04-01 RX ORDER — TC 99M MEDRONATE 20 MG/10ML
28.3 INJECTION, POWDER, LYOPHILIZED, FOR SOLUTION INTRAVENOUS
Status: COMPLETED | OUTPATIENT
Start: 2022-04-01 | End: 2022-04-01

## 2022-04-01 RX ADMIN — TC 99M MEDRONATE 28.3 MILLICURIE: 20 INJECTION, POWDER, LYOPHILIZED, FOR SOLUTION INTRAVENOUS at 08:47

## 2022-04-26 DIAGNOSIS — R19.7 DIARRHEA OF PRESUMED INFECTIOUS ORIGIN: Primary | ICD-10-CM

## 2022-04-26 PROCEDURE — 87449 NOS EACH ORGANISM AG IA: CPT

## 2022-04-26 PROCEDURE — 87507 IADNA-DNA/RNA PROBE TQ 12-25: CPT

## 2022-04-26 PROCEDURE — 87324 CLOSTRIDIUM AG IA: CPT

## 2022-04-27 ENCOUNTER — LAB (OUTPATIENT)
Dept: LAB | Facility: HOSPITAL | Age: 62
End: 2022-04-27

## 2022-04-27 DIAGNOSIS — R19.7 DIARRHEA OF PRESUMED INFECTIOUS ORIGIN: ICD-10-CM

## 2022-04-27 LAB
ADV 40+41 DNA STL QL NAA+NON-PROBE: NOT DETECTED
ASTRO TYP 1-8 RNA STL QL NAA+NON-PROBE: NOT DETECTED
C CAYETANENSIS DNA STL QL NAA+NON-PROBE: NOT DETECTED
C COLI+JEJ+UPSA DNA STL QL NAA+NON-PROBE: NOT DETECTED
CRYPTOSP DNA STL QL NAA+NON-PROBE: NOT DETECTED
E HISTOLYT DNA STL QL NAA+NON-PROBE: NOT DETECTED
EAEC PAA PLAS AGGR+AATA ST NAA+NON-PRB: NOT DETECTED
EC STX1+STX2 GENES STL QL NAA+NON-PROBE: NOT DETECTED
EPEC EAE GENE STL QL NAA+NON-PROBE: NOT DETECTED
ETEC LTA+ST1A+ST1B TOX ST NAA+NON-PROBE: NOT DETECTED
G LAMBLIA DNA STL QL NAA+NON-PROBE: NOT DETECTED
NOROVIRUS GI+II RNA STL QL NAA+NON-PROBE: NOT DETECTED
P SHIGELLOIDES DNA STL QL NAA+NON-PROBE: NOT DETECTED
RVA RNA STL QL NAA+NON-PROBE: NOT DETECTED
S ENT+BONG DNA STL QL NAA+NON-PROBE: NOT DETECTED
SAPO I+II+IV+V RNA STL QL NAA+NON-PROBE: DETECTED
SHIGELLA SP+EIEC IPAH ST NAA+NON-PROBE: NOT DETECTED
V CHOL+PARA+VUL DNA STL QL NAA+NON-PROBE: NOT DETECTED
V CHOLERAE DNA STL QL NAA+NON-PROBE: NOT DETECTED
Y ENTEROCOL DNA STL QL NAA+NON-PROBE: NOT DETECTED

## 2022-05-03 LAB — C DIFF GDH STL QL: NEGATIVE

## 2022-07-05 ENCOUNTER — TRANSCRIBE ORDERS (OUTPATIENT)
Dept: ADMINISTRATIVE | Facility: HOSPITAL | Age: 62
End: 2022-07-05

## 2022-07-05 DIAGNOSIS — C79.51 BONE METASTASIS: Primary | ICD-10-CM

## 2022-07-05 DIAGNOSIS — C18.9 MALIGNANT NEOPLASM OF COLON, UNSPECIFIED PART OF COLON: ICD-10-CM

## 2022-07-12 ENCOUNTER — HOSPITAL ENCOUNTER (OUTPATIENT)
Dept: NUCLEAR MEDICINE | Facility: HOSPITAL | Age: 62
Discharge: HOME | End: 2022-07-12

## 2022-07-12 DIAGNOSIS — C79.51 BONE METASTASIS: ICD-10-CM

## 2022-07-12 DIAGNOSIS — C18.9 MALIGNANT NEOPLASM OF COLON, UNSPECIFIED PART OF COLON: ICD-10-CM

## 2022-07-12 PROCEDURE — A9503 TC99M MEDRONATE: HCPCS | Performed by: NURSE PRACTITIONER

## 2022-07-12 PROCEDURE — 78306 BONE IMAGING WHOLE BODY: CPT

## 2022-07-12 PROCEDURE — 0 TECHNETIUM MEDRONATE KIT: Performed by: NURSE PRACTITIONER

## 2022-07-12 RX ORDER — TC 99M MEDRONATE 20 MG/10ML
25.6 INJECTION, POWDER, LYOPHILIZED, FOR SOLUTION INTRAVENOUS
Status: COMPLETED | OUTPATIENT
Start: 2022-07-12 | End: 2022-07-12

## 2022-07-12 RX ADMIN — TC 99M MEDRONATE 25.6 MILLICURIE: 20 INJECTION, POWDER, LYOPHILIZED, FOR SOLUTION INTRAVENOUS at 09:40

## 2022-07-13 DIAGNOSIS — C18.7 MALIGNANT NEOPLASM OF SIGMOID COLON: Primary | ICD-10-CM

## 2023-02-17 ENCOUNTER — OFFICE VISIT (OUTPATIENT)
Dept: SURGERY | Facility: CLINIC | Age: 63
End: 2023-02-17
Payer: COMMERCIAL

## 2023-02-17 VITALS
DIASTOLIC BLOOD PRESSURE: 99 MMHG | BODY MASS INDEX: 25.88 KG/M2 | TEMPERATURE: 97.8 F | RESPIRATION RATE: 18 BRPM | SYSTOLIC BLOOD PRESSURE: 188 MMHG | HEIGHT: 62 IN | HEART RATE: 105 BPM | OXYGEN SATURATION: 98 % | WEIGHT: 140.6 LBS

## 2023-02-17 DIAGNOSIS — K43.2 VENTRAL INCISIONAL HERNIA WITHOUT OBSTRUCTION OR GANGRENE: Primary | ICD-10-CM

## 2023-02-17 PROCEDURE — 99214 OFFICE O/P EST MOD 30 MIN: CPT | Performed by: SURGERY

## 2023-02-17 RX ORDER — AMILORIDE HYDROCHLORIDE 5 MG/1
10 TABLET ORAL DAILY
COMMUNITY
Start: 2022-12-22

## 2023-02-17 RX ORDER — CYANOCOBALAMIN (VITAMIN B-12) 1000 MCG
TABLET ORAL
COMMUNITY

## 2023-02-21 PROBLEM — K43.2 VENTRAL INCISIONAL HERNIA WITHOUT OBSTRUCTION OR GANGRENE: Status: ACTIVE | Noted: 2023-02-21

## 2023-02-21 RX ORDER — CHLORHEXIDINE GLUCONATE 0.12 MG/ML
15 RINSE ORAL EVERY 12 HOURS SCHEDULED
Status: CANCELLED | OUTPATIENT
Start: 2023-02-21

## 2023-02-21 RX ORDER — SODIUM CHLORIDE 9 MG/ML
100 INJECTION, SOLUTION INTRAVENOUS CONTINUOUS
Status: CANCELLED | OUTPATIENT
Start: 2023-02-21

## 2023-02-21 NOTE — PROGRESS NOTES
GENERAL SURGERY PROGRESS NOTE    2/21/2023    Patient Care Team:  Diana Spencer MD as PCP - General  Diana Spencer MD as PCP - Family Medicine  Mark Perera MD as Consulting Physician (Hematology and Oncology)  Jay Yeh MD as Surgeon (General Surgery)    Chief Complaint: follow-up port removal    Subjective   HPI: Patient is a 59-year-old lady who underwent a removal of her Port-A-Cath on 8/27/2020 after it had eroded through the skin.  She has a history of colon cancer with liver metastasis which was diagnosed initially in January 2012.  She underwent surgery for this, and then underwent adjuvant chemotherapy and has now been on maintenance chemotherapy since that time.  She is followed with serial CT scans of the abdomen and pelvis.  Patient reports that she is feeling well since removal of her Port-A-Cath.  Her chest wall wound is healing well with minimal drainage.  She has been packing the wound daily.  Today she also brings up that she has a ventral hernia which she wishes for me to evaluate.  She states that is been present for many years, but it has not caused her any significant problems.  It is only slightly uncomfortable occasionally.  On physical exam the patient does appear to have a large ventral hernia within her vertical midline incision.  The hernia itself is soft, nontender, and without any overlying skin changes.  The hernia is so large that when we attempt to reduce the hernia, it immediately recurs.  She denies having any problems eating with nausea or vomiting, or difficulties passing her bowels.  She does report that she frequently does heavy lifting as she works as a  and bearing-down seems to force more of her hernia out.  It always seems to be able to reduce, and she never reports any incarceration.    From 12/11/2020:  Patient had her repeat PET scan performed earlier this week and followed up with Dr. Elizalde.  PET/CT demonstrated area of concern  within the right hilar region of metastatic disease.  Patient denies having any nausea, vomiting, difficulty passing bowel movements, or significant abdominal pain.  She does still have some discomfort especially when she is wearing a seatbelt across her abdomen.    From 2/17/2023:  Patient returns the office today with report reported increased abdominal pain in the area where her hernia is.  She denies any nausea, vomiting, or difficulty having bowel movements.  A CT scan of the chest, abdomen, and pelvis obtained on 1/14/2023 demonstrated enlarging pulmonary nodules consistent with metastasis, no CT scan evidence of abdominopelvic metastatic disease, however an enlarging seventh rib metastasis and stable indeterminate sclerotic lesions in the right ilium.  She was found to have an uncomplicated abdominal wall hernia.    Review of Systems -   General ROS: negative for - chills, fatigue or fever  Psychological ROS: negative for - anxiety or depression  Allergy and Immunology ROS: negative for - hives, nasal congestion or postnasal drip  Hematological and Lymphatic ROS: negative for - bleeding problems or bruising  Respiratory ROS: no cough, shortness of breath, or wheezing  Cardiovascular ROS: no chest pain or dyspnea on exertion  Gastrointestinal ROS: + abdominal pain, no change in bowel habits, or black or bloody stools  Genito-Urinary ROS: no dysuria, trouble voiding, or hematuria  Musculoskeletal ROS: negative for - joint pain or muscle pain  Neurological ROS: no TIA or stroke symptoms  Dermatological ROS: negative for dry skin and rash    Objective     Vital Signs       Physical Exam   Constitutional: She is oriented to person, place, and time. She appears well-developed.   HENT:   Head: Normocephalic and atraumatic.   Cardiovascular: Normal rate and regular rhythm.   Pulmonary/Chest: Effort normal and breath sounds normal.   Right chest wall Port-A-Cath incision is well-healed.   Abdominal:   Abdomen is soft,  nontender, and there is a large ventral incisional hernia.  This is soft, easily reducible, and there is no color change.   Musculoskeletal: Normal range of motion.   Neurological: She is alert and oriented to person, place, and time.   Skin: Skin is warm and dry.   Psychiatric: Her behavior is normal.   Vitals reviewed.       Results Review:    Lab Results (last 24 hours)     ** No results found for the last 24 hours. **        Imaging Results (Last 24 Hours)     ** No results found for the last 24 hours. **           I have reviewed the above results and noted them below    Medication Review:    Current Outpatient Medications:   •  ALPRAZolam (Xanax) 0.5 MG tablet, Take 1 tablet by mouth 2 (Two) Times a Day As Needed for Anxiety (white coat syndrome). Take prior to infusion visits. Repeat x1 as needed during visit., Disp: 30 tablet, Rfl: 0  •  aMILoride (MIDAMOR) 5 MG tablet, Take 5 mg by mouth Daily. with food, Disp: , Rfl:   •  Cyanocobalamin (B-12) 1000 MCG tablet, , Disp: , Rfl:   •  doxycycline (VIBRAMYICN) 100 MG tablet, Take 100 mg by mouth 2 (Two) Times a Day., Disp: , Rfl:   •  Lysine 500 MG capsule, Take  by mouth., Disp: , Rfl:   •  vitamin D3 125 MCG (5000 UT) capsule capsule, Take 5,000 Units by mouth Daily., Disp: , Rfl:     I have reviewed the CT scan images, and now the patient has 2 separate ventral incisional hernias which total a ventral incisional hernia repair greater than 10 cm    Assessment & Plan     Active Problems:  Ventral hernia  Recurrent metastatic colon cancer    Discussion was had with the patient and with Dr. Elizalde on the day of her visit.  The patient understands that her colorectal disease is not curative and that her chemotherapy is in place to keep her metastatic colorectal cancer at bay.  Presently, due to the symptoms she is having as a result of her ventral hernia, she is unable to do the things that she normally would like to do.  Therefore, the patient would like to  proceed to the operating room for ventral incisional hernia repair.  Dr. Elizalde agrees that it is okay to keep the patient off of her chemotherapy to allow for surgical repair of her ventral incisional hernia.  We discussed that my preference for repairing this ventral hernia would be to perform an open ventral hernia repair with mesh.  We discussed the risks of surgery which include bleeding, infection, damage to surrounding structures, pain, recurrence, worsening metastatic disease, and possible need for other surgical interventions in the future.  Patient understands, and has agreed to proceed to the operating room.    Jay Yeh MD  02/21/23  10:17 EST

## 2023-02-22 RX ORDER — FERROUS SULFATE TAB EC 324 MG (65 MG FE EQUIVALENT) 324 (65 FE) MG
324 TABLET DELAYED RESPONSE ORAL
COMMUNITY

## 2023-02-23 ENCOUNTER — LAB (OUTPATIENT)
Dept: LAB | Facility: HOSPITAL | Age: 63
End: 2023-02-23
Payer: COMMERCIAL

## 2023-02-23 ENCOUNTER — HOSPITAL ENCOUNTER (OUTPATIENT)
Dept: GENERAL RADIOLOGY | Facility: HOSPITAL | Age: 63
Discharge: HOME | End: 2023-02-23
Payer: COMMERCIAL

## 2023-02-23 ENCOUNTER — HOSPITAL ENCOUNTER (OUTPATIENT)
Dept: CARDIOLOGY | Facility: HOSPITAL | Age: 63
Discharge: HOME | End: 2023-02-23
Payer: COMMERCIAL

## 2023-02-23 DIAGNOSIS — K43.2 VENTRAL INCISIONAL HERNIA WITHOUT OBSTRUCTION OR GANGRENE: ICD-10-CM

## 2023-02-23 LAB
ALBUMIN SERPL-MCNC: 3.9 G/DL (ref 3.5–5.2)
ALBUMIN/GLOB SERPL: 1.5 G/DL
ALP SERPL-CCNC: 86 U/L (ref 39–117)
ALT SERPL W P-5'-P-CCNC: 18 U/L (ref 1–33)
ANION GAP SERPL CALCULATED.3IONS-SCNC: 7 MMOL/L (ref 5–15)
AST SERPL-CCNC: 24 U/L (ref 1–32)
BASOPHILS # BLD AUTO: 0.03 10*3/MM3 (ref 0–0.2)
BASOPHILS NFR BLD AUTO: 0.4 % (ref 0–1.5)
BILIRUB SERPL-MCNC: 0.7 MG/DL (ref 0–1.2)
BUN SERPL-MCNC: 26 MG/DL (ref 8–23)
BUN/CREAT SERPL: 26 (ref 7–25)
CALCIUM SPEC-SCNC: 9.4 MG/DL (ref 8.6–10.5)
CHLORIDE SERPL-SCNC: 103 MMOL/L (ref 98–107)
CO2 SERPL-SCNC: 26 MMOL/L (ref 22–29)
CREAT SERPL-MCNC: 1 MG/DL (ref 0.57–1)
DEPRECATED RDW RBC AUTO: 42.2 FL (ref 37–54)
EGFRCR SERPLBLD CKD-EPI 2021: 64.2 ML/MIN/1.73
EOSINOPHIL # BLD AUTO: 0.06 10*3/MM3 (ref 0–0.4)
EOSINOPHIL NFR BLD AUTO: 0.8 % (ref 0.3–6.2)
ERYTHROCYTE [DISTWIDTH] IN BLOOD BY AUTOMATED COUNT: 13.7 % (ref 12.3–15.4)
GLOBULIN UR ELPH-MCNC: 2.6 GM/DL
GLUCOSE SERPL-MCNC: 102 MG/DL (ref 65–99)
HCT VFR BLD AUTO: 33.1 % (ref 34–46.6)
HGB BLD-MCNC: 10.7 G/DL (ref 12–15.9)
IMM GRANULOCYTES # BLD AUTO: 0.02 10*3/MM3 (ref 0–0.05)
IMM GRANULOCYTES NFR BLD AUTO: 0.3 % (ref 0–0.5)
LYMPHOCYTES # BLD AUTO: 1.34 10*3/MM3 (ref 0.7–3.1)
LYMPHOCYTES NFR BLD AUTO: 17.4 % (ref 19.6–45.3)
MCH RBC QN AUTO: 27.6 PG (ref 26.6–33)
MCHC RBC AUTO-ENTMCNC: 32.3 G/DL (ref 31.5–35.7)
MCV RBC AUTO: 85.5 FL (ref 79–97)
MONOCYTES # BLD AUTO: 0.92 10*3/MM3 (ref 0.1–0.9)
MONOCYTES NFR BLD AUTO: 11.9 % (ref 5–12)
NEUTROPHILS NFR BLD AUTO: 5.35 10*3/MM3 (ref 1.7–7)
NEUTROPHILS NFR BLD AUTO: 69.2 % (ref 42.7–76)
NRBC BLD AUTO-RTO: 0 /100 WBC (ref 0–0.2)
PLATELET # BLD AUTO: 207 10*3/MM3 (ref 140–450)
PMV BLD AUTO: 10.2 FL (ref 6–12)
POTASSIUM SERPL-SCNC: 4.4 MMOL/L (ref 3.5–5.2)
PROT SERPL-MCNC: 6.5 G/DL (ref 6–8.5)
RBC # BLD AUTO: 3.87 10*6/MM3 (ref 3.77–5.28)
SODIUM SERPL-SCNC: 136 MMOL/L (ref 136–145)
WBC NRBC COR # BLD: 7.72 10*3/MM3 (ref 3.4–10.8)

## 2023-02-23 PROCEDURE — 80053 COMPREHEN METABOLIC PANEL: CPT

## 2023-02-23 PROCEDURE — 36415 COLL VENOUS BLD VENIPUNCTURE: CPT

## 2023-02-23 PROCEDURE — 93005 ELECTROCARDIOGRAM TRACING: CPT | Performed by: SURGERY

## 2023-02-23 PROCEDURE — 71046 X-RAY EXAM CHEST 2 VIEWS: CPT

## 2023-02-23 PROCEDURE — 93010 ELECTROCARDIOGRAM REPORT: CPT | Performed by: INTERNAL MEDICINE

## 2023-02-23 PROCEDURE — 85025 COMPLETE CBC W/AUTO DIFF WBC: CPT

## 2023-02-24 LAB — QT INTERVAL: 339 MS

## 2023-03-02 ENCOUNTER — ANESTHESIA (OUTPATIENT)
Dept: PERIOP | Facility: HOSPITAL | Age: 63
End: 2023-03-02
Payer: COMMERCIAL

## 2023-03-02 ENCOUNTER — HOSPITAL ENCOUNTER (OUTPATIENT)
Facility: HOSPITAL | Age: 63
Discharge: HOME | End: 2023-03-03
Attending: SURGERY | Admitting: SURGERY
Payer: COMMERCIAL

## 2023-03-02 ENCOUNTER — ANESTHESIA EVENT (OUTPATIENT)
Dept: PERIOP | Facility: HOSPITAL | Age: 63
End: 2023-03-02
Payer: COMMERCIAL

## 2023-03-02 DIAGNOSIS — K43.2 VENTRAL INCISIONAL HERNIA WITHOUT OBSTRUCTION OR GANGRENE: ICD-10-CM

## 2023-03-02 LAB
ABO GROUP BLD: NORMAL
BLD GP AB SCN SERPL QL: NEGATIVE
RH BLD: NEGATIVE
T&S EXPIRATION DATE: NORMAL

## 2023-03-02 PROCEDURE — 25010000002 ONDANSETRON PER 1 MG: Performed by: NURSE ANESTHETIST, CERTIFIED REGISTERED

## 2023-03-02 PROCEDURE — 25010000002 ONDANSETRON PER 1 MG: Performed by: SURGERY

## 2023-03-02 PROCEDURE — 49595 RPR AA HRN 1ST > 10 RDC: CPT | Performed by: SURGERY

## 2023-03-02 PROCEDURE — 25010000002 MIDAZOLAM PER 1 MG: Performed by: NURSE ANESTHETIST, CERTIFIED REGISTERED

## 2023-03-02 PROCEDURE — 86850 RBC ANTIBODY SCREEN: CPT | Performed by: SURGERY

## 2023-03-02 PROCEDURE — 86901 BLOOD TYPING SEROLOGIC RH(D): CPT

## 2023-03-02 PROCEDURE — 25010000002 HYDROMORPHONE 1 MG/ML SOLUTION: Performed by: NURSE ANESTHETIST, CERTIFIED REGISTERED

## 2023-03-02 PROCEDURE — 86900 BLOOD TYPING SEROLOGIC ABO: CPT

## 2023-03-02 PROCEDURE — C1781 MESH (IMPLANTABLE): HCPCS | Performed by: SURGERY

## 2023-03-02 PROCEDURE — 25010000002 PROPOFOL 10 MG/ML EMULSION: Performed by: NURSE ANESTHETIST, CERTIFIED REGISTERED

## 2023-03-02 PROCEDURE — 88302 TISSUE EXAM BY PATHOLOGIST: CPT | Performed by: SURGERY

## 2023-03-02 PROCEDURE — 25010000002 FENTANYL CITRATE (PF) 50 MCG/ML SOLUTION: Performed by: NURSE ANESTHETIST, CERTIFIED REGISTERED

## 2023-03-02 PROCEDURE — 86900 BLOOD TYPING SEROLOGIC ABO: CPT | Performed by: SURGERY

## 2023-03-02 PROCEDURE — 25010000002 KETOROLAC TROMETHAMINE PER 15 MG: Performed by: NURSE ANESTHETIST, CERTIFIED REGISTERED

## 2023-03-02 PROCEDURE — 25010000002 DEXAMETHASONE PER 1 MG: Performed by: NURSE ANESTHETIST, CERTIFIED REGISTERED

## 2023-03-02 PROCEDURE — G0378 HOSPITAL OBSERVATION PER HR: HCPCS

## 2023-03-02 PROCEDURE — 86901 BLOOD TYPING SEROLOGIC RH(D): CPT | Performed by: SURGERY

## 2023-03-02 DEVICE — VENTRIO ST HERNIA PATCH
Type: IMPLANTABLE DEVICE | Site: ABDOMEN | Status: FUNCTIONAL
Brand: VENTRIO ST HERNIA PATCH

## 2023-03-02 RX ORDER — OXYCODONE HYDROCHLORIDE 5 MG/1
5 TABLET ORAL ONCE AS NEEDED
Status: DISCONTINUED | OUTPATIENT
Start: 2023-03-02 | End: 2023-03-02 | Stop reason: HOSPADM

## 2023-03-02 RX ORDER — ONDANSETRON 2 MG/ML
INJECTION INTRAMUSCULAR; INTRAVENOUS AS NEEDED
Status: DISCONTINUED | OUTPATIENT
Start: 2023-03-02 | End: 2023-03-02 | Stop reason: SURG

## 2023-03-02 RX ORDER — SODIUM CHLORIDE 0.9 % (FLUSH) 0.9 %
10 SYRINGE (ML) INJECTION AS NEEDED
Status: DISCONTINUED | OUTPATIENT
Start: 2023-03-02 | End: 2023-03-02 | Stop reason: HOSPADM

## 2023-03-02 RX ORDER — FENTANYL CITRATE 50 UG/ML
INJECTION, SOLUTION INTRAMUSCULAR; INTRAVENOUS AS NEEDED
Status: DISCONTINUED | OUTPATIENT
Start: 2023-03-02 | End: 2023-03-02 | Stop reason: SURG

## 2023-03-02 RX ORDER — KETOROLAC TROMETHAMINE 30 MG/ML
INJECTION, SOLUTION INTRAMUSCULAR; INTRAVENOUS AS NEEDED
Status: DISCONTINUED | OUTPATIENT
Start: 2023-03-02 | End: 2023-03-02 | Stop reason: SURG

## 2023-03-02 RX ORDER — ONDANSETRON 4 MG/1
4 TABLET, FILM COATED ORAL EVERY 6 HOURS PRN
Status: DISCONTINUED | OUTPATIENT
Start: 2023-03-02 | End: 2023-03-03 | Stop reason: HOSPADM

## 2023-03-02 RX ORDER — NEOSTIGMINE METHYLSULFATE 5 MG/5 ML
SYRINGE (ML) INTRAVENOUS AS NEEDED
Status: DISCONTINUED | OUTPATIENT
Start: 2023-03-02 | End: 2023-03-02 | Stop reason: SURG

## 2023-03-02 RX ORDER — CLINDAMYCIN PHOSPHATE 900 MG/50ML
900 INJECTION, SOLUTION INTRAVENOUS ONCE
Status: COMPLETED | OUTPATIENT
Start: 2023-03-02 | End: 2023-03-02

## 2023-03-02 RX ORDER — CHLORHEXIDINE GLUCONATE 0.12 MG/ML
15 RINSE ORAL EVERY 12 HOURS SCHEDULED
Status: DISCONTINUED | OUTPATIENT
Start: 2023-03-02 | End: 2023-03-02 | Stop reason: HOSPADM

## 2023-03-02 RX ORDER — SODIUM CHLORIDE, SODIUM LACTATE, POTASSIUM CHLORIDE, CALCIUM CHLORIDE 600; 310; 30; 20 MG/100ML; MG/100ML; MG/100ML; MG/100ML
INJECTION, SOLUTION INTRAVENOUS CONTINUOUS PRN
Status: DISCONTINUED | OUTPATIENT
Start: 2023-03-02 | End: 2023-03-02 | Stop reason: SURG

## 2023-03-02 RX ORDER — SODIUM CHLORIDE 9 MG/ML
100 INJECTION, SOLUTION INTRAVENOUS CONTINUOUS
Status: DISCONTINUED | OUTPATIENT
Start: 2023-03-02 | End: 2023-03-03 | Stop reason: HOSPADM

## 2023-03-02 RX ORDER — OXYCODONE HYDROCHLORIDE 5 MG/1
10 TABLET ORAL EVERY 4 HOURS PRN
Status: DISCONTINUED | OUTPATIENT
Start: 2023-03-02 | End: 2023-03-03 | Stop reason: HOSPADM

## 2023-03-02 RX ORDER — CHLORHEXIDINE GLUCONATE 0.12 MG/ML
15 RINSE ORAL EVERY 12 HOURS SCHEDULED
Status: DISCONTINUED | OUTPATIENT
Start: 2023-03-02 | End: 2023-03-03 | Stop reason: HOSPADM

## 2023-03-02 RX ORDER — NALOXONE HCL 0.4 MG/ML
0.1 VIAL (ML) INJECTION
Status: DISCONTINUED | OUTPATIENT
Start: 2023-03-02 | End: 2023-03-03 | Stop reason: HOSPADM

## 2023-03-02 RX ORDER — MIDAZOLAM HYDROCHLORIDE 1 MG/ML
INJECTION INTRAMUSCULAR; INTRAVENOUS AS NEEDED
Status: DISCONTINUED | OUTPATIENT
Start: 2023-03-02 | End: 2023-03-02 | Stop reason: SURG

## 2023-03-02 RX ORDER — ONDANSETRON 2 MG/ML
4 INJECTION INTRAMUSCULAR; INTRAVENOUS EVERY 6 HOURS PRN
Status: DISCONTINUED | OUTPATIENT
Start: 2023-03-02 | End: 2023-03-03 | Stop reason: HOSPADM

## 2023-03-02 RX ORDER — ONDANSETRON 2 MG/ML
4 INJECTION INTRAMUSCULAR; INTRAVENOUS ONCE AS NEEDED
Status: COMPLETED | OUTPATIENT
Start: 2023-03-02 | End: 2023-03-02

## 2023-03-02 RX ORDER — ACETAMINOPHEN 500 MG
1000 TABLET ORAL EVERY 8 HOURS
Status: DISCONTINUED | OUTPATIENT
Start: 2023-03-02 | End: 2023-03-03 | Stop reason: HOSPADM

## 2023-03-02 RX ORDER — AMOXICILLIN 250 MG
2 CAPSULE ORAL 2 TIMES DAILY
Status: DISCONTINUED | OUTPATIENT
Start: 2023-03-02 | End: 2023-03-03 | Stop reason: HOSPADM

## 2023-03-02 RX ORDER — ESMOLOL HYDROCHLORIDE 10 MG/ML
INJECTION INTRAVENOUS AS NEEDED
Status: DISCONTINUED | OUTPATIENT
Start: 2023-03-02 | End: 2023-03-02 | Stop reason: SURG

## 2023-03-02 RX ORDER — CLINDAMYCIN PHOSPHATE 600 MG/50ML
600 INJECTION, SOLUTION INTRAVENOUS EVERY 8 HOURS
Status: COMPLETED | OUTPATIENT
Start: 2023-03-02 | End: 2023-03-03

## 2023-03-02 RX ORDER — ALPRAZOLAM 0.5 MG/1
0.5 TABLET ORAL 2 TIMES DAILY PRN
Status: DISCONTINUED | OUTPATIENT
Start: 2023-03-02 | End: 2023-03-03 | Stop reason: HOSPADM

## 2023-03-02 RX ORDER — OXYCODONE HYDROCHLORIDE 5 MG/1
5 TABLET ORAL EVERY 4 HOURS PRN
Status: DISCONTINUED | OUTPATIENT
Start: 2023-03-02 | End: 2023-03-03 | Stop reason: HOSPADM

## 2023-03-02 RX ORDER — DEXAMETHASONE SODIUM PHOSPHATE 4 MG/ML
INJECTION, SOLUTION INTRA-ARTICULAR; INTRALESIONAL; INTRAMUSCULAR; INTRAVENOUS; SOFT TISSUE AS NEEDED
Status: DISCONTINUED | OUTPATIENT
Start: 2023-03-02 | End: 2023-03-02 | Stop reason: SURG

## 2023-03-02 RX ORDER — PROMETHAZINE HYDROCHLORIDE 25 MG/1
25 SUPPOSITORY RECTAL ONCE AS NEEDED
Status: DISCONTINUED | OUTPATIENT
Start: 2023-03-02 | End: 2023-03-02 | Stop reason: HOSPADM

## 2023-03-02 RX ORDER — GLYCOPYRROLATE 0.2 MG/ML
INJECTION INTRAMUSCULAR; INTRAVENOUS AS NEEDED
Status: DISCONTINUED | OUTPATIENT
Start: 2023-03-02 | End: 2023-03-02 | Stop reason: SURG

## 2023-03-02 RX ORDER — LIDOCAINE HYDROCHLORIDE 10 MG/ML
0.5 INJECTION, SOLUTION INFILTRATION; PERINEURAL ONCE AS NEEDED
Status: DISCONTINUED | OUTPATIENT
Start: 2023-03-02 | End: 2023-03-02 | Stop reason: HOSPADM

## 2023-03-02 RX ORDER — KETOROLAC TROMETHAMINE 30 MG/ML
INJECTION, SOLUTION INTRAMUSCULAR; INTRAVENOUS AS NEEDED
Status: DISCONTINUED | OUTPATIENT
Start: 2023-03-02 | End: 2023-03-02

## 2023-03-02 RX ORDER — SODIUM CHLORIDE 9 MG/ML
100 INJECTION, SOLUTION INTRAVENOUS CONTINUOUS
Status: DISCONTINUED | OUTPATIENT
Start: 2023-03-02 | End: 2023-03-02

## 2023-03-02 RX ORDER — MEPERIDINE HYDROCHLORIDE 25 MG/ML
12.5 INJECTION INTRAMUSCULAR; INTRAVENOUS; SUBCUTANEOUS ONCE
Status: DISCONTINUED | OUTPATIENT
Start: 2023-03-02 | End: 2023-03-02 | Stop reason: HOSPADM

## 2023-03-02 RX ORDER — NALOXONE HCL 0.4 MG/ML
0.4 VIAL (ML) INJECTION
Status: DISCONTINUED | OUTPATIENT
Start: 2023-03-02 | End: 2023-03-03 | Stop reason: HOSPADM

## 2023-03-02 RX ORDER — FAMOTIDINE 10 MG/ML
20 INJECTION, SOLUTION INTRAVENOUS 2 TIMES DAILY
Status: DISCONTINUED | OUTPATIENT
Start: 2023-03-02 | End: 2023-03-03 | Stop reason: HOSPADM

## 2023-03-02 RX ORDER — LIDOCAINE HYDROCHLORIDE 10 MG/ML
INJECTION, SOLUTION EPIDURAL; INFILTRATION; INTRACAUDAL; PERINEURAL AS NEEDED
Status: DISCONTINUED | OUTPATIENT
Start: 2023-03-02 | End: 2023-03-02 | Stop reason: SURG

## 2023-03-02 RX ORDER — PROMETHAZINE HYDROCHLORIDE 25 MG/1
25 TABLET ORAL ONCE AS NEEDED
Status: DISCONTINUED | OUTPATIENT
Start: 2023-03-02 | End: 2023-03-02 | Stop reason: HOSPADM

## 2023-03-02 RX ORDER — DIPHENHYDRAMINE HYDROCHLORIDE 50 MG/ML
12.5 INJECTION INTRAMUSCULAR; INTRAVENOUS ONCE AS NEEDED
Status: DISCONTINUED | OUTPATIENT
Start: 2023-03-02 | End: 2023-03-02 | Stop reason: HOSPADM

## 2023-03-02 RX ORDER — PHENYLEPHRINE HCL IN 0.9% NACL 1 MG/10 ML
SYRINGE (ML) INTRAVENOUS AS NEEDED
Status: DISCONTINUED | OUTPATIENT
Start: 2023-03-02 | End: 2023-03-02 | Stop reason: SURG

## 2023-03-02 RX ORDER — AMILORIDE HYDROCHLORIDE 5 MG/1
5 TABLET ORAL DAILY
Status: DISCONTINUED | OUTPATIENT
Start: 2023-03-02 | End: 2023-03-03 | Stop reason: HOSPADM

## 2023-03-02 RX ORDER — ROCURONIUM BROMIDE 10 MG/ML
INJECTION, SOLUTION INTRAVENOUS AS NEEDED
Status: DISCONTINUED | OUTPATIENT
Start: 2023-03-02 | End: 2023-03-02 | Stop reason: SURG

## 2023-03-02 RX ORDER — PROPOFOL 10 MG/ML
VIAL (ML) INTRAVENOUS AS NEEDED
Status: DISCONTINUED | OUTPATIENT
Start: 2023-03-02 | End: 2023-03-02 | Stop reason: SURG

## 2023-03-02 RX ORDER — SODIUM CHLORIDE, SODIUM LACTATE, POTASSIUM CHLORIDE, CALCIUM CHLORIDE 600; 310; 30; 20 MG/100ML; MG/100ML; MG/100ML; MG/100ML
1000 INJECTION, SOLUTION INTRAVENOUS CONTINUOUS
Status: DISCONTINUED | OUTPATIENT
Start: 2023-03-02 | End: 2023-03-02

## 2023-03-02 RX ADMIN — SENNOSIDES AND DOCUSATE SODIUM 2 TABLET: 50; 8.6 TABLET ORAL at 22:00

## 2023-03-02 RX ADMIN — FAMOTIDINE 20 MG: 10 INJECTION INTRAVENOUS at 22:01

## 2023-03-02 RX ADMIN — MUPIROCIN: 20 OINTMENT TOPICAL at 22:02

## 2023-03-02 RX ADMIN — LIDOCAINE HYDROCHLORIDE 30 MG: 10 INJECTION, SOLUTION EPIDURAL; INFILTRATION; INTRACAUDAL; PERINEURAL at 07:32

## 2023-03-02 RX ADMIN — SODIUM CHLORIDE 100 ML/HR: 9 INJECTION, SOLUTION INTRAVENOUS at 12:48

## 2023-03-02 RX ADMIN — SODIUM CHLORIDE, SODIUM LACTATE, POTASSIUM CHLORIDE, AND CALCIUM CHLORIDE: .6; .31; .03; .02 INJECTION, SOLUTION INTRAVENOUS at 07:29

## 2023-03-02 RX ADMIN — ACETAMINOPHEN 1000 MG: 500 TABLET, FILM COATED ORAL at 22:01

## 2023-03-02 RX ADMIN — Medication 100 MCG: at 08:00

## 2023-03-02 RX ADMIN — FENTANYL CITRATE 50 MCG: 50 INJECTION, SOLUTION INTRAMUSCULAR; INTRAVENOUS at 08:39

## 2023-03-02 RX ADMIN — CLINDAMYCIN PHOSPHATE 900 MG: 900 INJECTION, SOLUTION INTRAVENOUS at 07:35

## 2023-03-02 RX ADMIN — Medication 100 MCG: at 09:28

## 2023-03-02 RX ADMIN — SODIUM CHLORIDE, SODIUM LACTATE, POTASSIUM CHLORIDE, AND CALCIUM CHLORIDE 1000 ML: .6; .31; .03; .02 INJECTION, SOLUTION INTRAVENOUS at 06:13

## 2023-03-02 RX ADMIN — ROCURONIUM BROMIDE 40 MG: 10 INJECTION, SOLUTION INTRAVENOUS at 07:32

## 2023-03-02 RX ADMIN — FENTANYL CITRATE 50 MCG: 50 INJECTION, SOLUTION INTRAMUSCULAR; INTRAVENOUS at 07:50

## 2023-03-02 RX ADMIN — HYDROMORPHONE HYDROCHLORIDE 1 MG: 1 INJECTION, SOLUTION INTRAMUSCULAR; INTRAVENOUS; SUBCUTANEOUS at 09:00

## 2023-03-02 RX ADMIN — HYDROMORPHONE HYDROCHLORIDE 1 MG: 1 INJECTION, SOLUTION INTRAMUSCULAR; INTRAVENOUS; SUBCUTANEOUS at 08:57

## 2023-03-02 RX ADMIN — FENTANYL CITRATE 100 MCG: 50 INJECTION, SOLUTION INTRAMUSCULAR; INTRAVENOUS at 07:32

## 2023-03-02 RX ADMIN — ONDANSETRON 4 MG: 2 INJECTION INTRAMUSCULAR; INTRAVENOUS at 10:27

## 2023-03-02 RX ADMIN — ONDANSETRON 4 MG: 2 INJECTION INTRAMUSCULAR; INTRAVENOUS at 22:00

## 2023-03-02 RX ADMIN — KETOROLAC TROMETHAMINE 30 MG: 30 INJECTION, SOLUTION INTRAMUSCULAR at 09:06

## 2023-03-02 RX ADMIN — Medication 100 MCG: at 08:01

## 2023-03-02 RX ADMIN — DEXAMETHASONE SODIUM PHOSPHATE 4 MG: 4 INJECTION, SOLUTION INTRAMUSCULAR; INTRAVENOUS at 07:32

## 2023-03-02 RX ADMIN — Medication 100 MCG: at 09:36

## 2023-03-02 RX ADMIN — PROPOFOL 100 MG: 10 INJECTION, EMULSION INTRAVENOUS at 07:32

## 2023-03-02 RX ADMIN — Medication 5 MG: at 09:15

## 2023-03-02 RX ADMIN — GLYCOPYRROLATE 0.4 MCG: 0.2 INJECTION INTRAMUSCULAR; INTRAVENOUS at 09:15

## 2023-03-02 RX ADMIN — ESMOLOL HYDROCHLORIDE 20 MG: 100 INJECTION, SOLUTION INTRAVENOUS at 07:36

## 2023-03-02 RX ADMIN — CLINDAMYCIN PHOSPHATE 600 MG: 600 INJECTION, SOLUTION INTRAVENOUS at 14:51

## 2023-03-02 RX ADMIN — FAMOTIDINE 20 MG: 10 INJECTION INTRAVENOUS at 12:48

## 2023-03-02 RX ADMIN — Medication 100 MCG: at 09:04

## 2023-03-02 RX ADMIN — ONDANSETRON 4 MG: 2 INJECTION INTRAMUSCULAR; INTRAVENOUS at 09:04

## 2023-03-02 RX ADMIN — MIDAZOLAM 2 MG: 1 INJECTION INTRAMUSCULAR; INTRAVENOUS at 07:30

## 2023-03-02 RX ADMIN — CHLORHEXIDINE GLUCONATE 15 ML: 1.2 SOLUTION ORAL at 22:00

## 2023-03-02 NOTE — ANESTHESIA PREPROCEDURE EVALUATION
Anesthesia Evaluation     Patient summary reviewed and Nursing notes reviewed   history of anesthetic complications: PONV  NPO Solid Status: > 8 hours  NPO Liquid Status: > 2 hours           Airway   Mallampati: II  TM distance: >3 FB  Neck ROM: full  No difficulty expected  Dental - normal exam     Pulmonary - negative pulmonary ROS and normal exam   Cardiovascular - normal exam    (+) hypertension,       Neuro/Psych- negative ROS  GI/Hepatic/Renal/Endo    (+)   renal disease CRI,     Musculoskeletal (-) negative ROS    Abdominal  - normal exam    Bowel sounds: normal.   Substance History - negative use     OB/GYN negative ob/gyn ROS         Other      history of cancer remission                    Anesthesia Plan    ASA 3     general with block     intravenous induction     Anesthetic plan, risks, benefits, and alternatives have been provided, discussed and informed consent has been obtained with: patient.  Pre-procedure education provided  Plan discussed with CRNA and CAA.        CODE STATUS:

## 2023-03-02 NOTE — OP NOTE
VENTRAL/INCISIONAL HERNIA REPAIR  Operative Note    Patient Name:  Samantha Massey  YOB: 1960    Date of Surgery:  3/2/2023     Indications: The patient is a 62-year-old lady with history of metastatic colon cancer who has a large ventral incisional hernia which is symptomatic.  We discussed the risk, benefits, and alternatives to surgery, the patient understands, and agrees to proceed to the operating room for open ventral hernia repair with mesh.    Pre-op Diagnosis:   Ventral incisional hernia without obstruction or gangrene [K43.2]    Post-op Diagnosis:  Post-Op Diagnosis Codes:     * Ventral incisional hernia without obstruction or gangrene [K43.2]    Procedure/CPT® Codes:      Procedure(s):  VENTRAL/INCISIONAL HERNIA REPAIR WITH MESH    Staff:  Surgeon(s):  Jay Yeh MD    Assistant: Courtney Charles was responsible for performing the following activities: Retraction, Suction and Closing and their skilled assistance was necessary for the success of this case.     Anesthesia: General with Block    Estimated Blood Loss: minimal    Implants:    Implant Name Type Inv. Item Serial No.  Lot No. LRB No. Used Action   PTCH RK VENTRIO ST OVL 5.4X7.0IN LG - [id number] Implant PTCH RK VENTRIO ST OVL 5.4X7.0IN LG  [employer]  ([employer]) [id number] N/A 1 Implanted       Specimen:          Specimens     ID Source Type Tests Collected By Collected At Frozen?    A Abdominal Wall Tissue · TISSUE PATHOLOGY EXAM   Jay Yeh MD 3/2/23 0881     Description: HERNIA SAC          Findings: 2 separate ventral incisional hernias 1 which measured 8 cm in length and 1 which measured 2 cm in length.  These were connected to create 1 large ventral hernia which measured 12 cm in length.  Bowel was present within the hernia sacs but no evidence of any incarceration or strangulation.    Complications: None, immediately    Description of Procedure: After obtaining informed  consent in the preop holding area, the patient was brought to the operating room placed in the supine position.  SCDs were applied, and preoperative antibiotics were administered.  The patient then underwent uncomplicated induction of general endotracheal anesthesia.  A Ballesteros catheter was placed.  The abdomen was then prepped and draped in the usual sterile fashion and after a brief timeout the procedure began.  I began by excising the patient's previous incision which had a large keloid.  Once the incision had been excised, I used electrocautery to dissect down through the subcutaneous fat layer until I reached the hernia sac.  I then encircled the largest of the hernia sacs before opening the sac to examine its contents.  There was evidence of bowel within the sac without evidence of obstruction, incarceration, or strangulation.  I then reduced the bowel back into the abdomen, and excised the hernia sac which was passed off the field as specimen.  At the superior aspect of the hernia there was a second hernia which was much smaller, and the decision was made to divide the fascia between these 2 to create 1 large aperture.  In total this ventral hernia measured 12 cm in length.  I then selected a mesh of adequate size, and placed the mesh within the abdomen.  Care was taken to ensure that the coated side was towards the intestines, and the rough side was towards the anterior abdominal wall.  I then secured the mesh transabdominally circumferentially using 0 Ethibond sutures.  Care was taken to ensure that the mesh laid flat and there was no evidence of any bowel contained between the mesh and the anterior abdominal wall.  With the mesh now completely secured I then closed the fascia of the hernia defect primarily using #1 Prolene suture. I then re-approximated the deep subcutaneous fat to reduce any dead space.  The deep dermis was reapproximated using interrupted 3-0 Vicryl suture.  The skin was reapproximated  using running 4-0 Vicryl suture.  The wound was dressed with skin glue and an abdominal binder.  The patient tolerated the procedure well, was awoken, and taken to PACU in satisfactory condition after receiving a tap block.    Jay Yeh MD     Date: 3/2/2023  Time: 09:24 EST

## 2023-03-02 NOTE — ANESTHESIA PROCEDURE NOTES
Airway  Urgency: elective    Date/Time: 3/2/2023 7:34 AM  End Time:3/2/2023 7:36 AM  Airway not difficult    General Information and Staff    Patient location during procedure: OR  Anesthesiologist: Aurelio Barron MD  CRNA/CAA: Mathew Merida CRNA    Indications and Patient Condition  Indications for airway management: airway protection    Preoxygenated: yes  MILS maintained throughout  Mask difficulty assessment: 1 - vent by mask    Final Airway Details  Final airway type: endotracheal airway      Successful airway: ETT  Cuffed: yes   Successful intubation technique: direct laryngoscopy  Facilitating devices/methods: intubating stylet  Endotracheal tube insertion site: oral  Blade: Koenig  Blade size: 2  ETT size (mm): 7.5  Cormack-Lehane Classification: grade IIb - view of arytenoids or posterior of glottis only  Placement verified by: chest auscultation   Measured from: gums  ETT/EBT to gums (cm): 21  Number of attempts at approach: 2  Assessment: lips, teeth, and gum same as pre-op and atraumatic intubation

## 2023-03-02 NOTE — ANESTHESIA POSTPROCEDURE EVALUATION
Patient: Samantha Massey    Procedure Summary     Date: 03/02/23 Room / Location: [facility] OR 04 / [facility] MAIN OR    Anesthesia Start: 0725 Anesthesia Stop: 0951    Procedure: VENTRAL/INCISIONAL HERNIA REPAIR WITH MESH (Abdomen) Diagnosis:       Ventral incisional hernia without obstruction or gangrene      (Ventral incisional hernia without obstruction or gangrene [K43.2])    Surgeons: Jay Yeh MD Provider: Aurelio Barron MD    Anesthesia Type: general with block ASA Status: 3          Anesthesia Type: general with block    Vitals  Vitals Value Taken Time   /69 03/02/23 1013   Temp 97.3 °F (36.3 °C) 03/02/23 0949   Pulse 78 03/02/23 1014   Resp 12 03/02/23 1000   SpO2 98 % 03/02/23 1014   Vitals shown include unvalidated device data.        Post Anesthesia Care and Evaluation    Patient location during evaluation: PACU  Patient participation: complete - patient participated  Level of consciousness: awake  Pain scale: See nurse's notes for pain score.  Pain management: adequate    Airway patency: patent  Anesthetic complications: No anesthetic complications  PONV Status: none  Cardiovascular status: acceptable  Respiratory status: acceptable and spontaneous ventilation  Hydration status: acceptable    Comments: Patient seen and examined postoperatively; vital signs stable; SpO2 greater than or equal to 90%; cardiopulmonary status stable; nausea/vomiting adequately controlled; pain adequately controlled; no apparent anesthesia complications; patient discharged from anesthesia care when discharge criteria were met

## 2023-03-02 NOTE — PLAN OF CARE
Goal Outcome Evaluation:  Plan of Care Reviewed With: patient, friend        Progress: no change  Outcome Evaluation: Post-op open hernia repair. Pt. has vomited several times throughout day after surgery, however has tolerated small sips of fluids. Continuous IV fluids infusing. Pt. has rested well and voices minimal pain with movement. Will continue to monitor, VSS.

## 2023-03-03 ENCOUNTER — READMISSION MANAGEMENT (OUTPATIENT)
Dept: CALL CENTER | Facility: HOSPITAL | Age: 63
End: 2023-03-03
Payer: COMMERCIAL

## 2023-03-03 VITALS
DIASTOLIC BLOOD PRESSURE: 86 MMHG | HEART RATE: 96 BPM | SYSTOLIC BLOOD PRESSURE: 134 MMHG | HEIGHT: 62 IN | OXYGEN SATURATION: 99 % | BODY MASS INDEX: 24.99 KG/M2 | WEIGHT: 135.8 LBS | RESPIRATION RATE: 16 BRPM | TEMPERATURE: 98.3 F

## 2023-03-03 LAB
LAB AP CASE REPORT: NORMAL
PATH REPORT.FINAL DX SPEC: NORMAL
PATH REPORT.GROSS SPEC: NORMAL

## 2023-03-03 PROCEDURE — 99238 HOSP IP/OBS DSCHRG MGMT 30/<: CPT | Performed by: SURGERY

## 2023-03-03 PROCEDURE — G0378 HOSPITAL OBSERVATION PER HR: HCPCS

## 2023-03-03 RX ORDER — OXYCODONE HYDROCHLORIDE 5 MG/1
5 TABLET ORAL EVERY 4 HOURS PRN
Qty: 30 TABLET | Refills: 0 | Status: SHIPPED | OUTPATIENT
Start: 2023-03-03 | End: 2023-03-12

## 2023-03-03 RX ORDER — AMOXICILLIN 250 MG
2 CAPSULE ORAL 2 TIMES DAILY
Qty: 60 TABLET | Refills: 0 | Status: SHIPPED | OUTPATIENT
Start: 2023-03-03

## 2023-03-03 RX ORDER — ONDANSETRON 4 MG/1
4 TABLET, FILM COATED ORAL EVERY 6 HOURS PRN
Qty: 30 TABLET | Refills: 0 | Status: SHIPPED | OUTPATIENT
Start: 2023-03-03 | End: 2023-04-07

## 2023-03-03 RX ADMIN — FAMOTIDINE 20 MG: 10 INJECTION INTRAVENOUS at 09:20

## 2023-03-03 RX ADMIN — CLINDAMYCIN PHOSPHATE 600 MG: 600 INJECTION, SOLUTION INTRAVENOUS at 01:00

## 2023-03-03 RX ADMIN — ACETAMINOPHEN 1000 MG: 500 TABLET, FILM COATED ORAL at 09:20

## 2023-03-03 RX ADMIN — ACETAMINOPHEN 1000 MG: 500 TABLET, FILM COATED ORAL at 17:39

## 2023-03-03 RX ADMIN — MUPIROCIN: 20 OINTMENT TOPICAL at 09:20

## 2023-03-03 RX ADMIN — CHLORHEXIDINE GLUCONATE 15 ML: 1.2 SOLUTION ORAL at 09:20

## 2023-03-03 RX ADMIN — AMILORIDE HYDROCLORIDE 5 MG: 5 TABLET ORAL at 09:20

## 2023-03-03 NOTE — DISCHARGE INSTRUCTIONS
Ok for discharge  Follow-up with me (Dr. Yeh) in 2 weeks  Regular diet as tolerated  Ok to shower starting tomorrow. No tub baths, pools, lakes, or streams for 1 week.  Ok to apply ice to incisions  No heavy lifting (greater than 20 lbs) for 6 weeks after surgery  Call my office or present to the ED with: fevers greater than 101.5, redness around the incisions, drainage from the incisions, intractable nausea/vomiting, or pain that is getting worse instead of better

## 2023-03-03 NOTE — PLAN OF CARE
Goal Outcome Evaluation: Pt. AO x 4. Complaints of intermittent nausea, treated per MAR. Continues on clear liquid diet. Incisions open to air, clean and dry. VSS. Care plan on going.

## 2023-03-03 NOTE — CASE MANAGEMENT/SOCIAL WORK
Discharge Planning Assessment  [facility]     Patient Name: Samantha Massey  MRN: [id number]  Today's Date: 3/3/2023    Admit Date: 3/2/2023    Plan: D/C plan: Anticipate home   Discharge Needs Assessment     Row Name 03/03/23 1700       Living Environment    People in Home friend(s)    Name(s) of People in Home Friend Molly    Current Living Arrangements home    Primary Care Provided by self    Provides Primary Care For no one    Family Caregiver if Needed friend(s)    Family Caregiver Meri Barnes    Quality of Family Relationships helpful;involved;supportive    Able to Return to Prior Arrangements yes       Resource/Environmental Concerns    Resource/Environmental Concerns none    Transportation Concerns none       Transition Planning    Patient/Family Anticipates Transition to home    Patient/Family Anticipated Services at Transition none    Transportation Anticipated family or friend will provide       Discharge Needs Assessment    Readmission Within the Last 30 Days no previous admission in last 30 days    Equipment Currently Used at Home none    Concerns to be Addressed no discharge needs identified;denies needs/concerns at this time    Anticipated Changes Related to Illness none    Equipment Needed After Discharge none    Provided Post Acute Provider List? N/A               Discharge Plan     Row Name 03/03/23 1701       Plan    Plan D/C plan: Anticipate home    Patient/Family in Agreement with Plan yes    Plan Comments Met with pt at bedside. Pt lives at home with friend Molly. Pt is IADL, including driving. Molly to transport at d/c. Denies use of DME at home. PCP and pharmacy confirmed. No financial barriers to meds. No use of HHC. Denies any d/c needs.              Expected Discharge Date and Time     Expected Discharge Date Expected Discharge Time    Mar 3, 2023          Demographic Summary     Row Name 03/03/23 1700       General Information    Admission Type observation    Arrived From PACU/recovery room     Referral Source admission list    Reason for Consult discharge planning    Preferred Language English               Functional Status     Row Name 03/03/23 1700       Functional Status    Usual Activity Tolerance good    Current Activity Tolerance moderate       Functional Status, IADL    Medications independent    Meal Preparation independent    Housekeeping independent    Laundry independent    Shopping independent                     Met with patient in room wearing PPE: mask  Maintained distance greater than six feet and spent less than 15 minutes in the room.        Aly Dawn RN

## 2023-03-03 NOTE — PLAN OF CARE
Problem: Adult Inpatient Plan of Care  Goal: Plan of Care Review  Outcome: Ongoing, Progressing  Flowsheets (Taken 3/3/2023 1212)  Progress: improving  Plan of Care Reviewed With: patient   Goal Outcome Evaluation:  Plan of Care Reviewed With: patient        Progress: improving

## 2023-03-03 NOTE — DISCHARGE SUMMARY
GENERAL SURGERY DISCHARGE SUMMARY    Date of Discharge:  3/3/2023    Discharge Diagnosis: Ventral hernia    Presenting Problem/History of Present Illness  Active Hospital Problems   No active problems to display.      Resolved Hospital Problems    Diagnosis Date Resolved POA   • **Ventral incisional hernia without obstruction or gangrene [K43.2] 03/02/2023 Yes      Hospital Course  Patient is a 62 y.o. female presented with a ventral incisional hernia which was repaired with mesh on the day of admission.  The patient surgery was uncomplicated, but she was admitted to the hospital postoperatively for persistent postoperative nausea and vomiting and for pain control.  The patient had emesis after clear liquids even as recently as last night, but reports that she was able to tolerate a clear liquid diet for breakfast this morning without any issues.  She has been up and has walked the hallways twice.  She is passing gas, but has not yet had a bowel movement.  She is going to attempt to eat regular food and go home later on today.      Procedures Performed    Procedure(s):  VENTRAL/INCISIONAL HERNIA REPAIR WITH MESH  -------------------       Consults:   Consults     No orders found for last 30 day(s).          Pertinent Test Results: None    Condition on Discharge: Improved    Vital Signs  Temp:  [97.5 °F (36.4 °C)-98.3 °F (36.8 °C)] 98.3 °F (36.8 °C)  Heart Rate:  [] 96  Resp:  [13-20] 16  BP: (120-156)/(72-94) 134/86    Physical Exam:  Physical Exam  Vitals reviewed.   Constitutional:       General: She is not in acute distress.     Appearance: She is not ill-appearing.   Pulmonary:      Effort: Pulmonary effort is normal. No respiratory distress.   Abdominal:      Comments: Midline incision is well approximated without any surrounding erythema, ration, or drainage to suggest infection.  No evidence of recurrent hernia.  The patient does have significant bruising along the lower abdominal wall    Musculoskeletal:         General: No swelling. Normal range of motion.   Skin:     General: Skin is warm.      Coloration: Skin is not jaundiced.      Findings: Bruising present.   Neurological:      General: No focal deficit present.      Mental Status: She is alert and oriented to person, place, and time.   Psychiatric:         Mood and Affect: Mood normal.         Behavior: Behavior normal.         Thought Content: Thought content normal.         Judgment: Judgment normal.         Discharge Disposition  Home or Self Care    Discharge Medications     Discharge Medications      New Medications      Instructions Start Date   ondansetron 4 MG tablet  Commonly known as: ZOFRAN   4 mg, Oral, Every 6 Hours PRN      oxyCODONE 5 MG immediate release tablet  Commonly known as: ROXICODONE   5 mg, Oral, Every 4 Hours PRN      sennosides-docusate 8.6-50 MG per tablet  Commonly known as: PERICOLACE   2 tablets, Oral, 2 Times Daily         Continue These Medications      Instructions Start Date   ALPRAZolam 0.5 MG tablet  Commonly known as: Xanax   0.5 mg, Oral, 2 Times Daily PRN, Take prior to infusion visits. Repeat x1 as needed during visit.      aMILoride 5 MG tablet  Commonly known as: MIDAMOR   5 mg, Oral, Daily, with food    HOLD DOS      B-12 1000 MCG tablet   No dose, route, or frequency recorded.      doxycycline 100 MG tablet  Commonly known as: VIBRAMYICN   100 mg, Oral, 2 Times Daily      ferrous sulfate 324 (65 Fe) MG tablet delayed-release EC tablet   324 mg, Oral, Daily With Breakfast, HOLD dos      Lysine 500 MG capsule   1 tablet, Oral, 2 Times Daily      phosphorus 155-852-130 MG tablet  Commonly known as: K PHOS NEUTRAL   1 tablet, Oral, 2 Times Daily      vitamin D3 125 MCG (5000 UT) capsule capsule   5,000 Units, Oral, Daily             Discharge Diet:   Diet Instructions     Diet:      Diet Texture / Consistency: Regular          Activity at Discharge:   Activity Instructions     Discharge Activity       1) No driving while taking narcotics.   2) Return to school / work in light duty after follow-up.  3) May shower today, no tub baths.  4) Do not lift / push / pull more then 20 lbs.          Follow-up Appointments  No future appointments.  Additional Instructions for the Follow-ups that You Need to Schedule     Discharge Follow-up with Specified Provider: Rao; 2 Weeks   As directed      To: Rao    Follow Up: 2 Weeks         Notify Physician or Go To The ED For the Following Conditions   As directed      For redness around the incisions, drainage from the incisions, or fevers greater than 101.5    Order Comments: For redness around the incisions, drainage from the incisions, or fevers greater than 101.5                Test Results Pending at Discharge       Jay Yeh MD  03/03/23  14:52 EST

## 2023-03-04 NOTE — OUTREACH NOTE
Prep Survey    Flowsheet Row Responses   [facility]   Is LACE score < 7 ? Yes   Eligibility [facility]   Date of Admission 03/02/23   Date of Discharge 03/03/23   Discharge Disposition Home or Self Care   Discharge diagnosis Ventral incisional hernia without obstruction or gangrene, ventral/incisional hernia repair with mesh   Does the patient have one of the following disease processes/diagnoses(primary or secondary)? General Surgery   Does the patient have Home health ordered? No   Is there a DME ordered? No   Prep survey completed? Yes          Chrissy Avendaño Registered Nurse

## 2023-03-06 ENCOUNTER — TELEPHONE (OUTPATIENT)
Dept: SURGERY | Facility: CLINIC | Age: 63
End: 2023-03-06
Payer: COMMERCIAL

## 2023-03-06 ENCOUNTER — TRANSITIONAL CARE MANAGEMENT TELEPHONE ENCOUNTER (OUTPATIENT)
Dept: CALL CENTER | Facility: HOSPITAL | Age: 63
End: 2023-03-06
Payer: COMMERCIAL

## 2023-03-06 NOTE — OUTREACH NOTE
Call Center TCM Note    Flowsheet Row Responses   [facility] patient discharged from? Rad   Does the patient have one of the following disease processes/diagnoses(primary or secondary)? General Surgery   TCM attempt successful? Yes   Call start time 1503   Call end time 1507   Discharge diagnosis Ventral incisional hernia without obstruction or gangrene, ventral/incisional hernia repair with mesh   Is patient permission given to speak with other caregiver? Yes   Person spoke with today (if not patient) and relationship Roommate Molly Birmingham reviewed with patient/caregiver? Yes   Is the patient having any side effects they believe may be caused by any medication additions or changes? No   Does the patient have all medications related to this admission filled (includes all antibiotics, pain medications, etc.) Yes   Is the patient taking all medications as directed (includes completed medication regime)? Yes   Does the patient have an appointment with their PCP within 7 days of discharge? No   Nursing Interventions Patient desires to follow up with specialty only   Has home health visited the patient within 72 hours of discharge? N/A   Psychosocial issues? No   Did the patient receive a copy of their discharge instructions? Yes   Nursing interventions Reviewed instructions with patient   What is the patient's perception of their health status since discharge? Improving   Nursing interventions Nurse provided patient education   Is the patient /caregiver able to teach back basic post-op care? Continue use of incentive spirometry at least 1 week post discharge, Take showers only when approved by MD-sponge bathwendy until then, Do not remove steri-strips, Lifting as instructed by MD in discharge instructions, No tub bath, swimming, or hot tub until instructed by MD, Practice 'cough and deep breath', Drive as instructed by MD in discharge instructions, Keep incision areas clean,dry and protected   Is the  patient/caregiver able to teach back signs and symptoms of incisional infection? Increased redness, swelling or pain at the incisonal site, Pus or odor from incision, Increased drainage or bleeding, Incisional warmth, Fever   Is the patient/caregiver able to teach back steps to recovery at home? Set small, achievable goals for return to baseline health, Practice good oral hygiene, Eat a well-balance diet, Rest and rebuild strength, gradually increase activity, Weigh daily, Make a list of questions for surgeon's appointment   If the patient is a current smoker, are they able to teach back resources for cessation? Not a smoker   Is the patient/caregiver able to teach back the hierarchy of who to call/visit for symptoms/problems? PCP, Specialist, Home health nurse, Urgent Care, ED, 911 Yes   TCM call completed? Yes   Wrap up additional comments D/C DX: Ventral incisional hernia without obstruction or gangrene, ventral/incisional hernia repair with mesh - Per pt roommate/friend pt doing fair. Finally sleeping after acute constipation resolved. Pt still had N/V til yesterday but eating and drinking better today. First POST OP is 03/17/2023. Pt will follow up with PCP when further along from sx, and follow up with Oncology to start new oraal chemo.    Call end time [id number]          Molly Boyer MA    3/6/2023, 15:11 EST

## 2023-03-06 NOTE — TELEPHONE ENCOUNTER
PO FU Call- spoke with pt. camila 2 wk po appt. Will fu then. Knows to call with any questions or concerns.        States doing well. Having soreness.

## 2023-03-08 ENCOUNTER — OFFICE VISIT (OUTPATIENT)
Dept: FAMILY MEDICINE CLINIC | Facility: CLINIC | Age: 63
End: 2023-03-08
Payer: COMMERCIAL

## 2023-03-08 DIAGNOSIS — Z98.890 HISTORY OF VENTRAL HERNIA REPAIR: Primary | ICD-10-CM

## 2023-03-08 DIAGNOSIS — Z87.19 HISTORY OF VENTRAL HERNIA REPAIR: Primary | ICD-10-CM

## 2023-03-08 DIAGNOSIS — C19 METASTATIC COLORECTAL CANCER: ICD-10-CM

## 2023-03-08 DIAGNOSIS — I10 PRIMARY HYPERTENSION: ICD-10-CM

## 2023-03-08 PROCEDURE — 99495 TRANSJ CARE MGMT MOD F2F 14D: CPT | Performed by: FAMILY MEDICINE

## 2023-03-08 NOTE — PROGRESS NOTES
"Chief Complaint  Follow-up    Subjective     CC  Problem List  Visit Diagnosis   Encounters  Notes  Medications  Labs  Result Review Imaging  Media    Samantha Massey presents to [facility] for   History of Present Illness  Samantha was admitted to [facility]  on 03/02/2023. Discharged on 03/03/2023. She was seen for Ventral/Incisional Hernia Repair With Mesh. Labs that were performed during the encounter included: pathology-Soft tissue, abdominal wall, herniorrhaphy: Benign fibroadipose tissue consistent with clinical history, No malignancy identified . Diagnostic studies that were performed included: None. Medication changes: added Oxycodone 5mg, Zofran 4mg, and Pericolace 8.6-50mg.    She has a hx of metastatic colon cancer followed by [facility]          Review of Systems   Constitutional: Positive for unexpected weight loss (5 lbs). Negative for appetite change ( she is tolerating a regular diet) and fever.   Respiratory: Negative for shortness of breath and wheezing.    Cardiovascular: Negative for chest pain, palpitations and leg swelling.   Gastrointestinal: Positive for abdominal pain and constipation (mild she is stooling and passing a moderate amt of gas. ). Negative for diarrhea, nausea and vomiting. Abdominal distention: generalized her wound is healing well.    Endocrine: Negative for cold intolerance, heat intolerance, polydipsia and polyuria.   Genitourinary: Negative for dysuria and vaginal bleeding.   Hematological: Negative for adenopathy.        Objective   Vital Signs:   /78   Pulse (!) 130   Temp 97.1 °F (36.2 °C)   Resp 18   Ht 157.5 cm (62.01\")   Wt 60.8 kg (134 lb)   SpO2 98%   BMI 24.50 kg/m²     Physical Exam  Constitutional:       General: She is not in acute distress.  Cardiovascular:      Rate and Rhythm: Normal rate and regular rhythm.      Heart sounds: No murmur heard.  Pulmonary:      Effort: Pulmonary effort is normal. "      Breath sounds: Normal breath sounds.   Abdominal:      General: Abdomen is flat. Bowel sounds are normal.      Tenderness: There is no right CVA tenderness or left CVA tenderness.      Comments: She has a large mid line wound it is healing well.    Musculoskeletal:      Cervical back: Neck supple.      Right lower leg: No edema.      Left lower leg: No edema.   Lymphadenopathy:      Cervical: No cervical adenopathy.   Skin:     Findings: No rash.   Neurological:      Mental Status: She is alert.        Result Review :Labs  Result Review  Imaging  Med Tab  Media                 Assessment and Plan CC Problem List  Visit Diagnosis  ROS  Review (Popup)  Health Maintenance  Quality  BestPractice  Medications  SmartSets  SnapShot Encounters  Media  Problem List Items Addressed This Visit        Unprioritized    Metastatic colorectal cancer (HCC)    Overview     Metastatic to the liver and bone on going Rx    Doing well [place]         Primary hypertension    Overview     Fair control on amloride (potassium sparing diurectic)         Current Assessment & Plan     Hypertension is improving with treatment.  Continue current treatment regimen.  Blood pressure will be reassessed in 3 months.        Other Visit Diagnoses     History of ventral hernia repair    -  Primary    Incisional she is doing well. Increase activity as tolerated and follow up prn problems.           Follow Up Instructions Charge Capture  Follow-up Communications  No follow-ups on file.  Patient was given instructions and counseling regarding her condition or for health maintenance advice. Please see specific information pulled into the AVS if appropriate.

## 2023-03-09 VITALS
DIASTOLIC BLOOD PRESSURE: 78 MMHG | HEIGHT: 62 IN | TEMPERATURE: 97.1 F | HEART RATE: 130 BPM | BODY MASS INDEX: 24.66 KG/M2 | SYSTOLIC BLOOD PRESSURE: 138 MMHG | WEIGHT: 134 LBS | OXYGEN SATURATION: 98 % | RESPIRATION RATE: 18 BRPM

## 2023-03-09 PROBLEM — D69.59 CHEMOTHERAPY-INDUCED THROMBOCYTOPENIA: Status: RESOLVED | Noted: 2019-07-29 | Resolved: 2023-03-09

## 2023-03-09 PROBLEM — E66.3 OVERWEIGHT WITH BODY MASS INDEX (BMI) OF 25 TO 25.9 IN ADULT: Status: RESOLVED | Noted: 2021-11-09 | Resolved: 2023-03-09

## 2023-03-09 PROBLEM — R11.0 CHEMOTHERAPY-INDUCED NAUSEA: Status: RESOLVED | Noted: 2019-07-29 | Resolved: 2023-03-09

## 2023-03-09 PROBLEM — T73.2XXA FATIGUE DUE TO EXPOSURE: Status: RESOLVED | Noted: 2019-07-29 | Resolved: 2023-03-09

## 2023-03-09 PROBLEM — T45.1X5A CHEMOTHERAPY-INDUCED NAUSEA: Status: RESOLVED | Noted: 2019-07-29 | Resolved: 2023-03-09

## 2023-03-09 PROBLEM — T45.1X5A CHEMOTHERAPY-INDUCED THROMBOCYTOPENIA: Status: RESOLVED | Noted: 2019-07-29 | Resolved: 2023-03-09

## 2023-03-09 PROBLEM — H66.001 NON-RECURRENT ACUTE SUPPURATIVE OTITIS MEDIA OF RIGHT EAR: Status: RESOLVED | Noted: 2021-11-01 | Resolved: 2023-03-09

## 2023-03-17 ENCOUNTER — OFFICE VISIT (OUTPATIENT)
Dept: SURGERY | Facility: CLINIC | Age: 63
End: 2023-03-17
Payer: COMMERCIAL

## 2023-03-17 VITALS
WEIGHT: 130 LBS | OXYGEN SATURATION: 99 % | SYSTOLIC BLOOD PRESSURE: 151 MMHG | RESPIRATION RATE: 18 BRPM | BODY MASS INDEX: 23.92 KG/M2 | TEMPERATURE: 97.8 F | DIASTOLIC BLOOD PRESSURE: 86 MMHG | HEIGHT: 62 IN | HEART RATE: 122 BPM

## 2023-03-17 DIAGNOSIS — K43.2 VENTRAL INCISIONAL HERNIA WITHOUT OBSTRUCTION OR GANGRENE: Primary | ICD-10-CM

## 2023-03-17 PROCEDURE — 99213 OFFICE O/P EST LOW 20 MIN: CPT | Performed by: SURGERY

## 2023-03-17 RX ORDER — REGORAFENIB 40 MG/1
TABLET, FILM COATED ORAL
COMMUNITY
Start: 2023-02-23

## 2023-03-22 NOTE — PROGRESS NOTES
GENERAL SURGERY ESTABLISHED PATIENT NOTE    Patient Care Team:  Diana Spencer MD as PCP - General  TjDiana MD as PCP - Family Medicine  Mark Perera MD as Consulting Physician (Hematology and Oncology)  Jay Yeh MD as Surgeon (General Surgery)    Reason for follow-up: Follow-up from ventral hernia repair    Subjective     Patient is a 62 y.o. female presents for 2-week follow-up after undergoing open ventral incisional hernia repair with mesh on 3/2/2023.  Overall, the patient is doing well.  She is eating well, having regular bowel movements, and her pain seems to be well controlled.  She is happy that she has had this done.     Review of Systems   Gastrointestinal: Negative for abdominal pain, constipation, nausea and vomiting.        History  Past Medical History:   Diagnosis Date   • Cancer (HCC)     wiht mets to liver, bone   • Chronic kidney disease    • Colon cancer (HCC)    • History of colon cancer, stage IV     Stage SHELL with progression   • Hypertension    • Lung cancer (HCC)     with colon   • PONV (postoperative nausea and vomiting)    • Pulmonary arterial hypertension (HCC)    • Radiculopathy 07/2012    Right L5/S1     Past Surgical History:   Procedure Laterality Date   • ABDOMINAL SURGERY     • CYST REMOVAL  1998    cyst removed from [facility] in 1998   • PORTACATH PLACEMENT  2017   • VENOUS ACCESS DEVICE (PORT) INSERTION Left 01/12/2021    Procedure: INSERTION VENOUS ACCESS DEVICE;  Surgeon: Jay Yeh MD;  Location: [facility] MAIN OR;  Service: General;  Laterality: Left;   • VENOUS ACCESS DEVICE (PORT) REMOVAL Right 08/27/2020    Procedure: REMOVAL VENOUS ACCESS DEVICE;  Surgeon: Jay Yeh MD;  Location: [facility] MAIN OR;  Service: General;  Laterality: Right;   • VENTRAL/INCISIONAL HERNIA REPAIR N/A 3/2/2023    Procedure: VENTRAL/INCISIONAL HERNIA REPAIR WITH MESH;  Surgeon: Jay Yeh MD;  Location: [facility] MAIN OR;  Service:  General;  Laterality: N/A;     Family History   Problem Relation Age of Onset   • Cancer Sister         Bladder cancer   • Cancer Father      Social History     Tobacco Use   • Smoking status: Former     Packs/day: 1.00     Years: 30.00     Pack years: 30.00     Types: Cigarettes     Quit date: 2012     Years since quittin.2     Passive exposure: Past   • Smokeless tobacco: Never   Vaping Use   • Vaping Use: Never used   Substance Use Topics   • Alcohol use: Yes     Comment: 2 or 3 a month   • Drug use: Never     (Not in a hospital admission)    Allergies:  Hydrocodone, Iodinated contrast media, Latex, and Penicillins    Objective     Vital Signs       Physical Exam  Vitals reviewed.   Constitutional:       Appearance: She is well-developed.   HENT:      Head: Normocephalic and atraumatic.   Eyes:      Pupils: Pupils are equal, round, and reactive to light.   Cardiovascular:      Rate and Rhythm: Normal rate and regular rhythm.   Pulmonary:      Effort: Pulmonary effort is normal.      Breath sounds: Normal breath sounds.   Abdominal:      General: There is no distension.      Palpations: Abdomen is soft.      Tenderness: There is no abdominal tenderness.      Hernia: No hernia is present.      Comments: Midline abdominal wound appears to be well-healed without any surrounding erythema, nursing, or drainage to suggest infection.   Musculoskeletal:         General: Normal range of motion.      Cervical back: Normal range of motion.   Lymphadenopathy:      Cervical: No cervical adenopathy.   Skin:     General: Skin is warm and dry.      Findings: No rash.   Neurological:      Mental Status: She is alert and oriented to person, place, and time.         Results Review:   Lab Results (last 24 hours)     ** No results found for the last 24 hours. **        No radiology results for the last day      I reviewed the patient's new imaging results and agree with the interpretation.  I reviewed the patient's other test  results and agree with the interpretation    Assessment & Plan   Ventral incisional hernia or    Patient is 2 weeks status post open ventral incisional hernia repair with mesh on 3/2/2023.  Overall, she is doing quite well.    No heavy lifting for 6 weeks after surgery  Regular diet as tolerated  Follow-up with me in 4 weeks  Okay to begin immunotherapy in the coming weeks from surgical perspective    I discussed the patients findings and my recommendations with the patient.     Jay Yeh MD  03/22/23  14:39 EDT

## 2023-03-27 ENCOUNTER — HOSPITAL ENCOUNTER (OUTPATIENT)
Dept: RADIATION ONCOLOGY | Facility: HOSPITAL | Age: 63
Setting detail: RADIATION/ONCOLOGY SERIES
End: 2023-03-27
Payer: COMMERCIAL

## 2023-03-29 ENCOUNTER — TELEPHONE (OUTPATIENT)
Dept: RADIATION ONCOLOGY | Facility: HOSPITAL | Age: 63
End: 2023-03-29
Payer: COMMERCIAL

## 2023-03-29 NOTE — PROGRESS NOTES
RADIATION THERAPY CONSULT NOTE    NAME: Samantha Massey  YOB: 1960  MRN #: [id number]  DATE OF SERVICE: 3/30/2023  REFERRING PROVIDER: Mark Perera MD  [address] [facility],  [place] [address]  PRIMARY CARE PROVIDER: Diana Spencer MD    DIAGNOSIS:  Stage IV colon cancer  Encounter Diagnoses   Name Primary?   • Adenocarcinoma of colon metastatic to liver (HCC) Yes   • Malignant neoplasm of colon metastatic to bone (HCC)      REASON FOR CONSULTATION/CHIEF COMPLAINT:  Rib pain, right 7th rib met  I was asked to see the patient at the request of the referring provider noted below for advice and recommendations regarding this diagnosis and the role of radiation therapy.                              REQUESTING PHYSICIAN:    Mark Perera Md  [address] [place] [address],  [place] [address]    RECORDS OBTAINED:  Records of the patients history including those obtained from the referring provider were reviewed and summarized in detail.    HISTORY OF PRESENT ILLNESS:  Samantha Massey is a 62 y.o. female with an extensive oncologic history dating back to January 2012. She was referred to our clinic for consideration of palliative radiation therapy to right seventh rib.    She was seen in the office by her PCP, Dr. Diana Spencer, on 1/2/2012 with complaints of intermittent diarrhea and constipation starting in July 2011. CT AP was ordered and showed innumerable lesions throughout the liver, thickening of the distal sigmoid colon wall, and nonspecific nonpathologically enlarged lymph nodes in the sigmoid mesentery and left iliac chain. Colonoscopy on 1/9/2012 revealed a 10 cm distal colon mass. Pathology revealed moderately differentiated adenocarcinoma. She underwent laparoscopic low anterior resection with mobilization of splenic flexure on 1/13/2012. Pathology revealed invasive moderately to poorly differentiated adenocarcinoma of the rectosigmoid colon with invasion through the muscularis  propria and extension into the mesocolon. Surgical margins were free of tumor and mesenteric lymph nodes were negative (0/14). CT liver biopsy on 1/17/2012 revealed metastatic adenocarcinoma consistent with colonic primary. She was referred to medical oncology for adjuvant treatment.    CHEMOTHERAPY TIMELINE  02/21/2012   Cycle 1 FOLFOX + bevacizumab  04/03/2012   20% dose reduction + Neulasta  09/11/2012   Cycle 15 FOLFOX + bevacizumab  09/18/2012   Xeloda 1500 mg PO BID x 14 days (Q21D)  03/25/2013   Xeloda decreased to 1250 mg PO BID due to hand-foot syndrome  05/06/2013   Xeloda decreased to 1000 mg PO BID due to hand-foot syndrome, cytopenias, diarrhea  08/24/2014   Xeloda discontinued (remission)  11/27/2017   Cycle 1 mFOLFOX6 + bevacizumab  01/02/2018   Cycle 3 mFOLFOX6 + bevacizumab  01/22/2018   Chemotherapy discontinued; new orders: irinotecan + 5FU + leucovorin + bevacizumab  02/05/2018   Chemotherapy on hold pending surgical resection of liver masses  03/01/2018   Xeloda 1000 mg PO BID x 14 days (Q21D) for 6 months  09/18/2018   Cycle 6 Xeloda; discontinued (completion of therapy)  07/08/2019   Cycle 1 FOLFIRI + bevacizumab  08/12/2019   Cycle 3 FOLFIRI + bevacizumab + Neulasta  08/26/2019   Cycle 4 FOLFIRI + bevacizumab + Neulasta  10/21/2019   Cycle 8 FOLFIRI + bevacizumab + Neulasta  11/04/2019   Cycle 9 FOLFIRI + bevacizumab + Neulasta  12/16/2019   Cycle 1 bevacizumab + leucovorin + 5FU  07/27/2020   Cycle 15 bevacizumab + leucovorin + 5FU  07/29/2020   Chemotherapy discontinued (patient requested break)  12/10/2020   Xgeva 120 mg SQ monthly + calcium/vitamin D PO BID  01/18/2021   Cycle 1 irinotecan + cetuximab  06/24/2021   Xgeva held for dental procedure on 7/22/2021 12/09/2021   Xgeva resumed  12/29/2022   Cycle 45 irinotecan + cetuximab  01/23/2023   Chemotherapy discontinued (progression); new orders: regorafenib PO  03/20/2023   Regorafenib (Stivarga) initiated (delayed start due to  abdominal surgery)    05/28/2013 PET/CT  [facility] No hypermetabolic masses seen within the liver; no evidence of hypermetabolic activity in the neck, chest, abdomen, or pelvis.   11/17/2017 PET/CT  [facility] Large hypermetabolic mass in the liver segment 4 compatible with metastasis as identified on MRI. No additional metastatic lesions in the liver or elsewhere in the imaged portions of the body   05/16/2019 PET/CT  [facility] Hypermetabolic subcarinal mass concerning for metastatic disease, scattered pulmonary densities extending down to the right hilar lesion which are hypermetabolic ranging from SUV of 5.1-5.3 concerning for metastatic disease, right intercostal mass concerning for metastatic disease, hypermetabolic presumed peritoneal implant in the RUQ between the liver and the diaphragm concerning for metastatic disease, small subcentimeter lymph nodes in the left side of the neck which are nonspecific and indeterminate could be reactive versus metastatic, hypermetabolic activity seen in the soft tissues and bone marrow may be due to reactive process.   12/02/2019 PET/CT  [facility] Findings indicate positive response to therapy since previous PET.  Subcarinal jacques mass significantly diminished in size and no longer FDG avid.  No abnormal right or left hilar jacques uptake is identified no pathologically enlarged hilar lymph nodes are seen. RUL pulmonary nodule seen on previous PET has largely resolved. A 4 mm nodule remains and is diminished in size. No FDG avid pulmonary nodules identified. Previously described peritoneal or intercostal hypermetabolic soft tissue implants have resolved, no evidence of metastatic disease in the liver, diffuse hypermetabolic activity throughout the axial and appendicular skeleton without associated osteolytic or osteosclerotic abnormality which may represent marrow response to chemotherapy   12/07/2020 PET/CT  [facility] Hypermetabolic pulmonary nodule within the RUL with intense FDG  uptake likely related to metastatic disease, mildly hypermetabolic subcentimeter pulmonary nodules present bilaterally suspicious for metastatic disease, destructive hypermetabolic lesion with soft tissue component in the anterior lateral right seventh rib consistent with metastatic disease, no evidence of malignancy within the neck, abdomen, or pelvis.     She underwent CT guided biopsy of seventh rib lesion on 12/29/2020 at [facility]. Pathology revealed metastatic moderately differentiated adenocarcinoma consistent with colon primary. Oncotype showed benefit with cetuximab and panitumumab.    CT Chest without contrast on 3/29/2021 at [facility] following her fifth cycle of irinotecan + cetuximab showed an overall improvement compared to prior PET/CT scan. Several pulmonary nodules had decreased in size and/or bulk and metastasis involving the anterior right seventh rib had improved. No thoracic lymphadenopathy was identified regarding the right hilar region in the area FDG avidity from prior PET/CT.    After completion of her 45th cycle of irinotecan + cetuximab on 12/29/2022, she completed an interval CT CAP without contrast on 1/14/2023 at [facility] that showed enlarging pulmonary nodules compatible with metastasis, enlarging right seventh rib metastasis, stable indeterminate sclerotic lesions of the right ilium, no new suspicious bone lesions, no noncontrast CT findings of abdominopelvic metastatic disease.     Clinically, the rib met feels like a tooth ache.   She last had chemo on 03/20/2023 Stivarga.    The following portions of the patient's history were reviewed and updated as appropriate: allergies, current medications, past family history, past medical history, past social history, past surgical history and problem list. Reviewed with the patient and remain unchanged.    PAST MEDICAL HISTORY:  she has a past medical history of Cancer (HCC), Chronic kidney disease, Colon cancer (HCC), History  of colon cancer, stage IV, Hypertension, Lung cancer (HCC), PONV (postoperative nausea and vomiting), Pulmonary arterial hypertension (HCC), and Radiculopathy (07/2012).    MEDICATIONS:    Current Outpatient Medications:   •  ALPRAZolam (Xanax) 0.5 MG tablet, Take 1 tablet by mouth 2 (Two) Times a Day As Needed for Anxiety (white coat syndrome). Take prior to infusion visits. Repeat x1 as needed during visit., Disp: 30 tablet, Rfl: 0  •  aMILoride (MIDAMOR) 5 MG tablet, Take 1 tablet by mouth Daily. with food    HOLD DOS, Disp: , Rfl:   •  Cyanocobalamin (B-12) 1000 MCG tablet, , Disp: , Rfl:   •  doxycycline (VIBRAMYICN) 100 MG tablet, Take 1 tablet by mouth 2 (Two) Times a Day., Disp: , Rfl:   •  ferrous sulfate 324 (65 Fe) MG tablet delayed-release EC tablet, Take 1 tablet by mouth Daily With Breakfast. HOLD dos, Disp: , Rfl:   •  Lysine 500 MG capsule, Take 1 tablet by mouth 2 (Two) Times a Day., Disp: , Rfl:   •  ondansetron (ZOFRAN) 4 MG tablet, Take 1 tablet by mouth Every 6 (Six) Hours As Needed for Nausea or Vomiting., Disp: 30 tablet, Rfl: 0  •  phosphorus (K PHOS NEUTRAL) 155-852-130 MG tablet, Take 1 tablet by mouth 2 (Two) Times a Day., Disp: , Rfl:   •  sennosides-docusate (PERICOLACE) 8.6-50 MG per tablet, Take 2 tablets by mouth 2 (Two) Times a Day., Disp: 60 tablet, Rfl: 0  •  Stivarga 40 MG chemo tablet, , Disp: , Rfl:   •  vitamin D3 125 MCG (5000 UT) capsule capsule, Take 1 capsule by mouth Daily., Disp: , Rfl:     ALLERGIES:    Allergies   Allergen Reactions   • Hydrocodone Nausea And Vomiting   • Iodinated Contrast Media Hives and Itching     PT HAD SOME HIVES DURING SCAN.  PRIOR TO SCAN 8-3-2013.   • Latex Rash     Blisters    • Penicillins Hives     PAST SURGICAL HISTORY:  she has a past surgical history that includes Cyst Removal (1998); Portacath placement (2017); Abdominal surgery; Venous Access Device (Port) Removal (Right, 08/27/2020); Venous Access Device (Port) (Left, 01/12/2021); and  ventral/incisional hernia repair (N/A, 3/2/2023).    PREVIOUS RADIOTHERAPY OR CHEMOTHERAPY:  Multiple lines of chemo; no prior XRT    FAMILY HISTORY:  herfamily history includes Cancer in her father and sister.    SOCIAL HISTORY:  she reports that she quit smoking about 11 years ago. Her smoking use included cigarettes. She has a 30.00 pack-year smoking history. She has been exposed to tobacco smoke. She has never used smokeless tobacco. She reports current alcohol use. She reports that she does not use drugs.    PAIN AND PAIN MANAGEMENT:  7/10  NUTRITIONAL STATUS:  no issues  KPS:  90:  Minor signs or symptoms     PHQ-9 Total Score: distress screening tool completed.     REVIEW OF SYSTEMS:   Review of Systems   General: No fevers, chills, weight change, or drenching night sweats. Skin: No rashes or jaundice.  HEENT: No change in vision or hearing, no headaches.  Neck: No dysphagia or masses.  Heme/Lymph: No easy bruising or bleeding.  Respiratory System: No shortness of breath or cough.  Cardiovascular: No chest pain, palpitations, or dyspnea on exertion.  - Pacemaker. GI: No nausea, vomiting, diarrhea, melena, or hematochezia.  : No dysuria or hematuria.  Endocrine: No heat or cold intolerance. Musculoskeletal: + pain as noted Neuro: No weakness, numbness, syncope, or seizures. Psych: No mood changes or depression. Ext: Denies swelling.      Objective   VITAL SIGNS:  Vitals:    03/30/23 1007   BP: (!) 159/108   Pulse:    Resp:    Temp:    SpO2:        PHYSICAL EXAM:  GENERAL:  No apparent distress. Sitting comfortably in room.    HEENT:  Normocephalic, atraumatic. Pupils are equal, round, reactive to light. Sclera anicteric. Conjunctiva not injected. Oropharynx without erythema, ulcerations or thrush.   NECK:  Supple with no masses.  LYMPHATIC:  No cervical, supraclavicular or axillary adenopathy appreciated bilaterally.   CARDIOVASCULAR:  S1 & S2 detected; no murmurs, rubs or gallops.  CHEST:  Clear to  auscultation bilaterally; no wheezes, crackles or rubs. Work of breathing normal.  ABDOMEN:  Bowel sounds present. Abdomen is soft, nontender, nondistended.   MUSCULOSKELETAL:  + tenderness at Rt bra line region (7th rib area) No tenderness to palpation along the spine or scapulae. Normal range of motion.  EXTREMITIES:  No clubbing, cyanosis, edema.  SKIN:  No erythema, rashes, ulcerations noted.   NEUROLOGIC:  Cranial nerves II-XII grossly intact bilaterally. No focal neurologic deficits.  PSYCHIATRIC:  Alert, aware, and appropriate.      PERTINENT IMAGING/PATHOLOGY/LABS (Medical Decision Making):      COORDINATION OF CARE:  A copy of this note is sent to the referring provider.    PATHOLOGY (Reviewed): See above.    IMAGING (Reviewed): See above.    LABS (Reviewed):  HEMATOLOGY:  WBC   Date Value Ref Range Status   02/23/2023 7.72 3.40 - 10.80 10*3/mm3 Final   02/12/2018 5.46 4.5 - 11.0 10*3/uL Final     RBC   Date Value Ref Range Status   02/23/2023 3.87 3.77 - 5.28 10*6/mm3 Final   02/12/2018 2.82 (L) 4.0 - 5.2 10*6/uL Final     Hemoglobin   Date Value Ref Range Status   02/23/2023 10.7 (L) 12.0 - 15.9 g/dL Final   02/12/2018 8.1 (L) 12.0 - 16.0 g/dL Final     Hematocrit   Date Value Ref Range Status   02/23/2023 33.1 (L) 34.0 - 46.6 % Final   02/12/2018 25.3 (L) 36.0 - 46.0 % Final     Platelets   Date Value Ref Range Status   02/23/2023 207 140 - 450 10*3/mm3 Final   02/12/2018 157 140 - 440 10*3/uL Final     CHEMISTRY:  Lab Results   Component Value Date    GLUCOSE 102 (H) 02/23/2023    BUN 26 (H) 02/23/2023    CREATININE 1.00 02/23/2023    EGFRIFNONA 71 08/19/2021    BCR 26.0 (H) 02/23/2023    K 4.4 02/23/2023    CO2 26.0 02/23/2023    CALCIUM 9.4 02/23/2023    ALBUMIN 3.9 02/23/2023    LABIL2 1.1 06/03/2019    AST 24 02/23/2023    ALT 18 02/23/2023     Assessment & Plan   ASSESSMENT AND PLAN:    1. Adenocarcinoma of colon metastatic to liver (HCC)    2. Malignant neoplasm of colon metastatic to bone (HCC)        -Stage IV colon cancer  -Multi-line systemic therapy with new oral regimen just started.  -Next week is chemo week then washout week the week following.  -Pain at Known right 7th rib met--discussed palliative XRT during the chemo washout week.  Dosing will be 20 Gy in 5 fractions  CT chest does show more than 4 mets in the lung parenchyma.  -4D CT sim needed.  Informed consent.    This assessment comes from my review of the imaging, pathology, physician notes and other pertinent information as mentioned.    DISPOSITION:  Ct sim next step    TIME SPENT WITH PATIENT:  I spent 45 minutes caring for Samantha on this date of service. This time includes time spent by me in the following activities: preparing for the visit, reviewing tests, obtaining and/or reviewing a separately obtained history, performing a medically appropriate examination and/or evaluation, counseling and educating the patient/family/caregiver, ordering medications, tests, or procedures, referring and communicating with other health care professionals, documenting information in the medical record, independently interpreting results and communicating that information with the patient/family/caregiver and care coordination           CC: Mark Perera MD Embry, Candace L, MD    Approved by:     Jay Dey MD  03/30/23  10:34 EDT

## 2023-03-30 ENCOUNTER — CONSULT (OUTPATIENT)
Dept: RADIATION ONCOLOGY | Facility: HOSPITAL | Age: 63
End: 2023-03-30
Payer: COMMERCIAL

## 2023-03-30 ENCOUNTER — HOSPITAL ENCOUNTER (OUTPATIENT)
Dept: RADIATION ONCOLOGY | Facility: HOSPITAL | Age: 63
Discharge: HOME | End: 2023-03-30

## 2023-03-30 VITALS
SYSTOLIC BLOOD PRESSURE: 159 MMHG | OXYGEN SATURATION: 100 % | BODY MASS INDEX: 23.41 KG/M2 | HEART RATE: 106 BPM | RESPIRATION RATE: 18 BRPM | DIASTOLIC BLOOD PRESSURE: 108 MMHG | HEIGHT: 62 IN | WEIGHT: 127.2 LBS | TEMPERATURE: 98.2 F

## 2023-03-30 DIAGNOSIS — C18.9 ADENOCARCINOMA OF COLON METASTATIC TO LIVER: Primary | ICD-10-CM

## 2023-03-30 DIAGNOSIS — C18.9 MALIGNANT NEOPLASM OF COLON METASTATIC TO BONE: ICD-10-CM

## 2023-03-30 DIAGNOSIS — C78.7 ADENOCARCINOMA OF COLON METASTATIC TO LIVER: Primary | ICD-10-CM

## 2023-03-30 DIAGNOSIS — C79.51 MALIGNANT NEOPLASM OF COLON METASTATIC TO BONE: ICD-10-CM

## 2023-03-30 PROCEDURE — 77334 RADIATION TREATMENT AID(S): CPT | Performed by: RADIOLOGY

## 2023-03-30 PROCEDURE — G0463 HOSPITAL OUTPT CLINIC VISIT: HCPCS | Performed by: RADIOLOGY

## 2023-03-30 PROCEDURE — 99204 OFFICE O/P NEW MOD 45 MIN: CPT | Performed by: RADIOLOGY

## 2023-03-30 PROCEDURE — 77290 THER RAD SIMULAJ FIELD CPLX: CPT | Performed by: RADIOLOGY

## 2023-03-30 PROCEDURE — 77263 THER RADIOLOGY TX PLNG CPLX: CPT | Performed by: RADIOLOGY

## 2023-04-06 ENCOUNTER — HOSPITAL ENCOUNTER (OUTPATIENT)
Dept: RADIATION ONCOLOGY | Facility: HOSPITAL | Age: 63
Setting detail: RADIATION/ONCOLOGY SERIES
End: 2023-04-06
Payer: COMMERCIAL

## 2023-04-07 ENCOUNTER — OFFICE VISIT (OUTPATIENT)
Dept: SURGERY | Facility: CLINIC | Age: 63
End: 2023-04-07
Payer: COMMERCIAL

## 2023-04-07 VITALS
RESPIRATION RATE: 18 BRPM | DIASTOLIC BLOOD PRESSURE: 97 MMHG | HEART RATE: 95 BPM | WEIGHT: 129.2 LBS | SYSTOLIC BLOOD PRESSURE: 181 MMHG | TEMPERATURE: 97.7 F | BODY MASS INDEX: 23.77 KG/M2 | OXYGEN SATURATION: 100 % | HEIGHT: 62 IN

## 2023-04-07 DIAGNOSIS — K43.2 VENTRAL INCISIONAL HERNIA WITHOUT OBSTRUCTION OR GANGRENE: Primary | ICD-10-CM

## 2023-04-07 PROCEDURE — 99213 OFFICE O/P EST LOW 20 MIN: CPT | Performed by: SURGERY

## 2023-04-07 NOTE — PROGRESS NOTES
GENERAL SURGERY ESTABLISHED PATIENT NOTE    Patient Care Team:  Diana Spencer MD as PCP - General  TjDiana MD as PCP - Family Medicine  Mark Perera MD as Consulting Physician (Hematology and Oncology)  Jay Yeh MD as Surgeon (General Surgery)    Reason for follow-up: Follow-up from ventral hernia repair    Subjective     Patient is a 62 y.o. female presents for 5-week follow-up after undergoing open ventral incisional hernia repair with mesh on 3/2/2023.  Overall, the patient is doing well.  She is eating well, having regular bowel movements, and her pain seems to be well controlled.  She is pleased with her outcome, and wishes that she had done it sooner.    Review of Systems   Gastrointestinal: Negative for abdominal pain, constipation, nausea and vomiting.        History  Past Medical History:   Diagnosis Date   • Cancer     wiht mets to liver, bone   • Chronic kidney disease    • Colon cancer    • History of colon cancer, stage IV     Stage SHELL with progression   • Hypertension    • Lung cancer     with colon   • PONV (postoperative nausea and vomiting)    • Pulmonary arterial hypertension    • Radiculopathy 07/2012    Right L5/S1     Past Surgical History:   Procedure Laterality Date   • ABDOMINAL SURGERY     • CYST REMOVAL  1998    cyst removed from [facility] in 1998   • PORTACATH PLACEMENT  2017   • VENOUS ACCESS DEVICE (PORT) INSERTION Left 01/12/2021    Procedure: INSERTION VENOUS ACCESS DEVICE;  Surgeon: Jay Yeh MD;  Location: [facility] MAIN OR;  Service: General;  Laterality: Left;   • VENOUS ACCESS DEVICE (PORT) REMOVAL Right 08/27/2020    Procedure: REMOVAL VENOUS ACCESS DEVICE;  Surgeon: Jay Yeh MD;  Location: [facility] MAIN OR;  Service: General;  Laterality: Right;   • VENTRAL/INCISIONAL HERNIA REPAIR N/A 3/2/2023    Procedure: VENTRAL/INCISIONAL HERNIA REPAIR WITH MESH;  Surgeon: Jay Yeh MD;  Location: [facility] MAIN OR;   Service: General;  Laterality: N/A;     Family History   Problem Relation Age of Onset   • Cancer Sister         Bladder cancer   • Cancer Father      Social History     Tobacco Use   • Smoking status: Former     Packs/day: 1.00     Years: 30.00     Pack years: 30.00     Types: Cigarettes     Quit date: 2012     Years since quittin.2     Passive exposure: Past   • Smokeless tobacco: Never   Vaping Use   • Vaping Use: Never used   Substance Use Topics   • Alcohol use: Yes     Comment: 2 or 3 a month   • Drug use: Never     (Not in a hospital admission)    Allergies:  Hydrocodone, Iodinated contrast media, Latex, and Penicillins    Objective     Vital Signs  Temp:  [97.7 °F (36.5 °C)] 97.7 °F (36.5 °C)  Heart Rate:  [95] 95  Resp:  [18] 18  BP: (181)/(97) 181/97    Physical Exam  Vitals reviewed.   Constitutional:       Appearance: She is well-developed.   HENT:      Head: Normocephalic and atraumatic.   Eyes:      Pupils: Pupils are equal, round, and reactive to light.   Cardiovascular:      Rate and Rhythm: Normal rate and regular rhythm.   Pulmonary:      Effort: Pulmonary effort is normal.      Breath sounds: Normal breath sounds.   Abdominal:      General: There is no distension.      Palpations: Abdomen is soft.      Tenderness: There is no abdominal tenderness.      Hernia: No hernia is present.      Comments: Midline abdominal wound appears to be well-healed without any surrounding erythema, nursing, or drainage to suggest infection.   Musculoskeletal:         General: Normal range of motion.      Cervical back: Normal range of motion.   Lymphadenopathy:      Cervical: No cervical adenopathy.   Skin:     General: Skin is warm and dry.      Findings: No rash.   Neurological:      Mental Status: She is alert and oriented to person, place, and time.         Results Review:   Lab Results (last 24 hours)     ** No results found for the last 24 hours. **        No radiology results for the last day      I  reviewed the patient's new imaging results and agree with the interpretation.  I reviewed the patient's other test results and agree with the interpretation    Assessment & Plan   Ventral incisional hernia or    Patient is 5 weeks status post open ventral incisional hernia repair with mesh on 3/2/2023.  Overall, she is doing quite well.    No heavy lifting for 1 more week  Regular diet as tolerated  Follow-up with me as needed  Okay to begin immunotherapy    I discussed the patients findings and my recommendations with the patient.     Jay Yeh MD  04/07/23  10:20 EDT

## 2023-04-10 ENCOUNTER — RADIATION ONCOLOGY WEEKLY ASSESSMENT (OUTPATIENT)
Dept: RADIATION ONCOLOGY | Facility: HOSPITAL | Age: 63
End: 2023-04-10
Payer: COMMERCIAL

## 2023-04-10 ENCOUNTER — HOSPITAL ENCOUNTER (OUTPATIENT)
Dept: RADIATION ONCOLOGY | Facility: HOSPITAL | Age: 63
Discharge: HOME | End: 2023-04-10
Payer: COMMERCIAL

## 2023-04-10 VITALS
BODY MASS INDEX: 23.48 KG/M2 | HEART RATE: 88 BPM | RESPIRATION RATE: 22 BRPM | DIASTOLIC BLOOD PRESSURE: 77 MMHG | OXYGEN SATURATION: 98 % | SYSTOLIC BLOOD PRESSURE: 108 MMHG | WEIGHT: 128.4 LBS

## 2023-04-10 DIAGNOSIS — C79.51 MALIGNANT NEOPLASM OF COLON METASTATIC TO BONE: Primary | ICD-10-CM

## 2023-04-10 DIAGNOSIS — C18.9 MALIGNANT NEOPLASM OF COLON METASTATIC TO BONE: Primary | ICD-10-CM

## 2023-04-10 LAB
RAD ONC ARIA COURSE ID: NORMAL
RAD ONC ARIA COURSE LAST TREATMENT DATE: NORMAL
RAD ONC ARIA COURSE START DATE: NORMAL
RAD ONC ARIA COURSE TREATMENT ELAPSED DAYS: 0
RAD ONC ARIA FIRST TREATMENT DATE: NORMAL
RAD ONC ARIA PLAN FRACTIONS TREATED TO DATE: 1
RAD ONC ARIA PLAN ID: NORMAL
RAD ONC ARIA PLAN PRESCRIBED DOSE PER FRACTION: 4 GY
RAD ONC ARIA PLAN PRIMARY REFERENCE POINT: NORMAL
RAD ONC ARIA PLAN TOTAL FRACTIONS PRESCRIBED: 5
RAD ONC ARIA PLAN TOTAL PRESCRIBED DOSE: 2000 CGY
RAD ONC ARIA REFERENCE POINT DOSAGE GIVEN TO DATE: 4 GY
RAD ONC ARIA REFERENCE POINT ID: NORMAL
RAD ONC ARIA REFERENCE POINT SESSION DOSAGE GIVEN: 4 GY

## 2023-04-10 PROCEDURE — 77334 RADIATION TREATMENT AID(S): CPT | Performed by: RADIOLOGY

## 2023-04-10 PROCEDURE — 77295 3-D RADIOTHERAPY PLAN: CPT | Performed by: RADIOLOGY

## 2023-04-10 PROCEDURE — 77427 RADIATION TX MANAGEMENT X5: CPT | Performed by: RADIOLOGY

## 2023-04-10 PROCEDURE — 77412 RADIATION TX DELIVERY LVL 3: CPT | Performed by: RADIOLOGY

## 2023-04-10 PROCEDURE — 77300 RADIATION THERAPY DOSE PLAN: CPT | Performed by: RADIOLOGY

## 2023-04-10 PROCEDURE — FACE2FACE: Performed by: RADIOLOGY

## 2023-04-10 PROCEDURE — 77280 THER RAD SIMULAJ FIELD SMPL: CPT | Performed by: RADIOLOGY

## 2023-04-10 NOTE — PROGRESS NOTES
Patient Name: Samantha Massey Date: 4/10/2023       : 1960        MRN #: [id number] Diagnosis:   Encounter Diagnosis   Name Primary?   • Malignant neoplasm of colon metastatic to bone Yes                     RADIATION ON TREATMENT VISIT NOTE - GENERAL     Treatment Summary    Treatment Site Ref. ID Energy Dose/Fx (cGy) #Fx Dose Correction (cGy) Total Dose (cGy) Start Date End Date Elapsed Days   RtAntRib RtAntRibs 10X/6X 400  0 400 4/10/2023 4/10/2023 0     Intent: palliative    General:           Review of Systems    [] No new complaints [] Cough [] Dysphagia  [] Bowel changes [] Fever/chills [] Nausea/vomiting  [] Skin itching/soreness/breakdown  [x] Fatigue,  severity: 0 None [x] Pain,  severity: 2, location: described as ache at treatment site      Nausea regimen: NONE   Pain medication regimen: NONE   Bowel regimen: NONE   Skin regimen: NONE     Comments/Notes:  No complaints at this time.    KPS: 90%       Vital Signs: /77 Comment: Home reading from this morning  Pulse 88   Resp 22   Wt 58.2 kg (128 lb 6.4 oz)   LMP  (LMP Unknown)   SpO2 98%   BMI 23.48 kg/m²     Weight:   Wt Readings from Last 3 Encounters:   04/10/23 58.2 kg (128 lb 6.4 oz)   23 58.6 kg (129 lb 3.2 oz)   23 57.7 kg (127 lb 3.2 oz)       Medication:   Current Outpatient Medications:   •  ALPRAZolam (Xanax) 0.5 MG tablet, Take 1 tablet by mouth 2 (Two) Times a Day As Needed for Anxiety (white coat syndrome). Take prior to infusion visits. Repeat x1 as needed during visit., Disp: 30 tablet, Rfl: 0  •  aMILoride (MIDAMOR) 5 MG tablet, Take 2 tablets by mouth Daily. with food, Disp: , Rfl:   •  Cyanocobalamin (B-12) 1000 MCG tablet, , Disp: , Rfl:   •  doxycycline (VIBRAMYICN) 100 MG tablet, Take 1 tablet by mouth 2 (Two) Times a Day., Disp: , Rfl:   •  ferrous sulfate 324 (65 Fe) MG tablet delayed-release EC tablet, Take 1 tablet by mouth Daily With Breakfast. HOLD dos, Disp: , Rfl:   •  Lysine 500 MG  capsule, Take 1 tablet by mouth 2 (Two) Times a Day., Disp: , Rfl:   •  phosphorus (K PHOS NEUTRAL) 155-852-130 MG tablet, Take 1 tablet by mouth 2 (Two) Times a Day., Disp: , Rfl:   •  sennosides-docusate (PERICOLACE) 8.6-50 MG per tablet, Take 2 tablets by mouth 2 (Two) Times a Day., Disp: 60 tablet, Rfl: 0  •  Stivarga 40 MG chemo tablet, , Disp: , Rfl:   •  vitamin D3 125 MCG (5000 UT) capsule capsule, Take 1 capsule by mouth Daily., Disp: , Rfl:        LABS (Reviewed):  Hematology WBC   Date Value Ref Range Status   02/23/2023 7.72 3.40 - 10.80 10*3/mm3 Final   02/12/2018 5.46 4.5 - 11.0 10*3/uL Final     RBC   Date Value Ref Range Status   02/23/2023 3.87 3.77 - 5.28 10*6/mm3 Final   02/12/2018 2.82 (L) 4.0 - 5.2 10*6/uL Final     Hemoglobin   Date Value Ref Range Status   02/23/2023 10.7 (L) 12.0 - 15.9 g/dL Final   02/12/2018 8.1 (L) 12.0 - 16.0 g/dL Final     Hematocrit   Date Value Ref Range Status   02/23/2023 33.1 (L) 34.0 - 46.6 % Final   02/12/2018 25.3 (L) 36.0 - 46.0 % Final     Platelets   Date Value Ref Range Status   02/23/2023 207 140 - 450 10*3/mm3 Final   02/12/2018 157 140 - 440 10*3/uL Final      Chemistry   Lab Results   Component Value Date    GLUCOSE 102 (H) 02/23/2023    BUN 26 (H) 02/23/2023    CREATININE 1.00 02/23/2023    EGFRIFNONA 71 08/19/2021    BCR 26.0 (H) 02/23/2023    K 4.4 02/23/2023    CO2 26.0 02/23/2023    CALCIUM 9.4 02/23/2023    ALBUMIN 3.9 02/23/2023    LABIL2 1.1 06/03/2019    AST 24 02/23/2023    ALT 18 02/23/2023         Physical Exam:         Neck: [] Lymphadenopathy  Lungs: [x] Clear to auscultation  CVS: [x] Regular rate & rhythm  Skin: [x] stGstrstastdstest:st st1st Comments/Notes:     [x] Review of labs, images, dosimetry, dose delivered, & treatment parameters.   Comments:     [x] Patient treatment setup reviewed.    Comments:     Recommendations: skin care and precautions given  Continue xrt and supportive measures    [x] Continue RT  [] Change RT Plan [] Hold RT, length:         Approved Electronically By:  Jay Dey MD, 4/10/2023, 14:53 EDT          Confidentiality of this medical record shall be maintained except when use or disclosure is required or permitted by law, regulation or written authorization by the patient.

## 2023-04-11 ENCOUNTER — HOSPITAL ENCOUNTER (OUTPATIENT)
Dept: RADIATION ONCOLOGY | Facility: HOSPITAL | Age: 63
Discharge: HOME | End: 2023-04-11

## 2023-04-11 LAB
RAD ONC ARIA COURSE ID: NORMAL
RAD ONC ARIA COURSE LAST TREATMENT DATE: NORMAL
RAD ONC ARIA COURSE START DATE: NORMAL
RAD ONC ARIA COURSE TREATMENT ELAPSED DAYS: 1
RAD ONC ARIA FIRST TREATMENT DATE: NORMAL
RAD ONC ARIA PLAN FRACTIONS TREATED TO DATE: 2
RAD ONC ARIA PLAN ID: NORMAL
RAD ONC ARIA PLAN PRESCRIBED DOSE PER FRACTION: 4 GY
RAD ONC ARIA PLAN PRIMARY REFERENCE POINT: NORMAL
RAD ONC ARIA PLAN TOTAL FRACTIONS PRESCRIBED: 5
RAD ONC ARIA PLAN TOTAL PRESCRIBED DOSE: 2000 CGY
RAD ONC ARIA REFERENCE POINT DOSAGE GIVEN TO DATE: 8 GY
RAD ONC ARIA REFERENCE POINT ID: NORMAL
RAD ONC ARIA REFERENCE POINT SESSION DOSAGE GIVEN: 4 GY

## 2023-04-11 PROCEDURE — 77014 CHG CT GUIDANCE RADIATION THERAPY FLDS PLACEMENT: CPT | Performed by: RADIOLOGY

## 2023-04-11 PROCEDURE — 77387 GUIDANCE FOR RADJ TX DLVR: CPT | Performed by: RADIOLOGY

## 2023-04-11 PROCEDURE — 77412 RADIATION TX DELIVERY LVL 3: CPT | Performed by: RADIOLOGY

## 2023-04-12 ENCOUNTER — TELEPHONE (OUTPATIENT)
Dept: SURGERY | Facility: CLINIC | Age: 63
End: 2023-04-12
Payer: COMMERCIAL

## 2023-04-12 ENCOUNTER — HOSPITAL ENCOUNTER (OUTPATIENT)
Dept: RADIATION ONCOLOGY | Facility: HOSPITAL | Age: 63
Discharge: HOME | End: 2023-04-12

## 2023-04-12 LAB
RAD ONC ARIA COURSE ID: NORMAL
RAD ONC ARIA COURSE LAST TREATMENT DATE: NORMAL
RAD ONC ARIA COURSE START DATE: NORMAL
RAD ONC ARIA COURSE TREATMENT ELAPSED DAYS: 2
RAD ONC ARIA FIRST TREATMENT DATE: NORMAL
RAD ONC ARIA PLAN FRACTIONS TREATED TO DATE: 3
RAD ONC ARIA PLAN ID: NORMAL
RAD ONC ARIA PLAN PRESCRIBED DOSE PER FRACTION: 4 GY
RAD ONC ARIA PLAN PRIMARY REFERENCE POINT: NORMAL
RAD ONC ARIA PLAN TOTAL FRACTIONS PRESCRIBED: 5
RAD ONC ARIA PLAN TOTAL PRESCRIBED DOSE: 2000 CGY
RAD ONC ARIA REFERENCE POINT DOSAGE GIVEN TO DATE: 12 GY
RAD ONC ARIA REFERENCE POINT ID: NORMAL
RAD ONC ARIA REFERENCE POINT SESSION DOSAGE GIVEN: 4 GY

## 2023-04-12 PROCEDURE — 77412 RADIATION TX DELIVERY LVL 3: CPT | Performed by: RADIOLOGY

## 2023-04-12 PROCEDURE — 77014 CHG CT GUIDANCE RADIATION THERAPY FLDS PLACEMENT: CPT | Performed by: RADIOLOGY

## 2023-04-12 PROCEDURE — 77387 GUIDANCE FOR RADJ TX DLVR: CPT | Performed by: RADIOLOGY

## 2023-04-12 NOTE — TELEPHONE ENCOUNTER
"    Caller: Samantha Massey \"Mary\"    Relationship: Self    Best call back number: [phone]    What form or medical record are you requesting: RELEASE FOR WORK    Who is requesting this form or medical record from you: EMPLOYER    How would you like to receive the form or medical records (pick-up, mail, fax):     Timeframe paperwork needed: TODAY    Additional notes: PT HAD SURGERY 3/2/23 AND NEEDS RELEASE FOR 4/17/23 BACK TO WORK WITH NO RESTRICTIONS. PT WOULD LIKE TO  TODAY IF POSSIBLE.  "

## 2023-04-13 ENCOUNTER — HOSPITAL ENCOUNTER (OUTPATIENT)
Dept: RADIATION ONCOLOGY | Facility: HOSPITAL | Age: 63
Discharge: HOME | End: 2023-04-13

## 2023-04-13 LAB
RAD ONC ARIA COURSE ID: NORMAL
RAD ONC ARIA COURSE LAST TREATMENT DATE: NORMAL
RAD ONC ARIA COURSE START DATE: NORMAL
RAD ONC ARIA COURSE TREATMENT ELAPSED DAYS: 3
RAD ONC ARIA FIRST TREATMENT DATE: NORMAL
RAD ONC ARIA PLAN FRACTIONS TREATED TO DATE: 4
RAD ONC ARIA PLAN ID: NORMAL
RAD ONC ARIA PLAN PRESCRIBED DOSE PER FRACTION: 4 GY
RAD ONC ARIA PLAN PRIMARY REFERENCE POINT: NORMAL
RAD ONC ARIA PLAN TOTAL FRACTIONS PRESCRIBED: 5
RAD ONC ARIA PLAN TOTAL PRESCRIBED DOSE: 2000 CGY
RAD ONC ARIA REFERENCE POINT DOSAGE GIVEN TO DATE: 16 GY
RAD ONC ARIA REFERENCE POINT ID: NORMAL
RAD ONC ARIA REFERENCE POINT SESSION DOSAGE GIVEN: 4 GY

## 2023-04-13 PROCEDURE — 77387 GUIDANCE FOR RADJ TX DLVR: CPT | Performed by: RADIOLOGY

## 2023-04-13 PROCEDURE — 77336 RADIATION PHYSICS CONSULT: CPT | Performed by: RADIOLOGY

## 2023-04-13 PROCEDURE — 77412 RADIATION TX DELIVERY LVL 3: CPT | Performed by: RADIOLOGY

## 2023-04-13 PROCEDURE — 77014 CHG CT GUIDANCE RADIATION THERAPY FLDS PLACEMENT: CPT | Performed by: RADIOLOGY

## 2023-04-13 NOTE — PROGRESS NOTES
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 The summary of his/her treatment is as follows:    TREATMENT SITE:   Rt Ant Rib START DATE:   04/10/2023   TOTAL DOSE (cGy):   2000 END DATE:   04/13/2023   DOSE/FRACTION:   400 ELAPSED DAYS:   4   TOTAL FACTIONS:   5 PHYSICIAN:    Jay Dey MD     He/She is scheduled for a one-month follow-up appointment with Dr. Dey on May 16, 2023 at 1:20pm.     The following instructions were provided to the patient and/or family in their printed after visit summary (AVS) as well as discussed in-person with the radiation oncology nurse. The patient and/or family member had the opportunity to ask questions and verbalized their questions were adequately answered.   _______________________________________________________________________      RADIATION THERAPY DISCHARGE INSTRUCTIONS  General    CONGRATULATIONS! You completed 5 radiation treatments for treatment of your metastatic colon cancer. These discharge instructions are important for you to follow until your one-month follow up appointment with Dr. Dey. Please make sure to review these instructions and call the Radiation Oncology Department if you have any questions or concerns with symptoms you may experience. Thank you for trusting us with your cancer treatment!    DIET  • Eat a regular, well balanced diet that is high in protein such as meat, eggs, cheese, and nut butters  • Drink 48 to 64 ounces of fluid daily  • Use nutritional supplements if you are not able to eat full meals  • Monitor your weight and report continued weight loss to your doctor    MEDICATIONS  • Use Tylenol as needed to decrease discomfort and irritation to treatment area  • Take pain medications only as prescribed  • Take a laxative or stool softener as needed to prevent constipation due to pain medications    SKIN CARE  • Wash treated skin  gently with your hands using a mild, non-drying soap such as Dove® or Aveeno® until skin returns to normal  • Pat skin dry - do not rub  • Keep treated skin moist with frequent applications of Aquaphor® or unscented lotion (such as Eucerin)®  • Always protect your treated skin when outdoors by wearing protective clothing and applying sunscreen SPF 15 or higher at least 30 minutes before going outdoors and reapply frequently    ACTIVITY  Fatigue is a normal side effect of radiation therapy and should improve over time  • Alternate rest and activity  • Exercise such as walking may help to improve your fatigue    FOLLOW-UP  • Continue follow-up appointments with all other doctors as necessary  • Call your radiation oncology doctor if you are concerned with any side effects you are experiencing: [phone]    _______________________________________________________________________    Completed by: Alma Ivan MA, Radiation Oncology Medical Assistant on 04/14/2023  at 9:23 EDT

## 2023-04-13 NOTE — PATIENT INSTRUCTIONS
RADIATION THERAPY DISCHARGE INSTRUCTIONS  General    CONGRATULATIONS! You completed 5 radiation treatments for treatment of your metastatic colon cancer. These discharge instructions are important for you to follow until your one-month follow up appointment with Dr. Dey. Please make sure to review these instructions and call the Radiation Oncology Department if you have any questions or concerns with symptoms you may experience. Thank you for trusting us with your cancer treatment!    DIET  Eat a regular, well balanced diet that is high in protein such as meat, eggs, cheese, and nut butters  Drink 48 to 64 ounces of fluid daily  Use nutritional supplements if you are not able to eat full meals  Monitor your weight and report continued weight loss to your doctor    MEDICATIONS  Use Tylenol as needed to decrease discomfort and irritation to treatment area  Take pain medications only as prescribed  Take a laxative or stool softener as needed to prevent constipation due to pain medications    SKIN CARE  Wash treated skin gently with your hands using a mild, non-drying soap such as Dove® or Aveeno® until skin returns to normal  Pat skin dry - do not rub  Keep treated skin moist with frequent applications of Aquaphor® or unscented lotion (such as Eucerin)®  Always protect your treated skin when outdoors by wearing protective clothing and applying sunscreen SPF 15 or higher at least 30 minutes before going outdoors and reapply frequently    ACTIVITY  Fatigue is a normal side effect of radiation therapy and should improve over time  Alternate rest and activity  Exercise such as walking may help to improve your fatigue    FOLLOW-UP  Continue follow-up appointments with all other doctors as necessary  Call your radiation oncology doctor if you are concerned with any side effects you are experiencing: [phone]    _______________________________________________________________________    Completed by: Alma Ivan MA  on 4/14/2023 at 9:22 EDT

## 2023-04-14 ENCOUNTER — RADIATION ONCOLOGY WEEKLY ASSESSMENT (OUTPATIENT)
Dept: RADIATION ONCOLOGY | Facility: HOSPITAL | Age: 63
End: 2023-04-14
Payer: COMMERCIAL

## 2023-04-14 ENCOUNTER — HOSPITAL ENCOUNTER (OUTPATIENT)
Dept: RADIATION ONCOLOGY | Facility: HOSPITAL | Age: 63
Discharge: HOME | End: 2023-04-14

## 2023-04-14 DIAGNOSIS — C18.9 MALIGNANT NEOPLASM OF COLON METASTATIC TO BONE: Primary | ICD-10-CM

## 2023-04-14 DIAGNOSIS — C79.51 MALIGNANT NEOPLASM OF COLON METASTATIC TO BONE: Primary | ICD-10-CM

## 2023-04-14 LAB
RAD ONC ARIA COURSE ID: NORMAL
RAD ONC ARIA COURSE LAST TREATMENT DATE: NORMAL
RAD ONC ARIA COURSE START DATE: NORMAL
RAD ONC ARIA COURSE TREATMENT ELAPSED DAYS: 4
RAD ONC ARIA FIRST TREATMENT DATE: NORMAL
RAD ONC ARIA PLAN FRACTIONS TREATED TO DATE: 5
RAD ONC ARIA PLAN ID: NORMAL
RAD ONC ARIA PLAN PRESCRIBED DOSE PER FRACTION: 4 GY
RAD ONC ARIA PLAN PRIMARY REFERENCE POINT: NORMAL
RAD ONC ARIA PLAN TOTAL FRACTIONS PRESCRIBED: 5
RAD ONC ARIA PLAN TOTAL PRESCRIBED DOSE: 2000 CGY
RAD ONC ARIA REFERENCE POINT DOSAGE GIVEN TO DATE: 20 GY
RAD ONC ARIA REFERENCE POINT ID: NORMAL
RAD ONC ARIA REFERENCE POINT SESSION DOSAGE GIVEN: 4 GY

## 2023-04-14 PROCEDURE — 77412 RADIATION TX DELIVERY LVL 3: CPT | Performed by: RADIOLOGY

## 2023-04-14 PROCEDURE — 77014 CHG CT GUIDANCE RADIATION THERAPY FLDS PLACEMENT: CPT | Performed by: RADIOLOGY

## 2023-04-14 PROCEDURE — 77387 GUIDANCE FOR RADJ TX DLVR: CPT | Performed by: RADIOLOGY

## 2023-04-20 LAB
RAD ONC ARIA COURSE END DATE: NORMAL
RAD ONC ARIA COURSE ID: NORMAL
RAD ONC ARIA COURSE LAST TREATMENT DATE: NORMAL
RAD ONC ARIA COURSE START DATE: NORMAL
RAD ONC ARIA COURSE TREATMENT ELAPSED DAYS: 4
RAD ONC ARIA FIRST TREATMENT DATE: NORMAL
RAD ONC ARIA PLAN FRACTIONS TREATED TO DATE: 5
RAD ONC ARIA PLAN ID: NORMAL
RAD ONC ARIA PLAN NAME: NORMAL
RAD ONC ARIA PLAN PRESCRIBED DOSE PER FRACTION: 4 GY
RAD ONC ARIA PLAN PRIMARY REFERENCE POINT: NORMAL
RAD ONC ARIA PLAN TOTAL FRACTIONS PRESCRIBED: 5
RAD ONC ARIA PLAN TOTAL PRESCRIBED DOSE: 2000 CGY
RAD ONC ARIA REFERENCE POINT DOSAGE GIVEN TO DATE: 20 GY
RAD ONC ARIA REFERENCE POINT ID: NORMAL

## 2023-04-28 NOTE — PROGRESS NOTES
Patient Name: Samantha Massey   Date: 2023  : 1960       MRN: [id number]     Referring Physician: Diana Spencer MD  DX:   Encounter Diagnosis   Name Primary?   • Malignant neoplasm of colon metastatic to bone Yes        COMPLETION NOTE      Primary Radiation Oncologist: Jay Dey MD    PRIMARY SITE AND HISTOPATHOLOGY:   Metastatic Colon cancer, pain at right seventh rib met      STAGE: Stage IV Colon cancer      SIGNIFICANT LAB AND X-RAY FINDINGS: Samantha Massey is a 62 y.o. female with an extensive oncologic history dating back to 2012. She was referred to our clinic for consideration of palliative radiation therapy to right seventh rib.     She was seen in the office by her PCP, Dr. Diana Spencer, on 2012 with complaints of intermittent diarrhea and constipation starting in 2011. CT AP was ordered and showed innumerable lesions throughout the liver, thickening of the distal sigmoid colon wall, and nonspecific nonpathologically enlarged lymph nodes in the sigmoid mesentery and left iliac chain. Colonoscopy on 2012 revealed a 10 cm distal colon mass. Pathology revealed moderately differentiated adenocarcinoma. She underwent laparoscopic low anterior resection with mobilization of splenic flexure on 2012. Pathology revealed invasive moderately to poorly differentiated adenocarcinoma of the rectosigmoid colon with invasion through the muscularis propria and extension into the mesocolon. Surgical margins were free of tumor and mesenteric lymph nodes were negative (0/14). CT liver biopsy on 2012 revealed metastatic adenocarcinoma consistent with colonic primary. She was referred to medical oncology for adjuvant treatment.     CHEMOTHERAPY TIMELINE  2012   Cycle 1 FOLFOX + bevacizumab  2012   20% dose reduction + Neulasta  2012   Cycle 15 FOLFOX + bevacizumab  2012   Xeloda 1500 mg PO BID x 14 days (Q21D)  2013   Xeloda decreased to 1250  mg PO BID due to hand-foot syndrome  05/06/2013   Xeloda decreased to 1000 mg PO BID due to hand-foot syndrome, cytopenias, diarrhea  08/24/2014   Xeloda discontinued (remission)  11/27/2017   Cycle 1 mFOLFOX6 + bevacizumab  01/02/2018   Cycle 3 mFOLFOX6 + bevacizumab  01/22/2018   Chemotherapy discontinued; new orders: irinotecan + 5FU + leucovorin + bevacizumab  02/05/2018   Chemotherapy on hold pending surgical resection of liver masses  03/01/2018   Xeloda 1000 mg PO BID x 14 days (Q21D) for 6 months  09/18/2018   Cycle 6 Xeloda; discontinued (completion of therapy)  07/08/2019   Cycle 1 FOLFIRI + bevacizumab  08/12/2019   Cycle 3 FOLFIRI + bevacizumab + Neulasta  08/26/2019   Cycle 4 FOLFIRI + bevacizumab + Neulasta  10/21/2019   Cycle 8 FOLFIRI + bevacizumab + Neulasta  11/04/2019   Cycle 9 FOLFIRI + bevacizumab + Neulasta  12/16/2019   Cycle 1 bevacizumab + leucovorin + 5FU  07/27/2020   Cycle 15 bevacizumab + leucovorin + 5FU  07/29/2020   Chemotherapy discontinued (patient requested break)  12/10/2020   Xgeva 120 mg SQ monthly + calcium/vitamin D PO BID  01/18/2021   Cycle 1 irinotecan + cetuximab  06/24/2021   Xgeva held for dental procedure on 7/22/2021 12/09/2021   Xgeva resumed  12/29/2022   Cycle 45 irinotecan + cetuximab  01/23/2023   Chemotherapy discontinued (progression); new orders: regorafenib PO  03/20/2023   Regorafenib (Stivarga) initiated (delayed start due to abdominal surgery)     05/28/2013 PET/CT  [facility] No hypermetabolic masses seen within the liver; no evidence of hypermetabolic activity in the neck, chest, abdomen, or pelvis.   11/17/2017 PET/CT  [facility] Large hypermetabolic mass in the liver segment 4 compatible with metastasis as identified on MRI. No additional metastatic lesions in the liver or elsewhere in the imaged portions of the body   05/16/2019 PET/CT  [facility] Hypermetabolic subcarinal mass concerning for metastatic disease, scattered pulmonary densities extending down to  the right hilar lesion which are hypermetabolic ranging from SUV of 5.1-5.3 concerning for metastatic disease, right intercostal mass concerning for metastatic disease, hypermetabolic presumed peritoneal implant in the RUQ between the liver and the diaphragm concerning for metastatic disease, small subcentimeter lymph nodes in the left side of the neck which are nonspecific and indeterminate could be reactive versus metastatic, hypermetabolic activity seen in the soft tissues and bone marrow may be due to reactive process.   12/02/2019 PET/CT  [facility] Findings indicate positive response to therapy since previous PET.  Subcarinal jacques mass significantly diminished in size and no longer FDG avid.  No abnormal right or left hilar jacques uptake is identified no pathologically enlarged hilar lymph nodes are seen. RUL pulmonary nodule seen on previous PET has largely resolved. A 4 mm nodule remains and is diminished in size. No FDG avid pulmonary nodules identified. Previously described peritoneal or intercostal hypermetabolic soft tissue implants have resolved, no evidence of metastatic disease in the liver, diffuse hypermetabolic activity throughout the axial and appendicular skeleton without associated osteolytic or osteosclerotic abnormality which may represent marrow response to chemotherapy   12/07/2020 PET/CT  [facility] Hypermetabolic pulmonary nodule within the RUL with intense FDG uptake likely related to metastatic disease, mildly hypermetabolic subcentimeter pulmonary nodules present bilaterally suspicious for metastatic disease, destructive hypermetabolic lesion with soft tissue component in the anterior lateral right seventh rib consistent with metastatic disease, no evidence of malignancy within the neck, abdomen, or pelvis.      She underwent CT guided biopsy of seventh rib lesion on 12/29/2020 at [facility]. Pathology revealed metastatic moderately differentiated adenocarcinoma consistent with colon primary.  Oncotype showed benefit with cetuximab and panitumumab.     CT Chest without contrast on 3/29/2021 at [facility] following her fifth cycle of irinotecan + cetuximab showed an overall improvement compared to prior PET/CT scan. Several pulmonary nodules had decreased in size and/or bulk and metastasis involving the anterior right seventh rib had improved. No thoracic lymphadenopathy was identified regarding the right hilar region in the area FDG avidity from prior PET/CT.     After completion of her 45th cycle of irinotecan + cetuximab on 12/29/2022, she completed an interval CT CAP without contrast on 1/14/2023 at [facility] that showed enlarging pulmonary nodules compatible with metastasis, enlarging right seventh rib metastasis, stable indeterminate sclerotic lesions of the right ilium, no new suspicious bone lesions, no noncontrast CT findings of abdominopelvic metastatic disease.      Clinically, the rib met feels like a tooth ache.   She last had chemo on 03/20/2023 Stivarga.        PLAN OF TREATMENT:  Palliative XRT; in her chemo washout       DATE STARTED:  04/10/2023   DATE COMPLETED:  04/14/2023; 5 elapsed treatment days      TOTAL DOSE: 20 Gy in 5 fractions   DOSE/FRACTION:4 Gy per fraction  ENERGY:  10/6 MV photons   STATUS OF TUMOR/PATIENT AT COMPLETION OF RADIOTHERAPY: No unexpected toxicities or treatment breaks      TOLERANCE: well tolerated  Grade 1 fatigue  No skin issues  Skin care handout given      DISPOSITION:  Continues systemic therapy with Dr. Perera next week  1 month f/u here      Current Outpatient Medications:   •  ALPRAZolam (Xanax) 0.5 MG tablet, Take 1 tablet by mouth 2 (Two) Times a Day As Needed for Anxiety (white coat syndrome). Take prior to infusion visits. Repeat x1 as needed during visit., Disp: 30 tablet, Rfl: 0  •  aMILoride (MIDAMOR) 5 MG tablet, Take 2 tablets by mouth Daily. with food, Disp: , Rfl:   •  Cyanocobalamin (B-12) 1000 MCG tablet, , Disp: , Rfl:    •  doxycycline (VIBRAMYICN) 100 MG tablet, Take 1 tablet by mouth 2 (Two) Times a Day., Disp: , Rfl:   •  ferrous sulfate 324 (65 Fe) MG tablet delayed-release EC tablet, Take 1 tablet by mouth Daily With Breakfast. HOLD dos, Disp: , Rfl:   •  Lysine 500 MG capsule, Take 1 tablet by mouth 2 (Two) Times a Day., Disp: , Rfl:   •  phosphorus (K PHOS NEUTRAL) 155-852-130 MG tablet, Take 1 tablet by mouth 2 (Two) Times a Day., Disp: , Rfl:   •  sennosides-docusate (PERICOLACE) 8.6-50 MG per tablet, Take 2 tablets by mouth 2 (Two) Times a Day., Disp: 60 tablet, Rfl: 0  •  Stivarga 40 MG chemo tablet, , Disp: , Rfl:   •  vitamin D3 125 MCG (5000 UT) capsule capsule, Take 1 capsule by mouth Daily., Disp: , Rfl:     KPS: 80            cc: Mark Perera MD        Approved Electronically By:  Jay Dey MD, 4/28/2023, 12:39 EDT                              Confidentiality of this medical record shall be maintained except when use or disclosure is required or permitted by law, regulation or written authorization by the patient.

## 2023-05-31 NOTE — PROGRESS NOTES
RADIATION ONCOLOGY FOLLOW-UP NOTE    NAME: Samantha Massey  YOB: 1960  MRN #: [id number]  DATE OF SERVICE: 6/1/2023  REFERRING PROVIDER: Diana Spencer MD  [address] [place] [address] DR MORATAYA  [address]  IN [facility]  PRIMARY CARE PROVIDER: Diana Spencer MD    DIAGNOSIS:    Encounter Diagnosis   Name Primary?   • Malignant neoplasm of colon metastatic to bone Yes     REASON FOR VISIT/CC: Symptomatic Rt rib mets,  1M s/p XRT F/U    RADIATION TREATMENT COURSE:  2000 cGy in 5 fractions to right anterior ribs, completed 04/14/2023.    HISTORY OF PRESENT ILLNESS: The patient is a 62 y.o. year old female who was last seen in our office on 04/14/2023 upon completion of radiation therapy treatment.    She follows with Dr. Perera, and was last seen by ARNOLDO Funes on 04/24/2023. Their plan is to hold Stivarga due to secondary hypertension, continue B12 replacement, continue Xgeva, continue vitamin D replacement, and follow up in 1 month with physician.    No interval imaging.    She notes that XRT has greatly helped and denies any pain or new issues.      The following portions of the patient's history were reviewed and updated as appropriate: allergies, current medications, past family history, past medical history, past social history, past surgical history and problem list. Reviewed with the patient and remain unchanged.    PAST MEDICAL HISTORY:  she has a past medical history of Cancer, Chronic kidney disease, Colon cancer, History of colon cancer, stage IV, Hypertension, Lung cancer, PONV (postoperative nausea and vomiting), Pulmonary arterial hypertension, and Radiculopathy (07/2012).    MEDICATIONS:    Current Outpatient Medications:   •  ALPRAZolam (Xanax) 0.5 MG tablet, Take 1 tablet by mouth 2 (Two) Times a Day As Needed for Anxiety (white coat syndrome). Take prior to infusion visits. Repeat x1 as needed during visit., Disp: 30 tablet, Rfl: 0  •  Cyanocobalamin (B-12) 1000 MCG tablet, , Disp: ,  Rfl:   •  doxycycline (VIBRAMYICN) 100 MG tablet, Take 1 tablet by mouth 2 (Two) Times a Day., Disp: , Rfl:   •  ferrous sulfate 324 (65 Fe) MG tablet delayed-release EC tablet, Take 1 tablet by mouth Daily With Breakfast. HOLD dos, Disp: , Rfl:   •  lisinopril (PRINIVIL,ZESTRIL) 10 MG tablet, Take 1 tablet by mouth Daily., Disp: , Rfl:   •  Lysine 500 MG capsule, Take 1 tablet by mouth 2 (Two) Times a Day., Disp: , Rfl:   •  Stivarga 40 MG chemo tablet, , Disp: , Rfl:   •  vitamin D3 125 MCG (5000 UT) capsule capsule, Take 1 capsule by mouth Daily., Disp: , Rfl:   •  aMILoride (MIDAMOR) 5 MG tablet, Take 2 tablets by mouth Daily. with food (Patient not taking: Reported on 6/1/2023), Disp: , Rfl:   •  sennosides-docusate (PERICOLACE) 8.6-50 MG per tablet, Take 2 tablets by mouth 2 (Two) Times a Day. (Patient not taking: Reported on 6/1/2023), Disp: 60 tablet, Rfl: 0    ALLERGIES:    Allergies   Allergen Reactions   • Hydrocodone Nausea And Vomiting   • Iodinated Contrast Media Hives and Itching     PT HAD SOME HIVES DURING SCAN.  PRIOR TO SCAN 8-3-2013.   • Latex Rash     Blisters    • Penicillins Hives       PAST SURGICAL HISTORY:  she has a past surgical history that includes Cyst Removal (1998); Portacath placement (2017); Abdominal surgery; Venous Access Device (Port) Removal (Right, 08/27/2020); Venous Access Device (Port) (Left, 01/12/2021); and ventral/incisional hernia repair (N/A, 3/2/2023).    PREVIOUS RADIOTHERAPY OR CHEMOTHERAPY:  Yes, XRT has helped as noted.    FAMILY HISTORY:  herfamily history includes Cancer in her father and sister.    SOCIAL HISTORY:  she reports that she quit smoking about 11 years ago. Her smoking use included cigarettes. She has a 30.00 pack-year smoking history. She has been exposed to tobacco smoke. She has never used smokeless tobacco. She reports current alcohol use. She reports that she does not use drugs.    PAIN AND PAIN MANAGEMENT:  Denies pain.  Vitals:    06/01/23  "1336   BP: 147/89   Pulse: 110   Resp: 18   Temp: 98.1 °F (36.7 °C)   TempSrc: Oral   SpO2: 98%   Weight: 59.8 kg (131 lb 12.8 oz)   Height: 157.5 cm (62\")   PainSc: 0-No pain       NUTRITIONAL STATUS:  no issues    KPS:  80      REVIEW OF SYSTEMS: no rib pain or skin/CW issues General: No fevers, chills, weight change, or drenching night sweats. Skin: No rashes or jaundice.  HEENT: No change in vision or hearing, no headaches.    Respiratory System: No shortness of breath or cough.  Cardiovascular: No chest pain, palpitations, or dyspnea on exertion.   Psych: No mood changes or depression. Ext: Denies swelling.    Objective   VITAL SIGNS:  Vitals:    06/01/23 1336   BP: 147/89   Pulse: 110   Resp: 18   Temp: 98.1 °F (36.7 °C)   TempSrc: Oral   SpO2: 98%   Weight: 59.8 kg (131 lb 12.8 oz)   Height: 157.5 cm (62\")   PainSc: 0-No pain     PHYSICAL EXAM:  GENERAL: In no apparent distress, sitting comfortably in room.    HEENT: Normocephalic, atraumatic. Pupils are equal, round, reactive to light. Sclera anicteric. Conjunctiva not injected. Oropharynx without erythema, ulcerations or thrush.   NECK: Supple with no masses.  LYMPHATIC: No cervical, supraclavicular or axillary adenopathy appreciated bilaterally.   CARDIOVASCULAR: S1 & S2 detected; no murmurs, rubs or gallops.  CHEST: Clear to auscultation bilaterally; no wheezes, crackles or rubs. Work of breathing normal.  ABDOMEN: Bowel sounds present. Abdomen is soft, nontender, nondistended.   MUSCULOSKELETAL: No tenderness to palpation along the spine or scapulae. Normal range of motion.  EXTREMITIES: No clubbing, cyanosis, edema.  SKIN: No erythema, rashes, ulcerations noted.   NEUROLOGIC: Cranial nerves II-XII grossly intact bilaterally. No focal neurologic deficits.  PSYCHIATRIC:  Alert, aware, and appropriate.      PERTINENT IMAGING/PATHOLOGY/LABS (Medical Decision Making):     COORDINATION OF CARE: A copy of this note is sent to the referring " provider.    PATHOLOGY (Reviewed):     IMAGING (Reviewed):     LABS (Reviewed):  HEMATOLOGY:  WBC   Date Value Ref Range Status   02/23/2023 7.72 3.40 - 10.80 10*3/mm3 Final   02/12/2018 5.46 4.5 - 11.0 10*3/uL Final     RBC   Date Value Ref Range Status   02/23/2023 3.87 3.77 - 5.28 10*6/mm3 Final   02/12/2018 2.82 (L) 4.0 - 5.2 10*6/uL Final     Hemoglobin   Date Value Ref Range Status   02/23/2023 10.7 (L) 12.0 - 15.9 g/dL Final   02/12/2018 8.1 (L) 12.0 - 16.0 g/dL Final     Hematocrit   Date Value Ref Range Status   02/23/2023 33.1 (L) 34.0 - 46.6 % Final   02/12/2018 25.3 (L) 36.0 - 46.0 % Final     Platelets   Date Value Ref Range Status   02/23/2023 207 140 - 450 10*3/mm3 Final   02/12/2018 157 140 - 440 10*3/uL Final     CHEMISTRY:  Lab Results   Component Value Date    GLUCOSE 102 (H) 02/23/2023    BUN 26 (H) 02/23/2023    CREATININE 1.00 02/23/2023    EGFRIFNONA 71 08/19/2021    BCR 26.0 (H) 02/23/2023    K 4.4 02/23/2023    CO2 26.0 02/23/2023    CALCIUM 9.4 02/23/2023    ALBUMIN 3.9 02/23/2023    LABIL2 1.1 06/03/2019    AST 24 02/23/2023    ALT 18 02/23/2023     Assessment & Plan   ASSESSMENT AND PLAN:    1. Malignant neoplasm of colon metastatic to bone       -Good response to palliative XRT  -no acute or subacute toxicities noted from the XRT course.  -Follows closely with Dr. Perera and we will remain available as needed PRN but no scheduled f/u with us   At this time.    This assessment comes from my review of the imaging, pathology, physician notes and other pertinent information as mentioned.      CC: MD Jay Kennedy MD

## 2023-06-01 ENCOUNTER — OFFICE VISIT (OUTPATIENT)
Dept: RADIATION ONCOLOGY | Facility: HOSPITAL | Age: 63
End: 2023-06-01
Payer: COMMERCIAL

## 2023-06-01 VITALS
HEART RATE: 110 BPM | TEMPERATURE: 98.1 F | OXYGEN SATURATION: 98 % | WEIGHT: 131.8 LBS | RESPIRATION RATE: 18 BRPM | SYSTOLIC BLOOD PRESSURE: 147 MMHG | HEIGHT: 62 IN | DIASTOLIC BLOOD PRESSURE: 89 MMHG | BODY MASS INDEX: 24.25 KG/M2

## 2023-06-01 DIAGNOSIS — C18.9 MALIGNANT NEOPLASM OF COLON METASTATIC TO BONE: Primary | ICD-10-CM

## 2023-06-01 DIAGNOSIS — C79.51 MALIGNANT NEOPLASM OF COLON METASTATIC TO BONE: Primary | ICD-10-CM

## 2023-06-01 PROCEDURE — G0463 HOSPITAL OUTPT CLINIC VISIT: HCPCS | Performed by: RADIOLOGY

## 2023-06-01 RX ORDER — LISINOPRIL 10 MG/1
10 TABLET ORAL DAILY
COMMUNITY

## 2023-12-07 ENCOUNTER — HOSPITAL ENCOUNTER (INPATIENT)
Facility: HOSPITAL | Age: 63
LOS: 6 days | Discharge: HOME | DRG: 270 | End: 2023-12-13
Attending: EMERGENCY MEDICINE | Admitting: STUDENT IN AN ORGANIZED HEALTH CARE EDUCATION/TRAINING PROGRAM
Payer: COMMERCIAL

## 2023-12-07 ENCOUNTER — APPOINTMENT (OUTPATIENT)
Dept: CT IMAGING | Facility: HOSPITAL | Age: 63
DRG: 270 | End: 2023-12-07
Payer: COMMERCIAL

## 2023-12-07 ENCOUNTER — APPOINTMENT (OUTPATIENT)
Dept: GENERAL RADIOLOGY | Facility: HOSPITAL | Age: 63
DRG: 270 | End: 2023-12-07
Payer: COMMERCIAL

## 2023-12-07 DIAGNOSIS — I50.9 CONGESTIVE HEART FAILURE, UNSPECIFIED HF CHRONICITY, UNSPECIFIED HEART FAILURE TYPE: Primary | ICD-10-CM

## 2023-12-07 DIAGNOSIS — J18.9 PNEUMONIA DUE TO INFECTIOUS ORGANISM, UNSPECIFIED LATERALITY, UNSPECIFIED PART OF LUNG: ICD-10-CM

## 2023-12-07 PROBLEM — J96.01 ACUTE HYPOXIC RESPIRATORY FAILURE: Status: ACTIVE | Noted: 2023-12-07

## 2023-12-07 LAB
ALBUMIN SERPL-MCNC: 3 G/DL (ref 3.5–5.2)
ALBUMIN/GLOB SERPL: 1.3 G/DL
ALP SERPL-CCNC: 75 U/L (ref 39–117)
ALT SERPL W P-5'-P-CCNC: 16 U/L (ref 1–33)
ANION GAP SERPL CALCULATED.3IONS-SCNC: 10 MMOL/L (ref 5–15)
APTT PPP: 26.3 SECONDS (ref 61–76.5)
ARTERIAL PATENCY WRIST A: POSITIVE
AST SERPL-CCNC: 34 U/L (ref 1–32)
ATMOSPHERIC PRESS: ABNORMAL MM[HG]
BASE EXCESS BLDA CALC-SCNC: -4.4 MMOL/L (ref 0–3)
BASOPHILS # BLD AUTO: 0.1 10*3/MM3 (ref 0–0.2)
BASOPHILS NFR BLD AUTO: 1.7 % (ref 0–1.5)
BDY SITE: ABNORMAL
BILIRUB SERPL-MCNC: 0.4 MG/DL (ref 0–1.2)
BUN SERPL-MCNC: 21 MG/DL (ref 8–23)
BUN/CREAT SERPL: 28.4 (ref 7–25)
CALCIUM SPEC-SCNC: 7.5 MG/DL (ref 8.6–10.5)
CHLORIDE SERPL-SCNC: 107 MMOL/L (ref 98–107)
CO2 BLDA-SCNC: 21.6 MMOL/L (ref 22–29)
CO2 SERPL-SCNC: 21 MMOL/L (ref 22–29)
CREAT BLDA-MCNC: 0.8 MG/DL (ref 0.6–1.3)
CREAT SERPL-MCNC: 0.74 MG/DL (ref 0.57–1)
D-LACTATE SERPL-SCNC: 1.2 MMOL/L (ref 0.2–2)
D-LACTATE SERPL-SCNC: 1.5 MMOL/L (ref 0.3–2)
DEPRECATED RDW RBC AUTO: 80.1 FL (ref 37–54)
EGFRCR SERPLBLD CKD-EPI 2021: 83.4 ML/MIN/1.73
EGFRCR SERPLBLD CKD-EPI 2021: 91.6 ML/MIN/1.73
EOSINOPHIL # BLD AUTO: 0 10*3/MM3 (ref 0–0.4)
EOSINOPHIL NFR BLD AUTO: 0.4 % (ref 0.3–6.2)
ERYTHROCYTE [DISTWIDTH] IN BLOOD BY AUTOMATED COUNT: 21.5 % (ref 12.3–15.4)
GEN 5 2HR TROPONIN T REFLEX: 80 NG/L
GLOBULIN UR ELPH-MCNC: 2.3 GM/DL
GLUCOSE BLDC GLUCOMTR-MCNC: 176 MG/DL (ref 70–105)
GLUCOSE BLDC GLUCOMTR-MCNC: 219 MG/DL (ref 70–105)
GLUCOSE SERPL-MCNC: 171 MG/DL (ref 65–99)
HCO3 BLDA-SCNC: 20.5 MMOL/L (ref 21–28)
HCT VFR BLD AUTO: 34 % (ref 34–46.6)
HEMODILUTION: NO
HGB BLD-MCNC: 11.5 G/DL (ref 12–15.9)
HOLD SPECIMEN: NORMAL
INHALED O2 CONCENTRATION: 100 %
INR PPP: 0.96 (ref 0.93–1.1)
LYMPHOCYTES # BLD AUTO: 1.8 10*3/MM3 (ref 0.7–3.1)
LYMPHOCYTES NFR BLD AUTO: 25.7 % (ref 19.6–45.3)
MCH RBC QN AUTO: 34.5 PG (ref 26.6–33)
MCHC RBC AUTO-ENTMCNC: 33.7 G/DL (ref 31.5–35.7)
MCV RBC AUTO: 102.4 FL (ref 79–97)
MODALITY: ABNORMAL
MONOCYTES # BLD AUTO: 0.4 10*3/MM3 (ref 0.1–0.9)
MONOCYTES NFR BLD AUTO: 5.3 % (ref 5–12)
NEUTROPHILS NFR BLD AUTO: 4.6 10*3/MM3 (ref 1.7–7)
NEUTROPHILS NFR BLD AUTO: 66.9 % (ref 42.7–76)
NRBC BLD AUTO-RTO: 0.2 /100 WBC (ref 0–0.2)
NT-PROBNP SERPL-MCNC: 2176 PG/ML (ref 0–900)
PCO2 BLDA: 36.3 MM HG (ref 35–48)
PH BLDA: 7.36 PH UNITS (ref 7.35–7.45)
PLATELET # BLD AUTO: 280 10*3/MM3 (ref 140–450)
PMV BLD AUTO: 7.6 FL (ref 6–12)
PO2 BLDA: 82.2 MM HG (ref 83–108)
POTASSIUM SERPL-SCNC: 4 MMOL/L (ref 3.5–5.2)
PROT SERPL-MCNC: 5.3 G/DL (ref 6–8.5)
PROTHROMBIN TIME: 10.5 SECONDS (ref 9.6–11.7)
RBC # BLD AUTO: 3.32 10*6/MM3 (ref 3.77–5.28)
SAO2 % BLDCOA: 95.7 % (ref 94–98)
SODIUM SERPL-SCNC: 138 MMOL/L (ref 136–145)
TROPONIN T DELTA: 52 NG/L
TROPONIN T SERPL HS-MCNC: 28 NG/L
WBC NRBC COR # BLD AUTO: 6.9 10*3/MM3 (ref 3.4–10.8)

## 2023-12-07 PROCEDURE — 25010000002 CEFEPIME PER 500 MG: Performed by: EMERGENCY MEDICINE

## 2023-12-07 PROCEDURE — 25810000003 SODIUM CHLORIDE 0.9 % SOLUTION 250 ML FLEX CONT: Performed by: STUDENT IN AN ORGANIZED HEALTH CARE EDUCATION/TRAINING PROGRAM

## 2023-12-07 PROCEDURE — 71045 X-RAY EXAM CHEST 1 VIEW: CPT

## 2023-12-07 PROCEDURE — 85025 COMPLETE CBC W/AUTO DIFF WBC: CPT | Performed by: EMERGENCY MEDICINE

## 2023-12-07 PROCEDURE — 80053 COMPREHEN METABOLIC PANEL: CPT | Performed by: EMERGENCY MEDICINE

## 2023-12-07 PROCEDURE — 94799 UNLISTED PULMONARY SVC/PX: CPT

## 2023-12-07 PROCEDURE — 71275 CT ANGIOGRAPHY CHEST: CPT

## 2023-12-07 PROCEDURE — 85730 THROMBOPLASTIN TIME PARTIAL: CPT | Performed by: EMERGENCY MEDICINE

## 2023-12-07 PROCEDURE — 82948 REAGENT STRIP/BLOOD GLUCOSE: CPT

## 2023-12-07 PROCEDURE — 25010000002 FUROSEMIDE PER 20 MG: Performed by: EMERGENCY MEDICINE

## 2023-12-07 PROCEDURE — 99285 EMERGENCY DEPT VISIT HI MDM: CPT

## 2023-12-07 PROCEDURE — 25010000002 DIPHENHYDRAMINE PER 50 MG: Performed by: EMERGENCY MEDICINE

## 2023-12-07 PROCEDURE — 94660 CPAP INITIATION&MGMT: CPT

## 2023-12-07 PROCEDURE — 25010000002 MEROPENEM PER 100 MG: Performed by: STUDENT IN AN ORGANIZED HEALTH CARE EDUCATION/TRAINING PROGRAM

## 2023-12-07 PROCEDURE — 93005 ELECTROCARDIOGRAM TRACING: CPT | Performed by: EMERGENCY MEDICINE

## 2023-12-07 PROCEDURE — 82565 ASSAY OF CREATININE: CPT

## 2023-12-07 PROCEDURE — 87040 BLOOD CULTURE FOR BACTERIA: CPT | Performed by: EMERGENCY MEDICINE

## 2023-12-07 PROCEDURE — 25010000002 VANCOMYCIN HCL 1.25 G RECONSTITUTED SOLUTION 1 EACH VIAL: Performed by: STUDENT IN AN ORGANIZED HEALTH CARE EDUCATION/TRAINING PROGRAM

## 2023-12-07 PROCEDURE — 84484 ASSAY OF TROPONIN QUANT: CPT | Performed by: EMERGENCY MEDICINE

## 2023-12-07 PROCEDURE — 83605 ASSAY OF LACTIC ACID: CPT

## 2023-12-07 PROCEDURE — 36415 COLL VENOUS BLD VENIPUNCTURE: CPT

## 2023-12-07 PROCEDURE — 36600 WITHDRAWAL OF ARTERIAL BLOOD: CPT

## 2023-12-07 PROCEDURE — 25510000001 IOPAMIDOL PER 1 ML: Performed by: EMERGENCY MEDICINE

## 2023-12-07 PROCEDURE — 83880 ASSAY OF NATRIURETIC PEPTIDE: CPT | Performed by: EMERGENCY MEDICINE

## 2023-12-07 PROCEDURE — 82803 BLOOD GASES ANY COMBINATION: CPT

## 2023-12-07 PROCEDURE — 93005 ELECTROCARDIOGRAM TRACING: CPT

## 2023-12-07 PROCEDURE — 85610 PROTHROMBIN TIME: CPT | Performed by: EMERGENCY MEDICINE

## 2023-12-07 RX ORDER — INSULIN LISPRO 100 [IU]/ML
2-9 INJECTION, SOLUTION INTRAVENOUS; SUBCUTANEOUS
Status: DISCONTINUED | OUTPATIENT
Start: 2023-12-07 | End: 2023-12-13 | Stop reason: HOSPADM

## 2023-12-07 RX ORDER — SODIUM CHLORIDE 9 MG/ML
40 INJECTION, SOLUTION INTRAVENOUS AS NEEDED
Status: DISCONTINUED | OUTPATIENT
Start: 2023-12-07 | End: 2023-12-13 | Stop reason: HOSPADM

## 2023-12-07 RX ORDER — SODIUM CHLORIDE 0.9 % (FLUSH) 0.9 %
10 SYRINGE (ML) INJECTION AS NEEDED
Status: DISCONTINUED | OUTPATIENT
Start: 2023-12-07 | End: 2023-12-13 | Stop reason: HOSPADM

## 2023-12-07 RX ORDER — DIPHENHYDRAMINE HYDROCHLORIDE 50 MG/ML
25 INJECTION INTRAMUSCULAR; INTRAVENOUS ONCE
Status: COMPLETED | OUTPATIENT
Start: 2023-12-07 | End: 2023-12-07

## 2023-12-07 RX ORDER — LABETALOL HYDROCHLORIDE 5 MG/ML
10 INJECTION, SOLUTION INTRAVENOUS
Status: DISCONTINUED | OUTPATIENT
Start: 2023-12-07 | End: 2023-12-13 | Stop reason: HOSPADM

## 2023-12-07 RX ORDER — FUROSEMIDE 10 MG/ML
40 INJECTION INTRAMUSCULAR; INTRAVENOUS ONCE
Status: COMPLETED | OUTPATIENT
Start: 2023-12-07 | End: 2023-12-07

## 2023-12-07 RX ORDER — IPRATROPIUM BROMIDE AND ALBUTEROL SULFATE 2.5; .5 MG/3ML; MG/3ML
3 SOLUTION RESPIRATORY (INHALATION)
Status: DISCONTINUED | OUTPATIENT
Start: 2023-12-07 | End: 2023-12-09

## 2023-12-07 RX ORDER — NICOTINE POLACRILEX 4 MG
15 LOZENGE BUCCAL
Status: DISCONTINUED | OUTPATIENT
Start: 2023-12-07 | End: 2023-12-13 | Stop reason: HOSPADM

## 2023-12-07 RX ORDER — BUDESONIDE 0.5 MG/2ML
0.5 INHALANT ORAL
Status: DISCONTINUED | OUTPATIENT
Start: 2023-12-07 | End: 2023-12-09

## 2023-12-07 RX ORDER — NITROGLYCERIN 0.4 MG/1
0.4 TABLET SUBLINGUAL
Status: DISCONTINUED | OUTPATIENT
Start: 2023-12-07 | End: 2023-12-08

## 2023-12-07 RX ORDER — ONDANSETRON 4 MG/1
4 TABLET, FILM COATED ORAL EVERY 6 HOURS PRN
Status: DISCONTINUED | OUTPATIENT
Start: 2023-12-07 | End: 2023-12-08

## 2023-12-07 RX ORDER — FUROSEMIDE 10 MG/ML
20 INJECTION INTRAMUSCULAR; INTRAVENOUS EVERY 12 HOURS
Status: DISCONTINUED | OUTPATIENT
Start: 2023-12-07 | End: 2023-12-08

## 2023-12-07 RX ORDER — SODIUM CHLORIDE 0.9 % (FLUSH) 0.9 %
10 SYRINGE (ML) INJECTION EVERY 12 HOURS SCHEDULED
Status: DISCONTINUED | OUTPATIENT
Start: 2023-12-07 | End: 2023-12-13 | Stop reason: HOSPADM

## 2023-12-07 RX ORDER — ONDANSETRON 2 MG/ML
4 INJECTION INTRAMUSCULAR; INTRAVENOUS EVERY 6 HOURS PRN
Status: DISCONTINUED | OUTPATIENT
Start: 2023-12-07 | End: 2023-12-08

## 2023-12-07 RX ORDER — IBUPROFEN 600 MG/1
1 TABLET ORAL
Status: DISCONTINUED | OUTPATIENT
Start: 2023-12-07 | End: 2023-12-13 | Stop reason: HOSPADM

## 2023-12-07 RX ORDER — IPRATROPIUM BROMIDE AND ALBUTEROL SULFATE 2.5; .5 MG/3ML; MG/3ML
3 SOLUTION RESPIRATORY (INHALATION) ONCE
Status: DISCONTINUED | OUTPATIENT
Start: 2023-12-07 | End: 2023-12-09

## 2023-12-07 RX ORDER — DEXTROSE MONOHYDRATE 25 G/50ML
25 INJECTION, SOLUTION INTRAVENOUS
Status: DISCONTINUED | OUTPATIENT
Start: 2023-12-07 | End: 2023-12-13 | Stop reason: HOSPADM

## 2023-12-07 RX ADMIN — DIPHENHYDRAMINE HYDROCHLORIDE 25 MG: 50 INJECTION, SOLUTION INTRAMUSCULAR; INTRAVENOUS at 16:43

## 2023-12-07 RX ADMIN — MEROPENEM 1000 MG: 1 INJECTION, POWDER, FOR SOLUTION INTRAVENOUS at 22:25

## 2023-12-07 RX ADMIN — CEFEPIME 2000 MG: 2 INJECTION, POWDER, FOR SOLUTION INTRAVENOUS at 19:25

## 2023-12-07 RX ADMIN — VANCOMYCIN HYDROCHLORIDE 1250 MG: 1.25 INJECTION, POWDER, LYOPHILIZED, FOR SOLUTION INTRAVENOUS at 23:12

## 2023-12-07 RX ADMIN — IOPAMIDOL 100 ML: 755 INJECTION, SOLUTION INTRAVENOUS at 17:22

## 2023-12-07 RX ADMIN — FUROSEMIDE 40 MG: 10 INJECTION, SOLUTION INTRAMUSCULAR; INTRAVENOUS at 19:26

## 2023-12-07 RX ADMIN — Medication 10 ML: at 23:13

## 2023-12-07 NOTE — ED PROVIDER NOTES
Subjective   History of Present Illness  Chief complaint: Patient is 63-year-old who came from the cancer center.  They thought maybe she was having allergic reaction.  She was getting her infusion and became short of breath.  Her heart rate was fast as well.  She does not feel palpitations or chest pain.  She just feels very short of breath.  She is tachypneic.  She received Decadron and Benadryl.  No real improvement.  There was no hives noted.  She was short of breath last night as well.  She has never had a blood clot in her leg or lungs.    Context:    Duration:    Timing:    Severity: Severe    Associated Symptoms:        PCP:  LMP:      Review of Systems   Constitutional:  Positive for fatigue.   Respiratory:  Positive for shortness of breath.    Cardiovascular:  Negative for chest pain.   Gastrointestinal:  Negative for abdominal pain.   Genitourinary: Negative.    Musculoskeletal: Negative.        Past Medical History:   Diagnosis Date    Cancer     wiht mets to liver, bone    Chronic kidney disease     Colon cancer     History of colon cancer, stage IV     Stage SHELL with progression    Hypertension     Lung cancer     with colon    PONV (postoperative nausea and vomiting)     Pulmonary arterial hypertension     Radiculopathy 07/2012    Right L5/S1       Allergies   Allergen Reactions    Hydrocodone Nausea And Vomiting    Iodinated Contrast Media Hives and Itching     PT HAD SOME HIVES DURING SCAN.  PRIOR TO SCAN 8-3-2013.    Latex Rash     Blisters     Penicillins Hives       Past Surgical History:   Procedure Laterality Date    ABDOMINAL SURGERY      CYST REMOVAL  1998    cyst [facility] from [facility] in 1998    PORTACATH PLACEMENT  2017    VENOUS ACCESS DEVICE (PORT) INSERTION Left 01/12/2021    Procedure: INSERTION VENOUS ACCESS DEVICE;  Surgeon: Jay Yeh MD;  Location: [facility] OR;  Service: General;  Laterality: Left;    VENOUS ACCESS DEVICE (PORT) REMOVAL Right 08/27/2020    Procedure:  REMOVAL VENOUS ACCESS DEVICE;  Surgeon: Jay Yeh MD;  Location: [facility] MAIN OR;  Service: General;  Laterality: Right;    VENTRAL/INCISIONAL HERNIA REPAIR N/A 3/2/2023    Procedure: VENTRAL/INCISIONAL HERNIA REPAIR WITH MESH;  Surgeon: Jay Yeh MD;  Location: [facility] MAIN OR;  Service: General;  Laterality: N/A;       Family History   Problem Relation Age of Onset    Cancer Sister         Bladder cancer    Cancer Father        Social History     Socioeconomic History    Marital status: Single   Tobacco Use    Smoking status: Former     Packs/day: 1.00     Years: 30.00     Additional pack years: 0.00     Total pack years: 30.00     Types: Cigarettes     Quit date: 2012     Years since quittin.9     Passive exposure: Past    Smokeless tobacco: Never   Vaping Use    Vaping Use: Never used   Substance and Sexual Activity    Alcohol use: Yes     Comment: 2 or 3 a month    Drug use: Never    Sexual activity: Defer           Objective   Physical Exam  Vitals and nursing note reviewed.   Constitutional:       Appearance: Normal appearance.   HENT:      Head: Normocephalic and atraumatic.   Cardiovascular:      Rate and Rhythm: Regular rhythm. Tachycardia present.      Heart sounds: Normal heart sounds.   Pulmonary:      Effort: Pulmonary effort is normal.      Breath sounds: Normal breath sounds.   Abdominal:      Tenderness: There is no abdominal tenderness.   Musculoskeletal:         General: Normal range of motion.   Skin:     General: Skin is warm and dry.   Neurological:      General: No focal deficit present.      Mental Status: She is alert and oriented to person, place, and time.   Psychiatric:         Mood and Affect: Mood normal.         Thought Content: Thought content normal.         Procedures           ED Course      Results for orders placed or performed during the hospital encounter of 23   Comprehensive Metabolic Panel    Specimen: Blood   Result Value Ref Range     Glucose 171 (H) 65 - 99 mg/dL    BUN 21 8 - 23 mg/dL    Creatinine 0.74 0.57 - 1.00 mg/dL    Sodium 138 136 - 145 mmol/L    Potassium 4.0 3.5 - 5.2 mmol/L    Chloride 107 98 - 107 mmol/L    CO2 21.0 (L) 22.0 - 29.0 mmol/L    Calcium 7.5 (L) 8.6 - 10.5 mg/dL    Total Protein 5.3 (L) 6.0 - 8.5 g/dL    Albumin 3.0 (L) 3.5 - 5.2 g/dL    ALT (SGPT) 16 1 - 33 U/L    AST (SGOT) 34 (H) 1 - 32 U/L    Alkaline Phosphatase 75 39 - 117 U/L    Total Bilirubin 0.4 0.0 - 1.2 mg/dL    Globulin 2.3 gm/dL    A/G Ratio 1.3 g/dL    BUN/Creatinine Ratio 28.4 (H) 7.0 - 25.0    Anion Gap 10.0 5.0 - 15.0 mmol/L    eGFR 91.6 >60.0 mL/min/1.73   Protime-INR    Specimen: Blood   Result Value Ref Range    Protime 10.5 9.6 - 11.7 Seconds    INR 0.96 0.93 - 1.10   aPTT    Specimen: Blood   Result Value Ref Range    PTT 26.3 (L) 61.0 - 76.5 seconds   CBC Auto Differential    Specimen: Blood   Result Value Ref Range    WBC 6.90 3.40 - 10.80 10*3/mm3    RBC 3.32 (L) 3.77 - 5.28 10*6/mm3    Hemoglobin 11.5 (L) 12.0 - 15.9 g/dL    Hematocrit 34.0 34.0 - 46.6 %    .4 (H) 79.0 - 97.0 fL    MCH 34.5 (H) 26.6 - 33.0 pg    MCHC 33.7 31.5 - 35.7 g/dL    RDW 21.5 (H) 12.3 - 15.4 %    RDW-SD 80.1 (H) 37.0 - 54.0 fl    MPV 7.6 6.0 - 12.0 fL    Platelets 280 140 - 450 10*3/mm3    Neutrophil % 66.9 42.7 - 76.0 %    Lymphocyte % 25.7 19.6 - 45.3 %    Monocyte % 5.3 5.0 - 12.0 %    Eosinophil % 0.4 0.3 - 6.2 %    Basophil % 1.7 (H) 0.0 - 1.5 %    Neutrophils, Absolute 4.60 1.70 - 7.00 10*3/mm3    Lymphocytes, Absolute 1.80 0.70 - 3.10 10*3/mm3    Monocytes, Absolute 0.40 0.10 - 0.90 10*3/mm3    Eosinophils, Absolute 0.00 0.00 - 0.40 10*3/mm3    Basophils, Absolute 0.10 0.00 - 0.20 10*3/mm3    nRBC 0.2 0.0 - 0.2 /100 WBC   High Sensitivity Troponin T    Specimen: Blood   Result Value Ref Range    HS Troponin T 28 (H) <14 ng/L   BNP    Specimen: Blood   Result Value Ref Range    proBNP 2,176.0 (H) 0.0 - 900.0 pg/mL   Blood Gas, Arterial -    Specimen:  Arterial Blood   Result Value Ref Range    Site Left Radial     Adrian's Test Positive     pH, Arterial 7.361 7.350 - 7.450 pH units    pCO2, Arterial 36.3 35.0 - 48.0 mm Hg    pO2, Arterial 82.2 (L) 83.0 - 108.0 mm Hg    HCO3, Arterial 20.5 (L) 21.0 - 28.0 mmol/L    Base Excess, Arterial -4.4 (L) 0.0 - 3.0 mmol/L    O2 Saturation, Arterial 95.7 94.0 - 98.0 %    CO2 Content 21.6 (L) 22 - 29 mmol/L    Barometric Pressure for Blood Gas      Modality NRB     FIO2 100 %    Hemodilution No    POC Glucose Once    Specimen: Blood   Result Value Ref Range    Glucose 219 (H) 70 - 105 mg/dL   POC Lactate    Specimen: Arterial Blood   Result Value Ref Range    Lactate 1.2 0.2 - 2.0 mmol/L   POC Creatinine    Specimen: Venous Blood   Result Value Ref Range    Creatinine 0.80 0.60 - 1.30 mg/dL    eGFR 83.4 >60.0 mL/min/1.73   POC Lactate    Specimen: Blood   Result Value Ref Range    Lactate 1.5 0.3 - 2.0 mmol/L   ECG 12 Lead Other; Tachycardia   Result Value Ref Range    QT Interval 283 ms    QTC Interval 450 ms   ECG 12 Lead Tachycardia   Result Value Ref Range    QT Interval 328 ms    QTC Interval 482 ms   Gold Top - SST   Result Value Ref Range    Extra Tube Hold for add-ons.                                   CT Angiogram Chest Pulmonary Embolism    Result Date: 12/7/2023  Impression: 1. No evidence for pulmonary embolus. 2. Findings concerning for progression of disease with a sclerotic osseous metastatic lesion involving the lateral right with with increased consolidation and soft tissue component extending into the right middle lung. 3. Diffuse groundglass opacities with intralobular septal thickening compatible with pulmonary edema however there is additional bibasilar airspace disease with consolidation and pleural effusions suggesting additional infectious/inflammatory process. Electronically Signed: Nadja Li MD  12/7/2023 4:34 PM CST  Workstation ID: [id number]    XR Chest 1 View    Result Date:  12/7/2023  Impression: Right lower lobe consolidation and small effusion suspicious for pneumonia in the right clinical setting. Ill-defined more nodular alveolar opacities noted more so on the left, which could reflect edema or multifocal infection. Electronically Signed: Howard Razo MD  12/7/2023 4:56 PM EST  Workstation ID: [id number]             Medical Decision Making  Patient was seen evaluated for the above problem    Differential diagnosis includes was not limited to PE, allergic reaction, SVT, A-fib, a flutter, CHF, pneumonia    Patient presented after becoming acutely dyspneic but had been short of breath on the night prior as well.  Became suddenly worse.  There was no other throat narrowing.  No rash or hives that the patient had.  She received Benadryl and Decadron at the cancer center.  She persistently was tachycardic around 150.  Sounded significantly tight and had rales present diffusely.  She was requiring significant of oxygenation on arrival.  [place] as though the patient may have a pulmonary embolism as she has metastatic cancer.  Her tachycardia did improve some with BiPAP.  Her work of breathing came down her heart rate improved as well.  She has a narrow complex tachycardia at this point time with occasional PAC.  Heart rate is in the 120s range.  She did have elevated BNP and signs of fluid overload on her CT scan.  No signs of pulmonary embolism.  She was treated with Lasix as well as antibiotics for multifocal pneumonia.  She does not have fever here but she is on chemotherapy as well.  I discussed the case with Dr. Dover who agrees to admit the patient to the PCU.  Patient verbalized understanding of admission.  Critical care time 53 minutes.    Problems Addressed:  Congestive heart failure, unspecified HF chronicity, unspecified heart failure type: complicated acute illness or injury  Pneumonia due to infectious organism, unspecified laterality, unspecified part of lung: complicated  acute illness or injury    Amount and/or Complexity of Data Reviewed  Labs: ordered. Decision-making details documented in ED Course.     Details: Labs reviewed    Radiology: ordered and independent interpretation performed.     Details: CT scan reviewed by myself as above  ECG/medicine tests: ordered and independent interpretation performed.     Details: EKG is interpreted by myself as above.  Initial EKG shows narrow complex tachycardia rate of 152.    Risk  Prescription drug management.  Drug therapy requiring intensive monitoring for toxicity.  Decision regarding hospitalization.    Critical Care  Total time providing critical care: 53 minutes        Final diagnoses:   None   Chf  Narrow complex tachycardia  Pneumonia      ED Disposition  ED Disposition       None            No follow-up provider specified.       Medication List      No changes were made to your prescriptions during this visit.            Servando Costello,   12/07/23 1951

## 2023-12-07 NOTE — Clinical Note
Level of Care: Med/Surg [1]   Admitting Physician: JACI ARDON [[id number]]   Attending Physician: JACI ARDON [[id number]]   Bed Request Comments: u

## 2023-12-07 NOTE — Clinical Note
IABP inserted in the right groin. suture and stat-lock used to secure. IABP verified with fluoroscopy.Ratio: 1:1.

## 2023-12-07 NOTE — Clinical Note
Holding patient with IABP; awaiting ICU room; direct monitoring of patient by me (Davin Josue RN); she is alert and oriented; resting comfortably

## 2023-12-08 ENCOUNTER — APPOINTMENT (OUTPATIENT)
Dept: CARDIOLOGY | Facility: HOSPITAL | Age: 63
DRG: 270 | End: 2023-12-08
Payer: COMMERCIAL

## 2023-12-08 ENCOUNTER — APPOINTMENT (OUTPATIENT)
Dept: GENERAL RADIOLOGY | Facility: HOSPITAL | Age: 63
DRG: 270 | End: 2023-12-08
Payer: COMMERCIAL

## 2023-12-08 LAB
ANION GAP SERPL CALCULATED.3IONS-SCNC: 11 MMOL/L (ref 5–15)
ANION GAP SERPL CALCULATED.3IONS-SCNC: 11 MMOL/L (ref 5–15)
APTT PPP: 28.8 SECONDS (ref 61–76.5)
APTT PPP: 62.4 SECONDS (ref 61–76.5)
B PARAPERT DNA SPEC QL NAA+PROBE: NOT DETECTED
B PERT DNA SPEC QL NAA+PROBE: NOT DETECTED
BH CV ECHO MEAS - AO MAX PG: 14.4 MMHG
BH CV ECHO MEAS - AO MEAN PG: 8 MMHG
BH CV ECHO MEAS - AO V2 MAX: 190 CM/SEC
BH CV ECHO MEAS - AO V2 VTI: 34.4 CM
BH CV ECHO MEAS - EDV(CUBED): 79.5 ML
BH CV ECHO MEAS - EDV(MOD-SP4): 104 ML
BH CV ECHO MEAS - EF(MOD-SP4): 41.5 %
BH CV ECHO MEAS - ESV(CUBED): 35.9 ML
BH CV ECHO MEAS - ESV(MOD-SP4): 60.8 ML
BH CV ECHO MEAS - FS: 23.3 %
BH CV ECHO MEAS - IVS/LVPW: 1 CM
BH CV ECHO MEAS - IVSD: 0.9 CM
BH CV ECHO MEAS - LA DIMENSION: 3.8 CM
BH CV ECHO MEAS - LAT PEAK E' VEL: 11.4 CM/SEC
BH CV ECHO MEAS - LV DIASTOLIC VOL/BSA (35-75): 66.7 CM2
BH CV ECHO MEAS - LV MASS(C)D: 123.3 GRAMS
BH CV ECHO MEAS - LV MAX PG: 3.9 MMHG
BH CV ECHO MEAS - LV MEAN PG: 2 MMHG
BH CV ECHO MEAS - LV SYSTOLIC VOL/BSA (12-30): 39 CM2
BH CV ECHO MEAS - LV V1 MAX: 99 CM/SEC
BH CV ECHO MEAS - LV V1 VTI: 23.3 CM
BH CV ECHO MEAS - LVIDD: 4.3 CM
BH CV ECHO MEAS - LVIDS: 3.3 CM
BH CV ECHO MEAS - LVPWD: 0.9 CM
BH CV ECHO MEAS - MED PEAK E' VEL: 9.3 CM/SEC
BH CV ECHO MEAS - MR MAX PG: 68.2 MMHG
BH CV ECHO MEAS - MR MAX VEL: 413 CM/SEC
BH CV ECHO MEAS - MV A MAX VEL: 99.6 CM/SEC
BH CV ECHO MEAS - MV DEC SLOPE: 761 CM/SEC2
BH CV ECHO MEAS - MV DEC TIME: 0.14 SEC
BH CV ECHO MEAS - MV E MAX VEL: 88.6 CM/SEC
BH CV ECHO MEAS - MV E/A: 0.89
BH CV ECHO MEAS - MV MAX PG: 6.1 MMHG
BH CV ECHO MEAS - MV MEAN PG: 4 MMHG
BH CV ECHO MEAS - MV P1/2T: 43.9 MSEC
BH CV ECHO MEAS - MV V2 VTI: 22.9 CM
BH CV ECHO MEAS - MVA(P1/2T): 5 CM2
BH CV ECHO MEAS - RVDD: 4 CM
BH CV ECHO MEAS - SI(MOD-SP4): 27.7 ML/M2
BH CV ECHO MEAS - SV(MOD-SP4): 43.2 ML
BH CV ECHO MEAS - TAPSE (>1.6): 2.2 CM
BH CV ECHO MEASUREMENTS AVERAGE E/E' RATIO: 8.56
BH CV XLRA - TDI S': 14.2 CM/SEC
BUN SERPL-MCNC: 24 MG/DL (ref 8–23)
BUN SERPL-MCNC: 28 MG/DL (ref 8–23)
BUN/CREAT SERPL: 31.8 (ref 7–25)
BUN/CREAT SERPL: 33.8 (ref 7–25)
C PNEUM DNA NPH QL NAA+NON-PROBE: NOT DETECTED
CA-I SERPL ISE-MCNC: 1.09 MMOL/L (ref 1.2–1.3)
CALCIUM SPEC-SCNC: 7.7 MG/DL (ref 8.6–10.5)
CALCIUM SPEC-SCNC: 8 MG/DL (ref 8.6–10.5)
CHLORIDE SERPL-SCNC: 107 MMOL/L (ref 98–107)
CHLORIDE SERPL-SCNC: 109 MMOL/L (ref 98–107)
CO2 SERPL-SCNC: 22 MMOL/L (ref 22–29)
CO2 SERPL-SCNC: 25 MMOL/L (ref 22–29)
CREAT SERPL-MCNC: 0.71 MG/DL (ref 0.57–1)
CREAT SERPL-MCNC: 0.88 MG/DL (ref 0.57–1)
DEPRECATED RDW RBC AUTO: 77 FL (ref 37–54)
EGFRCR SERPLBLD CKD-EPI 2021: 74.4 ML/MIN/1.73
EGFRCR SERPLBLD CKD-EPI 2021: 96.3 ML/MIN/1.73
ERYTHROCYTE [DISTWIDTH] IN BLOOD BY AUTOMATED COUNT: 21.6 % (ref 12.3–15.4)
FLUAV SUBTYP SPEC NAA+PROBE: NOT DETECTED
FLUBV RNA ISLT QL NAA+PROBE: NOT DETECTED
GLUCOSE BLDC GLUCOMTR-MCNC: 127 MG/DL (ref 70–105)
GLUCOSE BLDC GLUCOMTR-MCNC: 157 MG/DL (ref 70–105)
GLUCOSE BLDC GLUCOMTR-MCNC: 231 MG/DL (ref 70–105)
GLUCOSE SERPL-MCNC: 140 MG/DL (ref 65–99)
GLUCOSE SERPL-MCNC: 210 MG/DL (ref 65–99)
HADV DNA SPEC NAA+PROBE: NOT DETECTED
HAPTOGLOB SERPL-MCNC: 29 MG/DL (ref 30–200)
HCOV 229E RNA SPEC QL NAA+PROBE: NOT DETECTED
HCOV HKU1 RNA SPEC QL NAA+PROBE: NOT DETECTED
HCOV NL63 RNA SPEC QL NAA+PROBE: NOT DETECTED
HCOV OC43 RNA SPEC QL NAA+PROBE: NOT DETECTED
HCT VFR BLD AUTO: 28.5 % (ref 34–46.6)
HGB BLD-MCNC: 9.6 G/DL (ref 12–15.9)
HMPV RNA NPH QL NAA+NON-PROBE: NOT DETECTED
HPIV1 RNA ISLT QL NAA+PROBE: NOT DETECTED
HPIV2 RNA SPEC QL NAA+PROBE: NOT DETECTED
HPIV3 RNA NPH QL NAA+PROBE: NOT DETECTED
HPIV4 P GENE NPH QL NAA+PROBE: NOT DETECTED
INR PPP: 1.07 (ref 0.93–1.1)
L PNEUMO1 AG UR QL IA: NEGATIVE
LEFT ATRIUM VOLUME INDEX: 31.3 ML/M2
M PNEUMO IGG SER IA-ACNC: NOT DETECTED
MCH RBC QN AUTO: 34.8 PG (ref 26.6–33)
MCHC RBC AUTO-ENTMCNC: 33.8 G/DL (ref 31.5–35.7)
MCV RBC AUTO: 102.9 FL (ref 79–97)
MRSA DNA SPEC QL NAA+PROBE: NORMAL
PLATELET # BLD AUTO: 195 10*3/MM3 (ref 140–450)
PMV BLD AUTO: 7.7 FL (ref 6–12)
POTASSIUM SERPL-SCNC: 3.6 MMOL/L (ref 3.5–5.2)
POTASSIUM SERPL-SCNC: 4.1 MMOL/L (ref 3.5–5.2)
PROTHROMBIN TIME: 11.6 SECONDS (ref 9.6–11.7)
QT INTERVAL: 283 MS
QT INTERVAL: 328 MS
QTC INTERVAL: 450 MS
QTC INTERVAL: 482 MS
RBC # BLD AUTO: 2.77 10*6/MM3 (ref 3.77–5.28)
RHINOVIRUS RNA SPEC NAA+PROBE: NOT DETECTED
RSV RNA NPH QL NAA+NON-PROBE: NOT DETECTED
S PNEUM AG SPEC QL LA: NEGATIVE
SARS-COV-2 RNA NPH QL NAA+NON-PROBE: NOT DETECTED
SODIUM SERPL-SCNC: 142 MMOL/L (ref 136–145)
SODIUM SERPL-SCNC: 143 MMOL/L (ref 136–145)
TROPONIN T SERPL HS-MCNC: 136 NG/L
TSH SERPL DL<=0.05 MIU/L-ACNC: 0.82 UIU/ML (ref 0.27–4.2)
WBC NRBC COR # BLD AUTO: 4.5 10*3/MM3 (ref 3.4–10.8)

## 2023-12-08 PROCEDURE — 82948 REAGENT STRIP/BLOOD GLUCOSE: CPT

## 2023-12-08 PROCEDURE — 84443 ASSAY THYROID STIM HORMONE: CPT | Performed by: NURSE PRACTITIONER

## 2023-12-08 PROCEDURE — 25010000002 EPOETIN ALFA-EPBX 40000 UNIT/ML SOLUTION: Performed by: INTERNAL MEDICINE

## 2023-12-08 PROCEDURE — 5A02210 ASSISTANCE WITH CARDIAC OUTPUT USING BALLOON PUMP, CONTINUOUS: ICD-10-PCS | Performed by: INTERNAL MEDICINE

## 2023-12-08 PROCEDURE — 80048 BASIC METABOLIC PNL TOTAL CA: CPT | Performed by: INTERNAL MEDICINE

## 2023-12-08 PROCEDURE — 83010 ASSAY OF HAPTOGLOBIN QUANT: CPT | Performed by: INTERNAL MEDICINE

## 2023-12-08 PROCEDURE — 94660 CPAP INITIATION&MGMT: CPT

## 2023-12-08 PROCEDURE — 85027 COMPLETE CBC AUTOMATED: CPT | Performed by: STUDENT IN AN ORGANIZED HEALTH CARE EDUCATION/TRAINING PROGRAM

## 2023-12-08 PROCEDURE — 25010000002 CEFEPIME PER 500 MG: Performed by: INTERNAL MEDICINE

## 2023-12-08 PROCEDURE — 94799 UNLISTED PULMONARY SVC/PX: CPT

## 2023-12-08 PROCEDURE — 25010000002 DIPHENHYDRAMINE PER 50 MG: Performed by: INTERNAL MEDICINE

## 2023-12-08 PROCEDURE — 84484 ASSAY OF TROPONIN QUANT: CPT | Performed by: STUDENT IN AN ORGANIZED HEALTH CARE EDUCATION/TRAINING PROGRAM

## 2023-12-08 PROCEDURE — C1894 INTRO/SHEATH, NON-LASER: HCPCS | Performed by: INTERNAL MEDICINE

## 2023-12-08 PROCEDURE — 25010000002 FUROSEMIDE PER 20 MG: Performed by: INTERNAL MEDICINE

## 2023-12-08 PROCEDURE — C1769 GUIDE WIRE: HCPCS | Performed by: INTERNAL MEDICINE

## 2023-12-08 PROCEDURE — 93306 TTE W/DOPPLER COMPLETE: CPT

## 2023-12-08 PROCEDURE — 25010000002 MEROPENEM PER 100 MG: Performed by: INTERNAL MEDICINE

## 2023-12-08 PROCEDURE — 25010000002 ENOXAPARIN PER 10 MG: Performed by: STUDENT IN AN ORGANIZED HEALTH CARE EDUCATION/TRAINING PROGRAM

## 2023-12-08 PROCEDURE — 85730 THROMBOPLASTIN TIME PARTIAL: CPT | Performed by: INTERNAL MEDICINE

## 2023-12-08 PROCEDURE — 25010000002 FENTANYL CITRATE (PF) 100 MCG/2ML SOLUTION: Performed by: INTERNAL MEDICINE

## 2023-12-08 PROCEDURE — 25510000001 IOPAMIDOL PER 1 ML: Performed by: INTERNAL MEDICINE

## 2023-12-08 PROCEDURE — 4A023N7 MEASUREMENT OF CARDIAC SAMPLING AND PRESSURE, LEFT HEART, PERCUTANEOUS APPROACH: ICD-10-PCS | Performed by: INTERNAL MEDICINE

## 2023-12-08 PROCEDURE — 82330 ASSAY OF CALCIUM: CPT | Performed by: STUDENT IN AN ORGANIZED HEALTH CARE EDUCATION/TRAINING PROGRAM

## 2023-12-08 PROCEDURE — 87641 MR-STAPH DNA AMP PROBE: CPT | Performed by: STUDENT IN AN ORGANIZED HEALTH CARE EDUCATION/TRAINING PROGRAM

## 2023-12-08 PROCEDURE — 99152 MOD SED SAME PHYS/QHP 5/>YRS: CPT | Performed by: INTERNAL MEDICINE

## 2023-12-08 PROCEDURE — 63710000001 INSULIN LISPRO (HUMAN) PER 5 UNITS: Performed by: INTERNAL MEDICINE

## 2023-12-08 PROCEDURE — 93306 TTE W/DOPPLER COMPLETE: CPT | Performed by: INTERNAL MEDICINE

## 2023-12-08 PROCEDURE — 36415 COLL VENOUS BLD VENIPUNCTURE: CPT | Performed by: STUDENT IN AN ORGANIZED HEALTH CARE EDUCATION/TRAINING PROGRAM

## 2023-12-08 PROCEDURE — 80048 BASIC METABOLIC PNL TOTAL CA: CPT | Performed by: STUDENT IN AN ORGANIZED HEALTH CARE EDUCATION/TRAINING PROGRAM

## 2023-12-08 PROCEDURE — 0202U NFCT DS 22 TRGT SARS-COV-2: CPT | Performed by: STUDENT IN AN ORGANIZED HEALTH CARE EDUCATION/TRAINING PROGRAM

## 2023-12-08 PROCEDURE — 71045 X-RAY EXAM CHEST 1 VIEW: CPT

## 2023-12-08 PROCEDURE — 85610 PROTHROMBIN TIME: CPT | Performed by: INTERNAL MEDICINE

## 2023-12-08 PROCEDURE — 25010000002 POTASSIUM CHLORIDE PER 2 MEQ: Performed by: INTERNAL MEDICINE

## 2023-12-08 PROCEDURE — 25010000002 HEPARIN (PORCINE) 25000-0.45 UT/250ML-% SOLUTION: Performed by: INTERNAL MEDICINE

## 2023-12-08 PROCEDURE — 93005 ELECTROCARDIOGRAM TRACING: CPT

## 2023-12-08 PROCEDURE — 93005 ELECTROCARDIOGRAM TRACING: CPT | Performed by: STUDENT IN AN ORGANIZED HEALTH CARE EDUCATION/TRAINING PROGRAM

## 2023-12-08 PROCEDURE — 25010000002 MEROPENEM PER 100 MG: Performed by: STUDENT IN AN ORGANIZED HEALTH CARE EDUCATION/TRAINING PROGRAM

## 2023-12-08 PROCEDURE — 99153 MOD SED SAME PHYS/QHP EA: CPT | Performed by: INTERNAL MEDICINE

## 2023-12-08 PROCEDURE — 25010000002 MORPHINE PER 10 MG: Performed by: NURSE PRACTITIONER

## 2023-12-08 PROCEDURE — 25010000002 METHYLPREDNISOLONE PER 125 MG: Performed by: INTERNAL MEDICINE

## 2023-12-08 PROCEDURE — 25010000002 FUROSEMIDE PER 20 MG: Performed by: STUDENT IN AN ORGANIZED HEALTH CARE EDUCATION/TRAINING PROGRAM

## 2023-12-08 PROCEDURE — 93458 L HRT ARTERY/VENTRICLE ANGIO: CPT | Performed by: INTERNAL MEDICINE

## 2023-12-08 PROCEDURE — 87449 NOS EACH ORGANISM AG IA: CPT | Performed by: INTERNAL MEDICINE

## 2023-12-08 PROCEDURE — B2111ZZ FLUOROSCOPY OF MULTIPLE CORONARY ARTERIES USING LOW OSMOLAR CONTRAST: ICD-10-PCS | Performed by: INTERNAL MEDICINE

## 2023-12-08 PROCEDURE — 94761 N-INVAS EAR/PLS OXIMETRY MLT: CPT

## 2023-12-08 RX ORDER — ENOXAPARIN SODIUM 100 MG/ML
1 INJECTION SUBCUTANEOUS EVERY 12 HOURS
Status: DISCONTINUED | OUTPATIENT
Start: 2023-12-08 | End: 2023-12-08

## 2023-12-08 RX ORDER — ACETAMINOPHEN 325 MG/1
650 TABLET ORAL EVERY 4 HOURS PRN
Status: DISCONTINUED | OUTPATIENT
Start: 2023-12-08 | End: 2023-12-13 | Stop reason: HOSPADM

## 2023-12-08 RX ORDER — MELATONIN
1000 DAILY
Status: DISCONTINUED | OUTPATIENT
Start: 2023-12-08 | End: 2023-12-13

## 2023-12-08 RX ORDER — CARVEDILOL 6.25 MG/1
6.25 TABLET ORAL 2 TIMES DAILY WITH MEALS
Status: DISCONTINUED | OUTPATIENT
Start: 2023-12-08 | End: 2023-12-11

## 2023-12-08 RX ORDER — CALCIUM CARBONATE 500 MG/1
1 TABLET, CHEWABLE ORAL DAILY
Status: DISCONTINUED | OUTPATIENT
Start: 2023-12-08 | End: 2023-12-13 | Stop reason: HOSPADM

## 2023-12-08 RX ORDER — METOPROLOL TARTRATE 1 MG/ML
INJECTION, SOLUTION INTRAVENOUS
Status: DISCONTINUED | OUTPATIENT
Start: 2023-12-08 | End: 2023-12-08 | Stop reason: HOSPADM

## 2023-12-08 RX ORDER — METOPROLOL TARTRATE 1 MG/ML
INJECTION, SOLUTION INTRAVENOUS
Status: COMPLETED
Start: 2023-12-08 | End: 2023-12-08

## 2023-12-08 RX ORDER — ONDANSETRON 4 MG/1
4 TABLET, FILM COATED ORAL EVERY 6 HOURS PRN
Status: DISCONTINUED | OUTPATIENT
Start: 2023-12-08 | End: 2023-12-13 | Stop reason: HOSPADM

## 2023-12-08 RX ORDER — METOPROLOL TARTRATE 1 MG/ML
2.5 INJECTION, SOLUTION INTRAVENOUS ONCE
Status: COMPLETED | OUTPATIENT
Start: 2023-12-08 | End: 2023-12-08

## 2023-12-08 RX ORDER — METHYLPREDNISOLONE SODIUM SUCCINATE 125 MG/2ML
125 INJECTION, POWDER, LYOPHILIZED, FOR SOLUTION INTRAMUSCULAR; INTRAVENOUS ONCE
Status: DISCONTINUED | OUTPATIENT
Start: 2023-12-08 | End: 2023-12-08 | Stop reason: SDUPTHER

## 2023-12-08 RX ORDER — ASPIRIN 81 MG/1
81 TABLET, CHEWABLE ORAL DAILY
Status: DISCONTINUED | OUTPATIENT
Start: 2023-12-08 | End: 2023-12-13 | Stop reason: HOSPADM

## 2023-12-08 RX ORDER — MORPHINE SULFATE 2 MG/ML
2 INJECTION, SOLUTION INTRAMUSCULAR; INTRAVENOUS
Status: DISCONTINUED | OUTPATIENT
Start: 2023-12-08 | End: 2023-12-13 | Stop reason: HOSPADM

## 2023-12-08 RX ORDER — LIDOCAINE HYDROCHLORIDE 20 MG/ML
INJECTION, SOLUTION INFILTRATION; PERINEURAL
Status: DISCONTINUED | OUTPATIENT
Start: 2023-12-08 | End: 2023-12-08 | Stop reason: HOSPADM

## 2023-12-08 RX ORDER — FAMOTIDINE 20 MG/1
20 TABLET, FILM COATED ORAL
Status: DISCONTINUED | OUTPATIENT
Start: 2023-12-08 | End: 2023-12-13 | Stop reason: HOSPADM

## 2023-12-08 RX ORDER — FENTANYL CITRATE 50 UG/ML
INJECTION, SOLUTION INTRAMUSCULAR; INTRAVENOUS
Status: DISCONTINUED | OUTPATIENT
Start: 2023-12-08 | End: 2023-12-08 | Stop reason: HOSPADM

## 2023-12-08 RX ORDER — ATORVASTATIN CALCIUM 40 MG/1
80 TABLET, FILM COATED ORAL DAILY
Status: DISCONTINUED | OUTPATIENT
Start: 2023-12-08 | End: 2023-12-13 | Stop reason: HOSPADM

## 2023-12-08 RX ORDER — DIPHENHYDRAMINE HYDROCHLORIDE 50 MG/ML
25 INJECTION INTRAMUSCULAR; INTRAVENOUS ONCE
Status: COMPLETED | OUTPATIENT
Start: 2023-12-08 | End: 2023-12-08

## 2023-12-08 RX ORDER — METHYLPREDNISOLONE SODIUM SUCCINATE 125 MG/2ML
125 INJECTION, POWDER, LYOPHILIZED, FOR SOLUTION INTRAMUSCULAR; INTRAVENOUS ONCE
Status: COMPLETED | OUTPATIENT
Start: 2023-12-08 | End: 2023-12-08

## 2023-12-08 RX ORDER — LANOLIN ALCOHOL/MO/W.PET/CERES
1000 CREAM (GRAM) TOPICAL DAILY
Status: DISCONTINUED | OUTPATIENT
Start: 2023-12-08 | End: 2023-12-13 | Stop reason: HOSPADM

## 2023-12-08 RX ORDER — POTASSIUM CHLORIDE 29.8 MG/ML
20 INJECTION INTRAVENOUS
Status: COMPLETED | OUTPATIENT
Start: 2023-12-08 | End: 2023-12-08

## 2023-12-08 RX ORDER — ONDANSETRON 2 MG/ML
4 INJECTION INTRAMUSCULAR; INTRAVENOUS EVERY 6 HOURS PRN
Status: DISCONTINUED | OUTPATIENT
Start: 2023-12-08 | End: 2023-12-13 | Stop reason: HOSPADM

## 2023-12-08 RX ORDER — NITROGLYCERIN 0.4 MG/1
0.4 TABLET SUBLINGUAL
Status: DISCONTINUED | OUTPATIENT
Start: 2023-12-08 | End: 2023-12-13 | Stop reason: HOSPADM

## 2023-12-08 RX ORDER — FUROSEMIDE 10 MG/ML
40 INJECTION INTRAMUSCULAR; INTRAVENOUS
Status: DISCONTINUED | OUTPATIENT
Start: 2023-12-08 | End: 2023-12-10

## 2023-12-08 RX ORDER — FUROSEMIDE 10 MG/ML
INJECTION INTRAMUSCULAR; INTRAVENOUS
Status: DISCONTINUED | OUTPATIENT
Start: 2023-12-08 | End: 2023-12-08 | Stop reason: HOSPADM

## 2023-12-08 RX ORDER — HEPARIN SODIUM 10000 [USP'U]/100ML
12 INJECTION, SOLUTION INTRAVENOUS
Status: DISCONTINUED | OUTPATIENT
Start: 2023-12-08 | End: 2023-12-09

## 2023-12-08 RX ADMIN — MORPHINE SULFATE 2 MG: 2 INJECTION, SOLUTION INTRAMUSCULAR; INTRAVENOUS at 22:48

## 2023-12-08 RX ADMIN — IPRATROPIUM BROMIDE AND ALBUTEROL SULFATE 3 ML: .5; 3 SOLUTION RESPIRATORY (INHALATION) at 07:07

## 2023-12-08 RX ADMIN — FUROSEMIDE 40 MG: 10 INJECTION, SOLUTION INTRAMUSCULAR; INTRAVENOUS at 13:23

## 2023-12-08 RX ADMIN — MORPHINE SULFATE 2 MG: 2 INJECTION, SOLUTION INTRAMUSCULAR; INTRAVENOUS at 16:34

## 2023-12-08 RX ADMIN — METOPROLOL TARTRATE 2.5 MG: 1 INJECTION, SOLUTION INTRAVENOUS at 16:51

## 2023-12-08 RX ADMIN — MORPHINE SULFATE 2 MG: 2 INJECTION, SOLUTION INTRAMUSCULAR; INTRAVENOUS at 19:13

## 2023-12-08 RX ADMIN — NITROGLYCERIN 0.4 MG: 0.4 TABLET SUBLINGUAL at 08:47

## 2023-12-08 RX ADMIN — EPOETIN ALFA-EPBX 40000 UNITS: 40000 INJECTION, SOLUTION INTRAVENOUS; SUBCUTANEOUS at 16:36

## 2023-12-08 RX ADMIN — POTASSIUM CHLORIDE 20 MEQ: 29.8 INJECTION, SOLUTION INTRAVENOUS at 21:17

## 2023-12-08 RX ADMIN — DIPHENHYDRAMINE HYDROCHLORIDE 25 MG: 50 INJECTION, SOLUTION INTRAMUSCULAR; INTRAVENOUS at 09:29

## 2023-12-08 RX ADMIN — Medication 10 ML: at 08:50

## 2023-12-08 RX ADMIN — METHYLPREDNISOLONE SODIUM SUCCINATE 125 MG: 125 INJECTION, POWDER, FOR SOLUTION INTRAMUSCULAR; INTRAVENOUS at 09:28

## 2023-12-08 RX ADMIN — CEFEPIME 2000 MG: 2 INJECTION, POWDER, FOR SOLUTION INTRAVENOUS at 18:02

## 2023-12-08 RX ADMIN — INSULIN LISPRO 4 UNITS: 100 INJECTION, SOLUTION INTRAVENOUS; SUBCUTANEOUS at 18:05

## 2023-12-08 RX ADMIN — BUDESONIDE INHALATION 0.5 MG: 0.5 SUSPENSION RESPIRATORY (INHALATION) at 07:06

## 2023-12-08 RX ADMIN — FAMOTIDINE 20 MG: 20 TABLET ORAL at 16:44

## 2023-12-08 RX ADMIN — MEROPENEM 1000 MG: 1 INJECTION, POWDER, FOR SOLUTION INTRAVENOUS at 13:01

## 2023-12-08 RX ADMIN — INSULIN LISPRO 4 UNITS: 100 INJECTION, SOLUTION INTRAVENOUS; SUBCUTANEOUS at 20:14

## 2023-12-08 RX ADMIN — HEPARIN SODIUM 12 UNITS/KG/HR: 10000 INJECTION, SOLUTION INTRAVENOUS at 13:15

## 2023-12-08 RX ADMIN — FUROSEMIDE 20 MG: 10 INJECTION, SOLUTION INTRAVENOUS at 12:54

## 2023-12-08 RX ADMIN — CARVEDILOL 6.25 MG: 6.25 TABLET, FILM COATED ORAL at 18:03

## 2023-12-08 RX ADMIN — NITROGLYCERIN 0.4 MG: 0.4 TABLET SUBLINGUAL at 08:36

## 2023-12-08 RX ADMIN — ENOXAPARIN SODIUM 60 MG: 60 INJECTION SUBCUTANEOUS at 06:20

## 2023-12-08 RX ADMIN — ATORVASTATIN CALCIUM 80 MG: 40 TABLET, FILM COATED ORAL at 08:37

## 2023-12-08 RX ADMIN — POTASSIUM CHLORIDE 20 MEQ: 29.8 INJECTION, SOLUTION INTRAVENOUS at 20:15

## 2023-12-08 RX ADMIN — Medication 10 ML: at 20:15

## 2023-12-08 RX ADMIN — INSULIN LISPRO 2 UNITS: 100 INJECTION, SOLUTION INTRAVENOUS; SUBCUTANEOUS at 13:22

## 2023-12-08 RX ADMIN — MEROPENEM 1000 MG: 1 INJECTION, POWDER, FOR SOLUTION INTRAVENOUS at 03:12

## 2023-12-08 RX ADMIN — ASPIRIN 81 MG CHEWABLE TABLET 81 MG: 81 TABLET CHEWABLE at 08:37

## 2023-12-08 NOTE — PLAN OF CARE
#Chest pain    - Patient complaining of chest pain    - troponin trending up    - possible 2/2 pneumonia and pulm edema    - has been weaned of Bipap    - start aspirin daily    - start statin    - A1c, lipid panel, tsh    - EKG reviewed, concerning for slight ischemia    - cover with therapeutic lovenox    - Nitro prn     - cardiology consulted    Electronically signed by Naomi Dover DO, 12/08/23, 4:29 AM EST.

## 2023-12-08 NOTE — CONSULTS
Group: [facility]         CONSULT NOTE    Patient Identification:  Samantha Massey  63 y.o.  female  1960  [id number]            Requesting physician: Attending physician    Reason for Consultation: Hypoxia      History of Present Illness:  64-year-old female admitted on 12/7/2023 with acute onset of dyspnea during Mvasi infusion patient was given Benadryl and Lasix and dexamethasone and nebulizer she became hypertensive blood pressure 200/100 and tachycardic heart rate 160 ABG showed pO2 of 82, CT chest no pulmonary embolism diffuse bilateral alveolar disease  Stage IV adenocarcinoma:    Assessment:      Acute hypoxic respiratory insufficiency    Respiratory viral panel negative on 12/8/2023  Check Legionella and strep urine antigen    Acute coronary syndrome s/p balloon pump    Headache colon cancer    Recommendations:    Oxygen titration currently on 2 L  Pulm pump per cardiology possible removal today  Patient is physically in the ICU management will be deferred to the intensivist team will be happy to follow-up after downgrade              Review of Sytems:  Review of Systems   Unable to perform ROS: Acuity of condition       Past Medical History:  Past Medical History:   Diagnosis Date    Cancer     wiht mets to liver, bone    Chronic kidney disease     Colon cancer     History of colon cancer, stage IV     Stage SHELL with progression    Hypertension     Lung cancer     with colon    PONV (postoperative nausea and vomiting)     Pulmonary arterial hypertension     Radiculopathy 07/2012    Right L5/S1       Past Surgical History:  Past Surgical History:   Procedure Laterality Date    ABDOMINAL SURGERY      CYST REMOVAL  1998    cyst removed from [facility] in 1998    PORTACATH PLACEMENT  2017    VENOUS ACCESS DEVICE (PORT) INSERTION Left 01/12/2021    Procedure: INSERTION VENOUS ACCESS DEVICE;  Surgeon: Jay Yeh MD;  Location: [facility];  Service: General;  Laterality: Left;     VENOUS ACCESS DEVICE (PORT) REMOVAL Right 2020    Procedure: REMOVAL VENOUS ACCESS DEVICE;  Surgeon: Jay Yeh MD;  Location: [facility] MAIN OR;  Service: General;  Laterality: Right;    VENTRAL/INCISIONAL HERNIA REPAIR N/A 3/2/2023    Procedure: VENTRAL/INCISIONAL HERNIA REPAIR WITH MESH;  Surgeon: Jay Yeh MD;  Location: [facility] MAIN OR;  Service: General;  Laterality: N/A;        Home Meds:  Medications Prior to Admission   Medication Sig Dispense Refill Last Dose    Cyanocobalamin (Vitamin B-12) 5000 MCG sublingual tablet Place 1 tablet under the tongue Every Morning.       doxycycline (VIBRAMYICN) 100 MG tablet Take 1 tablet by mouth 2 (Two) Times a Day.       Lysine 500 MG capsule Take 1 tablet by mouth 2 (Two) Times a Day.       trifluridine-tipiracil (Lonsurf) 20-8.19 MG per tablet Take 2 tablets by mouth 2 (Two) Times a Day.       vitamin D3 125 MCG (5000 UT) capsule capsule Take 1 capsule by mouth Daily.          Allergies:  Allergies   Allergen Reactions    Hydrocodone Nausea And Vomiting    Iodinated Contrast Media Hives and Itching     PT HAD SOME HIVES DURING SCAN.  PRIOR TO SCAN 8-3-2013.    Latex Rash     Blisters     Penicillins Hives       Social History:   Social History     Socioeconomic History    Marital status: Single   Tobacco Use    Smoking status: Former     Packs/day: 1.00     Years: 30.00     Additional pack years: 0.00     Total pack years: 30.00     Types: Cigarettes     Quit date: 2012     Years since quittin.9     Passive exposure: Past    Smokeless tobacco: Never   Vaping Use    Vaping Use: Never used   Substance and Sexual Activity    Alcohol use: Yes     Comment: 2 or 3 a month    Drug use: Never    Sexual activity: Defer       Family History:  Family History   Problem Relation Age of Onset    Cancer Sister         Bladder cancer    Cancer Father        Physical Exam:  /89 (BP Location: Left arm, Patient Position: Lying)   Pulse 92   " Temp 97.5 °F (36.4 °C) (Oral)   Resp 16   Ht 157.5 cm (62\")   Wt 56.6 kg (124 lb 12.5 oz)   LMP  (LMP Unknown)   SpO2 96%   BMI 22.82 kg/m²  Body mass index is 22.82 kg/m². 96% 56.6 kg (124 lb 12.5 oz)  Physical Exam  Cardiovascular:      Heart sounds: Murmur heard.   Pulmonary:      Effort: No respiratory distress.      Breath sounds: No stridor. Rhonchi and rales present. No wheezing.   Chest:      Chest wall: No tenderness.         LABS:  Lab Results   Component Value Date    CALCIUM 8.0 (L) 12/08/2023    PHOS 5.6 (H) 01/08/2018     Results from last 7 days   Lab Units 12/08/23  0514 12/07/23  1728 12/07/23  1728 12/07/23  1710 12/07/23  1631   SODIUM mmol/L 142  --  138  --   --    POTASSIUM mmol/L 4.1  --  4.0  --   --    CHLORIDE mmol/L 109*  --  107  --   --    CO2 mmol/L 22.0  --  21.0*  --   --    BUN mg/dL 24*  --  21  --   --    CREATININE mg/dL 0.71  --  0.74 0.80  --    GLUCOSE mg/dL 140*   < > 171*  --   --    CALCIUM mg/dL 8.0*  --  7.5*  --   --    WBC 10*3/mm3 4.50  --   --   --  6.90   HEMOGLOBIN g/dL 9.6*  --   --   --  11.5*   PLATELETS 10*3/mm3 195  --   --   --  280   ALT (SGPT) U/L  --   --  16  --   --    AST (SGOT) U/L  --   --  34*  --   --    PROBNP pg/mL  --   --  2,176.0*  --   --     < > = values in this interval not displayed.     Lab Results   Component Value Date    CKTOTAL 8 (L) 01/08/2018    TROPONINT 136 (C) 12/08/2023     Results from last 7 days   Lab Units 12/08/23  0514 12/07/23  1923 12/07/23  1728   HSTROP T ng/L 136* 80* 28*         Results from last 7 days   Lab Units 12/07/23  1902 12/07/23  1657   LACTATE mmol/L 1.5 1.2     Results from last 7 days   Lab Units 12/07/23  1657   PH, ARTERIAL pH units 7.361   PCO2, ARTERIAL mm Hg 36.3   PO2 ART mm Hg 82.2*   O2 SATURATION ART % 95.7   MODALITY  NRB     Results from last 7 days   Lab Units 12/08/23  0228   ADENOVIRUS DETECTION BY PCR  Not Detected   CORONAVIRUS 229E  Not Detected   CORONAVIRUS HKU1  Not Detected "   CORONAVIRUS NL63  Not Detected   CORONAVIRUS OC43  Not Detected   HUMAN METAPNEUMOVIRUS  Not Detected   HUMAN RHINOVIRUS/ENTEROVIRUS  Not Detected   INFLUENZA B PCR  Not Detected   PARAINFLUENZA 1  Not Detected   PARAINFLUENZA VIRUS 2  Not Detected   PARAINFLUENZA VIRUS 3  Not Detected   PARAINFLUENZA VIRUS 4  Not Detected   BORDETELLA PERTUSSIS PCR  Not Detected   CHLAMYDOPHILA PNEUMONIAE PCR  Not Detected   MYCOPLAMA PNEUMO PCR  Not Detected   INFLUENZA A PCR  Not Detected   RSV, PCR  Not Detected     Results from last 7 days   Lab Units 12/07/23  1631   INR  0.96         Lab Results   Component Value Date    TSH 0.824 12/08/2023     Estimated Creatinine Clearance: 73.4 mL/min (by C-G formula based on SCr of 0.71 mg/dL).         Imaging:  Imaging Results (Last 24 Hours)       Procedure Component Value Units Date/Time    XR Chest 1 View [[id number]] Collected: 12/08/23 1239     Updated: 12/08/23 1246    Narrative:      X-ray chest 1 view    Date of Exam: 12/8/2023 11:22 AM CST    Indication: Balloon pump    Comparison: Chest x-ray 12/7/2023    Findings:  The heart is stable in size. There is a left chest wall port catheter with the distal tip overlying the distal superior vena cava. Intra-aortic balloon pump catheter. Is present with the distal tip 9 mm from the margin of the descending thoracic aortic   arch. There is mild right basilar atelectasis but overall improved aeration at the right lung base from prior study. No pneumothorax. Trace right pleural effusion is suspected.      Impression:      Impression:    1. Improved but residual right lower lung airspace disease and trace effusion.    2. Placement of an intra-aortic balloon pump catheter.      Electronically Signed: Nadja Li MD    12/8/2023 11:44 AM CST    Workstation ID: [id number]    CT Angiogram Chest Pulmonary Embolism [[id number]] Collected: 12/07/23 1723     Updated: 12/07/23 1736    Narrative:      CT ANGIOGRAM CHEST PULMONARY  EMBOLISM    Date of Exam: 12/7/2023 4:20 PM CST    Indication: Pulmonary embolism (PE) suspected, high prob.    Comparison: Chest x-ray 12/7/2023 and CT chest 11/18/2023    Technique: Axial CT images were obtained of the chest after the uneventful intravenous administration of iodinated contrast utilizing pulmonary embolism protocol.  Sagittal and coronal reconstructions were performed.  Automated exposure control and   iterative reconstruction methods were used.      Findings:  PULMONARY VASCULATURE: Pulmonary arteries are widely patent without evidence of embolus.Main pulmonary artery is normal in size. No evidence of right heart strain.    MEDIASTINUM:Unremarkable. Aortic and heart size are normal. No aortic dissection identified. No mass nor pericardial effusion.  CORONARY ARTERIES: Mild calcified atherosclerotic disease.  LUNGS: Evaluation of lung parenchyma demonstrates groundglass opacities in attenuation in the lungs bilaterally with smooth interlobular septal thickening compatible with pulmonary edema. There is however bibasilar airspace disease with consolidation   right greater than left and associated pleural effusions.    There is stable appearance to nodular lesion in the right apical region measuring 1.3 x 1.3 cm. There is an ovoid lesion at the left lung base which appears unchanged measuring 1.6 x 1.8 cm. A nodular density in the superior segment of the left lower   lung appears slightly more irregular measuring 7 x 7 mm, previously 6 x 4 mm (image 72 of series 7). Additional pulmonary nodules appear stable.      PLEURAL SPACE: No effusion, mass, nor pneumothorax.  LYMPH NODES: There are no pathologically enlarged lymph nodes.    UPPER ABDOMEN: Unremarkable    OSSEOUS STRUCTURES: There is a sclerotic metastatic lesion along the lateral right 6 with with osseous destruction noted. There is associated soft tissue component extending into the right middle lung with increasing consolidation compatible  with   progression of disease. Sclerotic lesion in the manubrium appears unchanged.      Impression:      Impression:    1. No evidence for pulmonary embolus.  2. Findings concerning for progression of disease with a sclerotic osseous metastatic lesion involving the lateral right with with increased consolidation and soft tissue component extending into the right middle lung.  3. Diffuse groundglass opacities with intralobular septal thickening compatible with pulmonary edema however there is additional bibasilar airspace disease with consolidation and pleural effusions suggesting additional infectious/inflammatory process.      Electronically Signed: Nadja Li MD    12/7/2023 4:34 PM CST    Workstation ID: [id number]    XR Chest 1 View [[id number]] Collected: 12/07/23 1649     Updated: 12/07/23 1658    Narrative:      XR CHEST 1 VW    Date of Exam: 12/7/2023 4:42 PM EST    Indication: Shortness of breath    Comparison: CT chest, abdomen and pelvis 11/18/2023    Findings:  Heart size within normal limits. Accessed left chest port catheter tip within mid SVC. Consolidative right lower lobe opacities with small right-sided effusion. Background ill-defined nodular alveolar opacities throughout most notable on the left. No   large left effusion or pneumothorax.. Asymmetric sclerosis and expansion involving a right anterior rib in keeping with known metastatic lesion.      Impression:      Impression:  Right lower lobe consolidation and small effusion suspicious for pneumonia in the right clinical setting. Ill-defined more nodular alveolar opacities noted more so on the left, which could reflect edema or multifocal infection.      Electronically Signed: Howard Razo MD    12/7/2023 4:56 PM EST    Workstation ID: [id number]              Current Meds:   SCHEDULE  aspirin, 81 mg, Oral, Daily  atorvastatin, 80 mg, Oral, Daily  budesonide, 0.5 mg, Nebulization, BID - RT  calcium carbonate, 1 tablet, Oral,  Daily  cholecalciferol, 1,000 Units, Oral, Daily  epoetin ana-epbx, 40,000 Units, Subcutaneous, Weekly  famotidine, 20 mg, Oral, BID AC  furosemide, 40 mg, Intravenous, Q12H  insulin lispro, 2-9 Units, Subcutaneous, 4x Daily AC & at Bedtime  ipratropium-albuterol, 3 mL, Nebulization, Once  ipratropium-albuterol, 3 mL, Nebulization, 4x Daily - RT  meropenem, 1,000 mg, Intravenous, Q8H  sodium chloride, 10 mL, Intravenous, Q12H  vitamin B-12, 1,000 mcg, Oral, Daily      Infusions  heparin, 12 Units/kg/hr  Pharmacy to Dose meropenem (MERREM),       PRNs    acetaminophen    Calcium Replacement - Follow Nurse / BPA Driven Protocol    dextrose    dextrose    glucagon (human recombinant)    heparin    heparin    labetalol    Magnesium Standard Dose Replacement - Follow Nurse / BPA Driven Protocol    nitroglycerin    ondansetron **OR** ondansetron    [DISCONTINUED] meropenem **AND** Pharmacy to Dose meropenem (MERREM)    Phosphorus Replacement - Follow Nurse / BPA Driven Protocol    Potassium Replacement - Follow Nurse / BPA Driven Protocol    [COMPLETED] Insert Peripheral IV **AND** sodium chloride    sodium chloride    sodium chloride        Lorena Byrne MD  12/8/2023  12:57 EST      Much of this encounter note is an electronic transcription/translation of spoken language to printed text using Dragon Software.

## 2023-12-08 NOTE — SIGNIFICANT NOTE
Pt received from Cath lab via stretcher with IABP in place, Cardiac monitor and O2 in Place, Transferred to bed without difficulty. Report received from DONNY Josue RN @ bedside. Pt tolerated well.

## 2023-12-08 NOTE — PROGRESS NOTES
[facility] Medicine Services   Daily Progress Note    Patient Name: Samantha Massey  : 1960  MRN: [id number]  Primary Care Physician:  Diana Spencer MD  Date of admission: 2023  Date and Time of Service: 2023    Brief clinical summary:  63-year-old female with history of hypertension and stage IV colon cancer with metastasis to the liver lungs and bones who was sent to the ED for evaluation following increased shortness of breath with nonproductive cough during hemodialysis session.  Onset of symptoms 3 to 4 days earlier but just got worse during hemodialysis session.  Chest CT revealed pulmonary edema, multifocal pneumonia no PE.  proBNP was elevated at 2176.  Troponin trended up from 28 to 80 and then 136.  She is diagnosed with acute respiratory failure due to multifocal pneumonia and CHF.  She required BiPAP when she first arrived, and currently weaned to nasal cannula oxygen.  No prior history of supplemental oxygen use    Subjective        Patient resting in bed.  Complains of pleuritic chest pain with deep breaths on coughing.  No rest dyspnea, fever or chills      Objective      Vitals:   Temp:  [97.5 °F (36.4 °C)-98 °F (36.7 °C)] 97.5 °F (36.4 °C)  Heart Rate:  [] 97  Resp:  [12-25] 16  BP: ()/() 129/79  Flow (L/min):  [4-12] 4    Physical Exam   General: Chronically ill-appearing, not in distress  Mental status: Alert and oriented x 3  CVS: Regular rhythm, borderline tachycardia up to 111, no gallops  Respiration: Mild right-sided chest wall tenderness, no wheezes or rales, unlabored breathing  GI: Soft, ND/NT, bowel sounds present  Skin: Dry, pale in color, normal temperature  Extremity: No leg edema, no gross deformities  Neuro: No dysarthria, no facial droop, moves all extremities  Psych: Flat affect, appropriate behavior, good insight     Result Review    Result Review:  I have personally reviewed the results from the time of this admission to  12/8/2023 14:16 EST and agree with these findings:  [x]  Laboratory  []  Microbiology  [x]  Radiology  []  EKG/Telemetry   [x]  Cardiology/Vascular   []  Pathology  []  Old records  []  Other:    Assessment & Plan      aspirin, 81 mg, Oral, Daily  atorvastatin, 80 mg, Oral, Daily  budesonide, 0.5 mg, Nebulization, BID - RT  calcium carbonate, 1 tablet, Oral, Daily  cholecalciferol, 1,000 Units, Oral, Daily  epoetin ana-epbx, 40,000 Units, Subcutaneous, Weekly  famotidine, 20 mg, Oral, BID AC  furosemide, 40 mg, Intravenous, BID  insulin lispro, 2-9 Units, Subcutaneous, 4x Daily AC & at Bedtime  ipratropium-albuterol, 3 mL, Nebulization, Once  ipratropium-albuterol, 3 mL, Nebulization, 4x Daily - RT  meropenem, 1,000 mg, Intravenous, Q8H  sodium chloride, 10 mL, Intravenous, Q12H  vitamin B-12, 1,000 mcg, Oral, Daily       heparin, 12 Units/kg/hr, Last Rate: 12 Units/kg/hr (12/08/23 1315)  Pharmacy to Dose meropenem (MERREM),          Active Hospital Problems:  Active Hospital Problems    Diagnosis     **Acute hypoxic respiratory failure      Plan:   # Acute hypoxic respiratory failure secondary to pneumonia and pulmonary edema   -Chest CT -metastatic lesions, bibasilar airspace disease, pulmonary edema, no PE  -Weaned off BiPAP, presently on nasal cannula oxygen    # Bilateral pneumonia, suspect gram-positive or gram-negative bacterial infection  -Immunocompromised subject  -Received meropenem and vancomycin in the ED  -Continue meropenem  -Vancomycin discontinued following negative MRSA screen    # Acute heart failure  -No prior history of heart failure  -Echocardiogram pending  -Continue Lasix  -Strict I/O, daily weight monitoring    # Elevated troponin probably 2/2 demand ischemia versus acute coronary syndrome  -Cardiology following, anticipate left heart catheterization  -Continue aspirin, statin and heparin infusion    # Stage IV colon cancer with metastases to liver, lung and bone  -Outpatient oncology  follow-up    # Essential hypertension  -BP not elevated, monitor      DVT prophylaxis:  Medical and mechanical DVT prophylaxis orders are present.    CODE STATUS:    Code Status (Patient has no pulse and is not breathing): CPR (Attempt to Resuscitate)  Medical Interventions (Patient has pulse or is breathing): Full Support      Disposition:  I expect patient to be discharged 3-4 days.      Electronically signed by Roger Ayala MD, 12/08/23, 14:16 EST.  [facility] Team

## 2023-12-08 NOTE — SIGNIFICANT NOTE
12/08/23 1248   OTHER   Discipline physical therapist   Rehab Time/Intention   Session Not Performed patient unavailable for evaluation;other (see comments)  (Pt off floor for L heart cath when attempted. PT to follow up tomorrow for evaluation.)   Therapy Assessment/Plan (PT)   Criteria for Skilled Interventions Met (PT) yes;meets criteria   Recommendation   PT - Next Appointment 12/09/23

## 2023-12-08 NOTE — NURSING NOTE
On reassessment patient is more calm but complaining of pain in right chest radiating down right arm and to back. Patient describes chest pain as burning, right arm as aching, and back pain as a tight ache. Per Patient all of this happened suddenly.     Gave a 2nd nitro - patients pain is now 9/10 after the first    Pain 7/10 after 2nd nitro

## 2023-12-08 NOTE — NURSING NOTE
Patient put on call light complaining of 10/10 right sided chest pain radiating down arm and in her back. Gave 1 nitro with no relief.

## 2023-12-08 NOTE — CONSULTS
Hematology/Oncology Inpatient Consultation    Patient name: Samantha Massey  : 1960  MRN: [id number]  Referring Provider: WYATT Salgado DO  Reason for Consultation: Mvasi anaphylaxis and stage IV adenocarcinoma of the colon    Chief complaint: Dyspnea    History of present illness:    63 y.o. female presented to [facility] ER on 2023 after presenting to our office for an Mvasi infusion.  She has stage IV cancer of the colon, s/p multiple lines of therapy, currently on Lonsurf with Mvasi.  She had received multiple doses of Mvasi in the past.  During her infusion, she developed an acute onset of dyspnea with feeling of inability to breathe or move air.  She was treated aggressively with Benadryl and an albuterol nebulizer, for bilateral rhonchi and crackles, Lasix and dexamethasone.  She was hypertensive with blood pressure 200/100, tachycardic to 160s and O2 saturation initially was 95% but dropped to 75%.  She improved to 85% on 4 L nasal cannula and EMS was called to take patient to the ER.  In the ER, ABG confirmed hypoxia with a pO2 of 82.2.  proBNP was 2176 (<900).  Troponin 28 (< 14).  CMP was significant for an AST of 34 (1-32).  Lactate 1.2 (0.2-2.0), PT 10.5 (9.6-11.7), and PTT 26.3 (25-35).  White count 6.9, hemoglobin 11.5 (9.79 in our office), .4 and platelets 280,000.  Chest x-ray showed right lower lobe pneumonia.  CTA chest PE protocol showed no evidence of PE.  There were findings concerning for progression of disease with a sclerotic osseous metastatic lesion involving the lateral right rib with increased consolidation and soft tissue component extending into the right middle lobe lung.  Diffuse groundglass opacities with interlobular septal thickening compatible with pulmonary edema, however, there was additional bibasilar airspace disease with consolidation and pleural effusion suggesting additional infectious/inflammatory process.  EKG was abnormal and showed sinus  tachycardia with anterior septal infarction and suggested ischemia.  She was started on meropenem and pulmonary was consulted.  Cardiology was consulted on the day of consultation and her EKG was abnormal with prolonged QT interval.  Troponin had increased to 136.  White count 4.5, hemoglobin 9.6, .9 and platelets 195.  On the morning of consultation, she was complaining of pain in her right shoulder and constipation.  She was fatigued.  After she was evaluated by Dr. Perera, she developed chest pain, starting in her right arm, radiating down her right arm requiring nitroglycerin x 2 with minimal relief.    12/08/23  Hematology/Oncology was consulted as she is an established patient who we treat for stage IV adenocarcinoma of the colon, chemotherapy-induced cytopenias, vitamin B12 and vitamin D deficiencies, and osteopenia.  -Diagnosed with colon cancer with liver metastasis and 2012.  To date she has not received any cardiotoxic chemotherapy but did receive radiation to her right anterior rib in April 2023.  Although, Mvasi, may either cause reversible direct myocardial toxicity or exacerbate underlying myocardial dysfunction, occurring in 10% of patients.  Current CTA PE was compared to CT C/A/P on 11/18/2023.  She has completed 5 cycles of her current therapy.  -Anemia-due to chemotherapy with history of REGULO, and vitamin B12 deficiency.  Iron studies 8/25/2023 showed iron saturation of 40% (15-50), and ferritin of 223 ().  In May 2023, folate was 9.5, haptoglobin 134 ().  In December 2022, SPEP showed no M spike and copper was 112 ().  Ferrous sulfate was discontinued on 9/29/2023.  She was ordered to start Procrit 40,000 units subcu weekly on 11/22/2023 but has yet to start.  She takes vitamin B12 and vitamin D supplementation for deficiencies.  No change in past medical, surgical, social or family histories since patient was seen in the office on 12/7/2023.    PCP: Diana Spencer,  MD    History:  Past Medical History:   Diagnosis Date    Cancer     wiht mets to liver, bone    Chronic kidney disease     Colon cancer     History of colon cancer, stage IV     Stage SHELL with progression    Hypertension     Lung cancer     with colon    PONV (postoperative nausea and vomiting)     Pulmonary arterial hypertension     Radiculopathy 2012    Right L5/S1   ,   Past Surgical History:   Procedure Laterality Date    ABDOMINAL SURGERY      CYST REMOVAL      cyst removed from [facility] in     PORTACATH PLACEMENT  2017    VENOUS ACCESS DEVICE (PORT) INSERTION Left 2021    Procedure: INSERTION VENOUS ACCESS DEVICE;  Surgeon: Jay Yeh MD;  Location: [facility] MAIN OR;  Service: General;  Laterality: Left;    VENOUS ACCESS DEVICE (PORT) REMOVAL Right 2020    Procedure: REMOVAL VENOUS ACCESS DEVICE;  Surgeon: Jay Yeh MD;  Location: [facility] MAIN OR;  Service: General;  Laterality: Right;    VENTRAL/INCISIONAL HERNIA REPAIR N/A 3/2/2023    Procedure: VENTRAL/INCISIONAL HERNIA REPAIR WITH MESH;  Surgeon: Jay Yeh MD;  Location: [facility] MAIN OR;  Service: General;  Laterality: N/A;   ,   Family History   Problem Relation Age of Onset    Cancer Sister         Bladder cancer    Cancer Father    ,   Social History     Tobacco Use    Smoking status: Former     Packs/day: 1.00     Years: 30.00     Additional pack years: 0.00     Total pack years: 30.00     Types: Cigarettes     Quit date: 2012     Years since quittin.9     Passive exposure: Past    Smokeless tobacco: Never   Vaping Use    Vaping Use: Never used   Substance Use Topics    Alcohol use: Yes     Comment: 2 or 3 a month    Drug use: Never   ,   Medications Prior to Admission   Medication Sig Dispense Refill Last Dose    aMILoride (MIDAMOR) 5 MG tablet Take 2 tablets by mouth Daily. with food   Past Month    Cyanocobalamin (B-12) 1000 MCG tablet    2023    doxycycline (VIBRAMYICN) 100  MG tablet Take 1 tablet by mouth 2 (Two) Times a Day.   12/7/2023    Lysine 500 MG capsule Take 1 tablet by mouth 2 (Two) Times a Day.   12/7/2023    vitamin D3 125 MCG (5000 UT) capsule capsule Take 1 capsule by mouth Daily.   12/7/2023    ALPRAZolam (Xanax) 0.5 MG tablet Take 1 tablet by mouth 2 (Two) Times a Day As Needed for Anxiety (white coat syndrome). Take prior to infusion visits. Repeat x1 as needed during visit. 30 tablet 0 More than a month    ferrous sulfate 324 (65 Fe) MG tablet delayed-release EC tablet Take 1 tablet by mouth Daily With Breakfast. HOLD dos (Patient not taking: Reported on 12/8/2023)   Not Taking    lisinopril (PRINIVIL,ZESTRIL) 10 MG tablet Take 1 tablet by mouth Daily.   More than a month    sennosides-docusate (PERICOLACE) 8.6-50 MG per tablet Take 2 tablets by mouth 2 (Two) Times a Day. (Patient not taking: Reported on 6/1/2023) 60 tablet 0     Stivarga 40 MG chemo tablet  (Patient not taking: Reported on 12/8/2023)   Not Taking    trifluridine-tipiracil (Lonsurf) 20-8.19 MG per tablet Take 2 tablets by mouth 2 (Two) Times a Day.      , Scheduled Meds:  aspirin, 81 mg, Oral, Daily  atorvastatin, 80 mg, Oral, Daily  budesonide, 0.5 mg, Nebulization, BID - RT  enoxaparin, 1 mg/kg, Subcutaneous, Q12H  furosemide, 20 mg, Intravenous, Q12H  insulin lispro, 2-9 Units, Subcutaneous, 4x Daily AC & at Bedtime  ipratropium-albuterol, 3 mL, Nebulization, Once  ipratropium-albuterol, 3 mL, Nebulization, 4x Daily - RT  meropenem, 1,000 mg, Intravenous, Q8H  sodium chloride, 10 mL, Intravenous, Q12H    , Continuous Infusions:  Pharmacy to Dose enoxaparin (LOVENOX),   Pharmacy to Dose meropenem (MERREM),     , PRN Meds:    Calcium Replacement - Follow Nurse / BPA Driven Protocol    dextrose    dextrose    glucagon (human recombinant)    labetalol    Magnesium Standard Dose Replacement - Follow Nurse / BPA Driven Protocol    nitroglycerin    ondansetron **OR** ondansetron    Pharmacy to Dose  "enoxaparin (LOVENOX)    [DISCONTINUED] meropenem **AND** Pharmacy to Dose meropenem (MERREM)    Phosphorus Replacement - Follow Nurse / BPA Driven Protocol    Potassium Replacement - Follow Nurse / BPA Driven Protocol    [COMPLETED] Insert Peripheral IV **AND** sodium chloride    sodium chloride    sodium chloride   Allergies:  Hydrocodone, Iodinated contrast media, Latex, and Penicillins    ROS:  Review of Systems   Constitutional:  Positive for fatigue. Negative for activity change, chills, fever and unexpected weight change.   HENT:  Negative for congestion, dental problem, hearing loss, mouth sores, nosebleeds, sore throat and trouble swallowing.    Eyes:  Negative for photophobia, redness and visual disturbance.   Respiratory:  Negative for cough, chest tightness and shortness of breath.    Cardiovascular:  Negative for chest pain, palpitations and leg swelling.   Gastrointestinal:  Positive for constipation. Negative for abdominal distention, abdominal pain, blood in stool, diarrhea, nausea and vomiting.   Endocrine: Negative for cold intolerance and heat intolerance.   Genitourinary:  Negative for decreased urine volume, difficulty urinating, frequency, hematuria and urgency.   Musculoskeletal:  Positive for arthralgias (Right shoulder pain.). Negative for gait problem.   Skin:  Negative for rash and wound.   Neurological:  Negative for dizziness, tremors, weakness, light-headedness, numbness and headaches.   Hematological:  Negative for adenopathy. Does not bruise/bleed easily.   Psychiatric/Behavioral:  Negative for confusion and hallucinations. The patient is not nervous/anxious.    All other systems reviewed and are negative.       Objective     Vital Signs:   BP (!) 81/62 (BP Location: Left arm, Patient Position: Lying)   Pulse 117   Temp 98 °F (36.7 °C) (Oral)   Resp 14   Ht 157.5 cm (62\")   Wt 56.6 kg (124 lb 12.5 oz)   LMP  (LMP Unknown)   SpO2 100%   BMI 22.82 kg/m²     Physical " Exam:  Physical Exam  Vitals and nursing note reviewed.   Constitutional:       General: She is not in acute distress.     Appearance: Normal appearance. She is well-developed and normal weight. She is not diaphoretic.   HENT:      Head: Normocephalic and atraumatic.      Nose: Nose normal.      Comments: Oxygen per nasal cannula.     Mouth/Throat:      Mouth: Mucous membranes are moist.      Pharynx: Oropharynx is clear. No oropharyngeal exudate or posterior oropharyngeal erythema.      Comments: Dental fillings.  Eyes:      General: No scleral icterus.     Extraocular Movements: Extraocular movements intact.      Conjunctiva/sclera: Conjunctivae normal.      Pupils: Pupils are equal, round, and reactive to light.   Cardiovascular:      Rate and Rhythm: Regular rhythm. Tachycardia present.      Heart sounds: Normal heart sounds. No murmur heard.     Comments: Cardiac monitor leads.  Pulmonary:      Effort: Pulmonary effort is normal. No respiratory distress.      Breath sounds: Normal breath sounds. No wheezing or rales.      Comments: Left chest wall Idshyl-t-Ghub access.  Abdominal:      General: Bowel sounds are normal. There is no distension.      Palpations: Abdomen is soft. There is no mass.      Tenderness: There is no abdominal tenderness. There is no guarding.   Genitourinary:     Comments: Deferred   Musculoskeletal:         General: No swelling, tenderness or deformity. Normal range of motion.      Cervical back: Normal range of motion and neck supple.      Right lower leg: No edema.      Left lower leg: No edema.      Comments: Left upper extremity O2 monitor.   Lymphadenopathy:      Cervical: No cervical adenopathy.      Upper Body:      Right upper body: No supraclavicular adenopathy.      Left upper body: No supraclavicular adenopathy.   Skin:     General: Skin is warm and dry.      Coloration: Skin is not pale.      Findings: No bruising, erythema or rash.      Comments: Right upper extremity IV.    Neurological:      General: No focal deficit present.      Mental Status: She is alert and oriented to person, place, and time.      Coordination: Coordination normal.   Psychiatric:         Mood and Affect: Mood normal.         Behavior: Behavior normal.         Thought Content: Thought content normal.          Results Review:  Lab Results (last 48 hours)       Procedure Component Value Units Date/Time    POC Glucose Once [[id number]]  (Abnormal) Collected: 12/08/23 0711    Specimen: Blood Updated: 12/08/23 0713     Glucose 127 mg/dL      Comment: Serial Number: [id number]:  [id number]       Basic Metabolic Panel [[id number]]  (Abnormal) Collected: 12/08/23 0514    Specimen: Blood Updated: 12/08/23 0601     Glucose 140 mg/dL      BUN 24 mg/dL      Creatinine 0.71 mg/dL      Sodium 142 mmol/L      Potassium 4.1 mmol/L      Chloride 109 mmol/L      CO2 22.0 mmol/L      Calcium 8.0 mg/dL      BUN/Creatinine Ratio 33.8     Anion Gap 11.0 mmol/L      eGFR 96.3 mL/min/1.73     Narrative:      GFR Normal >60  Chronic Kidney Disease <60  Kidney Failure <15      High Sensitivity Troponin T [[id number]]  (Abnormal) Collected: 12/08/23 0514    Specimen: Blood Updated: 12/08/23 0557     HS Troponin T 136 ng/L     Narrative:      High Sensitive Troponin T Reference Range:  <14.0 ng/L- Negative Female for AMI  <22.0 ng/L- Negative Male for AMI  >=14 - Abnormal Female indicating possible myocardial injury.  >=22 - Abnormal Male indicating possible myocardial injury.   Clinicians would have to utilize clinical acumen, EKG, Troponin, and serial changes to determine if it is an Acute Myocardial Infarction or myocardial injury due to an underlying chronic condition.         Calcium, Ionized [[id number]]  (Abnormal) Collected: 12/08/23 0514    Specimen: Blood Updated: 12/08/23 0542     Ionized Calcium 1.09 mmol/L     CBC (No Diff) [[id number]]  (Abnormal) Collected: 12/08/23 0514    Specimen: Blood Updated: 12/08/23 0538     WBC  4.50 10*3/mm3      RBC 2.77 10*6/mm3      Hemoglobin 9.6 g/dL      Hematocrit 28.5 %      .9 fL      MCH 34.8 pg      MCHC 33.8 g/dL      RDW 21.6 %      RDW-SD 77.0 fl      MPV 7.7 fL      Platelets 195 10*3/mm3     MRSA Screen, PCR (Inpatient) - Swab, Nares [[id number]]  (Normal) Collected: 12/08/23 0321    Specimen: Swab from Nares Updated: 12/08/23 0440     MRSA PCR No MRSA Detected    Narrative:      The negative predictive value of this diagnostic test is high and should only be used to consider de-escalating anti-MRSA therapy. A positive result may indicate colonization with MRSA and must be correlated clinically.    Respiratory Panel PCR w/COVID-19(SARS-CoV-2) KARLEY/IRLANDA/KINZA/PAD/COR/KY In-House, NP Swab in UTM/VTM, 2 HR TAT - Swab, Nasopharynx [[id number]]  (Normal) Collected: 12/08/23 0228    Specimen: Swab from Nasopharynx Updated: 12/08/23 0335     ADENOVIRUS, PCR Not Detected     Coronavirus 229E Not Detected     Coronavirus HKU1 Not Detected     Coronavirus NL63 Not Detected     Coronavirus OC43 Not Detected     COVID19 Not Detected     Human Metapneumovirus Not Detected     Human Rhinovirus/Enterovirus Not Detected     Influenza A PCR Not Detected     Influenza B PCR Not Detected     Parainfluenza Virus 1 Not Detected     Parainfluenza Virus 2 Not Detected     Parainfluenza Virus 3 Not Detected     Parainfluenza Virus 4 Not Detected     RSV, PCR Not Detected     Bordetella pertussis pcr Not Detected     Bordetella parapertussis PCR Not Detected     Chlamydophila pneumoniae PCR Not Detected     Mycoplasma pneumo by PCR Not Detected    Narrative:      In the setting of a positive respiratory panel with a viral infection PLUS a negative procalcitonin without other underlying concern for bacterial infection, consider observing off antibiotics or discontinuation of antibiotics and continue supportive care. If the respiratory panel is positive for atypical bacterial infection (Bordetella pertussis,  Chlamydophila pneumoniae, or Mycoplasma pneumoniae), consider antibiotic de-escalation to target atypical bacterial infection.    POC Glucose Once [[id number]]  (Abnormal) Collected: 12/07/23 2224    Specimen: Blood Updated: 12/07/23 2226     Glucose 176 mg/dL      Comment: Serial Number: [id number]:  [id number]       High Sensitivity Troponin T 2Hr [[id number]]  (Abnormal) Collected: 12/07/23 1923    Specimen: Blood Updated: 12/07/23 1956     HS Troponin T 80 ng/L      Troponin T Delta 52 ng/L     Narrative:      High Sensitive Troponin T Reference Range:  <14.0 ng/L- Negative Female for AMI  <22.0 ng/L- Negative Male for AMI  >=14 - Abnormal Female indicating possible myocardial injury.  >=22 - Abnormal Male indicating possible myocardial injury.   Clinicians would have to utilize clinical acumen, EKG, Troponin, and serial changes to determine if it is an Acute Myocardial Infarction or myocardial injury due to an underlying chronic condition.         Blood Culture - Blood, Hand, Left [[id number]] Collected: 12/07/23 1923    Specimen: Blood from Hand, Left Updated: 12/07/23 1923    POC Lactate [[id number]]  (Normal) Collected: 12/07/23 1902    Specimen: Blood Updated: 12/07/23 1904     Lactate 1.5 mmol/L      Comment: Serial Number: [id number]:  [id number]       Blood Culture - Blood, Hand, Left [[id number]] Collected: 12/07/23 1859    Specimen: Blood from Hand, Left Updated: 12/07/23 1901    Comprehensive Metabolic Panel [[id number]]  (Abnormal) Collected: 12/07/23 1728    Specimen: Blood Updated: 12/07/23 1810     Glucose 171 mg/dL      BUN 21 mg/dL      Creatinine 0.74 mg/dL      Sodium 138 mmol/L      Potassium 4.0 mmol/L      Comment: Slight hemolysis detected by analyzer. Result may be falsely elevated.        Chloride 107 mmol/L      CO2 21.0 mmol/L      Calcium 7.5 mg/dL      Total Protein 5.3 g/dL      Albumin 3.0 g/dL      ALT (SGPT) 16 U/L      AST (SGOT) 34 U/L      Comment: Slight hemolysis  detected by analyzer. Result may be falsely elevated.        Alkaline Phosphatase 75 U/L      Total Bilirubin 0.4 mg/dL      Globulin 2.3 gm/dL      A/G Ratio 1.3 g/dL      BUN/Creatinine Ratio 28.4     Anion Gap 10.0 mmol/L      eGFR 91.6 mL/min/1.73     Narrative:      GFR Normal >60  Chronic Kidney Disease <60  Kidney Failure <15      BNP [[id number]]  (Abnormal) Collected: 12/07/23 1728    Specimen: Blood Updated: 12/07/23 1805     proBNP 2,176.0 pg/mL     Narrative:      This assay is used as an aid in the diagnosis of individuals suspected of having heart failure. It can be used as an aid in the diagnosis of acute decompensated heart failure (ADHF) in patients presenting with signs and symptoms of ADHF to the emergency department (ED). In addition, NT-proBNP of <300 pg/mL indicates ADHF is not likely.    Age Range Result Interpretation  NT-proBNP Concentration (pg/mL:      <50             Positive            >450                   Gray                 300-450                    Negative             <300    50-75           Positive            >900                  Gray                300-900                  Negative            <300      >75             Positive            >1800                  Gray                300-1800                  Negative            <300    High Sensitivity Troponin T [[id number]]  (Abnormal) Collected: 12/07/23 1728    Specimen: Blood Updated: 12/07/23 1805     HS Troponin T 28 ng/L     Narrative:      High Sensitive Troponin T Reference Range:  <14.0 ng/L- Negative Female for AMI  <22.0 ng/L- Negative Male for AMI  >=14 - Abnormal Female indicating possible myocardial injury.  >=22 - Abnormal Male indicating possible myocardial injury.   Clinicians would have to utilize clinical acumen, EKG, Troponin, and serial changes to determine if it is an Acute Myocardial Infarction or myocardial injury due to an underlying chronic condition.         POC Creatinine [[id number]]  (Normal)  Collected: 12/07/23 1710    Specimen: Venous Blood Updated: 12/07/23 1749     Creatinine 0.80 mg/dL      Comment: Serial Number: [id number]:  [id number]        eGFR 83.4 mL/min/1.73     Extra Tubes [[id number]] Collected: 12/07/23 1631    Specimen: Blood, Venous Line Updated: 12/07/23 1731    Narrative:      The following orders were created for panel order Extra Tubes.  Procedure                               Abnormality         Status                     ---------                               -----------         ------                     Gold Top - [facility][[id number]]                                   Final result                 Please view results for these tests on the individual orders.    Gold Top - SST [[id number]] Collected: 12/07/23 1631    Specimen: Blood Updated: 12/07/23 1731     Extra Tube Hold for add-ons.     Comment: Auto resulted.       Blood Gas, Arterial - [[id number]]  (Abnormal) Collected: 12/07/23 1657    Specimen: Arterial Blood Updated: 12/07/23 1703     Site Left Radial     Adrian's Test Positive     pH, Arterial 7.361 pH units      pCO2, Arterial 36.3 mm Hg      pO2, Arterial 82.2 mm Hg      HCO3, Arterial 20.5 mmol/L      Base Excess, Arterial -4.4 mmol/L      Comment: Serial Number: [id number]:  [id number]        O2 Saturation, Arterial 95.7 %      CO2 Content 21.6 mmol/L      Barometric Pressure for Blood Gas --     Comment: N/A        Modality NRB     FIO2 100 %      Hemodilution No    POC Lactate [[id number]]  (Normal) Collected: 12/07/23 1657    Specimen: Arterial Blood Updated: 12/07/23 1703     Lactate 1.2 mmol/L      Comment: Serial Number: [id number]:  [id number]       Protime-INR [[id number]]  (Normal) Collected: 12/07/23 1631    Specimen: Blood Updated: 12/07/23 1647     Protime 10.5 Seconds      INR 0.96    aPTT [[id number]]  (Abnormal) Collected: 12/07/23 1631    Specimen: Blood Updated: 12/07/23 1647     PTT 26.3 seconds     CBC & Differential [[id number]]  (Abnormal) Collected: 12/07/23  1631    Specimen: Blood Updated: 12/07/23 1638    Narrative:      The following orders were created for panel order CBC & Differential.  Procedure                               Abnormality         Status                     ---------                               -----------         ------                     CBC Auto Differential[[id number]]        Abnormal            Final result                 Please view results for these tests on the individual orders.    CBC Auto Differential [[id number]]  (Abnormal) Collected: 12/07/23 1631    Specimen: Blood Updated: 12/07/23 1638     WBC 6.90 10*3/mm3      RBC 3.32 10*6/mm3      Hemoglobin 11.5 g/dL      Hematocrit 34.0 %      .4 fL      MCH 34.5 pg      MCHC 33.7 g/dL      RDW 21.5 %      RDW-SD 80.1 fl      MPV 7.6 fL      Platelets 280 10*3/mm3      Neutrophil % 66.9 %      Lymphocyte % 25.7 %      Monocyte % 5.3 %      Eosinophil % 0.4 %      Basophil % 1.7 %      Neutrophils, Absolute 4.60 10*3/mm3      Lymphocytes, Absolute 1.80 10*3/mm3      Monocytes, Absolute 0.40 10*3/mm3      Eosinophils, Absolute 0.00 10*3/mm3      Basophils, Absolute 0.10 10*3/mm3      nRBC 0.2 /100 WBC     POC Glucose Once [[id number]]  (Abnormal) Collected: 12/07/23 1627    Specimen: Blood Updated: 12/07/23 1631     Glucose 219 mg/dL      Comment: Serial Number: [id number]:  [id number]                Pending Results: Blood cultures, haptoglobin, TSH.  Vitamin D and iron studies from office.    Imaging Reviewed:   CT Angiogram Chest Pulmonary Embolism    Result Date: 12/7/2023  Impression: 1. No evidence for pulmonary embolus. 2. Findings concerning for progression of disease with a sclerotic osseous metastatic lesion involving the lateral right with with increased consolidation and soft tissue component extending into the right middle lung. 3. Diffuse groundglass opacities with intralobular septal thickening compatible with pulmonary edema however there is additional bibasilar  airspace disease with consolidation and pleural effusions suggesting additional infectious/inflammatory process. Electronically Signed: Nadja Li MD  12/7/2023 4:34 PM CST  Workstation ID: [id number]    XR Chest 1 View    Result Date: 12/7/2023  Impression: Right lower lobe consolidation and small effusion suspicious for pneumonia in the right clinical setting. Ill-defined more nodular alveolar opacities noted more so on the left, which could reflect edema or multifocal infection. Electronically Signed: Howard Razo MD  12/7/2023 4:56 PM EST  Workstation ID: [id number]     I have reviewed the patient's labs, imaging, reports, and other clinician documentation.         Assessment & Plan       ASSESSMENT  Mvasi infusion reaction and EKG changes-acute onset after multiple tolerated infusions.  Unresponsive to initial treatment.  EKG suspicious for anterior septal infarction and troponin rising with development of chest pain.  Mvasi was discontinued and Lonsurf was put on hold.  Due for growth factor on 12/11/2023.  Pulmonary and cardiology have been consulted.  Mvasi has been shown to cause myocardial dysfunction in 10% of patients.  On ASA and treatment Lovenox.  Stage IV colon cancer-chest CT showed progression of disease in her lungs and bones but she has had disease in these areas.  She is on Xgeva as an outpatient.    Acute on chronic anemia, history of REGULO, macrocytosis and vitamin B12 deficiency-she has chemo induced anemia with macrocytosis.  Will continue oral B12 and start Procrit.  Will check for hemolysis and thyroid dysfunction.  Vitamin D deficiency and osteopenia-will continue supplementation.  Will obtain vitamin D level from office.  Started Tums for low ionized calcium.  Contrast dye allergy-recommend to avoid IV contrast with scans.  Right lower lobe pneumonia, metastatic lung disease and acute respiratory failure-Per pulmonary.    PLAN  Discontinue Mvasi.  Will discuss other options of  treatment as an outpatient.  Await cardiology consultation.  Await pulmonary consultation.  Haptoglobin and TSH.  Obtain vitamin D and iron studies from office.  TUMS, 1 tablet daily.  Continue daily oral B12 and vitamin D.  Start Pepcid.  Start Procrit 40,000 units subcu weekly.    Patient was seen and evaluated by Dr. Perera.  Electronically signed by ARNOLDO Toussaint, 12/08/23, 9:39 AM EST.     On 12/8/2023, I have personally performed a face-to-face diagnostic evaluation on this patient. I have performed a complete history and physical examination, reviewed laboratory studies, and radiographic examinations.  I have completed the majority and substantive portion of the medical decision making.  I have formulated the assessment on this patient and the plan of action as noted above. I have discussed the case with Rosa Perez NP, have edited/reviewed the note, and agree with the care plan.  The patient was sent from to the hospital from our office after having suffered an acute episode of shortness of air while receiving VGEF therapy.  On examination, left chest wall Udvuof-p-Diza is accessed and cardiac monitor leads are present.  Labs are significant for a high troponin and EKG changes.  CT chest is suggestive of inflammation/infection in addition to other changes.  Cardiology and pulmonary consultations have been obtained.  Will discontinue VGEF therapy and start Procrit for chemotherapy-induced anemia.       On 12/8/2023, I discussed the patient's findings and my recommendations with patient.    Thank you for this consult.  We will be happy to follow along in the care of this patient.     Part of this note may be an electronic transcription/translation of spoken language to printed text using the Dragon Dictation System.    Electronically signed by Makr Perera MD, 12/08/23, 6:18 PM EST.

## 2023-12-08 NOTE — H&P
[facility] Hospital Medicine Services      Patient Name: Samantha Massey  : 1960  MRN: [id number]  Primary Care Physician:  Diana Spencer MD  Date of admission: 2023      Subjective      Chief Complaint: dyspnea    History of Present Illness: Samantha Massey is a 63 y.o. female with PMHX of Colonic cancer with mets to liver, lung, and bone along with htn who presented to [facility] on 2023 complaining of dyspnea.  Patient states she was getting a chemo infusion earlier today when she became more short of breath than usual.  Admits to worsening dyspnea over the past 3-4 days with cough.  Due to concern for allergic reaction was sent to [facility] for evaluation.    In the ER, vitals 98F, , RR 24, /108, 100% bipap.  ABG 7.361/36.3/82.2/20.5.  HS troponin 28 and proBNP 2176.  CT PE suggestive of pulm edema and multi-focal pneumonia.  Patient to be admitted for respiratory failure 2/2 pulm edema and pneumonia.      ROS   12 point ROS reviewed and negative except as mentioned above      Personal History     Past Medical History:   Diagnosis Date    Cancer     wiht mets to liver, bone    Chronic kidney disease     Colon cancer     History of colon cancer, stage IV     Stage SHELL with progression    Hypertension     Lung cancer     with colon    PONV (postoperative nausea and vomiting)     Pulmonary arterial hypertension     Radiculopathy 2012    Right L5/S1       Past Surgical History:   Procedure Laterality Date    ABDOMINAL SURGERY      CYST REMOVAL      cyst removed from [facility] in     PORTACATH PLACEMENT  2017    VENOUS ACCESS DEVICE (PORT) INSERTION Left 2021    Procedure: INSERTION VENOUS ACCESS DEVICE;  Surgeon: Jay Yeh MD;  Location[facility] MAIN OR;  Service: General;  Laterality: Left;    VENOUS ACCESS DEVICE (PORT) REMOVAL Right 2020    Procedure: REMOVAL VENOUS ACCESS DEVICE;  Surgeon: Jay Yeh MD;   Location: [facility] MAIN OR;  Service: General;  Laterality: Right;    VENTRAL/INCISIONAL HERNIA REPAIR N/A 3/2/2023    Procedure: VENTRAL/INCISIONAL HERNIA REPAIR WITH MESH;  Surgeon: Jay Yeh MD;  Location: [facility] MAIN OR;  Service: General;  Laterality: N/A;       Family History: family history includes Cancer in her father and sister. Otherwise pertinent FHx was reviewed and not pertinent to current issue.    Social History:  reports that she quit smoking about 11 years ago. Her smoking use included cigarettes. She has a 30.00 pack-year smoking history. She has been exposed to tobacco smoke. She has never used smokeless tobacco. She reports current alcohol use. She reports that she does not use drugs.    Home Medications:  Prior to Admission Medications       Prescriptions Last Dose Informant Patient Reported? Taking?    ALPRAZolam (Xanax) 0.5 MG tablet   No No    Take 1 tablet by mouth 2 (Two) Times a Day As Needed for Anxiety (white coat syndrome). Take prior to infusion visits. Repeat x1 as needed during visit.    aMILoride (MIDAMOR) 5 MG tablet   Yes No    Take 2 tablets by mouth Daily. with food    Patient not taking:  Reported on 6/1/2023    Cyanocobalamin (B-12) 1000 MCG tablet   Yes No    doxycycline (VIBRAMYICN) 100 MG tablet   Yes No    Take 1 tablet by mouth 2 (Two) Times a Day.    ferrous sulfate 324 (65 Fe) MG tablet delayed-release EC tablet   Yes No    Take 1 tablet by mouth Daily With Breakfast. HOLD dos    lisinopril (PRINIVIL,ZESTRIL) 10 MG tablet   Yes No    Take 1 tablet by mouth Daily.    Lysine 500 MG capsule   Yes No    Take 1 tablet by mouth 2 (Two) Times a Day.    sennosides-docusate (PERICOLACE) 8.6-50 MG per tablet   No No    Take 2 tablets by mouth 2 (Two) Times a Day.    Patient not taking:  Reported on 6/1/2023    Stivarga 40 MG chemo tablet   Yes No    vitamin D3 125 MCG (5000 UT) capsule capsule  Self Yes No    Take 1 capsule by mouth Daily.               Allergies:  Allergies   Allergen Reactions    Hydrocodone Nausea And Vomiting    Iodinated Contrast Media Hives and Itching     PT HAD SOME HIVES DURING SCAN.  PRIOR TO SCAN 8-3-2013.    Latex Rash     Blisters     Penicillins Hives       Objective      Vitals:   Temp:  [98 °F (36.7 °C)] 98 °F (36.7 °C)  Heart Rate:  [126-156] 126  Resp:  [16-24] 22  BP: (131-196)/() 134/77  Flow (L/min):  [12] 12    Physical Exam  Constitutional:       General: She is not in acute distress.     Appearance: She is not toxic-appearing.   HENT:      Head: Normocephalic and atraumatic.      Nose: Nose normal. No congestion.      Mouth/Throat:      Pharynx: Oropharynx is clear. No oropharyngeal exudate.   Eyes:      General: No scleral icterus.  Cardiovascular:      Rate and Rhythm: Regular rhythm. Tachycardia present.      Heart sounds: No murmur heard.     No friction rub. No gallop.   Pulmonary:      Breath sounds: Rhonchi and rales present.      Comments: Patient on Bipap  Abdominal:      General: There is no distension.      Tenderness: There is no abdominal tenderness. There is no guarding.   Musculoskeletal:         General: No swelling, deformity or signs of injury.      Cervical back: Normal range of motion. No rigidity.   Skin:     Coloration: Skin is not jaundiced.      Findings: No bruising or lesion.   Neurological:      General: No focal deficit present.      Mental Status: She is alert and oriented to person, place, and time.      Motor: Weakness present.        Result Review    Result Review:  I have personally reviewed the results from the time of this admission to 12/7/2023 19:41 EST and agree with these findings:  [x]  Laboratory  []  Microbiology  [x]  Radiology  []  EKG/Telemetry   []  Cardiology/Vascular   []  Pathology  [x]  Old records  []  Other:        Assessment & Plan        Active Hospital Problems:  There are no active hospital problems to display for this patient.    Plan:     #Acute Hypoxic  Respiratory failure  #Pulmonary edema  #Bilateral pneumonia    - ABG 7.361/36.3/82.2/20.5    - Bipap started    - CT PE without PE but does show progress of disease with a sclerotic osseous metastatic lesion extending into the right middle lung complicated by pulmonary edema with bibasilar airspace disesae    - Treat as immunocomprimised    - Vancomycin, pharm to dose, monitor for toxicity    - Merrem, 1gm IV q8h    - strep, legionella, sputum    - MRSA swab    - blood cultures    - check RVO    - Duoneb QID    - Pulmicort BID    - proBNP 2176    - Lasix 40mg IV  BID, watch for nephrotoxicity    - pulm consulted    - HS troponin 28, trend, suspect 2/2 demand    - echo    - pulm consulted, may need bronchoscopy    - NPO    #Lung cancer with mets  #Neoplasm of colon metastatic to liver and bone    - extensive oncological history, follows with Dr. Perera and Dr. Dey    - hold current treatment    - Will consult Dr. Perera to follow while admitted    #DM    - ISS    #HTN    - Labetalol IV PRN with parameters    #Anxiety    - hole home Xanax while on Bipap        DVT prophylaxis: SCDs      CODE STATUS:       Admission Status:  I believe this patient meets inpatient status.    I discussed the patient's findings and my recommendations with patient.    This patient has been examined wearing appropriate Personal Protective Equipment and discussed with  Patine . 12/07/23      Signature: Electronically signed by Naomi Dover DO, 12/08/23, 1:18 AM EST.

## 2023-12-08 NOTE — PLAN OF CARE
Goal Outcome Evaluation:      Patient is A&Ox4 and on 4LO2 tolerating that well with O2 95%. NPO. Came with accessed port on L subclavian. Last accessed yesterday for chemotherapy and was no deaccessed. Rhythm appeared abnormal called for EKG. EKG showed abnormal T wave and prolonged QT interval. Patient c/o feeling pressure in chest but no pain or palpitations. Patient currently receiving breathing tx and has no questions or concerns at this time.

## 2023-12-08 NOTE — CONSULTS
Critical Care Consult Note   Samantha Massey : 1960 MRN:[id number] LOS:1 ROOM: [facility]/1     Reason for admission: Acute hypoxic respiratory failure     Assessment / Plan     Severe single-vessel coronary artery disease  Acute heart failure with reduced ejection fraction  IABP placed  Lasix 40 twice daily  Carvedilol 6.25 twice daily  Aspirin Lipitor ordered  Echo on 2023 EF 31-35%   Cardiology following    Metastatic cancer --primary colon cancer with mets to liver, lung and bones  Heme-onc following and managing  MVASI infusion reaction & EKG changes - Discontinued by Heme-Onc  Heme-onc ordered Procrit weekly for chemotherapy-induced anemia    Chronic:  Essential hypertension -- Well controlled. Patient not on medication a home  Former Smoker -- 1 pack/ day for 30 years. Quit in       Code Status (Patient has no pulse and is not breathing): CPR (Attempt to Resuscitate)  Medical Interventions (Patient has pulse or is breathing): Full Support       Nutrition: Diet: Cardiac Diets; Healthy Heart (2-3 Na+); Texture: Regular Texture (IDDSI 7); Fluid Consistency: Thin (IDDSI 0) Patient isn't on Tube Feeding     DVT prophylaxis:  Medical and mechanical DVT prophylaxis orders are present.     History of Present illness     A 63 y.o. old female patient with PMH of hypertension, stage IV colon cancer with metastasis to the liver, lungs, bones, presents to the hospital with complaints of increased shortness of breath with nonproductive cough during hemodialysis.  Patient reported symptoms beginning 3 to 4 days prior.  Chest CT ER showed pulmonary edema, multifocal pneumonia.  Troponin trended from .  Patient was admitted for acute respiratory failure with multifocal pneumonia and CHF.  Patient was started on BiPAP and admitted to hospitalist group.    Today patient was taken to cardiac Cath Lab which showed severe single-vessel coronary disease with 100% LAD and severe LV dysfunction.  A balloon pump was  inserted for hemodynamic support and patient was admitted to [facility] following for further monitoring and management.    ACP: CPR full interventions.    Patient was seen and examined on 12/08/23 at 14:26 EST .    Subjective / Review of systems     Review of Systems   Constitutional:  Negative for chills and fatigue.   Respiratory:  Negative for chest tightness and shortness of breath.    Gastrointestinal:  Negative for abdominal distention, diarrhea, nausea and vomiting.   Musculoskeletal:  Negative for back pain.   Neurological:  Negative for dizziness and numbness.        Past Medical/Surgical/Social/Family History & Allergies     Past Medical History:   Diagnosis Date    Cancer     wiht mets to liver, bone    Chronic kidney disease     Colon cancer     History of colon cancer, stage IV     Stage SHELL with progression    Hypertension     Lung cancer     with colon    PONV (postoperative nausea and vomiting)     Pulmonary arterial hypertension     Radiculopathy 07/2012    Right L5/S1      Past Surgical History:   Procedure Laterality Date    ABDOMINAL SURGERY      CYST REMOVAL  1998    [facility] 1998    PORTACATH PLACEMENT  2017    VENOUS ACCESS DEVICE (PORT) INSERTION Left 01/12/2021    Procedure: INSERTION VENOUS ACCESS DEVICE;  Surgeon: Jay Yeh MD;  Location: [facility] MAIN OR;  Service: General;  Laterality: Left;    VENOUS ACCESS DEVICE (PORT) REMOVAL Right 08/27/2020    Procedure: REMOVAL VENOUS ACCESS DEVICE;  Surgeon: Jay Yeh MD;  Location: [facility] MAIN OR;  Service: General;  Laterality: Right;    VENTRAL/INCISIONAL HERNIA REPAIR N/A 3/2/2023    Procedure: VENTRAL/INCISIONAL HERNIA REPAIR WITH MESH;  Surgeon: Jay Yeh MD;  Location: [facility] MAIN OR;  Service: General;  Laterality: N/A;      Social History     Socioeconomic History    Marital status: Single   Tobacco Use    Smoking status: Former     Packs/day: 1.00     Years: 30.00     Additional  pack years: 0.00     Total pack years: 30.00     Types: Cigarettes     Quit date: 2012     Years since quittin.9     Passive exposure: Past    Smokeless tobacco: Never   Vaping Use    Vaping Use: Never used   Substance and Sexual Activity    Alcohol use: Yes     Comment: 2 or 3 a month    Drug use: Never    Sexual activity: Defer      Family History   Problem Relation Age of Onset    Cancer Sister         Bladder cancer    Cancer Father       Allergies   Allergen Reactions    Hydrocodone Nausea And Vomiting    Iodinated Contrast Media Hives and Itching     PT HAD SOME HIVES DURING SCAN.  PRIOR TO SCAN 8-3-2013.    Latex Rash     Blisters     Penicillins Hives        Home Medications     Prior to Admission medications    Medication Sig Start Date End Date Taking? Authorizing Provider   Cyanocobalamin (Vitamin B-12) 5000 MCG sublingual tablet Place 1 tablet under the tongue Every Morning.   Yes Raffaele Ortiz MD   doxycycline (VIBRAMYICN) 100 MG tablet Take 1 tablet by mouth 2 (Two) Times a Day.   Yes Raffaele Ortiz MD   Lysine 500 MG capsule Take 1 tablet by mouth 2 (Two) Times a Day.   Yes Raffaele Ortiz MD   trifluridine-tipiracil (Lonsurf) 20-8.19 MG per tablet Take 2 tablets by mouth 2 (Two) Times a Day.   Yes Raffaele Ortiz MD   vitamin D3 125 MCG (5000 UT) capsule capsule Take 1 capsule by mouth Daily.   Yes Raffaele Ortiz MD   aMILoride (MIDAMOR) 5 MG tablet Take 2 tablets by mouth Daily. with food 22 Yes Raffaele Ortiz MD   Cyanocobalamin (B-12) 1000 MCG tablet   23 Yes Raffaele Ortiz MD   ALPRAZolam (Xanax) 0.5 MG tablet Take 1 tablet by mouth 2 (Two) Times a Day As Needed for Anxiety (white coat syndrome). Take prior to infusion visits. Repeat x1 as needed during visit. 21  Iram Reina APRN   ferrous sulfate 324 (65 Fe) MG tablet delayed-release EC tablet Take 1 tablet by mouth Daily With Breakfast. HOLD  dos  Patient not taking: Reported on 12/8/2023 12/8/23  ProviderRaffaele MD   lisinopril (PRINIVIL,ZESTRIL) 10 MG tablet Take 1 tablet by mouth Daily.  12/8/23  Provider, Historical, MD   sennosides-docusate (PERICOLACE) 8.6-50 MG per tablet Take 2 tablets by mouth 2 (Two) Times a Day.  Patient not taking: Reported on 6/1/2023 3/3/23 12/8/23  Jay Yeh MD   Stivarga 40 MG chemo tablet  2/23/23 12/8/23  Provider, MD Raffaele        Objective / Physical Exam     Vital signs:  Temp: 97.5 °F (36.4 °C)  BP: 129/79  Heart Rate: 97  Resp: 16  SpO2: 100 %  Weight: 56.6 kg (124 lb 12.5 oz)    Admission Weight: Weight: 59.9 kg (132 lb 0.9 oz)    Physical Exam  Vitals and nursing note reviewed.   Constitutional:       General: She is not in acute distress.     Appearance: She is not ill-appearing.   HENT:      Head: Normocephalic.      Mouth/Throat:      Mouth: Mucous membranes are moist.      Pharynx: Oropharynx is clear.   Eyes:      Extraocular Movements: Extraocular movements intact.      Conjunctiva/sclera: Conjunctivae normal.      Pupils: Pupils are equal, round, and reactive to light.   Cardiovascular:      Rate and Rhythm: Normal rate and regular rhythm.      Pulses: Normal pulses.      Heart sounds: Normal heart sounds.   Pulmonary:      Effort: Pulmonary effort is normal. No tachypnea or accessory muscle usage.      Breath sounds: Examination of the right-lower field reveals decreased breath sounds. Examination of the left-lower field reveals decreased breath sounds.   Abdominal:      General: Bowel sounds are normal.      Palpations: Abdomen is soft.   Musculoskeletal:      Right lower leg: No edema.      Left lower leg: No edema.   Skin:     General: Skin is warm and dry.      Findings: No rash.      Comments: Right groin IABP Clean dry and intact   Neurological:      Mental Status: She is alert and oriented to person, place, and time.   Psychiatric:         Mood and Affect: Mood normal.          Behavior: Behavior normal.          Labs     Results from last 7 days   Lab Units 12/08/23  0514 12/07/23  1631   WBC 10*3/mm3 4.50 6.90   HEMATOCRIT % 28.5* 34.0   PLATELETS 10*3/mm3 195 280      Results from last 7 days   Lab Units 12/08/23  0514 12/07/23  1728 12/07/23  1710   SODIUM mmol/L 142 138  --    POTASSIUM mmol/L 4.1 4.0  --    CHLORIDE mmol/L 109* 107  --    CO2 mmol/L 22.0 21.0*  --    BUN mg/dL 24* 21  --    CREATININE mg/dL 0.71 0.74 0.80        Imaging     XR Chest 1 View    Result Date: 12/8/2023  Impression: 1. Improved but residual right lower lung airspace disease and trace effusion. 2. Placement of an intra-aortic balloon pump catheter. Electronically Signed: Nadja Li MD  12/8/2023 11:44 AM CST  Workstation ID: [id number]    CT Angiogram Chest Pulmonary Embolism    Result Date: 12/7/2023  Impression: 1. No evidence for pulmonary embolus. 2. Findings concerning for progression of disease with a sclerotic osseous metastatic lesion involving the lateral right with with increased consolidation and soft tissue component extending into the right middle lung. 3. Diffuse groundglass opacities with intralobular septal thickening compatible with pulmonary edema however there is additional bibasilar airspace disease with consolidation and pleural effusions suggesting additional infectious/inflammatory process. Electronically Signed: Nadja Li MD  12/7/2023 4:34 PM CST  Workstation ID: [id number]    XR Chest 1 View    Result Date: 12/7/2023  Impression: Right lower lobe consolidation and small effusion suspicious for pneumonia in the right clinical setting. Ill-defined more nodular alveolar opacities noted more so on the left, which could reflect edema or multifocal infection. Electronically Signed: Howard Razo MD  12/7/2023 4:56 PM EST  Workstation ID: [id number]        Current Medications     Scheduled Meds:  aspirin, 81 mg, Oral, Daily  atorvastatin, 80 mg, Oral, Daily  budesonide, 0.5  mg, Nebulization, BID - RT  calcium carbonate, 1 tablet, Oral, Daily  cholecalciferol, 1,000 Units, Oral, Daily  epoetin ana-epbx, 40,000 Units, Subcutaneous, Weekly  famotidine, 20 mg, Oral, BID AC  furosemide, 40 mg, Intravenous, BID  insulin lispro, 2-9 Units, Subcutaneous, 4x Daily AC & at Bedtime  ipratropium-albuterol, 3 mL, Nebulization, Once  ipratropium-albuterol, 3 mL, Nebulization, 4x Daily - RT  meropenem, 1,000 mg, Intravenous, Q8H  sodium chloride, 10 mL, Intravenous, Q12H  vitamin B-12, 1,000 mcg, Oral, Daily         Continuous Infusions:  heparin, 12 Units/kg/hr, Last Rate: 12 Units/kg/hr (12/08/23 1315)  Pharmacy to Dose meropenem (MERREM),          ARNOLDO Collins   Critical Care  12/08/23   14:26 EST

## 2023-12-08 NOTE — CONSULTS
Cardiology Consult Note      REQUESTING PHYSICIAN    Roger Ayala MD    PATIENT IDENTIFICATION  Name: Samantha Massey  Age: 63 y.o.  Sex: female  :  1960  MRN: [id number]               Cardiology assessment and plan    Acute myocardial infarction  Non-ST elevation myocardial infarction  Cardiomyopathy  LV dysfunction  Chest discomfort  Pulmonary edema  Multivessel coronary artery disease  Possible stress cardiomyopathy  Hypoxic respiratory failure  Abnormal troponin and abnormal proBNP  Stage IV metastatic colon cancer  Multifocal pneumonia  Impressions:/Cardiac catheterization  Severe single-vessel coronary artery disease 100% occlusion of the LAD with right to left collaterals likely chronic total occlusion  Moderate to severe stenosis involving the left main supplying only a left circumflex artery with no critical stenosis  Moderate stenosis involving the ostium of the right coronary artery that supplies collaterals to the LAD territory  Severe LV dysfunction  Elevated left-sided filling pressures  Possible stress cardiomyopathy based on the LV gram findings and hypokinesis involving the mid to distal segments and also EKG changes that are out of proportion to elevated troponin    Admit patient to [facility]  Intra-aortic balloon pump for hemodynamic support  Diuresis  Low-dose beta-blockers  Continued aggressive risk factor modification  Test results reviewed and discussed with patient and family          REASON FOR CONSULTATION:  63-year-old female with no known history of ischemic heart disease.  Past medical history includes hypertension, stage IV colon cancer with metastasis to the liver/lungs/bones.    CC:  Shortness of breath  Nonproductive cough    HISTORY OF PRESENT ILLNESS:   Patient presented to the emergency department at [facility] 2023 from [facility] [place] center with complaint of worsening shortness of breath and nonproductive cough during her session.  She reports that  her symptoms actually began 3-4 days earlier, but worsened during infusion.  Due to concerns of possible allergic reaction, she was sent to the ED.  Chest CT revealed pulmonary edema, multifocal pneumonia.  No evidence of PE.  proBNP elevated at 2176.  She was started on IV antibiotics per attending as well as Tucker.  She was given Lasix 40 mg IV and scheduled twice daily.  Pulmonary was consulted.  High-sensitivity troponins were ordered with initial result 28.  The patient developed chest discomfort early this morning.  Additional troponins ordered.  She was covered with therapeutic Lovenox and given nitro as needed.  Troponin did trend upward at 80 and 136 respectively.  Cardiologist was notified and she was taken urgently to the cardiac Cath Lab.  Heart cath revealed severe single-vessel coronary disease with 100% LAD, right to left collaterals likely this is a chronic total occlusion.  Moderate-severe stenosis left main supplying only a left circumflex artery with no critical stenosis.  Moderate stenosis ostium of the RCA that supplies collaterals to the LAD territory.  She was found to have severe LV dysfunction with elevated left-sided filling pressures.  An intra-aortic balloon pump was inserted for hemodynamic support.  She was started on diuresis and admitted to the intensive care unit for close monitoring.  Upon my evaluation, patient is resting comfortably in bed.  She denies any chest discomfort at present.  Family member is at bedside.  Rhythm is sinus, blood pressure is quite stable.      REVIEW OF SYSTEMS:  Pertinent items are noted in HPI, all other systems reviewed and negative    OBJECTIVE   Ionized calcium 1.09  TSH 0.824  Hemoglobin 9.6    ASSESSMENT  Acute non-ST elevation MI  Coronary artery disease due to lipid rich plaque  Severe LV dysfunction-possibly  Hypocalcemia  Acute heart failure with reduced ejection fraction  Multifocal pneumonia  Stage IV colon cancer with metastases  Primary  "hypertension    PLAN  Intra-aortic balloon pump for hemodynamic support.  Continue diuresis and monitor renal function closely.  Add IV morphine for intermittent chest discomfort.  Check 2D echocardiogram.  Monitor hemodynamics closely.  Continue aspirin, statin.  Further recommendations per cardiologist          Vital Signs  Visit Vitals  /79   Pulse 97   Temp 97.5 °F (36.4 °C) (Oral)   Resp 16   Ht 157.5 cm (62\")   Wt 56.6 kg (124 lb 12.5 oz)   LMP  (LMP Unknown)   SpO2 100%   BMI 22.82 kg/m²     Oxygen Therapy  SpO2: 100 %  Pulse Oximetry Type: Continuous  Device (Oxygen Therapy): nasal cannula  Device (Oxygen Therapy) (Infant): NPPV/NIV  Flow (L/min): 4  Oxygen Concentration (%): (S) 60 (titrated per SATS)  Flowsheet Rows      Flowsheet Row First Filed Value   Admission Height 157.5 cm (62\") Documented at 12/07/2023 1617   Admission Weight 59.9 kg (132 lb 0.9 oz) Documented at 12/07/2023 1626          Intake & Output (last 3 days)         12/05 0701  12/06 0700 12/06 0701  12/07 0700 12/07 0701  12/08 0700 12/08 0701  12/09 0700    Urine (mL/kg/hr)   300 850 (1.8)    Total Output   300 850    Net   -300 -850                  Lines, Drains & Airways       Active LDAs       Name Placement date Placement time Site Days    Peripheral IV 12/07/23 1630 Anterior;Right Forearm 12/07/23  1630  Forearm  less than 1    Single Lumen Implantable Port 01/12/21 Left Chest 01/12/21  0759  Chest  1060    IABP 12/08/23  1030  Right femoral  less than 1    Arterial Sheath  Right Femoral 12/08/23  0949  Femoral  less than 1                    MEDICAL HISTORY    Past Medical History:   Diagnosis Date    Cancer     wiht mets to liver, bone    Chronic kidney disease     Colon cancer     History of colon cancer, stage IV     Stage SHELL with progression    Hypertension     Lung cancer     with colon    PONV (postoperative nausea and vomiting)     Pulmonary arterial hypertension     Radiculopathy 07/2012    Right L5/S1    " "    SURGICAL HISTORY    Past Surgical History:   Procedure Laterality Date    ABDOMINAL SURGERY      CYST REMOVAL      cyst removed from antoinette in     PORTACATH PLACEMENT  2017    VENOUS ACCESS DEVICE (PORT) INSERTION Left 2021    Procedure: INSERTION VENOUS ACCESS DEVICE;  Surgeon: Jay Yeh MD;  Location: [facility] MAIN OR;  Service: General;  Laterality: Left;    VENOUS ACCESS DEVICE (PORT) REMOVAL Right 2020    Procedure: REMOVAL VENOUS ACCESS DEVICE;  Surgeon: Jay Yeh MD;  Location: [facility] MAIN OR;  Service: General;  Laterality: Right;    VENTRAL/INCISIONAL HERNIA REPAIR N/A 3/2/2023    Procedure: VENTRAL/INCISIONAL HERNIA REPAIR WITH MESH;  Surgeon: Jay Yeh MD;  Location: [facility] MAIN OR;  Service: General;  Laterality: N/A;        FAMILY HISTORY    Family History   Problem Relation Age of Onset    Cancer Sister         Bladder cancer    Cancer Father        SOCIAL HISTORY    Social History     Tobacco Use    Smoking status: Former     Packs/day: 1.00     Years: 30.00     Additional pack years: 0.00     Total pack years: 30.00     Types: Cigarettes     Quit date: 2012     Years since quittin.9     Passive exposure: Past    Smokeless tobacco: Never   Substance Use Topics    Alcohol use: Yes     Comment: 2 or 3 a month        ALLERGIES    Allergies   Allergen Reactions    Hydrocodone Nausea And Vomiting    Iodinated Contrast Media Hives and Itching     PT HAD SOME HIVES DURING SCAN.  PRIOR TO SCAN 8-3-2013.    Latex Rash     Blisters     Penicillins Hives              /79   Pulse 97   Temp 97.5 °F (36.4 °C) (Oral)   Resp 16   Ht 157.5 cm (62\")   Wt 56.6 kg (124 lb 12.5 oz)   LMP  (LMP Unknown)   SpO2 100%   BMI 22.82 kg/m²   Intake/Output last 3 shifts:  I/O last 3 completed shifts:  In: -   Out: 300 [Urine:300]  Intake/Output this shift:  I/O this shift:  In: -   Out: 850 [Urine:850]    PHYSICAL " EXAM:    General: Well-developed, alert, cooperative, no distress, appears stated age  Head:  Normocephalic, hair loss from cancer treatment  Eyes:  Conjunctivae/corneas clear, EOM's intact     Neck:  Supple,  no adenopathy; no JVD or bruit  Lungs: Clear to auscultation bilaterally, no wheezes, rhonchi or rales are noted  Chest wall: No tenderness  Heart::  Regular rate and rhythm, S1 and S2 normal, no murmur, rub or gallop.  IABP noted  Abdomen: Soft, nontender, nondistended, bowel sounds active  Extremities: No cyanosis, clubbing, or edema   Pulses: 2+ and symmetric all extremities  Skin:  No rashes or lesions  Neuro/psych: A&O x3. CN II through XII are grossly intact with appropriate affect      Scheduled Meds:      aspirin, 81 mg, Oral, Daily  atorvastatin, 80 mg, Oral, Daily  budesonide, 0.5 mg, Nebulization, BID - RT  calcium carbonate, 1 tablet, Oral, Daily  cholecalciferol, 1,000 Units, Oral, Daily  epoetin ana-epbx, 40,000 Units, Subcutaneous, Weekly  famotidine, 20 mg, Oral, BID AC  furosemide, 40 mg, Intravenous, BID  insulin lispro, 2-9 Units, Subcutaneous, 4x Daily AC & at Bedtime  ipratropium-albuterol, 3 mL, Nebulization, Once  ipratropium-albuterol, 3 mL, Nebulization, 4x Daily - RT  meropenem, 1,000 mg, Intravenous, Q8H  sodium chloride, 10 mL, Intravenous, Q12H  vitamin B-12, 1,000 mcg, Oral, Daily        Continuous Infusions:    heparin, 12 Units/kg/hr, Last Rate: 12 Units/kg/hr (12/08/23 1315)  Pharmacy to Dose meropenem (MERREM),         PRN Meds:      acetaminophen    Calcium Replacement - Follow Nurse / BPA Driven Protocol    dextrose    dextrose    glucagon (human recombinant)    heparin    heparin    labetalol    Magnesium Standard Dose Replacement - Follow Nurse / BPA Driven Protocol    Morphine    nitroglycerin    ondansetron **OR** ondansetron    [DISCONTINUED] meropenem **AND** Pharmacy to Dose meropenem (MERREM)    Phosphorus Replacement - Follow Nurse / BPA Driven Protocol     "Potassium Replacement - Follow Nurse / BPA Driven Protocol    [COMPLETED] Insert Peripheral IV **AND** sodium chloride    sodium chloride    sodium chloride        Results Review:     I reviewed the patient's new clinical results.    CBC    Results from last 7 days   Lab Units 12/08/23  0514 12/07/23  1631   WBC 10*3/mm3 4.50 6.90   HEMOGLOBIN g/dL 9.6* 11.5*   PLATELETS 10*3/mm3 195 280     Cr Clearance Estimated Creatinine Clearance: 73.4 mL/min (by C-G formula based on SCr of 0.71 mg/dL).  Coag   Results from last 7 days   Lab Units 12/08/23  1237 12/07/23  1631   INR  1.07 0.96   APTT seconds 28.8* 26.3*     HbA1C No results found for: \"HGBA1C\"  Blood Glucose   Glucose   Date/Time Value Ref Range Status   12/08/2023 1241 157 (H) 70 - 105 mg/dL Final     Comment:     Serial Number: [id number]:  [id number]   12/08/2023 0711 127 (H) 70 - 105 mg/dL Final     Comment:     Serial Number: [id number]:  [id number]   12/07/2023 2224 176 (H) 70 - 105 mg/dL Final     Comment:     Serial Number: [id number]:  [id number]   12/07/2023 1627 219 (H) 70 - 105 mg/dL Final     Comment:     Serial Number: [id number]:  [id number]     Infection     CMP   Results from last 7 days   Lab Units 12/08/23  0514 12/07/23  1728 12/07/23  1710   SODIUM mmol/L 142 138  --    POTASSIUM mmol/L 4.1 4.0  --    CHLORIDE mmol/L 109* 107  --    CO2 mmol/L 22.0 21.0*  --    BUN mg/dL 24* 21  --    CREATININE mg/dL 0.71 0.74 0.80   GLUCOSE mg/dL 140* 171*  --    ALBUMIN g/dL  --  3.0*  --    BILIRUBIN mg/dL  --  0.4  --    ALK PHOS U/L  --  75  --    AST (SGOT) U/L  --  34*  --    ALT (SGPT) U/L  --  16  --      ABG    Results from last 7 days   Lab Units 12/07/23  1657   PH, ARTERIAL pH units 7.361   PCO2, ARTERIAL mm Hg 36.3   PO2 ART mm Hg 82.2*   O2 SATURATION ART % 95.7   BASE EXCESS ART mmol/L -4.4*     UA      MIRZA  No results found for: \"POCMETH\", \"POCAMPHET\", \"POCBARBITUR\", \"POCBENZO\", \"POCCOCAINE\", \"POCOPIATES\", " "\"POCOXYCODO\", \"POCPHENCYC\", \"POCPROPOXY\", \"POCTHC\", \"POCTRICYC\"  Lysis Labs   Results from last 7 days   Lab Units 12/08/23  1237 12/08/23  0514 12/07/23  1728 12/07/23  1710 12/07/23  1631   INR  1.07  --   --   --  0.96   APTT seconds 28.8*  --   --   --  26.3*   HEMOGLOBIN g/dL  --  9.6*  --   --  11.5*   PLATELETS 10*3/mm3  --  195  --   --  280   CREATININE mg/dL  --  0.71 0.74 0.80  --      Radiology(recent) XR Chest 1 View    Result Date: 12/8/2023  Impression: 1. Improved but residual right lower lung airspace disease and trace effusion. 2. Placement of an intra-aortic balloon pump catheter. Electronically Signed: Nadja Li MD  12/8/2023 11:44 AM CST  Workstation ID: [id number]    CT Angiogram Chest Pulmonary Embolism    Result Date: 12/7/2023  Impression: 1. No evidence for pulmonary embolus. 2. Findings concerning for progression of disease with a sclerotic osseous metastatic lesion involving the lateral right with with increased consolidation and soft tissue component extending into the right middle lung. 3. Diffuse groundglass opacities with intralobular septal thickening compatible with pulmonary edema however there is additional bibasilar airspace disease with consolidation and pleural effusions suggesting additional infectious/inflammatory process. Electronically Signed: Nadja Li MD  12/7/2023 4:34 PM CST  Workstation ID: [id number]    XR Chest 1 View    Result Date: 12/7/2023  Impression: Right lower lobe consolidation and small effusion suspicious for pneumonia in the right clinical setting. Ill-defined more nodular alveolar opacities noted more so on the left, which could reflect edema or multifocal infection. Electronically Signed: Howard Razo MD  12/7/2023 4:56 PM EST  Workstation ID: [id number]       Results from last 7 days   Lab Units 12/08/23  0514   HSTROP T ng/L 136*       X-rays, labs reviewed personally by physician.    ECG/EMG Results (most recent)       Procedure Component " Value Units Date/Time    ECG 12 Lead Rhythm Change [[id number]] Collected: 12/08/23 0409     Updated: 12/08/23 0410     QT Interval 453 ms      QTC Interval 608 ms     Narrative:      HEART RATE= 108  bpm  RR Interval= 556  ms  VT Interval= 84  ms  P Horizontal Axis=   deg  P Front Axis= 91  deg  QRSD Interval= 77  ms  QT Interval= 453  ms  QTcB= 608  ms  QRS Axis= -7  deg  T Wave Axis= 183  deg  - ABNORMAL ECG -  Sinus tachycardia  Consider anteroseptal infarct  Abnormal T, consider ischemia, diffuse leads  Prolonged QT interval  Electronically Signed By:   Date and Time of Study: 2023-12-08 04:09:21    SCANNED - TELEMETRY   [[id number]] Resulted: 12/07/23     Updated: 12/08/23 0548    SCANNED - TELEMETRY   [[id number]] Resulted: 12/07/23     Updated: 12/08/23 0548    ECG 12 Lead Chest Pain [[id number]] Collected: 12/08/23 0853     Updated: 12/08/23 0855     QT Interval 479 ms      QTC Interval 590 ms     Narrative:      HEART RATE= 91  bpm  RR Interval= 660  ms  VT Interval= 158  ms  P Horizontal Axis= -61  deg  P Front Axis= 82  deg  QRSD Interval= 76  ms  QT Interval= 479  ms  QTcB= 590  ms  QRS Axis= -6  deg  T Wave Axis= 165  deg  - ABNORMAL ECG -  Sinus rhythm  Probable left atrial enlargement  Anteroseptal infarct, age indeterminate  Prolonged QT interval  When compared with ECG of 08-Dec-2023 4:09:21,  Significant axis, voltage or hypertrophy change  Electronically Signed By:   Date and Time of Study: 2023-12-08 08:53:11    ECG 12 Lead Tachycardia [[id number]] Collected: 12/07/23 1746     Updated: 12/08/23 0915     QT Interval 328 ms      QTC Interval 482 ms     Narrative:      HEART RATE= 130  bpm  RR Interval= 464  ms  VT Interval= 138  ms  P Horizontal Axis= -57  deg  P Front Axis= 33  deg  QRSD Interval= 76  ms  QT Interval= 328  ms  QTcB= 482  ms  QRS Axis= 1  deg  T Wave Axis= 162  deg  - ABNORMAL ECG -  Sinus tachycardia  Atrial premature complex  Anteroseptal infarct, age indeterminate  repol abnrm  suggests ischemia, anterolateral  When compared with ECG of 07-Dec-2023 16:19:58,  Significant rate decrease  Significant axis, voltage or hypertrophy change  Electronically Signed By: Servando Costello (KINZA) 08-Dec-2023 09:14:50  Date and Time of Study: 2023-12-07 17:46:10    ECG 12 Lead Other; Tachycardia [[id number]] Collected: 12/07/23 1619     Updated: 12/08/23 0915     QT Interval 283 ms      QTC Interval 450 ms     Narrative:      HEART RATE= 152  bpm  RR Interval= 396  ms  IA Interval= 183  ms  P Horizontal Axis= -4  deg  P Front Axis= 38  deg  QRSD Interval= 77  ms  QT Interval= 283  ms  QTcB= 450  ms  QRS Axis= -2  deg  T Wave Axis= 169  deg  - ABNORMAL ECG -  Sinus tachycardia  Probable left atrial enlargement  Probable anteroseptal infarct, old  Repolarization abnormality, prob rate related  When compared with ECG of 23-Feb-2023 15:58:35,  Significant rate increase  Significant repolarization change  Electronically Signed By: Servando Costello (KINZA) 08-Dec-2023 09:15:37  Date and Time of Study: 2023-12-07 16:19:58              Medication Review:   I have reviewed the patient's current medication list  Scheduled Meds:aspirin, 81 mg, Oral, Daily  atorvastatin, 80 mg, Oral, Daily  budesonide, 0.5 mg, Nebulization, BID - RT  calcium carbonate, 1 tablet, Oral, Daily  cholecalciferol, 1,000 Units, Oral, Daily  epoetin ana-epbx, 40,000 Units, Subcutaneous, Weekly  famotidine, 20 mg, Oral, BID AC  furosemide, 40 mg, Intravenous, BID  insulin lispro, 2-9 Units, Subcutaneous, 4x Daily AC & at Bedtime  ipratropium-albuterol, 3 mL, Nebulization, Once  ipratropium-albuterol, 3 mL, Nebulization, 4x Daily - RT  meropenem, 1,000 mg, Intravenous, Q8H  sodium chloride, 10 mL, Intravenous, Q12H  vitamin B-12, 1,000 mcg, Oral, Daily      Continuous Infusions:heparin, 12 Units/kg/hr, Last Rate: 12 Units/kg/hr (12/08/23 1315)  Pharmacy to Dose meropenem (MERREM),       PRN Meds:.  acetaminophen    Calcium Replacement - Follow Nurse  / BPA Driven Protocol    dextrose    dextrose    glucagon (human recombinant)    heparin    heparin    labetalol    Magnesium Standard Dose Replacement - Follow Nurse / BPA Driven Protocol    Morphine    nitroglycerin    ondansetron **OR** ondansetron    [DISCONTINUED] meropenem **AND** Pharmacy to Dose meropenem (MERREM)    Phosphorus Replacement - Follow Nurse / BPA Driven Protocol    Potassium Replacement - Follow Nurse / BPA Driven Protocol    [COMPLETED] Insert Peripheral IV **AND** sodium chloride    sodium chloride    sodium chloride    Imaging:  Imaging Results (Last 72 Hours)       Procedure Component Value Units Date/Time    XR Chest 1 View [[id number]] Collected: 12/08/23 1239     Updated: 12/08/23 1246    Narrative:      X-ray chest 1 view    Date of Exam: 12/8/2023 11:22 AM CST    Indication: Balloon pump    Comparison: Chest x-ray 12/7/2023    Findings:  The heart is stable in size. There is a left chest wall port catheter with the distal tip overlying the distal superior vena cava. Intra-aortic balloon pump catheter. Is present with the distal tip 9 mm from the margin of the descending thoracic aortic   arch. There is mild right basilar atelectasis but overall improved aeration at the right lung base from prior study. No pneumothorax. Trace right pleural effusion is suspected.      Impression:      Impression:    1. Improved but residual right lower lung airspace disease and trace effusion.    2. Placement of an intra-aortic balloon pump catheter.      Electronically Signed: Nadja Li MD    12/8/2023 11:44 AM CST    Workstation ID: [id number]    CT Angiogram Chest Pulmonary Embolism [[id number]] Collected: 12/07/23 1723     Updated: 12/07/23 1736    Narrative:      CT ANGIOGRAM CHEST PULMONARY EMBOLISM    Date of Exam: 12/7/2023 4:20 PM CST    Indication: Pulmonary embolism (PE) suspected, high prob.    Comparison: Chest x-ray 12/7/2023 and CT chest 11/18/2023    Technique: Axial CT images were  obtained of the chest after the uneventful intravenous administration of iodinated contrast utilizing pulmonary embolism protocol.  Sagittal and coronal reconstructions were performed.  Automated exposure control and   iterative reconstruction methods were used.      Findings:  PULMONARY VASCULATURE: Pulmonary arteries are widely patent without evidence of embolus.Main pulmonary artery is normal in size. No evidence of right heart strain.    MEDIASTINUM:Unremarkable. Aortic and heart size are normal. No aortic dissection identified. No mass nor pericardial effusion.  CORONARY ARTERIES: Mild calcified atherosclerotic disease.  LUNGS: Evaluation of lung parenchyma demonstrates groundglass opacities in attenuation in the lungs bilaterally with smooth interlobular septal thickening compatible with pulmonary edema. There is however bibasilar airspace disease with consolidation   right greater than left and associated pleural effusions.    There is stable appearance to nodular lesion in the right apical region measuring 1.3 x 1.3 cm. There is an ovoid lesion at the left lung base which appears unchanged measuring 1.6 x 1.8 cm. A nodular density in the superior segment of the left lower   lung appears slightly more irregular measuring 7 x 7 mm, previously 6 x 4 mm (image 72 of series 7). Additional pulmonary nodules appear stable.      PLEURAL SPACE: No effusion, mass, nor pneumothorax.  LYMPH NODES: There are no pathologically enlarged lymph nodes.    UPPER ABDOMEN: Unremarkable    OSSEOUS STRUCTURES: There is a sclerotic metastatic lesion along the lateral right 6 with with osseous destruction noted. There is associated soft tissue component extending into the right middle lung with increasing consolidation compatible with   progression of disease. Sclerotic lesion in the manubrium appears unchanged.      Impression:      Impression:    1. No evidence for pulmonary embolus.  2. Findings concerning for progression of  "disease with a sclerotic osseous metastatic lesion involving the lateral right with with increased consolidation and soft tissue component extending into the right middle lung.  3. Diffuse groundglass opacities with intralobular septal thickening compatible with pulmonary edema however there is additional bibasilar airspace disease with consolidation and pleural effusions suggesting additional infectious/inflammatory process.      Electronically Signed: Nadja Li MD    12/7/2023 4:34 PM CST    Workstation ID: [id number]    XR Chest 1 View [[id number]] Collected: 12/07/23 1649     Updated: 12/07/23 1658    Narrative:      XR CHEST 1 VW    Date of Exam: 12/7/2023 4:42 PM EST    Indication: Shortness of breath    Comparison: CT chest, abdomen and pelvis 11/18/2023    Findings:  Heart size within normal limits. Accessed left chest port catheter tip within mid SVC. Consolidative right lower lobe opacities with small right-sided effusion. Background ill-defined nodular alveolar opacities throughout most notable on the left. No   large left effusion or pneumothorax.. Asymmetric sclerosis and expansion involving a right anterior rib in keeping with known metastatic lesion.      Impression:      Impression:  Right lower lobe consolidation and small effusion suspicious for pneumonia in the right clinical setting. Ill-defined more nodular alveolar opacities noted more so on the left, which could reflect edema or multifocal infection.      Electronically Signed: Howard Razo MD    12/7/2023 4:56 PM EST    Workstation ID: [id number]              ARNOLDO Barkley  12/08/23  15:08 EST       EMR Dragon/Transcription:   \"Dictated utilizing Dragon dictation\".                 Electronically signed by ARNOLOD Barkley, 12/08/23, 3:08 PM EST.    "

## 2023-12-08 NOTE — PAYOR COMM NOTE
"Andreas Massey \"Mary\" (63 y.o. Female)       Date of Birth   1960    Social Security Number       Address   [address] [place] [address] [place] [facility]    Home Phone   [phone]    MRN   [id number]       Yarsanism   Non-Uatsdin    Marital Status   Single                            Admission Date   23    Admission Type   Emergency    Admitting Provider   Naomi Dover DO    Attending Provider   Roger Ayala MD    Department, Room/Bed   [facility] CARDIOV[facility]ARE UNIT,        Discharge Date       Discharge Disposition       Discharge Destination                                 Attending Provider: Roger Ayala MD    Allergies: Hydrocodone, Iodinated Contrast Media, Latex, Penicillins    Isolation: None   Infection: None   Code Status: CPR    Ht: 157.5 cm (62\")   Wt: 56.6 kg (124 lb 12.5 oz)    Admission Cmt: None   Principal Problem: Acute hypoxic respiratory failure [J96.01]                   Active Insurance as of 2023       Primary Coverage       Payor Plan Insurance Group Employer/Plan Group    [employer] BLUE SHIELD PPO [id number]       Payor Plan Address Payor Plan Phone Number Payor Plan Fax Number Effective Dates    [address] [phone]  2020 - None Entered    [place] [address]         Subscriber Name Subscriber Birth Date Member ID       ANDREAS MASSEY 1960 [id number]                     Emergency Contacts        (Rel.) Home Phone Work Phone Mobile Phone    BRENT FINE (Other) [phone] -- [phone]                 History & Physical        Naomi Dover DO at 23 [facility]              [facility] Medicine Services      Patient Name: Andreas Massey  : 1960  MRN: [id number]  Primary Care Physician:  Diana Spencer MD  Date of admission: 2023      Subjective      Chief Complaint: dyspnea    History of Present Illness: Andreas Massey is a 63 " y.o. female with PMHX of Colonic cancer with mets to liver, lung, and bone along with htn who presented to [facility] on 12/7/2023 complaining of dyspnea.  Patient states she was getting a chemo infusion earlier today when she became more short of breath than usual.  Admits to worsening dyspnea over the past 3-4 days with cough.  Due to concern for allergic reaction was sent to [facility] for evaluation.    In the ER, vitals 98F, , RR 24, /108, 100% bipap.  ABG 7.361/36.3/82.2/20.5.  HS troponin 28 and proBNP 2176.  CT PE suggestive of pulm edema and multi-focal pneumonia.  Patient to be admitted for respiratory failure 2/2 pulm edema and pneumonia.      ROS   12 point ROS reviewed and negative except as mentioned above      Personal History     Past Medical History:   Diagnosis Date    Cancer     wiht mets to liver, bone    Chronic kidney disease     Colon cancer     History of colon cancer, stage IV     Stage SHELL with progression    Hypertension     Lung cancer     with colon    PONV (postoperative nausea and vomiting)     Pulmonary arterial hypertension     Radiculopathy 07/2012    Right L5/S1       Past Surgical History:   Procedure Laterality Date    ABDOMINAL SURGERY      CYST REMOVAL  1998    cyst removed from [facility] in 1998    PORTACATH PLACEMENT  2017    VENOUS ACCESS DEVICE (PORT) INSERTION Left 01/12/2021    Procedure: INSERTION VENOUS ACCESS DEVICE;  Surgeon: Jay Yeh MD;  Location: [facility] OR;  Service: General;  Laterality: Left;    VENOUS ACCESS DEVICE (PORT) REMOVAL Right 08/27/2020    Procedure: REMOVAL VENOUS ACCESS DEVICE;  Surgeon: Jay Yeh MD;  Location: [facility] MAIN OR;  Service: General;  Laterality: Right;    VENTRAL/INCISIONAL HERNIA REPAIR N/A 3/2/2023    Procedure: VENTRAL/INCISIONAL HERNIA REPAIR WITH MESH;  Surgeon: Jay Yeh MD;  Location: [facility] MAIN OR;  Service: General;  Laterality: N/A;       Family History: family  history includes Cancer in her father and sister. Otherwise pertinent FHx was reviewed and not pertinent to current issue.    Social History:  reports that she quit smoking about 11 years ago. Her smoking use included cigarettes. She has a 30.00 pack-year smoking history. She has been exposed to tobacco smoke. She has never used smokeless tobacco. She reports current alcohol use. She reports that she does not use drugs.    Home Medications:  Prior to Admission Medications       Prescriptions Last Dose Informant Patient Reported? Taking?    ALPRAZolam (Xanax) 0.5 MG tablet   No No    Take 1 tablet by mouth 2 (Two) Times a Day As Needed for Anxiety (white coat syndrome). Take prior to infusion visits. Repeat x1 as needed during visit.    aMILoride (MIDAMOR) 5 MG tablet   Yes No    Take 2 tablets by mouth Daily. with food    Patient not taking:  Reported on 6/1/2023    Cyanocobalamin (B-12) 1000 MCG tablet   Yes No    doxycycline (VIBRAMYICN) 100 MG tablet   Yes No    Take 1 tablet by mouth 2 (Two) Times a Day.    ferrous sulfate 324 (65 Fe) MG tablet delayed-release EC tablet   Yes No    Take 1 tablet by mouth Daily With Breakfast. HOLD dos    lisinopril (PRINIVIL,ZESTRIL) 10 MG tablet   Yes No    Take 1 tablet by mouth Daily.    Lysine 500 MG capsule   Yes No    Take 1 tablet by mouth 2 (Two) Times a Day.    sennosides-docusate (PERICOLACE) 8.6-50 MG per tablet   No No    Take 2 tablets by mouth 2 (Two) Times a Day.    Patient not taking:  Reported on 6/1/2023    Stivarga 40 MG chemo tablet   Yes No    vitamin D3 125 MCG (5000 UT) capsule capsule  Self Yes No    Take 1 capsule by mouth Daily.              Allergies:  Allergies   Allergen Reactions    Hydrocodone Nausea And Vomiting    Iodinated Contrast Media Hives and Itching     PT HAD SOME HIVES DURING SCAN.  PRIOR TO SCAN 8-3-2013.    Latex Rash     Blisters     Penicillins Hives       Objective      Vitals:   Temp:  [98 °F (36.7 °C)] 98 °F (36.7 °C)  Heart Rate:   [126-156] 126  Resp:  [16-24] 22  BP: (131-196)/() 134/77  Flow (L/min):  [12] 12    Physical Exam  Constitutional:       General: She is not in acute distress.     Appearance: She is not toxic-appearing.   HENT:      Head: Normocephalic and atraumatic.      Nose: Nose normal. No congestion.      Mouth/Throat:      Pharynx: Oropharynx is clear. No oropharyngeal exudate.   Eyes:      General: No scleral icterus.  Cardiovascular:      Rate and Rhythm: Regular rhythm. Tachycardia present.      Heart sounds: No murmur heard.     No friction rub. No gallop.   Pulmonary:      Breath sounds: Rhonchi and rales present.      Comments: Patient on Bipap  Abdominal:      General: There is no distension.      Tenderness: There is no abdominal tenderness. There is no guarding.   Musculoskeletal:         General: No swelling, deformity or signs of injury.      Cervical back: Normal range of motion. No rigidity.   Skin:     Coloration: Skin is not jaundiced.      Findings: No bruising or lesion.   Neurological:      General: No focal deficit present.      Mental Status: She is alert and oriented to person, place, and time.      Motor: Weakness present.        Result Review    Result Review:  I have personally reviewed the results from the time of this admission to 12/7/2023 19:41 EST and agree with these findings:  [x]  Laboratory  []  Microbiology  [x]  Radiology  []  EKG/Telemetry   []  Cardiology/Vascular   []  Pathology  [x]  Old records  []  Other:        Assessment & Plan        Active Hospital Problems:  There are no active hospital problems to display for this patient.    Plan:     #Acute Hypoxic Respiratory failure  #Pulmonary edema  #Bilateral pneumonia    - ABG 7.361/36.3/82.2/20.5    - Bipap started    - CT PE without PE but does show progress of disease with a sclerotic osseous metastatic lesion extending into the right middle lung complicated by pulmonary edema with bibasilar airspace disesae    - Treat as  immunocomprimised    - Vancomycin, pharm to dose, monitor for toxicity    - Merrem, 1gm IV q8h    - strep, legionella, sputum    - MRSA swab    - blood cultures    - check RVO    - Duoneb QID    - Pulmicort BID    - proBNP 2176    - Lasix 40mg IV  BID, watch for nephrotoxicity    - pulm consulted    - HS troponin 28, trend, suspect 2/2 demand    - echo    - pulm consulted, may need bronchoscopy    - NPO    #Lung cancer with mets  #Neoplasm of colon metastatic to liver and bone    - extensive oncological history, follows with Dr. Perera and Dr. Dey    - hold current treatment    - Will consult Dr. Perera to follow while admitted    #DM    - ISS    #HTN    - Labetalol IV PRN with parameters    #Anxiety    - hole home Xanax while on Bipap        DVT prophylaxis: SCDs      CODE STATUS:       Admission Status:  I believe this patient meets inpatient status.    I discussed the patient's findings and my recommendations with patient.    This patient has been examined wearing appropriate Personal Protective Equipment and discussed with  Patine . 23      Signature: Electronically signed by Naomi Dover DO, 23, 1:18 AM EST.       Electronically signed by Naomi Dover DO at 23 0118       Operative/Procedure Notes (last 48 hours)  Notes from 23 1154 through 23 1154   No notes of this type exist for this encounter.       Physician Progress Notes (last 48 hours)  Notes from 23 1154 through 23 1154   No notes of this type exist for this encounter.          Consult Notes (last 48 hours)        Rosa Perez, APRN at 23 0902        Consult Orders    1. Hematology & Oncology Inpatient Consult [[id number]] ordered by Naomi Dover DO at 23                 Hematology/Oncology Inpatient Consultation    Patient name: Samantha Massey  : 1960  MRN: [id number]  Referring Provider: WYATT Salgado DO  Reason for Consultation: Mvasi anaphylaxis and stage IV  adenocarcinoma of the colon    Chief complaint: Dyspnea    History of present illness:    63 y.o. female presented to [facility] ER on 12/7/2023 after presenting to our office for an Mvasi infusion.  She has stage IV cancer of the colon, s/p multiple lines of therapy, currently on Lonsurf with Mvasi.  She had received multiple doses of Mvasi in the past.  During her infusion, she developed an acute onset of dyspnea with feeling of inability to breathe or move air.  She was treated aggressively with Benadryl and an albuterol nebulizer, for bilateral rhonchi and crackles, Lasix and dexamethasone.  She was hypertensive with blood pressure 200/100, tachycardic to 160s and O2 saturation initially was 95% but dropped to 75%.  She improved to 85% on 4 L nasal cannula and EMS was called to take patient to the ER.  In the ER, ABG confirmed hypoxia with a pO2 of 82.2.  proBNP was 2176 (<900).  Troponin 28 (< 14).  CMP was significant for an AST of 34 (1-32).  Lactate 1.2 (0.2-2.0). PT 10.5 (9.6-11.7), and PTT 26.3 (25-35).  White count 6.9, hemoglobin 11.5 (7.97 in our office), .4 and platelets 280,000.  Chest x-ray showed right lower lobe pneumonia.  CTA chest PE protocol showed no evidence of PE.  There were findings concerning for progression of disease with a sclerotic osseous metastatic lesion involving the lateral right with increased consolidation and soft tissue component extending into the right middle lobe lung.  Diffuse groundglass opacities with interlobular septal thickening compatible with pulmonary edema, however, there was additional bibasilar airspace disease with consolidation and pleural effusion suggesting additional infectious/inflammatory process.  EKG was abnormal and showed sinus tachycardia with anterior septic infarction and suggested ischemia.  She was started on meropenem and pulmonary was consulted.  Cardiology was consulted on the day of consultation and her EKG was abnormal with  prolonged QT interval.  Troponin had increased to 136.  White count 4.5, hemoglobin 9.6, .9 and platelets 195.  On the morning of consultation, she was complaining of pain in her right shoulder and constipation.  She was fatigued.  After she was evaluated by Dr. Perera, she developed chest pain, starting in her right arm, radiating down her right arm requiring nitroglycerin x 2 for minimal relief.    12/08/23  Hematology/Oncology was consulted as she is an established patient who we treat for stage IV adenocarcinoma of the colon, chemotherapy-induced cytopenias, vitamin B12 and vitamin D deficiencies, and osteopenia.  -Diagnosed with colon cancer with liver metastasis and 2012.  To date she has not received any cardiotoxic chemotherapy but did receive radiation to her right anterior rib in April 2023.  Although, Mvasi, may either cause reversible direct myocardial toxicity or exacerbate underlying myocardial dysfunction, occurring in 10% of patients.  Current CTA PE was compared to CT C/A/P on 11/18/2023.  She has completed 5 cycles of her current therapy.  -Anemia-due to chemotherapy with history of REGULO, and vitamin B12 deficiency.  Iron studies 8/25/2023 showed iron saturation of 40% (15-50), and ferritin of 223 ().  In May 2023, folate was 9.5, haptoglobin 134 ().  In December 2022, SPEP showed no M spike and copper was 112 ().  Ferrous sulfate was discontinued on 9/29/2023.  She was ordered to start Procrit 40,000 units subcu weekly on 11/22/2023 but has yet to start.  She takes vitamin B12 and vitamin D supplementation for deficiencies.  No change in past medical, surgical, social or family histories since patient was seen in the office on 12/7/2023.    PCP: Diana Spencer MD    History:  Past Medical History:   Diagnosis Date    Cancer     wiht mets to liver, bone    Chronic kidney disease     Colon cancer     History of colon cancer, stage IV     Stage SHELL with progression     Hypertension     Lung cancer     with colon    PONV (postoperative nausea and vomiting)     Pulmonary arterial hypertension     Radiculopathy 2012    Right L5/S1   ,   Past Surgical History:   Procedure Laterality Date    ABDOMINAL SURGERY      CYST REMOVAL      cyst removed from [facility] in     PORTACATH PLACEMENT  2017    VENOUS ACCESS DEVICE (PORT) INSERTION Left 2021    Procedure: INSERTION VENOUS ACCESS DEVICE;  Surgeon: Jay Yeh MD;  Location: [facility] MAIN OR;  Service: General;  Laterality: Left;    VENOUS ACCESS DEVICE (PORT) REMOVAL Right 2020    Procedure: REMOVAL VENOUS ACCESS DEVICE;  Surgeon: Jay Yeh MD;  Location: [facility] MAIN OR;  Service: General;  Laterality: Right;    VENTRAL/INCISIONAL HERNIA REPAIR N/A 3/2/2023    Procedure: VENTRAL/INCISIONAL HERNIA REPAIR WITH MESH;  Surgeon: Jay Yeh MD;  Location: [facility] MAIN OR;  Service: General;  Laterality: N/A;   ,   Family History   Problem Relation Age of Onset    Cancer Sister         Bladder cancer    Cancer Father    ,   Social History     Tobacco Use    Smoking status: Former     Packs/day: 1.00     Years: 30.00     Additional pack years: 0.00     Total pack years: 30.00     Types: Cigarettes     Quit date: 2012     Years since quittin.9     Passive exposure: Past    Smokeless tobacco: Never   Vaping Use    Vaping Use: Never used   Substance Use Topics    Alcohol use: Yes     Comment: 2 or 3 a month    Drug use: Never   ,   Medications Prior to Admission   Medication Sig Dispense Refill Last Dose    aMILoride (MIDAMOR) 5 MG tablet Take 2 tablets by mouth Daily. with food   Past Month    Cyanocobalamin (B-12) 1000 MCG tablet    2023    doxycycline (VIBRAMYICN) 100 MG tablet Take 1 tablet by mouth 2 (Two) Times a Day.   2023    Lysine 500 MG capsule Take 1 tablet by mouth 2 (Two) Times a Day.   2023    vitamin D3 125 MCG (5000 UT) capsule capsule Take 1 capsule  by mouth Daily.   12/7/2023    ALPRAZolam (Xanax) 0.5 MG tablet Take 1 tablet by mouth 2 (Two) Times a Day As Needed for Anxiety (white coat syndrome). Take prior to infusion visits. Repeat x1 as needed during visit. 30 tablet 0 More than a month    ferrous sulfate 324 (65 Fe) MG tablet delayed-release EC tablet Take 1 tablet by mouth Daily With Breakfast. HOLD dos (Patient not taking: Reported on 12/8/2023)   Not Taking    lisinopril (PRINIVIL,ZESTRIL) 10 MG tablet Take 1 tablet by mouth Daily.   More than a month    sennosides-docusate (PERICOLACE) 8.6-50 MG per tablet Take 2 tablets by mouth 2 (Two) Times a Day. (Patient not taking: Reported on 6/1/2023) 60 tablet 0     Stivarga 40 MG chemo tablet  (Patient not taking: Reported on 12/8/2023)   Not Taking    trifluridine-tipiracil (Lonsurf) 20-8.19 MG per tablet Take 2 tablets by mouth 2 (Two) Times a Day.      , Scheduled Meds:  aspirin, 81 mg, Oral, Daily  atorvastatin, 80 mg, Oral, Daily  budesonide, 0.5 mg, Nebulization, BID - RT  enoxaparin, 1 mg/kg, Subcutaneous, Q12H  furosemide, 20 mg, Intravenous, Q12H  insulin lispro, 2-9 Units, Subcutaneous, 4x Daily AC & at Bedtime  ipratropium-albuterol, 3 mL, Nebulization, Once  ipratropium-albuterol, 3 mL, Nebulization, 4x Daily - RT  meropenem, 1,000 mg, Intravenous, Q8H  sodium chloride, 10 mL, Intravenous, Q12H    , Continuous Infusions:  Pharmacy to Dose enoxaparin (LOVENOX),   Pharmacy to Dose meropenem (MERREM),     , PRN Meds:    Calcium Replacement - Follow Nurse / BPA Driven Protocol    dextrose    dextrose    glucagon (human recombinant)    labetalol    Magnesium Standard Dose Replacement - Follow Nurse / BPA Driven Protocol    nitroglycerin    ondansetron **OR** ondansetron    Pharmacy to Dose enoxaparin (LOVENOX)    [DISCONTINUED] meropenem **AND** Pharmacy to Dose meropenem (MERREM)    Phosphorus Replacement - Follow Nurse / BPA Driven Protocol    Potassium Replacement - Follow Nurse / BPA Driven  "Protocol    [COMPLETED] Insert Peripheral IV **AND** sodium chloride    sodium chloride    sodium chloride   Allergies:  Hydrocodone, Iodinated contrast media, Latex, and Penicillins    ROS:  Review of Systems   Constitutional:  Positive for fatigue. Negative for activity change, chills, fever and unexpected weight change.   HENT:  Negative for congestion, dental problem, hearing loss, mouth sores, nosebleeds, sore throat and trouble swallowing.    Eyes:  Negative for photophobia and visual disturbance.   Respiratory:  Negative for cough, chest tightness and shortness of breath.    Cardiovascular:  Negative for chest pain, palpitations and leg swelling.   Gastrointestinal:  Positive for constipation. Negative for abdominal distention, abdominal pain, blood in stool, diarrhea, nausea and vomiting.   Endocrine: Negative for cold intolerance and heat intolerance.   Genitourinary:  Negative for decreased urine volume, difficulty urinating, frequency, hematuria and urgency.   Musculoskeletal:  Positive for arthralgias (Right shoulder pain.). Negative for gait problem.   Skin:  Negative for rash and wound.   Neurological:  Negative for dizziness, tremors, weakness, light-headedness, numbness and headaches.   Hematological:  Negative for adenopathy. Does not bruise/bleed easily.   Psychiatric/Behavioral:  Negative for confusion and hallucinations. The patient is not nervous/anxious.    All other systems reviewed and are negative.       Objective     Vital Signs:   BP (!) 81/62 (BP Location: Left arm, Patient Position: Lying)   Pulse 117   Temp 98 °F (36.7 °C) (Oral)   Resp 14   Ht 157.5 cm (62\")   Wt 56.6 kg (124 lb 12.5 oz)   LMP  (LMP Unknown)   SpO2 100%   BMI 22.82 kg/m²     Physical Exam:  Physical Exam  Vitals and nursing note reviewed.   Constitutional:       General: She is not in acute distress.     Appearance: Normal appearance. She is well-developed and normal weight. She is not diaphoretic.   HENT:      " Head: Normocephalic and atraumatic.      Nose: Nose normal.      Comments: Oxygen per nasal cannula.     Mouth/Throat:      Mouth: Mucous membranes are moist.      Pharynx: Oropharynx is clear. No oropharyngeal exudate or posterior oropharyngeal erythema.      Comments: Dental fillings.  Eyes:      General: No scleral icterus.     Extraocular Movements: Extraocular movements intact.      Conjunctiva/sclera: Conjunctivae normal.      Pupils: Pupils are equal, round, and reactive to light.   Cardiovascular:      Rate and Rhythm: Normal rate and regular rhythm.      Heart sounds: Normal heart sounds. No murmur heard.     Comments: Cardiac monitor leads.  Pulmonary:      Effort: Pulmonary effort is normal. No respiratory distress.      Breath sounds: Normal breath sounds. No wheezing or rales.      Comments: Left chest wall [id number] access.  Abdominal:      General: Bowel sounds are normal. There is no distension.      Palpations: Abdomen is soft. There is no mass.      Tenderness: There is no abdominal tenderness. There is no guarding.   Genitourinary:     Comments: Deferred   Musculoskeletal:         General: No swelling, tenderness or deformity. Normal range of motion.      Cervical back: Normal range of motion and neck supple.      Right lower leg: No edema.      Left lower leg: No edema.      Comments: Left upper extremity O2 monitor.   Lymphadenopathy:      Cervical: No cervical adenopathy.      Upper Body:      Right upper body: No supraclavicular adenopathy.      Left upper body: No supraclavicular adenopathy.   Skin:     General: Skin is warm and dry.      Coloration: Skin is not pale.      Findings: No bruising, erythema or rash.      Comments: Right upper extremity IV.   Neurological:      General: No focal deficit present.      Mental Status: She is alert and oriented to person, place, and time.      Coordination: Coordination normal.   Psychiatric:         Mood and Affect: Mood normal.          Behavior: Behavior normal.         Thought Content: Thought content normal.          Results Review:  Lab Results (last 48 hours)       Procedure Component Value Units Date/Time    POC Glucose Once [[id number]]  (Abnormal) Collected: 12/08/23 0711    Specimen: Blood Updated: 12/08/23 0713     Glucose 127 mg/dL      Comment: Serial Number: [id number]:  [id number]       Basic Metabolic Panel [[id number]]  (Abnormal) Collected: 12/08/23 0514    Specimen: Blood Updated: 12/08/23 0601     Glucose 140 mg/dL      BUN 24 mg/dL      Creatinine 0.71 mg/dL      Sodium 142 mmol/L      Potassium 4.1 mmol/L      Chloride 109 mmol/L      CO2 22.0 mmol/L      Calcium 8.0 mg/dL      BUN/Creatinine Ratio 33.8     Anion Gap 11.0 mmol/L      eGFR 96.3 mL/min/1.73     Narrative:      GFR Normal >60  Chronic Kidney Disease <60  Kidney Failure <15      High Sensitivity Troponin T [[id number]]  (Abnormal) Collected: 12/08/23 0514    Specimen: Blood Updated: 12/08/23 0557     HS Troponin T 136 ng/L     Narrative:      High Sensitive Troponin T Reference Range:  <14.0 ng/L- Negative Female for AMI  <22.0 ng/L- Negative Male for AMI  >=14 - Abnormal Female indicating possible myocardial injury.  >=22 - Abnormal Male indicating possible myocardial injury.   Clinicians would have to utilize clinical acumen, EKG, Troponin, and serial changes to determine if it is an Acute Myocardial Infarction or myocardial injury due to an underlying chronic condition.         Calcium, Ionized [[id number]]  (Abnormal) Collected: 12/08/23 0514    Specimen: Blood Updated: 12/08/23 0542     Ionized Calcium 1.09 mmol/L     CBC (No Diff) [[id number]]  (Abnormal) Collected: 12/08/23 0514    Specimen: Blood Updated: 12/08/23 0538     WBC 4.50 10*3/mm3      RBC 2.77 10*6/mm3      Hemoglobin 9.6 g/dL      Hematocrit 28.5 %      .9 fL      MCH 34.8 pg      MCHC 33.8 g/dL      RDW 21.6 %      RDW-SD 77.0 fl      MPV 7.7 fL      Platelets 195 10*3/mm3     MRSA  Screen, PCR (Inpatient) - Swab, Nares [[id number]]  (Normal) Collected: 12/08/23 0321    Specimen: Swab from Nares Updated: 12/08/23 0440     MRSA PCR No MRSA Detected    Narrative:      The negative predictive value of this diagnostic test is high and should only be used to consider de-escalating anti-MRSA therapy. A positive result may indicate colonization with MRSA and must be correlated clinically.    Respiratory Panel PCR w/COVID-19(SARS-CoV-2) KARLEY/IRLANDA/KINZA/PAD/COR/KY In-House, NP Swab in UTM/VTM, 2 HR TAT - Swab, Nasopharynx [[id number]]  (Normal) Collected: 12/08/23 0228    Specimen: Swab from Nasopharynx Updated: 12/08/23 0335     ADENOVIRUS, PCR Not Detected     Coronavirus 229E Not Detected     Coronavirus HKU1 Not Detected     Coronavirus NL63 Not Detected     Coronavirus OC43 Not Detected     COVID19 Not Detected     Human Metapneumovirus Not Detected     Human Rhinovirus/Enterovirus Not Detected     Influenza A PCR Not Detected     Influenza B PCR Not Detected     Parainfluenza Virus 1 Not Detected     Parainfluenza Virus 2 Not Detected     Parainfluenza Virus 3 Not Detected     Parainfluenza Virus 4 Not Detected     RSV, PCR Not Detected     Bordetella pertussis pcr Not Detected     Bordetella parapertussis PCR Not Detected     Chlamydophila pneumoniae PCR Not Detected     Mycoplasma pneumo by PCR Not Detected    Narrative:      In the setting of a positive respiratory panel with a viral infection PLUS a negative procalcitonin without other underlying concern for bacterial infection, consider observing off antibiotics or discontinuation of antibiotics and continue supportive care. If the respiratory panel is positive for atypical bacterial infection (Bordetella pertussis, Chlamydophila pneumoniae, or Mycoplasma pneumoniae), consider antibiotic de-escalation to target atypical bacterial infection.    POC Glucose Once [[id number]]  (Abnormal) Collected: 12/07/23 2224    Specimen: Blood Updated: 12/07/23  2226     Glucose 176 mg/dL      Comment: Serial Number: [id number]:  [id number]       High Sensitivity Troponin T 2Hr [[id number]]  (Abnormal) Collected: 12/07/23 1923    Specimen: Blood Updated: 12/07/23 1956     HS Troponin T 80 ng/L      Troponin T Delta 52 ng/L     Narrative:      High Sensitive Troponin T Reference Range:  <14.0 ng/L- Negative Female for AMI  <22.0 ng/L- Negative Male for AMI  >=14 - Abnormal Female indicating possible myocardial injury.  >=22 - Abnormal Male indicating possible myocardial injury.   Clinicians would have to utilize clinical acumen, EKG, Troponin, and serial changes to determine if it is an Acute Myocardial Infarction or myocardial injury due to an underlying chronic condition.         Blood Culture - Blood, Hand, Left [[id number]] Collected: 12/07/23 1923    Specimen: Blood from Hand, Left Updated: 12/07/23 1923    POC Lactate [[id number]]  (Normal) Collected: 12/07/23 1902    Specimen: Blood Updated: 12/07/23 1904     Lactate 1.5 mmol/L      Comment: Serial Number: [id number]:  [id number]       Blood Culture - Blood, Hand, Left [[id number]] Collected: 12/07/23 1859    Specimen: Blood from Hand, Left Updated: 12/07/23 1901    Comprehensive Metabolic Panel [[id number]]  (Abnormal) Collected: 12/07/23 1728    Specimen: Blood Updated: 12/07/23 1810     Glucose 171 mg/dL      BUN 21 mg/dL      Creatinine 0.74 mg/dL      Sodium 138 mmol/L      Potassium 4.0 mmol/L      Comment: Slight hemolysis detected by analyzer. Result may be falsely elevated.        Chloride 107 mmol/L      CO2 21.0 mmol/L      Calcium 7.5 mg/dL      Total Protein 5.3 g/dL      Albumin 3.0 g/dL      ALT (SGPT) 16 U/L      AST (SGOT) 34 U/L      Comment: Slight hemolysis detected by analyzer. Result may be falsely elevated.        Alkaline Phosphatase 75 U/L      Total Bilirubin 0.4 mg/dL      Globulin 2.3 gm/dL      A/G Ratio 1.3 g/dL      BUN/Creatinine Ratio 28.4     Anion Gap 10.0 mmol/L      eGFR  91.6 mL/min/1.73     Narrative:      GFR Normal >60  Chronic Kidney Disease <60  Kidney Failure <15      BNP [[id number]]  (Abnormal) Collected: 12/07/23 1728    Specimen: Blood Updated: 12/07/23 1805     proBNP 2,176.0 pg/mL     Narrative:      This assay is used as an aid in the diagnosis of individuals suspected of having heart failure. It can be used as an aid in the diagnosis of acute decompensated heart failure (ADHF) in patients presenting with signs and symptoms of ADHF to the emergency department (ED). In addition, NT-proBNP of <300 pg/mL indicates ADHF is not likely.    Age Range Result Interpretation  NT-proBNP Concentration (pg/mL:      <50             Positive            >450                   Gray                 300-450                    Negative             <300    50-75           Positive            >900                  Gray                300-900                  Negative            <300      >75             Positive            >1800                  Gray                300-1800                  Negative            <300    High Sensitivity Troponin T [[id number]]  (Abnormal) Collected: 12/07/23 1728    Specimen: Blood Updated: 12/07/23 1805     HS Troponin T 28 ng/L     Narrative:      High Sensitive Troponin T Reference Range:  <14.0 ng/L- Negative Female for AMI  <22.0 ng/L- Negative Male for AMI  >=14 - Abnormal Female indicating possible myocardial injury.  >=22 - Abnormal Male indicating possible myocardial injury.   Clinicians would have to utilize clinical acumen, EKG, Troponin, and serial changes to determine if it is an Acute Myocardial Infarction or myocardial injury due to an underlying chronic condition.         POC Creatinine [[id number]]  (Normal) Collected: 12/07/23 1710    Specimen: Venous Blood Updated: 12/07/23 1749     Creatinine 0.80 mg/dL      Comment: Serial Number: [id number]:  [id number]        eGFR 83.4 mL/min/1.73     Extra Tubes [[id number]] Collected: 12/07/23 1631     Specimen: Blood, Venous Line Updated: 12/07/23 1731    Narrative:      The following orders were created for panel order Extra Tubes.  Procedure                               Abnormality         Status                     ---------                               -----------         ------                     Gold Top - [facility], [place][[id number]]                                   Final result                 Please view results for these tests on the individual orders.    Gold Top - SST [[id number]] Collected: 12/07/23 1631    Specimen: Blood Updated: 12/07/23 1731     Extra Tube Hold for add-ons.     Comment: Auto resulted.       Blood Gas, Arterial - [[id number]]  (Abnormal) Collected: 12/07/23 1657    Specimen: Arterial Blood Updated: 12/07/23 1703     Site Left Radial     Adrian's Test Positive     pH, Arterial 7.361 pH units      pCO2, Arterial 36.3 mm Hg      pO2, Arterial 82.2 mm Hg      HCO3, Arterial 20.5 mmol/L      Base Excess, Arterial -4.4 mmol/L      Comment: Serial Number: [id number]:  [id number]        O2 Saturation, Arterial 95.7 %      CO2 Content 21.6 mmol/L      Barometric Pressure for Blood Gas --     Comment: N/A        Modality NRB     FIO2 100 %      Hemodilution No    POC Lactate [[id number]]  (Normal) Collected: 12/07/23 1657    Specimen: Arterial Blood Updated: 12/07/23 1703     Lactate 1.2 mmol/L      Comment: Serial Number: [id number]:  [id number]       Protime-INR [[id number]]  (Normal) Collected: 12/07/23 1631    Specimen: Blood Updated: 12/07/23 1647     Protime 10.5 Seconds      INR 0.96    aPTT [[id number]]  (Abnormal) Collected: 12/07/23 1631    Specimen: Blood Updated: 12/07/23 1647     PTT 26.3 seconds     CBC & Differential [[id number]]  (Abnormal) Collected: 12/07/23 1631    Specimen: Blood Updated: 12/07/23 1638    Narrative:      The following orders were created for panel order CBC & Differential.  Procedure                               Abnormality         Status                     ---------                                -----------         ------                     CBC Auto Differential[[id number]]        Abnormal            Final result                 Please view results for these tests on the individual orders.    CBC Auto Differential [[id number]]  (Abnormal) Collected: 12/07/23 1631    Specimen: Blood Updated: 12/07/23 1638     WBC 6.90 10*3/mm3      RBC 3.32 10*6/mm3      Hemoglobin 11.5 g/dL      Hematocrit 34.0 %      .4 fL      MCH 34.5 pg      MCHC 33.7 g/dL      RDW 21.5 %      RDW-SD 80.1 fl      MPV 7.6 fL      Platelets 280 10*3/mm3      Neutrophil % 66.9 %      Lymphocyte % 25.7 %      Monocyte % 5.3 %      Eosinophil % 0.4 %      Basophil % 1.7 %      Neutrophils, Absolute 4.60 10*3/mm3      Lymphocytes, Absolute 1.80 10*3/mm3      Monocytes, Absolute 0.40 10*3/mm3      Eosinophils, Absolute 0.00 10*3/mm3      Basophils, Absolute 0.10 10*3/mm3      nRBC 0.2 /100 WBC     POC Glucose Once [[id number]]  (Abnormal) Collected: 12/07/23 1627    Specimen: Blood Updated: 12/07/23 1631     Glucose 219 mg/dL      Comment: Serial Number: [id number]:  [id number]                Pending Results: Blood cultures, haptoglobin, TSH.  Vitamin D and iron studies from office.    Imaging Reviewed:   CT Angiogram Chest Pulmonary Embolism    Result Date: 12/7/2023  Impression: 1. No evidence for pulmonary embolus. 2. Findings concerning for progression of disease with a sclerotic osseous metastatic lesion involving the lateral right with with increased consolidation and soft tissue component extending into the right middle lung. 3. Diffuse groundglass opacities with intralobular septal thickening compatible with pulmonary edema however there is additional bibasilar airspace disease with consolidation and pleural effusions suggesting additional infectious/inflammatory process. Electronically Signed: Nadja Li MD  12/7/2023 4:34 PM CST  Workstation ID: [id number]    XR Chest 1 View    Result Date:  12/7/2023  Impression: Right lower lobe consolidation and small effusion suspicious for pneumonia in the right clinical setting. Ill-defined more nodular alveolar opacities noted more so on the left, which could reflect edema or multifocal infection. Electronically Signed: Howard Razo MD  12/7/2023 4:56 PM EST  Workstation ID: [id number]     I have reviewed the patient's labs, imaging, reports, and other clinician documentation.        Assessment & Plan       ASSESSMENT  Mvasi infusion reaction and EKG changes-acute onset after multiple tolerated infusions.  Unresponsive to initial treatment.  EKG suspicious for anterior septal infarction and troponin rising with development of chest pain.  Mvasi was discontinued and Lonsurf was put on hold.  Due for growth factor on 12/11/2023.  Pulmonary and cardiology have been consulted.  Mvasi has been shown to cause myocardial dysfunction in 10% of patients.  On ASA and treatment Lovenox.  Stage IV colon cancer-chest CT showed continued progression of disease in her lungs and bones.  She is on Xgeva as an outpatient.  Will need to look at other options for treatment.  Acute on chronic anemia, history of REGULO, macrocytosis and vitamin B12 deficiency-she has chemo induced anemia with macrocytosis.  Will continue oral B12 and start Procrit.  Will check for hemolysis and thyroid dysfunction.  Vitamin D deficiency and osteopenia-will continue supplementation.  Will obtain vitamin D level from office.  Started Tums for low ionized calcium.  Contrast dye allergy-recommend to avoid IV contrast with scans.  Right lower lobe pneumonia, metastatic lung disease and acute respiratory failure-Per pulmonary.    PLAN  Discontinue Mvasi and Lonsurf.  Will discuss other options of treatment as an outpatient.  Await cardiology consultation.  Await pulmonary consultation.  Haptoglobin and TSH.  Obtain vitamin D and iron studies from office.  TUMS, 1 tablet daily.  Continue daily oral B12 and  vitamin D.  Start Pepcid.  Start Procrit 40,000 units subcu weekly.    Patient was seen and evaluated by Dr. Perera.  Electronically signed by ARNOLDO Toussaint, 12/08/23, 9:39 AM EST.        On 12/8/2023, I discussed the patient's findings and my recommendations with patient.    Thank you for this consult.  We will be happy to follow along in the care of this patient.     Part of this note may be an electronic transcription/translation of spoken language to printed text using the Dragon Dictation System.      Electronically signed by Rosa Perez APRN at 12/08/23 0939

## 2023-12-09 ENCOUNTER — APPOINTMENT (OUTPATIENT)
Dept: GENERAL RADIOLOGY | Facility: HOSPITAL | Age: 63
DRG: 270 | End: 2023-12-09
Payer: COMMERCIAL

## 2023-12-09 PROBLEM — I50.21 ACUTE SYSTOLIC HEART FAILURE: Status: ACTIVE | Noted: 2023-12-09

## 2023-12-09 PROBLEM — I25.10 CAD (CORONARY ARTERY DISEASE): Status: ACTIVE | Noted: 2023-12-09

## 2023-12-09 PROBLEM — I50.43 ACUTE ON CHRONIC COMBINED SYSTOLIC AND DIASTOLIC CHF (CONGESTIVE HEART FAILURE): Status: ACTIVE | Noted: 2023-12-09

## 2023-12-09 PROBLEM — I21.4 NSTEMI (NON-ST ELEVATED MYOCARDIAL INFARCTION): Status: ACTIVE | Noted: 2023-12-09

## 2023-12-09 PROBLEM — I50.9 CONGESTIVE HEART FAILURE: Status: ACTIVE | Noted: 2023-12-09

## 2023-12-09 LAB
ACT BLD: 158 SECONDS (ref 89–137)
ACT BLD: 163 SECONDS (ref 89–137)
ANION GAP SERPL CALCULATED.3IONS-SCNC: 9 MMOL/L (ref 5–15)
APTT PPP: 65.6 SECONDS (ref 61–76.5)
BUN SERPL-MCNC: 34 MG/DL (ref 8–23)
BUN/CREAT SERPL: 47.9 (ref 7–25)
CALCIUM SPEC-SCNC: 7.5 MG/DL (ref 8.6–10.5)
CHLORIDE SERPL-SCNC: 108 MMOL/L (ref 98–107)
CO2 SERPL-SCNC: 25 MMOL/L (ref 22–29)
CREAT SERPL-MCNC: 0.71 MG/DL (ref 0.57–1)
DAT POLY-SP REAG RBC QL: NEGATIVE
DEPRECATED RDW RBC AUTO: 83.1 FL (ref 37–54)
EGFRCR SERPLBLD CKD-EPI 2021: 96.3 ML/MIN/1.73
ERYTHROCYTE [DISTWIDTH] IN BLOOD BY AUTOMATED COUNT: 22.5 % (ref 12.3–15.4)
GLUCOSE BLDC GLUCOMTR-MCNC: 105 MG/DL (ref 70–105)
GLUCOSE BLDC GLUCOMTR-MCNC: 114 MG/DL (ref 70–105)
GLUCOSE BLDC GLUCOMTR-MCNC: 150 MG/DL (ref 70–105)
GLUCOSE BLDC GLUCOMTR-MCNC: 98 MG/DL (ref 70–105)
GLUCOSE SERPL-MCNC: 116 MG/DL (ref 65–99)
HBA1C MFR BLD: 4.7 % (ref 4.8–5.6)
HCT VFR BLD AUTO: 24 % (ref 34–46.6)
HGB BLD-MCNC: 8 G/DL (ref 12–15.9)
LDH SERPL-CCNC: 307 U/L (ref 135–214)
MCH RBC QN AUTO: 33.8 PG (ref 26.6–33)
MCHC RBC AUTO-ENTMCNC: 33.2 G/DL (ref 31.5–35.7)
MCV RBC AUTO: 101.9 FL (ref 79–97)
PLATELET # BLD AUTO: 179 10*3/MM3 (ref 140–450)
PMV BLD AUTO: 7.7 FL (ref 6–12)
POTASSIUM SERPL-SCNC: 4.5 MMOL/L (ref 3.5–5.2)
QT INTERVAL: 453 MS
QT INTERVAL: 479 MS
QTC INTERVAL: 590 MS
QTC INTERVAL: 608 MS
RBC # BLD AUTO: 2.35 10*6/MM3 (ref 3.77–5.28)
RETICS # AUTO: 0.05 10*6/MM3 (ref 0.02–0.13)
RETICS/RBC NFR AUTO: 2.09 % (ref 0.7–1.9)
SODIUM SERPL-SCNC: 142 MMOL/L (ref 136–145)
WBC NRBC COR # BLD AUTO: 6 10*3/MM3 (ref 3.4–10.8)

## 2023-12-09 PROCEDURE — 25010000002 MORPHINE PER 10 MG: Performed by: NURSE PRACTITIONER

## 2023-12-09 PROCEDURE — 82948 REAGENT STRIP/BLOOD GLUCOSE: CPT

## 2023-12-09 PROCEDURE — 71045 X-RAY EXAM CHEST 1 VIEW: CPT

## 2023-12-09 PROCEDURE — 86880 COOMBS TEST DIRECT: CPT | Performed by: NURSE PRACTITIONER

## 2023-12-09 PROCEDURE — 25010000002 CEFEPIME PER 500 MG: Performed by: INTERNAL MEDICINE

## 2023-12-09 PROCEDURE — 80048 BASIC METABOLIC PNL TOTAL CA: CPT | Performed by: INTERNAL MEDICINE

## 2023-12-09 PROCEDURE — 85347 COAGULATION TIME ACTIVATED: CPT

## 2023-12-09 PROCEDURE — 94799 UNLISTED PULMONARY SVC/PX: CPT

## 2023-12-09 PROCEDURE — 83615 LACTATE (LD) (LDH) ENZYME: CPT | Performed by: NURSE PRACTITIONER

## 2023-12-09 PROCEDURE — 94761 N-INVAS EAR/PLS OXIMETRY MLT: CPT

## 2023-12-09 PROCEDURE — 85027 COMPLETE CBC AUTOMATED: CPT | Performed by: INTERNAL MEDICINE

## 2023-12-09 PROCEDURE — 85730 THROMBOPLASTIN TIME PARTIAL: CPT | Performed by: INTERNAL MEDICINE

## 2023-12-09 PROCEDURE — 83036 HEMOGLOBIN GLYCOSYLATED A1C: CPT | Performed by: HOSPITALIST

## 2023-12-09 PROCEDURE — 85045 AUTOMATED RETICULOCYTE COUNT: CPT | Performed by: NURSE PRACTITIONER

## 2023-12-09 PROCEDURE — P9041 ALBUMIN (HUMAN),5%, 50ML: HCPCS | Performed by: INTERNAL MEDICINE

## 2023-12-09 PROCEDURE — 94664 DEMO&/EVAL PT USE INHALER: CPT

## 2023-12-09 PROCEDURE — 94660 CPAP INITIATION&MGMT: CPT

## 2023-12-09 PROCEDURE — 25010000002 FUROSEMIDE PER 20 MG: Performed by: INTERNAL MEDICINE

## 2023-12-09 PROCEDURE — 25010000002 ALBUMIN HUMAN 5% PER 50 ML: Performed by: INTERNAL MEDICINE

## 2023-12-09 PROCEDURE — 25010000002 DIGOXIN PER 500 MCG: Performed by: INTERNAL MEDICINE

## 2023-12-09 PROCEDURE — 25010000002 ONDANSETRON PER 1 MG: Performed by: INTERNAL MEDICINE

## 2023-12-09 RX ORDER — ALBUMIN, HUMAN INJ 5% 5 %
25 SOLUTION INTRAVENOUS ONCE
Status: DISCONTINUED | OUTPATIENT
Start: 2023-12-09 | End: 2023-12-09

## 2023-12-09 RX ORDER — FUROSEMIDE 10 MG/ML
40 INJECTION INTRAMUSCULAR; INTRAVENOUS ONCE
Status: COMPLETED | OUTPATIENT
Start: 2023-12-09 | End: 2023-12-09

## 2023-12-09 RX ORDER — DIGOXIN 0.25 MG/ML
250 INJECTION INTRAMUSCULAR; INTRAVENOUS ONCE
Status: COMPLETED | OUTPATIENT
Start: 2023-12-09 | End: 2023-12-09

## 2023-12-09 RX ORDER — ALPRAZOLAM 0.25 MG/1
0.25 TABLET ORAL 2 TIMES DAILY PRN
Status: DISCONTINUED | OUTPATIENT
Start: 2023-12-09 | End: 2023-12-13 | Stop reason: HOSPADM

## 2023-12-09 RX ORDER — ALBUMIN, HUMAN INJ 5% 5 %
12.5 SOLUTION INTRAVENOUS ONCE
Status: COMPLETED | OUTPATIENT
Start: 2023-12-09 | End: 2023-12-09

## 2023-12-09 RX ORDER — ENOXAPARIN SODIUM 100 MG/ML
40 INJECTION SUBCUTANEOUS DAILY
Status: DISCONTINUED | OUTPATIENT
Start: 2023-12-10 | End: 2023-12-13 | Stop reason: HOSPADM

## 2023-12-09 RX ADMIN — ALBUMIN (HUMAN) 12.5 G: 12.5 INJECTION, SOLUTION INTRAVENOUS at 00:47

## 2023-12-09 RX ADMIN — ALPRAZOLAM 0.25 MG: 0.25 TABLET ORAL at 18:00

## 2023-12-09 RX ADMIN — FUROSEMIDE 40 MG: 10 INJECTION, SOLUTION INTRAMUSCULAR; INTRAVENOUS at 17:05

## 2023-12-09 RX ADMIN — ASPIRIN 81 MG CHEWABLE TABLET 81 MG: 81 TABLET CHEWABLE at 08:01

## 2023-12-09 RX ADMIN — FAMOTIDINE 20 MG: 20 TABLET ORAL at 08:01

## 2023-12-09 RX ADMIN — FUROSEMIDE 40 MG: 10 INJECTION, SOLUTION INTRAMUSCULAR; INTRAVENOUS at 02:31

## 2023-12-09 RX ADMIN — EMPAGLIFLOZIN 10 MG: 10 TABLET, FILM COATED ORAL at 17:05

## 2023-12-09 RX ADMIN — ATORVASTATIN CALCIUM 80 MG: 40 TABLET, FILM COATED ORAL at 08:01

## 2023-12-09 RX ADMIN — CARVEDILOL 6.25 MG: 6.25 TABLET, FILM COATED ORAL at 17:05

## 2023-12-09 RX ADMIN — IPRATROPIUM BROMIDE AND ALBUTEROL SULFATE 3 ML: .5; 3 SOLUTION RESPIRATORY (INHALATION) at 07:11

## 2023-12-09 RX ADMIN — ONDANSETRON 4 MG: 2 INJECTION INTRAMUSCULAR; INTRAVENOUS at 14:02

## 2023-12-09 RX ADMIN — FUROSEMIDE 40 MG: 10 INJECTION, SOLUTION INTRAMUSCULAR; INTRAVENOUS at 08:03

## 2023-12-09 RX ADMIN — Medication 10 ML: at 08:02

## 2023-12-09 RX ADMIN — CARVEDILOL 6.25 MG: 6.25 TABLET, FILM COATED ORAL at 08:01

## 2023-12-09 RX ADMIN — BUDESONIDE INHALATION 0.5 MG: 0.5 SUSPENSION RESPIRATORY (INHALATION) at 07:10

## 2023-12-09 RX ADMIN — FAMOTIDINE 20 MG: 20 TABLET ORAL at 17:05

## 2023-12-09 RX ADMIN — CEFEPIME 2000 MG: 2 INJECTION, POWDER, FOR SOLUTION INTRAVENOUS at 00:46

## 2023-12-09 RX ADMIN — Medication 10 ML: at 21:07

## 2023-12-09 RX ADMIN — CALCIUM CARBONATE (ANTACID) CHEW TAB 500 MG 1 TABLET: 500 CHEW TAB at 08:01

## 2023-12-09 RX ADMIN — CYANOCOBALAMIN TAB 1000 MCG 1000 MCG: 1000 TAB at 08:01

## 2023-12-09 RX ADMIN — CEFEPIME 2000 MG: 2 INJECTION, POWDER, FOR SOLUTION INTRAVENOUS at 08:00

## 2023-12-09 RX ADMIN — Medication 1000 UNITS: at 08:01

## 2023-12-09 RX ADMIN — MORPHINE SULFATE 2 MG: 2 INJECTION, SOLUTION INTRAMUSCULAR; INTRAVENOUS at 07:58

## 2023-12-09 RX ADMIN — DIGOXIN 250 MCG: 250 INJECTION, SOLUTION INTRAMUSCULAR; INTRAVENOUS; PARENTERAL at 17:05

## 2023-12-09 NOTE — SIGNIFICANT NOTE
12/09/23 1343   OTHER   Discipline physical therapist   Rehab Time/Intention   Session Not Performed other (see comments)  (bed rest until 8 PM. PT to f/u tomorrow as able.)   Recommendation   PT - Next Appointment 12/10/23

## 2023-12-09 NOTE — PROGRESS NOTES
Hematology/Oncology Inpatient Progress Note    PATIENT NAME: Samantha Massey  : 1960  MRN: [id number]    CHIEF COMPLAINT: MVASI anaphylaxis, stage IV adenocarcinoma of the colon, acute on chronic anemia, history of REGULO, macrocytosis, and vitamin B12 deficiency    HISTORY OF PRESENT ILLNESS:    63 y.o. female presented to [facility] ED on 2023 after presenting to our office for an MVASI infusion.  She has stage IV cancer of the colon, s/p multiple lines of therapy, currently on Lonsurf with MVASI.  She had received multiple doses of MVASI in the past.  During her infusion, she developed an acute onset of dyspnea with feeling of inability to breathe or move air.  She was treated aggressively with Benadryl and an albuterol nebulizer, for bilateral rhonchi and crackles, Lasix and dexamethasone.  She was hypertensive with blood pressure 200/100, tachycardic to 160s and O2 saturation initially was 95% but dropped to 75%.  She improved to 85% on 4 L nasal cannula and EMS was called to take patient to the ER.  In the ER, ABG confirmed hypoxia with a pO2 of 82.2.  proBNP was 2176 (<900).  Troponin 28 (< 14).  CMP was significant for an AST of 34 (1-32).  Lactate 1.2 (0.2-2.0), PT 10.5 (9.6-11.7), and PTT 26.3 (25-35).  White count 6.9, hemoglobin 11.5 (9.79 in our office), .4 and platelets 280,000.  Chest x-ray showed right lower lobe pneumonia.  CTA chest PE protocol showed no evidence of PE.  There were findings concerning for progression of disease with a sclerotic osseous metastatic lesion involving the lateral right rib with increased consolidation and soft tissue component extending into the right middle lobe lung.  Diffuse groundglass opacities with interlobular septal thickening compatible with pulmonary edema, however, there was additional bibasilar airspace disease with consolidation and pleural effusion suggesting additional infectious/inflammatory process.  EKG was abnormal and  showed sinus tachycardia with anterior septal infarction and suggested ischemia.  She was started on meropenem and pulmonary was consulted.  Cardiology was consulted on the day of consultation and her EKG was abnormal with prolonged QT interval.  Troponin had increased to 136.  White count 4.5, hemoglobin 9.6, .9 and platelets 195.  On the morning of consultation, she was complaining of pain in her right shoulder and constipation.  She was fatigued.  After she was evaluated by Dr. Perera, she developed chest pain, starting in her right arm, radiating down her right arm requiring nitroglycerin x 2 with minimal relief.     12/08/23  Hematology/Oncology was consulted as she is an established patient who we treat for stage IV adenocarcinoma of the colon, chemotherapy-induced cytopenias, vitamin B12 and vitamin D deficiencies, and osteopenia.  -Diagnosed with colon cancer with liver metastasis and 2012.  To date she has not received any cardiotoxic chemotherapy but did receive radiation to her right anterior rib in April 2023.  Although, Mvasi, may either cause reversible direct myocardial toxicity or exacerbate underlying myocardial dysfunction, occurring in 10% of patients.  Current CTA PE was compared to CT C/A/P on 11/18/2023.  She has completed 5 cycles of her current therapy.  -Anemia-due to chemotherapy with history of REGULO, and vitamin B12 deficiency.  Iron studies 8/25/2023 showed iron saturation of 40% (15-50), and ferritin of 223 ().  In May 2023, folate was 9.5, haptoglobin 134 ().  In December 2022, SPEP showed no M spike and copper was 112 ().  Ferrous sulfate was discontinued on 9/29/2023.  She was ordered to start Procrit 40,000 units subcu weekly on 11/22/2023 but has yet to start.  She takes vitamin B12 and vitamin D supplementation for deficiencies.  No change in past medical, surgical, social or family histories since patient was seen in the office on 12/7/2023.     PCP:  Diana Spencer MD    INTERVAL HISTORY:  12/8/2023-Cardiac catheterization by Dr. Olivera revealed severe single-vessel coronary artery disease with 100% occlusion of the LAD with right to left collaterals status and chronic total occlusion.  Moderate stenosis involving the ostium of the right coronary artery that supplies collaterals to the LAD territory.  There was severe LV dysfunction and elevated left-sided filling pressures.  Intra-aortic balloon pump for hemodynamic support inserted.  12/9/2023 - hemoglobin 8.0, .9, haptoglobin 29 (), TSH  0.824 (0.270-4.200). Moved to [facility] on 12/8/2023.  CXR identified no significant change.  IABP removed.    History of present illness reviewed since last visit and changes noted on 12/09/23.    Subjective   Has some burning in her chest.  Feels better.     ROS:  Review of Systems   Constitutional:  Negative for activity change, chills, fatigue, fever and unexpected weight change.   HENT:  Negative for congestion, dental problem, hearing loss, mouth sores, nosebleeds, sore throat and trouble swallowing.    Eyes:  Negative for photophobia and visual disturbance.   Respiratory:  Negative for cough, chest tightness and shortness of breath.    Cardiovascular:  Positive for chest pain. Negative for palpitations and leg swelling.   Gastrointestinal:  Negative for abdominal distention, abdominal pain, blood in stool, constipation, diarrhea, nausea and vomiting.   Endocrine: Negative for cold intolerance and heat intolerance.   Genitourinary:  Negative for decreased urine volume, difficulty urinating, frequency, hematuria and urgency.   Musculoskeletal:  Negative for arthralgias and gait problem.   Skin:  Negative for rash and wound.   Neurological:  Negative for dizziness, tremors, speech difficulty, weakness, light-headedness, numbness and headaches.   Hematological:  Negative for adenopathy. Does not bruise/bleed easily.   Psychiatric/Behavioral:  Negative for  "confusion and hallucinations. The patient is not nervous/anxious.    All other systems reviewed and are negative.    MEDICATIONS:    Scheduled Meds:  aspirin, 81 mg, Oral, Daily  atorvastatin, 80 mg, Oral, Daily  budesonide, 0.5 mg, Nebulization, BID - RT  calcium carbonate, 1 tablet, Oral, Daily  carvedilol, 6.25 mg, Oral, BID With Meals  cholecalciferol, 1,000 Units, Oral, Daily  epoetin ana-epbx, 40,000 Units, Subcutaneous, Weekly  famotidine, 20 mg, Oral, BID AC  furosemide, 40 mg, Intravenous, BID  insulin lispro, 2-9 Units, Subcutaneous, 4x Daily AC & at Bedtime  ipratropium-albuterol, 3 mL, Nebulization, Once  sodium chloride, 10 mL, Intravenous, Q12H  vitamin B-12, 1,000 mcg, Oral, Daily    Continuous Infusions:      PRN Meds:    acetaminophen    Calcium Replacement - Follow Nurse / BPA Driven Protocol    dextrose    dextrose    glucagon (human recombinant)    heparin    heparin    labetalol    Magnesium Standard Dose Replacement - Follow Nurse / BPA Driven Protocol    Morphine    nitroglycerin    ondansetron **OR** ondansetron    Phosphorus Replacement - Follow Nurse / BPA Driven Protocol    Potassium Replacement - Follow Nurse / BPA Driven Protocol    [COMPLETED] Insert Peripheral IV **AND** sodium chloride    sodium chloride    sodium chloride     ALLERGIES:    Allergies   Allergen Reactions    Hydrocodone Nausea And Vomiting    Iodinated Contrast Media Hives and Itching     PT HAD SOME HIVES DURING SCAN.  PRIOR TO SCAN 8-3-2013.    Latex Rash     Blisters     Penicillins Hives     Objective    VITALS:   /65   Pulse 96   Temp 97.7 °F (36.5 °C) (Oral)   Resp 12   Ht 157.5 cm (62\")   Wt 56.8 kg (125 lb 3.5 oz)   LMP  (LMP Unknown)   SpO2 97%   BMI 22.90 kg/m²     PHYSICAL EXAM:  Physical Exam  Vitals and nursing note reviewed.   Constitutional:       General: She is not in acute distress.     Appearance: Normal appearance. She is well-developed. She is not diaphoretic.   HENT:      Head: " Normocephalic and atraumatic.      Nose: Nose normal.      Comments: Oxygen via nasal cannula.     Mouth/Throat:      Mouth: Mucous membranes are moist.      Pharynx: Oropharynx is clear. No oropharyngeal exudate or posterior oropharyngeal erythema.      Comments: Dental fillings.  Eyes:      General: No scleral icterus.     Extraocular Movements: Extraocular movements intact.      Conjunctiva/sclera: Conjunctivae normal.      Pupils: Pupils are equal, round, and reactive to light.   Cardiovascular:      Rate and Rhythm: Normal rate and regular rhythm.      Heart sounds: Normal heart sounds. No murmur heard.     Comments: Cardiac monitor leads.  Pulmonary:      Effort: Pulmonary effort is normal. No respiratory distress.      Breath sounds: Normal breath sounds. No stridor. No wheezing or rales.   Chest:      Comments: Left chest wall Ouaxvi-J-Nouq - accessed.   Abdominal:      General: Bowel sounds are normal. There is no distension.      Palpations: Abdomen is soft. There is no mass.      Tenderness: There is no abdominal tenderness. There is no guarding.   Genitourinary:     Comments: Ballesteros catheter.   Musculoskeletal:         General: No swelling, tenderness or deformity. Normal range of motion.      Cervical back: Normal range of motion and neck supple.      Right lower leg: No edema.      Left lower leg: No edema.      Comments: Right upper extremity peripheral IV.   Left hand oxygen saturation monitor.     Lymphadenopathy:      Cervical: No cervical adenopathy.      Upper Body:      Right upper body: No supraclavicular adenopathy.      Left upper body: No supraclavicular adenopathy.   Skin:     General: Skin is warm and dry.      Coloration: Skin is not pale.      Findings: No bruising, erythema or rash.   Neurological:      General: No focal deficit present.      Mental Status: She is alert and oriented to person, place, and time.      Coordination: Coordination normal.   Psychiatric:         Mood and  Affect: Mood normal.         Behavior: Behavior normal.         Thought Content: Thought content normal.     RECENT LABS:  Lab Results (last 24 hours)       Procedure Component Value Units Date/Time    POC Activated Clotting Time [[id number]]  (Abnormal) Collected: 12/09/23 0938    Specimen: Arterial Blood Updated: 12/09/23 0943     Activated Clotting Time  158 Seconds      Comment: Serial Number: [id number]:  [id number]       POC Activated Clotting Time [[id number]]  (Abnormal) Collected: 12/09/23 0818    Specimen: Arterial Blood Updated: 12/09/23 0824     Activated Clotting Time  163 Seconds      Comment: Serial Number: [id number]:  [id number]       POC Glucose Once [[id number]]  (Normal) Collected: 12/09/23 0755    Specimen: Blood Updated: 12/09/23 0758     Glucose 105 mg/dL      Comment: Serial Number: [id number]:  [id number]       Basic Metabolic Panel [[id number]]  (Abnormal) Collected: 12/09/23 0222    Specimen: Blood Updated: 12/09/23 0253     Glucose 116 mg/dL      BUN 34 mg/dL      Creatinine 0.71 mg/dL      Sodium 142 mmol/L      Potassium 4.5 mmol/L      Comment: Result checked          Chloride 108 mmol/L      CO2 25.0 mmol/L      Calcium 7.5 mg/dL      BUN/Creatinine Ratio 47.9     Anion Gap 9.0 mmol/L      eGFR 96.3 mL/min/1.73     Narrative:      GFR Normal >60  Chronic Kidney Disease <60  Kidney Failure <15      aPTT [[id number]]  (Normal) Collected: 12/09/23 0222    Specimen: Blood Updated: 12/09/23 0239     PTT 65.6 seconds     CBC (No Diff) [[id number]]  (Abnormal) Collected: 12/09/23 0222    Specimen: Blood Updated: 12/09/23 0228     WBC 6.00 10*3/mm3      RBC 2.35 10*6/mm3      Hemoglobin 8.0 g/dL      Hematocrit 24.0 %      .9 fL      MCH 33.8 pg      MCHC 33.2 g/dL      RDW 22.5 %      RDW-SD 83.1 fl      MPV 7.7 fL      Platelets 179 10*3/mm3     Basic Metabolic Panel [[id number]]  (Abnormal) Collected: 12/08/23 1923    Specimen: Blood Updated: 12/08/23 1951     Glucose 210 mg/dL       BUN 28 mg/dL      Creatinine 0.88 mg/dL      Sodium 143 mmol/L      Potassium 3.6 mmol/L      Chloride 107 mmol/L      CO2 25.0 mmol/L      Calcium 7.7 mg/dL      BUN/Creatinine Ratio 31.8     Anion Gap 11.0 mmol/L      eGFR 74.4 mL/min/1.73     Narrative:      GFR Normal >60  Chronic Kidney Disease <60  Kidney Failure <15      aPTT [[id number]]  (Normal) Collected: 12/08/23 1923    Specimen: Blood Updated: 12/08/23 1947     PTT 62.4 seconds     Blood Culture - Blood, Hand, Left [[id number]]  (Normal) Collected: 12/07/23 1923    Specimen: Blood from Hand, Left Updated: 12/08/23 1931     Blood Culture No growth at 24 hours    Blood Culture - Blood, Hand, Left [[id number]]  (Normal) Collected: 12/07/23 1859    Specimen: Blood from Hand, Left Updated: 12/08/23 1916     Blood Culture No growth at 24 hours    Narrative:      Less than seven (7) mL's of blood was collected.  Insufficient quantity may yield false negative results.    Haptoglobin [[id number]]  (Abnormal) Collected: 12/08/23 1237    Specimen: Blood Updated: 12/08/23 1718     Haptoglobin 29 mg/dL     POC Glucose Once [[id number]]  (Abnormal) Collected: 12/08/23 1642    Specimen: Blood Updated: 12/08/23 1644     Glucose 231 mg/dL      Comment: Serial Number: [id number]:  [id number]       S. Pneumo Ag Urine or CSF - Urine, Urine, Clean Catch [[id number]]  (Normal) Collected: 12/08/23 1247    Specimen: Urine, Clean Catch Updated: 12/08/23 1314     Strep Pneumo Ag Negative    Legionella Antigen, Urine - Urine, Urine, Clean Catch [[id number]]  (Normal) Collected: 12/08/23 1247    Specimen: Urine, Clean Catch Updated: 12/08/23 1314     LEGIONELLA ANTIGEN, URINE Negative    Protime-INR [[id number]]  (Normal) Collected: 12/08/23 1237    Specimen: Blood Updated: 12/08/23 1309     Protime 11.6 Seconds      INR 1.07    aPTT [[id number]]  (Abnormal) Collected: 12/08/23 1237    Specimen: Blood Updated: 12/08/23 1309     PTT 28.8 seconds     POC Glucose Once [[id number]]   (Abnormal) Collected: 12/08/23 1241    Specimen: Blood Updated: 12/08/23 1244     Glucose 157 mg/dL      Comment: Serial Number: [id number]:  [id number]             PENDING RESULTS: Blood cultures (final), LDH, reticulocytes, and SHADI. Vitamin D and iron studies from office.     IMAGING REVIEWED:  XR Chest 1 View    Result Date: 12/9/2023  Impression: No significant change identified Electronically Signed: Clive Li MD  12/9/2023 8:07 AM CST  Workstation ID: [id number]    XR Chest 1 View    Result Date: 12/9/2023  Impression: Intra-aortic balloon pump catheter has migrated distally and now lies approximately 20 mm from the superior margin of the aortic arch at the upper T5 level. Electronically Signed: Celso Soni MD  12/9/2023 12:11 AM EST  Workstation ID: [id number]    XR Chest 1 View    Result Date: 12/8/2023  Impression: 1. Improved but residual right lower lung airspace disease and trace effusion. 2. Placement of an intra-aortic balloon pump catheter. Electronically Signed: Nadja Li MD  12/8/2023 11:44 AM CST  Workstation ID: [id number]    CT Angiogram Chest Pulmonary Embolism    Result Date: 12/7/2023  Impression: 1. No evidence for pulmonary embolus. 2. Findings concerning for progression of disease with a sclerotic osseous metastatic lesion involving the lateral right with with increased consolidation and soft tissue component extending into the right middle lung. 3. Diffuse groundglass opacities with intralobular septal thickening compatible with pulmonary edema however there is additional bibasilar airspace disease with consolidation and pleural effusions suggesting additional infectious/inflammatory process. Electronically Signed: Nadja Li MD  12/7/2023 4:34 PM CST  Workstation ID: [id number]    XR Chest 1 View    Result Date: 12/7/2023  Impression: Right lower lobe consolidation and small effusion suspicious for pneumonia in the right clinical setting. Ill-defined more nodular  alveolar opacities noted more so on the left, which could reflect edema or multifocal infection. Electronically Signed: Howard Razo MD  12/7/2023 4:56 PM EST  Workstation ID: [id number]     I have reviewed the patient's labs, imaging, reports, and other clinician documentation.    Assessment & Plan   ASSESSMENT  MVASI infusion reaction and EKG changes: Acute onset after multiple tolerated infusions.  Unresponsive to initial treatment.  EKG suspicious for anterior septal infarction and troponin rising with development of chest pain. MVASI was discontinued and Lonsurf was put on hold.  Due for growth factor on 12/11/2023.  Pulmonary and cardiology have been consulted.  Mvasi has been shown to cause myocardial dysfunction in 10% of patients.  On ASA and treatment dose Lovenox.  NSTEMI: Cardiac catheterization has revealed severe single-vessel coronary artery disease with 100% occlusion of the LAD with right to left collaterals likely chronic total occlusion.  Moderate stenosis involving the ostium of the right coronary artery that supplies collaterals to the LAD territory.  There was severe LV dysfunction and elevated left-sided filling pressures.  IABP placed and patient started on diuretics.  Stage IV colon cancer: Chest CT showed progression of disease in her lungs and bones but she has had disease in these areas.  She is on Xgeva as an outpatient.    Acute on chronic anemia, history of REGULO, macrocytosis and vitamin B12 deficiency: She has chemotherapy-induced anemia with macrocytosis. Oral vitamin B12 was continued and she was started on Procrit on 12/8/2023. Haptoglobin was low. Remaining hemolysis labs are pending. TSH was normal.   Vitamin D deficiency and osteopenia: Continued supplementation.  Will obtain vitamin D level from office.  Started Tums for low ionized calcium.  Contrast dye allergy: Recommend to avoid IV contrast with scans.  Right lower lobe pneumonia, metastatic lung disease and acute  respiratory failure: Management per pulmonary.     PLAN  Follow CBC.   Reticulocyte count, LDH, and SHADI.   Start folic acid 1 mg p.o. daily.  Obtain vitamin D level and iron studies from office.  Continue TUMS 1 tablet daily.  Continue daily oral B12 and vitamin D supplementation.  Continue Pepcid.  Continue Procrit 40,000 units subcu weekly, next due 12/15/2023.  Discuss other options of treatment as an outpatient.    Electronically signed by ARNOLDO White, 12/09/23, 11:51 AM EST.      On 12/9/2023, I have personally performed a face-to-face diagnostic evaluation on this patient. I have performed a complete history and physical examination, reviewed laboratory studies, and radiographic examinations.  I have completed the majority and substantive portion of the medical decision making.  I have formulated the assessment on this patient and the plan of action as noted above. I have discussed the case with Dai Dutta NP, have edited/reviewed the note, and agree with the care plan.  The patient claims to be feeling better with some burning pain in her chest.  On examination, left chest wall Cedcho-h-Fqbc is accessed and right arm IV is present.  Hemoglobin is 8 and haptoglobin somewhat low.  Cardiology is following for AMI.  Will start folic acid supplementation while waiting for additional workup results.      On 12/9/2023, I discussed the patient's findings and my recommendations with patient.    Part of this note may be an electronic transcription/translation of spoken language to printed text using the Dragon Dictation System.     Electronically signed by Mark Perera MD, 12/09/23, 12:13 PM EST.

## 2023-12-09 NOTE — PROGRESS NOTES
[facility]iology [facility]      Patient Care Team:  Diana Spencer MD as PCP - General  Diana Spencer MD as PCP - Family Medicine  Mark Perera MD as Consulting Physician (Hematology and Oncology)  Jay Yeh MD as Surgeon (General Surgery)      Cardiology assessment and plan     Acute myocardial infarction  Non-ST elevation myocardial infarction  Cardiomyopathy  LV dysfunction  Chest discomfort  Pulmonary edema  Multivessel coronary artery disease  Possible stress cardiomyopathy  Hypoxic respiratory failure  Abnormal troponin and abnormal proBNP  Stage IV metastatic colon cancer  Multifocal pneumonia  Impressions:/Cardiac catheterization  Severe single-vessel coronary artery disease 100% occlusion of the LAD with right to left collaterals likely chronic total occlusion  Moderate to severe stenosis involving the left main supplying only a left circumflex artery with no critical stenosis  Moderate stenosis involving the ostium of the right coronary artery that supplies collaterals to the LAD territory  Severe LV dysfunction  Elevated left-sided filling pressures  Possible stress cardiomyopathy based on the LV gram findings and hypokinesis involving the mid to distal segments and also EKG changes that are out of proportion to elevated troponin    Diuresis  Low-dose beta-blockers  Continued aggressive risk factor modification  Test results reviewed and discussed with patient and family  Intra-aortic balloon pump was removed at bedside  Patient is somewhat clinically better  Still continues to be tachycardic and hypotensive  Tmax is 97.8 pulse is 96 respirations are 12 blood pressure is 106/65 sats are 96%  Sodium is 142 potassium is 4.5 creatinine is 0.7 hemoglobin is 8.0  Current medications include Lipitor 80 mg p.o. once a day Coreg 6.25 mg p.o. twice daily patient is on Lasix 40 mg IV twice daily  Start patient on DVT prophylaxis dose of Lovenox later today if there is no groin bleeding  Continue  "current medical therapy continue close monitoring  Further recommendations based on patient course                  Chief Complaint: Chest pain coronary artery disease    Subjective no new complaints    Interval History: No significant change in the overall status    History taken from: patient    Review of Systems:  Review of Systems   Constitutional: Negative for chills, decreased appetite and malaise/fatigue.   HENT:  Negative for congestion and nosebleeds.    Eyes:  Negative for blurred vision and double vision.   Cardiovascular:  Positive for chest pain. Negative for irregular heartbeat, leg swelling, near-syncope, orthopnea and palpitations.   Respiratory:  Positive for shortness of breath. Negative for cough.    Hematologic/Lymphatic: Negative for adenopathy. Does not bruise/bleed easily.   Skin:  Negative for rash.   Musculoskeletal:  Negative for back pain and joint pain.   Gastrointestinal:  Negative for bloating, abdominal pain, hematemesis and hematochezia.   Neurological:  Negative for dizziness and focal weakness.       Objective    Vital Signs  Visit Vitals  /65   Pulse 96   Temp 97.7 °F (36.5 °C) (Oral)   Resp 12   Ht 157.5 cm (62\")   Wt 56.8 kg (125 lb 3.5 oz)   LMP  (LMP Unknown)   SpO2 97%   BMI 22.90 kg/m²     Oxygen Therapy  SpO2: 97 %  Pulse Oximetry Type: Continuous  Device (Oxygen Therapy): nasal cannula  Device (Oxygen Therapy) (Infant): NPPV/NIV  Flow (L/min): 2  Oxygen Concentration (%): (S) 60 (titrated per SATS)  Flowsheet Rows      Flowsheet Row First Filed Value   Admission Height 157.5 cm (62\") Documented at 12/07/2023 1617   Admission Weight 59.9 kg (132 lb 0.9 oz) Documented at 12/07/2023 1626          Intake & Output (last 3 days)         12/06 0701  12/07 0700 12/07 0701 12/08 0700 12/08 0701 12/09 0700 12/09 0701  12/10 0700    P.O.   600     I.V. (mL/kg)   1001 (17.6)     Total Intake(mL/kg)   1601 (28.2)     Urine (mL/kg/hr)  300 2960 (2.2) 665 (2.2)    Emesis/NG output "   0     Stool   0     Total Output  300 2960 665    Net  -300 -1359 -665            Urine Unmeasured Occurrence   0 x     Stool Unmeasured Occurrence   0 x     Emesis Unmeasured Occurrence   0 x           Lines, Drains & Airways       Active LDAs       Name Placement date Placement time Site Days    Peripheral IV 12/07/23 1630 Anterior;Right Forearm 12/07/23  1630  Forearm  1    Urethral Catheter 12/08/23  1900  catheter was placed during dayshift (12/8)  -- less than 1    Single Lumen Implantable Port 01/12/21 Left Chest 01/12/21  0759  Chest  1061                    Physical Exam:  Physical Exam    Results Review:     I reviewed the patient's new clinical results.    Lab Results (last 24 hours)       Procedure Component Value Units Date/Time    Reticulocytes [[id number]] Collected: 12/09/23 1217    Specimen: Blood Updated: 12/09/23 1222    Lactate Dehydrogenase [[id number]] Collected: 12/09/23 0222    Specimen: Blood Updated: 12/09/23 1216    POC Glucose Once [[id number]]  (Abnormal) Collected: 12/09/23 1147    Specimen: Blood Updated: 12/09/23 1148     Glucose 114 mg/dL      Comment: Serial Number: [id number]:  [id number]       POC Activated Clotting Time [[id number]]  (Abnormal) Collected: 12/09/23 0938    Specimen: Arterial Blood Updated: 12/09/23 0943     Activated Clotting Time  158 Seconds      Comment: Serial Number: [id number]:  [id number]       POC Activated Clotting Time [[id number]]  (Abnormal) Collected: 12/09/23 0818    Specimen: Arterial Blood Updated: 12/09/23 0824     Activated Clotting Time  163 Seconds      Comment: Serial Number: [id number]:  [id number]       POC Glucose Once [[id number]]  (Normal) Collected: 12/09/23 0755    Specimen: Blood Updated: 12/09/23 0758     Glucose 105 mg/dL      Comment: Serial Number: [id number]:  [id number]       Basic Metabolic Panel [[id number]]  (Abnormal) Collected: 12/09/23 0222    Specimen: Blood Updated: 12/09/23 0253     Glucose 116 mg/dL      BUN 34  mg/dL      Creatinine 0.71 mg/dL      Sodium 142 mmol/L      Potassium 4.5 mmol/L      Comment: Result checked          Chloride 108 mmol/L      CO2 25.0 mmol/L      Calcium 7.5 mg/dL      BUN/Creatinine Ratio 47.9     Anion Gap 9.0 mmol/L      eGFR 96.3 mL/min/1.73     Narrative:      GFR Normal >60  Chronic Kidney Disease <60  Kidney Failure <15      aPTT [[id number]]  (Normal) Collected: 12/09/23 0222    Specimen: Blood Updated: 12/09/23 0239     PTT 65.6 seconds     CBC (No Diff) [[id number]]  (Abnormal) Collected: 12/09/23 0222    Specimen: Blood Updated: 12/09/23 0228     WBC 6.00 10*3/mm3      RBC 2.35 10*6/mm3      Hemoglobin 8.0 g/dL      Hematocrit 24.0 %      .9 fL      MCH 33.8 pg      MCHC 33.2 g/dL      RDW 22.5 %      RDW-SD 83.1 fl      MPV 7.7 fL      Platelets 179 10*3/mm3     Basic Metabolic Panel [[id number]]  (Abnormal) Collected: 12/08/23 1923    Specimen: Blood Updated: 12/08/23 1951     Glucose 210 mg/dL      BUN 28 mg/dL      Creatinine 0.88 mg/dL      Sodium 143 mmol/L      Potassium 3.6 mmol/L      Chloride 107 mmol/L      CO2 25.0 mmol/L      Calcium 7.7 mg/dL      BUN/Creatinine Ratio 31.8     Anion Gap 11.0 mmol/L      eGFR 74.4 mL/min/1.73     Narrative:      GFR Normal >60  Chronic Kidney Disease <60  Kidney Failure <15      aPTT [[id number]]  (Normal) Collected: 12/08/23 1923    Specimen: Blood Updated: 12/08/23 1947     PTT 62.4 seconds     Blood Culture - Blood, Hand, Left [[id number]]  (Normal) Collected: 12/07/23 1923    Specimen: Blood from Hand, Left Updated: 12/08/23 1931     Blood Culture No growth at 24 hours    Blood Culture - Blood, Hand, Left [[id number]]  (Normal) Collected: 12/07/23 1859    Specimen: Blood from Hand, Left Updated: 12/08/23 1916     Blood Culture No growth at 24 hours    Narrative:      Less than seven (7) mL's of blood was collected.  Insufficient quantity may yield false negative results.    Haptoglobin [[id number]]  (Abnormal) Collected: 12/08/23 1237     Specimen: Blood Updated: 12/08/23 1718     Haptoglobin 29 mg/dL     POC Glucose Once [[id number]]  (Abnormal) Collected: 12/08/23 1642    Specimen: Blood Updated: 12/08/23 1644     Glucose 231 mg/dL      Comment: Serial Number: [id number]:  [id number]       S. Pneumo Ag Urine or CSF - Urine, Urine, Clean Catch [[id number]]  (Normal) Collected: 12/08/23 1247    Specimen: Urine, Clean Catch Updated: 12/08/23 1314     Strep Pneumo Ag Negative    Legionella Antigen, Urine - Urine, Urine, Clean Catch [[id number]]  (Normal) Collected: 12/08/23 1247    Specimen: Urine, Clean Catch Updated: 12/08/23 1314     LEGIONELLA ANTIGEN, URINE Negative    Protime-INR [[id number]]  (Normal) Collected: 12/08/23 1237    Specimen: Blood Updated: 12/08/23 1309     Protime 11.6 Seconds      INR 1.07    aPTT [[id number]]  (Abnormal) Collected: 12/08/23 1237    Specimen: Blood Updated: 12/08/23 1309     PTT 28.8 seconds     POC Glucose Once [[id number]]  (Abnormal) Collected: 12/08/23 1241    Specimen: Blood Updated: 12/08/23 1244     Glucose 157 mg/dL      Comment: Serial Number: [id number]:  [id number]             Results for orders placed during the hospital encounter of 12/07/23    ADULT TRANSTHORACIC ECHO COMPLETE W/ CONT IF NECESSARY PER PROTOCOL    Interpretation Summary    Left ventricular systolic function is severely decreased. Left ventricular ejection fraction appears to be 31 - 35%.    Left ventricular diastolic function is consistent with (grade I) impaired relaxation.    The left atrial cavity is moderately dilated.        Medication Review:   I have reviewed the patient's current medication list  Scheduled Meds:aspirin, 81 mg, Oral, Daily  atorvastatin, 80 mg, Oral, Daily  budesonide, 0.5 mg, Nebulization, BID - RT  calcium carbonate, 1 tablet, Oral, Daily  carvedilol, 6.25 mg, Oral, BID With Meals  cholecalciferol, 1,000 Units, Oral, Daily  epoetin ana-epbx, 40,000 Units, Subcutaneous, Weekly  famotidine, 20 mg,  Oral, BID AC  furosemide, 40 mg, Intravenous, BID  insulin lispro, 2-9 Units, Subcutaneous, 4x Daily AC & at Bedtime  ipratropium-albuterol, 3 mL, Nebulization, Once  sodium chloride, 10 mL, Intravenous, Q12H  vitamin B-12, 1,000 mcg, Oral, Daily      Continuous Infusions:   PRN Meds:.  acetaminophen    Calcium Replacement - Follow Nurse / BPA Driven Protocol    dextrose    dextrose    glucagon (human recombinant)    heparin    heparin    labetalol    Magnesium Standard Dose Replacement - Follow Nurse / BPA Driven Protocol    Morphine    nitroglycerin    ondansetron **OR** ondansetron    Phosphorus Replacement - Follow Nurse / BPA Driven Protocol    Potassium Replacement - Follow Nurse / BPA Driven Protocol    [COMPLETED] Insert Peripheral IV **AND** sodium chloride    sodium chloride    sodium chloride    ECG/EMG Results (last 24 hours)       Procedure Component Value Units Date/Time    ADULT TRANSTHORACIC ECHO COMPLETE W/ CONT IF NECESSARY PER PROTOCOL [[id number]] Resulted: 12/08/23 1758     Updated: 12/08/23 1804     LVIDd 4.3 cm      LVIDs 3.3 cm      IVSd 0.90 cm      LVPWd 0.90 cm      FS 23.3 %      IVS/LVPW 1.00 cm      ESV(cubed) 35.9 ml      LV Sys Vol (BSA corrected) 39.0 cm2      EDV(cubed) 79.5 ml      LV Patiño Vol (BSA corrected) 66.7 cm2      LV mass(C)d 123.3 grams      EDV(MOD-sp4) 104.0 ml      ESV(MOD-sp4) 60.8 ml      SV(MOD-sp4) 43.2 ml      SI(MOD-sp4) 27.7 ml/m2      EF(MOD-sp4) 41.5 %      MV E max pawan 88.6 cm/sec      MV A max pawan 99.6 cm/sec      MV dec time 0.14 sec      MV E/A 0.89     LA ESV Index (BP) 31.3 ml/m2      Med Peak E' Pawan 9.3 cm/sec      Lat Peak E' Pawan 11.4 cm/sec      Avg E/e' ratio 8.56     RVIDd 4.0 cm      TAPSE (>1.6) 2.20 cm      RV S' 14.2 cm/sec      LA dimension (2D)  3.8 cm      LV V1 max 99.0 cm/sec      LV V1 max PG 3.9 mmHg      LV V1 mean PG 2.00 mmHg      LV V1 VTI 23.3 cm      Ao pk pawan 190.0 cm/sec      Ao max PG 14.4 mmHg      Ao mean PG 8.0 mmHg      Ao V2  VTI 34.4 cm      MV max PG 6.1 mmHg      MV mean PG 4.0 mmHg      MV V2 VTI 22.9 cm      MV P1/2t 43.9 msec      MVA(P1/2t) 5.0 cm2      MV dec slope 761.0 cm/sec2      MR max joelle 413.0 cm/sec      MR max PG 68.2 mmHg     Narrative:        Left ventricular systolic function is severely decreased. Left   ventricular ejection fraction appears to be 31 - 35%.    Left ventricular diastolic function is consistent with (grade I)   impaired relaxation.    The left atrial cavity is moderately dilated.              Imaging Results (Last 24 Hours)       Procedure Component Value Units Date/Time    XR Chest 1 View [[id number]] Collected: 12/09/23 0906     Updated: 12/09/23 0909    Narrative:      XR CHEST 1 VW    Date of Exam: 12/9/2023 8:03 AM CST    Indication: IABP    Comparison: 12/8/2023    Findings:  Cardiomediastinal silhouette and support lines and tubes are relatively unchanged. Caudal marking the tip of the IABP appears similar position. There remains mild pulmonary vascular and interstitial prominence. No airspace disease, pneumothorax, nor   pleural effusion. No acute osseous abnormality identified.      Impression:      Impression:  No significant change identified      Electronically Signed: Clive Li MD    12/9/2023 8:07 AM CST    Workstation ID: [id number]    XR Chest 1 View [[id number]] Collected: 12/09/23 0004     Updated: 12/09/23 0013    Narrative:      XR CHEST 1 VW    Date of Exam: 12/8/2023 11:35 PM EST    Indication: IABP placement    Comparison: 12/8/2023.    Findings:  Intra-aortic balloon pump catheter is present. The distal tip of the catheter appears to have migrated distally and now lies approximately 20 mm from the superior margin of the aortic arch at what appears to be the upper T5 level. There is a small stable   right-sided pleural effusion. Lung parenchyma remains clear. No pneumothorax. There is a stable left-sided chest port. Heart size is unchanged. Pulmonary vasculature is within normal  limits      Impression:      Impression:  Intra-aortic balloon pump catheter has migrated distally and now lies approximately 20 mm from the superior margin of the aortic arch at the upper T5 level.        Electronically Signed: Celso Soni MD    12/9/2023 12:11 AM EST    Workstation ID: [id number]    XR Chest 1 View [[id number]] Collected: 12/08/23 1239     Updated: 12/08/23 1246    Narrative:      X-ray chest 1 view    Date of Exam: 12/8/2023 11:22 AM CST    Indication: Balloon pump    Comparison: Chest x-ray 12/7/2023    Findings:  The heart is stable in size. There is a left chest wall port catheter with the distal tip overlying the distal superior vena cava. Intra-aortic balloon pump catheter. Is present with the distal tip 9 mm from the margin of the descending thoracic aortic   arch. There is mild right basilar atelectasis but overall improved aeration at the right lung base from prior study. No pneumothorax. Trace right pleural effusion is suspected.      Impression:      Impression:    1. Improved but residual right lower lung airspace disease and trace effusion.    2. Placement of an intra-aortic balloon pump catheter.      Electronically Signed: Nadja Li MD    12/8/2023 11:44 AM CST    Workstation ID: [id number]          Results for orders placed during the hospital encounter of 12/07/23    Cardiac Catheterization/Vascular Study    Narrative  Table formatting from the original result was not included.  Cardiac Catheterization Operative Report    Samantha Massey  [id number]  12/8/2023  @PCP@    She underwent cardiac catheterization.    Indications for the procedure include: acute coronary syndrome.    No results found for this or any previous visit.    No results found for this or any previous visit.        Procedure Details:  The risks, benefits, complications, treatment options, and expected outcomes were discussed with the patient. The patient and/or family concurred with the proposed plan,  giving informed consent.    After informed consent the patient was brought to the cath lab after appropriate IV hydration was begun and oral premedication was given. She was further sedated with fentanyl. She was prepped and draped in the usual manner. Using the modified Seldinger access technique, a 6 Indian sheath was placed in the femoral artery. A left heart catheterization with coronary arteriography was performed. Findings are discussed below.    After the procedure was completed, sedation was stopped and the sheaths and catheters were all removed. Hemostasis was achieved per established hospital protocols.    Conscious sedation:  Conscious sedation was performed according to protocol.  I supervised and directed an independent trained observer with the assistance of monitoring the patient's level of consciousness and physiologic status throughout the procedure.  Intraoperative service time was 120 minutes.    Findings:    Hemodynamics Central aortic pressure systolic 144 diastolic 68 with a mean pressure of 90 mmHg  LV end-diastolic pressure of 39 mmHg  There was no gradient across the aortic valve on the pullback of the pigtail catheter    Left Main Small caliber vessel with significant dampening with 6 Indian catheter  Left main has a diffuse angiographic 60 to 70% stenosis with some dampening with a 6 Indian catheter but no significant dampening with a 4 Indian catheter supplies on the left circumflex artery  RCA Large-caliber vessel dominant vessel  Right coronary artery has ostial angiographic 50 to 60% stenosis with some dampening with the 6 Indian catheter  Mid right coronary artery has another focal area of 20% stenosis  Distal right coronary artery has a focal area of 30 to 40% stenosis  PLB branch of the right coronary artery is a large-caliber vessel with mid angiographic 30 to 40% stenosis  PDA branch of the right coronary artery is a large-caliber vessel has ostial 50 to 60% stenosis  Right  coronary artery provides collaterals to the LAD  % occluded in the ostial portion after the left circumflex artery  Circ Moderate to large caliber vessel angiographically normal  LV Moderate to severe hypokinesis involving the mid mid to distal anterior anteroapical wall to LV apex inferior apical wall with estimated LV ejection fraction of 35%  Coronary Dominance Right coronary artery    Estimated Blood Loss:  Minimal    Specimens: None    Complications:  None; patient tolerated the procedure well.    Disposition: PACU - hemodynamically stable.    Condition: stable    Impressions:  Severe single-vessel coronary artery disease 100% occlusion of the LAD with right to left collaterals likely chronic total occlusion  Moderate to severe stenosis involving the left main supplying only a left circumflex artery with no critical stenosis  Moderate stenosis involving the ostium of the right coronary artery that supplies collaterals to the LAD territory  Severe LV dysfunction  Elevated left-sided filling pressures  Possible stress cardiomyopathy based on the LV gram findings and hypokinesis involving the mid to distal segments and also EKG changes that are out of proportion to elevated troponin      Recommendations:  Admit patient to [facility]  Intra-aortic balloon pump for hemodynamic support  Diuresis  Continued aggressive risk factor modification  Test results reviewed and discussed with patient and family     Results for orders placed during the hospital encounter of 12/07/23    ADULT TRANSTHORACIC ECHO COMPLETE W/ CONT IF NECESSARY PER PROTOCOL    Interpretation Summary    Left ventricular systolic function is severely decreased. Left ventricular ejection fraction appears to be 31 - 35%.    Left ventricular diastolic function is consistent with (grade I) impaired relaxation.    The left atrial cavity is moderately dilated.     Lab Results   Component Value Date    GLUCOSE 116 (H) 12/09/2023    BUN 34 (H) 12/09/2023     "CREATININE 0.71 12/09/2023    EGFR 96.3 12/09/2023    BCR 47.9 (H) 12/09/2023    K 4.5 12/09/2023    CO2 25.0 12/09/2023    CALCIUM 7.5 (L) 12/09/2023    ALBUMIN 3.0 (L) 12/07/2023    BILITOT 0.4 12/07/2023    AST 34 (H) 12/07/2023    ALT 16 12/07/2023      No results found for: \"CHOL\", \"CHLPL\", \"TRIG\", \"HDL\", \"LDL\", \"LDLDIRECT\"   Lab Results   Component Value Date    CKTOTAL 8 (L) 01/08/2018    TROPONINT 136 (C) 12/08/2023        Assessment & Plan       Acute hypoxic respiratory failure    Acute systolic heart failure        Ramiro Olivera MD  12/09/23  12:25 EST               "

## 2023-12-09 NOTE — PROGRESS NOTES
Critical Care Progress Note   Samantha Massey : 1960 MRN:[id number] LOS:2     Principal Problem: Acute hypoxic respiratory failure     Reason for follow up: All the medical problems listed below    Summary     A 63 y.o. female with PMH of metastatic colon cancer, hypertension, presented to the hospital for shortness of breath and was admitted with a principal diagnosis of Acute hypoxic respiratory failure.  CT chest showed pulmonary congestion.  Also suspected to have multifocal pneumonia and started on broad-spectrum antibiotics.  Subsequently, all infectious workup was negative and antibiotics stopped.    Subsequently, troponins continued to trend up.  Cardiology was consulted , cardiac cath showed 100% occlusion of LAD, moderate to severe stenosis involving the left main and moderate stenosis involving the ostium of right coronary artery.  Needed temporary balloon pump for circulatory support.  Echocardiogram showed LVEF of 31 to 35% with grade 1 diastolic dysfunction.  Developed pulmonary edema and hypoxia, needed IV diuretics and Airvo.    Significant events     23 : Balloon pump removed.  Transferred to PCU.    Assessment / Plan     NSTEMI  Cardiac cath with 100% occlusion of LAD.  Plans in progress for balloon pump removal today.  Cardiology following.  Continue with aspirin, Coreg and statins.     Acute Combined Systolic and Diastolic heart failure / Ischemic cardiomyopathy  Currently decompensated.   Last ECHO showed an EF of 31-35% with grade 1 diastolic dysfunction.   Currently on Lasix 40 Mg IV every 12 hours.  Continue with Coreg.  Add Jardiance.  When blood pressure permits, will add Entresto.  Monitor Input/Output very closely. Follow daily weights.   Net IO Since Admission: -2,364 mL [23 1354]    Acute respiratory failure with hypoxia:   Due to CHF exacerbation.   No convincing evidence of pneumonia.  All infectious workup was negative.  DC all antibiotics.  Improved oxygenation  with diuretics.    Metastatic cancer --primary colon cancer with mets to liver, lung and bones  Heme-onc following and managing  MVASI infusion reaction & EKG changes - Discontinued by Heme-Onc  Heme-onc ordered Procrit weekly for chemotherapy-induced anemia    Essential Hypertension: well controlled.   Continue Coreg.   Titrate medications as needed.    Former Smoker -- 1 pack/ day for 30 years. Quit in 2012    Code status:   Code Status (Patient has no pulse and is not breathing): CPR (Attempt to Resuscitate)  Medical Interventions (Patient has pulse or is breathing): Full Support       Nutrition: Diet: Cardiac Diets; Healthy Heart (2-3 Na+); Texture: Regular Texture (IDDSI 7); Fluid Consistency: Thin (IDDSI 0)   Patient isn't on Tube Feeding    DVT prophylaxis:  Medical and mechanical DVT prophylaxis orders are present.     Subjective / Review of systems     Review of Systems   Feels much better, no chest pain.  Breathing is improved.  No shortness of breath.  Objective / Physical Exam   Vital signs:  Temp: 97.7 °F (36.5 °C)  BP: 115/69  Heart Rate: 99  Resp: 12  SpO2: 97 %  Weight: 56.8 kg (125 lb 3.5 oz)    Admission Weight: Weight: 59.9 kg (132 lb 0.9 oz)  Current Weight: Weight: 56.8 kg (125 lb 3.5 oz)    Input/Output in last 24 hours:    Intake/Output Summary (Last 24 hours) at 12/9/2023 1354  Last data filed at 12/9/2023 1300  Gross per 24 hour   Intake 1721 ml   Output 2785 ml   Net -1064 ml      Physical Exam  Vitals and nursing note reviewed.   Constitutional:       General: She is not in acute distress.     Appearance: Normal appearance.   HENT:      Mouth/Throat:      Mouth: Mucous membranes are moist.      Pharynx: Oropharynx is clear.   Eyes:      General: No scleral icterus.     Extraocular Movements: Extraocular movements intact.      Conjunctiva/sclera: Conjunctivae normal.   Cardiovascular:      Rate and Rhythm: Normal rate.      Heart sounds: No murmur heard.     No gallop.   Pulmonary:       Breath sounds: Rales (Bibasilar) present. No wheezing.      Comments: On nasal cannula  Abdominal:      General: Bowel sounds are normal.      Palpations: Abdomen is soft.      Tenderness: There is no abdominal tenderness.   Musculoskeletal:         General: No tenderness.      Right lower leg: No edema.      Left lower leg: No edema.   Skin:     Comments: Right groin balloon pump in place, no obvious hematoma   Neurological:      General: No focal deficit present.      Mental Status: She is alert and oriented to person, place, and time.        Radiology and Labs     Results from last 7 days   Lab Units 12/09/23  0222 12/08/23  0514 12/07/23  1631   WBC 10*3/mm3 6.00 4.50 6.90   HEMATOCRIT % 24.0* 28.5* 34.0   PLATELETS 10*3/mm3 179 195 280      Results from last 7 days   Lab Units 12/09/23 0222 12/08/23  1923 12/08/23  0514 12/07/23  1728 12/07/23  1710   SODIUM mmol/L 142 143 142 138  --    POTASSIUM mmol/L 4.5 3.6 4.1 4.0  --    CHLORIDE mmol/L 108* 107 109* 107  --    CO2 mmol/L 25.0 25.0 22.0 21.0*  --    BUN mg/dL 34* 28* 24* 21  --    CREATININE mg/dL 0.71 0.88 0.71 0.74 0.80      Current medications   Scheduled Meds: aspirin, 81 mg, Oral, Daily  atorvastatin, 80 mg, Oral, Daily  calcium carbonate, 1 tablet, Oral, Daily  carvedilol, 6.25 mg, Oral, BID With Meals  cholecalciferol, 1,000 Units, Oral, Daily  empagliflozin, 10 mg, Oral, Daily  [START ON 12/10/2023] enoxaparin, 40 mg, Subcutaneous, Daily  epoetin ana-epbx, 40,000 Units, Subcutaneous, Weekly  famotidine, 20 mg, Oral, BID AC  furosemide, 40 mg, Intravenous, BID  insulin lispro, 2-9 Units, Subcutaneous, 4x Daily AC & at Bedtime  sodium chloride, 10 mL, Intravenous, Q12H  vitamin B-12, 1,000 mcg, Oral, Daily      Continuous Infusions:      Plan discussed with RN. Reviewed all other data in the last 24 hours, including but not limited to vitals, labs, microbiology, imaging and pertinent notes from other providers. High complexity decision making and  high risk of deterioration.    Jurgen Schultz MD   Critical Care  12/09/23   13:54 EST

## 2023-12-09 NOTE — PROGRESS NOTES
"DAILY PROGRESS NOTE  [facility]    Patient Identification:  Name: Samantha Massey  Age: 63 y.o.  Sex: female  :  1960  MRN: [id number]         Primary Care Physician: Diana Spencer MD    Subjective:  Interval History: She is feeling better today.    Objective:    Scheduled Meds:aspirin, 81 mg, Oral, Daily  atorvastatin, 80 mg, Oral, Daily  calcium carbonate, 1 tablet, Oral, Daily  carvedilol, 6.25 mg, Oral, BID With Meals  cholecalciferol, 1,000 Units, Oral, Daily  empagliflozin, 10 mg, Oral, Daily  [START ON 12/10/2023] enoxaparin, 40 mg, Subcutaneous, Daily  epoetin ana-epbx, 40,000 Units, Subcutaneous, Weekly  famotidine, 20 mg, Oral, BID AC  furosemide, 40 mg, Intravenous, BID  insulin lispro, 2-9 Units, Subcutaneous, 4x Daily AC & at Bedtime  sodium chloride, 10 mL, Intravenous, Q12H  vitamin B-12, 1,000 mcg, Oral, Daily      Continuous Infusions:     Vital signs in last 24 hours:  Temp:  [97.5 °F (36.4 °C)-98 °F (36.7 °C)] 97.6 °F (36.4 °C)  Heart Rate:  [] 99  Resp:  [10-14] 12  BP: ()/(56-88) 115/69    Intake/Output:    Intake/Output Summary (Last 24 hours) at 2023 1754  Last data filed at 2023 1300  Gross per 24 hour   Intake 1198 ml   Output 2085 ml   Net -887 ml       Exam:  /69   Pulse 99   Temp 97.6 °F (36.4 °C) (Oral)   Resp 12   Ht 157.5 cm (62\")   Wt 56.8 kg (125 lb 3.5 oz)   LMP  (LMP Unknown)   SpO2 97%   BMI 22.90 kg/m²     General Appearance:    Alert, cooperative, no distress   Head:    Normocephalic, without obvious abnormality, atraumatic   Eyes:       Throat:   Lips, tongue, gums normal   Neck:   Supple, symmetrical, trachea midline, no JVD   Lungs:     Clear to auscultation bilaterally, respirations unlabored   Chest Wall:    No tenderness or deformity    Heart:    Regular rate and rhythm, S1 and S2 normal, no murmur,no  rub or gallop   Abdomen:     Soft, nontender, bowel sounds active, no masses, no organomegaly    Extremities:   " Extremities normal, atraumatic, no cyanosis or edema   Pulses:      Skin:   Skin is warm and dry,  no rashes or palpable lesions   Neurologic:   no focal deficits noted      Lab Results (last 72 hours)       Procedure Component Value Units Date/Time    Hemoglobin A1c [[id number]] Collected: 12/09/23 1217    Specimen: Blood Updated: 12/09/23 1659    POC Glucose Once [[id number]]  (Normal) Collected: 12/09/23 1634    Specimen: Blood Updated: 12/09/23 1635     Glucose 98 mg/dL      Comment: Serial Number: [id number]:  [id number]       Lactate Dehydrogenase [[id number]]  (Abnormal) Collected: 12/09/23 0222    Specimen: Blood Updated: 12/09/23 1252      U/L     Reticulocytes [[id number]]  (Abnormal) Collected: 12/09/23 1217    Specimen: Blood Updated: 12/09/23 1229     Reticulocyte % 2.09 %      Reticulocyte Absolute 0.0499 10*6/mm3     POC Glucose Once [[id number]]  (Abnormal) Collected: 12/09/23 1147    Specimen: Blood Updated: 12/09/23 1148     Glucose 114 mg/dL      Comment: Serial Number: [id number]:  [id number]       POC Activated Clotting Time [[id number]]  (Abnormal) Collected: 12/09/23 0938    Specimen: Arterial Blood Updated: 12/09/23 0943     Activated Clotting Time  158 Seconds      Comment: Serial Number: [id number]:  [id number]       POC Activated Clotting Time [[id number]]  (Abnormal) Collected: 12/09/23 0818    Specimen: Arterial Blood Updated: 12/09/23 0824     Activated Clotting Time  163 Seconds      Comment: Serial Number: [id number]:  [id number]       POC Glucose Once [[id number]]  (Normal) Collected: 12/09/23 0755    Specimen: Blood Updated: 12/09/23 0758     Glucose 105 mg/dL      Comment: Serial Number: [id number]:  [id number]       Basic Metabolic Panel [[id number]]  (Abnormal) Collected: 12/09/23 0222    Specimen: Blood Updated: 12/09/23 0253     Glucose 116 mg/dL      BUN 34 mg/dL      Creatinine 0.71 mg/dL      Sodium 142 mmol/L      Potassium 4.5 mmol/L      Comment: Result  checked          Chloride 108 mmol/L      CO2 25.0 mmol/L      Calcium 7.5 mg/dL      BUN/Creatinine Ratio 47.9     Anion Gap 9.0 mmol/L      eGFR 96.3 mL/min/1.73     Narrative:      GFR Normal >60  Chronic Kidney Disease <60  Kidney Failure <15      aPTT [[id number]]  (Normal) Collected: 12/09/23 0222    Specimen: Blood Updated: 12/09/23 0239     PTT 65.6 seconds     CBC (No Diff) [[id number]]  (Abnormal) Collected: 12/09/23 0222    Specimen: Blood Updated: 12/09/23 0228     WBC 6.00 10*3/mm3      RBC 2.35 10*6/mm3      Hemoglobin 8.0 g/dL      Hematocrit 24.0 %      .9 fL      MCH 33.8 pg      MCHC 33.2 g/dL      RDW 22.5 %      RDW-SD 83.1 fl      MPV 7.7 fL      Platelets 179 10*3/mm3     Basic Metabolic Panel [[id number]]  (Abnormal) Collected: 12/08/23 1923    Specimen: Blood Updated: 12/08/23 1951     Glucose 210 mg/dL      BUN 28 mg/dL      Creatinine 0.88 mg/dL      Sodium 143 mmol/L      Potassium 3.6 mmol/L      Chloride 107 mmol/L      CO2 25.0 mmol/L      Calcium 7.7 mg/dL      BUN/Creatinine Ratio 31.8     Anion Gap 11.0 mmol/L      eGFR 74.4 mL/min/1.73     Narrative:      GFR Normal >60  Chronic Kidney Disease <60  Kidney Failure <15      aPTT [[id number]]  (Normal) Collected: 12/08/23 1923    Specimen: Blood Updated: 12/08/23 1947     PTT 62.4 seconds     Blood Culture - Blood, Hand, Left [[id number]]  (Normal) Collected: 12/07/23 1923    Specimen: Blood from Hand, Left Updated: 12/08/23 1931     Blood Culture No growth at 24 hours    Blood Culture - Blood, Hand, Left [[id number]]  (Normal) Collected: 12/07/23 1859    Specimen: Blood from Hand, Left Updated: 12/08/23 1916     Blood Culture No growth at 24 hours    Narrative:      Less than seven (7) mL's of blood was collected.  Insufficient quantity may yield false negative results.    Haptoglobin [[id number]]  (Abnormal) Collected: 12/08/23 1237    Specimen: Blood Updated: 12/08/23 1718     Haptoglobin 29 mg/dL     POC Glucose Once [[id number]]   (Abnormal) Collected: 12/08/23 1642    Specimen: Blood Updated: 12/08/23 1644     Glucose 231 mg/dL      Comment: Serial Number: [id number]:  [id number]       S. Pneumo Ag Urine or CSF - Urine, Urine, Clean Catch [[id number]]  (Normal) Collected: 12/08/23 1247    Specimen: Urine, Clean Catch Updated: 12/08/23 1314     Strep Pneumo Ag Negative    Legionella Antigen, Urine - Urine, Urine, Clean Catch [[id number]]  (Normal) Collected: 12/08/23 1247    Specimen: Urine, Clean Catch Updated: 12/08/23 1314     LEGIONELLA ANTIGEN, URINE Negative    Protime-INR [[id number]]  (Normal) Collected: 12/08/23 1237    Specimen: Blood Updated: 12/08/23 1309     Protime 11.6 Seconds      INR 1.07    aPTT [[id number]]  (Abnormal) Collected: 12/08/23 1237    Specimen: Blood Updated: 12/08/23 1309     PTT 28.8 seconds     POC Glucose Once [[id number]]  (Abnormal) Collected: 12/08/23 1241    Specimen: Blood Updated: 12/08/23 1244     Glucose 157 mg/dL      Comment: Serial Number: [id number]:  [id number]       TSH [[id number]]  (Normal) Collected: 12/08/23 0514    Specimen: Blood Updated: 12/08/23 1008     TSH 0.824 uIU/mL     POC Glucose Once [[id number]]  (Abnormal) Collected: 12/08/23 0711    Specimen: Blood Updated: 12/08/23 0713     Glucose 127 mg/dL      Comment: Serial Number: [id number]:  [id number]       Basic Metabolic Panel [[id number]]  (Abnormal) Collected: 12/08/23 0514    Specimen: Blood Updated: 12/08/23 0601     Glucose 140 mg/dL      BUN 24 mg/dL      Creatinine 0.71 mg/dL      Sodium 142 mmol/L      Potassium 4.1 mmol/L      Chloride 109 mmol/L      CO2 22.0 mmol/L      Calcium 8.0 mg/dL      BUN/Creatinine Ratio 33.8     Anion Gap 11.0 mmol/L      eGFR 96.3 mL/min/1.73     Narrative:      GFR Normal >60  Chronic Kidney Disease <60  Kidney Failure <15      High Sensitivity Troponin T [[id number]]  (Abnormal) Collected: 12/08/23 0514    Specimen: Blood Updated: 12/08/23 0557     HS Troponin T 136 ng/L      Narrative:      High Sensitive Troponin T Reference Range:  <14.0 ng/L- Negative Female for AMI  <22.0 ng/L- Negative Male for AMI  >=14 - Abnormal Female indicating possible myocardial injury.  >=22 - Abnormal Male indicating possible myocardial injury.   Clinicians would have to utilize clinical acumen, EKG, Troponin, and serial changes to determine if it is an Acute Myocardial Infarction or myocardial injury due to an underlying chronic condition.         Calcium, Ionized [[id number]]  (Abnormal) Collected: 12/08/23 0514    Specimen: Blood Updated: 12/08/23 0542     Ionized Calcium 1.09 mmol/L     CBC (No Diff) [[id number]]  (Abnormal) Collected: 12/08/23 0514    Specimen: Blood Updated: 12/08/23 0538     WBC 4.50 10*3/mm3      RBC 2.77 10*6/mm3      Hemoglobin 9.6 g/dL      Hematocrit 28.5 %      .9 fL      MCH 34.8 pg      MCHC 33.8 g/dL      RDW 21.6 %      RDW-SD 77.0 fl      MPV 7.7 fL      Platelets 195 10*3/mm3     MRSA Screen, PCR (Inpatient) - Swab, Nares [[id number]]  (Normal) Collected: 12/08/23 0321    Specimen: Swab from Nares Updated: 12/08/23 0440     MRSA PCR No MRSA Detected    Narrative:      The negative predictive value of this diagnostic test is high and should only be used to consider de-escalating anti-MRSA therapy. A positive result may indicate colonization with MRSA and must be correlated clinically.    Respiratory Panel PCR w/COVID-19(SARS-CoV-2) KARLEY/IRLANDA/KINZA/PAD/COR/[place] In-House, NP Swab in [place], 2 HR TAT - Swab, Nasopharynx [[id number]]  (Normal) Collected: 12/08/23 0228    Specimen: Swab from Nasopharynx Updated: 12/08/23 0335     ADENOVIRUS, PCR Not Detected     Coronavirus 229E Not Detected     Coronavirus HKU1 Not Detected     Coronavirus NL63 Not Detected     Coronavirus OC43 Not Detected     COVID19 Not Detected     Human Metapneumovirus Not Detected     Human Rhinovirus/Enterovirus Not Detected     Influenza A PCR Not Detected     Influenza B PCR Not Detected      Parainfluenza Virus 1 Not Detected     Parainfluenza Virus 2 Not Detected     Parainfluenza Virus 3 Not Detected     Parainfluenza Virus 4 Not Detected     RSV, PCR Not Detected     Bordetella pertussis pcr Not Detected     Bordetella parapertussis PCR Not Detected     Chlamydophila pneumoniae PCR Not Detected     Mycoplasma pneumo by PCR Not Detected    Narrative:      In the setting of a positive respiratory panel with a viral infection PLUS a negative procalcitonin without other underlying concern for bacterial infection, consider observing off antibiotics or discontinuation of antibiotics and continue supportive care. If the respiratory panel is positive for atypical bacterial infection (Bordetella pertussis, Chlamydophila pneumoniae, or Mycoplasma pneumoniae), consider antibiotic de-escalation to target atypical bacterial infection.    POC Glucose Once [[id number]]  (Abnormal) Collected: 12/07/23 2224    Specimen: Blood Updated: 12/07/23 2226     Glucose 176 mg/dL      Comment: Serial Number: [id number]:  [id number]       High Sensitivity Troponin T 2Hr [[id number]]  (Abnormal) Collected: 12/07/23 1923    Specimen: Blood Updated: 12/07/23 1956     HS Troponin T 80 ng/L      Troponin T Delta 52 ng/L     Narrative:      High Sensitive Troponin T Reference Range:  <14.0 ng/L- Negative Female for AMI  <22.0 ng/L- Negative Male for AMI  >=14 - Abnormal Female indicating possible myocardial injury.  >=22 - Abnormal Male indicating possible myocardial injury.   Clinicians would have to utilize clinical acumen, EKG, Troponin, and serial changes to determine if it is an Acute Myocardial Infarction or myocardial injury due to an underlying chronic condition.         POC Lactate [[id number]]  (Normal) Collected: 12/07/23 1902    Specimen: Blood Updated: 12/07/23 1904     Lactate 1.5 mmol/L      Comment: Serial Number: [id number]:  [id number]       Comprehensive Metabolic Panel [[id number]]  (Abnormal) Collected:  12/07/23 1728    Specimen: Blood Updated: 12/07/23 1810     Glucose 171 mg/dL      BUN 21 mg/dL      Creatinine 0.74 mg/dL      Sodium 138 mmol/L      Potassium 4.0 mmol/L      Comment: Slight hemolysis detected by analyzer. Result may be falsely elevated.        Chloride 107 mmol/L      CO2 21.0 mmol/L      Calcium 7.5 mg/dL      Total Protein 5.3 g/dL      Albumin 3.0 g/dL      ALT (SGPT) 16 U/L      AST (SGOT) 34 U/L      Comment: Slight hemolysis detected by analyzer. Result may be falsely elevated.        Alkaline Phosphatase 75 U/L      Total Bilirubin 0.4 mg/dL      Globulin 2.3 gm/dL      A/G Ratio 1.3 g/dL      BUN/Creatinine Ratio 28.4     Anion Gap 10.0 mmol/L      eGFR 91.6 mL/min/1.73     Narrative:      GFR Normal >60  Chronic Kidney Disease <60  Kidney Failure <15      BNP [[id number]]  (Abnormal) Collected: 12/07/23 1728    Specimen: Blood Updated: 12/07/23 1805     proBNP 2,176.0 pg/mL     Narrative:      This assay is used as an aid in the diagnosis of individuals suspected of having heart failure. It can be used as an aid in the diagnosis of acute decompensated heart failure (ADHF) in patients presenting with signs and symptoms of ADHF to the emergency department (ED). In addition, NT-proBNP of <300 pg/mL indicates ADHF is not likely.    Age Range Result Interpretation  NT-proBNP Concentration (pg/mL:      <50             Positive            >450                   Gray                 300-450                    Negative             <300    50-75           Positive            >900                  Gray                300-900                  Negative            <300      >75             Positive            >1800                  Gray                300-1800                  Negative            <300    High Sensitivity Troponin T [[id number]]  (Abnormal) Collected: 12/07/23 1728    Specimen: Blood Updated: 12/07/23 1805     HS Troponin T 28 ng/L     Narrative:      High Sensitive Troponin T Reference  Range:  <14.0 ng/L- Negative Female for AMI  <22.0 ng/L- Negative Male for AMI  >=14 - Abnormal Female indicating possible myocardial injury.  >=22 - Abnormal Male indicating possible myocardial injury.   Clinicians would have to utilize clinical acumen, EKG, Troponin, and serial changes to determine if it is an Acute Myocardial Infarction or myocardial injury due to an underlying chronic condition.         POC Creatinine [[id number]]  (Normal) Collected: 12/07/23 1710    Specimen: Venous Blood Updated: 12/07/23 1749     Creatinine 0.80 mg/dL      Comment: Serial Number: [id number]:  [id number]        eGFR 83.4 mL/min/1.73     Extra Tubes [[id number]] Collected: 12/07/23 1631    Specimen: Blood, Venous Line Updated: 12/07/23 1731    Narrative:      The following orders were created for panel order Extra Tubes.  Procedure                               Abnormality         Status                     ---------                               -----------         ------                     Gold Top - SST[[id number]]                                   Final result                 Please view results for these tests on the individual orders.    Gold Top - SST [[id number]] Collected: 12/07/23 1631    Specimen: Blood Updated: 12/07/23 1731     Extra Tube Hold for add-ons.     Comment: Auto resulted.       Blood Gas, Arterial - [[id number]]  (Abnormal) Collected: 12/07/23 1657    Specimen: Arterial Blood Updated: 12/07/23 1703     Site Left Radial     Adrian's Test Positive     pH, Arterial 7.361 pH units      pCO2, Arterial 36.3 mm Hg      pO2, Arterial 82.2 mm Hg      HCO3, Arterial 20.5 mmol/L      Base Excess, Arterial -4.4 mmol/L      Comment: Serial Number: [id number]:  [id number]        O2 Saturation, Arterial 95.7 %      CO2 Content 21.6 mmol/L      Barometric Pressure for Blood Gas --     Comment: N/A        Modality NRB     FIO2 100 %      Hemodilution No    POC Lactate [[id number]]  (Normal) Collected: 12/07/23 1657     Specimen: Arterial Blood Updated: 12/07/23 1703     Lactate 1.2 mmol/L      Comment: Serial Number: [id number]:  [id number]       Protime-INR [[id number]]  (Normal) Collected: 12/07/23 1631    Specimen: Blood Updated: 12/07/23 1647     Protime 10.5 Seconds      INR 0.96    aPTT [[id number]]  (Abnormal) Collected: 12/07/23 1631    Specimen: Blood Updated: 12/07/23 1647     PTT 26.3 seconds     CBC & Differential [[id number]]  (Abnormal) Collected: 12/07/23 1631    Specimen: Blood Updated: 12/07/23 1638    Narrative:      The following orders were created for panel order CBC & Differential.  Procedure                               Abnormality         Status                     ---------                               -----------         ------                     CBC Auto Differential[[id number]]        Abnormal            Final result                 Please view results for these tests on the individual orders.    CBC Auto Differential [[id number]]  (Abnormal) Collected: 12/07/23 1631    Specimen: Blood Updated: 12/07/23 1638     WBC 6.90 10*3/mm3      RBC 3.32 10*6/mm3      Hemoglobin 11.5 g/dL      Hematocrit 34.0 %      .4 fL      MCH 34.5 pg      MCHC 33.7 g/dL      RDW 21.5 %      RDW-SD 80.1 fl      MPV 7.6 fL      Platelets 280 10*3/mm3      Neutrophil % 66.9 %      Lymphocyte % 25.7 %      Monocyte % 5.3 %      Eosinophil % 0.4 %      Basophil % 1.7 %      Neutrophils, Absolute 4.60 10*3/mm3      Lymphocytes, Absolute 1.80 10*3/mm3      Monocytes, Absolute 0.40 10*3/mm3      Eosinophils, Absolute 0.00 10*3/mm3      Basophils, Absolute 0.10 10*3/mm3      nRBC 0.2 /100 WBC     POC Glucose Once [[id number]]  (Abnormal) Collected: 12/07/23 1627    Specimen: Blood Updated: 12/07/23 1631     Glucose 219 mg/dL      Comment: Serial Number: [id number]:  [id number]             Data Review:  Results from last 7 days   Lab Units 12/09/23  0222 12/08/23  1923 12/08/23  0514   SODIUM mmol/L 142 143 142   POTASSIUM  mmol/L 4.5 3.6 4.1   CHLORIDE mmol/L 108* 107 109*   CO2 mmol/L 25.0 25.0 22.0   BUN mg/dL 34* 28* 24*   CREATININE mg/dL 0.71 0.88 0.71   GLUCOSE mg/dL 116* 210* 140*   CALCIUM mg/dL 7.5* 7.7* 8.0*     Results from last 7 days   Lab Units 12/09/23  0222 12/08/23  0514 12/07/23  1631   WBC 10*3/mm3 6.00 4.50 6.90   HEMOGLOBIN g/dL 8.0* 9.6* 11.5*   HEMATOCRIT % 24.0* 28.5* 34.0   PLATELETS 10*3/mm3 179 195 280     Results from last 7 days   Lab Units 12/08/23  0514   TSH uIU/mL 0.824         Lab Results   Lab Value Date/Time    TROPONINT 136 (C) 12/08/2023 0514    TROPONINT 80 (C) 12/07/2023 1923    TROPONINT 28 (H) 12/07/2023 1728         Results from last 7 days   Lab Units 12/07/23  1728   ALK PHOS U/L 75   BILIRUBIN mg/dL 0.4   ALT (SGPT) U/L 16   AST (SGOT) U/L 34*     Results from last 7 days   Lab Units 12/08/23  0514   TSH uIU/mL 0.824         Glucose   Date/Time Value Ref Range Status   12/09/2023 1634 98 70 - 105 mg/dL Final     Comment:     Serial Number: [id number]:  [id number]   12/09/2023 1147 114 (H) 70 - 105 mg/dL Final     Comment:     Serial Number: [id number]:  [id number]   12/09/2023 0755 105 70 - 105 mg/dL Final     Comment:     Serial Number: [id number]:  [id number]   12/08/2023 1642 231 (H) 70 - 105 mg/dL Final     Comment:     Serial Number: [id number]:  [id number]   12/08/2023 1241 157 (H) 70 - 105 mg/dL Final     Comment:     Serial Number: [id number]:  [id number]   12/08/2023 0711 127 (H) 70 - 105 mg/dL Final     Comment:     Serial Number: [id number]:  [id number]   12/07/2023 2224 176 (H) 70 - 105 mg/dL Final     Comment:     Serial Number: [id number]:  [id number]   12/07/2023 1627 219 (H) 70 - 105 mg/dL Final     Comment:     Serial Number: [id number]:  [id number]     Results from last 7 days   Lab Units 12/08/23  1237 12/07/23  1631   INR  1.07 0.96         Assessment:  Active Hospital Problems    Diagnosis  POA    **Acute hypoxic  respiratory failure [J96.01]  Yes    Acute systolic heart failure [I50.21]  Unknown    Congestive heart failure [I50.9]  Unknown    Acute on chronic combined systolic and diastolic CHF (congestive heart failure) [I50.43]  Unknown    NSTEMI (non-ST elevated myocardial infarction) [I21.4]  Unknown    CAD (coronary artery disease) [I25.10]  Unknown    Primary hypertension [I10]  Yes    Adenocarcinoma of colon metastatic to liver [C18.9, C78.7]  Yes      Resolved Hospital Problems   No resolved problems to display.       Plan:  Patient off balloon pump and looks well enough to move to PCU.  Medicine will follow for general medical care.  Follow-up labs.  Events noted.    Ramon Navarro MD  12/9/2023  17:54 EST

## 2023-12-10 LAB
ALBUMIN SERPL-MCNC: 3.5 G/DL (ref 3.5–5.2)
ALBUMIN/GLOB SERPL: 1.6 G/DL
ALP SERPL-CCNC: 78 U/L (ref 39–117)
ALT SERPL W P-5'-P-CCNC: 16 U/L (ref 1–33)
ANION GAP SERPL CALCULATED.3IONS-SCNC: 9 MMOL/L (ref 5–15)
AST SERPL-CCNC: 22 U/L (ref 1–32)
BILIRUB SERPL-MCNC: 0.8 MG/DL (ref 0–1.2)
BUN SERPL-MCNC: 35 MG/DL (ref 8–23)
BUN/CREAT SERPL: 40.7 (ref 7–25)
CALCIUM SPEC-SCNC: 8 MG/DL (ref 8.6–10.5)
CHLORIDE SERPL-SCNC: 106 MMOL/L (ref 98–107)
CO2 SERPL-SCNC: 28 MMOL/L (ref 22–29)
CREAT SERPL-MCNC: 0.86 MG/DL (ref 0.57–1)
DEPRECATED RDW RBC AUTO: 79.6 FL (ref 37–54)
EGFRCR SERPLBLD CKD-EPI 2021: 76.5 ML/MIN/1.73
ERYTHROCYTE [DISTWIDTH] IN BLOOD BY AUTOMATED COUNT: 22.3 % (ref 12.3–15.4)
GLOBULIN UR ELPH-MCNC: 2.2 GM/DL
GLUCOSE BLDC GLUCOMTR-MCNC: 110 MG/DL (ref 70–105)
GLUCOSE BLDC GLUCOMTR-MCNC: 118 MG/DL (ref 70–105)
GLUCOSE BLDC GLUCOMTR-MCNC: 187 MG/DL (ref 70–105)
GLUCOSE BLDC GLUCOMTR-MCNC: 87 MG/DL (ref 70–105)
GLUCOSE SERPL-MCNC: 88 MG/DL (ref 65–99)
HCT VFR BLD AUTO: 25.3 % (ref 34–46.6)
HGB BLD-MCNC: 8.5 G/DL (ref 12–15.9)
MCH RBC QN AUTO: 34.6 PG (ref 26.6–33)
MCHC RBC AUTO-ENTMCNC: 33.5 G/DL (ref 31.5–35.7)
MCV RBC AUTO: 103.5 FL (ref 79–97)
PLATELET # BLD AUTO: 157 10*3/MM3 (ref 140–450)
PMV BLD AUTO: 7.4 FL (ref 6–12)
POTASSIUM SERPL-SCNC: 3.9 MMOL/L (ref 3.5–5.2)
PROT SERPL-MCNC: 5.7 G/DL (ref 6–8.5)
RBC # BLD AUTO: 2.44 10*6/MM3 (ref 3.77–5.28)
RETICS # AUTO: 0.04 10*6/MM3 (ref 0.02–0.13)
RETICS/RBC NFR AUTO: 1.87 % (ref 0.7–1.9)
SODIUM SERPL-SCNC: 143 MMOL/L (ref 136–145)
WBC NRBC COR # BLD AUTO: 7.8 10*3/MM3 (ref 3.4–10.8)

## 2023-12-10 PROCEDURE — 82948 REAGENT STRIP/BLOOD GLUCOSE: CPT

## 2023-12-10 PROCEDURE — 25010000002 FUROSEMIDE PER 20 MG: Performed by: INTERNAL MEDICINE

## 2023-12-10 PROCEDURE — 25010000002 DIGOXIN PER 500 MCG: Performed by: INTERNAL MEDICINE

## 2023-12-10 PROCEDURE — 97162 PT EVAL MOD COMPLEX 30 MIN: CPT

## 2023-12-10 PROCEDURE — 25010000002 HEPARIN LOCK FLUSH PER 10 UNITS: Performed by: HOSPITALIST

## 2023-12-10 PROCEDURE — 80053 COMPREHEN METABOLIC PANEL: CPT | Performed by: INTERNAL MEDICINE

## 2023-12-10 PROCEDURE — 25010000002 ENOXAPARIN PER 10 MG: Performed by: INTERNAL MEDICINE

## 2023-12-10 PROCEDURE — 85027 COMPLETE CBC AUTOMATED: CPT | Performed by: INTERNAL MEDICINE

## 2023-12-10 PROCEDURE — 63710000001 INSULIN LISPRO (HUMAN) PER 5 UNITS: Performed by: INTERNAL MEDICINE

## 2023-12-10 PROCEDURE — 85045 AUTOMATED RETICULOCYTE COUNT: CPT | Performed by: NURSE PRACTITIONER

## 2023-12-10 RX ORDER — DIGOXIN 0.25 MG/ML
250 INJECTION INTRAMUSCULAR; INTRAVENOUS ONCE
Status: COMPLETED | OUTPATIENT
Start: 2023-12-10 | End: 2023-12-10

## 2023-12-10 RX ORDER — FUROSEMIDE 40 MG/1
40 TABLET ORAL DAILY
Status: DISCONTINUED | OUTPATIENT
Start: 2023-12-11 | End: 2023-12-13 | Stop reason: HOSPADM

## 2023-12-10 RX ORDER — HEPARIN SODIUM (PORCINE) LOCK FLUSH IV SOLN 100 UNIT/ML 100 UNIT/ML
500 SOLUTION INTRAVENOUS ONCE
Status: COMPLETED | OUTPATIENT
Start: 2023-12-10 | End: 2023-12-10

## 2023-12-10 RX ADMIN — FAMOTIDINE 20 MG: 20 TABLET ORAL at 08:13

## 2023-12-10 RX ADMIN — ASPIRIN 81 MG CHEWABLE TABLET 81 MG: 81 TABLET CHEWABLE at 08:13

## 2023-12-10 RX ADMIN — FAMOTIDINE 20 MG: 20 TABLET ORAL at 16:38

## 2023-12-10 RX ADMIN — Medication 10 ML: at 08:14

## 2023-12-10 RX ADMIN — CALCIUM CARBONATE (ANTACID) CHEW TAB 500 MG 1 TABLET: 500 CHEW TAB at 08:13

## 2023-12-10 RX ADMIN — Medication 1000 UNITS: at 08:13

## 2023-12-10 RX ADMIN — CYANOCOBALAMIN TAB 1000 MCG 1000 MCG: 1000 TAB at 08:13

## 2023-12-10 RX ADMIN — CARVEDILOL 6.25 MG: 6.25 TABLET, FILM COATED ORAL at 17:44

## 2023-12-10 RX ADMIN — CARVEDILOL 6.25 MG: 6.25 TABLET, FILM COATED ORAL at 08:13

## 2023-12-10 RX ADMIN — INSULIN LISPRO 2 UNITS: 100 INJECTION, SOLUTION INTRAVENOUS; SUBCUTANEOUS at 20:55

## 2023-12-10 RX ADMIN — ALPRAZOLAM 0.25 MG: 0.25 TABLET ORAL at 20:54

## 2023-12-10 RX ADMIN — DIGOXIN 250 MCG: 0.25 INJECTION INTRAMUSCULAR; INTRAVENOUS at 12:11

## 2023-12-10 RX ADMIN — HEPARIN 500 UNITS: 100 SYRINGE at 12:17

## 2023-12-10 RX ADMIN — EMPAGLIFLOZIN 10 MG: 10 TABLET, FILM COATED ORAL at 08:16

## 2023-12-10 RX ADMIN — FUROSEMIDE 40 MG: 10 INJECTION, SOLUTION INTRAMUSCULAR; INTRAVENOUS at 08:13

## 2023-12-10 RX ADMIN — ATORVASTATIN CALCIUM 80 MG: 40 TABLET, FILM COATED ORAL at 08:13

## 2023-12-10 RX ADMIN — ENOXAPARIN SODIUM 40 MG: 100 INJECTION SUBCUTANEOUS at 16:38

## 2023-12-10 NOTE — PLAN OF CARE
Goal Outcome Evaluation:  Plan of Care Reviewed With: patient           Outcome Evaluation: Pt is a 62 YO F admitted with acute hypoxic resp failure, NSTEMI, with hx of stage IV colon cancer. Pt states she is generally very independent. Pt ambluates, performs all ADLs, does not use AD, works as a  and drives. Pt reports no pain this date and reports being off supplemental O2 since last night. O2 monitored throughout evaluation and maintained above 90% throughout. Pt requires no physical assistance for transfers or ambluation this date and ambluated approx 250 feet. HR slightly elevated to 128 bpm but pt asympotmatic. Pt appears safe to return home with family at d/c. Does not have acute care needs. PT to sign off at this time and will require new orders if status changes.      Anticipated Discharge Disposition (PT): home with assist

## 2023-12-10 NOTE — THERAPY EVALUATION
Patient Name: Samantha Massey  : 1960    MRN: [id number]                              Today's Date: 12/10/2023       Admit Date: 2023    Visit Dx:     ICD-10-CM ICD-9-CM   1. Congestive heart failure, unspecified HF chronicity, unspecified heart failure type  I50.9 428.0   2. Pneumonia due to infectious organism, unspecified laterality, unspecified part of lung  J18.9 486     Patient Active Problem List   Diagnosis    Adenocarcinoma of colon metastatic to liver    Allergic reaction to contrast dye    Anemia    Encounter for chemotherapy management    Hypokalemia    Liver cancer    Malignant neoplasm of colon metastatic to bone    Primary hypertension    Acute hypoxic respiratory failure    Acute systolic heart failure    Congestive heart failure    Acute on chronic combined systolic and diastolic CHF (congestive heart failure)    NSTEMI (non-ST elevated myocardial infarction)    CAD (coronary artery disease)     Past Medical History:   Diagnosis Date    Acute systolic heart failure 2023    CAD (coronary artery disease) 2023    Cancer     wiht mets to liver, bone    Chronic kidney disease     Colon cancer     History of colon cancer, stage IV     Stage SHELL with progression    Hypertension     Lung cancer     with colon    NSTEMI (non-ST elevated myocardial infarction) 2023    PONV (postoperative nausea and vomiting)     Pulmonary arterial hypertension     Radiculopathy 2012    Right L5/S1     Past Surgical History:   Procedure Laterality Date    ABDOMINAL SURGERY      CYST REMOVAL      cyst removed from [facility] in     PORTACATH PLACEMENT      VENOUS ACCESS DEVICE (PORT) INSERTION Left 2021    Procedure: INSERTION VENOUS ACCESS DEVICE;  Surgeon: Jay Yeh MD;  Location: [facility];  Service: General;  Laterality: Left;    VENOUS ACCESS DEVICE (PORT) REMOVAL Right 2020    Procedure: REMOVAL VENOUS ACCESS DEVICE;  Surgeon: Jay Yeh,  MD;  Location: [facility] MAIN OR;  Service: General;  Laterality: Right;    VENTRAL/INCISIONAL HERNIA REPAIR N/A 3/2/2023    Procedure: VENTRAL/INCISIONAL HERNIA REPAIR WITH MESH;  Surgeon: Jay Yeh MD;  Location: [facility] MAIN OR;  Service: General;  Laterality: N/A;      General Information       Row Name 12/10/23 1407          Physical Therapy Time and Intention    Document Type evaluation  -EL     Mode of Treatment physical therapy  -       Row Name 12/10/23 1407          General Information    Prior Level of Function independent:;all household mobility;ADL's;driving;work  -       Row Name 12/10/23 1407          Living Environment    People in Home significant other;grandchild(huong)  -EL       Row Name 12/10/23 1407          Home Main Entrance    Number of Stairs, Main Entrance three  -EL       Row Name 12/10/23 1407          Stairs Within Home, Primary    Number of Stairs, Within Home, Primary none;other (see comments)  navigates steps at work consistently  -EL       Row Name 12/10/23 1407          Cognition    Orientation Status (Cognition) oriented x 4  -EL               User Key  (r) = Recorded By, (t) = Taken By, (c) = Cosigned By      Initials Name Provider Type    EL Zeke Godinez, LAUREN Physical Therapist                   Mobility       Row Name 12/10/23 1409          Transfers    Comment, (Transfers) in chair when PT entered  -EL       Row Name 12/10/23 1409          Sit-Stand Transfer    Sit-Stand [place] (Transfers) independent  -EL       Row Name 12/10/23 1409          Gait/Stairs (Locomotion)    [place] Level (Gait) standby assist  -EL     Distance in Feet (Gait) 250  -EL     Comment, (Gait/Stairs) No significant gait deficits  -EL               User Key  (r) = Recorded By, (t) = Taken By, (c) = Cosigned By      Initials Name Provider Type    EL Zeke Godinez, PT Physical Therapist                   Obj/Interventions       Row Name 12/10/23 1410          Range of Motion Comprehensive     General Range of Motion bilateral lower extremity ROM WFL  -       Row Name 12/10/23 1410          Strength Comprehensive (MMT)    General Manual Muscle Testing (MMT) Assessment no strength deficits identified  -[facility] Name 12/10/23 1410          Balance    Balance Assessment sitting static balance;standing dynamic balance;standing static balance  -EL     Static Sitting Balance independent  -EL     Static Standing Balance standby assist  -     Dynamic Standing Balance standby assist  -       Row Name 12/10/23 1410          Sensory Assessment (Somatosensory)    Sensory Assessment (Somatosensory) sensation intact  -               User Key  (r) = Recorded By, (t) = Taken By, (c) = Cosigned By      Initials Name Provider Type    Zeke Sterling, PT Physical Therapist                   Goals/Plan    No documentation.                  Clinical Impression       Row Name 12/10/23 1412          Pain    Pretreatment Pain Rating 0/10 - no pain  -     Posttreatment Pain Rating 0/10 - no pain  -       Row Name 12/10/23 1412          Plan of Care Review    Plan of Care Reviewed With patient  -     Outcome Evaluation Pt is a 64 YO F admitted with acute hypoxic resp failure, NSTEMI, with hx of stage IV colon cancer. Pt states she is generally very independent. Pt ambluates, performs all ADLs, does not use AD, works as a  and drives. Pt reports no pain this date and reports being off supplemental O2 since last night. O2 monitored throughout evaluation and maintained above 90% throughout. Pt requires no physical assistance for transfers or ambluation this date and ambluated approx 250 feet. HR slightly elevated to 128 bpm but pt asympotmatic. Pt appears safe to return home with family at d/c. Does not have acute care needs. PT to sign off at this time and will require new orders if status changes.  -       Row Name 12/10/23 1412          Therapy Assessment/Plan (PT)    Criteria for Skilled  Interventions Met (PT) no problems identified which require skilled intervention  -EL     Therapy Frequency (PT) evaluation only  -EL       Row Name 12/10/23 1412          Vital Signs    Pre SpO2 (%) 98  -EL     O2 Delivery Pre Treatment room air  -EL     Intra SpO2 (%) 94  -EL     O2 Delivery Intra Treatment room air  -EL     Post SpO2 (%) 98  -EL     O2 Delivery Post Treatment room air  -EL     Pre Patient Position Sitting  -EL     Intra Patient Position Standing  -EL     Post Patient Position Sitting  -EL       Row Name 12/10/23 1412          Positioning and Restraints    Pre-Treatment Position sitting in chair/recliner  -EL     Post Treatment Position chair  -EL     In Chair notified nsg;reclined;call light within reach;encouraged to call for assist;exit alarm on  -EL               User Key  (r) = Recorded By, (t) = Taken By, (c) = Cosigned By      Initials Name Provider Type    Zeke Sterling PT Physical Therapist                   Outcome Measures       Row Name 12/10/23 1419          How much help from another person do you currently need...    Turning from your back to your side while in flat bed without using bedrails? 4  -EL     Moving from lying on back to sitting on the side of a flat bed without bedrails? 4  -EL     Moving to and from a bed to a chair (including a wheelchair)? 4  -EL     Standing up from a chair using your arms (e.g., wheelchair, bedside chair)? 4  -EL     Climbing 3-5 steps with a railing? 4  -EL     To walk in hospital room? 4  -EL     AM-PAC 6 Clicks Score (PT) 24  -EL     Highest Level of Mobility Goal 8 --> Walked 250 feet or more  -       Row Name 12/10/23 1419          Functional Assessment    Outcome Measure Options AM-PAC 6 Clicks Basic Mobility (PT)  -EL               User Key  (r) = Recorded By, (t) = Taken By, (c) = Cosigned By      Initials Name Provider Type    Zeke Sterling PT Physical Therapist                                 Physical Therapy Education       Title: PT  OT SLP Therapies (Done)       Topic: Physical Therapy (Done)       Point: Mobility training (Done)       Learning Progress Summary             Patient Acceptance, E,TB, VU by  at 12/10/2023 1420                         Point: Precautions (Done)       Learning Progress Summary             Patient Acceptance, E,TB, VU by  at 12/10/2023 1420                                         User Key       Initials Effective Dates Name Provider Type Discipline     06/23/20 -  Zeke Godinez, PT Physical Therapist PT                  PT Recommendation and Plan     Plan of Care Reviewed With: patient  Outcome Evaluation: Pt is a 64 YO F admitted with acute hypoxic resp failure, NSTEMI, with hx of stage IV colon cancer. Pt states she is generally very independent. Pt ambluates, performs all ADLs, does not use AD, works as a  and drives. Pt reports no pain this date and reports being off supplemental O2 since last night. O2 monitored throughout evaluation and maintained above 90% throughout. Pt requires no physical assistance for transfers or ambluation this date and ambluated approx 250 feet. HR slightly elevated to 128 bpm but pt asympotmatic. Pt appears safe to return home with family at d/c. Does not have acute care needs. PT to sign off at this time and will require new orders if status changes.     Time Calculation:   PT Evaluation Complexity  History, PT Evaluation Complexity: 1-2 personal factors and/or comorbidities  Examination of Body Systems (PT Eval Complexity): total of 3 or more elements  Clinical Presentation (PT Evaluation Complexity): evolving  Clinical Decision Making (PT Evaluation Complexity): moderate complexity  Overall Complexity (PT Evaluation Complexity): moderate complexity     PT Charges       Row Name 12/10/23 1420             Time Calculation    Start Time 1143  -EL      Stop Time 1153  -EL      Time Calculation (min) 10 min  -EL      PT Received On 12/10/23  -                User Key  (r) =  Recorded By, (t) = Taken By, (c) = Cosigned By      Initials Name Provider Type    Zeke Sterling, PT Physical Therapist                  Therapy Charges for Today       Code Description Service Date Service Provider Modifiers Qty    32031848916 HC PT EVAL MOD COMPLEXITY 2 12/10/2023 Zeke Godinez, PT GP 1            PT G-Codes  Outcome Measure Options: AM-PAC 6 Clicks Basic Mobility (PT)  AM-PAC 6 Clicks Score (PT): 24  PT Discharge Summary  Anticipated Discharge Disposition (PT): home with assist    Zeke Godinez PT  12/10/2023

## 2023-12-10 NOTE — PROGRESS NOTES
Daily Progress Note        Acute hypoxic respiratory failure    Adenocarcinoma of colon metastatic to liver    Primary hypertension    Acute systolic heart failure    Congestive heart failure    Acute on chronic combined systolic and diastolic CHF (congestive heart failure)    NSTEMI (non-ST elevated myocardial infarction)    CAD (coronary artery disease)    Assessment:        Acute hypoxic respiratory insufficiency  Respiratory viral panel negative on 12/8/2023  Legionella and Urine strep antigen negative    Cardiomyopathy EF 30%  Acute coronary syndrome s/p balloon pump     Metastatic colon cancer with liver mets, lung mets and bone mets     Recommendations:     Oxygen titration currently on 2 L  Lovenox 40 mg,   diuresis   Lasix 40 mg IV every 12                      LOS: 3 days     Subjective         Objective     Vital signs for last 24 hours:  Vitals:    12/10/23 0411 12/10/23 0500 12/10/23 0813 12/10/23 1026   BP:  132/76 114/66 103/56   BP Location:    Left arm   Patient Position:    Sitting   Pulse:  104  114   Resp:    15   Temp: 98.1 °F (36.7 °C)   97.8 °F (36.6 °C)   TempSrc: Oral   Oral   SpO2:  97%  95%   Weight: 56.4 kg (124 lb 5.4 oz)      Height:           Intake/Output last 3 shifts:  I/O last 3 completed shifts:  In: 1768 [P.O.:930; I.V.:838]  Out: 2865 [Urine:2865]  Intake/Output this shift:  I/O this shift:  In: 350 [P.O.:350]  Out: 880 [Urine:880]      Radiology  Imaging Results (Last 24 Hours)       ** No results found for the last 24 hours. **            Labs:  Results from last 7 days   Lab Units 12/10/23  0422   WBC 10*3/mm3 7.80   HEMOGLOBIN g/dL 8.5*   HEMATOCRIT % 25.3*   PLATELETS 10*3/mm3 157     Results from last 7 days   Lab Units 12/10/23  0422   SODIUM mmol/L 143   POTASSIUM mmol/L 3.9   CHLORIDE mmol/L 106   CO2 mmol/L 28.0   BUN mg/dL 35*   CREATININE mg/dL 0.86   CALCIUM mg/dL 8.0*   BILIRUBIN mg/dL 0.8   ALK PHOS U/L 78   ALT (SGPT) U/L 16   AST (SGOT) U/L 22   GLUCOSE mg/dL 88      Results from last 7 days   Lab Units 12/07/23  1657   PH, ARTERIAL pH units 7.361   PO2 ART mm Hg 82.2*   PCO2, ARTERIAL mm Hg 36.3   HCO3 ART mmol/L 20.5*     Results from last 7 days   Lab Units 12/10/23  0422 12/07/23  1728   ALBUMIN g/dL 3.5 3.0*     Results from last 7 days   Lab Units 12/08/23  0514 12/07/23  1923 12/07/23  1728   HSTROP T ng/L 136* 80* 28*             Results from last 7 days   Lab Units 12/09/23  0222 12/08/23  1923 12/08/23  1237 12/07/23  1631   INR   --   --  1.07 0.96   APTT seconds 65.6 62.4 28.8* 26.3*     Results from last 7 days   Lab Units 12/08/23  0514   TSH uIU/mL 0.824           Meds:   SCHEDULE  aspirin, 81 mg, Oral, Daily  atorvastatin, 80 mg, Oral, Daily  calcium carbonate, 1 tablet, Oral, Daily  carvedilol, 6.25 mg, Oral, BID With Meals  cholecalciferol, 1,000 Units, Oral, Daily  empagliflozin, 10 mg, Oral, Daily  enoxaparin, 40 mg, Subcutaneous, Daily  epoetin ana-epbx, 40,000 Units, Subcutaneous, Weekly  famotidine, 20 mg, Oral, BID AC  furosemide, 40 mg, Intravenous, BID  heparin, 500 Units, Intracatheter, Once  insulin lispro, 2-9 Units, Subcutaneous, 4x Daily AC & at Bedtime  sodium chloride, 10 mL, Intravenous, Q12H  vitamin B-12, 1,000 mcg, Oral, Daily      Infusions     PRNs  •  acetaminophen  •  ALPRAZolam  •  Calcium Replacement - Follow Nurse / BPA Driven Protocol  •  dextrose  •  dextrose  •  glucagon (human recombinant)  •  labetalol  •  Magnesium Standard Dose Replacement - Follow Nurse / BPA Driven Protocol  •  Morphine  •  nitroglycerin  •  ondansetron **OR** ondansetron  •  Phosphorus Replacement - Follow Nurse / BPA Driven Protocol  •  Potassium Replacement - Follow Nurse / BPA Driven Protocol  •  [COMPLETED] Insert Peripheral IV **AND** sodium chloride  •  sodium chloride  •  sodium chloride    Physical Exam:  Physical Exam    ROS  Review of Systems          Total time spent with patient greater than: 45 Minutes

## 2023-12-10 NOTE — PROGRESS NOTES
Hematology/Oncology Inpatient Progress Note    PATIENT NAME: Samantha Massey  : 1960  MRN: [id number]    CHIEF COMPLAINT: MVASI anaphylaxis, stage IV adenocarcinoma of the colon, acute on chronic anemia, history of REGULO, macrocytosis, and vitamin B12 deficiency    HISTORY OF PRESENT ILLNESS:    63 y.o. female presented to [facility] ED on 2023 after presenting to our office for an MVASI infusion.  She has stage IV cancer of the colon, s/p multiple lines of therapy, currently on Lonsurf with MVASI.  She had received multiple doses of MVASI in the past.  During her infusion, she developed an acute onset of dyspnea with feeling of inability to breathe or move air.  She was treated aggressively with Benadryl and an albuterol nebulizer, for bilateral rhonchi and crackles, Lasix and dexamethasone.  She was hypertensive with blood pressure 200/100, tachycardic to 160s and O2 saturation initially was 95% but dropped to 75%.  She improved to 85% on 4 L nasal cannula and EMS was called to take patient to the ER.  In the ER, ABG confirmed hypoxia with a pO2 of 82.2.  proBNP was 2176 (<900).  Troponin 28 (< 14).  CMP was significant for an AST of 34 (1-32).  Lactate 1.2 (0.2-2.0), PT 10.5 (9.6-11.7), and PTT 26.3 (25-35).  White count 6.9, hemoglobin 11.5 (9.79 in our office), .4 and platelets 280,000.  Chest x-ray showed right lower lobe pneumonia.  CTA chest PE protocol showed no evidence of PE.  There were findings concerning for progression of disease with a sclerotic osseous metastatic lesion involving the lateral right rib with increased consolidation and soft tissue component extending into the right middle lobe lung.  Diffuse groundglass opacities with interlobular septal thickening compatible with pulmonary edema, however, there was additional bibasilar airspace disease with consolidation and pleural effusion suggesting additional infectious/inflammatory process.  EKG was abnormal and  showed sinus tachycardia with anterior septal infarction and suggested ischemia.  She was started on meropenem and pulmonary was consulted.  Cardiology was consulted on the day of consultation and her EKG was abnormal with prolonged QT interval.  Troponin had increased to 136.  White count 4.5, hemoglobin 9.6, .9 and platelets 195.  On the morning of consultation, she was complaining of pain in her right shoulder and constipation.  She was fatigued.  After she was evaluated by Dr. Perera, she developed chest pain, starting in her right arm, radiating down her right arm requiring nitroglycerin x 2 with minimal relief.     12/08/23  Hematology/Oncology was consulted as she is an established patient who we treat for stage IV adenocarcinoma of the colon, chemotherapy-induced cytopenias, vitamin B12 and vitamin D deficiencies, and osteopenia.  -Diagnosed with colon cancer with liver metastasis and 2012.  To date she has not received any cardiotoxic chemotherapy but did receive radiation to her right anterior rib in April 2023.  Although, Mvasi, may either cause reversible direct myocardial toxicity or exacerbate underlying myocardial dysfunction, occurring in 10% of patients.  Current CTA PE was compared to CT C/A/P on 11/18/2023.  She has completed 5 cycles of her current therapy.  -Anemia-due to chemotherapy with history of REGULO, and vitamin B12 deficiency.  Iron studies 8/25/2023 showed iron saturation of 40% (15-50), and ferritin of 223 ().  In May 2023, folate was 9.5, haptoglobin 134 ().  In December 2022, SPEP showed no M spike and copper was 112 ().  Ferrous sulfate was discontinued on 9/29/2023.  She was ordered to start Procrit 40,000 units subcu weekly on 11/22/2023 but has yet to start.  She takes vitamin B12 and vitamin D supplementation for deficiencies.  No change in past medical, surgical, social or family histories since patient was seen in the office on 12/7/2023.     PCP:  Diana Spencer MD    INTERVAL HISTORY:  12/8/2023-Cardiac catheterization by Dr. Olivera revealed severe single-vessel coronary artery disease with 100% occlusion of the LAD with right to left collaterals status and chronic total occlusion.  Moderate stenosis involving the ostium of the right coronary artery that supplies collaterals to the LAD territory.  There was severe LV dysfunction and elevated left-sided filling pressures.  Intra-aortic balloon pump for hemodynamic support inserted.  12/9/2023 - hemoglobin 8.0, .9, haptoglobin 29 (), TSH  0.824 (0.270-4.200). Moved to [facility] on 12/8/2023.  CXR identified no significant change.  IABP removed.  12/10/2023 - hemoglobin 8.5, .5,  (135-214), reticulocytes 2.09 (0.70-1.90), SHADI - negative. Started on folic acid 1 mg po daily on 12/9/2023.    History of present illness reviewed since last visit and changes noted on 12/10/23.    Subjective   Has chronic constipation. Chest pain has resolved.      ROS:  Review of Systems   Constitutional:  Negative for activity change, chills, fatigue, fever and unexpected weight change.   HENT:  Negative for congestion, dental problem, hearing loss, mouth sores, nosebleeds, sore throat and trouble swallowing.    Eyes:  Negative for photophobia and visual disturbance.   Respiratory:  Negative for cough, chest tightness and shortness of breath.    Cardiovascular:  Negative for chest pain, palpitations and leg swelling.   Gastrointestinal:  Positive for constipation (chronic). Negative for abdominal distention, abdominal pain, blood in stool, diarrhea, nausea and vomiting.   Endocrine: Negative for cold intolerance and heat intolerance.   Genitourinary:  Negative for decreased urine volume, difficulty urinating, dysuria, frequency, hematuria and urgency.   Musculoskeletal:  Negative for arthralgias and gait problem.   Skin:  Negative for rash and wound.   Neurological:  Negative for dizziness, tremors,  "speech difficulty, weakness, light-headedness, numbness and headaches.   Hematological:  Negative for adenopathy. Does not bruise/bleed easily.   Psychiatric/Behavioral:  Negative for confusion and hallucinations. The patient is not nervous/anxious.    All other systems reviewed and are negative.    MEDICATIONS:    Scheduled Meds:  aspirin, 81 mg, Oral, Daily  atorvastatin, 80 mg, Oral, Daily  calcium carbonate, 1 tablet, Oral, Daily  carvedilol, 6.25 mg, Oral, BID With Meals  cholecalciferol, 1,000 Units, Oral, Daily  empagliflozin, 10 mg, Oral, Daily  enoxaparin, 40 mg, Subcutaneous, Daily  epoetin ana-epbx, 40,000 Units, Subcutaneous, Weekly  famotidine, 20 mg, Oral, BID AC  furosemide, 40 mg, Intravenous, BID  insulin lispro, 2-9 Units, Subcutaneous, 4x Daily AC & at Bedtime  sodium chloride, 10 mL, Intravenous, Q12H  vitamin B-12, 1,000 mcg, Oral, Daily    Continuous Infusions:      PRN Meds:    acetaminophen    ALPRAZolam    Calcium Replacement - Follow Nurse / BPA Driven Protocol    dextrose    dextrose    glucagon (human recombinant)    labetalol    Magnesium Standard Dose Replacement - Follow Nurse / BPA Driven Protocol    Morphine    nitroglycerin    ondansetron **OR** ondansetron    Phosphorus Replacement - Follow Nurse / BPA Driven Protocol    Potassium Replacement - Follow Nurse / BPA Driven Protocol    [COMPLETED] Insert Peripheral IV **AND** sodium chloride    sodium chloride    sodium chloride     ALLERGIES:    Allergies   Allergen Reactions    Hydrocodone Nausea And Vomiting    Iodinated Contrast Media Hives and Itching     PT HAD SOME HIVES DURING SCAN.  PRIOR TO SCAN 8-3-2013.    Latex Rash     Blisters     Penicillins Hives     Objective    VITALS:   /56 (BP Location: Left arm, Patient Position: Sitting)   Pulse 114   Temp 97.8 °F (36.6 °C) (Oral)   Resp 15   Ht 157.5 cm (62\")   Wt 56.4 kg (124 lb 5.4 oz)   LMP  (LMP Unknown)   SpO2 95%   BMI 22.74 kg/m²     PHYSICAL " EXAM:  Physical Exam  Vitals and nursing note reviewed.   Constitutional:       General: She is not in acute distress.     Appearance: Normal appearance. She is well-developed. She is not diaphoretic.   HENT:      Head: Normocephalic and atraumatic.      Nose: Nose normal.      Mouth/Throat:      Mouth: Mucous membranes are moist.      Pharynx: Oropharynx is clear. No oropharyngeal exudate or posterior oropharyngeal erythema.   Eyes:      General: No scleral icterus.     Extraocular Movements: Extraocular movements intact.      Conjunctiva/sclera: Conjunctivae normal.      Pupils: Pupils are equal, round, and reactive to light.   Cardiovascular:      Rate and Rhythm: Regular rhythm. Tachycardia present.      Heart sounds: Normal heart sounds. No murmur heard.     Comments: Cardiac monitor leads.  Pulmonary:      Effort: Pulmonary effort is normal. No respiratory distress.      Breath sounds: Normal breath sounds. No stridor. No wheezing or rales.   Chest:      Comments: Left chest wall Wnuvlb-H-Eqxl.   Abdominal:      General: Bowel sounds are normal. There is no distension.      Palpations: Abdomen is soft. There is no mass.      Tenderness: There is no abdominal tenderness. There is no guarding.   Genitourinary:     Comments: Deferred.   Musculoskeletal:         General: No swelling, tenderness or deformity. Normal range of motion.      Cervical back: Normal range of motion and neck supple.      Right lower leg: No edema.      Left lower leg: No edema.      Comments: Right upper extremity peripheral IV.   Left hand oxygen saturation monitor.   Lymphadenopathy:      Cervical: No cervical adenopathy.      Upper Body:      Right upper body: No supraclavicular adenopathy.      Left upper body: No supraclavicular adenopathy.   Skin:     General: Skin is warm and dry.      Coloration: Skin is not pale.      Findings: No bruising, erythema or rash.   Neurological:      General: No focal deficit present.      Mental  Status: She is alert and oriented to person, place, and time.      Coordination: Coordination normal.   Psychiatric:         Mood and Affect: Mood normal.         Behavior: Behavior normal.         Thought Content: Thought content normal.     RECENT LABS:  Lab Results (last 24 hours)       Procedure Component Value Units Date/Time    POC Glucose Once [[id number]]  (Abnormal) Collected: 12/10/23 0810    Specimen: Blood Updated: 12/10/23 0828     Glucose 118 mg/dL      Comment: Serial Number: [id number]:  [id number]       Comprehensive Metabolic Panel [[id number]]  (Abnormal) Collected: 12/10/23 0422    Specimen: Blood Updated: 12/10/23 0453     Glucose 88 mg/dL      BUN 35 mg/dL      Creatinine 0.86 mg/dL      Sodium 143 mmol/L      Potassium 3.9 mmol/L      Chloride 106 mmol/L      CO2 28.0 mmol/L      Calcium 8.0 mg/dL      Total Protein 5.7 g/dL      Albumin 3.5 g/dL      ALT (SGPT) 16 U/L      AST (SGOT) 22 U/L      Alkaline Phosphatase 78 U/L      Total Bilirubin 0.8 mg/dL      Globulin 2.2 gm/dL      A/G Ratio 1.6 g/dL      BUN/Creatinine Ratio 40.7     Anion Gap 9.0 mmol/L      eGFR 76.5 mL/min/1.73     Narrative:      GFR Normal >60  Chronic Kidney Disease <60  Kidney Failure <15      Reticulocytes [[id number]]  (Normal) Collected: 12/10/23 0422    Specimen: Blood Updated: 12/10/23 0432     Reticulocyte % 1.87 %      Reticulocyte Absolute 0.0448 10*6/mm3     CBC (No Diff) [[id number]]  (Abnormal) Collected: 12/10/23 0422    Specimen: Blood Updated: 12/10/23 0429     WBC 7.80 10*3/mm3      RBC 2.44 10*6/mm3      Hemoglobin 8.5 g/dL      Hematocrit 25.3 %      .5 fL      MCH 34.6 pg      MCHC 33.5 g/dL      RDW 22.3 %      RDW-SD 79.6 fl      MPV 7.4 fL      Platelets 157 10*3/mm3     POC Glucose Once [[id number]]  (Abnormal) Collected: 12/09/23 1952    Specimen: Blood Updated: 12/09/23 1954     Glucose 150 mg/dL      Comment: Serial Number: [id number]:  [id number]       Blood Culture - Blood,  Hand, Left [[id number]]  (Normal) Collected: 12/07/23 1923    Specimen: Blood from Hand, Left Updated: 12/09/23 1931     Blood Culture No growth at 2 days    Blood Culture - Blood, Hand, Left [[id number]]  (Normal) Collected: 12/07/23 1859    Specimen: Blood from Hand, Left Updated: 12/09/23 1916     Blood Culture No growth at 2 days    Narrative:      Less than seven (7) mL's of blood was collected.  Insufficient quantity may yield false negative results.    Hemoglobin A1c [[id number]]  (Abnormal) Collected: 12/09/23 1217    Specimen: Blood Updated: 12/09/23 1757     Hemoglobin A1C 4.70 %     POC Glucose Once [[id number]]  (Normal) Collected: 12/09/23 1634    Specimen: Blood Updated: 12/09/23 1635     Glucose 98 mg/dL      Comment: Serial Number: [id number]:  [id number]       Lactate Dehydrogenase [[id number]]  (Abnormal) Collected: 12/09/23 0222    Specimen: Blood Updated: 12/09/23 1252      U/L     Reticulocytes [[id number]]  (Abnormal) Collected: 12/09/23 1217    Specimen: Blood Updated: 12/09/23 1229     Reticulocyte % 2.09 %      Reticulocyte Absolute 0.0499 10*6/mm3     POC Glucose Once [[id number]]  (Abnormal) Collected: 12/09/23 1147    Specimen: Blood Updated: 12/09/23 1148     Glucose 114 mg/dL      Comment: Serial Number: [id number]:  [id number]             PENDING RESULTS: Blood cultures (final).  Vitamin D and iron studies from the office.    IMAGING REVIEWED:  XR Chest 1 View    Result Date: 12/9/2023  Impression: No significant change identified Electronically Signed: Clive Li MD  12/9/2023 8:07 AM CST  Workstation ID: [id number]    XR Chest 1 View    Result Date: 12/9/2023  Impression: Intra-aortic balloon pump catheter has migrated distally and now lies approximately 20 mm from the superior margin of the aortic arch at the upper T5 level. Electronically Signed: Celso Soni MD  12/9/2023 12:11 AM EST  Workstation ID: [id number]    XR Chest 1 View    Result Date:  12/8/2023  Impression: 1. Improved but residual right lower lung airspace disease and trace effusion. 2. Placement of an intra-aortic balloon pump catheter. Electronically Signed: Nadja Li MD  12/8/2023 11:44 AM CST  Workstation ID: [id number]     I have reviewed the patient's labs, imaging, reports, and other clinician documentation.    Assessment & Plan   ASSESSMENT  MVASI infusion reaction and EKG changes: Acute onset after multiple tolerated infusions.  Unresponsive to initial treatment.  EKG suspicious for anterior septal infarction and troponin rising with development of chest pain. MVASI was discontinued and Lonsurf was put on hold.  Due for growth factor on 12/11/2023.  Pulmonary and cardiology have been consulted.  Mvasi has been shown to cause myocardial dysfunction in 10% of patients, but patient has coronary artery disease.  On ASA and treatment dose Lovenox.  NSTEMI: Cardiac catheterization has revealed severe single-vessel coronary artery disease with 100% occlusion of the LAD with right to left collaterals likely chronic total occlusion.  Moderate stenosis involving the ostium of the right coronary artery that supplies collaterals to the LAD territory.  There was severe LV dysfunction and elevated left-sided filling pressures.  IABP placed temporarily and patient started on diuretics.  Stage IV colon cancer: Chest CT showed progression of disease in her lungs and bones but she has had disease in these areas.  She is on Xgeva as an outpatient.    Acute on chronic anemia, history of REGULO, macrocytosis and vitamin B12 deficiency: She has chemotherapy-induced anemia with macrocytosis. Oral vitamin B12 was continued and she was started on Procrit on 12/8/2023. Haptoglobin was low, SHADI negative, LDH and reticulocytes elevated.  She was started on folic acid supplementation. TSH was normal.   Vitamin D deficiency and osteopenia: Continued supplementation.  Will obtain vitamin D level from office.   Started TUMs for low ionized calcium.  Contrast dye allergy: Recommend to avoid IV contrast with scans.       PLAN  Follow CBC.   Obtain vitamin D level and iron studies from office.  Continue folic acid 1 mg p.o. daily.  Continue TUMs 1 tablet daily.  Continue daily oral B12 and vitamin D supplementation.  Continue Pepcid.  Continue Procrit 40,000 units subcu weekly, next due 12/15/2023.  Discuss other options of treatment as an outpatient.    Electronically signed by ARNOLDO White, 12/10/23, 11:23 AM EST.      On 12/10/2023, I have personally performed a face-to-face diagnostic evaluation on this patient. I have performed a complete history and physical examination, reviewed laboratory studies, and radiographic examinations.  I have completed the majority and substantive portion of the medical decision making.  I have formulated the assessment on this patient and the plan of action as noted above. I have discussed the case with Dai Dutta NP, have edited/reviewed the note, and agree with the care plan.  She has no more chest pains.  She does have chronic constipation.  On examination, Ballesteros catheter has been removed.  WBC 7.8 and hemoglobin 8.5.  She is in neutropenic precautions that can be discontinued at present.    On 12/10/2023, I discussed the patient's findings and my recommendations with patient.    Part of this note may be an electronic transcription/translation of spoken language to printed text using the Dragon Dictation System.     Electronically signed by Mark Perera MD, 12/10/23, 12:03 PM EST.

## 2023-12-10 NOTE — PROGRESS NOTES
[facility]      Patient Care Team:  Diana Spencer MD as PCP - General  Diana Spencer MD as PCP - Family Medicine  Mark Perera MD as Consulting Physician (Hematology and Oncology)  Jay Yeh MD as Surgeon (General Surgery)      Cardiology assessment and plan     Acute myocardial infarction  Non-ST elevation myocardial infarction  Cardiomyopathy  LV dysfunction  Chest discomfort  Pulmonary edema  Multivessel coronary artery disease  Possible stress cardiomyopathy  Hypoxic respiratory failure  Abnormal troponin and abnormal proBNP  Stage IV metastatic colon cancer  Multifocal pneumonia  Impressions:/Cardiac catheterization  Severe single-vessel coronary artery disease 100% occlusion of the LAD with right to left collaterals likely chronic total occlusion  Moderate to severe stenosis involving the left main supplying only a left circumflex artery with no critical stenosis  Moderate stenosis involving the ostium of the right coronary artery that supplies collaterals to the LAD territory  Severe LV dysfunction  Elevated left-sided filling pressures  Possible stress cardiomyopathy based on the LV gram findings and hypokinesis involving the mid to distal segments and also EKG changes that are out of proportion to elevated troponin    Diuresis  Low-dose beta-blockers  Continued aggressive risk factor modification  Test results reviewed and discussed with patient and family  Intra-aortic balloon pump was removed at bedside  Patient is somewhat clinically better  Still continues to be tachycardic and hypotensive  Tmax is 97.8 pulse is 96 respirations are 12 blood pressure is 106/65 sats are 96%  Sodium is 142 potassium is 4.5 creatinine is 0.7 hemoglobin is 8.0  Current medications include Lipitor 80 mg p.o. once a day Coreg 6.25 mg p.o. twice daily switch to p.o. Lasix  1 dose of IV dig  Start patient on DVT prophylaxis dose of Lovenox later today if there is no groin bleeding  Continue  "current medical therapy continue close monitoring  Further recommendations based on patient course                  Chief Complaint: Chest pain coronary artery disease    Subjective no new complaints    Interval History: No significant change in the overall status    History taken from: patient    Review of Systems:  Review of Systems   Constitutional: Negative for chills, decreased appetite and malaise/fatigue.   HENT:  Negative for congestion and nosebleeds.    Eyes:  Negative for blurred vision and double vision.   Cardiovascular:  Positive for chest pain. Negative for irregular heartbeat, leg swelling, near-syncope, orthopnea and palpitations.   Respiratory:  Positive for shortness of breath. Negative for cough.    Hematologic/Lymphatic: Negative for adenopathy. Does not bruise/bleed easily.   Skin:  Negative for rash.   Musculoskeletal:  Negative for back pain and joint pain.   Gastrointestinal:  Negative for bloating, abdominal pain, hematemesis and hematochezia.   Neurological:  Negative for dizziness and focal weakness.       Objective    Vital Signs  Visit Vitals  /56 (BP Location: Left arm, Patient Position: Sitting)   Pulse 114   Temp 97.8 °F (36.6 °C) (Oral)   Resp 15   Ht 157.5 cm (62\")   Wt 56.4 kg (124 lb 5.4 oz)   LMP  (LMP Unknown)   SpO2 95%   BMI 22.74 kg/m²     Oxygen Therapy  SpO2: 95 %  Pulse Oximetry Type: Continuous  Device (Oxygen Therapy): room air  Device (Oxygen Therapy) (Infant): NPPV/NIV  Flow (L/min): 2  Oxygen Concentration (%): (S) 60 (titrated per SATS)  Flowsheet Rows      Flowsheet Row First Filed Value   Admission Height 157.5 cm (62\") Documented at 12/07/2023 1617   Admission Weight 59.9 kg (132 lb 0.9 oz) Documented at 12/07/2023 1626          Intake & Output (last 3 days)         12/07 0701 12/08 0700 12/08 0701 12/09 0700 12/09 0701  12/10 0700 12/10 0701 12/11 0700    P.O.  600 690 350    I.V. (mL/kg)  1001 (17.6) 0 (0)     Total Intake(mL/kg)  1601 (28.2) 690 " (12.2) 350 (6.2)    Urine (mL/kg/hr) 300 2960 (2.2) 1750 (1.3) 880 (3.2)    Emesis/NG output  0 0     Stool  0 0     Total Output 300 2960 1750 880    Net -300 -1359 -1060 -530            Urine Unmeasured Occurrence  0 x 2 x     Stool Unmeasured Occurrence  0 x 0 x     Emesis Unmeasured Occurrence  0 x 0 x           Lines, Drains & Airways       Active LDAs       Name Placement date Placement time Site Days    Peripheral IV 12/07/23 1630 Anterior;Right Forearm 12/07/23  1630  Forearm  1    Urethral Catheter 12/08/23  1900  catheter was placed during dayshift (12/8)  -- less than 1    Single Lumen Implantable Port 01/12/21 Left Chest 01/12/21  0759  Chest  1061                    Physical Exam:  Physical Exam    Results Review:     I reviewed the patient's new clinical results.    Lab Results (last 24 hours)       Procedure Component Value Units Date/Time    POC Glucose Once [[id number]]  (Abnormal) Collected: 12/10/23 1113    Specimen: Blood Updated: 12/10/23 1114     Glucose 110 mg/dL      Comment: Serial Number: [id number]:  [id number]       POC Glucose Once [[id number]]  (Abnormal) Collected: 12/10/23 0810    Specimen: Blood Updated: 12/10/23 0828     Glucose 118 mg/dL      Comment: Serial Number: [id number]:  [id number]       Comprehensive Metabolic Panel [[id number]]  (Abnormal) Collected: 12/10/23 0422    Specimen: Blood Updated: 12/10/23 0453     Glucose 88 mg/dL      BUN 35 mg/dL      Creatinine 0.86 mg/dL      Sodium 143 mmol/L      Potassium 3.9 mmol/L      Chloride 106 mmol/L      CO2 28.0 mmol/L      Calcium 8.0 mg/dL      Total Protein 5.7 g/dL      Albumin 3.5 g/dL      ALT (SGPT) 16 U/L      AST (SGOT) 22 U/L      Alkaline Phosphatase 78 U/L      Total Bilirubin 0.8 mg/dL      Globulin 2.2 gm/dL      A/G Ratio 1.6 g/dL      BUN/Creatinine Ratio 40.7     Anion Gap 9.0 mmol/L      eGFR 76.5 mL/min/1.73     Narrative:      GFR Normal >60  Chronic Kidney Disease <60  Kidney Failure <15       Reticulocytes [[id number]]  (Normal) Collected: 12/10/23 0422    Specimen: Blood Updated: 12/10/23 0432     Reticulocyte % 1.87 %      Reticulocyte Absolute 0.0448 10*6/mm3     CBC (No Diff) [[id number]]  (Abnormal) Collected: 12/10/23 0422    Specimen: Blood Updated: 12/10/23 0429     WBC 7.80 10*3/mm3      RBC 2.44 10*6/mm3      Hemoglobin 8.5 g/dL      Hematocrit 25.3 %      .5 fL      MCH 34.6 pg      MCHC 33.5 g/dL      RDW 22.3 %      RDW-SD 79.6 fl      MPV 7.4 fL      Platelets 157 10*3/mm3     POC Glucose Once [[id number]]  (Abnormal) Collected: 12/09/23 1952    Specimen: Blood Updated: 12/09/23 1954     Glucose 150 mg/dL      Comment: Serial Number: [id number]:  [id number]       Blood Culture - Blood, Hand, Left [[id number]]  (Normal) Collected: 12/07/23 1923    Specimen: Blood from Hand, Left Updated: 12/09/23 1931     Blood Culture No growth at 2 days    Blood Culture - Blood, Hand, Left [[id number]]  (Normal) Collected: 12/07/23 1859    Specimen: Blood from Hand, Left Updated: 12/09/23 1916     Blood Culture No growth at 2 days    Narrative:      Less than seven (7) mL's of blood was collected.  Insufficient quantity may yield false negative results.    Hemoglobin A1c [[id number]]  (Abnormal) Collected: 12/09/23 1217    Specimen: Blood Updated: 12/09/23 1757     Hemoglobin A1C 4.70 %     POC Glucose Once [[id number]]  (Normal) Collected: 12/09/23 1634    Specimen: Blood Updated: 12/09/23 1635     Glucose 98 mg/dL      Comment: Serial Number: [id number]:  [id number]       Lactate Dehydrogenase [[id number]]  (Abnormal) Collected: 12/09/23 0222    Specimen: Blood Updated: 12/09/23 1252      U/L     Reticulocytes [[id number]]  (Abnormal) Collected: 12/09/23 1217    Specimen: Blood Updated: 12/09/23 1229     Reticulocyte % 2.09 %      Reticulocyte Absolute 0.0499 10*6/mm3           Results for orders placed during the hospital encounter of 12/07/23    ADULT TRANSTHORACIC ECHO COMPLETE W/  CONT IF NECESSARY PER PROTOCOL    Interpretation Summary    Left ventricular systolic function is severely decreased. Left ventricular ejection fraction appears to be 31 - 35%.    Left ventricular diastolic function is consistent with (grade I) impaired relaxation.    The left atrial cavity is moderately dilated.        Medication Review:   I have reviewed the patient's current medication list  Scheduled Meds:aspirin, 81 mg, Oral, Daily  atorvastatin, 80 mg, Oral, Daily  calcium carbonate, 1 tablet, Oral, Daily  carvedilol, 6.25 mg, Oral, BID With Meals  cholecalciferol, 1,000 Units, Oral, Daily  empagliflozin, 10 mg, Oral, Daily  enoxaparin, 40 mg, Subcutaneous, Daily  epoetin ana-epbx, 40,000 Units, Subcutaneous, Weekly  famotidine, 20 mg, Oral, BID AC  furosemide, 40 mg, Intravenous, BID  heparin, 500 Units, Intracatheter, Once  insulin lispro, 2-9 Units, Subcutaneous, 4x Daily AC & at Bedtime  sodium chloride, 10 mL, Intravenous, Q12H  vitamin B-12, 1,000 mcg, Oral, Daily      Continuous Infusions:   PRN Meds:.  acetaminophen    ALPRAZolam    Calcium Replacement - Follow Nurse / BPA Driven Protocol    dextrose    dextrose    glucagon (human recombinant)    labetalol    Magnesium Standard Dose Replacement - Follow Nurse / BPA Driven Protocol    Morphine    nitroglycerin    ondansetron **OR** ondansetron    Phosphorus Replacement - Follow Nurse / BPA Driven Protocol    Potassium Replacement - Follow Nurse / BPA Driven Protocol    [COMPLETED] Insert Peripheral IV **AND** sodium chloride    sodium chloride    sodium chloride    ECG/EMG Results (last 24 hours)       Procedure Component Value Units Date/Time    ADULT TRANSTHORACIC ECHO COMPLETE W/ CONT IF NECESSARY PER PROTOCOL [[id number]] Resulted: 12/08/23 1758     Updated: 12/08/23 1804     LVIDd 4.3 cm      LVIDs 3.3 cm      IVSd 0.90 cm      LVPWd 0.90 cm      FS 23.3 %      IVS/LVPW 1.00 cm      ESV(cubed) 35.9 ml      LV Sys Vol (BSA corrected) 39.0 cm2       EDV(cubed) 79.5 ml      LV Patiño Vol (BSA corrected) 66.7 cm2      LV mass(C)d 123.3 grams      EDV(MOD-sp4) 104.0 ml      ESV(MOD-sp4) 60.8 ml      SV(MOD-sp4) 43.2 ml      SI(MOD-sp4) 27.7 ml/m2      EF(MOD-sp4) 41.5 %      MV E max pawan 88.6 cm/sec      MV A max pawan 99.6 cm/sec      MV dec time 0.14 sec      MV E/A 0.89     LA ESV Index (BP) 31.3 ml/m2      Med Peak E' Pawan 9.3 cm/sec      Lat Peak E' Pawan 11.4 cm/sec      Avg E/e' ratio 8.56     RVIDd 4.0 cm      TAPSE (>1.6) 2.20 cm      RV S' 14.2 cm/sec      LA dimension (2D)  3.8 cm      LV V1 max 99.0 cm/sec      LV V1 max PG 3.9 mmHg      LV V1 mean PG 2.00 mmHg      LV V1 VTI 23.3 cm      Ao pk pawan 190.0 cm/sec      Ao max PG 14.4 mmHg      Ao mean PG 8.0 mmHg      Ao V2 VTI 34.4 cm      MV max PG 6.1 mmHg      MV mean PG 4.0 mmHg      MV V2 VTI 22.9 cm      MV P1/2t 43.9 msec      MVA(P1/2t) 5.0 cm2      MV dec slope 761.0 cm/sec2      MR max pawan 413.0 cm/sec      MR max PG 68.2 mmHg     Narrative:        Left ventricular systolic function is severely decreased. Left   ventricular ejection fraction appears to be 31 - 35%.    Left ventricular diastolic function is consistent with (grade I)   impaired relaxation.    The left atrial cavity is moderately dilated.              Imaging Results (Last 24 Hours)       ** No results found for the last 24 hours. **          Results for orders placed during the hospital encounter of 12/07/23    Cardiac Catheterization/Vascular Study    Narrative  Table formatting from the original result was not included.  Cardiac Catheterization Operative Report    Saamntha Massey  [id number]  12/8/2023  @PCP@    She underwent cardiac catheterization.    Indications for the procedure include: acute coronary syndrome.    No results found for this or any previous visit.    No results found for this or any previous visit.        Procedure Details:  The risks, benefits, complications, treatment options, and expected outcomes were  discussed with the patient. The patient and/or family concurred with the proposed plan, giving informed consent.    After informed consent the patient was brought to the cath lab after appropriate IV hydration was begun and oral premedication was given. She was further sedated with fentanyl. She was prepped and draped in the usual manner. Using the modified Seldinger access technique, a 6 Prydeinig sheath was placed in the femoral artery. A left heart catheterization with coronary arteriography was performed. Findings are discussed below.    After the procedure was completed, sedation was stopped and the sheaths and catheters were all removed. Hemostasis was achieved per established hospital protocols.    Conscious sedation:  Conscious sedation was performed according to protocol.  I supervised and directed an independent trained observer with the assistance of monitoring the patient's level of consciousness and physiologic status throughout the procedure.  Intraoperative service time was 120 minutes.    Findings:    Hemodynamics Central aortic pressure systolic 144 diastolic 68 with a mean pressure of 90 mmHg  LV end-diastolic pressure of 39 mmHg  There was no gradient across the aortic valve on the pullback of the pigtail catheter    Left Main Small caliber vessel with significant dampening with 6 Prydeinig catheter  Left main has a diffuse angiographic 60 to 70% stenosis with some dampening with a 6 Prydeinig catheter but no significant dampening with a 4 Prydeinig catheter supplies on the left circumflex artery  RCA Large-caliber vessel dominant vessel  Right coronary artery has ostial angiographic 50 to 60% stenosis with some dampening with the 6 Prydeinig catheter  Mid right coronary artery has another focal area of 20% stenosis  Distal right coronary artery has a focal area of 30 to 40% stenosis  PLB branch of the right coronary artery is a large-caliber vessel with mid angiographic 30 to 40% stenosis  PDA branch of the  right coronary artery is a large-caliber vessel has ostial 50 to 60% stenosis  Right coronary artery provides collaterals to the LAD  % occluded in the ostial portion after the left circumflex artery  Circ Moderate to large caliber vessel angiographically normal  LV Moderate to severe hypokinesis involving the mid mid to distal anterior anteroapical wall to LV apex inferior apical wall with estimated LV ejection fraction of 35%  Coronary Dominance Right coronary artery    Estimated Blood Loss:  Minimal    Specimens: None    Complications:  None; patient tolerated the procedure well.    Disposition: PACU - hemodynamically stable.    Condition: stable    Impressions:  Severe single-vessel coronary artery disease 100% occlusion of the LAD with right to left collaterals likely chronic total occlusion  Moderate to severe stenosis involving the left main supplying only a left circumflex artery with no critical stenosis  Moderate stenosis involving the ostium of the right coronary artery that supplies collaterals to the LAD territory  Severe LV dysfunction  Elevated left-sided filling pressures  Possible stress cardiomyopathy based on the LV gram findings and hypokinesis involving the mid to distal segments and also EKG changes that are out of proportion to elevated troponin      Recommendations:  Admit patient to [facility]  Intra-aortic balloon pump for hemodynamic support  Diuresis  Continued aggressive risk factor modification  Test results reviewed and discussed with patient and family     Results for orders placed during the hospital encounter of 12/07/23    ADULT TRANSTHORACIC ECHO COMPLETE W/ CONT IF NECESSARY PER PROTOCOL    Interpretation Summary    Left ventricular systolic function is severely decreased. Left ventricular ejection fraction appears to be 31 - 35%.    Left ventricular diastolic function is consistent with (grade I) impaired relaxation.    The left atrial cavity is moderately dilated.     Lab  "Results   Component Value Date    GLUCOSE 88 12/10/2023    BUN 35 (H) 12/10/2023    CREATININE 0.86 12/10/2023    EGFR 76.5 12/10/2023    BCR 40.7 (H) 12/10/2023    K 3.9 12/10/2023    CO2 28.0 12/10/2023    CALCIUM 8.0 (L) 12/10/2023    ALBUMIN 3.5 12/10/2023    BILITOT 0.8 12/10/2023    AST 22 12/10/2023    ALT 16 12/10/2023      No results found for: \"CHOL\", \"CHLPL\", \"TRIG\", \"HDL\", \"LDL\", \"LDLDIRECT\"   Lab Results   Component Value Date    CKTOTAL 8 (L) 01/08/2018    TROPONINT 136 (C) 12/08/2023        Assessment & Plan       Acute hypoxic respiratory failure    Adenocarcinoma of colon metastatic to liver    Primary hypertension    Acute systolic heart failure    Congestive heart failure    Acute on chronic combined systolic and diastolic CHF (congestive heart failure)    NSTEMI (non-ST elevated myocardial infarction)    CAD (coronary artery disease)        Ramiro Olivera MD  12/10/23  11:54 EST               "

## 2023-12-10 NOTE — OP NOTE
Removal of balloon pump    Intra-aortic balloon pump was removed at bedside using sterile precautions.  After removal of the sutures that The balloon pump in place and balloon pump was turned off and balloon was aspirated with a 20 cc syringe and balloon pump was removed with manual pressure for few minutes and then a FemoStop device was placed to control the bleeding of the right common femoral arterial site.  Patient tolerated the procedure well with no immediate complications.

## 2023-12-10 NOTE — PROGRESS NOTES
"DAILY PROGRESS NOTE  [facility]    Patient Identification:  Name: Samantha Massey  Age: 63 y.o.  Sex: female  :  1960  MRN: [id number]         Primary Care Physician: Diana Spencer MD    Subjective:  Interval History: She is feeling better today.  She is on room air and up in the chair.    Objective:    Scheduled Meds:aspirin, 81 mg, Oral, Daily  atorvastatin, 80 mg, Oral, Daily  calcium carbonate, 1 tablet, Oral, Daily  carvedilol, 6.25 mg, Oral, BID With Meals  cholecalciferol, 1,000 Units, Oral, Daily  empagliflozin, 10 mg, Oral, Daily  enoxaparin, 40 mg, Subcutaneous, Daily  epoetin ana-epbx, 40,000 Units, Subcutaneous, Weekly  famotidine, 20 mg, Oral, BID AC  [START ON 2023] furosemide, 40 mg, Oral, Daily  insulin lispro, 2-9 Units, Subcutaneous, 4x Daily AC & at Bedtime  sodium chloride, 10 mL, Intravenous, Q12H  vitamin B-12, 1,000 mcg, Oral, Daily      Continuous Infusions:     Vital signs in last 24 hours:  Temp:  [97.6 °F (36.4 °C)-98.2 °F (36.8 °C)] 97.8 °F (36.6 °C)  Heart Rate:  [] 101  Resp:  [12-16] 15  BP: ()/(51-79) 101/54    Intake/Output:    Intake/Output Summary (Last 24 hours) at 12/10/2023 1241  Last data filed at 12/10/2023 1022  Gross per 24 hour   Intake 920 ml   Output 1865 ml   Net -945 ml       Exam:  /54   Pulse 101   Temp 97.8 °F (36.6 °C) (Oral)   Resp 15   Ht 157.5 cm (62\")   Wt 56.4 kg (124 lb 5.4 oz)   LMP  (LMP Unknown)   SpO2 95%   BMI 22.74 kg/m²     General Appearance:    Alert, cooperative, no distress   Head:    Normocephalic, without obvious abnormality, atraumatic   Eyes:       Throat:   Lips, tongue, gums normal   Neck:   Supple, symmetrical, trachea midline, no JVD   Lungs:     Clear to auscultation bilaterally, respirations unlabored   Chest Wall:    No tenderness or deformity    Heart:    Regular rate and rhythm, S1 and S2 normal, no murmur,no  rub or gallop   Abdomen:     Soft, nontender, bowel sounds active, no " masses, no organomegaly    Extremities:   Extremities normal, atraumatic, no cyanosis or edema   Pulses:      Skin:   Skin is warm and dry,  no rashes or palpable lesions   Neurologic:   no focal deficits noted      Lab Results (last 72 hours)       Procedure Component Value Units Date/Time    Hemoglobin A1c [[id number]] Collected: 12/09/23 1217    Specimen: Blood Updated: 12/09/23 1659    POC Glucose Once [[id number]]  (Normal) Collected: 12/09/23 1634    Specimen: Blood Updated: 12/09/23 1635     Glucose 98 mg/dL      Comment: Serial Number: [id number]:  [id number]       Lactate Dehydrogenase [[id number]]  (Abnormal) Collected: 12/09/23 0222    Specimen: Blood Updated: 12/09/23 1252      U/L     Reticulocytes [[id number]]  (Abnormal) Collected: 12/09/23 1217    Specimen: Blood Updated: 12/09/23 1229     Reticulocyte % 2.09 %      Reticulocyte Absolute 0.0499 10*6/mm3     POC Glucose Once [[id number]]  (Abnormal) Collected: 12/09/23 1147    Specimen: Blood Updated: 12/09/23 1148     Glucose 114 mg/dL      Comment: Serial Number: [id number]:  [id number]       POC Activated Clotting Time [[id number]]  (Abnormal) Collected: 12/09/23 0938    Specimen: Arterial Blood Updated: 12/09/23 0943     Activated Clotting Time  158 Seconds      Comment: Serial Number: [id number]:  [id number]       POC Activated Clotting Time [[id number]]  (Abnormal) Collected: 12/09/23 0818    Specimen: Arterial Blood Updated: 12/09/23 0824     Activated Clotting Time  163 Seconds      Comment: Serial Number: [id number]:  [id number]       POC Glucose Once [[id number]]  (Normal) Collected: 12/09/23 0755    Specimen: Blood Updated: 12/09/23 0758     Glucose 105 mg/dL      Comment: Serial Number: [id number]:  [id number]       Basic Metabolic Panel [[id number]]  (Abnormal) Collected: 12/09/23 0222    Specimen: Blood Updated: 12/09/23 0253     Glucose 116 mg/dL      BUN 34 mg/dL      Creatinine 0.71 mg/dL      Sodium 142 mmol/L       Potassium 4.5 mmol/L      Comment: Result checked          Chloride 108 mmol/L      CO2 25.0 mmol/L      Calcium 7.5 mg/dL      BUN/Creatinine Ratio 47.9     Anion Gap 9.0 mmol/L      eGFR 96.3 mL/min/1.73     Narrative:      GFR Normal >60  Chronic Kidney Disease <60  Kidney Failure <15      aPTT [[id number]]  (Normal) Collected: 12/09/23 0222    Specimen: Blood Updated: 12/09/23 0239     PTT 65.6 seconds     CBC (No Diff) [[id number]]  (Abnormal) Collected: 12/09/23 0222    Specimen: Blood Updated: 12/09/23 0228     WBC 6.00 10*3/mm3      RBC 2.35 10*6/mm3      Hemoglobin 8.0 g/dL      Hematocrit 24.0 %      .9 fL      MCH 33.8 pg      MCHC 33.2 g/dL      RDW 22.5 %      RDW-SD 83.1 fl      MPV 7.7 fL      Platelets 179 10*3/mm3     Basic Metabolic Panel [[id number]]  (Abnormal) Collected: 12/08/23 1923    Specimen: Blood Updated: 12/08/23 1951     Glucose 210 mg/dL      BUN 28 mg/dL      Creatinine 0.88 mg/dL      Sodium 143 mmol/L      Potassium 3.6 mmol/L      Chloride 107 mmol/L      CO2 25.0 mmol/L      Calcium 7.7 mg/dL      BUN/Creatinine Ratio 31.8     Anion Gap 11.0 mmol/L      eGFR 74.4 mL/min/1.73     Narrative:      GFR Normal >60  Chronic Kidney Disease <60  Kidney Failure <15      aPTT [[id number]]  (Normal) Collected: 12/08/23 1923    Specimen: Blood Updated: 12/08/23 1947     PTT 62.4 seconds     Blood Culture - Blood, Hand, Left [[id number]]  (Normal) Collected: 12/07/23 1923    Specimen: Blood from Hand, Left Updated: 12/08/23 1931     Blood Culture No growth at 24 hours    Blood Culture - Blood, Hand, Left [[id number]]  (Normal) Collected: 12/07/23 1859    Specimen: Blood from Hand, Left Updated: 12/08/23 1916     Blood Culture No growth at 24 hours    Narrative:      Less than seven (7) mL's of blood was collected.  Insufficient quantity may yield false negative results.    Haptoglobin [[id number]]  (Abnormal) Collected: 12/08/23 1237    Specimen: Blood Updated: 12/08/23 1718     Haptoglobin 29  mg/dL     POC Glucose Once [[id number]]  (Abnormal) Collected: 12/08/23 1642    Specimen: Blood Updated: 12/08/23 1644     Glucose 231 mg/dL      Comment: Serial Number: [id number]:  [id number]       S. Pneumo Ag Urine or CSF - Urine, Urine, Clean Catch [[id number]]  (Normal) Collected: 12/08/23 1247    Specimen: Urine, Clean Catch Updated: 12/08/23 1314     Strep Pneumo Ag Negative    Legionella Antigen, Urine - Urine, Urine, Clean Catch [[id number]]  (Normal) Collected: 12/08/23 1247    Specimen: Urine, Clean Catch Updated: 12/08/23 1314     LEGIONELLA ANTIGEN, URINE Negative    Protime-INR [[id number]]  (Normal) Collected: 12/08/23 1237    Specimen: Blood Updated: 12/08/23 1309     Protime 11.6 Seconds      INR 1.07    aPTT [[id number]]  (Abnormal) Collected: 12/08/23 1237    Specimen: Blood Updated: 12/08/23 1309     PTT 28.8 seconds     POC Glucose Once [[id number]]  (Abnormal) Collected: 12/08/23 1241    Specimen: Blood Updated: 12/08/23 1244     Glucose 157 mg/dL      Comment: Serial Number: [id number]:  [id number]       TSH [[id number]]  (Normal) Collected: 12/08/23 0514    Specimen: Blood Updated: 12/08/23 1008     TSH 0.824 uIU/mL     POC Glucose Once [[id number]]  (Abnormal) Collected: 12/08/23 0711    Specimen: Blood Updated: 12/08/23 0713     Glucose 127 mg/dL      Comment: Serial Number: [id number]:  [id number]       Basic Metabolic Panel [[id number]]  (Abnormal) Collected: 12/08/23 0514    Specimen: Blood Updated: 12/08/23 0601     Glucose 140 mg/dL      BUN 24 mg/dL      Creatinine 0.71 mg/dL      Sodium 142 mmol/L      Potassium 4.1 mmol/L      Chloride 109 mmol/L      CO2 22.0 mmol/L      Calcium 8.0 mg/dL      BUN/Creatinine Ratio 33.8     Anion Gap 11.0 mmol/L      eGFR 96.3 mL/min/1.73     Narrative:      GFR Normal >60  Chronic Kidney Disease <60  Kidney Failure <15      High Sensitivity Troponin T [[id number]]  (Abnormal) Collected: 12/08/23 0514    Specimen: Blood Updated: 12/08/23  0557     HS Troponin T 136 ng/L     Narrative:      High Sensitive Troponin T Reference Range:  <14.0 ng/L- Negative Female for AMI  <22.0 ng/L- Negative Male for AMI  >=14 - Abnormal Female indicating possible myocardial injury.  >=22 - Abnormal Male indicating possible myocardial injury.   Clinicians would have to utilize clinical acumen, EKG, Troponin, and serial changes to determine if it is an Acute Myocardial Infarction or myocardial injury due to an underlying chronic condition.         Calcium, Ionized [[id number]]  (Abnormal) Collected: 12/08/23 0514    Specimen: Blood Updated: 12/08/23 0542     Ionized Calcium 1.09 mmol/L     CBC (No Diff) [[id number]]  (Abnormal) Collected: 12/08/23 0514    Specimen: Blood Updated: 12/08/23 0538     WBC 4.50 10*3/mm3      RBC 2.77 10*6/mm3      Hemoglobin 9.6 g/dL      Hematocrit 28.5 %      .9 fL      MCH 34.8 pg      MCHC 33.8 g/dL      RDW 21.6 %      RDW-SD 77.0 fl      MPV 7.7 fL      Platelets 195 10*3/mm3     MRSA Screen, PCR (Inpatient) - Swab, Nares [[id number]]  (Normal) Collected: 12/08/23 0321    Specimen: Swab from Nares Updated: 12/08/23 0440     MRSA PCR No MRSA Detected    Narrative:      The negative predictive value of this diagnostic test is high and should only be used to consider de-escalating anti-MRSA therapy. A positive result may indicate colonization with MRSA and must be correlated clinically.    Respiratory Panel PCR w/COVID-19(SARS-CoV-2) KARLEY/IRLANDA/KINZA/PAD/COR/KY In-House, NP Swab in [facility], 2 HR TAT - Swab, Nasopharynx [[id number]]  (Normal) Collected: 12/08/23 0228    Specimen: Swab from Nasopharynx Updated: 12/08/23 0335     ADENOVIRUS, PCR Not Detected     Coronavirus 229E Not Detected     Coronavirus HKU1 Not Detected     Coronavirus NL63 Not Detected     Coronavirus OC43 Not Detected     COVID19 Not Detected     Human Metapneumovirus Not Detected     Human Rhinovirus/Enterovirus Not Detected     Influenza A PCR Not Detected      Influenza B PCR Not Detected     Parainfluenza Virus 1 Not Detected     Parainfluenza Virus 2 Not Detected     Parainfluenza Virus 3 Not Detected     Parainfluenza Virus 4 Not Detected     RSV, PCR Not Detected     Bordetella pertussis pcr Not Detected     Bordetella parapertussis PCR Not Detected     Chlamydophila pneumoniae PCR Not Detected     Mycoplasma pneumo by PCR Not Detected    Narrative:      In the setting of a positive respiratory panel with a viral infection PLUS a negative procalcitonin without other underlying concern for bacterial infection, consider observing off antibiotics or discontinuation of antibiotics and continue supportive care. If the respiratory panel is positive for atypical bacterial infection (Bordetella pertussis, Chlamydophila pneumoniae, or Mycoplasma pneumoniae), consider antibiotic de-escalation to target atypical bacterial infection.    POC Glucose Once [[id number]]  (Abnormal) Collected: 12/07/23 2224    Specimen: Blood Updated: 12/07/23 2226     Glucose 176 mg/dL      Comment: Serial Number: [id number]:  [id number]       High Sensitivity Troponin T 2Hr [[id number]]  (Abnormal) Collected: 12/07/23 1923    Specimen: Blood Updated: 12/07/23 1956     HS Troponin T 80 ng/L      Troponin T Delta 52 ng/L     Narrative:      High Sensitive Troponin T Reference Range:  <14.0 ng/L- Negative Female for AMI  <22.0 ng/L- Negative Male for AMI  >=14 - Abnormal Female indicating possible myocardial injury.  >=22 - Abnormal Male indicating possible myocardial injury.   Clinicians would have to utilize clinical acumen, EKG, Troponin, and serial changes to determine if it is an Acute Myocardial Infarction or myocardial injury due to an underlying chronic condition.         POC Lactate [[id number]]  (Normal) Collected: 12/07/23 1902    Specimen: Blood Updated: 12/07/23 1904     Lactate 1.5 mmol/L      Comment: Serial Number: [id number]:  [id number]       Comprehensive Metabolic Panel  [[id number]]  (Abnormal) Collected: 12/07/23 1728    Specimen: Blood Updated: 12/07/23 1810     Glucose 171 mg/dL      BUN 21 mg/dL      Creatinine 0.74 mg/dL      Sodium 138 mmol/L      Potassium 4.0 mmol/L      Comment: Slight hemolysis detected by analyzer. Result may be falsely elevated.        Chloride 107 mmol/L      CO2 21.0 mmol/L      Calcium 7.5 mg/dL      Total Protein 5.3 g/dL      Albumin 3.0 g/dL      ALT (SGPT) 16 U/L      AST (SGOT) 34 U/L      Comment: Slight hemolysis detected by analyzer. Result may be falsely elevated.        Alkaline Phosphatase 75 U/L      Total Bilirubin 0.4 mg/dL      Globulin 2.3 gm/dL      A/G Ratio 1.3 g/dL      BUN/Creatinine Ratio 28.4     Anion Gap 10.0 mmol/L      eGFR 91.6 mL/min/1.73     Narrative:      GFR Normal >60  Chronic Kidney Disease <60  Kidney Failure <15      BNP [[id number]]  (Abnormal) Collected: 12/07/23 1728    Specimen: Blood Updated: 12/07/23 1805     proBNP 2,176.0 pg/mL     Narrative:      This assay is used as an aid in the diagnosis of individuals suspected of having heart failure. It can be used as an aid in the diagnosis of acute decompensated heart failure (ADHF) in patients presenting with signs and symptoms of ADHF to the emergency department (ED). In addition, NT-proBNP of <300 pg/mL indicates ADHF is not likely.    Age Range Result Interpretation  NT-proBNP Concentration (pg/mL:      <50             Positive            >450                   Gray                 300-450                    Negative             <300    50-75           Positive            >900                  Gray                300-900                  Negative            <300      >75             Positive            >1800                  Gray                300-1800                  Negative            <300    High Sensitivity Troponin T [[id number]]  (Abnormal) Collected: 12/07/23 1728    Specimen: Blood Updated: 12/07/23 1805     HS Troponin T 28 ng/L     Narrative:       High Sensitive Troponin T Reference Range:  <14.0 ng/L- Negative Female for AMI  <22.0 ng/L- Negative Male for AMI  >=14 - Abnormal Female indicating possible myocardial injury.  >=22 - Abnormal Male indicating possible myocardial injury.   Clinicians would have to utilize clinical acumen, EKG, Troponin, and serial changes to determine if it is an Acute Myocardial Infarction or myocardial injury due to an underlying chronic condition.         POC Creatinine [[id number]]  (Normal) Collected: 12/07/23 1710    Specimen: Venous Blood Updated: 12/07/23 1749     Creatinine 0.80 mg/dL      Comment: Serial Number: [id number]:  [id number]        eGFR 83.4 mL/min/1.73     Extra Tubes [[id number]] Collected: 12/07/23 1631    Specimen: Blood, Venous Line Updated: 12/07/23 1731    Narrative:      The following orders were created for panel order Extra Tubes.  Procedure                               Abnormality         Status                     ---------                               -----------         ------                     Gold Top - SST[[id number]]                                   Final result                 Please view results for these tests on the individual orders.    Gold Top - SST [[id number]] Collected: 12/07/23 1631    Specimen: Blood Updated: 12/07/23 1731     Extra Tube Hold for add-ons.     Comment: Auto resulted.       Blood Gas, Arterial - [[id number]]  (Abnormal) Collected: 12/07/23 1657    Specimen: Arterial Blood Updated: 12/07/23 1703     Site Left Radial     Adrian's Test Positive     pH, Arterial 7.361 pH units      pCO2, Arterial 36.3 mm Hg      pO2, Arterial 82.2 mm Hg      HCO3, Arterial 20.5 mmol/L      Base Excess, Arterial -4.4 mmol/L      Comment: Serial Number: [id number]:  [id number]        O2 Saturation, Arterial 95.7 %      CO2 Content 21.6 mmol/L      Barometric Pressure for Blood Gas --     Comment: N/A        Modality NRB     FIO2 100 %      Hemodilution No    POC Lactate [[id number]]  (Normal)  Collected: 12/07/23 1657    Specimen: Arterial Blood Updated: 12/07/23 1703     Lactate 1.2 mmol/L      Comment: Serial Number: [id number]:  [id number]       Protime-INR [[id number]]  (Normal) Collected: 12/07/23 1631    Specimen: Blood Updated: 12/07/23 1647     Protime 10.5 Seconds      INR 0.96    aPTT [[id number]]  (Abnormal) Collected: 12/07/23 1631    Specimen: Blood Updated: 12/07/23 1647     PTT 26.3 seconds     CBC & Differential [[id number]]  (Abnormal) Collected: 12/07/23 1631    Specimen: Blood Updated: 12/07/23 1638    Narrative:      The following orders were created for panel order CBC & Differential.  Procedure                               Abnormality         Status                     ---------                               -----------         ------                     CBC Auto Differential[[id number]]        Abnormal            Final result                 Please view results for these tests on the individual orders.    CBC Auto Differential [[id number]]  (Abnormal) Collected: 12/07/23 1631    Specimen: Blood Updated: 12/07/23 1638     WBC 6.90 10*3/mm3      RBC 3.32 10*6/mm3      Hemoglobin 11.5 g/dL      Hematocrit 34.0 %      .4 fL      MCH 34.5 pg      MCHC 33.7 g/dL      RDW 21.5 %      RDW-SD 80.1 fl      MPV 7.6 fL      Platelets 280 10*3/mm3      Neutrophil % 66.9 %      Lymphocyte % 25.7 %      Monocyte % 5.3 %      Eosinophil % 0.4 %      Basophil % 1.7 %      Neutrophils, Absolute 4.60 10*3/mm3      Lymphocytes, Absolute 1.80 10*3/mm3      Monocytes, Absolute 0.40 10*3/mm3      Eosinophils, Absolute 0.00 10*3/mm3      Basophils, Absolute 0.10 10*3/mm3      nRBC 0.2 /100 WBC     POC Glucose Once [[id number]]  (Abnormal) Collected: 12/07/23 1627    Specimen: Blood Updated: 12/07/23 1631     Glucose 219 mg/dL      Comment: Serial Number: [id number]:  [id number]             Data Review:  Results from last 7 days   Lab Units 12/10/23  0422 12/09/23  0222 12/08/23  1923   SODIUM mmol/L  143 142 143   POTASSIUM mmol/L 3.9 4.5 3.6   CHLORIDE mmol/L 106 108* 107   CO2 mmol/L 28.0 25.0 25.0   BUN mg/dL 35* 34* 28*   CREATININE mg/dL 0.86 0.71 0.88   GLUCOSE mg/dL 88 116* 210*   CALCIUM mg/dL 8.0* 7.5* 7.7*     Results from last 7 days   Lab Units 12/10/23  0422 12/09/23  0222 12/08/23  0514   WBC 10*3/mm3 7.80 6.00 4.50   HEMOGLOBIN g/dL 8.5* 8.0* 9.6*   HEMATOCRIT % 25.3* 24.0* 28.5*   PLATELETS 10*3/mm3 157 179 195     Results from last 7 days   Lab Units 12/08/23  0514   TSH uIU/mL 0.824     Results from last 7 days   Lab Units 12/09/23  1217   HEMOGLOBIN A1C % 4.70*     Lab Results   Lab Value Date/Time    TROPONINT 136 (C) 12/08/2023 0514    TROPONINT 80 (C) 12/07/2023 1923    TROPONINT 28 (H) 12/07/2023 1728         Results from last 7 days   Lab Units 12/10/23  0422 12/07/23  1728   ALK PHOS U/L 78 75   BILIRUBIN mg/dL 0.8 0.4   ALT (SGPT) U/L 16 16   AST (SGOT) U/L 22 34*     Results from last 7 days   Lab Units 12/08/23  0514   TSH uIU/mL 0.824     Results from last 7 days   Lab Units 12/09/23  1217   HEMOGLOBIN A1C % 4.70*     Glucose   Date/Time Value Ref Range Status   12/10/2023 1113 110 (H) 70 - 105 mg/dL Final     Comment:     Serial Number: [id number]:  [id number]   12/10/2023 0810 118 (H) 70 - 105 mg/dL Final     Comment:     Serial Number: [id number]:  [id number]   12/09/2023 1952 150 (H) 70 - 105 mg/dL Final     Comment:     Serial Number: [id number]:  [id number]   12/09/2023 1634 98 70 - 105 mg/dL Final     Comment:     Serial Number: [id number]:  [id number]   12/09/2023 1147 114 (H) 70 - 105 mg/dL Final     Comment:     Serial Number: [id number]:  [id number]   12/09/2023 0755 105 70 - 105 mg/dL Final     Comment:     Serial Number: [id number]:  [id number]   12/08/2023 1642 231 (H) 70 - 105 mg/dL Final     Comment:     Serial Number: [id number]:  [id number]   12/08/2023 1241 157 (H) 70 - 105 mg/dL Final     Comment:     Serial Number:  [id number]:  [id number]     Results from last 7 days   Lab Units 12/08/23  1237 12/07/23  1631   INR  1.07 0.96         Assessment:  Active Hospital Problems    Diagnosis  POA    **Acute hypoxic respiratory failure [J96.01]  Yes    Acute systolic heart failure [I50.21]  Unknown    Congestive heart failure [I50.9]  Unknown    Acute on chronic combined systolic and diastolic CHF (congestive heart failure) [I50.43]  Unknown    NSTEMI (non-ST elevated myocardial infarction) [I21.4]  Unknown    CAD (coronary artery disease) [I25.10]  Unknown    Primary hypertension [I10]  Yes    Adenocarcinoma of colon metastatic to liver [C18.9, C78.7]  Yes      Resolved Hospital Problems   No resolved problems to display.       Plan:  Patient off balloon pump and looks well enough to move to PCU.  Medicine will follow for general medical care.  Follow-up labs.  Events noted.  Consults with cardiology and oncology noted.  DC planning.  Disposition to be determined.  Probably needs a few more days.    Ramon Navarro MD  12/10/2023  12:41 EST

## 2023-12-11 LAB
ALBUMIN SERPL-MCNC: 3.3 G/DL (ref 3.5–5.2)
ALBUMIN/GLOB SERPL: 1.3 G/DL
ALP SERPL-CCNC: 84 U/L (ref 39–117)
ALT SERPL W P-5'-P-CCNC: 17 U/L (ref 1–33)
ANION GAP SERPL CALCULATED.3IONS-SCNC: 11 MMOL/L (ref 5–15)
AST SERPL-CCNC: 26 U/L (ref 1–32)
BILIRUB SERPL-MCNC: 0.9 MG/DL (ref 0–1.2)
BUN SERPL-MCNC: 30 MG/DL (ref 8–23)
BUN/CREAT SERPL: 41.7 (ref 7–25)
CALCIUM SPEC-SCNC: 9 MG/DL (ref 8.6–10.5)
CHLORIDE SERPL-SCNC: 101 MMOL/L (ref 98–107)
CO2 SERPL-SCNC: 27 MMOL/L (ref 22–29)
CREAT SERPL-MCNC: 0.72 MG/DL (ref 0.57–1)
DEPRECATED RDW RBC AUTO: 77 FL (ref 37–54)
EGFRCR SERPLBLD CKD-EPI 2021: 94.7 ML/MIN/1.73
ERYTHROCYTE [DISTWIDTH] IN BLOOD BY AUTOMATED COUNT: 21.8 % (ref 12.3–15.4)
GLOBULIN UR ELPH-MCNC: 2.5 GM/DL
GLUCOSE BLDC GLUCOMTR-MCNC: 101 MG/DL (ref 70–105)
GLUCOSE BLDC GLUCOMTR-MCNC: 164 MG/DL (ref 70–105)
GLUCOSE BLDC GLUCOMTR-MCNC: 91 MG/DL (ref 70–105)
GLUCOSE BLDC GLUCOMTR-MCNC: 93 MG/DL (ref 70–105)
GLUCOSE SERPL-MCNC: 95 MG/DL (ref 65–99)
HCT VFR BLD AUTO: 26.5 % (ref 34–46.6)
HGB BLD-MCNC: 9.1 G/DL (ref 12–15.9)
MCH RBC QN AUTO: 35.6 PG (ref 26.6–33)
MCHC RBC AUTO-ENTMCNC: 34.3 G/DL (ref 31.5–35.7)
MCV RBC AUTO: 103.8 FL (ref 79–97)
PLATELET # BLD AUTO: 163 10*3/MM3 (ref 140–450)
PMV BLD AUTO: 7.9 FL (ref 6–12)
POTASSIUM SERPL-SCNC: 3.8 MMOL/L (ref 3.5–5.2)
PROT SERPL-MCNC: 5.8 G/DL (ref 6–8.5)
RBC # BLD AUTO: 2.56 10*6/MM3 (ref 3.77–5.28)
RETICS # AUTO: 0.05 10*6/MM3 (ref 0.02–0.13)
RETICS/RBC NFR AUTO: 2.09 % (ref 0.7–1.9)
SODIUM SERPL-SCNC: 139 MMOL/L (ref 136–145)
WBC NRBC COR # BLD AUTO: 6.2 10*3/MM3 (ref 3.4–10.8)

## 2023-12-11 PROCEDURE — 36415 COLL VENOUS BLD VENIPUNCTURE: CPT | Performed by: INTERNAL MEDICINE

## 2023-12-11 PROCEDURE — 85027 COMPLETE CBC AUTOMATED: CPT | Performed by: INTERNAL MEDICINE

## 2023-12-11 PROCEDURE — 94799 UNLISTED PULMONARY SVC/PX: CPT

## 2023-12-11 PROCEDURE — 80053 COMPREHEN METABOLIC PANEL: CPT | Performed by: INTERNAL MEDICINE

## 2023-12-11 PROCEDURE — 94761 N-INVAS EAR/PLS OXIMETRY MLT: CPT

## 2023-12-11 PROCEDURE — 25010000002 ENOXAPARIN PER 10 MG: Performed by: INTERNAL MEDICINE

## 2023-12-11 PROCEDURE — 82948 REAGENT STRIP/BLOOD GLUCOSE: CPT

## 2023-12-11 PROCEDURE — 85045 AUTOMATED RETICULOCYTE COUNT: CPT | Performed by: NURSE PRACTITIONER

## 2023-12-11 RX ORDER — METOPROLOL SUCCINATE 25 MG/1
25 TABLET, EXTENDED RELEASE ORAL
Status: DISCONTINUED | OUTPATIENT
Start: 2023-12-11 | End: 2023-12-13 | Stop reason: HOSPADM

## 2023-12-11 RX ORDER — BUDESONIDE 0.5 MG/2ML
0.5 INHALANT ORAL
Status: DISCONTINUED | OUTPATIENT
Start: 2023-12-11 | End: 2023-12-13 | Stop reason: HOSPADM

## 2023-12-11 RX ORDER — IPRATROPIUM BROMIDE AND ALBUTEROL SULFATE 2.5; .5 MG/3ML; MG/3ML
3 SOLUTION RESPIRATORY (INHALATION)
Status: DISCONTINUED | OUTPATIENT
Start: 2023-12-11 | End: 2023-12-13 | Stop reason: HOSPADM

## 2023-12-11 RX ADMIN — FUROSEMIDE 40 MG: 40 TABLET ORAL at 08:12

## 2023-12-11 RX ADMIN — ASPIRIN 81 MG CHEWABLE TABLET 81 MG: 81 TABLET CHEWABLE at 08:12

## 2023-12-11 RX ADMIN — IPRATROPIUM BROMIDE AND ALBUTEROL SULFATE 3 ML: .5; 3 SOLUTION RESPIRATORY (INHALATION) at 15:53

## 2023-12-11 RX ADMIN — BUDESONIDE INHALATION 0.5 MG: 0.5 SUSPENSION RESPIRATORY (INHALATION) at 20:02

## 2023-12-11 RX ADMIN — Medication 1000 UNITS: at 08:12

## 2023-12-11 RX ADMIN — CALCIUM CARBONATE (ANTACID) CHEW TAB 500 MG 1 TABLET: 500 CHEW TAB at 08:11

## 2023-12-11 RX ADMIN — EMPAGLIFLOZIN 10 MG: 10 TABLET, FILM COATED ORAL at 08:12

## 2023-12-11 RX ADMIN — CYANOCOBALAMIN TAB 1000 MCG 1000 MCG: 1000 TAB at 08:12

## 2023-12-11 RX ADMIN — METOPROLOL SUCCINATE 25 MG: 25 TABLET, EXTENDED RELEASE ORAL at 19:02

## 2023-12-11 RX ADMIN — FAMOTIDINE 20 MG: 20 TABLET ORAL at 16:42

## 2023-12-11 RX ADMIN — FAMOTIDINE 20 MG: 20 TABLET ORAL at 08:04

## 2023-12-11 RX ADMIN — ENOXAPARIN SODIUM 40 MG: 100 INJECTION SUBCUTANEOUS at 16:41

## 2023-12-11 RX ADMIN — IPRATROPIUM BROMIDE AND ALBUTEROL SULFATE 3 ML: .5; 3 SOLUTION RESPIRATORY (INHALATION) at 11:36

## 2023-12-11 RX ADMIN — Medication 10 ML: at 21:21

## 2023-12-11 RX ADMIN — Medication 10 ML: at 08:15

## 2023-12-11 RX ADMIN — CARVEDILOL 6.25 MG: 6.25 TABLET, FILM COATED ORAL at 08:12

## 2023-12-11 RX ADMIN — SACUBITRIL AND VALSARTAN 1 TABLET: 24; 26 TABLET, FILM COATED ORAL at 21:20

## 2023-12-11 RX ADMIN — ATORVASTATIN CALCIUM 80 MG: 40 TABLET, FILM COATED ORAL at 08:12

## 2023-12-11 RX ADMIN — IPRATROPIUM BROMIDE AND ALBUTEROL SULFATE 3 ML: .5; 3 SOLUTION RESPIRATORY (INHALATION) at 20:01

## 2023-12-11 NOTE — PROGRESS NOTES
Hematology/Oncology Inpatient Progress Note    PATIENT NAME: Samantha Massey  : 1960  MRN: [id number]    CHIEF COMPLAINT: MVASI anaphylaxis, stage IV adenocarcinoma of the colon, acute on chronic anemia, history of REGULO, macrocytosis, and vitamin B12 deficiency    HISTORY OF PRESENT ILLNESS:    63 y.o. female presented to [facility] ED on 2023 after presenting to our office for an MVASI infusion.  She has stage IV cancer of the colon, s/p multiple lines of therapy, currently on Lonsurf with MVASI.  She had received multiple doses of MVASI in the past. During her infusion, she developed an acute onset of dyspnea with feeling of inability to breathe or move air.  She was treated aggressively with Benadryl and an albuterol nebulizer, for bilateral rhonchi and crackles, Lasix and dexamethasone.  She was hypertensive with blood pressure 200/100, tachycardic to 160s and O2 saturation initially was 95% but dropped to 75%.  She improved to 85% on 4 L nasal cannula and EMS was called to take patient to the ER.  In the ER, ABG confirmed hypoxia with a pO2 of 82.2.  proBNP was 2176 (<900).  Troponin 28 (< 14).  CMP was significant for an AST of 34 (1-32).  Lactate 1.2 (0.2-2.0), PT 10.5 (9.6-11.7), and PTT 26.3 (25-35).  White count 6.9, hemoglobin 11.5 (9.79 in our office), .4 and platelets 280,000.  Chest x-ray showed right lower lobe pneumonia.  CTA chest PE protocol showed no evidence of PE.  There were findings concerning for progression of disease with a sclerotic osseous metastatic lesion involving the lateral right rib with increased consolidation and soft tissue component extending into the right middle lobe lung.  Diffuse groundglass opacities with interlobular septal thickening compatible with pulmonary edema, however, there was additional bibasilar airspace disease with consolidation and pleural effusion suggesting additional infectious/inflammatory process.  EKG was abnormal and  showed sinus tachycardia with anterior septal infarction and suggested ischemia.  She was started on meropenem and pulmonary was consulted.  Cardiology was consulted on the day of consultation and her EKG was abnormal with prolonged QT interval.  Troponin had increased to 136.  White count 4.5, hemoglobin 9.6, .9 and platelets 195.  On the morning of consultation, she was complaining of pain in her right shoulder and constipation.  She was fatigued.  After she was evaluated by Dr. Perera, she developed chest pain, starting in her right arm, radiating down her right arm requiring nitroglycerin x 2 with minimal relief.     12/08/23  Hematology/Oncology was consulted as she is an established patient who we treat for stage IV adenocarcinoma of the colon, chemotherapy-induced cytopenias, vitamin B12 and vitamin D deficiencies, and osteopenia.  -Diagnosed with colon cancer with liver metastasis and 2012.  To date she has not received any cardiotoxic chemotherapy but did receive radiation to her right anterior rib in April 2023.  Although, Mvasi, may either cause reversible direct myocardial toxicity or exacerbate underlying myocardial dysfunction, occurring in 10% of patients.  Current CTA PE was compared to CT C/A/P on 11/18/2023.  She has completed 5 cycles of her current therapy.  -Anemia-due to chemotherapy with history of REGULO, and vitamin B12 deficiency.  Iron studies 8/25/2023 showed iron saturation of 40% (15-50), and ferritin of 223 ().  In May 2023, folate was 9.5, haptoglobin 134 ().  In December 2022, SPEP showed no M spike and copper was 112 ().  Ferrous sulfate was discontinued on 9/29/2023.  She was ordered to start Procrit 40,000 units subcu weekly on 11/22/2023 but has yet to start.  She takes vitamin B12 and vitamin D supplementation for deficiencies.  No change in past medical, surgical, social or family histories since patient was seen in the office on 12/7/2023.     PCP:  Tj, Diana JOHNSTON MD    INTERVAL HISTORY:  12/8/2023-Cardiac catheterization by Dr. Olivera revealed severe single-vessel coronary artery disease with 100% occlusion of the LAD with right to left collaterals status and chronic total occlusion.  Moderate stenosis involving the ostium of the right coronary artery that supplies collaterals to the LAD territory.  There was severe LV dysfunction and elevated left-sided filling pressures.  Intra-aortic balloon pump for hemodynamic support inserted.  12/9/2023 - hemoglobin 8.0, .9, haptoglobin 29 (), TSH  0.824 (0.270-4.200). Moved to [facility] on 12/8/2023.  CXR identified no significant change.  IABP removed.  12/10/2023 - hemoglobin 8.5, .5,  (135-214), reticulocytes 2.09 (0.70-1.90), SHADI - negative. Started on folic acid 1 mg po daily on 12/9/2023.  12/11/2023 - hemoglobin 9.1, .5.     History of present illness reviewed since last visit and changes noted on 12/11/23.    Subjective   Complains of chronic constipation.       ROS:  Review of Systems   Constitutional:  Negative for activity change, chills, fatigue, fever and unexpected weight change.   HENT:  Negative for congestion, dental problem, hearing loss, mouth sores, nosebleeds, sore throat and trouble swallowing.    Eyes:  Negative for photophobia and visual disturbance.   Respiratory:  Negative for cough, chest tightness and shortness of breath.    Cardiovascular:  Negative for chest pain, palpitations and leg swelling.   Gastrointestinal:  Positive for constipation (chronic). Negative for abdominal distention, abdominal pain, blood in stool, diarrhea, nausea and vomiting.   Endocrine: Negative for cold intolerance and heat intolerance.   Genitourinary:  Negative for decreased urine volume, difficulty urinating, dysuria, frequency, hematuria and urgency.   Musculoskeletal:  Negative for arthralgias and gait problem.   Skin:  Negative for rash and wound.   Neurological:  Negative  "for dizziness, tremors, facial asymmetry, speech difficulty, weakness, light-headedness, numbness and headaches.   Hematological:  Negative for adenopathy. Does not bruise/bleed easily.   Psychiatric/Behavioral:  Negative for confusion and hallucinations. The patient is not nervous/anxious.    All other systems reviewed and are negative.    MEDICATIONS:    Scheduled Meds:  aspirin, 81 mg, Oral, Daily  atorvastatin, 80 mg, Oral, Daily  calcium carbonate, 1 tablet, Oral, Daily  carvedilol, 6.25 mg, Oral, BID With Meals  cholecalciferol, 1,000 Units, Oral, Daily  empagliflozin, 10 mg, Oral, Daily  enoxaparin, 40 mg, Subcutaneous, Daily  epoetin ana-epbx, 40,000 Units, Subcutaneous, Weekly  famotidine, 20 mg, Oral, BID AC  furosemide, 40 mg, Oral, Daily  insulin lispro, 2-9 Units, Subcutaneous, 4x Daily AC & at Bedtime  sodium chloride, 10 mL, Intravenous, Q12H  vitamin B-12, 1,000 mcg, Oral, Daily    Continuous Infusions:      PRN Meds:    acetaminophen    ALPRAZolam    Calcium Replacement - Follow Nurse / BPA Driven Protocol    dextrose    dextrose    glucagon (human recombinant)    labetalol    Magnesium Standard Dose Replacement - Follow Nurse / BPA Driven Protocol    Morphine    nitroglycerin    ondansetron **OR** ondansetron    Phosphorus Replacement - Follow Nurse / BPA Driven Protocol    Potassium Replacement - Follow Nurse / BPA Driven Protocol    [COMPLETED] Insert Peripheral IV **AND** sodium chloride    sodium chloride    sodium chloride     ALLERGIES:    Allergies   Allergen Reactions    Hydrocodone Nausea And Vomiting    Iodinated Contrast Media Hives and Itching     PT HAD SOME HIVES DURING SCAN.  PRIOR TO SCAN 8-3-2013.    Latex Rash     Blisters     Penicillins Hives     Objective    VITALS:   /65 (Patient Position: Sitting)   Pulse 105   Temp 97.8 °F (36.6 °C) (Oral)   Resp 17   Ht 157.5 cm (62\")   Wt 57 kg (125 lb 10.6 oz)   LMP  (LMP Unknown)   SpO2 95%   BMI 22.98 kg/m²     PHYSICAL " EXAM:  Physical Exam  Vitals and nursing note reviewed.   Constitutional:       General: She is not in acute distress.     Appearance: Normal appearance. She is well-developed. She is not diaphoretic.   HENT:      Head: Normocephalic and atraumatic.      Right Ear: External ear normal.      Left Ear: External ear normal.      Nose: Nose normal.      Mouth/Throat:      Mouth: Mucous membranes are moist.      Pharynx: Oropharynx is clear. No oropharyngeal exudate or posterior oropharyngeal erythema.      Comments: Dental fillings.   Eyes:      General: No scleral icterus.     Extraocular Movements: Extraocular movements intact.      Conjunctiva/sclera: Conjunctivae normal.      Pupils: Pupils are equal, round, and reactive to light.   Cardiovascular:      Rate and Rhythm: Regular rhythm. Tachycardia present.      Heart sounds: Normal heart sounds. No murmur heard.     Comments: Cardiac monitor leads.  Pulmonary:      Effort: Pulmonary effort is normal. No respiratory distress.      Breath sounds: Normal breath sounds. No stridor. No wheezing or rales.   Chest:      Comments: Left chest wall [id number].   Abdominal:      General: Bowel sounds are normal. There is no distension.      Palpations: Abdomen is soft. There is no mass.      Tenderness: There is no abdominal tenderness. There is no guarding.   Genitourinary:     Comments: Deferred.   Musculoskeletal:         General: No swelling, tenderness or deformity. Normal range of motion.      Cervical back: Normal range of motion and neck supple.      Right lower leg: No edema.      Left lower leg: No edema.      Comments: Right upper extremity peripheral IV.   Left hand oxygen saturation monitor.   Lymphadenopathy:      Cervical: No cervical adenopathy.      Upper Body:      Right upper body: No supraclavicular adenopathy.      Left upper body: No supraclavicular adenopathy.   Skin:     General: Skin is warm and dry.      Coloration: Skin is not pale.      Findings:  No bruising, erythema or rash.   Neurological:      General: No focal deficit present.      Mental Status: She is alert and oriented to person, place, and time.      Coordination: Coordination normal.   Psychiatric:         Mood and Affect: Mood normal.         Behavior: Behavior normal.         Thought Content: Thought content normal.     RECENT LABS:  Lab Results (last 24 hours)       Procedure Component Value Units Date/Time    POC Glucose Once [[id number]]  (Abnormal) Collected: 12/11/23 0825    Specimen: Blood Updated: 12/11/23 0827     Glucose 164 mg/dL      Comment: Serial Number: [id number]:  [id number]       Comprehensive Metabolic Panel [[id number]]  (Abnormal) Collected: 12/11/23 0610    Specimen: Blood Updated: 12/11/23 0645     Glucose 95 mg/dL      BUN 30 mg/dL      Creatinine 0.72 mg/dL      Sodium 139 mmol/L      Potassium 3.8 mmol/L      Chloride 101 mmol/L      CO2 27.0 mmol/L      Calcium 9.0 mg/dL      Total Protein 5.8 g/dL      Albumin 3.3 g/dL      ALT (SGPT) 17 U/L      AST (SGOT) 26 U/L      Alkaline Phosphatase 84 U/L      Total Bilirubin 0.9 mg/dL      Globulin 2.5 gm/dL      A/G Ratio 1.3 g/dL      BUN/Creatinine Ratio 41.7     Anion Gap 11.0 mmol/L      eGFR 94.7 mL/min/1.73     Narrative:      GFR Normal >60  Chronic Kidney Disease <60  Kidney Failure <15      Reticulocytes [[id number]]  (Abnormal) Collected: 12/11/23 0610    Specimen: Blood Updated: 12/11/23 0632     Reticulocyte % 2.09 %      Reticulocyte Absolute 0.0534 10*6/mm3     CBC (No Diff) [[id number]]  (Abnormal) Collected: 12/11/23 0610    Specimen: Blood Updated: 12/11/23 0625     WBC 6.20 10*3/mm3      RBC 2.56 10*6/mm3      Hemoglobin 9.1 g/dL      Hematocrit 26.5 %      .8 fL      MCH 35.6 pg      MCHC 34.3 g/dL      RDW 21.8 %      RDW-SD 77.0 fl      MPV 7.9 fL      Platelets 163 10*3/mm3     POC Glucose Once [[id number]]  (Abnormal) Collected: 12/10/23 1957    Specimen: Blood Updated: 12/10/23 1959     Glucose  187 mg/dL      Comment: Serial Number: [id number]:  [id number]       Blood Culture - Blood, Hand, Left [[id number]]  (Normal) Collected: 12/07/23 1923    Specimen: Blood from Hand, Left Updated: 12/10/23 1931     Blood Culture No growth at 3 days    Blood Culture - Blood, Hand, Left [[id number]]  (Normal) Collected: 12/07/23 1859    Specimen: Blood from Hand, Left Updated: 12/10/23 1916     Blood Culture No growth at 3 days    Narrative:      Less than seven (7) mL's of blood was collected.  Insufficient quantity may yield false negative results.    POC Glucose Once [[id number]]  (Normal) Collected: 12/10/23 1617    Specimen: Blood Updated: 12/10/23 1619     Glucose 87 mg/dL      Comment: Serial Number: [id number]:  [id number]       POC Glucose Once [[id number]]  (Abnormal) Collected: 12/10/23 1113    Specimen: Blood Updated: 12/10/23 1114     Glucose 110 mg/dL      Comment: Serial Number: [id number]:  [id number]             PENDING RESULTS: Blood cultures (final).  Vitamin D and iron studies from the office.    IMAGING REVIEWED:  XR Chest 1 View    Result Date: 12/9/2023  Impression: No significant change identified Electronically Signed: Clive Li MD  12/9/2023 8:07 AM CST  Workstation ID: [id number]     I have reviewed the patient's labs, imaging, reports, and other clinician documentation.    Assessment & Plan   ASSESSMENT  MVASI infusion reaction and EKG changes: Acute onset after multiple tolerated infusions.  Unresponsive to initial treatment.  EKG suspicious for anterior septal infarction and troponin rising with development of chest pain. MVASI was discontinued and Lonsurf was put on hold.  Due for growth factor on 12/11/2023.  Pulmonary and cardiology have been consulted.  MVASI has been shown to cause myocardial dysfunction in 10% of patients, but patient has coronary artery disease.  On ASA and prophylactic dose Lovenox.  NSTEMI: Cardiac catheterization has revealed severe single-vessel  coronary artery disease with 100% occlusion of the LAD with right to left collaterals likely chronic total occlusion.  Moderate stenosis involving the ostium of the right coronary artery that supplies collaterals to the LAD territory.  There was severe LV dysfunction and elevated left-sided filling pressures.  IABP placed temporarily and patient started on diuretics. IABP discontinued.   Stage IV colon cancer: Chest CT showed progression of disease in lungs and bones but she has had disease in these areas.  She is on Xgeva as an outpatient.    Acute on chronic anemia, history of REGULO, macrocytosis and vitamin B12 deficiency: She has chemotherapy-induced anemia with macrocytosis. Oral vitamin B12 was continued and she was started on Procrit on 12/8/2023. Haptoglobin was low, SHADI negative, LDH and reticulocytes elevated.  She was started on folic acid supplementation. TSH was normal.   Vitamin D deficiency and osteopenia: Continued supplementation.  Will obtain vitamin D level from office.  Started TUMs for low ionized calcium.  Contrast dye allergy: Avoid IV contrast with scans.     PLAN  Follow CBC.   Obtain vitamin D level and iron studies from office.  Continue folic acid 1 mg p.o. daily.  Continue TUMs 1 tablet daily.  Continue daily oral vitamin B12 and vitamin D supplementation.  Continue Pepcid.  Continue Procrit 40,000 units subcu weekly, next due 12/15/2023.  Discuss oncological options of treatment as an outpatient.    Electronically signed by ARNOLDO White, 12/11/23, 8:38 AM EST.      On 12/11/2023, I have personally performed a face-to-face diagnostic evaluation on this patient. I have performed a complete history and physical examination, reviewed laboratory studies, and radiographic examinations.  I have completed the majority and substantive portion of the medical decision making.  I have formulated the assessment on this patient and the plan of action as noted above. I have discussed the case  with Dai Dutta NP, have edited/reviewed the note, and agree with the care plan.  The patient has chronic constipation.  On examination, right arm IV and left hand O2 monitor are present.  Hemoglobin is 9.1.  She is on folic acid supplementation and weekly Procrit injections.  Plan is for chemotherapy to resume post discharge.     On 12/11/2023, I discussed the patient's findings and my recommendations with patient.    Part of this note may be an electronic transcription/translation of spoken language to printed text using the Dragon Dictation System.     Electronically signed by Mark Perera MD, 12/11/23, 8:58 AM EST.

## 2023-12-11 NOTE — PROGRESS NOTES
"PULMONARY CRITICAL CARE PROGRESS  NOTE      PATIENT IDENTIFICATION:  Name: Samantha Massey  MRN: [id number]  :  1960     Age: 63 y.o.  Sex: female    DATE OF Note:  2023   Referring Physician: Naomi Dover DO                  Subjective:   Feeling better, up in chair at bedside,  on room air, no SOB, no chest or abd pain, no bowel or bladder issues     Objective:  tMax 24 hrs: Temp (24hrs), Av °F (36.7 °C), Min:97.4 °F (36.3 °C), Max:98.5 °F (36.9 °C)      Vitals Ranges:   Temp:  [97.4 °F (36.3 °C)-98.5 °F (36.9 °C)] 97.8 °F (36.6 °C)  Heart Rate:  [] 98  Resp:  [14-17] 17  BP: ()/(52-85) 117/70    Intake and Output Last 3 Shifts:   I/O last 3 completed shifts:  In: 1430 [P.O.:1430]  Out: 1755 [Urine:1755]    Exam:  /70   Pulse 98   Temp 97.8 °F (36.6 °C) (Oral)   Resp 17   Ht 157.5 cm (62\")   Wt 57 kg (125 lb 10.6 oz)   LMP  (LMP Unknown)   SpO2 100%   BMI 22.98 kg/m²     General Appearance:  AAO   HEENT:  Normocephalic, without obvious abnormality. Conjunctivae/corneas clear.  Normal external ear canals. Nares normal, no drainage     Neck:  Supple, symmetrical, trachea midline. No JVD.  Lungs /Chest wall:   Bilateral basal rhonchi, respirations unlabored, symmetrical wall movement.     Heart:  Regular rate and rhythm, systolic murmur PMI left sternal border  Abdomen: Soft, nontender, no masses, no organomegaly.    Extremities: No edema,  clubbing or cyanosis        Medications:  aspirin, 81 mg, Oral, Daily  atorvastatin, 80 mg, Oral, Daily  calcium carbonate, 1 tablet, Oral, Daily  carvedilol, 6.25 mg, Oral, BID With Meals  cholecalciferol, 1,000 Units, Oral, Daily  empagliflozin, 10 mg, Oral, Daily  enoxaparin, 40 mg, Subcutaneous, Daily  epoetin ana-epbx, 40,000 Units, Subcutaneous, Weekly  famotidine, 20 mg, Oral, BID AC  furosemide, 40 mg, Oral, Daily  insulin lispro, 2-9 Units, Subcutaneous, 4x Daily AC & at Bedtime  sodium chloride, 10 mL, Intravenous, " Q12H  vitamin B-12, 1,000 mcg, Oral, Daily        Infusion:        PRN:    acetaminophen    ALPRAZolam    Calcium Replacement - Follow Nurse / BPA Driven Protocol    dextrose    dextrose    glucagon (human recombinant)    labetalol    Magnesium Standard Dose Replacement - Follow Nurse / BPA Driven Protocol    Morphine    nitroglycerin    ondansetron **OR** ondansetron    Phosphorus Replacement - Follow Nurse / BPA Driven Protocol    Potassium Replacement - Follow Nurse / BPA Driven Protocol    [COMPLETED] Insert Peripheral IV **AND** sodium chloride    sodium chloride    sodium chloride  Data Review:  All labs (24hrs):   Recent Results (from the past 24 hour(s))   POC Glucose Once    Collection Time: 12/10/23 11:13 AM    Specimen: Blood   Result Value Ref Range    Glucose 110 (H) 70 - 105 mg/dL   POC Glucose Once    Collection Time: 12/10/23  4:17 PM    Specimen: Blood   Result Value Ref Range    Glucose 87 70 - 105 mg/dL   POC Glucose Once    Collection Time: 12/10/23  7:57 PM    Specimen: Blood   Result Value Ref Range    Glucose 187 (H) 70 - 105 mg/dL   CBC (No Diff)    Collection Time: 12/11/23  6:10 AM    Specimen: Blood   Result Value Ref Range    WBC 6.20 3.40 - 10.80 10*3/mm3    RBC 2.56 (L) 3.77 - 5.28 10*6/mm3    Hemoglobin 9.1 (L) 12.0 - 15.9 g/dL    Hematocrit 26.5 (L) 34.0 - 46.6 %    .8 (H) 79.0 - 97.0 fL    MCH 35.6 (H) 26.6 - 33.0 pg    MCHC 34.3 31.5 - 35.7 g/dL    RDW 21.8 (H) 12.3 - 15.4 %    RDW-SD 77.0 (H) 37.0 - 54.0 fl    MPV 7.9 6.0 - 12.0 fL    Platelets 163 140 - 450 10*3/mm3   Comprehensive Metabolic Panel    Collection Time: 12/11/23  6:10 AM    Specimen: Blood   Result Value Ref Range    Glucose 95 65 - 99 mg/dL    BUN 30 (H) 8 - 23 mg/dL    Creatinine 0.72 0.57 - 1.00 mg/dL    Sodium 139 136 - 145 mmol/L    Potassium 3.8 3.5 - 5.2 mmol/L    Chloride 101 98 - 107 mmol/L    CO2 27.0 22.0 - 29.0 mmol/L    Calcium 9.0 8.6 - 10.5 mg/dL    Total Protein 5.8 (L) 6.0 - 8.5 g/dL     Albumin 3.3 (L) 3.5 - 5.2 g/dL    ALT (SGPT) 17 1 - 33 U/L    AST (SGOT) 26 1 - 32 U/L    Alkaline Phosphatase 84 39 - 117 U/L    Total Bilirubin 0.9 0.0 - 1.2 mg/dL    Globulin 2.5 gm/dL    A/G Ratio 1.3 g/dL    BUN/Creatinine Ratio 41.7 (H) 7.0 - 25.0    Anion Gap 11.0 5.0 - 15.0 mmol/L    eGFR 94.7 >60.0 mL/min/1.73   Reticulocytes    Collection Time: 12/11/23  6:10 AM    Specimen: Blood   Result Value Ref Range    Reticulocyte % 2.09 (H) 0.70 - 1.90 %    Reticulocyte Absolute 0.0534 0.0200 - 0.1300 10*6/mm3   POC Glucose Once    Collection Time: 12/11/23  8:25 AM    Specimen: Blood   Result Value Ref Range    Glucose 164 (H) 70 - 105 mg/dL        Imaging:  Cardiac Catheterization/Vascular Study  Table formatting from the original result was not included.  Cardiac Catheterization Operative Report    Samantha P Bryson  [id number]  12/8/2023  @PCP@    She underwent cardiac catheterization.     Indications for the procedure include: acute coronary syndrome.     No results found for this or any previous visit.    No results found for this or any previous visit.       Procedure Details:  The risks, benefits, complications, treatment options, and expected   outcomes were discussed with the patient. The patient and/or family   concurred with the proposed plan, giving informed consent.     After informed consent the patient was brought to the cath lab after   appropriate IV hydration was begun and oral premedication was given. She   was further sedated with fentanyl. She was prepped and draped in the usual   manner. Using the modified Seldinger access technique, a 6 Czech sheath   was placed in the femoral artery. A left heart catheterization with   coronary arteriography was performed. Findings are discussed below.    After the procedure was completed, sedation was stopped and the sheaths   and catheters were all removed. Hemostasis was achieved per established   hospital protocols.    Conscious sedation:  Conscious  sedation was performed according to protocol.  I supervised and   directed an independent trained observer with the assistance of monitoring   the patient's level of consciousness and physiologic status throughout the   procedure.  Intraoperative service time was 120 minutes.    Findings:    Hemodynamics Central aortic pressure systolic 144 diastolic 68 with a mean   pressure of 90 mmHg  LV end-diastolic pressure of 39 mmHg  There was no gradient across the aortic valve on the pullback of the   pigtail catheter     Left Main Small caliber vessel with significant dampening with 6 Greenlandic   catheter  Left main has a diffuse angiographic 60 to 70% stenosis with some   dampening with a 6 Greenlandic catheter but no significant dampening with a 4   Greenlandic catheter supplies on the left circumflex artery   RCA Large-caliber vessel dominant vessel  Right coronary artery has ostial angiographic 50 to 60% stenosis with some   dampening with the 6 Greenlandic catheter  Mid right coronary artery has another focal area of 20% stenosis  Distal right coronary artery has a focal area of 30 to 40% stenosis  PLB branch of the right coronary artery is a large-caliber vessel with mid   angiographic 30 to 40% stenosis  PDA branch of the right coronary artery is a large-caliber vessel has   ostial 50 to 60% stenosis  Right coronary artery provides collaterals to the LAD   % occluded in the ostial portion after the left circumflex artery   Circ Moderate to large caliber vessel angiographically normal    LV Moderate to severe hypokinesis involving the mid mid to distal   anterior anteroapical wall to LV apex inferior apical wall with estimated   LV ejection fraction of 35%   Coronary Dominance Right coronary artery     Estimated Blood Loss:  Minimal    Specimens: None         Complications:  None; patient tolerated the procedure well.           Disposition: PACU - hemodynamically stable.           Condition: stable    Impressions:  Severe  single-vessel coronary artery disease 100% occlusion of the LAD   with right to left collaterals likely chronic total occlusion  Moderate to severe stenosis involving the left main supplying only a left   circumflex artery with no critical stenosis  Moderate stenosis involving the ostium of the right coronary artery that   supplies collaterals to the LAD territory  Severe LV dysfunction  Elevated left-sided filling pressures  Possible stress cardiomyopathy based on the LV gram findings and   hypokinesis involving the mid to distal segments and also EKG changes that   are out of proportion to elevated troponin    Recommendations:  Admit patient to [facility]U  Intra-aortic balloon pump for hemodynamic support  Diuresis  Continued aggressive risk factor modification  Test results reviewed and discussed with patient and family       ASSESSMENT:  Acute hypoxic respiratory failure  Adenocarcinoma of colon metastatic to liver  Primary hypertension  Acute systolic heart failure  Congestive heart failure  Acute on chronic combined systolic and diastolic CHF (congestive heart failure)  NSTEMI (non-ST elevated myocardial infarction)  CAD (coronary artery disease)       PLAN:  OOB daily   PT/OT  Bronchodilators  Inhaled corticosteroids  Incentive spirometer  Diuresis and monitor RAPHAEL's  Oncology/Cardiology following   Electrolytes/ glycemic control  DVT and GI prophylaxis-Lovenox    Discussed with ARNOLDO Aguiar   12/11/2023  11:06 EST    I personally have examined  and interviewed the patient. I have reviewed the history, data, problems, assessment and plan with our NP.  Total Critical care time in direct medical management (   ) minutes, This time specifically excludes time spent performing procedures.    Deana Arteaga MD   12/11/2023  12:20 EST

## 2023-12-11 NOTE — PROGRESS NOTES
"[facility] MEDICINE SERVICE  DAILY PROGRESS NOTE      Patient Name: Samantha Massey  Date of Admission: 12/7/2023  Today's Date: 12/11/23  Length of Stay: 4  Primary Care Physician: Diana Spencer MD    Subjective   Patient is doing well at this time.  She has no current complaints.  No overnight events.      Objective    Temp:  [97.4 °F (36.3 °C)-98.5 °F (36.9 °C)] 97.8 °F (36.6 °C)  Heart Rate:  [] 105  Resp:  [14-17] 17  BP: ()/(52-67) 109/65  Physical Exam  General: NAD, AAOx3  HEENT: AT NC, EOMI  Neck: No JVD  CVS: S1/S2 present, RRR, no M/R/G  Lungs: CTA B/L  Abdomen: soft, NT, ND, BS+  Ext: no edema  Skin: no rashes, bruises or discolorations  Neuro: no focal deficits  Psych: Not agitated         Results Review:  I have reviewed the labs, radiology results, and diagnostic studies.    Laboratory Data:   Results from last 7 days   Lab Units 12/11/23  0610 12/10/23  0422 12/09/23 0222   WBC 10*3/mm3 6.20 7.80 6.00   HEMOGLOBIN g/dL 9.1* 8.5* 8.0*   HEMATOCRIT % 26.5* 25.3* 24.0*   PLATELETS 10*3/mm3 163 157 179        Results from last 7 days   Lab Units 12/11/23  0610 12/10/23  0422 12/09/23  0222 12/08/23  0514 12/07/23  1728   SODIUM mmol/L 139 143 142   < > 138   POTASSIUM mmol/L 3.8 3.9 4.5   < > 4.0   CHLORIDE mmol/L 101 106 108*   < > 107   CO2 mmol/L 27.0 28.0 25.0   < > 21.0*   BUN mg/dL 30* 35* 34*   < > 21   CREATININE mg/dL 0.72 0.86 0.71   < > 0.74   CALCIUM mg/dL 9.0 8.0* 7.5*   < > 7.5*   BILIRUBIN mg/dL 0.9 0.8  --   --  0.4   ALK PHOS U/L 84 78  --   --  75   ALT (SGPT) U/L 17 16  --   --  16   AST (SGOT) U/L 26 22  --   --  34*   GLUCOSE mg/dL 95 88 116*   < > 171*    < > = values in this interval not displayed.       Culture Data:   No results found for: \"BLOODCX\"  No results found for: \"URINECX\"  No results found for: \"RESPCX\"  No results found for: \"WOUNDCX\"  No results found for: \"STOOLCX\"  No components found for: \"BODYFLD\"    Radiology Data:   Imaging Results " (Last 24 Hours)       ** No results found for the last 24 hours. **            I have reviewed the patient's current medications.     Assessment/Plan       1) acute respiratory failure with hypoxia  - resolved  - possibly secondary to NSTEMI    2) NSTEMI  - cardiology on board, appreciate recs  - LHC shows 100% occlusion of the LAD with right to left collaterals, and moderate to severe stenosis involving the left main and the ostium of the RCA and severe LV dysfunction  - coreg, lipitor, ASA    3) CHF  - TTE on 12/8 shows severely reduced EF of 31-35% and grade I diastolic dysfuncion  - lasix, coreg  - strict I/O and daily weights  - fluid restriction  - manage per cardiology    4) metastatic colon cancer  - oncology on board, appreciate recs  - continue cholecalciferol, tums and vitamin B    5) GI/DVT ppx  - pepcid/lovenox        Electronically signed by Narayan Pool MD, 12/11/23, 09:40 EST.

## 2023-12-11 NOTE — CASE MANAGEMENT/SOCIAL WORK
Discharge Planning Assessment   Rad     Patient Name: Samantha Massey  MRN: [id number]  Today's Date: 12/11/2023    Admit Date: 12/7/2023    Plan: DC Plan: Home with significant other and grandchild.   Discharge Needs Assessment       Row Name 12/11/23 1431       Living Environment    People in Home significant other;grandchild(huong)    Name(s) of People in Home S.O. Molly Robison    Current Living Arrangements home    Potentially Unsafe Housing Conditions none    Primary Care Provided by self    Provides Primary Care For grandchild(huong)    Family Caregiver if Needed significant other;sibling(s)    Family Caregiver Names S.O. Molly Robison    Quality of Family Relationships helpful;involved;supportive    Able to Return to Prior Arrangements yes       Resource/Environmental Concerns    Resource/Environmental Concerns none    Transportation Concerns none       Food Insecurity    Within the past 12 months, you worried that your food would run out before you got the money to buy more. Never true    Within the past 12 months, the food you bought just didn't last and you didn't have money to get more. Never true       Transition Planning    Patient/Family Anticipates Transition to home with family    Patient/Family Anticipated Services at Transition none    Transportation Anticipated family or friend will provide       Discharge Needs Assessment    Readmission Within the Last 30 Days no previous admission in last 30 days    Current Outpatient/Agency/Support Group other (see comments)  OP [facility]    Equipment Currently Used at Home bp cuff;scales;crutches    Concerns to be Addressed discharge planning    Anticipated Changes Related to Illness none    Equipment Needed After Discharge none    Provided Post Acute Provider List? N/A    Provided Post Acute Provider Quality & Resource List? N/A    Current Discharge Risk chronically ill                   Discharge Plan       Row Name 12/11/23 1433       Plan    Plan DC  Plan: Home with significant other and grandchild.    Patient/Family in Agreement with Plan yes    Provided Post Acute Provider List? N/A    Provided Post Acute Provider Quality & Resource List? N/A    Plan Comments CM spoke with patient at bedside to discuss admission assessment and discharge planning. Patient confirms PCP and pharmacy. Patient confirms she is agreeable to enrolling in meds to bed program. CM enrolled patient and updated pharmacy in [facility]. Patient denies any difficulty affording medications at this time. Patient denies any additional needs for services or DME at this time. CM spoke with patient about importance of discussing ACP with family and significant other to detrmine her decision maker if she is ever unable. CM instructed patient to ask for  if she desires to set up and ACP. Patient verbalized understanding and will discuss with her loved ones. Patient states her significant other Molly will provide transportation when she is ready to DC. CM spoke with MD and Oncologist of clinical updates. DC timing unclear at this point. CM will continue to follow for any further needs and adjust discharge plan accordingly. DC Barriers: Cardiac monitoring.               Expected Discharge Date and Time       Expected Discharge Date Expected Discharge Time    Dec 12, 2023            Demographic Summary       Row Name 12/11/23 1432       General Information    Admission Type inpatient    Arrived From emergency department;home    Referral Source admission list    Reason for Consult discharge planning    Preferred Language English       Contact Information    Permission Granted to Share Info With                    Functional Status       Row Name 12/11/23 1434       Functional Status    Usual Activity Tolerance good    Current Activity Tolerance good       Physical Activity    On average, how many days per week do you engage in moderate to strenuous exercise (like a brisk walk)? 0 days     On average, how many minutes do you engage in exercise at this level? 0 min    Number of minutes of exercise per week 0       Functional Status, IADL    Medications independent    Meal Preparation independent    Housekeeping independent    Laundry independent    Shopping independent       Mental Status    General Appearance WDL WDL       Mental Status Summary    Recent Changes in Mental Status/Cognitive Functioning no changes       Employment/    Employment Status employed full-time    Current or Previous Occupation education           Current or Previous  Service none               Nahomi Mirza RN     Office Phone: [phone]  Office Cell:     [phone]

## 2023-12-11 NOTE — PROGRESS NOTES
"Cardiology Progress  Note      Patient Care Team:  Diana Spencer MD as PCP - General  TjDiana MD as PCP - Family Medicine  Mark Perera MD as Consulting Physician (Hematology and Oncology)  Jay Yeh MD as Surgeon (General Surgery)    PATIENT IDENTIFICATION  Name: Samantha Massey  Age: 63 y.o.  Sex: female  :  1960  MRN: [id number]      Length of stay:    LOS: 4 days           REASON FOR FOLLOW-UP:  Acute non-ST elevation MI  Coronary artery disease-multivessel  LV dysfunction      INTERVAL HISTORY  Patient seen and examined, chart and labs reviewed.  Patient reports she feels much better today.  Sitting in bedside chair and appears comfortable.  Rhythm sinus at 104, blood pressure 124/66      SUBJECTIVE    Denies chest discomfort  Denies shortness of breath  No edema  No GI complaints      REVIEW OF SYSTEMS:  Pertinent items are noted in HPI, all other systems reviewed and negative    OBJECTIVE   Hemoglobin 9.1    ASSESSMENT  Acute non-ST elevation MI  Multivessel coronary artery disease  Possible stress cardiomyopathy  Hypoxic respiratory failure  LV dysfunction  Stage IV metastatic colon cancer  Multifocal pneumonia      RECOMMENDATIONS  Continue low-dose beta-blocker as blood pressure allows  Continue aggressive risk factor modification  Continue aspirin, statin, BB, Lasix            Vital Signs  Visit Vitals  /70   Pulse 99   Temp 98.8 °F (37.1 °C) (Oral)   Resp 15   Ht 157.5 cm (62\")   Wt 57 kg (125 lb 10.6 oz)   LMP  (LMP Unknown)   SpO2 100%   BMI 22.98 kg/m²     Oxygen Therapy  SpO2: 100 %  Pulse Oximetry Type: Continuous  Device (Oxygen Therapy): room air  Device (Oxygen Therapy) (Infant): NPPV/NIV  Flow (L/min): 2  Oxygen Concentration (%): (S) 60 (titrated per SATS)  Flowsheet Rows      Flowsheet Row First Filed Value   Admission Height 157.5 cm (62\") Documented at 2023 1617   Admission Weight 59.9 kg (132 lb 0.9 oz) Documented at 2023 1626 " "         Intake & Output (last 3 days)         12/08 0701  12/09 0700 12/09 0701  12/10 0700 12/10 0701 12/11 0700 12/11 0701 12/12 0700    P.O.      I.V. (mL/kg) 1001 (17.6) 0 (0) 0 (0)     Total Intake(mL/kg) 1601 (28.2) 690 (12.2) 1070 (18.8)     Urine (mL/kg/hr) 2960 (2.2) 1750 (1.3) 1380 (1)     Emesis/NG output 0 0 0     Stool 0 0 0     Total Output 2960 1750 1380     Net -1359 -1060 -310             Urine Unmeasured Occurrence 0 x 2 x      Stool Unmeasured Occurrence 0 x 0 x 1 x     Emesis Unmeasured Occurrence 0 x 0 x 0 x           Lines, Drains & Airways       Active LDAs       Name Placement date Placement time Site Days    Peripheral IV 12/07/23 1630 Anterior;Right Forearm 12/07/23  1630  Forearm  3                           /70   Pulse 99   Temp 98.8 °F (37.1 °C) (Oral)   Resp 15   Ht 157.5 cm (62\")   Wt 57 kg (125 lb 10.6 oz)   LMP  (LMP Unknown)   SpO2 100%   BMI 22.98 kg/m²   Intake/Output last 3 shifts:  I/O last 3 completed shifts:  In: 1430 [P.O.:1430]  Out: 1755 [Urine:1755]  Intake/Output this shift:  No intake/output data recorded.    PHYSICAL EXAM:    General: Alert, cooperative, no distress, appears stated age  Head:  Normocephalic, atraumatic, mucous membranes moist  Eyes:  Conjunctivae/corneas clear, EOM's intact     Neck:  Supple,  no adenopathy; no JVD or bruit  Lungs:  Clear to auscultation bilaterally, no wheezes, rhonchi or rales are noted  Chest wall: No tenderness  Heart::  Regular rate and rhythm, S1 and S2 normal, no murmur, rub or gallop  Abdomen: Soft, nontender, nondistended, bowel sounds active  Extremities: No cyanosis, clubbing, or edema   Pulses: 2+ and symmetric all extremities  Skin:  No rashes or lesions  Neuro/psych: A&O x3. CN II through XII are grossly intact with appropriate affect        Scheduled Meds:      aspirin, 81 mg, Oral, Daily  atorvastatin, 80 mg, Oral, Daily  budesonide, 0.5 mg, Nebulization, BID - RT  calcium carbonate, 1 " tablet, Oral, Daily  carvedilol, 6.25 mg, Oral, BID With Meals  cholecalciferol, 1,000 Units, Oral, Daily  empagliflozin, 10 mg, Oral, Daily  enoxaparin, 40 mg, Subcutaneous, Daily  epoetin ana-epbx, 40,000 Units, Subcutaneous, Weekly  famotidine, 20 mg, Oral, BID AC  furosemide, 40 mg, Oral, Daily  insulin lispro, 2-9 Units, Subcutaneous, 4x Daily AC & at Bedtime  ipratropium-albuterol, 3 mL, Nebulization, 4x Daily - RT  sodium chloride, 10 mL, Intravenous, Q12H  vitamin B-12, 1,000 mcg, Oral, Daily        Continuous Infusions:         PRN Meds:      acetaminophen    ALPRAZolam    Calcium Replacement - Follow Nurse / BPA Driven Protocol    dextrose    dextrose    glucagon (human recombinant)    labetalol    Magnesium Standard Dose Replacement - Follow Nurse / BPA Driven Protocol    Morphine    nitroglycerin    ondansetron **OR** ondansetron    Phosphorus Replacement - Follow Nurse / BPA Driven Protocol    Potassium Replacement - Follow Nurse / BPA Driven Protocol    [COMPLETED] Insert Peripheral IV **AND** sodium chloride    sodium chloride    sodium chloride        Results Review:     I reviewed the patient's new clinical results.    CBC    Results from last 7 days   Lab Units 12/11/23  0610 12/10/23  0422 12/09/23  0222 12/08/23  0514 12/07/23  1631   WBC 10*3/mm3 6.20 7.80 6.00 4.50 6.90   HEMOGLOBIN g/dL 9.1* 8.5* 8.0* 9.6* 11.5*   PLATELETS 10*3/mm3 163 157 179 195 280     Cr Clearance Estimated Creatinine Clearance: 72.9 mL/min (by C-G formula based on SCr of 0.72 mg/dL).  Coag   Results from last 7 days   Lab Units 12/09/23  0222 12/08/23  1923 12/08/23  1237 12/07/23  1631   INR   --   --  1.07 0.96   APTT seconds 65.6 62.4 28.8* 26.3*     HbA1C   Lab Results   Component Value Date    HGBA1C 4.70 (L) 12/09/2023     Blood Glucose   Glucose   Date/Time Value Ref Range Status   12/11/2023 1201 91 70 - 105 mg/dL Final     Comment:     Serial Number: [id number]:  [id number]   12/11/2023 0825 753 (T) 57 -  "105 mg/dL Final     Comment:     Serial Number: [id number]:  [id number]   12/10/2023 1957 187 (H) 70 - 105 mg/dL Final     Comment:     Serial Number: [id number]:  [id number]   12/10/2023 1617 87 70 - 105 mg/dL Final     Comment:     Serial Number: [id number]:  [id number]   12/10/2023 1113 110 (H) 70 - 105 mg/dL Final     Comment:     Serial Number: [id number]:  [id number]   12/10/2023 0810 118 (H) 70 - 105 mg/dL Final     Comment:     Serial Number: [id number]:  [id number]   12/09/2023 1952 150 (H) 70 - 105 mg/dL Final     Comment:     Serial Number: [id number]:  [id number]   12/09/2023 1634 98 70 - 105 mg/dL Final     Comment:     Serial Number: [id number]:  [id number]     Infection   Results from last 7 days   Lab Units 12/07/23  1923 12/07/23  1859   BLOODCX  No growth at 3 days No growth at 3 days     CMP   Results from last 7 days   Lab Units 12/11/23  0610 12/10/23  0422 12/09/23  0222 12/08/23  1923 12/08/23  0514 12/07/23  1728 12/07/23  1710   SODIUM mmol/L 139 143 142 143 142 138  --    POTASSIUM mmol/L 3.8 3.9 4.5 3.6 4.1 4.0  --    CHLORIDE mmol/L 101 106 108* 107 109* 107  --    CO2 mmol/L 27.0 28.0 25.0 25.0 22.0 21.0*  --    BUN mg/dL 30* 35* 34* 28* 24* 21  --    CREATININE mg/dL 0.72 0.86 0.71 0.88 0.71 0.74 0.80   GLUCOSE mg/dL 95 88 116* 210* 140* 171*  --    ALBUMIN g/dL 3.3* 3.5  --   --   --  3.0*  --    BILIRUBIN mg/dL 0.9 0.8  --   --   --  0.4  --    ALK PHOS U/L 84 78  --   --   --  75  --    AST (SGOT) U/L 26 22  --   --   --  34*  --    ALT (SGPT) U/L 17 16  --   --   --  16  --      ABG    Results from last 7 days   Lab Units 12/07/23  1657   PH, ARTERIAL pH units 7.361   PCO2, ARTERIAL mm Hg 36.3   PO2 ART mm Hg 82.2*   O2 SATURATION ART % 95.7   BASE EXCESS ART mmol/L -4.4*     UA      MIRZA  No results found for: \"POCMETH\", \"POCAMPHET\", \"POCBARBITUR\", \"POCBENZO\", \"POCCOCAINE\", \"POCOPIATES\", \"POCOXYCODO\", \"POCPHENCYC\", \"POCPROPOXY\", \"POCTHC\", " "\"POCTRICYC\"  Lysis Labs   Results from last 7 days   Lab Units 12/11/23  0610 12/10/23  0422 12/09/23  0222 12/08/23  1923 12/08/23  1237 12/08/23  0514 12/07/23  1728 12/07/23  1710 12/07/23  1631   INR   --   --   --   --  1.07  --   --   --  0.96   APTT seconds  --   --  65.6 62.4 28.8*  --   --   --  26.3*   HEMOGLOBIN g/dL 9.1* 8.5* 8.0*  --   --  9.6*  --   --  11.5*   PLATELETS 10*3/mm3 163 157 179  --   --  195  --   --  280   CREATININE mg/dL 0.72 0.86 0.71 0.88  --  0.71 0.74 0.80  --      Radiology(recent) No radiology results for the last day      Results from last 7 days   Lab Units 12/08/23  0514   HSTROP T ng/L 136*       X-rays, labs reviewed personally by physician.    ECG/EMG Results (most recent)       Procedure Component Value Units Date/Time    SCANNED - TELEMETRY   [[id number]] Resulted: 12/07/23     Updated: 12/08/23 0548    SCANNED - TELEMETRY   [[id number]] Resulted: 12/07/23     Updated: 12/08/23 0548    ECG 12 Lead Tachycardia [[id number]] Collected: 12/07/23 1746     Updated: 12/08/23 0915     QT Interval 328 ms      QTC Interval 482 ms     Narrative:      HEART RATE= 130  bpm  RR Interval= 464  ms  TN Interval= 138  ms  P Horizontal Axis= -57  deg  P Front Axis= 33  deg  QRSD Interval= 76  ms  QT Interval= 328  ms  QTcB= 482  ms  QRS Axis= 1  deg  T Wave Axis= 162  deg  - ABNORMAL ECG -  Sinus tachycardia  Atrial premature complex  Anteroseptal infarct, age indeterminate  repol abnrm suggests ischemia, anterolateral  When compared with ECG of 07-Dec-2023 16:19:58,  Significant rate decrease  Significant axis, voltage or hypertrophy change  Electronically Signed By: Servando Costello) 08-Dec-2023 09:14:50  Date and Time of Study: 2023-12-07 17:46:10    ECG 12 Lead Other; Tachycardia [[id number]] Collected: 12/07/23 1619     Updated: 12/08/23 0915     QT Interval 283 ms      QTC Interval 450 ms     Narrative:      HEART RATE= 152  bpm  RR Interval= 396  ms  TN Interval= 183  ms  P Horizontal " Axis= -4  deg  P Front Axis= 38  deg  QRSD Interval= 77  ms  QT Interval= 283  ms  QTcB= 450  ms  QRS Axis= -2  deg  T Wave Axis= 169  deg  - ABNORMAL ECG -  Sinus tachycardia  Probable left atrial enlargement  Probable anteroseptal infarct, old  Repolarization abnormality, prob rate related  When compared with ECG of 23-Feb-2023 15:58:35,  Significant rate increase  Significant repolarization change  Electronically Signed By: Servando Costello) 08-Dec-2023 09:15:37  Date and Time of Study: 2023-12-07 16:19:58    ADULT TRANSTHORACIC ECHO COMPLETE W/ CONT IF NECESSARY PER PROTOCOL [[id number]] Resulted: 12/08/23 1758     Updated: 12/08/23 1804     LVIDd 4.3 cm      LVIDs 3.3 cm      IVSd 0.90 cm      LVPWd 0.90 cm      FS 23.3 %      IVS/LVPW 1.00 cm      ESV(cubed) 35.9 ml      LV Sys Vol (BSA corrected) 39.0 cm2      EDV(cubed) 79.5 ml      LV Patiño Vol (BSA corrected) 66.7 cm2      LV mass(C)d 123.3 grams      EDV(MOD-sp4) 104.0 ml      ESV(MOD-sp4) 60.8 ml      SV(MOD-sp4) 43.2 ml      SI(MOD-sp4) 27.7 ml/m2      EF(MOD-sp4) 41.5 %      MV E max joelle 88.6 cm/sec      MV A max joelle 99.6 cm/sec      MV dec time 0.14 sec      MV E/A 0.89     LA ESV Index (BP) 31.3 ml/m2      Med Peak E' Joelle 9.3 cm/sec      Lat Peak E' Joelle 11.4 cm/sec      Avg E/e' ratio 8.56     RVIDd 4.0 cm      TAPSE (>1.6) 2.20 cm      RV S' 14.2 cm/sec      LA dimension (2D)  3.8 cm      LV V1 max 99.0 cm/sec      LV V1 max PG 3.9 mmHg      LV V1 mean PG 2.00 mmHg      LV V1 VTI 23.3 cm      Ao pk joelle 190.0 cm/sec      Ao max PG 14.4 mmHg      Ao mean PG 8.0 mmHg      Ao V2 VTI 34.4 cm      MV max PG 6.1 mmHg      MV mean PG 4.0 mmHg      MV V2 VTI 22.9 cm      MV P1/2t 43.9 msec      MVA(P1/2t) 5.0 cm2      MV dec slope 761.0 cm/sec2      MR max joelle 413.0 cm/sec      MR max PG 68.2 mmHg     Narrative:        Left ventricular systolic function is severely decreased. Left   ventricular ejection fraction appears to be 31 - 35%.    Left ventricular  diastolic function is consistent with (grade I)   impaired relaxation.    The left atrial cavity is moderately dilated.      ECG 12 Lead Chest Pain [[id number]] Collected: 12/08/23 0853     Updated: 12/09/23 1934     QT Interval 479 ms      QTC Interval 590 ms     Narrative:      HEART RATE= 91  bpm  RR Interval= 660  ms  DC Interval= 158  ms  P Horizontal Axis= -61  deg  P Front Axis= 82  deg  QRSD Interval= 76  ms  QT Interval= 479  ms  QTcB= 590  ms  QRS Axis= -6  deg  T Wave Axis= 165  deg  - ABNORMAL ECG -  Sinus rhythm  Probable left atrial enlargement  Anteroseptal infarct, age indeterminate  Prolonged QT interval  When compared with ECG of 08-Dec-2023 4:09:21,  Significant axis, voltage or hypertrophy change  Electronically Signed By: Ramiro Olivera ([facility]) 09-Dec-2023 19:33:55  Date and Time of Study: 2023-12-08 08:53:11    ECG 12 Lead Rhythm Change [[id number]] Collected: 12/08/23 0409     Updated: 12/09/23 1934     QT Interval 453 ms      QTC Interval 608 ms     Narrative:      HEART RATE= 108  bpm  RR Interval= 556  ms  DC Interval= 84  ms  P Horizontal Axis=   deg  P Front Axis= 91  deg  QRSD Interval= 77  ms  QT Interval= 453  ms  QTcB= 608  ms  QRS Axis= -7  deg  T Wave Axis= 183  deg  - ABNORMAL ECG -  Sinus tachycardia  Consider anteroseptal infarct  Prolonged QT interval  When compared with ECG of 07-Dec-2023 17:46:10,  Significant rate decrease  Significant repolarization change  Electronically Signed By: Ramiro Olivera ([facility]) 09-Dec-2023 19:34:02  Date and Time of Study: 2023-12-08 04:09:21    SCANNED - TELEMETRY   [[id number]] Resulted: 12/07/23     Updated: 12/11/23 1058              Medication Review:   I have reviewed the patient's current medication list  Scheduled Meds:aspirin, 81 mg, Oral, Daily  atorvastatin, 80 mg, Oral, Daily  budesonide, 0.5 mg, Nebulization, BID - RT  calcium carbonate, 1 tablet, Oral, Daily  carvedilol, 6.25 mg, Oral, BID With Meals  cholecalciferol, 1,000  Units, Oral, Daily  empagliflozin, 10 mg, Oral, Daily  enoxaparin, 40 mg, Subcutaneous, Daily  epoetin ana-epbx, 40,000 Units, Subcutaneous, Weekly  famotidine, 20 mg, Oral, BID AC  furosemide, 40 mg, Oral, Daily  insulin lispro, 2-9 Units, Subcutaneous, 4x Daily AC & at Bedtime  ipratropium-albuterol, 3 mL, Nebulization, 4x Daily - RT  sodium chloride, 10 mL, Intravenous, Q12H  vitamin B-12, 1,000 mcg, Oral, Daily      Continuous Infusions:   PRN Meds:.  acetaminophen    ALPRAZolam    Calcium Replacement - Follow Nurse / BPA Driven Protocol    dextrose    dextrose    glucagon (human recombinant)    labetalol    Magnesium Standard Dose Replacement - Follow Nurse / BPA Driven Protocol    Morphine    nitroglycerin    ondansetron **OR** ondansetron    Phosphorus Replacement - Follow Nurse / BPA Driven Protocol    Potassium Replacement - Follow Nurse / BPA Driven Protocol    [COMPLETED] Insert Peripheral IV **AND** sodium chloride    sodium chloride    sodium chloride    Imaging:  Imaging Results (Last 72 Hours)       Procedure Component Value Units Date/Time    XR Chest 1 View [[id number]] Collected: 12/09/23 0906     Updated: 12/09/23 0909    Narrative:      XR CHEST 1 VW    Date of Exam: 12/9/2023 8:03 AM CST    Indication: IABP    Comparison: 12/8/2023    Findings:  Cardiomediastinal silhouette and support lines and tubes are relatively unchanged. Caudal marking the tip of the IABP appears similar position. There remains mild pulmonary vascular and interstitial prominence. No airspace disease, pneumothorax, nor   pleural effusion. No acute osseous abnormality identified.      Impression:      Impression:  No significant change identified      Electronically Signed: Clive Li MD    12/9/2023 8:07 AM CST    Workstation ID: [id number]    XR Chest 1 View [[id number]] Collected: 12/09/23 0004     Updated: 12/09/23 0013    Narrative:      XR CHEST 1 VW    Date of Exam: 12/8/2023 11:35 PM EST    Indication: IABP  "placement    Comparison: 12/8/2023.    Findings:  Intra-aortic balloon pump catheter is present. The distal tip of the catheter appears to have migrated distally and now lies approximately 20 mm from the superior margin of the aortic arch at what appears to be the upper T5 level. There is a small stable   right-sided pleural effusion. Lung parenchyma remains clear. No pneumothorax. There is a stable left-sided chest port. Heart size is unchanged. Pulmonary vasculature is within normal limits      Impression:      Impression:  Intra-aortic balloon pump catheter has migrated distally and now lies approximately 20 mm from the superior margin of the aortic arch at the upper T5 level.        Electronically Signed: Celso Soni MD    12/9/2023 12:11 AM EST    Workstation ID: [id number]    XR Chest 1 View [[id number]] Collected: 12/08/23 1239     Updated: 12/08/23 1246    Narrative:      X-ray chest 1 view    Date of Exam: 12/8/2023 11:22 AM CST    Indication: Balloon pump    Comparison: Chest x-ray 12/7/2023    Findings:  The heart is stable in size. There is a left chest wall port catheter with the distal tip overlying the distal superior vena cava. Intra-aortic balloon pump catheter. Is present with the distal tip 9 mm from the margin of the descending thoracic aortic   arch. There is mild right basilar atelectasis but overall improved aeration at the right lung base from prior study. No pneumothorax. Trace right pleural effusion is suspected.      Impression:      Impression:    1. Improved but residual right lower lung airspace disease and trace effusion.    2. Placement of an intra-aortic balloon pump catheter.      Electronically Signed: Nadja Li MD    12/8/2023 11:44 AM CST    Workstation ID: [id number]              ARNOLDO Barkley  12/11/23  12:32 EST       EMR Dragon/Transcription:   \"Dictated utilizing Dragon dictation\".       Electronically signed by ARNOLDO Barkley, 12/11/23, 12:32 PM " EST.  Copied text in this note has been reviewed by me and is accurate as of 12/11/23.    Cardiology attending:  Seen and examined.  Chart and labs reviewed. Independant interpretations of cardiac testing was performed. History and exam findings are verified with above changes noted.  Assessment and plan notated by APC after being formulated by attending consultant.  Note that greater than 50% of the time spent in care of the patient was provided by attending consultant.    Patient denies any chest pain pressure heaviness or tightness.  No shortness of breath.  She is tachycardic.  Discussed options.  Will discontinue the patient's Coreg and start her on Toprol and Entresto to try and control tachycardia and get her on guideline directed therapy for her cardiomyopathy.    Physical Exam:    General: Alert, cooperative, no distress, appears stated age  Head:  Normocephalic, atraumatic, mucous membranes moist  Eyes:  Conjunctiva/corneas clear, EOM's intact     Neck:  Supple,  no bruit  Lungs:            Coarse and diminished bilaterally  Chest wall: No tenderness  Heart::  Regular rate and rhythm, S1 and S2 normal, 1/6 holosystolic murmur.  No rub or gallop  Abdomen: Soft, non-tender, nondistended bowel sounds active  Extremities: No cyanosis, clubbing, or edema  Pulses:            2+ and symmetric all extremities  Skin:  No rashes or lesions  Neuro/psych: A&O x3. CN II through XII are grossly intact with appropriate affect

## 2023-12-12 LAB
ALBUMIN SERPL-MCNC: 3.5 G/DL (ref 3.5–5.2)
ALBUMIN/GLOB SERPL: 1.3 G/DL
ALP SERPL-CCNC: 91 U/L (ref 39–117)
ALT SERPL W P-5'-P-CCNC: 17 U/L (ref 1–33)
ANION GAP SERPL CALCULATED.3IONS-SCNC: 12 MMOL/L (ref 5–15)
AST SERPL-CCNC: 26 U/L (ref 1–32)
BACTERIA SPEC AEROBE CULT: NORMAL
BACTERIA SPEC AEROBE CULT: NORMAL
BILIRUB SERPL-MCNC: 0.8 MG/DL (ref 0–1.2)
BUN SERPL-MCNC: 23 MG/DL (ref 8–23)
BUN/CREAT SERPL: 27.7 (ref 7–25)
CALCIUM SPEC-SCNC: 10.1 MG/DL (ref 8.6–10.5)
CHLORIDE SERPL-SCNC: 98 MMOL/L (ref 98–107)
CO2 SERPL-SCNC: 27 MMOL/L (ref 22–29)
CREAT SERPL-MCNC: 0.83 MG/DL (ref 0.57–1)
DEPRECATED RDW RBC AUTO: 76.6 FL (ref 37–54)
EGFRCR SERPLBLD CKD-EPI 2021: 79.8 ML/MIN/1.73
ERYTHROCYTE [DISTWIDTH] IN BLOOD BY AUTOMATED COUNT: 21.8 % (ref 12.3–15.4)
GLOBULIN UR ELPH-MCNC: 2.8 GM/DL
GLUCOSE BLDC GLUCOMTR-MCNC: 101 MG/DL (ref 70–105)
GLUCOSE BLDC GLUCOMTR-MCNC: 134 MG/DL (ref 70–105)
GLUCOSE BLDC GLUCOMTR-MCNC: 154 MG/DL (ref 70–105)
GLUCOSE BLDC GLUCOMTR-MCNC: 96 MG/DL (ref 70–105)
GLUCOSE SERPL-MCNC: 117 MG/DL (ref 65–99)
HCT VFR BLD AUTO: 27 % (ref 34–46.6)
HGB BLD-MCNC: 9.4 G/DL (ref 12–15.9)
MCH RBC QN AUTO: 35.9 PG (ref 26.6–33)
MCHC RBC AUTO-ENTMCNC: 34.8 G/DL (ref 31.5–35.7)
MCV RBC AUTO: 103.2 FL (ref 79–97)
PLATELET # BLD AUTO: 192 10*3/MM3 (ref 140–450)
PMV BLD AUTO: 8.4 FL (ref 6–12)
POTASSIUM SERPL-SCNC: 4.5 MMOL/L (ref 3.5–5.2)
PROT SERPL-MCNC: 6.3 G/DL (ref 6–8.5)
RBC # BLD AUTO: 2.62 10*6/MM3 (ref 3.77–5.28)
RETICS # AUTO: 0.13 10*6/MM3 (ref 0.02–0.13)
RETICS/RBC NFR AUTO: 4.9 % (ref 0.7–1.9)
SODIUM SERPL-SCNC: 137 MMOL/L (ref 136–145)
WBC NRBC COR # BLD AUTO: 6.3 10*3/MM3 (ref 3.4–10.8)

## 2023-12-12 PROCEDURE — 85027 COMPLETE CBC AUTOMATED: CPT | Performed by: INTERNAL MEDICINE

## 2023-12-12 PROCEDURE — 94799 UNLISTED PULMONARY SVC/PX: CPT

## 2023-12-12 PROCEDURE — 94761 N-INVAS EAR/PLS OXIMETRY MLT: CPT

## 2023-12-12 PROCEDURE — 82948 REAGENT STRIP/BLOOD GLUCOSE: CPT

## 2023-12-12 PROCEDURE — 93005 ELECTROCARDIOGRAM TRACING: CPT | Performed by: NURSE PRACTITIONER

## 2023-12-12 PROCEDURE — 94664 DEMO&/EVAL PT USE INHALER: CPT

## 2023-12-12 PROCEDURE — 80053 COMPREHEN METABOLIC PANEL: CPT | Performed by: INTERNAL MEDICINE

## 2023-12-12 PROCEDURE — 63710000001 INSULIN LISPRO (HUMAN) PER 5 UNITS: Performed by: INTERNAL MEDICINE

## 2023-12-12 PROCEDURE — 25010000002 ENOXAPARIN PER 10 MG: Performed by: INTERNAL MEDICINE

## 2023-12-12 PROCEDURE — 85045 AUTOMATED RETICULOCYTE COUNT: CPT | Performed by: NURSE PRACTITIONER

## 2023-12-12 RX ADMIN — SACUBITRIL AND VALSARTAN 1 TABLET: 24; 26 TABLET, FILM COATED ORAL at 21:06

## 2023-12-12 RX ADMIN — SACUBITRIL AND VALSARTAN 1 TABLET: 24; 26 TABLET, FILM COATED ORAL at 09:16

## 2023-12-12 RX ADMIN — METOPROLOL SUCCINATE 25 MG: 25 TABLET, EXTENDED RELEASE ORAL at 09:16

## 2023-12-12 RX ADMIN — FUROSEMIDE 40 MG: 40 TABLET ORAL at 09:16

## 2023-12-12 RX ADMIN — CALCIUM CARBONATE (ANTACID) CHEW TAB 500 MG 1 TABLET: 500 CHEW TAB at 09:16

## 2023-12-12 RX ADMIN — FAMOTIDINE 20 MG: 20 TABLET ORAL at 17:23

## 2023-12-12 RX ADMIN — ASPIRIN 81 MG CHEWABLE TABLET 81 MG: 81 TABLET CHEWABLE at 09:16

## 2023-12-12 RX ADMIN — Medication 10 ML: at 09:19

## 2023-12-12 RX ADMIN — INSULIN LISPRO 2 UNITS: 100 INJECTION, SOLUTION INTRAVENOUS; SUBCUTANEOUS at 12:07

## 2023-12-12 RX ADMIN — IPRATROPIUM BROMIDE AND ALBUTEROL SULFATE 3 ML: .5; 3 SOLUTION RESPIRATORY (INHALATION) at 07:09

## 2023-12-12 RX ADMIN — BUDESONIDE INHALATION 0.5 MG: 0.5 SUSPENSION RESPIRATORY (INHALATION) at 07:12

## 2023-12-12 RX ADMIN — ATORVASTATIN CALCIUM 80 MG: 40 TABLET, FILM COATED ORAL at 09:16

## 2023-12-12 RX ADMIN — ENOXAPARIN SODIUM 40 MG: 100 INJECTION SUBCUTANEOUS at 16:36

## 2023-12-12 RX ADMIN — ACETAMINOPHEN 650 MG: 325 TABLET, FILM COATED ORAL at 09:20

## 2023-12-12 RX ADMIN — Medication 1000 UNITS: at 09:16

## 2023-12-12 RX ADMIN — FAMOTIDINE 20 MG: 20 TABLET ORAL at 07:41

## 2023-12-12 RX ADMIN — EMPAGLIFLOZIN 10 MG: 10 TABLET, FILM COATED ORAL at 09:16

## 2023-12-12 RX ADMIN — CYANOCOBALAMIN TAB 1000 MCG 1000 MCG: 1000 TAB at 09:16

## 2023-12-12 NOTE — CASE MANAGEMENT/SOCIAL WORK
Continued Stay Note   Rad     Patient Name: Samantha Massey  MRN: [id number]  Today's Date: 12/12/2023    Admit Date: 12/7/2023    Plan: DC Plan: Home with significant other and grandchild.   Discharge Plan       Row Name 12/12/23 1205       Plan    Plan DC Plan: Home with significant other and grandchild.    Provided Post Acute Provider List? N/A    Provided Post Acute Provider Quality & Resource List? N/A    Plan Comments CM reviewed chart documentation to obtain clinical updates.No significant changes in condition or care plans to report. CM reached out to MD and nursing via [place] to inquire about DC timing. Awaiting response at this time. CM will continue to follow for any further needs and adjust discharge plan accordingly. DC Barriers: Cardiac monitoring, 's.               Expected Discharge Date and Time       Expected Discharge Date Expected Discharge Time    Dec 13, 2023               Nahomi Mirza RN     Office Phone: [phone]  Office Cell:     [phone]

## 2023-12-12 NOTE — PROGRESS NOTES
"PULMONARY CRITICAL CARE PROGRESS  NOTE      PATIENT IDENTIFICATION:  Name: Samantha Massey  MRN: [id number]  :  1960     Age: 63 y.o.  Sex: female    DATE OF Note:  2023   Referring Physician: Naomi Dover DO                  Subjective:   Feeling better, up in chair at bedside,  on room air, no SOB, no chest or abd pain, still tachycardic, no bowel or bladder issues, ambulating to the bathroom as needed    Objective:  tMax 24 hrs: Temp (24hrs), Av.8 °F (36.6 °C), Min:97.5 °F (36.4 °C), Max:98.9 °F (37.2 °C)      Vitals Ranges:   Temp:  [97.5 °F (36.4 °C)-98.9 °F (37.2 °C)] 97.7 °F (36.5 °C)  Heart Rate:  [] 102  Resp:  [15-20] 16  BP: ()/(50-80) 95/60    Intake and Output Last 3 Shifts:   I/O last 3 completed shifts:  In: 1440 [P.O.:1440]  Out: 1500 [Urine:1500]    Exam:  BP 95/60 (BP Location: Left arm, Patient Position: Sitting)   Pulse 102   Temp 97.7 °F (36.5 °C) (Oral)   Resp 16   Ht 157.5 cm (62\")   Wt 55.7 kg (122 lb 12.7 oz)   LMP  (LMP Unknown)   SpO2 99%   BMI 22.46 kg/m²     General Appearance:  AAO   HEENT:  Normocephalic, without obvious abnormality. Conjunctivae/corneas clear.  Normal external ear canals. Nares normal, no drainage     Neck:  Supple, symmetrical, trachea midline. No JVD.  Lungs /Chest wall:   Bilateral basal rhonchi, respirations unlabored, symmetrical wall movement.     Heart:  Regular rate and rhythm, systolic murmur PMI left sternal border  Abdomen: Soft, nontender, no masses, no organomegaly.    Extremities: No edema,  clubbing or cyanosis        Medications:  aspirin, 81 mg, Oral, Daily  atorvastatin, 80 mg, Oral, Daily  budesonide, 0.5 mg, Nebulization, BID - RT  calcium carbonate, 1 tablet, Oral, Daily  cholecalciferol, 1,000 Units, Oral, Daily  empagliflozin, 10 mg, Oral, Daily  enoxaparin, 40 mg, Subcutaneous, Daily  epoetin ana-epbx, 40,000 Units, Subcutaneous, Weekly  famotidine, 20 mg, Oral, BID AC  furosemide, 40 mg, Oral, " Daily  insulin lispro, 2-9 Units, Subcutaneous, 4x Daily AC & at Bedtime  ipratropium-albuterol, 3 mL, Nebulization, 4x Daily - RT  metoprolol succinate XL, 25 mg, Oral, Q24H  sacubitril-valsartan, 1 tablet, Oral, Q12H  sodium chloride, 10 mL, Intravenous, Q12H  vitamin B-12, 1,000 mcg, Oral, Daily        Infusion:        PRN:    acetaminophen    ALPRAZolam    Calcium Replacement - Follow Nurse / BPA Driven Protocol    dextrose    dextrose    glucagon (human recombinant)    labetalol    Magnesium Standard Dose Replacement - Follow Nurse / BPA Driven Protocol    Morphine    nitroglycerin    ondansetron **OR** ondansetron    Phosphorus Replacement - Follow Nurse / BPA Driven Protocol    Potassium Replacement - Follow Nurse / BPA Driven Protocol    [COMPLETED] Insert Peripheral IV **AND** sodium chloride    sodium chloride    sodium chloride  Data Review:  All labs (24hrs):   Recent Results (from the past 24 hour(s))   POC Glucose Once    Collection Time: 12/11/23  5:33 PM    Specimen: Blood   Result Value Ref Range    Glucose 93 70 - 105 mg/dL   POC Glucose Once    Collection Time: 12/11/23  9:16 PM    Specimen: Blood   Result Value Ref Range    Glucose 101 70 - 105 mg/dL   CBC (No Diff)    Collection Time: 12/12/23  4:48 AM    Specimen: Blood   Result Value Ref Range    WBC 6.30 3.40 - 10.80 10*3/mm3    RBC 2.62 (L) 3.77 - 5.28 10*6/mm3    Hemoglobin 9.4 (L) 12.0 - 15.9 g/dL    Hematocrit 27.0 (L) 34.0 - 46.6 %    .2 (H) 79.0 - 97.0 fL    MCH 35.9 (H) 26.6 - 33.0 pg    MCHC 34.8 31.5 - 35.7 g/dL    RDW 21.8 (H) 12.3 - 15.4 %    RDW-SD 76.6 (H) 37.0 - 54.0 fl    MPV 8.4 6.0 - 12.0 fL    Platelets 192 140 - 450 10*3/mm3   Comprehensive Metabolic Panel    Collection Time: 12/12/23  4:48 AM    Specimen: Blood   Result Value Ref Range    Glucose 117 (H) 65 - 99 mg/dL    BUN 23 8 - 23 mg/dL    Creatinine 0.83 0.57 - 1.00 mg/dL    Sodium 137 136 - 145 mmol/L    Potassium 4.5 3.5 - 5.2 mmol/L    Chloride 98 98 - 107  mmol/L    CO2 27.0 22.0 - 29.0 mmol/L    Calcium 10.1 8.6 - 10.5 mg/dL    Total Protein 6.3 6.0 - 8.5 g/dL    Albumin 3.5 3.5 - 5.2 g/dL    ALT (SGPT) 17 1 - 33 U/L    AST (SGOT) 26 1 - 32 U/L    Alkaline Phosphatase 91 39 - 117 U/L    Total Bilirubin 0.8 0.0 - 1.2 mg/dL    Globulin 2.8 gm/dL    A/G Ratio 1.3 g/dL    BUN/Creatinine Ratio 27.7 (H) 7.0 - 25.0    Anion Gap 12.0 5.0 - 15.0 mmol/L    eGFR 79.8 >60.0 mL/min/1.73   Reticulocytes    Collection Time: 12/12/23  4:48 AM    Specimen: Blood   Result Value Ref Range    Reticulocyte % 4.90 (H) 0.70 - 1.90 %    Reticulocyte Absolute 0.1283 0.0200 - 0.1300 10*6/mm3   POC Glucose Once    Collection Time: 12/12/23  8:02 AM    Specimen: Blood   Result Value Ref Range    Glucose 134 (H) 70 - 105 mg/dL   ECG 12 Lead Tachycardia    Collection Time: 12/12/23  8:54 AM   Result Value Ref Range    QT Interval 294 ms    QTC Interval 421 ms   POC Glucose Once    Collection Time: 12/12/23 11:33 AM    Specimen: Blood   Result Value Ref Range    Glucose 154 (H) 70 - 105 mg/dL        Imaging:  Cardiac Catheterization/Vascular Study  Table formatting from the original result was not included.  Cardiac Catheterization Operative Report    Samantha Massey  [id number]  12/8/2023  @PCP@    She underwent cardiac catheterization.     Indications for the procedure include: acute coronary syndrome.     No results found for this or any previous visit.    No results found for this or any previous visit.       Procedure Details:  The risks, benefits, complications, treatment options, and expected   outcomes were discussed with the patient. The patient and/or family   concurred with the proposed plan, giving informed consent.     After informed consent the patient was brought to the cath lab after   appropriate IV hydration was begun and oral premedication was given. She   was further sedated with fentanyl. She was prepped and draped in the usual   manner. Using the modified Seldinger access  technique, a 6 M[place]gasque sheath   was placed in the femoral artery. A left heart catheterization with   coronary arteriography was performed. Findings are discussed below.    After the procedure was completed, sedation was stopped and the sheaths   and catheters were all removed. Hemostasis was achieved per established   hospital protocols.    Conscious sedation:  Conscious sedation was performed according to protocol.  I supervised and   directed an independent trained observer with the assistance of monitoring   the patient's level of consciousness and physiologic status throughout the   procedure.  Intraoperative service time was 120 minutes.    Findings:    Hemodynamics Central aortic pressure systolic 144 diastolic 68 with a mean   pressure of 90 mmHg  LV end-diastolic pressure of 39 mmHg  There was no gradient across the aortic valve on the pullback of the   pigtail catheter     Left Main Small caliber vessel with significant dampening with 6 Monegasque   catheter  Left main has a diffuse angiographic 60 to 70% stenosis with some   dampening with a 6 [place]onegasque catheter but no significant dampening with a 4   Monegasque catheter supplies on the left circumflex artery   RCA Large-caliber vessel dominant vessel  Right coronary artery has ostial angiographic 50 to 60% stenosis with some   dampening with the 6 [place]onegasque catheter  Mid right coronary artery has another focal area of 20% stenosis  Distal right coronary artery has a focal area of 30 to 40% stenosis  PLB branch of the right coronary artery is a large-caliber vessel with mid   angiographic 30 to 40% stenosis  PDA branch of the right coronary artery is a large-caliber vessel has   ostial 50 to 60% stenosis  Right coronary artery provides collaterals to the LAD   % occluded in the ostial portion after the left circumflex artery   Circ Moderate to large caliber vessel angiographically normal    LV Moderate to severe hypokinesis involving the mid mid to distal    anterior anteroapical wall to LV apex inferior apical wall with estimated   LV ejection fraction of 35%   Coronary Dominance Right coronary artery     Estimated Blood Loss:  Minimal    Specimens: None         Complications:  None; patient tolerated the procedure well.           Disposition: PACU - hemodynamically stable.           Condition: stable    Impressions:  Severe single-vessel coronary artery disease 100% occlusion of the LAD   with right to left collaterals likely chronic total occlusion  Moderate to severe stenosis involving the left main supplying only a left   circumflex artery with no critical stenosis  Moderate stenosis involving the ostium of the right coronary artery that   supplies collaterals to the LAD territory  Severe LV dysfunction  Elevated left-sided filling pressures  Possible stress cardiomyopathy based on the LV gram findings and   hypokinesis involving the mid to distal segments and also EKG changes that   are out of proportion to elevated troponin    Recommendations:  Admit patient to CVCU  Intra-aortic balloon pump for hemodynamic support  Diuresis  Continued aggressive risk factor modification  Test results reviewed and discussed with patient and family       ASSESSMENT:  Acute hypoxic respiratory failure  Adenocarcinoma of colon metastatic to liver  Primary hypertension  Acute systolic heart failure  Congestive heart failure  Acute on chronic combined systolic and diastolic CHF (congestive heart failure)  NSTEMI (non-ST elevated myocardial infarction)  CAD (coronary artery disease)       PLAN:  Monitor HR  OOB daily   PT/OT  Bronchodilators  Inhaled corticosteroids  Incentive spirometer  Diuresis and monitor RAPHAEL's  Oncology/Cardiology following   Electrolytes/ glycemic control  DVT and GI prophylaxis-Lovenox    Discussed with Dr Chandler Dodson, APRN   12/12/2023  12:55 EST    I personally have examined  and interviewed the patient. I have reviewed the history, data,  problems, assessment and plan with our NP.  Total Critical care time in direct medical management (   ) minutes, This time specifically excludes time spent performing procedures.    Deana Arteaga MD   12/12/2023  22:18 EST

## 2023-12-12 NOTE — PROGRESS NOTES
"Cardiology Progress  Note      Patient Care Team:  Diana Spencer MD as PCP - General  TjDiana MD as PCP - Family Medicine  Mark Perera MD as Consulting Physician (Hematology and Oncology)  Jay Yeh MD as Surgeon (General Surgery)    PATIENT IDENTIFICATION  Name: Samantha Massey  Age: 63 y.o.  Sex: female  :  1960  MRN: [id number]      Length of stay:    LOS: 5 days           REASON FOR FOLLOW-UP:  Acute non-ST elevation MI  Coronary artery disease-multivessel  LV dysfunction      INTERVAL HISTORY  Patient seen and examined, chart and labs reviewed.  Patient in bedside chair asleep and appears comfortable.  Rhythm sinus tachycardia at 115.  Nursing reports heart rate increases to 120-123.  She reports no symptoms of chest discomfort, dizziness or lightheadedness.      SUBJECTIVE    Denies chest discomfort  Denies shortness of breath  No edema  No GI complaints      REVIEW OF SYSTEMS:  Pertinent items are noted in HPI, all other systems reviewed and negative    OBJECTIVE   Hemoglobin 9.1    ASSESSMENT  Acute non-ST elevation MI  Sinus tachycardia  Multivessel coronary artery disease  Possible stress cardiomyopathy  Hypoxic respiratory failure  LV dysfunction  Stage IV metastatic colon cancer  Multifocal pneumonia  Metastatic colon cancer      RECOMMENDATIONS  Continue low-dose beta-blocker as blood pressure allows  Continue aggressive risk factor modification  Continue aspirin, statin, BB, Lasix            Vital Signs  Visit Vitals  BP 95/60 (BP Location: Left arm, Patient Position: Sitting)   Pulse 102   Temp 97.7 °F (36.5 °C) (Oral)   Resp 16   Ht 157.5 cm (62\")   Wt 55.7 kg (122 lb 12.7 oz)   LMP  (LMP Unknown)   SpO2 99%   BMI 22.46 kg/m²     Oxygen Therapy  SpO2: 99 %  Pulse Oximetry Type: Continuous  Device (Oxygen Therapy): room air  Device (Oxygen Therapy) (Infant): NPPV/NIV  Flow (L/min): 0  Oxygen Concentration (%): 21  Flowsheet Rows      Flowsheet Row First " "Filed Value   Admission Height 157.5 cm (62\") Documented at 12/07/2023 1617   Admission Weight 59.9 kg (132 lb 0.9 oz) Documented at 12/07/2023 1626          Intake & Output (last 3 days)         12/09 0701  12/10 0700 12/10 0701 12/11 0700 12/11 0701 12/12 0700 12/12 0701 12/13 0700    P.O. 690 1070 960     I.V. (mL/kg) 0 (0) 0 (0) 0 (0)     Total Intake(mL/kg) 690 (12.2) 1070 (18.8) 960 (17.2)     Urine (mL/kg/hr) 1750 (1.3) 1380 (1) 1100 (0.8)     Emesis/NG output 0 0 0     Stool 0 0 0     Total Output 1750 1380 1100     Net -1060 -310 -140             Urine Unmeasured Occurrence 2 x  2 x     Stool Unmeasured Occurrence 0 x 1 x      Emesis Unmeasured Occurrence 0 x 0 x            Lines, Drains & Airways       Active LDAs       Name Placement date Placement time Site Days    Peripheral IV 12/07/23 1630 Anterior;Right Forearm 12/07/23  1630  Forearm  3                           BP 95/60 (BP Location: Left arm, Patient Position: Sitting)   Pulse 102   Temp 97.7 °F (36.5 °C) (Oral)   Resp 16   Ht 157.5 cm (62\")   Wt 55.7 kg (122 lb 12.7 oz)   LMP  (LMP Unknown)   SpO2 99%   BMI 22.46 kg/m²   Intake/Output last 3 shifts:  I/O last 3 completed shifts:  In: 1440 [P.O.:1440]  Out: 1500 [Urine:1500]  Intake/Output this shift:  No intake/output data recorded.    PHYSICAL EXAM:    General: Alert, cooperative, no distress, appears stated age  Head:  Normocephalic, atraumatic, mucous membranes moist  Eyes:  Conjunctivae/corneas clear, EOM's intact     Neck:  Supple,  no bruit  Lungs: Clear to auscultation bilaterally, no wheezes, rhonchi or rales are noted  Chest wall: No tenderness  Heart::  Regular rate and rhythm, S1 and S2 normal, 1/6 holosystolic murmur.  No rub or gallop  Abdomen: Soft, nontender, nondistended, bowel sounds active  Extremities: No cyanosis, clubbing, or edema   Pulses: 2+ and symmetric all extremities  Skin:  No rashes or lesions  Neuro/psych: A&O x3. CN II through XII are grossly intact with " appropriate affect        Scheduled Meds:      aspirin, 81 mg, Oral, Daily  atorvastatin, 80 mg, Oral, Daily  budesonide, 0.5 mg, Nebulization, BID - RT  calcium carbonate, 1 tablet, Oral, Daily  cholecalciferol, 1,000 Units, Oral, Daily  empagliflozin, 10 mg, Oral, Daily  enoxaparin, 40 mg, Subcutaneous, Daily  epoetin ana-epbx, 40,000 Units, Subcutaneous, Weekly  famotidine, 20 mg, Oral, BID AC  furosemide, 40 mg, Oral, Daily  insulin lispro, 2-9 Units, Subcutaneous, 4x Daily AC & at Bedtime  ipratropium-albuterol, 3 mL, Nebulization, 4x Daily - RT  metoprolol succinate XL, 25 mg, Oral, Q24H  sacubitril-valsartan, 1 tablet, Oral, Q12H  sodium chloride, 10 mL, Intravenous, Q12H  vitamin B-12, 1,000 mcg, Oral, Daily        Continuous Infusions:         PRN Meds:      acetaminophen    ALPRAZolam    Calcium Replacement - Follow Nurse / BPA Driven Protocol    dextrose    dextrose    glucagon (human recombinant)    labetalol    Magnesium Standard Dose Replacement - Follow Nurse / BPA Driven Protocol    Morphine    nitroglycerin    ondansetron **OR** ondansetron    Phosphorus Replacement - Follow Nurse / BPA Driven Protocol    Potassium Replacement - Follow Nurse / BPA Driven Protocol    [COMPLETED] Insert Peripheral IV **AND** sodium chloride    sodium chloride    sodium chloride        Results Review:     I reviewed the patient's new clinical results.    CBC    Results from last 7 days   Lab Units 12/12/23  0448 12/11/23  0610 12/10/23  0422 12/09/23  0222 12/08/23  0514 12/07/23  1631   WBC 10*3/mm3 6.30 6.20 7.80 6.00 4.50 6.90   HEMOGLOBIN g/dL 9.4* 9.1* 8.5* 8.0* 9.6* 11.5*   PLATELETS 10*3/mm3 192 163 157 179 195 280     Cr Clearance Estimated Creatinine Clearance: 61.8 mL/min (by C-G formula based on SCr of 0.83 mg/dL).  Coag   Results from last 7 days   Lab Units 12/09/23  0222 12/08/23  1923 12/08/23  1237 12/07/23  1631   INR   --   --  1.07 0.96   APTT seconds 65.6 62.4 28.8* 26.3*     HbA1C   Lab Results    Component Value Date    HGBA1C 4.70 (L) 12/09/2023     Blood Glucose   Glucose   Date/Time Value Ref Range Status   12/12/2023 1133 154 (H) 70 - 105 mg/dL Final     Comment:     Serial Number: [id number]:  [id number]   12/12/2023 0802 134 (H) 70 - 105 mg/dL Final     Comment:     Serial Number: [id number]:  [id number]   12/11/2023 2116 101 70 - 105 mg/dL Final     Comment:     Serial Number: [id number]:  [id number]   12/11/2023 1733 93 70 - 105 mg/dL Final     Comment:     Serial Number: [id number]:  [id number]   12/11/2023 1201 91 70 - 105 mg/dL Final     Comment:     Serial Number: [id number]:  [id number]   12/11/2023 0825 164 (H) 70 - 105 mg/dL Final     Comment:     Serial Number: [id number]:  [id number]   12/10/2023 1957 187 (H) 70 - 105 mg/dL Final     Comment:     Serial Number: [id number]:  [id number]   12/10/2023 1617 87 70 - 105 mg/dL Final     Comment:     Serial Number: [id number]:  [id number]     Infection   Results from last 7 days   Lab Units 12/07/23  1923 12/07/23  1859   BLOODCX  No growth at 4 days No growth at 4 days     CMP   Results from last 7 days   Lab Units 12/12/23  0448 12/11/23  0610 12/10/23  0422 12/09/23  0222 12/08/23  1923 12/08/23  0514 12/07/23  1728   SODIUM mmol/L 137 139 143 142 143 142 138   POTASSIUM mmol/L 4.5 3.8 3.9 4.5 3.6 4.1 4.0   CHLORIDE mmol/L 98 101 106 108* 107 109* 107   CO2 mmol/L 27.0 27.0 28.0 25.0 25.0 22.0 21.0*   BUN mg/dL 23 30* 35* 34* 28* 24* 21   CREATININE mg/dL 0.83 0.72 0.86 0.71 0.88 0.71 0.74   GLUCOSE mg/dL 117* 95 88 116* 210* 140* 171*   ALBUMIN g/dL 3.5 3.3* 3.5  --   --   --  3.0*   BILIRUBIN mg/dL 0.8 0.9 0.8  --   --   --  0.4   ALK PHOS U/L 91 84 78  --   --   --  75   AST (SGOT) U/L 26 26 22  --   --   --  34*   ALT (SGPT) U/L 17 17 16  --   --   --  16     ABG    Results from last 7 days   Lab Units 12/07/23  1657   PH, ARTERIAL pH units 7.361   PCO2, ARTERIAL mm Hg 36.3   PO2 ART mm Hg  "82.2*   O2 SATURATION ART % 95.7   BASE EXCESS ART mmol/L -4.4*     UA      MIRZA  No results found for: \"POCMETH\", \"POCAMPHET\", \"POCBARBITUR\", \"POCBENZO\", \"POCCOCAINE\", \"POCOPIATES\", \"POCOXYCODO\", \"POCPHENCYC\", \"POCPROPOXY\", \"POCTHC\", \"POCTRICYC\"  Lysis Labs   Results from last 7 days   Lab Units 12/12/23  0448 12/11/23  0610 12/10/23  0422 12/09/23  0222 12/08/23  1923 12/08/23  1237 12/08/23  0514 12/07/23  1728 12/07/23  1710 12/07/23  1631   INR   --   --   --   --   --  1.07  --   --   --  0.96   APTT seconds  --   --   --  65.6 62.4 28.8*  --   --   --  26.3*   HEMOGLOBIN g/dL 9.4* 9.1* 8.5* 8.0*  --   --  9.6*  --   --  11.5*   PLATELETS 10*3/mm3 192 163 157 179  --   --  195  --   --  280   CREATININE mg/dL 0.83 0.72 0.86 0.71 0.88  --  0.71 0.74   < >  --     < > = values in this interval not displayed.     Radiology(recent) No radiology results for the last day      Results from last 7 days   Lab Units 12/08/23  0514   HSTROP T ng/L 136*       X-rays, labs reviewed personally by physician.    ECG/EMG Results (most recent)       Procedure Component Value Units Date/Time    SCANNED - TELEMETRY   [[id number]] Resulted: 12/07/23     Updated: 12/08/23 0548    SCANNED - TELEMETRY   [[id number]] Resulted: 12/07/23     Updated: 12/08/23 0548    ECG 12 Lead Tachycardia [[id number]] Collected: 12/07/23 1746     Updated: 12/08/23 0915     QT Interval 328 ms      QTC Interval 482 ms     Narrative:      HEART RATE= 130  bpm  RR Interval= 464  ms  MT Interval= 138  ms  P Horizontal Axis= -57  deg  P Front Axis= 33  deg  QRSD Interval= 76  ms  QT Interval= 328  ms  QTcB= 482  ms  QRS Axis= 1  deg  T Wave Axis= 162  deg  - ABNORMAL ECG -  Sinus tachycardia  Atrial premature complex  Anteroseptal infarct, age indeterminate  repol abnrm suggests ischemia, anterolateral  When compared with ECG of 07-Dec-2023 16:19:58,  Significant rate decrease  Significant axis, voltage or hypertrophy change  Electronically Signed By: Trang, " Servando (KINZA) 08-Dec-2023 09:14:50  Date and Time of Study: 2023-12-07 17:46:10    ECG 12 Lead Other; Tachycardia [[id number]] Collected: 12/07/23 1619     Updated: 12/08/23 0915     QT Interval 283 ms      QTC Interval 450 ms     Narrative:      HEART RATE= 152  bpm  RR Interval= 396  ms  DC Interval= 183  ms  P Horizontal Axis= -4  deg  P Front Axis= 38  deg  QRSD Interval= 77  ms  QT Interval= 283  ms  QTcB= 450  ms  QRS Axis= -2  deg  T Wave Axis= 169  deg  - ABNORMAL ECG -  Sinus tachycardia  Probable left atrial enlargement  Probable anteroseptal infarct, old  Repolarization abnormality, prob rate related  When compared with ECG of 23-Feb-2023 15:58:35,  Significant rate increase  Significant repolarization change  Electronically Signed By: Servando CostelloKINZA) 08-Dec-2023 09:15:37  Date and Time of Study: 2023-12-07 16:19:58    ADULT TRANSTHORACIC ECHO COMPLETE W/ CONT IF NECESSARY PER PROTOCOL [[id number]] Resulted: 12/08/23 1758     Updated: 12/08/23 1804     LVIDd 4.3 cm      LVIDs 3.3 cm      IVSd 0.90 cm      LVPWd 0.90 cm      FS 23.3 %      IVS/LVPW 1.00 cm      ESV(cubed) 35.9 ml      LV Sys Vol (BSA corrected) 39.0 cm2      EDV(cubed) 79.5 ml      LV Patiño Vol (BSA corrected) 66.7 cm2      LV mass(C)d 123.3 grams      EDV(MOD-sp4) 104.0 ml      ESV(MOD-sp4) 60.8 ml      SV(MOD-sp4) 43.2 ml      SI(MOD-sp4) 27.7 ml/m2      EF(MOD-sp4) 41.5 %      MV E max joelle 88.6 cm/sec      MV A max joelle 99.6 cm/sec      MV dec time 0.14 sec      MV E/A 0.89     LA ESV Index (BP) 31.3 ml/m2      Med Peak E' Joelle 9.3 cm/sec      Lat Peak E' Joelle 11.4 cm/sec      Avg E/e' ratio 8.56     RVIDd 4.0 cm      TAPSE (>1.6) 2.20 cm      RV S' 14.2 cm/sec      LA dimension (2D)  3.8 cm      LV V1 max 99.0 cm/sec      LV V1 max PG 3.9 mmHg      LV V1 mean PG 2.00 mmHg      LV V1 VTI 23.3 cm      Ao pk joelle 190.0 cm/sec      Ao max PG 14.4 mmHg      Ao mean PG 8.0 mmHg      Ao V2 VTI 34.4 cm      MV max PG 6.1 mmHg      MV mean PG 4.0  mmHg      MV V2 VTI 22.9 cm      MV P1/2t 43.9 msec      MVA(P1/2t) 5.0 cm2      MV dec slope 761.0 cm/sec2      MR max joelle 413.0 cm/sec      MR max PG 68.2 mmHg     Narrative:        Left ventricular systolic function is severely decreased. Left   ventricular ejection fraction appears to be 31 - 35%.    Left ventricular diastolic function is consistent with (grade I)   impaired relaxation.    The left atrial cavity is moderately dilated.      ECG 12 Lead Chest Pain [[id number]] Collected: 12/08/23 0853     Updated: 12/09/23 1934     QT Interval 479 ms      QTC Interval 590 ms     Narrative:      HEART RATE= 91  bpm  RR Interval= 660  ms  KS Interval= 158  ms  P Horizontal Axis= -61  deg  P Front Axis= 82  deg  QRSD Interval= 76  ms  QT Interval= 479  ms  QTcB= 590  ms  QRS Axis= -6  deg  T Wave Axis= 165  deg  - ABNORMAL ECG -  Sinus rhythm  Probable left atrial enlargement  Anteroseptal infarct, age indeterminate  Prolonged QT interval  When compared with ECG of 08-Dec-2023 4:09:21,  Significant axis, voltage or hypertrophy change  Electronically Signed By: Ramiro Olivera (KINZA) 09-Dec-2023 19:33:55  Date and Time of Study: 2023-12-08 08:53:11    ECG 12 Lead Rhythm Change [[id number]] Collected: 12/08/23 0409     Updated: 12/09/23 1934     QT Interval 453 ms      QTC Interval 608 ms     Narrative:      HEART RATE= 108  bpm  RR Interval= 556  ms  KS Interval= 84  ms  P Horizontal Axis=   deg  P Front Axis= 91  deg  QRSD Interval= 77  ms  QT Interval= 453  ms  QTcB= 608  ms  QRS Axis= -7  deg  T Wave Axis= 183  deg  - ABNORMAL ECG -  Sinus tachycardia  Consider anteroseptal infarct  Prolonged QT interval  When compared with ECG of 07-Dec-2023 17:46:10,  Significant rate decrease  Significant repolarization change  Electronically Signed By: Ramiro Olivera (KINZA) 09-Dec-2023 19:34:02  Date and Time of Study: 2023-12-08 04:09:21    SCANNED - TELEMETRY   [[id number]] Resulted: 12/07/23     Updated: 12/11/23  1058    ECG 12 Lead Tachycardia [[id number]] Collected: 12/12/23 0854     Updated: 12/12/23 0855     QT Interval 294 ms      QTC Interval 421 ms     Narrative:      HEART RATE= 123  bpm  RR Interval= 488  ms  IL Interval= 151  ms  P Horizontal Axis= -11  deg  P Front Axis= 58  deg  QRSD Interval= 74  ms  QT Interval= 294  ms  QTcB= 421  ms  QRS Axis= -1  deg  T Wave Axis= 149  deg  - ABNORMAL ECG -  Sinus tachycardia  Anteroseptal infarct, age indeterminate  When compared with ECG of 08-Dec-2023 8:53:11,  Significant rate increase  Significant repolarization change  Significant axis, voltage or hypertrophy change  Electronically Signed By:   Date and Time of Study: 2023-12-12 08:54:22              Medication Review:   I have reviewed the patient's current medication list  Scheduled Meds:aspirin, 81 mg, Oral, Daily  atorvastatin, 80 mg, Oral, Daily  budesonide, 0.5 mg, Nebulization, BID - RT  calcium carbonate, 1 tablet, Oral, Daily  cholecalciferol, 1,000 Units, Oral, Daily  empagliflozin, 10 mg, Oral, Daily  enoxaparin, 40 mg, Subcutaneous, Daily  epoetin ana-epbx, 40,000 Units, Subcutaneous, Weekly  famotidine, 20 mg, Oral, BID AC  furosemide, 40 mg, Oral, Daily  insulin lispro, 2-9 Units, Subcutaneous, 4x Daily AC & at Bedtime  ipratropium-albuterol, 3 mL, Nebulization, 4x Daily - RT  metoprolol succinate XL, 25 mg, Oral, Q24H  sacubitril-valsartan, 1 tablet, Oral, Q12H  sodium chloride, 10 mL, Intravenous, Q12H  vitamin B-12, 1,000 mcg, Oral, Daily      Continuous Infusions:   PRN Meds:.  acetaminophen    ALPRAZolam    Calcium Replacement - Follow Nurse / BPA Driven Protocol    dextrose    dextrose    glucagon (human recombinant)    labetalol    Magnesium Standard Dose Replacement - Follow Nurse / BPA Driven Protocol    Morphine    nitroglycerin    ondansetron **OR** ondansetron    Phosphorus Replacement - Follow Nurse / BPA Driven Protocol    Potassium Replacement - Follow Nurse / BPA Driven Protocol     "[COMPLETED] Insert Peripheral IV **AND** sodium chloride    sodium chloride    sodium chloride    Imaging:  Imaging Results (Last 72 Hours)       ** No results found for the last 72 hours. **              ARNOLDO Barkley  12/12/23  12:20 EST       EMR Dragon/Transcription:   \"Dictated utilizing Dragon dictation\".       Electronically signed by ARNOLDO Barkley, 12/12/23, 12:20 PM EST.    Copied text in this note has been reviewed by me and is accurate as of 12/12/23.    Cardiology attending:  Seen and examined.  Chart and labs reviewed. Independant interpretations of cardiac testing was performed. History and exam findings are verified with above changes noted.  Assessment and plan notated by APC after being formulated by attending consultant.  Note that greater than 50% of the time spent in care of the patient was provided by attending consultant.    Heart rate still little elevated today.  Will try to titrate beta-blocker upwards.  Extensive conversation today regarding patient's cardiomyopathy with regards to defibrillator therapy.  Will discuss with oncology.  If patient has life expectancy in excess of 1 year, patient may benefit from AICD.  Would need LifeVest prior to then.  Recommend guideline directed medical therapy in the interim in an effort to maximize opportunity to improve LVEF.    Physical Exam:    General: Alert, cooperative, no distress, appears stated age  Head:  Normocephalic, atraumatic, mucous membranes moist  Eyes:  Conjunctiva/corneas clear, EOM's intact     Neck:  Supple,  no bruit  Lungs:            Clear to auscultation bilaterally, no wheezes rhonchi rales are noted  Chest wall: No tenderness  Heart::  Regular rate and rhythm, S1 and S2 normal, 1/6 holosystolic murmur.  No rub or gallop  Abdomen: Soft, non-tender, nondistended bowel sounds active  Extremities: No cyanosis, clubbing, or edema  Pulses:            2+ and symmetric all extremities  Skin:  No rashes or " lesions  Neuro/psych: A&O x3. CN II through XII are grossly intact with appropriate affect

## 2023-12-12 NOTE — PROGRESS NOTES
"[facility] MEDICINE SERVICE  DAILY PROGRESS NOTE      Patient Name: Samantha Massey  Date of Admission: 12/7/2023  Today's Date: 12/12/23  Length of Stay: 5  Primary Care Physician: Diana Spencer MD    Subjective   Patient is doing well today.  No new complaints.  No overnight events.      Objective    Temp:  [97.5 °F (36.4 °C)-98.9 °F (37.2 °C)] 97.5 °F (36.4 °C)  Heart Rate:  [] 119  Resp:  [15-20] 18  BP: ()/(50-80) 104/64  Physical Exam  General: NAD, AAOx3  HEENT: AT NC, EOMI  Neck: No JVD  CVS: S1/S2 present, tachycardia, no M/R/G  Lungs: CTA B/L  Abdomen: soft, NT, ND, BS+  Ext: no edema  Skin: no rashes, bruises or discolorations  Neuro: no focal deficits  Psych: Not agitated         Results Review:  I have reviewed the labs, radiology results, and diagnostic studies.    Laboratory Data:   Results from last 7 days   Lab Units 12/12/23  0448 12/11/23  0610 12/10/23  0422   WBC 10*3/mm3 6.30 6.20 7.80   HEMOGLOBIN g/dL 9.4* 9.1* 8.5*   HEMATOCRIT % 27.0* 26.5* 25.3*   PLATELETS 10*3/mm3 192 163 157        Results from last 7 days   Lab Units 12/12/23  0448 12/11/23  0610 12/10/23  0422   SODIUM mmol/L 137 139 143   POTASSIUM mmol/L 4.5 3.8 3.9   CHLORIDE mmol/L 98 101 106   CO2 mmol/L 27.0 27.0 28.0   BUN mg/dL 23 30* 35*   CREATININE mg/dL 0.83 0.72 0.86   CALCIUM mg/dL 10.1 9.0 8.0*   BILIRUBIN mg/dL 0.8 0.9 0.8   ALK PHOS U/L 91 84 78   ALT (SGPT) U/L 17 17 16   AST (SGOT) U/L 26 26 22   GLUCOSE mg/dL 117* 95 88       Culture Data:   No results found for: \"BLOODCX\"  No results found for: \"URINECX\"  No results found for: \"RESPCX\"  No results found for: \"WOUNDCX\"  No results found for: \"STOOLCX\"  No components found for: \"BODYFLD\"    Radiology Data:   Imaging Results (Last 24 Hours)       ** No results found for the last 24 hours. **            I have reviewed the patient's current medications.     Assessment/Plan       1) acute respiratory failure with hypoxia  - resolved  - " possibly secondary to NSTEMI  - pulmonary on board, appreciate recs  - pulmicort, duonebs    2) NSTEMI  - cardiology on board, appreciate recs  - LHC shows 100% occlusion of the LAD with right to left collaterals, and moderate to severe stenosis involving the left main and the ostium of the RCA and severe LV dysfunction  - metoprolol, lipitor, ASA    3) CHF  - TTE on 12/8 shows severely reduced EF of 31-35% and grade I diastolic dysfuncion  - lasix, coreg  - strict I/O and daily weights  - fluid restriction  - manage per cardiology    4) metastatic colon cancer  - oncology on board, appreciate recs  - continue cholecalciferol, tums and vitamin B    5) GI/DVT ppx  - pepcid/lovenox        Electronically signed by Narayan Pool MD, 12/12/23, 09:33 EST.

## 2023-12-12 NOTE — PROGRESS NOTES
Hematology/Oncology Inpatient Progress Note    PATIENT NAME: Samantha Massey  : 1960  MRN: [id number]    CHIEF COMPLAINT: MVASI anaphylaxis, stage IV adenocarcinoma of the colon, acute on chronic anemia, history of REGULO, NIHA, and vitamin B12 deficiency    HISTORY OF PRESENT ILLNESS:    63 y.o. female presented to [facility] ED on 2023 after presenting to our office for an MVASI infusion.  She has stage IV cancer of the colon, s/p multiple lines of therapy, currently on Lonsurf with MVASI.  She had received multiple doses of MVASI in the past. During her infusion, she developed an acute onset of dyspnea with feeling of inability to breathe or move air.  She was treated aggressively with Benadryl and an albuterol nebulizer, for bilateral rhonchi and crackles, Lasix and dexamethasone.  She was hypertensive with blood pressure 200/100, tachycardic to 160s and O2 saturation initially was 95% but dropped to 75%.  She improved to 85% on 4 L nasal cannula and EMS was called to take patient to the ER.  In the ER, ABG confirmed hypoxia with a pO2 of 82.2.  proBNP was 2176 (<900).  Troponin 28 (< 14).  CMP was significant for an AST of 34 (1-32).  Lactate 1.2 (0.2-2.0), PT 10.5 (9.6-11.7), and PTT 26.3 (25-35).  White count 6.9, hemoglobin 11.5 (9.79 in our office), .4 and platelets 280,000.  Chest x-ray showed right lower lobe pneumonia.  CTA chest PE protocol showed no evidence of PE.  There were findings concerning for progression of disease with a sclerotic osseous metastatic lesion involving the lateral right rib with increased consolidation and soft tissue component extending into the right middle lobe lung.  Diffuse groundglass opacities with interlobular septal thickening compatible with pulmonary edema, however, there was additional bibasilar airspace disease with consolidation and pleural effusion suggesting additional infectious/inflammatory process.  EKG was abnormal and showed sinus  tachycardia with anterior septal infarction and suggested ischemia.  She was started on meropenem and pulmonary was consulted.  Cardiology was consulted on the day of consultation and her EKG was abnormal with prolonged QT interval.  Troponin had increased to 136.  White count 4.5, hemoglobin 9.6, .9 and platelets 195.  On the morning of consultation, she was complaining of pain in her right shoulder and constipation.  She was fatigued.  After she was evaluated by Dr. Perera, she developed chest pain, starting in her right arm, radiating down her right arm requiring nitroglycerin x 2 with minimal relief.     12/08/23  Hematology/Oncology was consulted as she is an established patient who we treat for stage IV adenocarcinoma of the colon, chemotherapy-induced cytopenias, vitamin B12 and vitamin D deficiencies, and osteopenia.  -Diagnosed with colon cancer with liver metastasis and 2012.  To date she has not received any cardiotoxic chemotherapy but did receive radiation to her right anterior rib in April 2023.  Although, Mvasi, may either cause reversible direct myocardial toxicity or exacerbate underlying myocardial dysfunction, occurring in 10% of patients.  Current CTA PE was compared to CT C/A/P on 11/18/2023.  She has completed 5 cycles of her current therapy.  -Anemia-due to chemotherapy with history of REGULO, and vitamin B12 deficiency.  Iron studies 8/25/2023 showed iron saturation of 40% (15-50), and ferritin of 223 ().  In May 2023, folate was 9.5, haptoglobin 134 ().  In December 2022, SPEP showed no M spike and copper was 112 ().  Ferrous sulfate was discontinued on 9/29/2023.  She was ordered to start Procrit 40,000 units subcu weekly on 11/22/2023 but has yet to start.  She takes vitamin B12 and vitamin D supplementation for deficiencies.  No change in past medical, surgical, social or family histories since patient was seen in the office on 12/7/2023.     PCP: Diana Spencer,  MD    INTERVAL HISTORY:  12/8/2023-Cardiac catheterization by Dr. Olivera revealed severe single-vessel coronary artery disease with 100% occlusion of the LAD with right to left collaterals status and chronic total occlusion.  Moderate stenosis involving the ostium of the right coronary artery that supplies collaterals to the LAD territory.  There was severe LV dysfunction and elevated left-sided filling pressures.  Intra-aortic balloon pump for hemodynamic support inserted.  12/9/2023 - hemoglobin 8.0, .9, haptoglobin 29 (), TSH  0.824 (0.270-4.200). Moved to [facility] on 12/8/2023.  CXR identified no significant change.  IABP removed.  12/10/2023 - hemoglobin 8.5, .5,  (135-214), reticulocytes 2.09 (0.70-1.90), SHADI - negative. Started on folic acid 1 mg po daily on 12/9/2023.  12/11/2023 - hemoglobin 9.1, .5.   12/12/2023-hemoglobin 9.4.    History of present illness reviewed since last visit and changes noted on 12/12/23.    Subjective reports she feels fine she has been up walking in the ICU.    ROS:  Review of Systems   Constitutional:  Negative for activity change, chills, fatigue, fever and unexpected weight change.   HENT:  Negative for congestion, dental problem, hearing loss, mouth sores, nosebleeds, sore throat and trouble swallowing.    Eyes:  Negative for photophobia and visual disturbance.   Respiratory:  Negative for cough, chest tightness and shortness of breath.    Cardiovascular:  Negative for chest pain, palpitations and leg swelling.   Gastrointestinal:  Negative for abdominal distention, abdominal pain, blood in stool, constipation, diarrhea, nausea and vomiting.   Endocrine: Negative for cold intolerance and heat intolerance.   Genitourinary:  Negative for decreased urine volume, difficulty urinating, dysuria, frequency, hematuria and urgency.   Musculoskeletal:  Negative for arthralgias and gait problem.   Skin:  Negative for rash and wound.   Neurological:   Negative for dizziness, tremors, seizures, facial asymmetry, speech difficulty, weakness, light-headedness, numbness and headaches.   Hematological:  Negative for adenopathy. Does not bruise/bleed easily.   Psychiatric/Behavioral:  Negative for confusion and hallucinations. The patient is not nervous/anxious.    All other systems reviewed and are negative.    MEDICATIONS:    Scheduled Meds:  aspirin, 81 mg, Oral, Daily  atorvastatin, 80 mg, Oral, Daily  budesonide, 0.5 mg, Nebulization, BID - RT  calcium carbonate, 1 tablet, Oral, Daily  cholecalciferol, 1,000 Units, Oral, Daily  empagliflozin, 10 mg, Oral, Daily  enoxaparin, 40 mg, Subcutaneous, Daily  epoetin ana-epbx, 40,000 Units, Subcutaneous, Weekly  famotidine, 20 mg, Oral, BID AC  furosemide, 40 mg, Oral, Daily  insulin lispro, 2-9 Units, Subcutaneous, 4x Daily AC & at Bedtime  ipratropium-albuterol, 3 mL, Nebulization, 4x Daily - RT  metoprolol succinate XL, 25 mg, Oral, Q24H  sacubitril-valsartan, 1 tablet, Oral, Q12H  sodium chloride, 10 mL, Intravenous, Q12H  vitamin B-12, 1,000 mcg, Oral, Daily    Continuous Infusions:      PRN Meds:    acetaminophen    ALPRAZolam    Calcium Replacement - Follow Nurse / BPA Driven Protocol    dextrose    dextrose    glucagon (human recombinant)    labetalol    Magnesium Standard Dose Replacement - Follow Nurse / BPA Driven Protocol    Morphine    nitroglycerin    ondansetron **OR** ondansetron    Phosphorus Replacement - Follow Nurse / BPA Driven Protocol    Potassium Replacement - Follow Nurse / BPA Driven Protocol    [COMPLETED] Insert Peripheral IV **AND** sodium chloride    sodium chloride    sodium chloride     ALLERGIES:    Allergies   Allergen Reactions    Hydrocodone Nausea And Vomiting    Iodinated Contrast Media Hives and Itching     PT HAD SOME HIVES DURING SCAN.  PRIOR TO SCAN 8-3-2013.    Latex Rash     Blisters     Penicillins Hives     Objective    VITALS:   /67   Pulse 114   Temp 97.5 °F (36.4  "°C) (Oral)   Resp 18   Ht 157.5 cm (62\")   Wt 55.7 kg (122 lb 12.7 oz)   LMP  (LMP Unknown)   SpO2 96%   BMI 22.46 kg/m²     PHYSICAL EXAM:  Physical Exam  Vitals and nursing note reviewed.   Constitutional:       General: She is not in acute distress.     Appearance: Normal appearance. She is well-developed. She is not ill-appearing or diaphoretic.   HENT:      Head: Normocephalic and atraumatic.      Right Ear: External ear normal.      Left Ear: External ear normal.      Nose: Nose normal.      Mouth/Throat:      Mouth: Mucous membranes are moist.      Pharynx: Oropharynx is clear. No oropharyngeal exudate or posterior oropharyngeal erythema.      Comments: Dental fillings.   Eyes:      General: No scleral icterus.     Extraocular Movements: Extraocular movements intact.      Conjunctiva/sclera: Conjunctivae normal.      Pupils: Pupils are equal, round, and reactive to light.   Cardiovascular:      Rate and Rhythm: Regular rhythm. Tachycardia present.      Heart sounds: Normal heart sounds. No murmur heard.     Comments: Cardiac monitor leads.  Pulmonary:      Effort: Pulmonary effort is normal. No respiratory distress.      Breath sounds: Normal breath sounds. No stridor. No wheezing or rales.   Chest:      Comments: Left chest wall Xtarrt-Q-Gzrx not accessed.   Abdominal:      General: Bowel sounds are normal. There is no distension.      Palpations: Abdomen is soft. There is no mass.      Tenderness: There is no abdominal tenderness. There is no guarding.   Genitourinary:     Comments: Deferred.   Musculoskeletal:         General: No swelling, tenderness or deformity. Normal range of motion.      Cervical back: Normal range of motion and neck supple.      Right lower leg: No edema.      Left lower leg: No edema.      Comments: Right upper extremity peripheral IV.   Left hand oxygen saturation monitor.   Lymphadenopathy:      Cervical: No cervical adenopathy.      Upper Body:      Right upper body: No " supraclavicular adenopathy.      Left upper body: No supraclavicular adenopathy.   Skin:     General: Skin is warm and dry.      Coloration: Skin is not pale.      Findings: No bruising, erythema or rash.   Neurological:      General: No focal deficit present.      Mental Status: She is alert and oriented to person, place, and time.      Coordination: Coordination normal.   Psychiatric:         Mood and Affect: Mood normal.         Behavior: Behavior normal.         Thought Content: Thought content normal.     RECENT LABS:  Lab Results (last 24 hours)       Procedure Component Value Units Date/Time    POC Glucose Once [[id number]]  (Abnormal) Collected: 12/12/23 0802    Specimen: Blood Updated: 12/12/23 0805     Glucose 134 mg/dL      Comment: Serial Number: [id number]:  [id number]       Comprehensive Metabolic Panel [[id number]]  (Abnormal) Collected: 12/12/23 0448    Specimen: Blood Updated: 12/12/23 0610     Glucose 117 mg/dL      BUN 23 mg/dL      Creatinine 0.83 mg/dL      Sodium 137 mmol/L      Potassium 4.5 mmol/L      Comment: Slight hemolysis detected by analyzer. Result may be falsely elevated.        Chloride 98 mmol/L      CO2 27.0 mmol/L      Calcium 10.1 mg/dL      Total Protein 6.3 g/dL      Albumin 3.5 g/dL      ALT (SGPT) 17 U/L      AST (SGOT) 26 U/L      Comment: Slight hemolysis detected by analyzer. Result may be falsely elevated.        Alkaline Phosphatase 91 U/L      Total Bilirubin 0.8 mg/dL      Globulin 2.8 gm/dL      A/G Ratio 1.3 g/dL      BUN/Creatinine Ratio 27.7     Anion Gap 12.0 mmol/L      eGFR 79.8 mL/min/1.73     Narrative:      GFR Normal >60  Chronic Kidney Disease <60  Kidney Failure <15      Reticulocytes [[id number]]  (Abnormal) Collected: 12/12/23 0448    Specimen: Blood Updated: 12/12/23 0546     Reticulocyte % 4.90 %      Reticulocyte Absolute 0.1283 10*6/mm3     CBC (No Diff) [[id number]]  (Abnormal) Collected: 12/12/23 0448    Specimen: Blood Updated: 12/12/23 0537      WBC 6.30 10*3/mm3      RBC 2.62 10*6/mm3      Hemoglobin 9.4 g/dL      Hematocrit 27.0 %      .2 fL      MCH 35.9 pg      MCHC 34.8 g/dL      RDW 21.8 %      RDW-SD 76.6 fl      MPV 8.4 fL      Platelets 192 10*3/mm3     POC Glucose Once [[id number]]  (Normal) Collected: 12/11/23 2116    Specimen: Blood Updated: 12/11/23 2118     Glucose 101 mg/dL      Comment: Serial Number: [id number]:  [id number]       Blood Culture - Blood, Hand, Left [[id number]]  (Normal) Collected: 12/07/23 1923    Specimen: Blood from Hand, Left Updated: 12/11/23 1931     Blood Culture No growth at 4 days    Blood Culture - Blood, Hand, Left [[id number]]  (Normal) Collected: 12/07/23 1859    Specimen: Blood from Hand, Left Updated: 12/11/23 1915     Blood Culture No growth at 4 days    Narrative:      Less than seven (7) mL's of blood was collected.  Insufficient quantity may yield false negative results.    POC Glucose Once [[id number]]  (Normal) Collected: 12/11/23 1733    Specimen: Blood Updated: 12/11/23 1734     Glucose 93 mg/dL      Comment: Serial Number: [id number]:  [id number]       POC Glucose Once [[id number]]  (Normal) Collected: 12/11/23 1201    Specimen: Blood Updated: 12/11/23 1204     Glucose 91 mg/dL      Comment: Serial Number: [id number]:  [id number]             PENDING RESULTS: Vitamin D and iron studies from the office.    IMAGING REVIEWED:  No radiology results for the last day    I have reviewed the patient's labs, imaging, reports, and other clinician documentation.    Assessment & Plan   ASSESSMENT  MVASI infusion reaction and NSTEMI: Acute onset of symptoms after multiple tolerated infusions.  Unresponsive to initial treatment.  EKG suspicious for anterior septal infarction and troponin rising with development of chest pain. MVASI was discontinued and Lonsurf was put on hold.  Due for growth factor on 12/11/2023.  Pulmonary and cardiology have been consulted.  MVASI has been shown to cause  myocardial dysfunction in 10% of patients, but patient has coronary artery disease.  On ASA and prophylactic dose Lovenox.  NSTEMI and CAD: Cardiac catheterization revealed severe single-vessel coronary artery disease with 100% occlusion of the LAD with right to left collaterals likely chronic total occlusion.  Moderate stenosis involving the ostium of the right coronary artery that supplies collaterals to the LAD territory.  There was severe LV dysfunction and elevated left-sided filling pressures.  IABP placed temporarily but has been d/c'ed. TSH was normal.   Stage IV colon cancer: Chest CT showed progression of disease in lungs and bones but she has had disease in these areas.  She is on Xgeva as an outpatient.    Acute on chronic anemia, history of REGULO, macrocytosis, NIHA and vitamin B12 deficiency: She has chemotherapy-induced anemia with macrocytosis. Oral vitamin B12 was continued and she was started on Procrit on 12/8/2023. Haptoglobin was low, SHADI negative, LDH and reticulocytes elevated.  On folic acid supplementation. TSH was normal.   Vitamin D deficiency and osteopenia: Continued supplementation.  Will obtain vitamin D level from office.  Started TUMs daily.   Contrast dye allergy: Avoid IV contrast with scans.     PLAN  Follow CBC.   Obtain vitamin D level and iron studies from office.  Continue folic acid 1 mg p.o. daily.  Continue TUMs 1 tablet daily.  Continue daily oral vitamin B12 and vitamin D supplementation.  Continue Pepcid.  Continue Procrit 40,000 units subcu weekly, next due 12/15/2023.  Discuss oncological options of treatment as an outpatient.    Patient was seen and evaluated by Dr. Perera.  Electronically signed by ARNOLDO Toussaint, 12/12/23, 11:06 AM EST.        On 12/12/2023, I have personally performed a face-to-face diagnostic evaluation on this patient. I have performed a complete history and physical examination, reviewed laboratory studies, and radiographic examinations.   I have completed the majority and substantive portion of the medical decision making.  I have formulated the assessment on this patient and the plan of action as noted above. I have discussed the case with Rosa Perez NP, have edited/reviewed the note, and agree with the care plan.  She claims to be feeling all right with no more chest pains.  On examination left-sided [id number]-Vsmp is not accessed anymore.  She remains anemic.  She has been walking around the [place].  Will continue her on weekly Procrit to help improve the hemoglobin.         On 12/12/2023, I discussed the patient's findings and my recommendations with patient.    Part of this note may be an electronic transcription/translation of spoken language to printed text using the Dragon Dictation System.     Electronically signed by Mark Perera MD, 12/12/23, 7:21 PM EST.

## 2023-12-13 ENCOUNTER — READMISSION MANAGEMENT (OUTPATIENT)
Dept: CALL CENTER | Facility: HOSPITAL | Age: 63
End: 2023-12-13
Payer: COMMERCIAL

## 2023-12-13 VITALS
SYSTOLIC BLOOD PRESSURE: 108 MMHG | OXYGEN SATURATION: 96 % | DIASTOLIC BLOOD PRESSURE: 67 MMHG | HEART RATE: 93 BPM | WEIGHT: 123.24 LBS | HEIGHT: 62 IN | TEMPERATURE: 97.6 F | BODY MASS INDEX: 22.68 KG/M2 | RESPIRATION RATE: 15 BRPM

## 2023-12-13 LAB
ALBUMIN SERPL-MCNC: 3.7 G/DL (ref 3.5–5.2)
ALBUMIN/GLOB SERPL: 1.4 G/DL
ALP SERPL-CCNC: 94 U/L (ref 39–117)
ALT SERPL W P-5'-P-CCNC: 19 U/L (ref 1–33)
ANION GAP SERPL CALCULATED.3IONS-SCNC: 11 MMOL/L (ref 5–15)
AST SERPL-CCNC: 23 U/L (ref 1–32)
BASOPHILS # BLD AUTO: 0 10*3/MM3 (ref 0–0.2)
BASOPHILS NFR BLD AUTO: 0.3 % (ref 0–1.5)
BILIRUB SERPL-MCNC: 0.8 MG/DL (ref 0–1.2)
BUN SERPL-MCNC: 25 MG/DL (ref 8–23)
BUN/CREAT SERPL: 33.8 (ref 7–25)
CALCIUM SPEC-SCNC: 9.8 MG/DL (ref 8.6–10.5)
CHLORIDE SERPL-SCNC: 98 MMOL/L (ref 98–107)
CO2 SERPL-SCNC: 30 MMOL/L (ref 22–29)
CREAT SERPL-MCNC: 0.74 MG/DL (ref 0.57–1)
DEPRECATED RDW RBC AUTO: 81.8 FL (ref 37–54)
EGFRCR SERPLBLD CKD-EPI 2021: 91.6 ML/MIN/1.73
EOSINOPHIL # BLD AUTO: 0.1 10*3/MM3 (ref 0–0.4)
EOSINOPHIL NFR BLD AUTO: 2.1 % (ref 0.3–6.2)
ERYTHROCYTE [DISTWIDTH] IN BLOOD BY AUTOMATED COUNT: 22.8 % (ref 12.3–15.4)
GLOBULIN UR ELPH-MCNC: 2.7 GM/DL
GLUCOSE BLDC GLUCOMTR-MCNC: 110 MG/DL (ref 70–105)
GLUCOSE BLDC GLUCOMTR-MCNC: 93 MG/DL (ref 70–105)
GLUCOSE SERPL-MCNC: 98 MG/DL (ref 65–99)
HCT VFR BLD AUTO: 27.3 % (ref 34–46.6)
HGB BLD-MCNC: 9.3 G/DL (ref 12–15.9)
LYMPHOCYTES # BLD AUTO: 1 10*3/MM3 (ref 0.7–3.1)
LYMPHOCYTES NFR BLD AUTO: 16.9 % (ref 19.6–45.3)
MCH RBC QN AUTO: 35.3 PG (ref 26.6–33)
MCHC RBC AUTO-ENTMCNC: 33.9 G/DL (ref 31.5–35.7)
MCV RBC AUTO: 104.1 FL (ref 79–97)
MONOCYTES # BLD AUTO: 0.7 10*3/MM3 (ref 0.1–0.9)
MONOCYTES NFR BLD AUTO: 11.5 % (ref 5–12)
NEUTROPHILS NFR BLD AUTO: 3.9 10*3/MM3 (ref 1.7–7)
NEUTROPHILS NFR BLD AUTO: 69.2 % (ref 42.7–76)
NRBC BLD AUTO-RTO: 0.1 /100 WBC (ref 0–0.2)
PLATELET # BLD AUTO: 197 10*3/MM3 (ref 140–450)
PMV BLD AUTO: 8.8 FL (ref 6–12)
POTASSIUM SERPL-SCNC: 4.4 MMOL/L (ref 3.5–5.2)
PROT SERPL-MCNC: 6.4 G/DL (ref 6–8.5)
RBC # BLD AUTO: 2.63 10*6/MM3 (ref 3.77–5.28)
SODIUM SERPL-SCNC: 139 MMOL/L (ref 136–145)
WBC NRBC COR # BLD AUTO: 5.7 10*3/MM3 (ref 3.4–10.8)
WHOLE BLOOD HOLD SPECIMEN: NORMAL

## 2023-12-13 PROCEDURE — 85025 COMPLETE CBC W/AUTO DIFF WBC: CPT | Performed by: NURSE PRACTITIONER

## 2023-12-13 PROCEDURE — 82948 REAGENT STRIP/BLOOD GLUCOSE: CPT

## 2023-12-13 PROCEDURE — 80053 COMPREHEN METABOLIC PANEL: CPT | Performed by: INTERNAL MEDICINE

## 2023-12-13 PROCEDURE — 81256 HFE GENE: CPT | Performed by: NURSE PRACTITIONER

## 2023-12-13 PROCEDURE — 94799 UNLISTED PULMONARY SVC/PX: CPT

## 2023-12-13 RX ORDER — ASPIRIN 81 MG/1
81 TABLET, CHEWABLE ORAL DAILY
Qty: 30 TABLET | Refills: 0 | Status: SHIPPED | OUTPATIENT
Start: 2023-12-14 | End: 2024-01-13

## 2023-12-13 RX ORDER — METOPROLOL SUCCINATE 25 MG/1
25 TABLET, EXTENDED RELEASE ORAL
Qty: 30 TABLET | Refills: 0 | Status: SHIPPED | OUTPATIENT
Start: 2023-12-14 | End: 2024-01-13

## 2023-12-13 RX ORDER — FUROSEMIDE 40 MG/1
40 TABLET ORAL DAILY
Qty: 30 TABLET | Refills: 0 | Status: SHIPPED | OUTPATIENT
Start: 2023-12-14 | End: 2024-01-13

## 2023-12-13 RX ORDER — ATORVASTATIN CALCIUM 80 MG/1
80 TABLET, FILM COATED ORAL DAILY
Qty: 30 TABLET | Refills: 0 | Status: SHIPPED | OUTPATIENT
Start: 2023-12-14 | End: 2024-01-13

## 2023-12-13 RX ADMIN — ASPIRIN 81 MG CHEWABLE TABLET 81 MG: 81 TABLET CHEWABLE at 08:28

## 2023-12-13 RX ADMIN — ATORVASTATIN CALCIUM 80 MG: 40 TABLET, FILM COATED ORAL at 08:28

## 2023-12-13 RX ADMIN — Medication 1000 UNITS: at 08:28

## 2023-12-13 RX ADMIN — CYANOCOBALAMIN TAB 1000 MCG 1000 MCG: 1000 TAB at 08:28

## 2023-12-13 RX ADMIN — FUROSEMIDE 40 MG: 40 TABLET ORAL at 08:28

## 2023-12-13 RX ADMIN — SACUBITRIL AND VALSARTAN 1 TABLET: 24; 26 TABLET, FILM COATED ORAL at 08:28

## 2023-12-13 RX ADMIN — METOPROLOL SUCCINATE 25 MG: 25 TABLET, EXTENDED RELEASE ORAL at 08:28

## 2023-12-13 RX ADMIN — FAMOTIDINE 20 MG: 20 TABLET ORAL at 07:55

## 2023-12-13 RX ADMIN — EMPAGLIFLOZIN 10 MG: 10 TABLET, FILM COATED ORAL at 08:28

## 2023-12-13 RX ADMIN — CALCIUM CARBONATE (ANTACID) CHEW TAB 500 MG 1 TABLET: 500 CHEW TAB at 08:28

## 2023-12-13 RX ADMIN — Medication 10 ML: at 08:28

## 2023-12-13 NOTE — CASE MANAGEMENT/SOCIAL WORK
Continued Stay Note   Rad     Patient Name: Samantha Massey  MRN: [id number]  Today's Date: 12/13/2023    Admit Date: 12/7/2023    Plan: DC Plan: Home with significant other and grandchild.   Discharge Plan       Row Name 12/13/23 1208       Plan    Plan DC Plan: Home with significant other and grandchild.    Provided Post Acute Provider List? Yes    N/A Provider List Comment Hospice and Palliative Care information    Provided Post Acute Provider Quality & Resource List? N/A    Plan Comments CM spoke with patient’s nurse and also reviewed chart documentation to obtain clinical updates. Nursing reports patient and Cardiologist spoke today. Patient is not a candidate for intervention secondary to life expectancy less than 12 months. Plan for patient to DC home with family today. CM spoke with patient at bedside to offer Hospice/Palliative Care resources to patient. Patient is not yet ready to move forward with services, but was agreeable to receive information about services available. Brochures and Hospice/Palliative service lists provided. Patient will transport home via private vehicle with spouse. No barriers.               Expected Discharge Date and Time       Expected Discharge Date Expected Discharge Time    Dec 14, 2023               Nahomi Mirza RN    Office Phone: [phone]  Office Cell:     [phone]

## 2023-12-13 NOTE — PLAN OF CARE
Goal Outcome Evaluation:   Pt to return to [place] for rehab. SQ drain removed per Neurosurgery NP. MAZIN to remain in place x 7 days.

## 2023-12-13 NOTE — DISCHARGE SUMMARY
[facility] Medicine Services  Discharge Summary      Date of Admission: 12/7/2023  Date of Discharge:  12/13/2023  Primary Care Physician: Diana Spencer MD    Presenting Problem/History of Present Illness:  Pneumonia due to infectious organism, unspecified laterality, unspecified part of lung [J18.9]  Congestive heart failure, unspecified HF chronicity, unspecified heart failure type [I50.9]  Acute hypoxic respiratory failure [J96.01]       Final Discharge Diagnoses:  Active Hospital Problems    Diagnosis     **Acute hypoxic respiratory failure     Acute systolic heart failure     Congestive heart failure     Acute on chronic combined systolic and diastolic CHF (congestive heart failure)     NSTEMI (non-ST elevated myocardial infarction)     CAD (coronary artery disease)     Primary hypertension     Adenocarcinoma of colon metastatic to liver        Consults:   Consults       Date and Time Order Name Status Description    12/9/2023  1:55 PM Inpatient Hospitalist Consult      12/8/2023 12:11 PM Inpatient Intensivist Consult Completed     12/8/2023  4:30 AM Inpatient Cardiology Consult Completed     12/7/2023  8:23 PM Inpatient Pulmonology Consult Completed     12/7/2023  8:23 PM Hematology & Oncology Inpatient Consult Completed     12/7/2023  7:03 PM Hospitalist (on-call MD unless specified)              Procedures Performed: Procedure(s):  Left Heart Cath                Pertinent Test Results:   Lab Results (most recent)       Procedure Component Value Units Date/Time    Hemochromatosis Mutation [[id number]] Collected: 12/13/23 1305    Specimen: Blood Updated: 12/13/23 1308    POC Glucose Once [[id number]]  (Abnormal) Collected: 12/13/23 1216    Specimen: Blood Updated: 12/13/23 1218     Glucose 110 mg/dL      Comment: Serial Number: [id number]:  [id number]       POC Glucose Once [[id number]]  (Normal) Collected: 12/13/23 0732    Specimen: Blood Updated: 12/13/23 0735     Glucose 93 mg/dL       Comment: Serial Number: [id number]:  [id number]       Comprehensive Metabolic Panel [[id number]]  (Abnormal) Collected: 12/13/23 0536    Specimen: Blood Updated: 12/13/23 0653     Glucose 98 mg/dL      BUN 25 mg/dL      Creatinine 0.74 mg/dL      Sodium 139 mmol/L      Potassium 4.4 mmol/L      Chloride 98 mmol/L      CO2 30.0 mmol/L      Calcium 9.8 mg/dL      Total Protein 6.4 g/dL      Albumin 3.7 g/dL      ALT (SGPT) 19 U/L      AST (SGOT) 23 U/L      Alkaline Phosphatase 94 U/L      Total Bilirubin 0.8 mg/dL      Globulin 2.7 gm/dL      A/G Ratio 1.4 g/dL      BUN/Creatinine Ratio 33.8     Anion Gap 11.0 mmol/L      eGFR 91.6 mL/min/1.73     Narrative:      GFR Normal >60  Chronic Kidney Disease <60  Kidney Failure <15      Extra Tubes [[id number]] Collected: 12/13/23 0536    Specimen: Blood, Venous Line Updated: 12/13/23 0646    Narrative:      The following orders were created for panel order Extra Tubes.  Procedure                               Abnormality         Status                     ---------                               -----------         ------                     Lavender Top[[id number]]                                     Final result                 Please view results for these tests on the individual orders.    Lavender Top [[id number]] Collected: 12/13/23 0536    Specimen: Blood Updated: 12/13/23 0646     Extra Tube hold for add-on     Comment: Auto resulted       CBC & Differential [[id number]]  (Abnormal) Collected: 12/13/23 0536    Specimen: Blood Updated: 12/13/23 0632    Narrative:      The following orders were created for panel order CBC & Differential.  Procedure                               Abnormality         Status                     ---------                               -----------         ------                     CBC Auto Differential[[id number]]        Abnormal            Final result                 Please view results for these tests on the individual orders.    CBC  Auto Differential [[id number]]  (Abnormal) Collected: 12/13/23 0536    Specimen: Blood Updated: 12/13/23 0632     WBC 5.70 10*3/mm3      RBC 2.63 10*6/mm3      Hemoglobin 9.3 g/dL      Hematocrit 27.3 %      .1 fL      MCH 35.3 pg      MCHC 33.9 g/dL      RDW 22.8 %      RDW-SD 81.8 fl      MPV 8.8 fL      Platelets 197 10*3/mm3      Neutrophil % 69.2 %      Lymphocyte % 16.9 %      Monocyte % 11.5 %      Eosinophil % 2.1 %      Basophil % 0.3 %      Neutrophils, Absolute 3.90 10*3/mm3      Lymphocytes, Absolute 1.00 10*3/mm3      Monocytes, Absolute 0.70 10*3/mm3      Eosinophils, Absolute 0.10 10*3/mm3      Basophils, Absolute 0.00 10*3/mm3      nRBC 0.1 /100 WBC     Blood Culture - Blood, Hand, Left [[id number]]  (Normal) Collected: 12/07/23 1923    Specimen: Blood from Hand, Left Updated: 12/12/23 1931     Blood Culture No growth at 5 days    Blood Culture - Blood, Hand, Left [[id number]]  (Normal) Collected: 12/07/23 1859    Specimen: Blood from Hand, Left Updated: 12/12/23 1915     Blood Culture No growth at 5 days    Narrative:      Less than seven (7) mL's of blood was collected.  Insufficient quantity may yield false negative results.    Comprehensive Metabolic Panel [[id number]]  (Abnormal) Collected: 12/12/23 0448    Specimen: Blood Updated: 12/12/23 0610     Glucose 117 mg/dL      BUN 23 mg/dL      Creatinine 0.83 mg/dL      Sodium 137 mmol/L      Potassium 4.5 mmol/L      Comment: Slight hemolysis detected by analyzer. Result may be falsely elevated.        Chloride 98 mmol/L      CO2 27.0 mmol/L      Calcium 10.1 mg/dL      Total Protein 6.3 g/dL      Albumin 3.5 g/dL      ALT (SGPT) 17 U/L      AST (SGOT) 26 U/L      Comment: Slight hemolysis detected by analyzer. Result may be falsely elevated.        Alkaline Phosphatase 91 U/L      Total Bilirubin 0.8 mg/dL      Globulin 2.8 gm/dL      A/G Ratio 1.3 g/dL      BUN/Creatinine Ratio 27.7     Anion Gap 12.0 mmol/L      eGFR 79.8 mL/min/1.73      Narrative:      GFR Normal >60  Chronic Kidney Disease <60  Kidney Failure <15      Reticulocytes [[id number]]  (Abnormal) Collected: 12/12/23 0448    Specimen: Blood Updated: 12/12/23 0546     Reticulocyte % 4.90 %      Reticulocyte Absolute 0.1283 10*6/mm3     CBC (No Diff) [[id number]]  (Abnormal) Collected: 12/12/23 0448    Specimen: Blood Updated: 12/12/23 0537     WBC 6.30 10*3/mm3      RBC 2.62 10*6/mm3      Hemoglobin 9.4 g/dL      Hematocrit 27.0 %      .2 fL      MCH 35.9 pg      MCHC 34.8 g/dL      RDW 21.8 %      RDW-SD 76.6 fl      MPV 8.4 fL      Platelets 192 10*3/mm3     Reticulocytes [[id number]]  (Abnormal) Collected: 12/11/23 0610    Specimen: Blood Updated: 12/11/23 0632     Reticulocyte % 2.09 %      Reticulocyte Absolute 0.0534 10*6/mm3     CBC (No Diff) [[id number]]  (Abnormal) Collected: 12/11/23 0610    Specimen: Blood Updated: 12/11/23 0625     WBC 6.20 10*3/mm3      RBC 2.56 10*6/mm3      Hemoglobin 9.1 g/dL      Hematocrit 26.5 %      .8 fL      MCH 35.6 pg      MCHC 34.3 g/dL      RDW 21.8 %      RDW-SD 77.0 fl      MPV 7.9 fL      Platelets 163 10*3/mm3     Hemoglobin A1c [[id number]]  (Abnormal) Collected: 12/09/23 1217    Specimen: Blood Updated: 12/09/23 1757     Hemoglobin A1C 4.70 %     Lactate Dehydrogenase [[id number]]  (Abnormal) Collected: 12/09/23 0222    Specimen: Blood Updated: 12/09/23 1252      U/L     POC Activated Clotting Time [[id number]]  (Abnormal) Collected: 12/09/23 0938    Specimen: Arterial Blood Updated: 12/09/23 0943     Activated Clotting Time  158 Seconds      Comment: Serial Number: [id number]:  [id number]       POC Activated Clotting Time [[id number]]  (Abnormal) Collected: 12/09/23 0818    Specimen: Arterial Blood Updated: 12/09/23 0824     Activated Clotting Time  163 Seconds      Comment: Serial Number: [id number]:  [id number]       Basic Metabolic Panel [[id number]]  (Abnormal) Collected: 12/09/23 0222    Specimen: Blood Updated: 12/09/23  0253     Glucose 116 mg/dL      BUN 34 mg/dL      Creatinine 0.71 mg/dL      Sodium 142 mmol/L      Potassium 4.5 mmol/L      Comment: Result checked          Chloride 108 mmol/L      CO2 25.0 mmol/L      Calcium 7.5 mg/dL      BUN/Creatinine Ratio 47.9     Anion Gap 9.0 mmol/L      eGFR 96.3 mL/min/1.73     Narrative:      GFR Normal >60  Chronic Kidney Disease <60  Kidney Failure <15      aPTT [[id number]]  (Normal) Collected: 12/09/23 0222    Specimen: Blood Updated: 12/09/23 0239     PTT 65.6 seconds     Basic Metabolic Panel [[id number]]  (Abnormal) Collected: 12/08/23 1923    Specimen: Blood Updated: 12/08/23 1951     Glucose 210 mg/dL      BUN 28 mg/dL      Creatinine 0.88 mg/dL      Sodium 143 mmol/L      Potassium 3.6 mmol/L      Chloride 107 mmol/L      CO2 25.0 mmol/L      Calcium 7.7 mg/dL      BUN/Creatinine Ratio 31.8     Anion Gap 11.0 mmol/L      eGFR 74.4 mL/min/1.73     Narrative:      GFR Normal >60  Chronic Kidney Disease <60  Kidney Failure <15      aPTT [[id number]]  (Normal) Collected: 12/08/23 1923    Specimen: Blood Updated: 12/08/23 1947     PTT 62.4 seconds     Haptoglobin [[id number]]  (Abnormal) Collected: 12/08/23 1237    Specimen: Blood Updated: 12/08/23 1718     Haptoglobin 29 mg/dL     S. Pneumo Ag Urine or CSF - Urine, Urine, Clean Catch [[id number]]  (Normal) Collected: 12/08/23 1247    Specimen: Urine, Clean Catch Updated: 12/08/23 1314     Strep Pneumo Ag Negative    Legionella Antigen, Urine - Urine, Urine, Clean Catch [[id number]]  (Normal) Collected: 12/08/23 1247    Specimen: Urine, Clean Catch Updated: 12/08/23 1314     LEGIONELLA ANTIGEN, URINE Negative    Protime-INR [[id number]]  (Normal) Collected: 12/08/23 1237    Specimen: Blood Updated: 12/08/23 1309     Protime 11.6 Seconds      INR 1.07    TSH [[id number]]  (Normal) Collected: 12/08/23 0514    Specimen: Blood Updated: 12/08/23 1008     TSH 0.824 uIU/mL     High Sensitivity Troponin T [[id number]]  (Abnormal) Collected:  12/08/23 0514    Specimen: Blood Updated: 12/08/23 0557     HS Troponin T 136 ng/L     Narrative:      High Sensitive Troponin T Reference Range:  <14.0 ng/L- Negative Female for AMI  <22.0 ng/L- Negative Male for AMI  >=14 - Abnormal Female indicating possible myocardial injury.  >=22 - Abnormal Male indicating possible myocardial injury.   Clinicians would have to utilize clinical acumen, EKG, Troponin, and serial changes to determine if it is an Acute Myocardial Infarction or myocardial injury due to an underlying chronic condition.         Calcium, Ionized [[id number]]  (Abnormal) Collected: 12/08/23 0514    Specimen: Blood Updated: 12/08/23 0542     Ionized Calcium 1.09 mmol/L     MRSA Screen, PCR (Inpatient) - Swab, Nares [[id number]]  (Normal) Collected: 12/08/23 0321    Specimen: Swab from Nares Updated: 12/08/23 0440     MRSA PCR No MRSA Detected    Narrative:      The negative predictive value of this diagnostic test is high and should only be used to consider de-escalating anti-MRSA therapy. A positive result may indicate colonization with MRSA and must be correlated clinically.    Respiratory Panel PCR w/COVID-19(SARS-CoV-2) KARLEY/IRLANDA/KINZA/PAD/COR/[facility] In-House, NP Swab in UTM/VTM, 2 HR TAT - Swab, Nasopharynx [[id number]]  (Normal) Collected: 12/08/23 0228    Specimen: Swab from Nasopharynx Updated: 12/08/23 0335     ADENOVIRUS, PCR Not Detected     Coronavirus 229E Not Detected     Coronavirus HKU1 Not Detected     Coronavirus NL63 Not Detected     Coronavirus OC43 Not Detected     COVID19 Not Detected     Human Metapneumovirus Not Detected     Human Rhinovirus/Enterovirus Not Detected     Influenza A PCR Not Detected     Influenza B PCR Not Detected     Parainfluenza Virus 1 Not Detected     Parainfluenza Virus 2 Not Detected     Parainfluenza Virus 3 Not Detected     Parainfluenza Virus 4 Not Detected     RSV, PCR Not Detected     Bordetella pertussis pcr Not Detected     Bordetella parapertussis PCR Not  Detected     Chlamydophila pneumoniae PCR Not Detected     Mycoplasma pneumo by PCR Not Detected    Narrative:      In the setting of a positive respiratory panel with a viral infection PLUS a negative procalcitonin without other underlying concern for bacterial infection, consider observing off antibiotics or discontinuation of antibiotics and continue supportive care. If the respiratory panel is positive for atypical bacterial infection (Bordetella pertussis, Chlamydophila pneumoniae, or Mycoplasma pneumoniae), consider antibiotic de-escalation to target atypical bacterial infection.    High Sensitivity Troponin T 2Hr [[id number]]  (Abnormal) Collected: 12/07/23 1923    Specimen: Blood Updated: 12/07/23 1956     HS Troponin T 80 ng/L      Troponin T Delta 52 ng/L     Narrative:      High Sensitive Troponin T Reference Range:  <14.0 ng/L- Negative Female for AMI  <22.0 ng/L- Negative Male for AMI  >=14 - Abnormal Female indicating possible myocardial injury.  >=22 - Abnormal Male indicating possible myocardial injury.   Clinicians would have to utilize clinical acumen, EKG, Troponin, and serial changes to determine if it is an Acute Myocardial Infarction or myocardial injury due to an underlying chronic condition.         POC Lactate [[id number]]  (Normal) Collected: 12/07/23 1902    Specimen: Blood Updated: 12/07/23 1904     Lactate 1.5 mmol/L      Comment: Serial Number: [id number]:  [id number]       BNP [[id number]]  (Abnormal) Collected: 12/07/23 1728    Specimen: Blood Updated: 12/07/23 1805     proBNP 2,176.0 pg/mL     Narrative:      This assay is used as an aid in the diagnosis of individuals suspected of having heart failure. It can be used as an aid in the diagnosis of acute decompensated heart failure (ADHF) in patients presenting with signs and symptoms of ADHF to the emergency department (ED). In addition, NT-proBNP of <300 pg/mL indicates ADHF is not likely.    Age Range Result Interpretation   NT-proBNP Concentration (pg/mL:      <50             Positive            >450                   Gray                 300-450                    Negative             <300    50-75           Positive            >900                  Gray                300-900                  Negative            <300      >75             Positive            >1800                  Gray                300-1800                  Negative            <300    High Sensitivity Troponin T [[id number]]  (Abnormal) Collected: 12/07/23 1728    Specimen: Blood Updated: 12/07/23 1805     HS Troponin T 28 ng/L     Narrative:      High Sensitive Troponin T Reference Range:  <14.0 ng/L- Negative Female for AMI  <22.0 ng/L- Negative Male for AMI  >=14 - Abnormal Female indicating possible myocardial injury.  >=22 - Abnormal Male indicating possible myocardial injury.   Clinicians would have to utilize clinical acumen, EKG, Troponin, and serial changes to determine if it is an Acute Myocardial Infarction or myocardial injury due to an underlying chronic condition.         POC Creatinine [[id number]]  (Normal) Collected: 12/07/23 1710    Specimen: Venous Blood Updated: 12/07/23 1749     Creatinine 0.80 mg/dL      Comment: Serial Number: [id number]:  [id number]        eGFR 83.4 mL/min/1.73     Extra Tubes [[id number]] Collected: 12/07/23 1631    Specimen: Blood, Venous Line Updated: 12/07/23 1731    Narrative:      The following orders were created for panel order Extra Tubes.  Procedure                               Abnormality         Status                     ---------                               -----------         ------                     Gold Top - SST[[id number]]                                   Final result                 Please view results for these tests on the individual orders.    Gold Top - SST [[id number]] Collected: 12/07/23 1631    Specimen: Blood Updated: 12/07/23 1731     Extra Tube Hold for add-ons.     Comment: Auto resulted.        Blood Gas, Arterial - [[id number]]  (Abnormal) Collected: 12/07/23 1657    Specimen: Arterial Blood Updated: 12/07/23 1703     Site Left Radial     Adrian's Test Positive     pH, Arterial 7.361 pH units      pCO2, Arterial 36.3 mm Hg      pO2, Arterial 82.2 mm Hg      HCO3, Arterial 20.5 mmol/L      Base Excess, Arterial -4.4 mmol/L      Comment: Serial Number: [id number]:  [id number]        O2 Saturation, Arterial 95.7 %      CO2 Content 21.6 mmol/L      Barometric Pressure for Blood Gas --     Comment: N/A        Modality NRB     FIO2 100 %      Hemodilution No    POC Lactate [[id number]]  (Normal) Collected: 12/07/23 1657    Specimen: Arterial Blood Updated: 12/07/23 1703     Lactate 1.2 mmol/L      Comment: Serial Number: [id number]:  [id number]       Protime-INR [[id number]]  (Normal) Collected: 12/07/23 1631    Specimen: Blood Updated: 12/07/23 1647     Protime 10.5 Seconds      INR 0.96    CBC & Differential [[id number]]  (Abnormal) Collected: 12/07/23 1631    Specimen: Blood Updated: 12/07/23 1638    Narrative:      The following orders were created for panel order CBC & Differential.  Procedure                               Abnormality         Status                     ---------                               -----------         ------                     CBC Auto Differential[[id number]]        Abnormal            Final result                 Please view results for these tests on the individual orders.    CBC Auto Differential [[id number]]  (Abnormal) Collected: 12/07/23 1631    Specimen: Blood Updated: 12/07/23 1638     WBC 6.90 10*3/mm3      RBC 3.32 10*6/mm3      Hemoglobin 11.5 g/dL      Hematocrit 34.0 %      .4 fL      MCH 34.5 pg      MCHC 33.7 g/dL      RDW 21.5 %      RDW-SD 80.1 fl      MPV 7.6 fL      Platelets 280 10*3/mm3      Neutrophil % 66.9 %      Lymphocyte % 25.7 %      Monocyte % 5.3 %      Eosinophil % 0.4 %      Basophil % 1.7 %      Neutrophils, Absolute 4.60 10*3/mm3       Lymphocytes, Absolute 1.80 10*3/mm3      Monocytes, Absolute 0.40 10*3/mm3      Eosinophils, Absolute 0.00 10*3/mm3      Basophils, Absolute 0.10 10*3/mm3      nRBC 0.2 /100 WBC           Imaging Results (Most Recent)       Procedure Component Value Units Date/Time    XR Chest 1 View [[id number]] Collected: 12/09/23 0906     Updated: 12/09/23 0909    Narrative:      XR CHEST 1 VW    Date of Exam: 12/9/2023 8:03 AM CST    Indication: IABP    Comparison: 12/8/2023    Findings:  Cardiomediastinal silhouette and support lines and tubes are relatively unchanged. Caudal marking the tip of the IABP appears similar position. There remains mild pulmonary vascular and interstitial prominence. No airspace disease, pneumothorax, nor   pleural effusion. No acute osseous abnormality identified.      Impression:      Impression:  No significant change identified      Electronically Signed: Clive Li MD    12/9/2023 8:07 AM CST    Workstation ID: [id number]    XR Chest 1 View [[id number]] Collected: 12/09/23 0004     Updated: 12/09/23 0013    Narrative:      XR CHEST 1 VW    Date of Exam: 12/8/2023 11:35 PM EST    Indication: IABP placement    Comparison: 12/8/2023.    Findings:  Intra-aortic balloon pump catheter is present. The distal tip of the catheter appears to have migrated distally and now lies approximately 20 mm from the superior margin of the aortic arch at what appears to be the upper T5 level. There is a small stable   right-sided pleural effusion. Lung parenchyma remains clear. No pneumothorax. There is a stable left-sided chest port. Heart size is unchanged. Pulmonary vasculature is within normal limits      Impression:      Impression:  Intra-aortic balloon pump catheter has migrated distally and now lies approximately 20 mm from the superior margin of the aortic arch at the upper T5 level.        Electronically Signed: Celso Soni MD    12/9/2023 12:11 AM EST    Workstation ID: [id number]    XR Chest 1 View  [[id number]] Collected: 12/08/23 1239     Updated: 12/08/23 1246    Narrative:      X-ray chest 1 view    Date of Exam: 12/8/2023 11:22 AM CST    Indication: Balloon pump    Comparison: Chest x-ray 12/7/2023    Findings:  The heart is stable in size. There is a left chest wall port catheter with the distal tip overlying the distal superior vena cava. Intra-aortic balloon pump catheter. Is present with the distal tip 9 mm from the margin of the descending thoracic aortic   arch. There is mild right basilar atelectasis but overall improved aeration at the right lung base from prior study. No pneumothorax. Trace right pleural effusion is suspected.      Impression:      Impression:    1. Improved but residual right lower lung airspace disease and trace effusion.    2. Placement of an intra-aortic balloon pump catheter.      Electronically Signed: Nadja Li MD    12/8/2023 11:44 AM CST    Workstation ID: [id number]    CT Angiogram Chest Pulmonary Embolism [[id number]] Collected: 12/07/23 1723     Updated: 12/07/23 1736    Narrative:      CT ANGIOGRAM CHEST PULMONARY EMBOLISM    Date of Exam: 12/7/2023 4:20 PM CST    Indication: Pulmonary embolism (PE) suspected, high prob.    Comparison: Chest x-ray 12/7/2023 and CT chest 11/18/2023    Technique: Axial CT images were obtained of the chest after the uneventful intravenous administration of iodinated contrast utilizing pulmonary embolism protocol.  Sagittal and coronal reconstructions were performed.  Automated exposure control and   iterative reconstruction methods were used.      Findings:  PULMONARY VASCULATURE: Pulmonary arteries are widely patent without evidence of embolus.Main pulmonary artery is normal in size. No evidence of right heart strain.    MEDIASTINUM:Unremarkable. Aortic and heart size are normal. No aortic dissection identified. No mass nor pericardial effusion.  CORONARY ARTERIES: Mild calcified atherosclerotic disease.  LUNGS: Evaluation of lung  parenchyma demonstrates groundglass opacities in attenuation in the lungs bilaterally with smooth interlobular septal thickening compatible with pulmonary edema. There is however bibasilar airspace disease with consolidation   right greater than left and associated pleural effusions.    There is stable appearance to nodular lesion in the right apical region measuring 1.3 x 1.3 cm. There is an ovoid lesion at the left lung base which appears unchanged measuring 1.6 x 1.8 cm. A nodular density in the superior segment of the left lower   lung appears slightly more irregular measuring 7 x 7 mm, previously 6 x 4 mm (image 72 of series 7). Additional pulmonary nodules appear stable.      PLEURAL SPACE: No effusion, mass, nor pneumothorax.  LYMPH NODES: There are no pathologically enlarged lymph nodes.    UPPER ABDOMEN: Unremarkable    OSSEOUS STRUCTURES: There is a sclerotic metastatic lesion along the lateral right 6 with with osseous destruction noted. There is associated soft tissue component extending into the right middle lung with increasing consolidation compatible with   progression of disease. Sclerotic lesion in the manubrium appears unchanged.      Impression:      Impression:    1. No evidence for pulmonary embolus.  2. Findings concerning for progression of disease with a sclerotic osseous metastatic lesion involving the lateral right with with increased consolidation and soft tissue component extending into the right middle lung.  3. Diffuse groundglass opacities with intralobular septal thickening compatible with pulmonary edema however there is additional bibasilar airspace disease with consolidation and pleural effusions suggesting additional infectious/inflammatory process.      Electronically Signed: Nadja Li MD    12/7/2023 4:34 PM CST    Workstation ID: [id number]    XR Chest 1 View [[id number]] Collected: 12/07/23 1649     Updated: 12/07/23 1658    Narrative:      XR CHEST 1 VW    Date of Exam:  12/7/2023 4:42 PM EST    Indication: Shortness of breath    Comparison: CT chest, abdomen and pelvis 11/18/2023    Findings:  Heart size within normal limits. Accessed left chest port catheter tip within mid SVC. Consolidative right lower lobe opacities with small right-sided effusion. Background ill-defined nodular alveolar opacities throughout most notable on the left. No   large left effusion or pneumothorax.. Asymmetric sclerosis and expansion involving a right anterior rib in keeping with known metastatic lesion.      Impression:      Impression:  Right lower lobe consolidation and small effusion suspicious for pneumonia in the right clinical setting. Ill-defined more nodular alveolar opacities noted more so on the left, which could reflect edema or multifocal infection.      Electronically Signed: Howard Razo MD    12/7/2023 4:56 PM EST    Workstation ID: [id number]            Chief Complaint on Day of Discharge: chest pain    Hospital Course:  Samantha Massey is a 63 y.o. female with PMHX of Colonic cancer with mets to liver, lung, and bone along with htn who presented to [facility] on 12/7/2023 complaining of dyspnea.  Patient states she was getting a chemo infusion earlier today when she became more short of breath than usual.  Admits to worsening dyspnea over the past 3-4 days with cough.  Due to concern for allergic reaction was sent to [facility] for evaluation.     In the ER, vitals 98F, , RR 24, /108, 100% bipap.  ABG 7.361/36.3/82.2/20.5.  HS troponin 28 and proBNP 2176.  CT PE suggestive of pulm edema and multi-focal pneumonia.  Patient to be admitted for respiratory failure 2/2 pulm edema and pneumonia.    Patient was evaluated by cardiology for NSTEMI and had a cardiac cath which showed 100% occlusion of the LAD with right to left collaterals, and moderate to severe stenosis involving the left main and the ostium of the RCA and severe LV dysfunction.  Her TTE showed an EF of  "31-35% and grade I diastolic dysfunction.  No stents were placed.  Life vest vs AICD were discussed, but ultimately it was felt that the risks outweighed the benefits.  She was deemed stable for d/c home on 12/13 with lipitor, ASA, metoprolol, jardiance and entresto.  She is to f/u with her PCP in 1 week, oncology and cardiology.       Condition on Discharge:  stable    Physical Exam on Discharge:  /67 (BP Location: Left arm, Patient Position: Lying)   Pulse 93   Temp 97.6 °F (36.4 °C) (Oral)   Resp 15   Ht 157.5 cm (62\")   Wt 55.9 kg (123 lb 3.8 oz)   LMP  (LMP Unknown)   SpO2 96%   BMI 22.54 kg/m²   Physical Exam  General: NAD, AAOx3  HEENT: AT NC, EOMI  Neck: No JVD  CVS: S1/S2 present, tachycardia, no M/R/G  Lungs: CTA B/L  Abdomen: soft, NT, ND, BS+  Ext: no edema  Skin: no rashes, bruises or discolorations  Neuro: no focal deficits  Psych: Not agitated     Discharge Disposition:  Home or Self Care    Discharge Medications:     Discharge Medications        New Medications        Instructions Start Date   aspirin 81 MG chewable tablet   81 mg, Oral, Daily   Start Date: December 14, 2023     atorvastatin 80 MG tablet  Commonly known as: LIPITOR   80 mg, Oral, Daily   Start Date: December 14, 2023     empagliflozin 10 MG tablet tablet  Commonly known as: JARDIANCE   10 mg, Oral, Daily   Start Date: December 14, 2023     furosemide 40 MG tablet  Commonly known as: LASIX   40 mg, Oral, Daily   Start Date: December 14, 2023     metoprolol succinate XL 25 MG 24 hr tablet  Commonly known as: TOPROL-XL   25 mg, Oral, Every 24 Hours Scheduled   Start Date: December 14, 2023     sacubitril-valsartan 24-26 MG tablet  Commonly known as: ENTRESTO   1 tablet, Oral, Every 12 Hours Scheduled             Continue These Medications        Instructions Start Date   doxycycline 100 MG tablet  Commonly known as: VIBRAMYICN   100 mg, Oral, 2 Times Daily      Lonsurf 20-8.19 MG per tablet  Generic drug: " trifluridine-tipiracil   2 tablets, Oral, 2 Times Daily      Lysine 500 MG capsule   1 tablet, Oral, 2 Times Daily      Vitamin B-12 5000 MCG sublingual tablet   1 tablet, Sublingual, Every Early Morning      vitamin D3 125 MCG (5000 UT) capsule capsule   5,000 Units, Oral, Daily               Discharge Diet:  cardiac, diabetic, low salt    Activity at Discharge:  as tolerated    Discharge Care Plan/Instructions: f/u PCP, cardiology, oncology    Time: less than 30 minutes

## 2023-12-13 NOTE — OUTREACH NOTE
Prep Survey      Flowsheet Row Responses   [facility] patient discharged from? Rad   Is LACE score < 7 ? No   Eligibility [facility]   Date of Admission 12/07/23   Date of Discharge 12/13/23   Discharge Disposition Home or Self Care   Discharge diagnosis Acute hypoxic respiratory failure, Pneumonia due to infectious organism,   Does the patient have one of the following disease processes/diagnoses(primary or secondary)? Pneumonia   Does the patient have Home health ordered? No   Is there a DME ordered? No   Prep survey completed? Yes            Emperatriz GREENE - Registered Nurse

## 2023-12-13 NOTE — PROGRESS NOTES
"[facility] MEDICINE SERVICE  DAILY PROGRESS NOTE      Patient Name: Samantha Massey  Date of Admission: 12/7/2023  Today's Date: 12/13/23  Length of Stay: 6  Primary Care Physician: Diana Spencer MD    Subjective   Patient is stable today.  She has no complaints at this time.  No overnight events.      Objective    Temp:  [97.4 °F (36.3 °C)-99.1 °F (37.3 °C)] 97.7 °F (36.5 °C)  Heart Rate:  [] 93  Resp:  [14-20] 14  BP: ()/(56-78) 108/67  Physical Exam  General: NAD, AAOx3  HEENT: AT NC, EOMI  Neck: No JVD  CVS: S1/S2 present, tachycardia, no M/R/G  Lungs: CTA B/L  Abdomen: soft, NT, ND, BS+  Ext: no edema  Skin: no rashes, bruises or discolorations  Neuro: no focal deficits  Psych: Not agitated         Results Review:  I have reviewed the labs, radiology results, and diagnostic studies.    Laboratory Data:   Results from last 7 days   Lab Units 12/13/23  0536 12/12/23  0448 12/11/23  0610   WBC 10*3/mm3 5.70 6.30 6.20   HEMOGLOBIN g/dL 9.3* 9.4* 9.1*   HEMATOCRIT % 27.3* 27.0* 26.5*   PLATELETS 10*3/mm3 197 192 163        Results from last 7 days   Lab Units 12/13/23  0536 12/12/23  0448 12/11/23  0610   SODIUM mmol/L 139 137 139   POTASSIUM mmol/L 4.4 4.5 3.8   CHLORIDE mmol/L 98 98 101   CO2 mmol/L 30.0* 27.0 27.0   BUN mg/dL 25* 23 30*   CREATININE mg/dL 0.74 0.83 0.72   CALCIUM mg/dL 9.8 10.1 9.0   BILIRUBIN mg/dL 0.8 0.8 0.9   ALK PHOS U/L 94 91 84   ALT (SGPT) U/L 19 17 17   AST (SGOT) U/L 23 26 26   GLUCOSE mg/dL 98 117* 95       Culture Data:   No results found for: \"BLOODCX\"  No results found for: \"URINECX\"  No results found for: \"RESPCX\"  No results found for: \"WOUNDCX\"  No results found for: \"STOOLCX\"  No components found for: \"BODYFLD\"    Radiology Data:   Imaging Results (Last 24 Hours)       ** No results found for the last 24 hours. **            I have reviewed the patient's current medications.     Assessment/Plan       1) acute respiratory failure with hypoxia  - " resolved  - possibly secondary to NSTEMI  - pulmonary on board, appreciate recs  - pulmicort, duonebs    2) NSTEMI  - cardiology on board, appreciate recs  - LHC shows 100% occlusion of the LAD with right to left collaterals, and moderate to severe stenosis involving the left main and the ostium of the RCA and severe LV dysfunction  - metoprolol, lipitor, ASA    3) CHF  - TTE on 12/8 shows severely reduced EF of 31-35% and grade I diastolic dysfuncion  - lasix, coreg  - strict I/O and daily weights  - fluid restriction  - manage per cardiology  - may need life vest/AICD prior to d/c    4) metastatic colon cancer  - oncology on board, appreciate recs  - continue cholecalciferol, tums and vitamin B    5) GI/DVT ppx  - pepcid/lovenox        Electronically signed by Narayan Pool MD, 12/13/23, 10:01 EST.

## 2023-12-13 NOTE — PROGRESS NOTES
Cardiology Progress  Note      Patient Care Team:  Diana Spencer MD as PCP - General  Diana Spencer MD as PCP - Family Medicine  Mark Perera MD as Consulting Physician (Hematology and Oncology)  Jay Yeh MD as Surgeon (General Surgery)    PATIENT IDENTIFICATION  Name: Samantha Massey  Age: 63 y.o.  Sex: female  :  1960  MRN: [id number]      Length of stay:    LOS: 6 days           REASON FOR FOLLOW-UP:  Acute non-ST elevation MI  Coronary artery disease-multivessel  LV dysfunction      INTERVAL HISTORY  Seen and examined.  Chart labs reviewed.  No chest pain pressure heaviness or tightness.  Heart rate is better controlled.      Case discussed with cardiac electrophysiology and with oncology.  After discussing with oncology, it appears that patient is on fourth line therapy and best estimate as to survival would be up to 12 months.  Given this information, patient would not be a candidate for an AICD and thus not a LifeVest.  Information was relayed to the patient.      REVIEW OF SYSTEMS:  Pertinent items are noted in HPI, all other systems reviewed and negative    ASSESSMENT  Type II non-ST elevation MI secondary to chronic coronary artery disease and supply demand mismatch.  Sinus tachycardia  Multivessel coronary artery disease  Probable stress cardiomyopathy versus chemotherapy related  Hypoxic respiratory failure-improved  Stage IV metastatic colon cancer  Multifocal pneumonia    RECOMMENDATIONS  Guideline directed medical therapy as patient's clinical condition allows     Patient is currently taking aspirin, statin, Jardiance, furosemide, Toprol-XL, and Entresto  Patient is not a candidate for AICD and thus LifeVest secondary to estimated cancer survival 12 months or less  Cardiology status is otherwise appropriate for discharge when okay with other services.  Follow-up with cardiology in 2 to 4 weeks            Vital Signs  Visit Vitals  /67 (BP Location: Left  "arm, Patient Position: Lying)   Pulse 93   Temp 97.6 °F (36.4 °C) (Oral)   Resp 15   Ht 157.5 cm (62\")   Wt 55.9 kg (123 lb 3.8 oz)   LMP  (LMP Unknown)   SpO2 96%   BMI 22.54 kg/m²     Oxygen Therapy  SpO2: 96 %  Pulse Oximetry Type: Continuous  Device (Oxygen Therapy): room air  Device (Oxygen Therapy) (Infant): NPPV/NIV  Flow (L/min): 0  Oxygen Concentration (%): 21  Flowsheet Rows      Flowsheet Row First Filed Value   Admission Height 157.5 cm (62\") Documented at 12/07/2023 1617   Admission Weight 59.9 kg (132 lb 0.9 oz) Documented at 12/07/2023 1626          Intake & Output (last 3 days)         12/10 0701  12/11 0700 12/11 0701 12/12 0700 12/12 0701 12/13 0700 12/13 0701 12/14 0700    P.O. 1070 960 960 720    I.V. (mL/kg) 0 (0) 0 (0) 0 (0)     Total Intake(mL/kg) 1070 (18.8) 960 (17.2) 960 (17.2) 720 (12.9)    Urine (mL/kg/hr) 1380 (1) 1100 (0.8) 1550 (1.2)     Emesis/NG output 0 0      Stool 0 0 0     Total Output 1380 1100 1550     Net -310 -140 -590 +720            Urine Unmeasured Occurrence  2 x      Stool Unmeasured Occurrence 1 x  1 x     Emesis Unmeasured Occurrence 0 x             Lines, Drains & Airways       Active LDAs       Name Placement date Placement time Site Days    Peripheral IV 12/07/23 1630 Anterior;Right Forearm 12/07/23  1630  Forearm  3                           /67 (BP Location: Left arm, Patient Position: Lying)   Pulse 93   Temp 97.6 °F (36.4 °C) (Oral)   Resp 15   Ht 157.5 cm (62\")   Wt 55.9 kg (123 lb 3.8 oz)   LMP  (LMP Unknown)   SpO2 96%   BMI 22.54 kg/m²   Intake/Output last 3 shifts:  I/O last 3 completed shifts:  In: 1680 [P.O.:1680]  Out: 2650 [Urine:2650]  Intake/Output this shift:  I/O this shift:  In: 720 [P.O.:720]  Out: -     PHYSICAL EXAM:    General: Alert, cooperative, no distress, appears stated age  Head:  Normocephalic, atraumatic, mucous membranes moist  Eyes:  Conjunctivae/corneas clear, EOM's intact     Neck:  Supple,  no bruit  Lungs: Clear " to auscultation bilaterally, no wheezes, rhonchi or rales are noted  Chest wall: No tenderness  Heart::  Regular rate and rhythm, S1 and S2 normal, 1/6 holosystolic murmur.  No rub or gallop  Abdomen: Soft, nontender, nondistended, bowel sounds active  Extremities: No cyanosis, clubbing, or edema   Pulses: 2+ and symmetric all extremities  Skin:  No rashes or lesions  Neuro/psych: A&O x3. CN II through XII are grossly intact with appropriate affect        Scheduled Meds:      aspirin, 81 mg, Oral, Daily  atorvastatin, 80 mg, Oral, Daily  budesonide, 0.5 mg, Nebulization, BID - RT  calcium carbonate, 1 tablet, Oral, Daily  empagliflozin, 10 mg, Oral, Daily  enoxaparin, 40 mg, Subcutaneous, Daily  epoetin ana-epbx, 40,000 Units, Subcutaneous, Weekly  famotidine, 20 mg, Oral, BID AC  furosemide, 40 mg, Oral, Daily  insulin lispro, 2-9 Units, Subcutaneous, 4x Daily AC & at Bedtime  ipratropium-albuterol, 3 mL, Nebulization, 4x Daily - RT  metoprolol succinate XL, 25 mg, Oral, Q24H  sacubitril-valsartan, 1 tablet, Oral, Q12H  sodium chloride, 10 mL, Intravenous, Q12H  vitamin B-12, 1,000 mcg, Oral, Daily        Continuous Infusions:         PRN Meds:      acetaminophen    ALPRAZolam    Calcium Replacement - Follow Nurse / BPA Driven Protocol    dextrose    dextrose    glucagon (human recombinant)    labetalol    Magnesium Standard Dose Replacement - Follow Nurse / BPA Driven Protocol    Morphine    nitroglycerin    ondansetron **OR** ondansetron    Phosphorus Replacement - Follow Nurse / BPA Driven Protocol    Potassium Replacement - Follow Nurse / BPA Driven Protocol    [COMPLETED] Insert Peripheral IV **AND** sodium chloride    sodium chloride    sodium chloride        Results Review:     I reviewed the patient's new clinical results.    CBC    Results from last 7 days   Lab Units 12/13/23  0536 12/12/23  0448 12/11/23  0610 12/10/23  0422 12/09/23  0222 12/08/23  0514 12/07/23  1631   WBC 10*3/mm3 5.70 6.30 6.20 7.80  6.00 4.50 6.90   HEMOGLOBIN g/dL 9.3* 9.4* 9.1* 8.5* 8.0* 9.6* 11.5*   PLATELETS 10*3/mm3 197 192 163 157 179 195 280     Cr Clearance Estimated Creatinine Clearance: 69.6 mL/min (by C-G formula based on SCr of 0.74 mg/dL).  Coag   Results from last 7 days   Lab Units 12/09/23  0222 12/08/23  1923 12/08/23  1237 12/07/23  1631   INR   --   --  1.07 0.96   APTT seconds 65.6 62.4 28.8* 26.3*     HbA1C   Lab Results   Component Value Date    HGBA1C 4.70 (L) 12/09/2023     Blood Glucose   Glucose   Date/Time Value Ref Range Status   12/13/2023 1216 110 (H) 70 - 105 mg/dL Final     Comment:     Serial Number: [id number]:  [id number]   12/13/2023 0732 93 70 - 105 mg/dL Final     Comment:     Serial Number: [id number]:  [id number]   12/12/2023 1936 96 70 - 105 mg/dL Final     Comment:     Serial Number: [id number]:  [id number]   12/12/2023 1637 101 70 - 105 mg/dL Final     Comment:     Serial Number: [id number]:  [id number]   12/12/2023 1133 154 (H) 70 - 105 mg/dL Final     Comment:     Serial Number: [id number]:  [id number]   12/12/2023 0802 134 (H) 70 - 105 mg/dL Final     Comment:     Serial Number: [id number]:  [id number]   12/11/2023 2116 101 70 - 105 mg/dL Final     Comment:     Serial Number: [id number]:  [id number]   12/11/2023 1733 93 70 - 105 mg/dL Final     Comment:     Serial Number: [id number]:  [id number]     Infection   Results from last 7 days   Lab Units 12/07/23 1923 12/07/23  1859   BLOODCX  No growth at 5 days No growth at 5 days     CMP   Results from last 7 days   Lab Units 12/13/23  0536 12/12/23  0448 12/11/23  0610 12/10/23  0422 12/09/23  0222 12/08/23  1923 12/08/23  0514 12/07/23  1728   SODIUM mmol/L 139 137 139 143 142 143 142 138   POTASSIUM mmol/L 4.4 4.5 3.8 3.9 4.5 3.6 4.1 4.0   CHLORIDE mmol/L 98 98 101 106 108* 107 109* 107   CO2 mmol/L 30.0* 27.0 27.0 28.0 25.0 25.0 22.0 21.0*   BUN mg/dL 25* 23 30* 35* 34* 28* 24* 21   CREATININE mg/dL  "0.74 0.83 0.72 0.86 0.71 0.88 0.71 0.74   GLUCOSE mg/dL 98 117* 95 88 116* 210* 140* 171*   ALBUMIN g/dL 3.7 3.5 3.3* 3.5  --   --   --  3.0*   BILIRUBIN mg/dL 0.8 0.8 0.9 0.8  --   --   --  0.4   ALK PHOS U/L 94 91 84 78  --   --   --  75   AST (SGOT) U/L 23 26 26 22  --   --   --  34*   ALT (SGPT) U/L 19 17 17 16  --   --   --  16     ABG    Results from last 7 days   Lab Units 12/07/23  1657   PH, ARTERIAL pH units 7.361   PCO2, ARTERIAL mm Hg 36.3   PO2 ART mm Hg 82.2*   O2 SATURATION ART % 95.7   BASE EXCESS ART mmol/L -4.4*     UA      MIRZA  No results found for: \"POCMETH\", \"POCAMPHET\", \"POCBARBITUR\", \"POCBENZO\", \"POCCOCAINE\", \"POCOPIATES\", \"POCOXYCODO\", \"POCPHENCYC\", \"POCPROPOXY\", \"POCTHC\", \"POCTRICYC\"  Lysis Labs   Results from last 7 days   Lab Units 12/13/23  0536 12/12/23  0448 12/11/23  0610 12/10/23  0422 12/09/23  0222 12/08/23  1923 12/08/23  1237 12/08/23  0514 12/07/23  1710 12/07/23  1631   INR   --   --   --   --   --   --  1.07  --   --  0.96   APTT seconds  --   --   --   --  65.6 62.4 28.8*  --   --  26.3*   HEMOGLOBIN g/dL 9.3* 9.4* 9.1* 8.5* 8.0*  --   --  9.6*  --  11.5*   PLATELETS 10*3/mm3 197 192 163 157 179  --   --  195  --  280   CREATININE mg/dL 0.74 0.83 0.72 0.86 0.71 0.88  --  0.71   < >  --     < > = values in this interval not displayed.     Radiology(recent) No radiology results for the last day      Results from last 7 days   Lab Units 12/08/23 0514   HSTROP T ng/L 136*       X-rays, labs reviewed personally by physician.    ECG/EMG Results (most recent)       Procedure Component Value Units Date/Time    SCANNED - TELEMETRY   [[id number]] Resulted: 12/07/23     Updated: 12/08/23 0548    SCANNED - TELEMETRY   [[id number]] Resulted: 12/07/23     Updated: 12/08/23 0548    ECG 12 Lead Tachycardia [[id number]] Collected: 12/07/23 1746     Updated: 12/08/23 0915     QT Interval 328 ms      QTC Interval 482 ms     Narrative:      HEART RATE= 130  bpm  RR Interval= 464  ms  VA Interval= " 138  ms  P Horizontal Axis= -57  deg  P Front Axis= 33  deg  QRSD Interval= 76  ms  QT Interval= 328  ms  QTcB= 482  ms  QRS Axis= 1  deg  T Wave Axis= 162  deg  - ABNORMAL ECG -  Sinus tachycardia  Atrial premature complex  Anteroseptal infarct, age indeterminate  repol abnrm suggests ischemia, anterolateral  When compared with ECG of 07-Dec-2023 16:19:58,  Significant rate decrease  Significant axis, voltage or hypertrophy change  Electronically Signed By: Servando Costello (KINZA) 08-Dec-2023 09:14:50  Date and Time of Study: 2023-12-07 17:46:10    ECG 12 Lead Other; Tachycardia [[id number]] Collected: 12/07/23 1619     Updated: 12/08/23 0915     QT Interval 283 ms      QTC Interval 450 ms     Narrative:      HEART RATE= 152  bpm  RR Interval= 396  ms  ME Interval= 183  ms  P Horizontal Axis= -4  deg  P Front Axis= 38  deg  QRSD Interval= 77  ms  QT Interval= 283  ms  QTcB= 450  ms  QRS Axis= -2  deg  T Wave Axis= 169  deg  - ABNORMAL ECG -  Sinus tachycardia  Probable left atrial enlargement  Probable anteroseptal infarct, old  Repolarization abnormality, prob rate related  When compared with ECG of 23-Feb-2023 15:58:35,  Significant rate increase  Significant repolarization change  Electronically Signed By: Servando Costello ([facility]) 08-Dec-2023 09:15:37  Date and Time of Study: 2023-12-07 16:19:58    ADULT TRANSTHORACIC ECHO COMPLETE W/ CONT IF NECESSARY PER PROTOCOL [[id number]] Resulted: 12/08/23 1758     Updated: 12/08/23 1804     LVIDd 4.3 cm      LVIDs 3.3 cm      IVSd 0.90 cm      LVPWd 0.90 cm      FS 23.3 %      IVS/LVPW 1.00 cm      ESV(cubed) 35.9 ml      LV Sys Vol (BSA corrected) 39.0 cm2      EDV(cubed) 79.5 ml      LV Patiño Vol (BSA corrected) 66.7 cm2      LV mass(C)d 123.3 grams      EDV(MOD-sp4) 104.0 ml      ESV(MOD-sp4) 60.8 ml      SV(MOD-sp4) 43.2 ml      SI(MOD-sp4) 27.7 ml/m2      EF(MOD-sp4) 41.5 %      MV E max joelle 88.6 cm/sec      MV A max joelle 99.6 cm/sec      MV dec time 0.14 sec      MV E/A 0.89      LA ESV Index (BP) 31.3 ml/m2      Med Peak E' Pawan 9.3 cm/sec      Lat Peak E' Pawan 11.4 cm/sec      Avg E/e' ratio 8.56     RVIDd 4.0 cm      TAPSE (>1.6) 2.20 cm      RV S' 14.2 cm/sec      LA dimension (2D)  3.8 cm      LV V1 max 99.0 cm/sec      LV V1 max PG 3.9 mmHg      LV V1 mean PG 2.00 mmHg      LV V1 VTI 23.3 cm      Ao pk pawan 190.0 cm/sec      Ao max PG 14.4 mmHg      Ao mean PG 8.0 mmHg      Ao V2 VTI 34.4 cm      MV max PG 6.1 mmHg      MV mean PG 4.0 mmHg      MV V2 VTI 22.9 cm      MV P1/2t 43.9 msec      MVA(P1/2t) 5.0 cm2      MV dec slope 761.0 cm/sec2      MR max pawan 413.0 cm/sec      MR max PG 68.2 mmHg     Narrative:        Left ventricular systolic function is severely decreased. Left   ventricular ejection fraction appears to be 31 - 35%.    Left ventricular diastolic function is consistent with (grade I)   impaired relaxation.    The left atrial cavity is moderately dilated.      ECG 12 Lead Chest Pain [[id number]] Collected: 12/08/23 0853     Updated: 12/09/23 1934     QT Interval 479 ms      QTC Interval 590 ms     Narrative:      HEART RATE= 91  bpm  RR Interval= 660  ms  VT Interval= 158  ms  P Horizontal Axis= -61  deg  P Front Axis= 82  deg  QRSD Interval= 76  ms  QT Interval= 479  ms  QTcB= 590  ms  QRS Axis= -6  deg  T Wave Axis= 165  deg  - ABNORMAL ECG -  Sinus rhythm  Probable left atrial enlargement  Anteroseptal infarct, age indeterminate  Prolonged QT interval  When compared with ECG of 08-Dec-2023 4:09:21,  Significant axis, voltage or hypertrophy change  Electronically Signed By: Ramiro Olivera ([facility]) 09-Dec-2023 19:33:55  Date and Time of Study: 2023-12-08 08:53:11    ECG 12 Lead Rhythm Change [[id number]] Collected: 12/08/23 0409     Updated: 12/09/23 1934     QT Interval 453 ms      QTC Interval 608 ms     Narrative:      HEART RATE= 108  bpm  RR Interval= 556  ms  VT Interval= 84  ms  P Horizontal Axis=   deg  P Front Axis= 91  deg  QRSD Interval= 77  ms  QT  Interval= 453  ms  QTcB= 608  ms  QRS Axis= -7  deg  T Wave Axis= 183  deg  - ABNORMAL ECG -  Sinus tachycardia  Consider anteroseptal infarct  Prolonged QT interval  When compared with ECG of 07-Dec-2023 17:46:10,  Significant rate decrease  Significant repolarization change  Electronically Signed By: Ramiro Olivera ([facility]) 09-Dec-2023 19:34:02  Date and Time of Study: 2023-12-08 04:09:21    SCANNED - TELEMETRY   [[id number]] Resulted: 12/07/23     Updated: 12/11/23 1058    ECG 12 Lead Tachycardia [[id number]] Collected: 12/12/23 0854     Updated: 12/12/23 0855     QT Interval 294 ms      QTC Interval 421 ms     Narrative:      HEART RATE= 123  bpm  RR Interval= 488  ms  HI Interval= 151  ms  P Horizontal Axis= -11  deg  P Front Axis= 58  deg  QRSD Interval= 74  ms  QT Interval= 294  ms  QTcB= 421  ms  QRS Axis= -1  deg  T Wave Axis= 149  deg  - ABNORMAL ECG -  Sinus tachycardia  Anteroseptal infarct, age indeterminate  When compared with ECG of 08-Dec-2023 8:53:11,  Significant rate increase  Significant repolarization change  Significant axis, voltage or hypertrophy change  Electronically Signed By:   Date and Time of Study: 2023-12-12 08:54:22    SCANNED - TELEMETRY   [[id number]] Resulted: 12/07/23     Updated: 12/12/23 1901              Medication Review:   I have reviewed the patient's current medication list  Scheduled Meds:aspirin, 81 mg, Oral, Daily  atorvastatin, 80 mg, Oral, Daily  budesonide, 0.5 mg, Nebulization, BID - RT  calcium carbonate, 1 tablet, Oral, Daily  empagliflozin, 10 mg, Oral, Daily  enoxaparin, 40 mg, Subcutaneous, Daily  epoetin ana-epbx, 40,000 Units, Subcutaneous, Weekly  famotidine, 20 mg, Oral, BID AC  furosemide, 40 mg, Oral, Daily  insulin lispro, 2-9 Units, Subcutaneous, 4x Daily AC & at Bedtime  ipratropium-albuterol, 3 mL, Nebulization, 4x Daily - RT  metoprolol succinate XL, 25 mg, Oral, Q24H  sacubitril-valsartan, 1 tablet, Oral, Q12H  sodium chloride, 10 mL, Intravenous,  "Q12H  vitamin B-12, 1,000 mcg, Oral, Daily      Continuous Infusions:   PRN Meds:.  acetaminophen    ALPRAZolam    Calcium Replacement - Follow Nurse / BPA Driven Protocol    dextrose    dextrose    glucagon (human recombinant)    labetalol    Magnesium Standard Dose Replacement - Follow Nurse / BPA Driven Protocol    Morphine    nitroglycerin    ondansetron **OR** ondansetron    Phosphorus Replacement - Follow Nurse / BPA Driven Protocol    Potassium Replacement - Follow Nurse / BPA Driven Protocol    [COMPLETED] Insert Peripheral IV **AND** sodium chloride    sodium chloride    sodium chloride    Imaging:  Imaging Results (Last 72 Hours)       ** No results found for the last 72 hours. **              Marcus Murrell,   12/13/23  17:57 EST       EMR Dragon/Transcription:   \"Dictated utilizing Dragon dictation\".       Copied text in this note has been reviewed by me and is accurate as of 12/13/23.      "

## 2023-12-13 NOTE — PROGRESS NOTES
Hematology/Oncology Inpatient Progress Note    PATIENT NAME: Samantha Massey  : 1960  MRN: [id number]    CHIEF COMPLAINT: MVASI anaphylaxis, stage IV adenocarcinoma of the colon, acute on chronic anemia, history of REGULO, NIHA, and vitamin B12 deficiency    HISTORY OF PRESENT ILLNESS:    63 y.o. female presented to [facility] ED on 2023 after presenting to our office for an MVASI infusion.  She has stage IV cancer of the colon, s/p multiple lines of therapy, currently on Lonsurf with MVASI.  She had received multiple doses of MVASI in the past. During her infusion, she developed an acute onset of dyspnea with feeling of inability to breathe or move air.  She was treated aggressively with Benadryl and an albuterol nebulizer, for bilateral rhonchi and crackles, Lasix and dexamethasone.  She was hypertensive with blood pressure 200/100, tachycardic to 160s and O2 saturation initially was 95% but dropped to 75%.  She improved to 85% on 4 L nasal cannula and EMS was called to take patient to the ER.  In the ER, ABG confirmed hypoxia with a pO2 of 82.2.  proBNP was 2176 (<900).  Troponin 28 (< 14).  CMP was significant for an AST of 34 (1-32).  Lactate 1.2 (0.2-2.0), PT 10.5 (9.6-11.7), and PTT 26.3 (25-35).  White count 6.9, hemoglobin 11.5 (9.79 in our office), .4 and platelets 280,000.  Chest x-ray showed right lower lobe pneumonia.  CTA chest PE protocol showed no evidence of PE.  There were findings concerning for progression of disease with a sclerotic osseous metastatic lesion involving the lateral right rib with increased consolidation and soft tissue component extending into the right middle lobe lung.  Diffuse groundglass opacities with interlobular septal thickening compatible with pulmonary edema, however, there was additional bibasilar airspace disease with consolidation and pleural effusion suggesting additional infectious/inflammatory process.  EKG was abnormal and showed sinus  tachycardia with anterior septal infarction and suggested ischemia.  She was started on meropenem and pulmonary was consulted.  Cardiology was consulted on the day of consultation and her EKG was abnormal with prolonged QT interval.  Troponin had increased to 136.  White count 4.5, hemoglobin 9.6, .9 and platelets 195.  On the morning of consultation, she was complaining of pain in her right shoulder and constipation.  She was fatigued.  After she was evaluated by Dr. Perera, she developed chest pain, starting in her right arm, radiating down her right arm requiring nitroglycerin x 2 with minimal relief.     12/08/23  Hematology/Oncology was consulted as she is an established patient who we treat for stage IV adenocarcinoma of the colon, chemotherapy-induced cytopenias, vitamin B12 and vitamin D deficiencies, and osteopenia.  -Diagnosed with colon cancer with liver metastasis and 2012.  To date she has not received any cardiotoxic chemotherapy but did receive radiation to her right anterior rib in April 2023.  Although, Mvasi, may either cause reversible direct myocardial toxicity or exacerbate underlying myocardial dysfunction, occurring in 10% of patients.  Current CTA PE was compared to CT C/A/P on 11/18/2023.  She has completed 5 cycles of her current therapy.  -Anemia-due to chemotherapy with history of REGULO, and vitamin B12 deficiency.  Iron studies 8/25/2023 showed iron saturation of 40% (15-50), and ferritin of 223 ().  In May 2023, folate was 9.5, haptoglobin 134 ().  In December 2022, SPEP showed no M spike and copper was 112 ().  Ferrous sulfate was discontinued on 9/29/2023.  She was ordered to start Procrit 40,000 units subcu weekly on 11/22/2023 but has yet to start.  She takes vitamin B12 and vitamin D supplementation for deficiencies.  No change in past medical, surgical, social or family histories since patient was seen in the office on 12/7/2023.     PCP: Diana Spencer,  MD    INTERVAL HISTORY:  12/8/2023-Cardiac catheterization by Dr. Olivera revealed severe single-vessel coronary artery disease with 100% occlusion of the LAD with right to left collaterals status and chronic total occlusion.  Moderate stenosis involving the ostium of the right coronary artery that supplies collaterals to the LAD territory.  There was severe LV dysfunction and elevated left-sided filling pressures.  Intra-aortic balloon pump for hemodynamic support inserted.  12/9/2023 - hemoglobin 8.0, .9, haptoglobin 29 (), TSH  0.824 (0.270-4.200). Moved to [facility] on 12/8/2023.  CXR identified no significant change.  IABP removed.  12/10/2023 - hemoglobin 8.5, .5,  (135-214), reticulocytes 2.09 (0.70-1.90), SHADI - negative. Started on folic acid 1 mg po daily on 12/9/2023.  12/11/2023 - hemoglobin 9.1, .5.   12/12/2023-hemoglobin 9.4.  12/13/2023-hemoglobin 9.3, .1.  Labs from office on 11/20/2023-vitamin D 111 ().  Ferritin 610 (), percent saturation 55 (15-50), TIBC 200 (255-450), and iron 110 (). Vitamin D supplement discontinued.      History of present illness reviewed since last visit and changes noted on 12/13/23.    Subjective reports she feels fine.    ROS:  Review of Systems   Constitutional:  Negative for activity change, chills, fatigue, fever and unexpected weight change.   HENT:  Negative for congestion, dental problem, hearing loss, mouth sores, nosebleeds, sore throat and trouble swallowing.    Eyes:  Negative for photophobia and visual disturbance.   Respiratory:  Negative for cough, chest tightness and shortness of breath.    Cardiovascular:  Negative for chest pain, palpitations and leg swelling.   Gastrointestinal:  Negative for abdominal distention, abdominal pain, blood in stool, constipation, diarrhea, nausea and vomiting.   Endocrine: Negative for cold intolerance and heat intolerance.   Genitourinary:  Negative for decreased urine  volume, difficulty urinating, dysuria, enuresis, frequency, hematuria and urgency.   Musculoskeletal:  Negative for arthralgias and gait problem.   Skin:  Negative for rash and wound.   Neurological:  Negative for dizziness, tremors, seizures, facial asymmetry, speech difficulty, weakness, light-headedness, numbness and headaches.   Hematological:  Negative for adenopathy. Does not bruise/bleed easily.   Psychiatric/Behavioral:  Negative for confusion and hallucinations. The patient is not nervous/anxious.    All other systems reviewed and are negative.    MEDICATIONS:    Scheduled Meds:  aspirin, 81 mg, Oral, Daily  atorvastatin, 80 mg, Oral, Daily  budesonide, 0.5 mg, Nebulization, BID - RT  calcium carbonate, 1 tablet, Oral, Daily  cholecalciferol, 1,000 Units, Oral, Daily  empagliflozin, 10 mg, Oral, Daily  enoxaparin, 40 mg, Subcutaneous, Daily  epoetin ana-epbx, 40,000 Units, Subcutaneous, Weekly  famotidine, 20 mg, Oral, BID AC  furosemide, 40 mg, Oral, Daily  insulin lispro, 2-9 Units, Subcutaneous, 4x Daily AC & at Bedtime  ipratropium-albuterol, 3 mL, Nebulization, 4x Daily - RT  metoprolol succinate XL, 25 mg, Oral, Q24H  sacubitril-valsartan, 1 tablet, Oral, Q12H  sodium chloride, 10 mL, Intravenous, Q12H  vitamin B-12, 1,000 mcg, Oral, Daily    Continuous Infusions:      PRN Meds:    acetaminophen    ALPRAZolam    Calcium Replacement - Follow Nurse / BPA Driven Protocol    dextrose    dextrose    glucagon (human recombinant)    labetalol    Magnesium Standard Dose Replacement - Follow Nurse / BPA Driven Protocol    Morphine    nitroglycerin    ondansetron **OR** ondansetron    Phosphorus Replacement - Follow Nurse / BPA Driven Protocol    Potassium Replacement - Follow Nurse / BPA Driven Protocol    [COMPLETED] Insert Peripheral IV **AND** sodium chloride    sodium chloride    sodium chloride     ALLERGIES:    Allergies   Allergen Reactions    Hydrocodone Nausea And Vomiting    Iodinated Contrast Media  "Hives and Itching     PT HAD SOME HIVES DURING SCAN.  PRIOR TO SCAN 8-3-2013.    Latex Rash     Blisters     Penicillins Hives     Objective    VITALS:   /67 (BP Location: Left arm, Patient Position: Lying)   Pulse 93   Temp 97.7 °F (36.5 °C) (Oral)   Resp 14   Ht 157.5 cm (62\")   Wt 55.9 kg (123 lb 3.8 oz)   LMP  (LMP Unknown)   SpO2 96%   BMI 22.54 kg/m²     PHYSICAL EXAM:  Physical Exam  Vitals and nursing note reviewed.   Constitutional:       General: She is not in acute distress.     Appearance: Normal appearance. She is well-developed and normal weight. She is not ill-appearing or diaphoretic.   HENT:      Head: Normocephalic and atraumatic.      Right Ear: External ear normal.      Left Ear: External ear normal.      Nose: Nose normal.      Mouth/Throat:      Mouth: Mucous membranes are moist.      Pharynx: Oropharynx is clear. No oropharyngeal exudate or posterior oropharyngeal erythema.      Comments: Dental fillings.   Eyes:      General: No scleral icterus.     Extraocular Movements: Extraocular movements intact.      Conjunctiva/sclera: Conjunctivae normal.      Pupils: Pupils are equal, round, and reactive to light.   Cardiovascular:      Rate and Rhythm: Normal rate and regular rhythm.      Heart sounds: Normal heart sounds. No murmur heard.     Comments: Cardiac monitor leads.  Pulmonary:      Effort: Pulmonary effort is normal. No respiratory distress.      Breath sounds: Normal breath sounds. No stridor. No wheezing or rales.   Chest:      Comments: Left chest wall [id number] not accessed.   Abdominal:      General: Bowel sounds are normal. There is no distension.      Palpations: Abdomen is soft. There is no mass.      Tenderness: There is no abdominal tenderness. There is no guarding.   Genitourinary:     Comments: Deferred.   Musculoskeletal:         General: No swelling, tenderness or deformity. Normal range of motion.      Cervical back: Normal range of motion and neck supple. " No rigidity.      Right lower leg: No edema.      Left lower leg: No edema.      Comments: Right upper extremity peripheral IV.   Left hand oxygen saturation monitor.   Lymphadenopathy:      Cervical: No cervical adenopathy.      Upper Body:      Right upper body: No supraclavicular adenopathy.      Left upper body: No supraclavicular adenopathy.   Skin:     General: Skin is warm and dry.      Coloration: Skin is not pale.      Findings: No bruising, erythema or rash.   Neurological:      General: No focal deficit present.      Mental Status: She is alert and oriented to person, place, and time.      Coordination: Coordination normal.   Psychiatric:         Mood and Affect: Mood normal.         Behavior: Behavior normal.         Thought Content: Thought content normal.         Judgment: Judgment normal.     RECENT LABS:  Lab Results (last 24 hours)       Procedure Component Value Units Date/Time    POC Glucose Once [[id number]]  (Normal) Collected: 12/13/23 0732    Specimen: Blood Updated: 12/13/23 0735     Glucose 93 mg/dL      Comment: Serial Number: [id number]:  [id number]       Comprehensive Metabolic Panel [[id number]]  (Abnormal) Collected: 12/13/23 0536    Specimen: Blood Updated: 12/13/23 0653     Glucose 98 mg/dL      BUN 25 mg/dL      Creatinine 0.74 mg/dL      Sodium 139 mmol/L      Potassium 4.4 mmol/L      Chloride 98 mmol/L      CO2 30.0 mmol/L      Calcium 9.8 mg/dL      Total Protein 6.4 g/dL      Albumin 3.7 g/dL      ALT (SGPT) 19 U/L      AST (SGOT) 23 U/L      Alkaline Phosphatase 94 U/L      Total Bilirubin 0.8 mg/dL      Globulin 2.7 gm/dL      A/G Ratio 1.4 g/dL      BUN/Creatinine Ratio 33.8     Anion Gap 11.0 mmol/L      eGFR 91.6 mL/min/1.73     Narrative:      GFR Normal >60  Chronic Kidney Disease <60  Kidney Failure <15      Extra Tubes [[id number]] Collected: 12/13/23 0536    Specimen: Blood, Venous Line Updated: 12/13/23 0646    Narrative:      The following orders were created for  panel order Extra Tubes.  Procedure                               Abnormality         Status                     ---------                               -----------         ------                     Lavender Top[[id number]]                                     Final result                 Please view results for these tests on the individual orders.    Lavender Top [[id number]] Collected: 12/13/23 0536    Specimen: Blood Updated: 12/13/23 0646     Extra Tube hold for add-on     Comment: Auto resulted       CBC & Differential [[id number]]  (Abnormal) Collected: 12/13/23 0536    Specimen: Blood Updated: 12/13/23 0632    Narrative:      The following orders were created for panel order CBC & Differential.  Procedure                               Abnormality         Status                     ---------                               -----------         ------                     CBC Auto Differential[[id number]]        Abnormal            Final result                 Please view results for these tests on the individual orders.    CBC Auto Differential [[id number]]  (Abnormal) Collected: 12/13/23 0536    Specimen: Blood Updated: 12/13/23 0632     WBC 5.70 10*3/mm3      RBC 2.63 10*6/mm3      Hemoglobin 9.3 g/dL      Hematocrit 27.3 %      .1 fL      MCH 35.3 pg      MCHC 33.9 g/dL      RDW 22.8 %      RDW-SD 81.8 fl      MPV 8.8 fL      Platelets 197 10*3/mm3      Neutrophil % 69.2 %      Lymphocyte % 16.9 %      Monocyte % 11.5 %      Eosinophil % 2.1 %      Basophil % 0.3 %      Neutrophils, Absolute 3.90 10*3/mm3      Lymphocytes, Absolute 1.00 10*3/mm3      Monocytes, Absolute 0.70 10*3/mm3      Eosinophils, Absolute 0.10 10*3/mm3      Basophils, Absolute 0.00 10*3/mm3      nRBC 0.1 /100 WBC     POC Glucose Once [[id number]]  (Normal) Collected: 12/12/23 1936    Specimen: Blood Updated: 12/12/23 1938     Glucose 96 mg/dL      Comment: Serial Number: [id number]:  [id number]       Blood Culture - Blood, Hand,  Left [[id number]]  (Normal) Collected: 12/07/23 1923    Specimen: Blood from Hand, Left Updated: 12/12/23 1931     Blood Culture No growth at 5 days    Blood Culture - Blood, Hand, Left [[id number]]  (Normal) Collected: 12/07/23 1859    Specimen: Blood from Hand, Left Updated: 12/12/23 1915     Blood Culture No growth at 5 days    Narrative:      Less than seven (7) mL's of blood was collected.  Insufficient quantity may yield false negative results.    POC Glucose Once [[id number]]  (Normal) Collected: 12/12/23 1637    Specimen: Blood Updated: 12/12/23 1640     Glucose 101 mg/dL      Comment: Serial Number: [id number]:  [id number]       POC Glucose Once [[id number]]  (Abnormal) Collected: 12/12/23 1133    Specimen: Blood Updated: 12/12/23 1136     Glucose 154 mg/dL      Comment: Serial Number: [id number]:  [id number]             PENDING RESULTS:  HFE gene mutation    IMAGING REVIEWED:  No radiology results for the last day    I have reviewed the patient's labs, imaging, reports, and other clinician documentation.    Assessment & Plan   ASSESSMENT  MVASI infusion reaction and NSTEMI: Acute onset of symptoms after multiple tolerated infusions.  Unresponsive to initial treatment.  EKG suspicious for anterior septal infarction and troponin rising with development of chest pain after admission. MVASI was discontinued and Lonsurf was put on hold.  Due for growth factor on 12/11/2023, but so far it hasn't been needed.  Pulmonary and cardiology managing. MVASI has been shown to cause myocardial dysfunction in 10% of patients and her EF was 31-35%.  CAD confirmed on heart cath.  On ASA and prophylactic dose Lovenox.  NSTEMI and CAD: Cardiac catheterization revealed severe single-vessel coronary artery disease with 100% occlusion of the LAD with right to left collaterals likely chronic total occlusion.  Moderate stenosis involving the ostium of the right coronary artery that supplies collaterals to the LAD territory.   There was severe LV dysfunction and elevated left-sided filling pressures.  IABP placed temporarily but has been d/c'ed. TSH was normal.  Concerned about placement of AICD and prognosis.  Stage IV colon cancer: Diagnosed in 2012. Chest CT showed progression of disease in lungs and bones but she has had disease in these areas.  She is on Xgeva as an outpatient and fourth line chemotherapy.  She has been working full-time.    Acute on chronic anemia, history of REGULO, macrocytosis, NIHA and vitamin B12 deficiency: She has chemotherapy-induced anemia with macrocytosis. Oral vitamin B12 was continued and she was started on Procrit on 12/8/2023. Haptoglobin was low, SHADI negative, LDH and reticulocytes elevated.  On folic acid supplementation. TSH was normal.  Iron studies showed possible iron overload.  HFE mutation ordered.  History of Vitamin D deficiency and osteopenia: Continued supplementation and started daily TUMS. On Xgeva as an outpatient.  Vitamin D level high in office.  Discontinued supplement.     Contrast dye allergy: Avoid IV contrast with scans.     PLAN  Follow CBC.   Continue folic acid 1 mg p.o. daily.  Continue TUMs 1 tablet daily.  Continue daily oral vitamin B12.  Continue Pepcid.  Continue Procrit 40,000 units subcu weekly, next due 12/15/2023.  Discuss oncological options of treatment as an outpatient.  D/C vitamin D.  HFE gene mutation.    Patient was seen and evaluated by Dr. Perera.  Electronically signed by ARNOLDO Toussaint, 12/13/23, 9:09 AM EST.        On 12/13/2023, I have personally performed a face-to-face diagnostic evaluation on this patient. I have performed a complete history and physical examination, reviewed laboratory studies, and radiographic examinations.  I have completed the majority and substantive portion of the medical decision making.  I have formulated the assessment on this patient and the plan of action as noted above. I have discussed the case with Rosa Perez  NP, have edited/reviewed the note, and agree with the care plan.  She claims to be feeling fine.  On examination left chest wall port is not accessed and right arm IV is present.  Outpatient vitamin D level and iron saturation were high.  Adjustments made accordingly.  She is being evaluated for AICD placement versus other modalities of treatment for her low ejection fraction by cardiology.         On 12/13/2023, I discussed the patient's findings and my recommendations with patient and cardiology.    Part of this note may be an electronic transcription/translation of spoken language to printed text using the Dragon Dictation System.     Electronically signed by Mark Perera MD, 12/13/23, 5:22 PM EST.

## 2023-12-14 ENCOUNTER — TRANSITIONAL CARE MANAGEMENT TELEPHONE ENCOUNTER (OUTPATIENT)
Dept: CALL CENTER | Facility: HOSPITAL | Age: 63
End: 2023-12-14
Payer: COMMERCIAL

## 2023-12-14 NOTE — PROGRESS NOTES
"PULMONARY CRITICAL CARE PROGRESS  NOTE      PATIENT IDENTIFICATION:  Name: Samantha Massey  MRN: [id number]  :  1960     Age: 63 y.o.  Sex: female    DATE OF Note:  2023   Referring Physician: Naomi Dover DO                  Subjective:   Feeling better, up in chair at bedside,  on room air, no SOB, no chest or abd pain, still tachycardic, no bowel or bladder issues,    Objective:  tMax 24 hrs: Temp (24hrs), Av.3 °F (36.8 °C), Min:97.6 °F (36.4 °C), Max:99.1 °F (37.3 °C)      Vitals Ranges:   Temp:  [97.6 °F (36.4 °C)-99.1 °F (37.3 °C)] 97.6 °F (36.4 °C)  Heart Rate:  [] 93  Resp:  [14-20] 15  BP: (108-136)/(67-77) 108/67    Intake and Output Last 3 Shifts:   I/O last 3 completed shifts:  In: 1680 [P.O.:1680]  Out: 1550 [Urine:1550]    Exam:  /67 (BP Location: Left arm, Patient Position: Lying)   Pulse 93   Temp 97.6 °F (36.4 °C) (Oral)   Resp 15   Ht 157.5 cm (62\")   Wt 55.9 kg (123 lb 3.8 oz)   LMP  (LMP Unknown)   SpO2 96%   BMI 22.54 kg/m²     General Appearance:  AAO   HEENT:  Normocephalic, without obvious abnormality. Conjunctivae/corneas clear.  Normal external ear canals. Nares normal, no drainage     Neck:  Supple, symmetrical, trachea midline. No JVD.  Lungs /Chest wall:   Bilateral basal rhonchi, respirations unlabored, symmetrical wall movement.     Heart:  Regular rate and rhythm, systolic murmur PMI left sternal border  Abdomen: Soft, nontender, no masses, no organomegaly.    Extremities: No edema,  clubbing or cyanosis        Medications:        Infusion:  No current facility-administered medications for this encounter.       PRN:    Data Review:  All labs (24hrs):   Recent Results (from the past 24 hour(s))   Comprehensive Metabolic Panel    Collection Time: 23  5:36 AM    Specimen: Blood   Result Value Ref Range    Glucose 98 65 - 99 mg/dL    BUN 25 (H) 8 - 23 mg/dL    Creatinine 0.74 0.57 - 1.00 mg/dL    Sodium 139 136 - 145 mmol/L    Potassium " 4.4 3.5 - 5.2 mmol/L    Chloride 98 98 - 107 mmol/L    CO2 30.0 (H) 22.0 - 29.0 mmol/L    Calcium 9.8 8.6 - 10.5 mg/dL    Total Protein 6.4 6.0 - 8.5 g/dL    Albumin 3.7 3.5 - 5.2 g/dL    ALT (SGPT) 19 1 - 33 U/L    AST (SGOT) 23 1 - 32 U/L    Alkaline Phosphatase 94 39 - 117 U/L    Total Bilirubin 0.8 0.0 - 1.2 mg/dL    Globulin 2.7 gm/dL    A/G Ratio 1.4 g/dL    BUN/Creatinine Ratio 33.8 (H) 7.0 - 25.0    Anion Gap 11.0 5.0 - 15.0 mmol/L    eGFR 91.6 >60.0 mL/min/1.73   CBC Auto Differential    Collection Time: 12/13/23  5:36 AM    Specimen: Blood   Result Value Ref Range    WBC 5.70 3.40 - 10.80 10*3/mm3    RBC 2.63 (L) 3.77 - 5.28 10*6/mm3    Hemoglobin 9.3 (L) 12.0 - 15.9 g/dL    Hematocrit 27.3 (L) 34.0 - 46.6 %    .1 (H) 79.0 - 97.0 fL    MCH 35.3 (H) 26.6 - 33.0 pg    MCHC 33.9 31.5 - 35.7 g/dL    RDW 22.8 (H) 12.3 - 15.4 %    RDW-SD 81.8 (H) 37.0 - 54.0 fl    MPV 8.8 6.0 - 12.0 fL    Platelets 197 140 - 450 10*3/mm3    Neutrophil % 69.2 42.7 - 76.0 %    Lymphocyte % 16.9 (L) 19.6 - 45.3 %    Monocyte % 11.5 5.0 - 12.0 %    Eosinophil % 2.1 0.3 - 6.2 %    Basophil % 0.3 0.0 - 1.5 %    Neutrophils, Absolute 3.90 1.70 - 7.00 10*3/mm3    Lymphocytes, Absolute 1.00 0.70 - 3.10 10*3/mm3    Monocytes, Absolute 0.70 0.10 - 0.90 10*3/mm3    Eosinophils, Absolute 0.10 0.00 - 0.40 10*3/mm3    Basophils, Absolute 0.00 0.00 - 0.20 10*3/mm3    nRBC 0.1 0.0 - 0.2 /100 WBC   Lavender Top    Collection Time: 12/13/23  5:36 AM   Result Value Ref Range    Extra Tube hold for add-on    POC Glucose Once    Collection Time: 12/13/23  7:32 AM    Specimen: Blood   Result Value Ref Range    Glucose 93 70 - 105 mg/dL   POC Glucose Once    Collection Time: 12/13/23 12:16 PM    Specimen: Blood   Result Value Ref Range    Glucose 110 (H) 70 - 105 mg/dL        Imaging:  Cardiac Catheterization/Vascular Study  Table formatting from the original result was not included.  Cardiac Catheterization Operative Report    Samantha CARIAS  Bryson  [id number]  12/8/2023  @PCP@    She underwent cardiac catheterization.     Indications for the procedure include: acute coronary syndrome.     No results found for this or any previous visit.    No results found for this or any previous visit.       Procedure Details:  The risks, benefits, complications, treatment options, and expected   outcomes were discussed with the patient. The patient and/or family   concurred with the proposed plan, giving informed consent.     After informed consent the patient was brought to the cath lab after   appropriate IV hydration was begun and oral premedication was given. She   was further sedated with fentanyl. She was prepped and draped in the usual   manner. Using the modified Seldinger access technique, a 6 [place] sheath   was placed in the femoral artery. A left heart catheterization with   coronary arteriography was performed. Findings are discussed below.    After the procedure was completed, sedation was stopped and the sheaths   and catheters were all removed. Hemostasis was achieved per established   hospital protocols.    Conscious sedation:  Conscious sedation was performed according to protocol.  I supervised and   directed an independent trained observer with the assistance of monitoring   the patient's level of consciousness and physiologic status throughout the   procedure.  Intraoperative service time was 120 minutes.    Findings:    Hemodynamics Central aortic pressure systolic 144 diastolic 68 with a mean   pressure of 90 mmHg  LV end-diastolic pressure of 39 mmHg  There was no gradient across the aortic valve on the pullback of the   pigtail catheter     Left Main Small caliber vessel with significant dampening with 6 [place]   catheter  Left main has a diffuse angiographic 60 to 70% stenosis with some   dampening with a 6 [place] catheter but no significant dampening with a 4   [place] catheter supplies on the left circumflex artery   RCA Large-caliber  vessel dominant vessel  Right coronary artery has ostial angiographic 50 to 60% stenosis with some   dampening with the 6 [place] catheter  Mid right coronary artery has another focal area of 20% stenosis  Distal right coronary artery has a focal area of 30 to 40% stenosis  PLB branch of the right coronary artery is a large-caliber vessel with mid   angiographic 30 to 40% stenosis  PDA branch of the right coronary artery is a large-caliber vessel has   ostial 50 to 60% stenosis  Right coronary artery provides collaterals to the LAD   % occluded in the ostial portion after the left circumflex artery   Circ Moderate to large caliber vessel angiographically normal    LV Moderate to severe hypokinesis involving the mid mid to distal   anterior anteroapical wall to LV apex inferior apical wall with estimated   LV ejection fraction of 35%   Coronary Dominance Right coronary artery     Estimated Blood Loss:  Minimal    Specimens: None         Complications:  None; patient tolerated the procedure well.           Disposition: PACU - hemodynamically stable.           Condition: stable    Impressions:  Severe single-vessel coronary artery disease 100% occlusion of the LAD   with right to left collaterals likely chronic total occlusion  Moderate to severe stenosis involving the left main supplying only a left   circumflex artery with no critical stenosis  Moderate stenosis involving the ostium of the right coronary artery that   supplies collaterals to the LAD territory  Severe LV dysfunction  Elevated left-sided filling pressures  Possible stress cardiomyopathy based on the LV gram findings and   hypokinesis involving the mid to distal segments and also EKG changes that   are out of proportion to elevated troponin    Recommendations:  Admit patient to [facility]  Intra-aortic balloon pump for hemodynamic support  Diuresis  Continued aggressive risk factor modification  Test results reviewed and discussed with patient and  family       ASSESSMENT:  Acute hypoxic respiratory failure  Adenocarcinoma of colon metastatic to liver  Primary hypertension  Acute systolic heart failure  Congestive heart failure  Acute on chronic combined systolic and diastolic CHF (congestive heart failure)  NSTEMI (non-ST elevated myocardial infarction)  CAD (coronary artery disease)       PLAN:  Discharge home and follow up in 2 -3 weeks   Bronchodilators  Inhaled corticosteroids  Incentive spirometer  Diuresis and monitor RAPHAEL's  Oncology/Cardiology following   Electrolytes/ glycemic control  DVT and GI prophylaxis-Lovenox    Discussed with Dr Chandler Dodson, APRN   12/13/2023  21:50 EST      I personally have examined  and interviewed the patient. I have reviewed the history, data, problems, assessment and plan with our NP.  Total Critical care time in direct medical management (   ) minutes, This time specifically excludes time spent performing procedures.    Deana Arteaga MD   12/13/2023  23:54 EST

## 2023-12-14 NOTE — OUTREACH NOTE
Call Center TCM Note      Flowsheet Row Responses   [facility] patient discharged from? [facility]   Does the patient have one of the following disease processes/diagnoses(primary or secondary)? Pneumonia   TCM attempt successful? Yes   Call start time 1123   Call end time 1125   Discharge diagnosis Acute hypoxic respiratory failure, Pneumonia due to infectious organism,   Meds reviewed with patient/caregiver? Yes   Is the patient having any side effects they believe may be caused by any medication additions or changes? No   Does the patient have all medications ordered at discharge? Yes   Is the patient taking all medications as directed (includes completed medication regime)? Yes   Comments Hospital d/c f/u appt on 12/27/23 @1:15pm   Does the patient have an appointment with their PCP within 7-14 days of discharge? Yes   Has home health visited the patient within 72 hours of discharge? N/A   Pulse Ox monitoring None   Psychosocial issues? No   Did the patient receive a copy of their discharge instructions? Yes   Nursing interventions Reviewed instructions with patient   What is the patient's perception of their health status since discharge? Improving   Nursing Interventions Nurse provided patient education   Is the patient/caregiver able to teach back the hierarchy of who to call/visit for symptoms/problems? PCP, Specialist, Home health nurse, Urgent Care, ED, 911 Yes   Is the patient/caregiver able to teach back signs and symptoms of worsening condition: Fever/chills, Shortness of breath, Chest pain   Is the patient/caregiver able to teach back importance of completing antibiotic course of treatment? Yes   TCM call completed? Yes   Call end time 1125   Would this patient benefit from a Referral to Amb Social Work? No   Is the patient interested in additional calls from an ambulatory ? No            Brinda Rodriguez RN    12/14/2023, 11:26 EST

## 2023-12-14 NOTE — PAYOR COMM NOTE
"This is discharge notification for Andreas Massey  Reference/Auth # [id number]   Pt discharged routine to home on 12/13/23.    GOPAL Jimenez, RN, CCM  Utilization Review Nurse  [facility]  Direct & confidential phone # [phone]  Fax # [phone]        Andreas Massey \"Mary\" (63 y.o. Female)       Date of Birth   1960    Social Security Number       Address   [address] [place] [address] [place] [address]    Home Phone   [phone]    MRN   [id number]       Sikhism   Non-Nondenominational    Marital Status   Single                            Admission Date   12/7/23    Admission Type   Emergency    Admitting Provider   Naomi Dover DO    Attending Provider       Department, Room/Bed   [facility] CARDIOVASCULAR CARE UNIT, 2215/1       Discharge Date   12/13/2023    Discharge Disposition   Home or Self Care    Discharge Destination                                 Attending Provider: (none)   Allergies: Hydrocodone, Iodinated Contrast Media, Latex, Penicillins    Isolation: None   Infection: None   Code Status: Prior    Ht: 157.5 cm (62\")   Wt: 55.9 kg (123 lb 3.8 oz)    Admission Cmt: None   Principal Problem: Acute hypoxic respiratory failure [J96.01]                   Active Insurance as of 12/7/2023       Primary Coverage       Payor Plan Insurance Group Employer/Plan Group    [facility] [employer] [facility] [employer] BLUE [facility] PPO [id number]       Payor Plan Address Payor Plan Phone Number Payor Plan Fax Number Effective Dates    [address] [phone]  11/1/2020 - None Entered    [place] [address]         Subscriber Name Subscriber Birth Date Member ID       ANDREAS MASSEY 1960 [id number]                     Emergency Contacts        (Rel.) Home Phone Work Phone Mobile Phone    BRENT FINE (Other) [phone] -- [phone]                 Discharge Summary        Narayan Pool MD at 12/13/23 [address] [place] [address] " Medicine Services  Discharge Summary      Date of Admission: 12/7/2023  Date of Discharge:  12/13/2023  Primary Care Physician: Diana Spencer MD    Presenting Problem/History of Present Illness:  Pneumonia due to infectious organism, unspecified laterality, unspecified part of lung [J18.9]  Congestive heart failure, unspecified HF chronicity, unspecified heart failure type [I50.9]  Acute hypoxic respiratory failure [J96.01]       Final Discharge Diagnoses:  Active Hospital Problems    Diagnosis     **Acute hypoxic respiratory failure     Acute systolic heart failure     Congestive heart failure     Acute on chronic combined systolic and diastolic CHF (congestive heart failure)     NSTEMI (non-ST elevated myocardial infarction)     CAD (coronary artery disease)     Primary hypertension     Adenocarcinoma of colon metastatic to liver        Consults:   Consults       Date and Time Order Name Status Description    12/9/2023  1:55 PM Inpatient Hospitalist Consult      12/8/2023 12:11 PM Inpatient Intensivist Consult Completed     12/8/2023  4:30 AM Inpatient Cardiology Consult Completed     12/7/2023  8:23 PM Inpatient Pulmonology Consult Completed     12/7/2023  8:23 PM Hematology & Oncology Inpatient Consult Completed     12/7/2023  7:03 PM Hospitalist (on-call MD unless specified)              Procedures Performed: Procedure(s):  Left Heart Cath                Pertinent Test Results:   Lab Results (most recent)       Procedure Component Value Units Date/Time    Hemochromatosis Mutation [[id number]] Collected: 12/13/23 1305    Specimen: Blood Updated: 12/13/23 1308    POC Glucose Once [[id number]]  (Abnormal) Collected: 12/13/23 1216    Specimen: Blood Updated: 12/13/23 1218     Glucose 110 mg/dL      Comment: Serial Number: [id number]:  [id number]       POC Glucose Once [[id number]]  (Normal) Collected: 12/13/23 0732    Specimen: Blood Updated: 12/13/23 0735     Glucose 93 mg/dL      Comment: Serial Number:  [id number]:  [id number]       Comprehensive Metabolic Panel [[id number]]  (Abnormal) Collected: 12/13/23 0536    Specimen: Blood Updated: 12/13/23 0653     Glucose 98 mg/dL      BUN 25 mg/dL      Creatinine 0.74 mg/dL      Sodium 139 mmol/L      Potassium 4.4 mmol/L      Chloride 98 mmol/L      CO2 30.0 mmol/L      Calcium 9.8 mg/dL      Total Protein 6.4 g/dL      Albumin 3.7 g/dL      ALT (SGPT) 19 U/L      AST (SGOT) 23 U/L      Alkaline Phosphatase 94 U/L      Total Bilirubin 0.8 mg/dL      Globulin 2.7 gm/dL      A/G Ratio 1.4 g/dL      BUN/Creatinine Ratio 33.8     Anion Gap 11.0 mmol/L      eGFR 91.6 mL/min/1.73     Narrative:      GFR Normal >60  Chronic Kidney Disease <60  Kidney Failure <15      Extra Tubes [[id number]] Collected: 12/13/23 0536    Specimen: Blood, Venous Line Updated: 12/13/23 0646    Narrative:      The following orders were created for panel order Extra Tubes.  Procedure                               Abnormality         Status                     ---------                               -----------         ------                     Lavender Top[[id number]]                                     Final result                 Please view results for these tests on the individual orders.    Lavender Top [[id number]] Collected: 12/13/23 0536    Specimen: Blood Updated: 12/13/23 0646     Extra Tube hold for add-on     Comment: Auto resulted       CBC & Differential [[id number]]  (Abnormal) Collected: 12/13/23 0536    Specimen: Blood Updated: 12/13/23 0632    Narrative:      The following orders were created for panel order CBC & Differential.  Procedure                               Abnormality         Status                     ---------                               -----------         ------                     CBC Auto Differential[[id number]]        Abnormal            Final result                 Please view results for these tests on the individual orders.    CBC Auto Differential  [[id number]]  (Abnormal) Collected: 12/13/23 0536    Specimen: Blood Updated: 12/13/23 0632     WBC 5.70 10*3/mm3      RBC 2.63 10*6/mm3      Hemoglobin 9.3 g/dL      Hematocrit 27.3 %      .1 fL      MCH 35.3 pg      MCHC 33.9 g/dL      RDW 22.8 %      RDW-SD 81.8 fl      MPV 8.8 fL      Platelets 197 10*3/mm3      Neutrophil % 69.2 %      Lymphocyte % 16.9 %      Monocyte % 11.5 %      Eosinophil % 2.1 %      Basophil % 0.3 %      Neutrophils, Absolute 3.90 10*3/mm3      Lymphocytes, Absolute 1.00 10*3/mm3      Monocytes, Absolute 0.70 10*3/mm3      Eosinophils, Absolute 0.10 10*3/mm3      Basophils, Absolute 0.00 10*3/mm3      nRBC 0.1 /100 WBC     Blood Culture - Blood, Hand, Left [[id number]]  (Normal) Collected: 12/07/23 1923    Specimen: Blood from Hand, Left Updated: 12/12/23 1931     Blood Culture No growth at 5 days    Blood Culture - Blood, Hand, Left [[id number]]  (Normal) Collected: 12/07/23 1859    Specimen: Blood from Hand, Left Updated: 12/12/23 1915     Blood Culture No growth at 5 days    Narrative:      Less than seven (7) mL's of blood was collected.  Insufficient quantity may yield false negative results.    Comprehensive Metabolic Panel [[id number]]  (Abnormal) Collected: 12/12/23 0448    Specimen: Blood Updated: 12/12/23 0610     Glucose 117 mg/dL      BUN 23 mg/dL      Creatinine 0.83 mg/dL      Sodium 137 mmol/L      Potassium 4.5 mmol/L      Comment: Slight hemolysis detected by analyzer. Result may be falsely elevated.        Chloride 98 mmol/L      CO2 27.0 mmol/L      Calcium 10.1 mg/dL      Total Protein 6.3 g/dL      Albumin 3.5 g/dL      ALT (SGPT) 17 U/L      AST (SGOT) 26 U/L      Comment: Slight hemolysis detected by analyzer. Result may be falsely elevated.        Alkaline Phosphatase 91 U/L      Total Bilirubin 0.8 mg/dL      Globulin 2.8 gm/dL      A/G Ratio 1.3 g/dL      BUN/Creatinine Ratio 27.7     Anion Gap 12.0 mmol/L      eGFR 79.8 mL/min/1.73     Narrative:      GFR  Normal >60  Chronic Kidney Disease <60  Kidney Failure <15      Reticulocytes [[id number]]  (Abnormal) Collected: 12/12/23 0448    Specimen: Blood Updated: 12/12/23 0546     Reticulocyte % 4.90 %      Reticulocyte Absolute 0.1283 10*6/mm3     CBC (No Diff) [[id number]]  (Abnormal) Collected: 12/12/23 0448    Specimen: Blood Updated: 12/12/23 0537     WBC 6.30 10*3/mm3      RBC 2.62 10*6/mm3      Hemoglobin 9.4 g/dL      Hematocrit 27.0 %      .2 fL      MCH 35.9 pg      MCHC 34.8 g/dL      RDW 21.8 %      RDW-SD 76.6 fl      MPV 8.4 fL      Platelets 192 10*3/mm3     Reticulocytes [[id number]]  (Abnormal) Collected: 12/11/23 0610    Specimen: Blood Updated: 12/11/23 0632     Reticulocyte % 2.09 %      Reticulocyte Absolute 0.0534 10*6/mm3     CBC (No Diff) [[id number]]  (Abnormal) Collected: 12/11/23 0610    Specimen: Blood Updated: 12/11/23 0625     WBC 6.20 10*3/mm3      RBC 2.56 10*6/mm3      Hemoglobin 9.1 g/dL      Hematocrit 26.5 %      .8 fL      MCH 35.6 pg      MCHC 34.3 g/dL      RDW 21.8 %      RDW-SD 77.0 fl      MPV 7.9 fL      Platelets 163 10*3/mm3     Hemoglobin A1c [[id number]]  (Abnormal) Collected: 12/09/23 1217    Specimen: Blood Updated: 12/09/23 1757     Hemoglobin A1C 4.70 %     Lactate Dehydrogenase [[id number]]  (Abnormal) Collected: 12/09/23 0222    Specimen: Blood Updated: 12/09/23 1252      U/L     POC Activated Clotting Time [[id number]]  (Abnormal) Collected: 12/09/23 0938    Specimen: Arterial Blood Updated: 12/09/23 0943     Activated Clotting Time  158 Seconds      Comment: Serial Number: [id number]:  [id number]       POC Activated Clotting Time [[id number]]  (Abnormal) Collected: 12/09/23 0818    Specimen: Arterial Blood Updated: 12/09/23 0824     Activated Clotting Time  163 Seconds      Comment: Serial Number: [id number]:  [id number]       Basic Metabolic Panel [[id number]]  (Abnormal) Collected: 12/09/23 0222    Specimen: Blood Updated: 12/09/23 0253     Glucose 116  mg/dL      BUN 34 mg/dL      Creatinine 0.71 mg/dL      Sodium 142 mmol/L      Potassium 4.5 mmol/L      Comment: Result checked          Chloride 108 mmol/L      CO2 25.0 mmol/L      Calcium 7.5 mg/dL      BUN/Creatinine Ratio 47.9     Anion Gap 9.0 mmol/L      eGFR 96.3 mL/min/1.73     Narrative:      GFR Normal >60  Chronic Kidney Disease <60  Kidney Failure <15      aPTT [[id number]]  (Normal) Collected: 12/09/23 0222    Specimen: Blood Updated: 12/09/23 0239     PTT 65.6 seconds     Basic Metabolic Panel [[id number]]  (Abnormal) Collected: 12/08/23 1923    Specimen: Blood Updated: 12/08/23 1951     Glucose 210 mg/dL      BUN 28 mg/dL      Creatinine 0.88 mg/dL      Sodium 143 mmol/L      Potassium 3.6 mmol/L      Chloride 107 mmol/L      CO2 25.0 mmol/L      Calcium 7.7 mg/dL      BUN/Creatinine Ratio 31.8     Anion Gap 11.0 mmol/L      eGFR 74.4 mL/min/1.73     Narrative:      GFR Normal >60  Chronic Kidney Disease <60  Kidney Failure <15      aPTT [[id number]]  (Normal) Collected: 12/08/23 1923    Specimen: Blood Updated: 12/08/23 1947     PTT 62.4 seconds     Haptoglobin [[id number]]  (Abnormal) Collected: 12/08/23 1237    Specimen: Blood Updated: 12/08/23 1718     Haptoglobin 29 mg/dL     S. Pneumo Ag Urine or CSF - Urine, Urine, Clean Catch [[id number]]  (Normal) Collected: 12/08/23 1247    Specimen: Urine, Clean Catch Updated: 12/08/23 1314     Strep Pneumo Ag Negative    Legionella Antigen, Urine - Urine, Urine, Clean Catch [[id number]]  (Normal) Collected: 12/08/23 1247    Specimen: Urine, Clean Catch Updated: 12/08/23 1314     LEGIONELLA ANTIGEN, URINE Negative    Protime-INR [[id number]]  (Normal) Collected: 12/08/23 1237    Specimen: Blood Updated: 12/08/23 1309     Protime 11.6 Seconds      INR 1.07    TSH [[id number]]  (Normal) Collected: 12/08/23 0514    Specimen: Blood Updated: 12/08/23 1008     TSH 0.824 uIU/mL     High Sensitivity Troponin T [[id number]]  (Abnormal) Collected: 12/08/23 0514    Specimen:  Blood Updated: 12/08/23 0557     HS Troponin T 136 ng/L     Narrative:      High Sensitive Troponin T Reference Range:  <14.0 ng/L- Negative Female for AMI  <22.0 ng/L- Negative Male for AMI  >=14 - Abnormal Female indicating possible myocardial injury.  >=22 - Abnormal Male indicating possible myocardial injury.   Clinicians would have to utilize clinical acumen, EKG, Troponin, and serial changes to determine if it is an Acute Myocardial Infarction or myocardial injury due to an underlying chronic condition.         Calcium, Ionized [[id number]]  (Abnormal) Collected: 12/08/23 0514    Specimen: Blood Updated: 12/08/23 0542     Ionized Calcium 1.09 mmol/L     MRSA Screen, PCR (Inpatient) - Swab, Nares [[id number]]  (Normal) Collected: 12/08/23 0321    Specimen: Swab from Nares Updated: 12/08/23 0440     MRSA PCR No MRSA Detected    Narrative:      The negative predictive value of this diagnostic test is high and should only be used to consider de-escalating anti-MRSA therapy. A positive result may indicate colonization with MRSA and must be correlated clinically.    Respiratory Panel PCR w/COVID-19(SARS-CoV-2) KARLEY/IRLANDA/KINZA/PAD/COR/KY In-House, NP Swab in UTM/VTM, 2 HR TAT - Swab, Nasopharynx [[id number]]  (Normal) Collected: 12/08/23 0228    Specimen: Swab from Nasopharynx Updated: 12/08/23 0335     ADENOVIRUS, PCR Not Detected     Coronavirus 229E Not Detected     Coronavirus HKU1 Not Detected     Coronavirus NL63 Not Detected     Coronavirus OC43 Not Detected     COVID19 Not Detected     Human Metapneumovirus Not Detected     Human Rhinovirus/Enterovirus Not Detected     Influenza A PCR Not Detected     Influenza B PCR Not Detected     Parainfluenza Virus 1 Not Detected     Parainfluenza Virus 2 Not Detected     Parainfluenza Virus 3 Not Detected     Parainfluenza Virus 4 Not Detected     RSV, PCR Not Detected     Bordetella pertussis pcr Not Detected     Bordetella parapertussis PCR Not Detected     Chlamydophila  pneumoniae PCR Not Detected     Mycoplasma pneumo by PCR Not Detected    Narrative:      In the setting of a positive respiratory panel with a viral infection PLUS a negative procalcitonin without other underlying concern for bacterial infection, consider observing off antibiotics or discontinuation of antibiotics and continue supportive care. If the respiratory panel is positive for atypical bacterial infection (Bordetella pertussis, Chlamydophila pneumoniae, or Mycoplasma pneumoniae), consider antibiotic de-escalation to target atypical bacterial infection.    High Sensitivity Troponin T 2Hr [[id number]]  (Abnormal) Collected: 12/07/23 1923    Specimen: Blood Updated: 12/07/23 1956     HS Troponin T 80 ng/L      Troponin T Delta 52 ng/L     Narrative:      High Sensitive Troponin T Reference Range:  <14.0 ng/L- Negative Female for AMI  <22.0 ng/L- Negative Male for AMI  >=14 - Abnormal Female indicating possible myocardial injury.  >=22 - Abnormal Male indicating possible myocardial injury.   Clinicians would have to utilize clinical acumen, EKG, Troponin, and serial changes to determine if it is an Acute Myocardial Infarction or myocardial injury due to an underlying chronic condition.         POC Lactate [[id number]]  (Normal) Collected: 12/07/23 1902    Specimen: Blood Updated: 12/07/23 1904     Lactate 1.5 mmol/L      Comment: Serial Number: [id number]:  [id number]       BNP [[id number]]  (Abnormal) Collected: 12/07/23 1728    Specimen: Blood Updated: 12/07/23 1805     proBNP 2,176.0 pg/mL     Narrative:      This assay is used as an aid in the diagnosis of individuals suspected of having heart failure. It can be used as an aid in the diagnosis of acute decompensated heart failure (ADHF) in patients presenting with signs and symptoms of ADHF to the emergency department (ED). In addition, NT-proBNP of <300 pg/mL indicates ADHF is not likely.    Age Range Result Interpretation  NT-proBNP Concentration  (pg/mL:      <50             Positive            >450                   Gray                 300-450                    Negative             <300    50-75           Positive            >900                  Gray                300-900                  Negative            <300      >75             Positive            >1800                  Gray                300-1800                  Negative            <300    High Sensitivity Troponin T [[id number]]  (Abnormal) Collected: 12/07/23 1728    Specimen: Blood Updated: 12/07/23 1805     HS Troponin T 28 ng/L     Narrative:      High Sensitive Troponin T Reference Range:  <14.0 ng/L- Negative Female for AMI  <22.0 ng/L- Negative Male for AMI  >=14 - Abnormal Female indicating possible myocardial injury.  >=22 - Abnormal Male indicating possible myocardial injury.   Clinicians would have to utilize clinical acumen, EKG, Troponin, and serial changes to determine if it is an Acute Myocardial Infarction or myocardial injury due to an underlying chronic condition.         POC Creatinine [[id number]]  (Normal) Collected: 12/07/23 1710    Specimen: Venous Blood Updated: 12/07/23 1749     Creatinine 0.80 mg/dL      Comment: Serial Number: [id number]:  [id number]        eGFR 83.4 mL/min/1.73     Extra Tubes [[id number]] Collected: 12/07/23 1631    Specimen: Blood, Venous Line Updated: 12/07/23 1731    Narrative:      The following orders were created for panel order Extra Tubes.  Procedure                               Abnormality         Status                     ---------                               -----------         ------                     Gold Top - SST[[id number]]                                   Final result                 Please view results for these tests on the individual orders.    [facility] Top - SST [[id number]] Collected: 12/07/23 1631    Specimen: Blood Updated: 12/07/23 1731     Extra Tube Hold for add-ons.     Comment: Auto resulted.       Blood Gas, Arterial -  [[id number]]  (Abnormal) Collected: 12/07/23 1657    Specimen: Arterial Blood Updated: 12/07/23 1703     Site Left Radial     Adrian's Test Positive     pH, Arterial 7.361 pH units      pCO2, Arterial 36.3 mm Hg      pO2, Arterial 82.2 mm Hg      HCO3, Arterial 20.5 mmol/L      Base Excess, Arterial -4.4 mmol/L      Comment: Serial Number: [id number]:  [id number]        O2 Saturation, Arterial 95.7 %      CO2 Content 21.6 mmol/L      Barometric Pressure for Blood Gas --     Comment: N/A        Modality NRB     FIO2 100 %      Hemodilution No    POC Lactate [[id number]]  (Normal) Collected: 12/07/23 1657    Specimen: Arterial Blood Updated: 12/07/23 1703     Lactate 1.2 mmol/L      Comment: Serial Number: [id number]:  [id number]       Protime-INR [[id number]]  (Normal) Collected: 12/07/23 1631    Specimen: Blood Updated: 12/07/23 1647     Protime 10.5 Seconds      INR 0.96    CBC & Differential [[id number]]  (Abnormal) Collected: 12/07/23 1631    Specimen: Blood Updated: 12/07/23 1638    Narrative:      The following orders were created for panel order CBC & Differential.  Procedure                               Abnormality         Status                     ---------                               -----------         ------                     CBC Auto Differential[[id number]]        Abnormal            Final result                 Please view results for these tests on the individual orders.    CBC Auto Differential [[id number]]  (Abnormal) Collected: 12/07/23 1631    Specimen: Blood Updated: 12/07/23 1638     WBC 6.90 10*3/mm3      RBC 3.32 10*6/mm3      Hemoglobin 11.5 g/dL      Hematocrit 34.0 %      .4 fL      MCH 34.5 pg      MCHC 33.7 g/dL      RDW 21.5 %      RDW-SD 80.1 fl      MPV 7.6 fL      Platelets 280 10*3/mm3      Neutrophil % 66.9 %      Lymphocyte % 25.7 %      Monocyte % 5.3 %      Eosinophil % 0.4 %      Basophil % 1.7 %      Neutrophils, Absolute 4.60 10*3/mm3      Lymphocytes, Absolute 1.80  10*3/mm3      Monocytes, Absolute 0.40 10*3/mm3      Eosinophils, Absolute 0.00 10*3/mm3      Basophils, Absolute 0.10 10*3/mm3      nRBC 0.2 /100 WBC           Imaging Results (Most Recent)       Procedure Component Value Units Date/Time    XR Chest 1 View [[id number]] Collected: 12/09/23 0906     Updated: 12/09/23 0909    Narrative:      XR CHEST 1 VW    Date of Exam: 12/9/2023 8:03 AM CST    Indication: IABP    Comparison: 12/8/2023    Findings:  Cardiomediastinal silhouette and support lines and tubes are relatively unchanged. Caudal marking the tip of the IABP appears similar position. There remains mild pulmonary vascular and interstitial prominence. No airspace disease, pneumothorax, nor   pleural effusion. No acute osseous abnormality identified.      Impression:      Impression:  No significant change identified      Electronically Signed: Clive Li MD    12/9/2023 8:07 AM CST    Workstation ID: [id number]    XR Chest 1 View [[id number]] Collected: 12/09/23 0004     Updated: 12/09/23 0013    Narrative:      XR CHEST 1 VW    Date of Exam: 12/8/2023 11:35 PM EST    Indication: IABP placement    Comparison: 12/8/2023.    Findings:  Intra-aortic balloon pump catheter is present. The distal tip of the catheter appears to have migrated distally and now lies approximately 20 mm from the superior margin of the aortic arch at what appears to be the upper T5 level. There is a small stable   right-sided pleural effusion. Lung parenchyma remains clear. No pneumothorax. There is a stable left-sided chest port. Heart size is unchanged. Pulmonary vasculature is within normal limits      Impression:      Impression:  Intra-aortic balloon pump catheter has migrated distally and now lies approximately 20 mm from the superior margin of the aortic arch at the upper T5 level.        Electronically Signed: Celso Soni MD    12/9/2023 12:11 AM EST    Workstation ID: [id number]    XR Chest 1 View [[id number]] Collected: 12/08/23  1239     Updated: 12/08/23 1246    Narrative:      X-ray chest 1 view    Date of Exam: 12/8/2023 11:22 AM CST    Indication: Balloon pump    Comparison: Chest x-ray 12/7/2023    Findings:  The heart is stable in size. There is a left chest wall port catheter with the distal tip overlying the distal superior vena cava. Intra-aortic balloon pump catheter. Is present with the distal tip 9 mm from the margin of the descending thoracic aortic   arch. There is mild right basilar atelectasis but overall improved aeration at the right lung base from prior study. No pneumothorax. Trace right pleural effusion is suspected.      Impression:      Impression:    1. Improved but residual right lower lung airspace disease and trace effusion.    2. Placement of an intra-aortic balloon pump catheter.      Electronically Signed: Nadja Li MD    12/8/2023 11:44 AM CST    Workstation ID: [id number]    CT Angiogram Chest Pulmonary Embolism [[id number]] Collected: 12/07/23 1723     Updated: 12/07/23 1736    Narrative:      CT ANGIOGRAM CHEST PULMONARY EMBOLISM    Date of Exam: 12/7/2023 4:20 PM CST    Indication: Pulmonary embolism (PE) suspected, high prob.    Comparison: Chest x-ray 12/7/2023 and CT chest 11/18/2023    Technique: Axial CT images were obtained of the chest after the uneventful intravenous administration of iodinated contrast utilizing pulmonary embolism protocol.  Sagittal and coronal reconstructions were performed.  Automated exposure control and   iterative reconstruction methods were used.      Findings:  PULMONARY VASCULATURE: Pulmonary arteries are widely patent without evidence of embolus.Main pulmonary artery is normal in size. No evidence of right heart strain.    MEDIASTINUM:Unremarkable. Aortic and heart size are normal. No aortic dissection identified. No mass nor pericardial effusion.  CORONARY ARTERIES: Mild calcified atherosclerotic disease.  LUNGS: Evaluation of lung parenchyma demonstrates  groundglass opacities in attenuation in the lungs bilaterally with smooth interlobular septal thickening compatible with pulmonary edema. There is however bibasilar airspace disease with consolidation   right greater than left and associated pleural effusions.    There is stable appearance to nodular lesion in the right apical region measuring 1.3 x 1.3 cm. There is an ovoid lesion at the left lung base which appears unchanged measuring 1.6 x 1.8 cm. A nodular density in the superior segment of the left lower   lung appears slightly more irregular measuring 7 x 7 mm, previously 6 x 4 mm (image 72 of series 7). Additional pulmonary nodules appear stable.      PLEURAL SPACE: No effusion, mass, nor pneumothorax.  LYMPH NODES: There are no pathologically enlarged lymph nodes.    UPPER ABDOMEN: Unremarkable    OSSEOUS STRUCTURES: There is a sclerotic metastatic lesion along the lateral right 6 with with osseous destruction noted. There is associated soft tissue component extending into the right middle lung with increasing consolidation compatible with   progression of disease. Sclerotic lesion in the manubrium appears unchanged.      Impression:      Impression:    1. No evidence for pulmonary embolus.  2. Findings concerning for progression of disease with a sclerotic osseous metastatic lesion involving the lateral right with with increased consolidation and soft tissue component extending into the right middle lung.  3. Diffuse groundglass opacities with intralobular septal thickening compatible with pulmonary edema however there is additional bibasilar airspace disease with consolidation and pleural effusions suggesting additional infectious/inflammatory process.      Electronically Signed: Nadja Li MD    12/7/2023 4:34 PM CST    Workstation ID: [id number]    XR Chest 1 View [[id number]] Collected: 12/07/23 1649     Updated: 12/07/23 1658    Narrative:      XR CHEST 1 VW    Date of Exam: 12/7/2023 4:42 PM  EST    Indication: Shortness of breath    Comparison: CT chest, abdomen and pelvis 11/18/2023    Findings:  Heart size within normal limits. Accessed left chest port catheter tip within mid SVC. Consolidative right lower lobe opacities with small right-sided effusion. Background ill-defined nodular alveolar opacities throughout most notable on the left. No   large left effusion or pneumothorax.. Asymmetric sclerosis and expansion involving a right anterior rib in keeping with known metastatic lesion.      Impression:      Impression:  Right lower lobe consolidation and small effusion suspicious for pneumonia in the right clinical setting. Ill-defined more nodular alveolar opacities noted more so on the left, which could reflect edema or multifocal infection.      Electronically Signed: Howard Razo MD    12/7/2023 4:56 PM EST    Workstation ID: [id number]            Chief Complaint on Day of Discharge: chest pain    Hospital Course:  Samantha Massey is a 63 y.o. female with PMHX of Colonic cancer with mets to liver, lung, and bone along with htn who presented to [facility] on 12/7/2023 complaining of dyspnea.  Patient states she was getting a chemo infusion earlier today when she became more short of breath than usual.  Admits to worsening dyspnea over the past 3-4 days with cough.  Due to concern for allergic reaction was sent to [facility] for evaluation.     In the ER, vitals 98F, , RR 24, /108, 100% bipap.  ABG 7.361/36.3/82.2/20.5.  HS troponin 28 and proBNP 2176.  CT PE suggestive of pulm edema and multi-focal pneumonia.  Patient to be admitted for respiratory failure 2/2 pulm edema and pneumonia.    Patient was evaluated by cardiology for NSTEMI and had a cardiac cath which showed 100% occlusion of the LAD with right to left collaterals, and moderate to severe stenosis involving the left main and the ostium of the RCA and severe LV dysfunction.  Her TTE showed an EF of 31-35% and grade I  "diastolic dysfunction.  No stents were placed.  Life vest vs AICD were discussed, but ultimately it was felt that the risks outweighed the benefits.  She was deemed stable for d/c home on 12/13 with lipitor, ASA, metoprolol, jardiance and entresto.  She is to f/u with her PCP in 1 week, oncology and cardiology.       Condition on Discharge:  stable    Physical Exam on Discharge:  /67 (BP Location: Left arm, Patient Position: Lying)   Pulse 93   Temp 97.6 °F (36.4 °C) (Oral)   Resp 15   Ht 157.5 cm (62\")   Wt 55.9 kg (123 lb 3.8 oz)   LMP  (LMP Unknown)   SpO2 96%   BMI 22.54 kg/m²   Physical Exam  General: NAD, AAOx3  HEENT: AT NC, EOMI  Neck: No JVD  CVS: S1/S2 present, tachycardia, no M/R/G  Lungs: CTA B/L  Abdomen: soft, NT, ND, BS+  Ext: no edema  Skin: no rashes, bruises or discolorations  Neuro: no focal deficits  Psych: Not agitated     Discharge Disposition:  Home or Self Care    Discharge Medications:     Discharge Medications        New Medications        Instructions Start Date   aspirin 81 MG chewable tablet   81 mg, Oral, Daily   Start Date: December 14, 2023     atorvastatin 80 MG tablet  Commonly known as: LIPITOR   80 mg, Oral, Daily   Start Date: December 14, 2023     empagliflozin 10 MG tablet tablet  Commonly known as: JARDIANCE   10 mg, Oral, Daily   Start Date: December 14, 2023     furosemide 40 MG tablet  Commonly known as: LASIX   40 mg, Oral, Daily   Start Date: December 14, 2023     metoprolol succinate XL 25 MG 24 hr tablet  Commonly known as: TOPROL-XL   25 mg, Oral, Every 24 Hours Scheduled   Start Date: December 14, 2023     sacubitril-valsartan 24-26 MG tablet  Commonly known as: ENTRESTO   1 tablet, Oral, Every 12 Hours Scheduled             Continue These Medications        Instructions Start Date   doxycycline 100 MG tablet  Commonly known as: VIBRAMYICN   100 mg, Oral, 2 Times Daily      Lonsurf 20-8.19 MG per tablet  Generic drug: trifluridine-tipiracil   2 tablets, " Oral, 2 Times Daily      Lysine 500 MG capsule   1 tablet, Oral, 2 Times Daily      Vitamin B-12 5000 MCG sublingual tablet   1 tablet, Sublingual, Every Early Morning      vitamin D3 125 MCG (5000 UT) capsule capsule   5,000 Units, Oral, Daily               Discharge Diet:  cardiac, diabetic, low salt    Activity at Discharge:  as tolerated    Discharge Care Plan/Instructions: f/u PCP, cardiology, oncology    Time: less than 30 minutes                Electronically signed by Narayan Pool MD at 12/13/23 6864

## 2023-12-14 NOTE — CASE MANAGEMENT/SOCIAL WORK
Case Management Discharge Note           Transportation Services  Private: Car (with significant other)    Final Discharge Disposition Code: 01 - home or self-care

## 2023-12-16 LAB
QT INTERVAL: 294 MS
QTC INTERVAL: 421 MS

## 2023-12-20 LAB
HFE GENE MUT ANL BLD/T: NORMAL
IMP & REVIEW OF LAB RESULTS: NORMAL

## 2023-12-27 ENCOUNTER — OFFICE VISIT (OUTPATIENT)
Dept: FAMILY MEDICINE CLINIC | Facility: CLINIC | Age: 63
End: 2023-12-27
Payer: COMMERCIAL

## 2023-12-27 ENCOUNTER — TELEPHONE (OUTPATIENT)
Dept: CARDIOLOGY | Facility: CLINIC | Age: 63
End: 2023-12-27

## 2023-12-27 VITALS
OXYGEN SATURATION: 99 % | HEART RATE: 103 BPM | HEIGHT: 62 IN | RESPIRATION RATE: 18 BRPM | TEMPERATURE: 97 F | DIASTOLIC BLOOD PRESSURE: 88 MMHG | BODY MASS INDEX: 22.26 KG/M2 | SYSTOLIC BLOOD PRESSURE: 122 MMHG | WEIGHT: 121 LBS

## 2023-12-27 DIAGNOSIS — I25.2 HISTORY OF NON-ST ELEVATION MYOCARDIAL INFARCTION (NSTEMI): ICD-10-CM

## 2023-12-27 DIAGNOSIS — C18.9 ADENOCARCINOMA OF COLON METASTATIC TO LIVER: ICD-10-CM

## 2023-12-27 DIAGNOSIS — Z87.01 HISTORY OF BACTERIAL PNEUMONIA: Primary | ICD-10-CM

## 2023-12-27 DIAGNOSIS — I10 PRIMARY HYPERTENSION: ICD-10-CM

## 2023-12-27 DIAGNOSIS — I25.10 CORONARY ARTERY DISEASE INVOLVING NATIVE CORONARY ARTERY OF NATIVE HEART WITHOUT ANGINA PECTORIS: ICD-10-CM

## 2023-12-27 DIAGNOSIS — E78.5 HYPERLIPIDEMIA LDL GOAL <100: ICD-10-CM

## 2023-12-27 DIAGNOSIS — I51.81 STRESS-INDUCED CARDIOMYOPATHY: ICD-10-CM

## 2023-12-27 DIAGNOSIS — C78.7 ADENOCARCINOMA OF COLON METASTATIC TO LIVER: ICD-10-CM

## 2023-12-27 PROBLEM — I50.43 ACUTE ON CHRONIC COMBINED SYSTOLIC AND DIASTOLIC CHF (CONGESTIVE HEART FAILURE): Status: RESOLVED | Noted: 2023-12-09 | Resolved: 2023-12-27

## 2023-12-27 PROBLEM — J96.01 ACUTE HYPOXIC RESPIRATORY FAILURE: Status: RESOLVED | Noted: 2023-12-07 | Resolved: 2023-12-27

## 2023-12-27 PROBLEM — I50.21 ACUTE SYSTOLIC HEART FAILURE: Status: RESOLVED | Noted: 2023-12-09 | Resolved: 2023-12-27

## 2023-12-27 NOTE — PROGRESS NOTES
Chief Complaint  Congestive Heart Failure, Coronary Artery Disease, and Hypertension    Subjective     CC  Problem List  Visit Diagnosis   Encounters  Notes  Medications  Labs  Result Review Imaging  Media    Samantha Massey presents to [facility] for   History of Present Illness  Samantha was seen at [facility] . She was admitted on 12/07/2023  for chest pain. She was discharged on 12/13/2023. Discharge diagnosis was Acute hypoxic respiratory failure, Acute systolic heart failure, CHF, Non stemi,MI,  Hypertension. Labs that were performed during the encounter included: CMP-BUN 25, CO2 30.0, BUN/Creatinine ratio 33.8, CBC-RBC 2.63, Hemoglobin 9.3, Hematocrit 27.3, .1, MCH 35.3, RDW 22.8, RDW-SD 81.8, INR-1.07, and Troponin T 136, Troponin T Delta 52, Reticulocyte 4.90, Reticulocyte absolute 0.1283, Ionized calcium 1.09, Haptoglobin 29, A1C 4.7. Diagnostic studies that were performed included: X-Ray-chest 12/7/23 - right lower lobe consolidation and small effusion suspicious for pneumonia in the right clinical setting, ill defined more nodular alveolar opacities noted more so on the left which could reflect edema or multifocal infection, chest x-ray 12/8/23 - Improved but residual right lower lung airspace disease and trace effusion. .Chest X-RAY 12/8/23 - . Chest 12/9/23 - No significant change identified, CT Scan-No evidence for pulmonary embolus, findings concerning for progression of disease with a sclerotic osseous metastatic lesion involving the lateral right with with increased consolidation and soft tissue component extending into the right middle lung. Diffuse groundglass opacities with intralobular septal thickening compatible with pulmonary edema however there is additional bibasilar airspace disease with consolidation and pleural effusions suggesting additional infectious/inflammatory process, and cardiac catherization-Severe single-vessel  coronary artery disease 100% occulusion of the LAD with right to left collaterals likely chronic total occlusion. Currently Samantha receives care at home. Complications from the hospital stay include none. The patient stated that they do not need help with their daily life and activities. The patient stated that they do have emotional support at home.   Congestive Heart Failure  Presents for follow-up visit. Pertinent negatives include no abdominal pain, chest pain, chest pressure, near-syncope, orthopnea, palpitations or shortness of breath. Her past medical history is significant for CAD.   Coronary Artery Disease  Presents for follow-up visit. Pertinent negatives include no chest pain, chest pressure, dizziness, leg swelling, palpitations or shortness of breath. Her past medical history is significant for CHF. The symptoms have been stable. Compliance with diet is good. Compliance with exercise is good. Compliance with medications is good.   Hypertension  This is a chronic problem. The current episode started more than 1 year ago. Pertinent negatives include no chest pain, headaches, orthopnea, palpitations, PND or shortness of breath. Risk factors for coronary artery disease include post-menopausal state. Current antihypertension treatment includes beta blockers. The current treatment provides moderate improvement. There are no compliance problems.        Review of Systems   Constitutional:  Positive for unexpected weight loss (10 lbs). Negative for appetite change and fever.   Respiratory:  Negative for cough and shortness of breath.    Cardiovascular:  Negative for chest pain, palpitations, orthopnea, leg swelling, PND and near-syncope.   Gastrointestinal:  Negative for abdominal pain, constipation, diarrhea, nausea and vomiting.   Endocrine: Negative for cold intolerance, heat intolerance, polydipsia and polyuria.   Genitourinary:  Negative for dysuria.   Skin:  Negative for rash.   Neurological:  Negative  "for dizziness, weakness and numbness.   Hematological:  Negative for adenopathy. Does not bruise/bleed easily.        Objective   Vital Signs:   /88 (BP Location: Left arm, Patient Position: Sitting, Cuff Size: Adult)   Pulse 103   Temp 97 °F (36.1 °C) (Infrared)   Resp 18   Ht 157.5 cm (62\")   Wt 54.9 kg (121 lb)   SpO2 99% Comment: room air  BMI 22.13 kg/m²     Physical Exam  Constitutional:       General: She is not in acute distress.  Neck:      Thyroid: No thyromegaly.      Vascular: No JVD.   Cardiovascular:      Rate and Rhythm: Normal rate and regular rhythm.      Heart sounds: No murmur heard.  Pulmonary:      Effort: Pulmonary effort is normal. No respiratory distress.      Breath sounds: Examination of the right-middle field reveals decreased breath sounds. Examination of the right-lower field reveals decreased breath sounds.   Musculoskeletal:      Cervical back: Neck supple.      Right lower leg: No edema.      Left lower leg: No edema.   Lymphadenopathy:      Cervical: No cervical adenopathy.   Skin:     Findings: No rash.   Neurological:      Mental Status: She is alert.        Result Review :Labs  Result Review  Imaging  Med Tab  Media                 Assessment and Plan CC Problem List  Visit Diagnosis  ROS  Review (Popup)  Health Maintenance  Quality  BestPractice  Medications  SmartSets  SnapShot Encounters  Media  Problem List Items Addressed This Visit          High    Adenocarcinoma of colon metastatic to liver    Overview     Metastatatic to the liver , lung, and bone. Followed with Angie/         Primary hypertension    Overview     Fair control on amloride (potassium sparing diurectic)         CAD (coronary artery disease)    Overview     Hx of acute non ST eleation MI 12/07/2023  100% occlusion of LAD with nateral collaterals   Moderate to severe stenosis involving the left main supplying only a left circumflex artery with no critical stenosis  Moderate " stenosis involving the ostium of the right coronary artery that supplies collaterals to the LAD territory  Severe LV dysfunction, with need for ballon pump  Sxs improved, after Rx of pneumona, aggravated by metastatic colon cancer.   On Entresto           Relevant Orders    Ambulatory Referral to Cardiology    Stress-induced cardiomyopathy    Overview     Compensated after resolution of pneumonia. Compliant with meds and cariology follow up Pendyla            Medium    Hyperlipidemia LDL goal <100    Overview     Tolerating Atorvastatin, compliant watch liver function given CA with liver mets.          Current Assessment & Plan     Lipid abnormalities are improving with treatment.  Pharmacotherapy as ordered.  Lipids will be reassessed in 3 months.            Unprioritized    History of non-ST elevation myocardial infarction (NSTEMI)    Overview     12/07/2023          Other Visit Diagnoses       History of bacterial pneumonia    -  Primary    with hx of respiratory failure, now much improved doing well. Abx completesd. Neg exam            Follow Up Instructions Charge Capture  Follow-up Communications  Return in about 3 months (around 3/27/2024), or if symptoms worsen or fail to improve.  Patient was given instructions and counseling regarding her condition or for health maintenance advice. Please see specific information pulled into the AVS if appropriate.

## 2023-12-27 NOTE — TELEPHONE ENCOUNTER
"     Caller: Samantha Massey \"Mary\"    Relationship to patient: Self    Best call back number:  [phone]    Patient is needing: PATIENT REQUESTING A HOSPITAL FOLLOW UP WITH  [facility] IN THE [place] OFFICE. NO AVAILABILITY       "

## 2024-01-03 ENCOUNTER — OFFICE VISIT (OUTPATIENT)
Dept: CARDIOLOGY | Facility: CLINIC | Age: 64
End: 2024-01-03
Payer: COMMERCIAL

## 2024-01-03 VITALS
WEIGHT: 120 LBS | SYSTOLIC BLOOD PRESSURE: 128 MMHG | BODY MASS INDEX: 22.08 KG/M2 | HEIGHT: 62 IN | HEART RATE: 86 BPM | OXYGEN SATURATION: 100 % | DIASTOLIC BLOOD PRESSURE: 74 MMHG

## 2024-01-03 DIAGNOSIS — I25.2 HISTORY OF NON-ST ELEVATION MYOCARDIAL INFARCTION (NSTEMI): ICD-10-CM

## 2024-01-03 DIAGNOSIS — I51.81 STRESS-INDUCED CARDIOMYOPATHY: ICD-10-CM

## 2024-01-03 DIAGNOSIS — I50.23 ACUTE ON CHRONIC SYSTOLIC CONGESTIVE HEART FAILURE: ICD-10-CM

## 2024-01-03 DIAGNOSIS — I10 PRIMARY HYPERTENSION: ICD-10-CM

## 2024-01-03 DIAGNOSIS — E78.5 HYPERLIPIDEMIA LDL GOAL <100: ICD-10-CM

## 2024-01-03 DIAGNOSIS — I25.10 CORONARY ARTERY DISEASE INVOLVING NATIVE CORONARY ARTERY OF NATIVE HEART WITHOUT ANGINA PECTORIS: Primary | ICD-10-CM

## 2024-01-03 RX ORDER — METOPROLOL SUCCINATE 25 MG/1
25 TABLET, EXTENDED RELEASE ORAL
Qty: 90 TABLET | Refills: 3 | Status: SHIPPED | OUTPATIENT
Start: 2024-01-03 | End: 2024-12-28

## 2024-01-03 RX ORDER — ATORVASTATIN CALCIUM 80 MG/1
80 TABLET, FILM COATED ORAL DAILY
Qty: 90 TABLET | Refills: 3 | Status: SHIPPED | OUTPATIENT
Start: 2024-01-03 | End: 2024-12-28

## 2024-01-03 RX ORDER — FUROSEMIDE 40 MG/1
40 TABLET ORAL DAILY
Qty: 30 TABLET | Refills: 0 | Status: SHIPPED | OUTPATIENT
Start: 2024-01-03 | End: 2024-02-02

## 2024-01-03 NOTE — PROGRESS NOTES
Cardiology Office Visit      Encounter Date:  01/03/2024    Patient ID:   Samantha Massey is a 63 y.o. female.    Reason For Followup:  Coronary artery disease and cardiomyopathy    Brief Clinical History:  Dear Dr. Spencer, Diana JOHNSTON MD    I had the pleasure of seeing Samantha Massey today. As you are well aware, this is a 63 y.o. female with a past medical history that is significant for history of presented to the emergency department at [facility] 12/7/2023 from infusion center with complaint of worsening shortness of breath and nonproductive cough during her session.  She reports that her symptoms actually began 3-4 days earlier, but worsened during infusion.  Due to concerns of possible allergic reaction, she was sent to the ED.  Chest CT revealed pulmonary edema, multifocal pneumonia.  No evidence of PE.  proBNP elevated at 2176.  She was started on IV antibiotics per attending as well as Tucker.  She was given Lasix 40 mg IV and scheduled twice daily.  Pulmonary was consulted.  High-sensitivity troponins were ordered with initial result 28.  The patient developed chest discomfort early this morning.  Additional troponins ordered.  She was covered with therapeutic Lovenox and given nitro as needed.  Troponin did trend upward at 80 and 136 respectively.  Cardiologist was notified and she was taken urgently to the cardiac Cath Lab.  Heart cath revealed severe single-vessel coronary disease with 100% LAD, right to left collaterals likely this is a chronic total occlusion.  Moderate-severe stenosis left main supplying only a left circumflex artery with no critical stenosis.  Moderate stenosis ostium of the RCA that supplies collaterals to the LAD territory.  She was found to have severe LV dysfunction with elevated left-sided filling pressures.  An intra-aortic balloon pump was inserted for hemodynamic support.     Interval History:  Denies any further symptoms of chest pain shortness of breath  dizziness or syncope  Shortness of breath is improved  Compliant with medical therapy denies any new cardiac symptoms  Compliant with medical therapy    Assessment & Plan    Impressions:  Acute non-ST elevation MI  Sinus tachycardia  Multivessel coronary artery disease  Possible stress cardiomyopathy  Hypoxic respiratory failure  LV dysfunction  Stage IV metastatic colon cancer  Multifocal pneumonia  Metastatic colon cancer  Impressions:/Cardiac catheterization  Severe single-vessel coronary artery disease 100% occlusion of the LAD with right to left collaterals likely chronic total occlusion  Moderate to severe stenosis involving the left main supplying only a left circumflex artery with no critical stenosis  Moderate stenosis involving the ostium of the right coronary artery that supplies collaterals to the LAD territory  Severe LV dysfunction  Elevated left-sided filling pressures  Possible stress cardiomyopathy based on the LV gram findings and hypokinesis involving the mid to distal segments and also EKG changes that are out of proportion to elevated troponin  Multivessel coronary artery disease with LV dysfunction possible stress cardiomyopathy  Cardiac diagnosis and treatment options reviewed and discussed with patient      Recommendations:  Guideline directed medical therapy as patient's clinical condition allows     Patient is currently taking aspirin, statin, Jardiance, furosemide, Toprol-XL, and Entresto  Repeat echocardiogram to assess LV systolic function  If the LV systolic function is normalized okay to discontinue the use of Lasix  Check labs including CBC CMP and lipids  Continue current medical therapy with aspirin 81 mg p.o. once a day Lipitor 80 mg p.o. once a day Lasix 40 mg once a day Toprol-XL 25 mg p.o. once a day Entresto 24 x 26 1 tablet p.o. twice daily patient is on Jardiance 10 mg p.o. once a day  Repeat echocardiogram in 1 to 2 weeks  Repeat labs in 2 months  Follow-up in office in 3  "months      Vitals:  Vitals:    01/03/24 1359   BP: 128/74   Pulse: 86   SpO2: 100%   Weight: 54.4 kg (120 lb)   Height: 157.5 cm (62\")       Physical Exam:    General: Alert, cooperative, no distress, appears stated age  Head:  Normocephalic, atraumatic, mucous membranes moist  Eyes:  Conjunctiva/corneas clear, EOM's intact     Neck:  Supple,  no adenopathy;      Lungs: Clear to auscultation bilaterally, no wheezes rhonchi rales are noted  Chest wall: No tenderness  Heart::  Regular rate and rhythm, S1 and S2 normal, no murmur, rub or gallop  Abdomen: Soft, non-tender, nondistended bowel sounds active  Extremities: No cyanosis, clubbing, or edema  Pulses: 2+ and symmetric all extremities  Skin:  No rashes or lesions  Neuro/psych: A&O x3. CN II through XII are grossly intact with appropriate affect              Lab Results   Component Value Date    GLUCOSE 98 12/13/2023    BUN 25 (H) 12/13/2023    CREATININE 0.74 12/13/2023    EGFR 91.6 12/13/2023    BCR 33.8 (H) 12/13/2023    K 4.4 12/13/2023    CO2 30.0 (H) 12/13/2023    CALCIUM 9.8 12/13/2023    ALBUMIN 3.7 12/13/2023    BILITOT 0.8 12/13/2023    AST 23 12/13/2023    ALT 19 12/13/2023     Results for orders placed during the hospital encounter of 12/07/23    ADULT TRANSTHORACIC ECHO COMPLETE W/ CONT IF NECESSARY PER PROTOCOL    Interpretation Summary    Left ventricular systolic function is severely decreased. Left ventricular ejection fraction appears to be 31 - 35%.    Left ventricular diastolic function is consistent with (grade I) impaired relaxation.    The left atrial cavity is moderately dilated.     Results for orders placed during the hospital encounter of 12/07/23    Cardiac Catheterization/Vascular Study    Narrative  Table formatting from the original result was not included.  Cardiac Catheterization Operative Report    Samantha Massey  [id number]  12/8/2023  @PCP@    She underwent cardiac catheterization.    Indications for the procedure include: " acute coronary syndrome.    No results found for this or any previous visit.    No results found for this or any previous visit.        Procedure Details:  The risks, benefits, complications, treatment options, and expected outcomes were discussed with the patient. The patient and/or family concurred with the proposed plan, giving informed consent.    After informed consent the patient was brought to the cath lab after appropriate IV hydration was begun and oral premedication was given. She was further sedated with fentanyl. She was prepped and draped in the usual manner. Using the modified Seldinger access technique, a 6 [place] sheath was placed in the femoral artery. A left heart catheterization with coronary arteriography was performed. Findings are discussed below.    After the procedure was completed, sedation was stopped and the sheaths and catheters were all removed. Hemostasis was achieved per established hospital protocols.    Conscious sedation:  Conscious sedation was performed according to protocol.  I supervised and directed an independent trained observer with the assistance of monitoring the patient's level of consciousness and physiologic status throughout the procedure.  Intraoperative service time was 120 minutes.    Findings:    Hemodynamics Central aortic pressure systolic 144 diastolic 68 with a mean pressure of 90 mmHg  LV end-diastolic pressure of 39 mmHg  There was no gradient across the aortic valve on the pullback of the pigtail catheter    Left Main Small caliber vessel with significant dampening with 6 [place] catheter  Left main has a diffuse angiographic 60 to 70% stenosis with some dampening with a 6 [place] catheter but no significant dampening with a 4 [place] catheter supplies on the left circumflex artery  RCA Large-caliber vessel dominant vessel  Right coronary artery has ostial angiographic 50 to 60% stenosis with some dampening with the 6 [place] catheter  Mid right coronary artery  "has another focal area of 20% stenosis  Distal right coronary artery has a focal area of 30 to 40% stenosis  PLB branch of the right coronary artery is a large-caliber vessel with mid angiographic 30 to 40% stenosis  PDA branch of the right coronary artery is a large-caliber vessel has ostial 50 to 60% stenosis  Right coronary artery provides collaterals to the LAD  % occluded in the ostial portion after the left circumflex artery  Circ Moderate to large caliber vessel angiographically normal  LV Moderate to severe hypokinesis involving the mid mid to distal anterior anteroapical wall to LV apex inferior apical wall with estimated LV ejection fraction of 35%  Coronary Dominance Right coronary artery    Estimated Blood Loss:  Minimal    Specimens: None    Complications:  None; patient tolerated the procedure well.    Disposition: PACU - hemodynamically stable.    Condition: stable    Impressions:  Severe single-vessel coronary artery disease 100% occlusion of the LAD with right to left collaterals likely chronic total occlusion  Moderate to severe stenosis involving the left main supplying only a left circumflex artery with no critical stenosis  Moderate stenosis involving the ostium of the right coronary artery that supplies collaterals to the LAD territory  Severe LV dysfunction  Elevated left-sided filling pressures  Possible stress cardiomyopathy based on the LV gram findings and hypokinesis involving the mid to distal segments and also EKG changes that are out of proportion to elevated troponin      Recommendations:  Admit patient to [facility]  Intra-aortic balloon pump for hemodynamic support  Diuresis  Continued aggressive risk factor modification  Test results reviewed and discussed with patient and family     No results found for: \"CHOL\", \"CHLPL\", \"TRIG\", \"HDL\", \"LDL\", \"LDLDIRECT\"             Objective:          Allergies:  Allergies   Allergen Reactions    Hydrocodone Nausea And Vomiting    Iodinated " Contrast Media Hives and Itching     PT HAD SOME HIVES DURING SCAN.  PRIOR TO SCAN 8-3-2013.    Latex Rash     Blisters     Penicillins Hives       Medication Review:     Current Outpatient Medications:     aspirin 81 MG chewable tablet, Chew 1 tablet Daily for 30 days., Disp: 30 tablet, Rfl: 0    atorvastatin (LIPITOR) 80 MG tablet, Take 1 tablet by mouth Daily for 360 days., Disp: 90 tablet, Rfl: 3    Cyanocobalamin (Vitamin B-12) 5000 MCG sublingual tablet, Place 1 tablet under the tongue Every Morning., Disp: , Rfl:     doxycycline (VIBRAMYICN) 100 MG tablet, Take 1 tablet by mouth 2 (Two) Times a Day., Disp: , Rfl:     empagliflozin (JARDIANCE) 10 MG tablet tablet, Take 1 tablet by mouth Daily for 360 days., Disp: 90 tablet, Rfl: 3    furosemide (LASIX) 40 MG tablet, Take 1 tablet by mouth Daily for 30 days., Disp: 30 tablet, Rfl: 0    Lysine 500 MG capsule, Take 1 tablet by mouth 2 (Two) Times a Day., Disp: , Rfl:     metoprolol succinate XL (TOPROL-XL) 25 MG 24 hr tablet, Take 1 tablet by mouth Daily for 360 days., Disp: 90 tablet, Rfl: 3    sacubitril-valsartan (ENTRESTO) 24-26 MG tablet, Take 1 tablet by mouth Every 12 (Twelve) Hours for 360 days., Disp: 180 tablet, Rfl: 3    trifluridine-tipiracil (Lonsurf) 20-8.19 MG per tablet, Take 2 tablets by mouth 2 (Two) Times a Day., Disp: , Rfl:     vitamin D3 125 MCG (5000 UT) capsule capsule, Take 1 capsule by mouth Daily. (Patient not taking: Reported on 1/3/2024), Disp: , Rfl:     Family History:  Family History   Problem Relation Age of Onset    Cancer Sister         Bladder cancer    Cancer Father        Past Medical History:  Past Medical History:   Diagnosis Date    Acute systolic heart failure 12/9/2023    CAD (coronary artery disease) 12/9/2023    Cancer     wiht mets to liver, bone    Chronic kidney disease     Colon cancer     History of colon cancer, stage IV     Stage SHELL with progression    Hypertension     Lung cancer     with colon    NSTEMI  (non-ST elevated myocardial infarction) 2023    PONV (postoperative nausea and vomiting)     Pulmonary arterial hypertension     Radiculopathy 2012    Right L5/S1       Past surgical History:  Past Surgical History:   Procedure Laterality Date    ABDOMINAL SURGERY      CARDIAC CATHETERIZATION N/A 2023    Procedure: Left Heart Cath;  Surgeon: Ramiro Olivera MD;  Location: [facility] CATH INVASIVE LOCATION;  Service: Cardiovascular;  Laterality: N/A;    CYST REMOVAL      cyst removed from [facility] in     PORTACATH PLACEMENT  2017    VENOUS ACCESS DEVICE (PORT) INSERTION Left 2021    Procedure: INSERTION VENOUS ACCESS DEVICE;  Surgeon: Jay Yeh MD;  Location: [facility] MAIN OR;  Service: General;  Laterality: Left;    VENOUS ACCESS DEVICE (PORT) REMOVAL Right 2020    Procedure: REMOVAL VENOUS ACCESS DEVICE;  Surgeon: Jay Yeh MD;  Location: [facility] MAIN OR;  Service: General;  Laterality: Right;    VENTRAL/INCISIONAL HERNIA REPAIR N/A 3/2/2023    Procedure: VENTRAL/INCISIONAL HERNIA REPAIR WITH MESH;  Surgeon: Jay Yeh MD;  Location: [facility] MAIN OR;  Service: General;  Laterality: N/A;       Social History:  Social History     Socioeconomic History    Marital status: Single   Tobacco Use    Smoking status: Former     Packs/day: 1.00     Years: 30.00     Additional pack years: 0.00     Total pack years: 30.00     Types: Cigarettes     Quit date: 2012     Years since quittin.0     Passive exposure: Past    Smokeless tobacco: Never   Vaping Use    Vaping Use: Never used   Substance and Sexual Activity    Alcohol use: Not Currently     Comment: 2 or 3 a month    Drug use: Never    Sexual activity: Defer       Review of Systems:  The following systems were reviewed as they relate to the cardiovascular system: Constitutional, Eyes, ENT, Cardiovascular, Respiratory, Gastrointestinal, Integumentary, Neurological, Psychiatric, Hematologic,  Endocrine, Musculoskeletal, and Genitourinary. The pertinent cardiovascular findings are reported above with all other cardiovascular points within those systems being negative.    Diagnostic Study Review:     Current Electrocardiogram:  Procedures          NOTE: The following portions of the patient's history were reviewed and updated this visit as appropriate: allergies, current medications, past family history, past medical history, past social history, past surgical history and problem list.

## 2024-01-09 NOTE — PROGRESS NOTES
HEMATOLOGY ONCOLOGY OUTPATIENT CONSULTATION       Patient name: Samantha Massey  : 1960  MRN: [id number]  Primary Care Physician: Diana Spencer MD  Referring Physician: Diana Spencer MD  Reason For Consult: Colon cancer.     History of Present Illness:  Patient is a 63 y.o.     2024: In the office for the first time in transfer from [facility]. Ms. Massey reported that sometime in  she developed severe constipation that did not seem to improve. This slowly became associated to fatigue and weakness. Eventually she sought attention and was found to have normocytic anemia in 2012. A CT scan of the abdomen reported innumerable lesions throughout the liver. There was thickening of the distal sigmoid colon. There were some non-specific lymph nodes. She underwent a colonoscopy in early January that revealed a tumor in the distal colon. The final report of pathology was of invasive, moderately differentiated adenocarcinoma without microsatellite instability. She underwent a laparoscopic low anterior resection. The final report of pathology was of a poorly differentiated adenocarcinoma of the rectosigmoid with invasion through the muscularis propria and extension of the mesocolon. No lymph nodes were involved, but she underwent an image guided biopsy of the liver reported metastatic disease consistent with primary colon cancer. In 2012 she started treatment with FOLFOX/bevacizumab. Testing at the time reported KRAS wild type. A PET scan done shortly after the commencement of the treatment reported multiple metastatic lesions in the liver. In  of the same year there was evidence of response to the treatment. Later, in August, the lesions were stable. She continued treatment with the same regimen through 2012 at which time she was transitioned to capecitabine. Radioembolization to the liver was delivered initially to the right lobe and then  to the left lobe in November of that same year. There was no suggestion of disease outside of the liver and the lesions in the liver showed evidence of response. In August of 2014 the capecitabine was discontinued as there was no longer evidence of disease. She did well until October of 2017 when she had radiographic evidence of progressive disease in the liver and an image guided biopsy confirmed colonic primary. FOLFOX/bevacizumab was re-introduced in October of that year.  In February of 2018 she had evidence of response and no suggestion of extrahepatic disease and she underwent left hepatectomy. Following that she was placed on capecitabine as adjuvant treatment. She continued treatment with this agent until September of 2018. In early January of 2019 there was evidence of disease in the lungs and the mediastinal lymph nodes. In July of 2019 she started treatment with FOLFIRI/bevacizumab. In the setting of progressive disease the bevacizumab was changed to cetuximab. In December of 2020 metastatic bone disease was identified. To continue treatment with cetuximab and irinotecan. There was no evidence of progression until January of 2023 that revealed enlarging pulmonary nodules and enlargement of a right seventh rib metastatic lesion. For that reason she was prescribed regorafenib and began treatment in February of the same year.  In April of 2023 she had radiation to the right 7th rib. The regorafenib resulted in hand foot syndrome. In June of 2023 a scan revealed progressive disease and in late July of the same year she began treatment with trifluridine/tipracil and in August of the same year bevacizumab was added. She developed myelosuppression and doses were modified. At the time of this first visit she was still taking the above regimen. She was rather asymptomatic and on exam there were no changes. There was a report of a scan done in early December of 2023 that reported possible enlargement of the rib  metastatic lesion. A decision was made to continue with the same treatment and plans to take over the prescription of trifluridine/tipracil. To have scans in February of 2024     Subjective:  1/11/2024: In the office for initial evaluation.     Past Medical History:   Diagnosis Date    Acute systolic heart failure 12/09/2023    CAD (coronary artery disease) 12/09/2023    Chronic kidney disease     colon cancer     Metastatic to liver and bones    Colon cancer     History of colon cancer, stage IV     Stage SHELL with progression    Hypertension     NSTEMI (non-ST elevated myocardial infarction) 12/09/2023    PONV (postoperative nausea and vomiting)     Pulmonary arterial hypertension     Radiculopathy 07/2012    Right L5/S1     Past Surgical History:   Procedure Laterality Date    ABDOMINAL SURGERY      CARDIAC CATHETERIZATION N/A 12/8/2023    Procedure: Left Heart Cath;  Surgeon: Ramiro Olivera MD;  Location: [facility] CATH INVASIVE LOCATION;  Service: Cardiovascular;  Laterality: N/A;    CYST REMOVAL  1998    cyst removed from [facility] in 1998    PORTACATH PLACEMENT  2017    VENOUS ACCESS DEVICE (PORT) INSERTION Left 01/12/2021    Procedure: INSERTION VENOUS ACCESS DEVICE;  Surgeon: Jay Yeh MD;  Location: [facility] MAIN OR;  Service: General;  Laterality: Left;    VENOUS ACCESS DEVICE (PORT) REMOVAL Right 08/27/2020    Procedure: REMOVAL VENOUS ACCESS DEVICE;  Surgeon: Jay Yeh MD;  Location: [facility] MAIN OR;  Service: General;  Laterality: Right;    VENTRAL/INCISIONAL HERNIA REPAIR N/A 3/2/2023    Procedure: VENTRAL/INCISIONAL HERNIA REPAIR WITH MESH;  Surgeon: Jay Yeh MD;  Location: [facility] MAIN OR;  Service: General;  Laterality: N/A;       Current Outpatient Medications:     aspirin 81 MG chewable tablet, Chew 1 tablet Daily for 30 days., Disp: 30 tablet, Rfl: 0    atorvastatin (LIPITOR) 80 MG tablet, Take 1 tablet by mouth Daily for 360 days., Disp: 90 tablet,  Rfl: 3    Cyanocobalamin (Vitamin B-12) 5000 MCG sublingual tablet, Place 1 tablet under the tongue Every Morning., Disp: , Rfl:     doxycycline (VIBRAMYICN) 100 MG tablet, Take 1 tablet by mouth 2 (Two) Times a Day., Disp: , Rfl:     empagliflozin (JARDIANCE) 10 MG tablet tablet, Take 1 tablet by mouth Daily for 360 days., Disp: 90 tablet, Rfl: 3    furosemide (LASIX) 40 MG tablet, Take 1 tablet by mouth Daily for 30 days., Disp: 30 tablet, Rfl: 0    Lysine 500 MG capsule, Take 1 tablet by mouth 2 (Two) Times a Day., Disp: , Rfl:     metoprolol succinate XL (TOPROL-XL) 25 MG 24 hr tablet, Take 1 tablet by mouth Daily for 360 days., Disp: 90 tablet, Rfl: 3    sacubitril-valsartan (ENTRESTO) 24-26 MG tablet, Take 1 tablet by mouth Every 12 (Twelve) Hours for 360 days., Disp: 180 tablet, Rfl: 3    trifluridine-tipiracil (Lonsurf) 20-8.19 MG per tablet, Take 2 tablets by mouth 2 (Two) Times a Day., Disp: , Rfl:     vitamin D3 125 MCG (5000 UT) capsule capsule, Take 1 capsule by mouth Daily. (Patient not taking: Reported on 1/3/2024), Disp: , Rfl:     Allergies   Allergen Reactions    Hydrocodone Nausea And Vomiting    Iodinated Contrast Media Hives and Itching     PT HAD SOME HIVES DURING SCAN.  PRIOR TO SCAN 8-3-2013.    Latex Rash     Blisters     Penicillins Hives     Family History   Problem Relation Age of Onset    Heart disease Mother     Lung cancer Father 70        Associated to cigarette smoking    Cancer Sister         Bladder cancer     Cancer-related family history includes Cancer in her sister; Lung cancer (age of onset: 70) in her father.    Social History     Tobacco Use    Smoking status: Former     Packs/day: 1.00     Years: 30.00     Additional pack years: 0.00     Total pack years: 30.00     Types: Cigarettes     Start date:      Quit date: 2012     Years since quittin.0     Passive exposure: Past    Smokeless tobacco: Never   Vaping Use    Vaping Use: Never used   Substance Use Topics     Alcohol use: Not Currently     Comment: 2 or 3 a month    Drug use: Never     Social History     Social History Narrative    Not on file     ROS:   Review of Systems   Constitutional:  Negative for activity change, appetite change, chills, diaphoresis, fatigue, fever and unexpected weight change.   HENT:  Negative for congestion, dental problem, drooling, ear discharge, ear pain, facial swelling, hearing loss, mouth sores, nosebleeds, postnasal drip, rhinorrhea, sinus pressure, sinus pain, sneezing, sore throat, tinnitus, trouble swallowing and voice change.    Eyes:  Negative for photophobia, pain, discharge, redness, itching and visual disturbance.   Respiratory:  Negative for apnea, cough, choking, chest tightness, shortness of breath, wheezing and stridor.    Cardiovascular:  Negative for chest pain, palpitations and leg swelling.   Gastrointestinal:  Negative for abdominal distention, abdominal pain, anal bleeding, blood in stool, constipation, diarrhea, nausea, rectal pain and vomiting.   Endocrine: Negative for cold intolerance, heat intolerance, polydipsia and polyuria.   Genitourinary:  Negative for decreased urine volume, difficulty urinating, dysuria, flank pain, frequency, genital sores, hematuria and urgency.   Musculoskeletal:  Negative for arthralgias, back pain, gait problem, joint swelling, myalgias, neck pain and neck stiffness.   Skin:  Negative for color change, pallor and rash.   Neurological:  Negative for dizziness, tremors, seizures, syncope, facial asymmetry, speech difficulty, weakness, light-headedness, numbness and headaches.   Hematological:  Negative for adenopathy. Does not bruise/bleed easily.   Psychiatric/Behavioral:  Negative for agitation, behavioral problems, confusion, decreased concentration, hallucinations, self-injury, sleep disturbance and suicidal ideas. The patient is not nervous/anxious.      Objective:    Vital Signs:  Vitals:    01/11/24 1444   BP: 91/60   Pulse: 108  "  Temp: 97.9 °F (36.6 °C)   TempSrc: Oral   SpO2: 100%   Weight: 55.9 kg (123 lb 3.2 oz)   Height: 157.5 cm (62\")   PainSc: 0-No pain     Body mass index is 22.53 kg/m².    ECOG  (0) Fully active, able to carry on all predisease performance without restriction    Physical Exam:   Physical Exam  Constitutional:       General: She is not in acute distress.     Appearance: She is not ill-appearing, toxic-appearing or diaphoretic.   HENT:      Head: Normocephalic and atraumatic.      Right Ear: External ear normal.      Left Ear: External ear normal.      Nose: Nose normal.      Mouth/Throat:      Mouth: Mucous membranes are moist.      Pharynx: Oropharynx is clear. No oropharyngeal exudate or posterior oropharyngeal erythema.   Eyes:      General: No scleral icterus.        Right eye: No discharge.         Left eye: No discharge.      Conjunctiva/sclera: Conjunctivae normal.      Pupils: Pupils are equal, round, and reactive to light.   Cardiovascular:      Rate and Rhythm: Normal rate and regular rhythm.      Pulses: Normal pulses.      Heart sounds: No murmur heard.     No friction rub. No gallop.   Pulmonary:      Effort: No respiratory distress.      Breath sounds: No stridor. No wheezing, rhonchi or rales.   Abdominal:      General: Abdomen is flat. Bowel sounds are normal. There is no distension.      Palpations: Abdomen is soft. There is no mass.      Tenderness: There is no abdominal tenderness. There is no right CVA tenderness, left CVA tenderness, guarding or rebound.      Hernia: No hernia is present.   Musculoskeletal:         General: No swelling, tenderness, deformity or signs of injury.      Cervical back: No rigidity.      Right lower leg: No edema.      Left lower leg: No edema.   Lymphadenopathy:      Cervical: No cervical adenopathy.   Skin:     Coloration: Skin is not jaundiced.      Findings: No bruising, lesion or rash.   Neurological:      General: No focal deficit present.      Mental Status: " She is alert and oriented to person, place, and time.      Cranial Nerves: No cranial nerve deficit.      Motor: No weakness.      Gait: Gait normal.   Psychiatric:         Mood and Affect: Mood normal.         Behavior: Behavior normal.         Thought Content: Thought content normal.         Judgment: Judgment normal.     JG Adams MD performed the physical exam on 1/11/2024 as documented above.     Lab Results - Last 18 Months   Lab Units 01/11/24  1441 12/13/23  0536 12/12/23  0448   WBC 10*3/mm3 2.42* 5.70 6.30   HEMOGLOBIN g/dL 8.6* 9.3* 9.4*   HEMATOCRIT % 27.4* 27.3* 27.0*   PLATELETS 10*3/mm3 77* 197 192   MCV fL 103.4* 104.1* 103.2*     Lab Results - Last 18 Months   Lab Units 01/11/24  1441 12/13/23  0536 12/12/23  0448   SODIUM mmol/L 140 139 137   POTASSIUM mmol/L 3.6 4.4 4.5   CHLORIDE mmol/L 106 98 98   CO2 mmol/L 22.0 30.0* 27.0   BUN mg/dL 27* 25* 23   CREATININE mg/dL 1.19* 0.74 0.83   CALCIUM mg/dL 8.6 9.8 10.1   BILIRUBIN mg/dL 1.0 0.8 0.8   ALK PHOS U/L 84 94 91   ALT (SGPT) U/L 16 19 17   AST (SGOT) U/L 22 23 26   GLUCOSE mg/dL 102* 98 117*     Lab Results   Component Value Date    GLUCOSE 102 (H) 01/11/2024    BUN 27 (H) 01/11/2024    CREATININE 1.19 (H) 01/11/2024    EGFRIFNONA 71 08/19/2021    BCR 22.7 01/11/2024    K 3.6 01/11/2024    CO2 22.0 01/11/2024    CALCIUM 8.6 01/11/2024    ALBUMIN 4.1 01/11/2024    LABIL2 1.1 06/03/2019    AST 22 01/11/2024    ALT 16 01/11/2024     Lab Results - Last 18 Months   Lab Units 12/09/23  0222 12/08/23  1923 12/08/23  1237 12/07/23  1631   INR   --   --  1.07 0.96   APTT seconds 65.6 62.4 28.8* 26.3*     Lab Results   Component Value Date    IRON 45 12/11/2019    TIBC 368 12/11/2019    FERRITIN 490.60 (H) 12/11/2019     Lab Results   Component Value Date    FOLATE >20.00 12/11/2019     Lab Results   Component Value Date    RETICCTPCT 4.90 (H) 12/12/2023     Lab Results   Component Value Date    DFPRTSNO24 >2,000 (H) 12/11/2019     LDH   Date  Value Ref Range Status   12/09/2023 307 (H) 135 - 214 U/L Final     Lab Results   Component Value Date    HAPTOGLOBIN 29 (L) 12/08/2023     Lab Results   Component Value Date    PTT 65.6 12/09/2023    INR 1.07 12/08/2023     Lab Results   Component Value Date    CEA 1.47 12/11/2019     Assessment & Plan     Metastatic colon cancer on multiple lines of treatment. To continue with trifluridine/tipracil for now. There is suggestion of progressive disease. To obtain scans in the near future. Discussed with her and her family.   Congestive heart failure that had been attributed to continued use of bevacizumab. Suggested to correspond to [facility].   Placed treatment plan.   Reviewed all the records including those from [facility] and [facility]. Discussed with her. She was not aware of the possible progression of her disease.   She is to see me in 4 weeks with new scans.     Sean Adams MD on 1/11/2024 at 17:08

## 2024-01-11 ENCOUNTER — CONSULT (OUTPATIENT)
Dept: ONCOLOGY | Facility: CLINIC | Age: 64
End: 2024-01-11
Payer: COMMERCIAL

## 2024-01-11 ENCOUNTER — LAB (OUTPATIENT)
Dept: LAB | Facility: HOSPITAL | Age: 64
End: 2024-01-11
Payer: COMMERCIAL

## 2024-01-11 VITALS
HEIGHT: 62 IN | WEIGHT: 123.2 LBS | OXYGEN SATURATION: 100 % | SYSTOLIC BLOOD PRESSURE: 91 MMHG | HEART RATE: 108 BPM | BODY MASS INDEX: 22.67 KG/M2 | TEMPERATURE: 97.9 F | DIASTOLIC BLOOD PRESSURE: 60 MMHG

## 2024-01-11 DIAGNOSIS — C18.9 ADENOCARCINOMA OF COLON METASTATIC TO LIVER: Primary | ICD-10-CM

## 2024-01-11 DIAGNOSIS — C78.7 ADENOCARCINOMA OF COLON METASTATIC TO LIVER: Primary | ICD-10-CM

## 2024-01-11 LAB
ALBUMIN SERPL-MCNC: 4.1 G/DL (ref 3.5–5.2)
ALBUMIN/GLOB SERPL: 2 G/DL
ALP SERPL-CCNC: 84 U/L (ref 39–117)
ALT SERPL W P-5'-P-CCNC: 16 U/L (ref 1–33)
ANION GAP SERPL CALCULATED.3IONS-SCNC: 12 MMOL/L (ref 5–15)
AST SERPL-CCNC: 22 U/L (ref 1–32)
BASOPHILS # BLD AUTO: 0.03 10*3/MM3 (ref 0–0.2)
BASOPHILS NFR BLD AUTO: 1.2 % (ref 0–1.5)
BILIRUB SERPL-MCNC: 1 MG/DL (ref 0–1.2)
BUN SERPL-MCNC: 27 MG/DL (ref 8–23)
BUN/CREAT SERPL: 22.7 (ref 7–25)
CALCIUM SPEC-SCNC: 8.6 MG/DL (ref 8.6–10.5)
CEA SERPL-MCNC: 15.6 NG/ML
CHLORIDE SERPL-SCNC: 106 MMOL/L (ref 98–107)
CO2 SERPL-SCNC: 22 MMOL/L (ref 22–29)
CREAT SERPL-MCNC: 1.19 MG/DL (ref 0.57–1)
DEPRECATED RDW RBC AUTO: 61.9 FL (ref 37–54)
EGFRCR SERPLBLD CKD-EPI 2021: 51.8 ML/MIN/1.73
EOSINOPHIL # BLD AUTO: 0.05 10*3/MM3 (ref 0–0.4)
EOSINOPHIL NFR BLD AUTO: 2.1 % (ref 0.3–6.2)
ERYTHROCYTE [DISTWIDTH] IN BLOOD BY AUTOMATED COUNT: 17.4 % (ref 12.3–15.4)
GLOBULIN UR ELPH-MCNC: 2.1 GM/DL
GLUCOSE SERPL-MCNC: 102 MG/DL (ref 65–99)
HCT VFR BLD AUTO: 27.4 % (ref 34–46.6)
HGB BLD-MCNC: 8.6 G/DL (ref 12–15.9)
HOLD SPECIMEN: NORMAL
HOLD SPECIMEN: NORMAL
LYMPHOCYTES # BLD AUTO: 0.6 10*3/MM3 (ref 0.7–3.1)
LYMPHOCYTES NFR BLD AUTO: 24.8 % (ref 19.6–45.3)
MCH RBC QN AUTO: 32.5 PG (ref 26.6–33)
MCHC RBC AUTO-ENTMCNC: 31.4 G/DL (ref 31.5–35.7)
MCV RBC AUTO: 103.4 FL (ref 79–97)
MONOCYTES # BLD AUTO: 0.13 10*3/MM3 (ref 0.1–0.9)
MONOCYTES NFR BLD AUTO: 5.4 % (ref 5–12)
NEUTROPHILS NFR BLD AUTO: 1.61 10*3/MM3 (ref 1.7–7)
NEUTROPHILS NFR BLD AUTO: 66.5 % (ref 42.7–76)
PLATELET # BLD AUTO: 77 10*3/MM3 (ref 140–450)
PMV BLD AUTO: 8.9 FL (ref 6–12)
POTASSIUM SERPL-SCNC: 3.6 MMOL/L (ref 3.5–5.2)
PROT SERPL-MCNC: 6.2 G/DL (ref 6–8.5)
RBC # BLD AUTO: 2.65 10*6/MM3 (ref 3.77–5.28)
SODIUM SERPL-SCNC: 140 MMOL/L (ref 136–145)
WBC NRBC COR # BLD AUTO: 2.42 10*3/MM3 (ref 3.4–10.8)

## 2024-01-11 PROCEDURE — 85025 COMPLETE CBC W/AUTO DIFF WBC: CPT

## 2024-01-11 PROCEDURE — 80053 COMPREHEN METABOLIC PANEL: CPT | Performed by: INTERNAL MEDICINE

## 2024-01-11 PROCEDURE — 82378 CARCINOEMBRYONIC ANTIGEN: CPT | Performed by: INTERNAL MEDICINE

## 2024-01-11 PROCEDURE — 36415 COLL VENOUS BLD VENIPUNCTURE: CPT

## 2024-01-18 ENCOUNTER — TELEPHONE (OUTPATIENT)
Dept: ONCOLOGY | Facility: CLINIC | Age: 64
End: 2024-01-18
Payer: COMMERCIAL

## 2024-01-23 ENCOUNTER — TELEPHONE (OUTPATIENT)
Dept: ONCOLOGY | Facility: CLINIC | Age: 64
End: 2024-01-23
Payer: COMMERCIAL

## 2024-01-23 ENCOUNTER — HOSPITAL ENCOUNTER (OUTPATIENT)
Dept: CARDIOLOGY | Facility: HOSPITAL | Age: 64
Discharge: HOME | End: 2024-01-23
Admitting: INTERNAL MEDICINE
Payer: COMMERCIAL

## 2024-01-23 VITALS
DIASTOLIC BLOOD PRESSURE: 60 MMHG | SYSTOLIC BLOOD PRESSURE: 103 MMHG | WEIGHT: 123.24 LBS | BODY MASS INDEX: 22.68 KG/M2 | HEIGHT: 62 IN

## 2024-01-23 DIAGNOSIS — I25.2 HISTORY OF NON-ST ELEVATION MYOCARDIAL INFARCTION (NSTEMI): ICD-10-CM

## 2024-01-23 DIAGNOSIS — I10 PRIMARY HYPERTENSION: ICD-10-CM

## 2024-01-23 DIAGNOSIS — I25.10 CORONARY ARTERY DISEASE INVOLVING NATIVE CORONARY ARTERY OF NATIVE HEART WITHOUT ANGINA PECTORIS: ICD-10-CM

## 2024-01-23 DIAGNOSIS — I50.23 ACUTE ON CHRONIC SYSTOLIC CONGESTIVE HEART FAILURE: ICD-10-CM

## 2024-01-23 LAB
BH CV ECHO MEAS - ACS: 1.62 CM
BH CV ECHO MEAS - AO MAX PG: 7.3 MMHG
BH CV ECHO MEAS - AO MEAN PG: 4 MMHG
BH CV ECHO MEAS - AO ROOT DIAM: 2.7 CM
BH CV ECHO MEAS - AO V2 MAX: 135.4 CM/SEC
BH CV ECHO MEAS - AO V2 VTI: 26.2 CM
BH CV ECHO MEAS - AVA(I,D): 1.72 CM2
BH CV ECHO MEAS - EDV(CUBED): 126.7 ML
BH CV ECHO MEAS - EDV(MOD-SP4): 42.9 ML
BH CV ECHO MEAS - EF(MOD-BP): 55 %
BH CV ECHO MEAS - EF(MOD-SP4): 56.5 %
BH CV ECHO MEAS - ESV(CUBED): 36 ML
BH CV ECHO MEAS - ESV(MOD-SP4): 18.6 ML
BH CV ECHO MEAS - FS: 34.2 %
BH CV ECHO MEAS - IVS/LVPW: 0.98 CM
BH CV ECHO MEAS - IVSD: 0.89 CM
BH CV ECHO MEAS - LA DIMENSION: 3.2 CM
BH CV ECHO MEAS - LV DIASTOLIC VOL/BSA (35-75): 27.6 CM2
BH CV ECHO MEAS - LV MASS(C)D: 159.4 GRAMS
BH CV ECHO MEAS - LV MAX PG: 2.8 MMHG
BH CV ECHO MEAS - LV MEAN PG: 1.36 MMHG
BH CV ECHO MEAS - LV SYSTOLIC VOL/BSA (12-30): 12 CM2
BH CV ECHO MEAS - LV V1 MAX: 83.1 CM/SEC
BH CV ECHO MEAS - LV V1 VTI: 16.1 CM
BH CV ECHO MEAS - LVIDD: 5 CM
BH CV ECHO MEAS - LVIDS: 3.3 CM
BH CV ECHO MEAS - LVOT AREA: 2.8 CM2
BH CV ECHO MEAS - LVOT DIAM: 1.88 CM
BH CV ECHO MEAS - LVPWD: 0.91 CM
BH CV ECHO MEAS - MR MAX PG: 111 MMHG
BH CV ECHO MEAS - MR MAX VEL: 526.6 CM/SEC
BH CV ECHO MEAS - MV A MAX VEL: 97.3 CM/SEC
BH CV ECHO MEAS - MV DEC SLOPE: 351.6 CM/SEC2
BH CV ECHO MEAS - MV DEC TIME: 0.23 SEC
BH CV ECHO MEAS - MV E MAX VEL: 44.5 CM/SEC
BH CV ECHO MEAS - MV E/A: 0.46
BH CV ECHO MEAS - MV MAX PG: 4.9 MMHG
BH CV ECHO MEAS - MV MEAN PG: 2.3 MMHG
BH CV ECHO MEAS - MV V2 VTI: 24.9 CM
BH CV ECHO MEAS - MVA(VTI): 1.8 CM2
BH CV ECHO MEAS - PA ACC TIME: 0.18 SEC
BH CV ECHO MEAS - PA V2 MAX: 87.8 CM/SEC
BH CV ECHO MEAS - PULM A REVS DUR: 0.09 SEC
BH CV ECHO MEAS - PULM A REVS VEL: 34.6 CM/SEC
BH CV ECHO MEAS - PULM DIAS VEL: 34.5 CM/SEC
BH CV ECHO MEAS - PULM S/D: 1.32
BH CV ECHO MEAS - PULM SYS VEL: 45.5 CM/SEC
BH CV ECHO MEAS - RAP SYSTOLE: 10 MMHG
BH CV ECHO MEAS - RVSP: 39.8 MMHG
BH CV ECHO MEAS - SI(MOD-SP4): 15.6 ML/M2
BH CV ECHO MEAS - SV(LVOT): 44.9 ML
BH CV ECHO MEAS - SV(MOD-SP4): 24.2 ML
BH CV ECHO MEAS - TAPSE (>1.6): 2.2 CM
BH CV ECHO MEAS - TR MAX PG: 29.8 MMHG
BH CV ECHO MEAS - TR MAX VEL: 272.2 CM/SEC
BH CV XLRA - RV BASE: 2.8 CM
BH CV XLRA - RV MID: 2 CM

## 2024-01-23 PROCEDURE — 93306 TTE W/DOPPLER COMPLETE: CPT | Performed by: INTERNAL MEDICINE

## 2024-01-23 PROCEDURE — 93306 TTE W/DOPPLER COMPLETE: CPT

## 2024-01-24 ENCOUNTER — TELEPHONE (OUTPATIENT)
Dept: ONCOLOGY | Facility: CLINIC | Age: 64
End: 2024-01-24
Payer: COMMERCIAL

## 2024-01-24 ENCOUNTER — SPECIALTY PHARMACY (OUTPATIENT)
Dept: PHARMACY | Facility: HOSPITAL | Age: 64
End: 2024-01-24
Payer: COMMERCIAL

## 2024-01-24 NOTE — PROGRESS NOTES
Oral Chemotherapy - New Referral    Received a referral from     Treatment Plan: Lonsurf (trifluridine-tipiracil)  Start date of treatment planned for: As soon as oral specialty medication is available. (Pt was transferred from outside clinic and was taking prior to transfer. She was due for her next cycle on 1/22/24)  Indication: metastatic colon cancer  Relevant past treatments: FOLFOX/avastin, capecitabine, FOLFIRI/avastin, cetuximab (replaced avastin), cetuximab and irinotecan, stivarga  Is the therapy appropriate based on treatment guidelines and FDA labeling?: Yes  Therapeutic Goals: Continue treatment until progression or intolerable toxicity  Patient can self-administer oral medications: Yes    Drug-Drug Interactions: The current medication list was reviewed and there are no relevant drug-drug interactions with the specialty medication.  Medication Allergies: The patient has no relevant allergies as it relates to their oral specialty medication  Review of Labs/Dose Adjustments: The patient's most recent labs were reviewed and all are WNL to start treatment at this dose.     A prescription was released to be determined specialty pharmacy for   Drug: Lonsurf (trifluridine-tipiracil)  Strength: 20-8.19 mg  Directions: Take 2 tablets by mouth twice a day on days 1-5 and 8-12 of each 28 day cycle  Quantity: 40  Refills: 5    +   Drug: Lonsurf (trifluridine-tipiracil)  Strength: 15-6.14 mg  Directions: Take 1 tablets by mouth twice a day on days 1-5 and 8-12 of each 28 day cycle  Quantity: 20  Refills: 5      Pharmacy education is not yet scheduled.    Alley Byers, Pharm.D.    1/24/2024  17:00 EST

## 2024-01-24 NOTE — TELEPHONE ENCOUNTER
PER DR. STINSON - PATIENT IS TO NO LONGER RECEIVE AVASTIN. PATIENT NOTIFIED OF CANCELLED APPTS. SHE CONFIRMED.

## 2024-01-25 DIAGNOSIS — C18.9 ADENOCARCINOMA OF COLON METASTATIC TO LIVER: Primary | ICD-10-CM

## 2024-01-25 DIAGNOSIS — C78.7 ADENOCARCINOMA OF COLON METASTATIC TO LIVER: Primary | ICD-10-CM

## 2024-01-25 RX ORDER — SODIUM CHLORIDE 9 MG/ML
20 INJECTION, SOLUTION INTRAVENOUS ONCE
OUTPATIENT
Start: 2024-02-09

## 2024-01-25 RX ORDER — SODIUM CHLORIDE 9 MG/ML
20 INJECTION, SOLUTION INTRAVENOUS ONCE
OUTPATIENT
Start: 2024-01-26

## 2024-01-25 NOTE — PROGRESS NOTES
Specialty Pharmacy Note: Lonsurf    Verified patient home dose.    A prescription was released to be determined specialty pharmacy for   Drug: Lonsurf (trifluridine-tipiracil)  Strength: 20-8.19 mg  Directions: Take 2 tablets by mouth twice a day on days 1-5 and 8-12 of each 28 day cycle  Quantity: 40  Refills: 5    Released to pharmacy: [facility] on provider signature    Completed independent double check on medication order/RX.        Thanks,    Aria THURMAN, PharmD

## 2024-01-26 ENCOUNTER — SPECIALTY PHARMACY (OUTPATIENT)
Dept: PHARMACY | Facility: HOSPITAL | Age: 64
End: 2024-01-26
Payer: COMMERCIAL

## 2024-01-26 DIAGNOSIS — C18.9 ADENOCARCINOMA OF COLON METASTATIC TO LIVER: Primary | ICD-10-CM

## 2024-01-26 DIAGNOSIS — C78.7 ADENOCARCINOMA OF COLON METASTATIC TO LIVER: Primary | ICD-10-CM

## 2024-01-26 RX ORDER — ASPIRIN 81 MG/1
81 TABLET ORAL DAILY
COMMUNITY

## 2024-01-26 NOTE — PROGRESS NOTES
Specialty Pharmacy Note     Samantha is a patient that transfer from Dr. Perera office prescribed on Lonsurf 40mg. She is currently getting it filled from [facility] which is the required pharmacy thru her insurance. Last fill was 12/12/23 and she said she was getting her medication at no cost.    Dante Madison - CARE COORDINATOR  1/26/2024  08:09 EST

## 2024-01-26 NOTE — PROGRESS NOTES
Specialty Pharmacy Patient Management Program  Oncology Initial Assessment       Samantha Masesy is a 63 y.o. female with metastatic colon cancer seen by an Oncology provider and enrolled in the Oncology Patient Management program offered by [facility].  An initial outreach was conducted, including assessment of therapy appropriateness and specialty medication education for Lonsurf (trifluridine-tipiracil). The patient was introduced to services offered by [facility], including: regular assessments, refill coordination, curbside pick-up or mail order delivery options, prior authorization maintenance, and financial assistance programs as applicable. The patient was also provided with contact information for the pharmacy team.     Regimen: Lonsurf 40mg BID on days 1-5 and 8-12 then off for 14 days.  Patient was due to restart on Monday 1/19/24 but due to change in provider has a delay.    Start date of oral specialty medication:  She has been on medication and was dose reduced per patient due to pancytopenia.  She will start next cycle on receipt of medication.    Relevant Past Medical History, Comorbidities, and Vaccines  Relevant medical history and concomitant health conditions were discussed with the patient. The patient's chart has been reviewed for relevant past medical history and comorbid health conditions and updated as necessary.  Vaccines are coordinated by the patient's oncologist and primary care provider.  Past Medical History:   Diagnosis Date    Acute systolic heart failure 12/09/2023    CAD (coronary artery disease) 12/09/2023    Chronic kidney disease     colon cancer     Metastatic to liver and bones    Colon cancer     History of colon cancer, stage IV     Stage SHELL with progression    Hypertension     NSTEMI (non-ST elevated myocardial infarction) 12/09/2023    PONV (postoperative nausea and vomiting)     Pulmonary arterial hypertension      Radiculopathy 2012    Right L5/S1     Social History     Socioeconomic History    Marital status: Single   Tobacco Use    Smoking status: Former     Packs/day: 1.00     Years: 30.00     Additional pack years: 0.00     Total pack years: 30.00     Types: Cigarettes     Start date:      Quit date: 2012     Years since quittin.0     Passive exposure: Past    Smokeless tobacco: Never   Vaping Use    Vaping Use: Never used   Substance and Sexual Activity    Alcohol use: Not Currently     Comment: 2 or 3 a month    Drug use: Never    Sexual activity: Defer       Allergies  Known allergies and reactions were discussed with the patient. The patient's chart has been reviewed for allergy information and updated as necessary.   Allergies   Allergen Reactions    Hydrocodone Nausea And Vomiting    Iodinated Contrast Media Hives and Itching     PT HAD SOME HIVES DURING SCAN.  PRIOR TO SCAN 8-3-2013.    Latex Rash     Blisters     Penicillins Hives        Current Medication List  This medication list has been reviewed with the patient and evaluated for any interactions or necessary modifications/recommendations, and updated to include all prescription medications, OTC medications, and supplements the patient is currently taking.  This list reflects what is contained in the patient's profile, which has also been marked as reviewed to communicate to other providers it is the most up to date version of the patient's current medication therapy.   Prior to Admission medications    Medication Sig Start Date End Date Taking? Authorizing Provider   aspirin 81 MG EC tablet Take 1 tablet by mouth Daily.   Yes Raffaele Ortiz MD   atorvastatin (LIPITOR) 80 MG tablet Take 1 tablet by mouth Daily for 360 days. 1/3/24 12/28/24 Yes Ramiro Olivera MD   Cyanocobalamin (Vitamin B-12) 5000 MCG sublingual tablet Place 1 tablet under the tongue Every Morning.   Yes Raffaele Ortiz MD   doxycycline (VIBRAMYICN) 100  MG tablet Take 1 tablet by mouth 2 (Two) Times a Day.   Yes Raffaele Ortiz MD   empagliflozin (JARDIANCE) 10 MG tablet tablet Take 1 tablet by mouth Daily for 360 days. 1/3/24 12/28/24 Yes Ramiro Olivera MD   furosemide (LASIX) 40 MG tablet Take 1 tablet by mouth Daily for 30 days. 1/3/24 2/2/24 Yes Ramiro Olivera MD   Lysine 500 MG capsule Take 1 tablet by mouth 2 (Two) Times a Day.   Yes ProviderRaffaele MD   metoprolol succinate XL (TOPROL-XL) 25 MG 24 hr tablet Take 1 tablet by mouth Daily for 360 days. 1/3/24 12/28/24 Yes Ramiro Olivera MD   sacubitril-valsartan (ENTRESTO) 24-26 MG tablet Take 1 tablet by mouth Every 12 (Twelve) Hours for 360 days. 1/3/24 12/28/24 Yes Ramiro Olivera MD   trifluridine-tipiracil (Lonsurf) 20-8.19 MG per tablet Take 2 tablets by mouth 2 (Two) Times a Day.   Yes ProviderRaffaele MD   trifluridine-tipiracil (LONSURF) 20-8.19 MG per tablet Take 2 tablets by mouth Daily With Breakfast & Dinner for 80 doses. Take 2 tabs with Breakfast and 2 with Dinner Day 1-5 and 8-12 Q28D. 1/26/24 3/6/24 Yes Sean Adams MD   aspirin 81 MG chewable tablet Chew 1 tablet Daily for 30 days. 12/14/23 1/13/24  Narayan Pool MD   atorvastatin (LIPITOR) 80 MG tablet Take 1 tablet by mouth Daily for 30 days. 12/14/23 1/3/24  Narayan Pool MD   empagliflozin (JARDIANCE) 10 MG tablet tablet Take 1 tablet by mouth Daily for 30 days. 12/14/23 1/3/24  Narayan Pool MD   furosemide (LASIX) 40 MG tablet Take 1 tablet by mouth Daily for 30 days. 12/14/23 1/3/24  Narayan Pool MD   metoprolol succinate XL (TOPROL-XL) 25 MG 24 hr tablet Take 1 tablet by mouth Daily for 30 days. 12/14/23 1/3/24  Narayan Pool MD   sacubitril-valsartan (ENTRESTO) 24-26 MG tablet Take 1 tablet by mouth Every 12 (Twelve) Hours for 30 days. 12/13/23 1/3/24  Narayan Pool MD   vitamin D3 125 MCG (5000 UT) capsule capsule Take 1 capsule by mouth Daily.  Patient not  taking: Reported on 1/3/2024  1/13/24  Provider, MD Raffaele       Drug Interactions  Reviewed concomitant medications, allergies, labs, comorbidities/medical history, quality of life rates as 10, and immunization history.   Drug-drug interactions noted and discussed during education: no significant drug interactions noted. . Reminded the patient to let us know before making any changes or starting any new prescription or OTC medications so we can first assess drug interactions.  Drug-food interactions noted and discussed during education: Patient was instructed to avoid  none    Recommended Medications Assessment  Bone Health (such as calcium/vitamin D, bisphosphonate, RANKL inhibitor) - Not Indicated   VTE prophylaxis - Not Indicated   Prophylactic antimicrobials - Not Indicated     Relevant Laboratory Values  Lab Results   Component Value Date    GLUCOSE 102 (H) 01/11/2024    CALCIUM 8.6 01/11/2024     01/11/2024    K 3.6 01/11/2024    CO2 22.0 01/11/2024     01/11/2024    BUN 27 (H) 01/11/2024    CREATININE 1.19 (H) 01/11/2024    EGFRIFNONA 71 08/19/2021    BCR 22.7 01/11/2024    ANIONGAP 12.0 01/11/2024     Lab Results   Component Value Date    WBC 2.42 (L) 01/11/2024    RBC 2.65 (L) 01/11/2024    HGB 8.6 (L) 01/11/2024    HCT 27.4 (L) 01/11/2024    .4 (H) 01/11/2024    MCH 32.5 01/11/2024    MCHC 31.4 (L) 01/11/2024    RDW 17.4 (H) 01/11/2024    RDWSD 61.9 (H) 01/11/2024    MPV 8.9 01/11/2024    PLT 77 (L) 01/11/2024    NEUTRORELPCT 66.5 01/11/2024    LYMPHORELPCT 24.8 01/11/2024    MONORELPCT 5.4 01/11/2024    EOSRELPCT 2.1 01/11/2024    BASORELPCT 1.2 01/11/2024    AUTOIGPER 0.3 02/23/2023    NEUTROABS 1.61 (L) 01/11/2024    LYMPHSABS 0.60 (L) 01/11/2024    MONOSABS 0.13 01/11/2024    EOSABS 0.05 01/11/2024    BASOSABS 0.03 01/11/2024    AUTOIGNUM 0.02 02/23/2023    NRBC 0.1 12/13/2023       The above labs have been reviewed. No dose adjustments are needed for the oral specialty  medication(s) based on the labs.    Initial Education Provided for Specialty Medication  The patient has been provided with the following education. All questions and concerns have been addressed prior to the patient receiving the medication, and the patient has verbalized understanding of the education and any materials provided.  Additional patient education shall be provided and documented upon request by the patient, provider or payer.      Provided patient with:   24-hour clinic phone number and my contact information and instructions to call should additional questions arise.     Medication Education Sheets Provided: (select all that apply)  Oral Specialty Medication: Lonsurf (trifluridine-tipiracil)      Other Education Sheets Provided: (select all that apply)  none    TOPICS COMMENTS   Storage and Handling of Oral Specialty Medication Store in the original container, in a dry location out of direct sunlight, and out of reach of children or pets. and Store at room temperature.  Discussed safe handling and what to do with any unused medication.   Administration of Oral Specialty Medication Do not crush or chew tablets. and take within 1 hour of meal twice a day   Adherence to Oral Specialty Regimen and Handling Missed Doses Patient is likely to have good treatment adherence; reinforced the importance of adherence. Reviewed how to address missed doses and to let us know of any missed doses.   Anemia: role of RBC, cause, s/s, ways to manage, role of transfusion Reviewed the role of RBC and the use of transfusions if hemoglobin decreases too much.  Patient to notify us if they experience shortness of breath, dizziness, or palpitations.  Also let patient know they could feel more tired than usual and to try to stay active, but rest if they need to.    Thrombocytopenia: role of platelet, cause, s/s, ways to prevent bleeding, things to avoid, when to seek help Reviewed the role of platelets in blood clotting and  when to call clinic (bloody nose that bleeds for 5 mins despite pressure, a cut that won't stop bleeding despite pressure, gums that bleed excessively with brushing or flossing). Recommended using an electric razor, soft bristle toothbrush, and blowing your nose gently.    Neutropenia: role of WBC, cause, infection precautions, s/s of infection, when to call MD Reviewed the role of WBC, good infection prevention practices, and when to call the clinic (temperature 100.4F, sore throat, burning urination, etc)  COVID Vaccines:  vaccinated but  not boosted  Flu Vaccine: Declined flu vaccine   Nutrition and Appetite Changes:  importance of maintaining healthy diet & weight, ways to manage to improve intake, dietary consult, exercise regimen, electrolyte and/or blood glucose abnormalities Decreased Appetite: Discussed the risk of decreased appetite. Recommended eating smaller, more frequent meals. Instructed the patient to contact the clinic if losing weight or having difficulty eating enough to maintain energy level.   Diarrhea: causes, s/s of dehydration, ways to manage, dietary changes, when to call MD Chemotherapy : Discussed the risk of diarrhea. Instructed patient on use OTC loperamide with diarrhea onset, but emphasized the importance of calling the clinic if 4-6 episodes in 24 hours not relieved by OTC loperamide. Patient has not experienced this.   Constipation: causes, ways to manage, dietary changes, when to call MD Provided supplementary handout with instructions for use of docusate and other OTC therapies to manage constipation.  Instructed to call us if medications aren't working.   Nausea/Vomiting: cause, use of antiemetics, dietary changes, when to call MD Emetic risk: Low  Patient has been taking and has not had any nausea    Instructed the patient to take a dose of the PRN medication at the first onset of nausea and if it's not working to call us for additional medications.  Also provided non-drug  measures to mitigate nausea.   Mouth Sores: causes, oral care, ways to manage .    Alopecia: cause, ways to manage, resources    Nervous System Changes: causes, s/s, neuropathies, cognitive changes, ways to manage fatigue   Pain: causes, ways to manage Chemo: Discussed muscle and joint aches/pains with chemotherapy, and recommended the use of OTC pain relief with ibuprofen or acetaminophen if needed.   Skin/Nail Changes: cause, s/s, ways to manage none       Organ Toxicities: cause, s/s, need for diagnostic tests, labs, when to notify MD Discussed potential effects on organ systems, monitoring, diagnostic tests, labs, and when to notify their MD. Discussed the signs/symptoms of the following:  myelosuppression and lung changes   Miscellaneous Financial Issues: none-copay card obtained  Lab Draws: On or before day 1 of each cycle, no sooner than 3 days early.     Infertility and Sexuality:  causes, fertility preservation options, sexuality changes, ways to manage, importance of birth control The patient is not of childbearing potential.   Home Care: how to manage bodily fluids Counseled on management of soiled linens and proper flush technique.  Discussed how to manage all the side effects at home and advised when to contact the MD office   Survivorship: distress, distress assessment, secondary malignancies, early/late effects, follow-up, social issues, social support      Adherence and Self-Administration  Barriers to Patient Adherence and/or Self-Administration: none  Methods for Supporting Patient Adherence and/or Self-Administration:  patient reports no missed doses  Expected duration of therapy: Until disease progression or intolerable toxicity    Goals of Therapy  Patient Goals of Therapy:   Consistently take medications as prescribed  Patient will adhere to medication regimen  Patient will report any medication side effects to healthcare provider  Clinical Goals:    Goals Addressed Today        Specialty  Pharmacy General Goal      Clinical goal/therapeutic target: disease control, per the recent oncology clinic notes and labs.             Support patient understanding of medication regimen  Ensure patient knows the pharmacy contact information  Schedule regular follow-up to monitor the treatment serious adverse events  Schedule regular follow-up to confirm medication adherence  Schedule regular follow-up to monitor disease progression or stability    Quality of Life Assessment   The patient's current (pre-therapy) quality of life was discussed with the patient. The QOL segment of this outreach has been reviewed and updated.   Quality of Life Score: 10/10    Reassessment Plan & Follow-Up  Pharmacist to perform regular reassessments no more than (6) months from the previous assessment.  Welcome information and patient satisfaction survey to be sent by retail team with patient's initial fill.  Care Coordinator to set up future refill outreaches, coordinate prescription delivery, and escalate clinical questions to pharmacist.     Additional Plans, Therapy Recommendations or Therapy Problems to Be Addressed: medication delivery and copay card     Attestation I attest the patient was actively involved in and has agreed to the above plan of care. If the prescribed therapy is at any point deemed not appropriate based on the current or future assessments, a consultation will be initiated with the patient's specialty care provider to determine the best course of action. The revised plan of therapy will be documented along with any required assessments and/or additional patient education provided.     Aria THURMAN, PharmD      Date and Time: 1/26/2024  16:08 EST

## 2024-01-29 ENCOUNTER — SPECIALTY PHARMACY (OUTPATIENT)
Dept: PHARMACY | Facility: HOSPITAL | Age: 64
End: 2024-01-29
Payer: COMMERCIAL

## 2024-01-29 NOTE — PROGRESS NOTES
Specialty Pharmacy Note     Initial fill of Trifluridine-Tipiracil (Lonsurf) 40mg dose was dispensed from [facility] on 1/26/24 to arrive on 1/29/24 verified by patient. Samantha says she is starting her medication today.    Dante Madison - CARE COORDINATOR  1/29/2024  15:25 EST

## 2024-02-01 ENCOUNTER — HOSPITAL ENCOUNTER (OUTPATIENT)
Dept: CT IMAGING | Facility: HOSPITAL | Age: 64
Discharge: HOME | End: 2024-02-01
Admitting: INTERNAL MEDICINE
Payer: COMMERCIAL

## 2024-02-01 ENCOUNTER — HOSPITAL ENCOUNTER (OUTPATIENT)
Dept: CT IMAGING | Facility: HOSPITAL | Age: 64
Discharge: HOME | End: 2024-02-01
Payer: COMMERCIAL

## 2024-02-01 DIAGNOSIS — C18.9 ADENOCARCINOMA OF COLON METASTATIC TO LIVER: ICD-10-CM

## 2024-02-01 DIAGNOSIS — C78.7 ADENOCARCINOMA OF COLON METASTATIC TO LIVER: ICD-10-CM

## 2024-02-01 PROCEDURE — 71250 CT THORAX DX C-: CPT

## 2024-02-01 PROCEDURE — 74176 CT ABD & PELVIS W/O CONTRAST: CPT

## 2024-02-07 RX ORDER — FUROSEMIDE 40 MG/1
TABLET ORAL
Qty: 90 TABLET | Refills: 0 | Status: SHIPPED | OUTPATIENT
Start: 2024-02-07

## 2024-02-15 ENCOUNTER — APPOINTMENT (OUTPATIENT)
Dept: PHARMACY | Facility: HOSPITAL | Age: 64
End: 2024-02-15
Payer: COMMERCIAL

## 2024-02-15 ENCOUNTER — LAB (OUTPATIENT)
Dept: LAB | Facility: HOSPITAL | Age: 64
End: 2024-02-15
Payer: COMMERCIAL

## 2024-02-15 ENCOUNTER — SPECIALTY PHARMACY (OUTPATIENT)
Dept: PHARMACY | Facility: HOSPITAL | Age: 64
End: 2024-02-15
Payer: COMMERCIAL

## 2024-02-15 ENCOUNTER — HOSPITAL ENCOUNTER (OUTPATIENT)
Dept: ONCOLOGY | Facility: HOSPITAL | Age: 64
Discharge: HOME | End: 2024-02-15
Admitting: INTERNAL MEDICINE
Payer: COMMERCIAL

## 2024-02-15 ENCOUNTER — HOSPITAL ENCOUNTER (OUTPATIENT)
Dept: INFUSION THERAPY | Facility: HOSPITAL | Age: 64
Discharge: HOME | End: 2024-02-15
Payer: COMMERCIAL

## 2024-02-15 ENCOUNTER — OFFICE VISIT (OUTPATIENT)
Dept: ONCOLOGY | Facility: CLINIC | Age: 64
End: 2024-02-15
Payer: COMMERCIAL

## 2024-02-15 VITALS
HEIGHT: 62 IN | TEMPERATURE: 97.7 F | BODY MASS INDEX: 22.6 KG/M2 | WEIGHT: 122.8 LBS | HEART RATE: 91 BPM | OXYGEN SATURATION: 100 % | DIASTOLIC BLOOD PRESSURE: 74 MMHG | SYSTOLIC BLOOD PRESSURE: 119 MMHG

## 2024-02-15 DIAGNOSIS — C78.7 ADENOCARCINOMA OF COLON METASTATIC TO LIVER: ICD-10-CM

## 2024-02-15 DIAGNOSIS — D64.9 ANEMIA, UNSPECIFIED TYPE: ICD-10-CM

## 2024-02-15 DIAGNOSIS — C78.7 ADENOCARCINOMA OF COLON METASTATIC TO LIVER: Primary | ICD-10-CM

## 2024-02-15 DIAGNOSIS — C18.9 MALIGNANT NEOPLASM OF COLON METASTATIC TO BONE: Primary | ICD-10-CM

## 2024-02-15 DIAGNOSIS — C18.9 ADENOCARCINOMA OF COLON METASTATIC TO LIVER: Primary | ICD-10-CM

## 2024-02-15 DIAGNOSIS — C79.51 MALIGNANT NEOPLASM OF COLON METASTATIC TO BONE: ICD-10-CM

## 2024-02-15 DIAGNOSIS — C79.51 MALIGNANT NEOPLASM OF COLON METASTATIC TO BONE: Primary | ICD-10-CM

## 2024-02-15 DIAGNOSIS — C18.9 MALIGNANT NEOPLASM OF COLON METASTATIC TO BONE: ICD-10-CM

## 2024-02-15 DIAGNOSIS — C18.9 ADENOCARCINOMA OF COLON METASTATIC TO LIVER: ICD-10-CM

## 2024-02-15 LAB
ABO GROUP BLD: NORMAL
ALBUMIN SERPL-MCNC: 3.9 G/DL (ref 3.5–5.2)
ALBUMIN SERPL-MCNC: 4.2 G/DL (ref 3.5–5.2)
ALBUMIN/GLOB SERPL: 1.6 G/DL
ALBUMIN/GLOB SERPL: 1.7 G/DL
ALP SERPL-CCNC: 103 U/L (ref 39–117)
ALP SERPL-CCNC: 104 U/L (ref 39–117)
ALT SERPL W P-5'-P-CCNC: 13 U/L (ref 1–33)
ALT SERPL W P-5'-P-CCNC: 14 U/L (ref 1–33)
ANION GAP SERPL CALCULATED.3IONS-SCNC: 9 MMOL/L (ref 5–15)
ANION GAP SERPL CALCULATED.3IONS-SCNC: 9 MMOL/L (ref 5–15)
AST SERPL-CCNC: 19 U/L (ref 1–32)
AST SERPL-CCNC: 20 U/L (ref 1–32)
BASOPHILS # BLD AUTO: 0 10*3/MM3 (ref 0–0.2)
BASOPHILS # BLD AUTO: 0.02 10*3/MM3 (ref 0–0.2)
BASOPHILS # BLD AUTO: 0.02 10*3/MM3 (ref 0–0.2)
BASOPHILS NFR BLD AUTO: 0.5 % (ref 0–1.5)
BASOPHILS NFR BLD AUTO: 0.7 % (ref 0–1.5)
BASOPHILS NFR BLD AUTO: 0.7 % (ref 0–1.5)
BB HOLD TUBE: NORMAL
BILIRUB SERPL-MCNC: 0.5 MG/DL (ref 0–1.2)
BILIRUB SERPL-MCNC: 0.5 MG/DL (ref 0–1.2)
BLD GP AB SCN SERPL QL: NEGATIVE
BUN SERPL-MCNC: 22 MG/DL (ref 8–23)
BUN SERPL-MCNC: 22 MG/DL (ref 8–23)
BUN/CREAT SERPL: 23.9 (ref 7–25)
BUN/CREAT SERPL: 24.4 (ref 7–25)
CALCIUM SPEC-SCNC: 8.4 MG/DL (ref 8.6–10.5)
CALCIUM SPEC-SCNC: 8.6 MG/DL (ref 8.6–10.5)
CEA SERPL-MCNC: 26 NG/ML
CHLORIDE SERPL-SCNC: 109 MMOL/L (ref 98–107)
CHLORIDE SERPL-SCNC: 109 MMOL/L (ref 98–107)
CO2 SERPL-SCNC: 22 MMOL/L (ref 22–29)
CO2 SERPL-SCNC: 23 MMOL/L (ref 22–29)
CREAT SERPL-MCNC: 0.9 MG/DL (ref 0.57–1)
CREAT SERPL-MCNC: 0.92 MG/DL (ref 0.57–1)
DEPRECATED RDW RBC AUTO: 59 FL (ref 37–54)
DEPRECATED RDW RBC AUTO: 61.9 FL (ref 37–54)
DEPRECATED RDW RBC AUTO: 66.9 FL (ref 37–54)
EGFRCR SERPLBLD CKD-EPI 2021: 70.5 ML/MIN/1.73
EGFRCR SERPLBLD CKD-EPI 2021: 72.4 ML/MIN/1.73
EOSINOPHIL # BLD AUTO: 0.05 10*3/MM3 (ref 0–0.4)
EOSINOPHIL # BLD AUTO: 0.08 10*3/MM3 (ref 0–0.4)
EOSINOPHIL # BLD AUTO: 0.1 10*3/MM3 (ref 0–0.4)
EOSINOPHIL NFR BLD AUTO: 1.6 % (ref 0.3–6.2)
EOSINOPHIL NFR BLD AUTO: 2.7 % (ref 0.3–6.2)
EOSINOPHIL NFR BLD AUTO: 2.9 % (ref 0.3–6.2)
ERYTHROCYTE [DISTWIDTH] IN BLOOD BY AUTOMATED COUNT: 17.3 % (ref 12.3–15.4)
ERYTHROCYTE [DISTWIDTH] IN BLOOD BY AUTOMATED COUNT: 17.6 % (ref 12.3–15.4)
ERYTHROCYTE [DISTWIDTH] IN BLOOD BY AUTOMATED COUNT: 19.6 % (ref 12.3–15.4)
GLOBULIN UR ELPH-MCNC: 2.5 GM/DL
GLOBULIN UR ELPH-MCNC: 2.5 GM/DL
GLUCOSE SERPL-MCNC: 83 MG/DL (ref 65–99)
GLUCOSE SERPL-MCNC: 94 MG/DL (ref 65–99)
HCT VFR BLD AUTO: 22.8 % (ref 34–46.6)
HCT VFR BLD AUTO: 23.4 % (ref 34–46.6)
HCT VFR BLD AUTO: 23.6 % (ref 34–46.6)
HGB BLD-MCNC: 7.1 G/DL (ref 12–15.9)
HGB BLD-MCNC: 7.3 G/DL (ref 12–15.9)
HGB BLD-MCNC: 7.5 G/DL (ref 12–15.9)
HOLD SPECIMEN: NORMAL
LYMPHOCYTES # BLD AUTO: 1.3 10*3/MM3 (ref 0.7–3.1)
LYMPHOCYTES # BLD AUTO: 1.41 10*3/MM3 (ref 0.7–3.1)
LYMPHOCYTES # BLD AUTO: 1.6 10*3/MM3 (ref 0.7–3.1)
LYMPHOCYTES NFR BLD AUTO: 44.4 % (ref 19.6–45.3)
LYMPHOCYTES NFR BLD AUTO: 46.1 % (ref 19.6–45.3)
LYMPHOCYTES NFR BLD AUTO: 46.6 % (ref 19.6–45.3)
MCH RBC QN AUTO: 31.3 PG (ref 26.6–33)
MCH RBC QN AUTO: 31.5 PG (ref 26.6–33)
MCH RBC QN AUTO: 31.6 PG (ref 26.6–33)
MCHC RBC AUTO-ENTMCNC: 30.9 G/DL (ref 31.5–35.7)
MCHC RBC AUTO-ENTMCNC: 31.1 G/DL (ref 31.5–35.7)
MCHC RBC AUTO-ENTMCNC: 32 G/DL (ref 31.5–35.7)
MCV RBC AUTO: 100.4 FL (ref 79–97)
MCV RBC AUTO: 102.2 FL (ref 79–97)
MCV RBC AUTO: 98.4 FL (ref 79–97)
MONOCYTES # BLD AUTO: 0.1 10*3/MM3 (ref 0.1–0.9)
MONOCYTES # BLD AUTO: 0.12 10*3/MM3 (ref 0.1–0.9)
MONOCYTES # BLD AUTO: 0.15 10*3/MM3 (ref 0.1–0.9)
MONOCYTES NFR BLD AUTO: 3.9 % (ref 5–12)
MONOCYTES NFR BLD AUTO: 4.4 % (ref 5–12)
MONOCYTES NFR BLD AUTO: 5.1 % (ref 5–12)
NEUTROPHILS NFR BLD AUTO: 1.38 10*3/MM3 (ref 1.7–7)
NEUTROPHILS NFR BLD AUTO: 1.46 10*3/MM3 (ref 1.7–7)
NEUTROPHILS NFR BLD AUTO: 1.5 10*3/MM3 (ref 1.7–7)
NEUTROPHILS NFR BLD AUTO: 45.6 % (ref 42.7–76)
NEUTROPHILS NFR BLD AUTO: 47.1 % (ref 42.7–76)
NEUTROPHILS NFR BLD AUTO: 47.7 % (ref 42.7–76)
NRBC BLD AUTO-RTO: 0.1 /100 WBC (ref 0–0.2)
PLATELET # BLD AUTO: 126 10*3/MM3 (ref 140–450)
PLATELET # BLD AUTO: 132 10*3/MM3 (ref 140–450)
PLATELET # BLD AUTO: 139 10*3/MM3 (ref 140–450)
PMV BLD AUTO: 7.4 FL (ref 6–12)
PMV BLD AUTO: 9.3 FL (ref 6–12)
PMV BLD AUTO: 9.5 FL (ref 6–12)
POTASSIUM SERPL-SCNC: 4.1 MMOL/L (ref 3.5–5.2)
POTASSIUM SERPL-SCNC: 4.3 MMOL/L (ref 3.5–5.2)
PROT SERPL-MCNC: 6.4 G/DL (ref 6–8.5)
PROT SERPL-MCNC: 6.7 G/DL (ref 6–8.5)
RBC # BLD AUTO: 2.27 10*6/MM3 (ref 3.77–5.28)
RBC # BLD AUTO: 2.31 10*6/MM3 (ref 3.77–5.28)
RBC # BLD AUTO: 2.37 10*6/MM3 (ref 3.77–5.28)
RH BLD: NEGATIVE
SODIUM SERPL-SCNC: 140 MMOL/L (ref 136–145)
SODIUM SERPL-SCNC: 141 MMOL/L (ref 136–145)
T&S EXPIRATION DATE: NORMAL
WBC NRBC COR # BLD AUTO: 2.93 10*3/MM3 (ref 3.4–10.8)
WBC NRBC COR # BLD AUTO: 3.06 10*3/MM3 (ref 3.4–10.8)
WBC NRBC COR # BLD AUTO: 3.4 10*3/MM3 (ref 3.4–10.8)

## 2024-02-15 PROCEDURE — 25010000002 HEPARIN LOCK FLUSH PER 10 UNITS: Performed by: INTERNAL MEDICINE

## 2024-02-15 PROCEDURE — 86900 BLOOD TYPING SEROLOGIC ABO: CPT | Performed by: INTERNAL MEDICINE

## 2024-02-15 PROCEDURE — 36415 COLL VENOUS BLD VENIPUNCTURE: CPT

## 2024-02-15 PROCEDURE — 85025 COMPLETE CBC W/AUTO DIFF WBC: CPT | Performed by: INTERNAL MEDICINE

## 2024-02-15 PROCEDURE — 82378 CARCINOEMBRYONIC ANTIGEN: CPT | Performed by: INTERNAL MEDICINE

## 2024-02-15 PROCEDURE — 86850 RBC ANTIBODY SCREEN: CPT | Performed by: INTERNAL MEDICINE

## 2024-02-15 PROCEDURE — 86901 BLOOD TYPING SEROLOGIC RH(D): CPT | Performed by: INTERNAL MEDICINE

## 2024-02-15 PROCEDURE — 36591 DRAW BLOOD OFF VENOUS DEVICE: CPT

## 2024-02-15 PROCEDURE — 80053 COMPREHEN METABOLIC PANEL: CPT | Performed by: INTERNAL MEDICINE

## 2024-02-15 PROCEDURE — 85025 COMPLETE CBC W/AUTO DIFF WBC: CPT

## 2024-02-15 PROCEDURE — 86923 COMPATIBILITY TEST ELECTRIC: CPT

## 2024-02-15 RX ORDER — HEPARIN SODIUM (PORCINE) LOCK FLUSH IV SOLN 100 UNIT/ML 100 UNIT/ML
500 SOLUTION INTRAVENOUS AS NEEDED
Status: CANCELLED | OUTPATIENT
Start: 2024-02-15

## 2024-02-15 RX ORDER — SODIUM CHLORIDE 0.9 % (FLUSH) 0.9 %
20 SYRINGE (ML) INJECTION AS NEEDED
Status: CANCELLED | OUTPATIENT
Start: 2024-02-15

## 2024-02-15 RX ORDER — SODIUM CHLORIDE 9 MG/ML
20 INJECTION, SOLUTION INTRAVENOUS ONCE
OUTPATIENT
Start: 2024-02-23

## 2024-02-15 RX ORDER — SODIUM CHLORIDE 9 MG/ML
250 INJECTION, SOLUTION INTRAVENOUS AS NEEDED
Status: CANCELLED | OUTPATIENT
Start: 2024-02-15

## 2024-02-15 RX ORDER — HEPARIN SODIUM (PORCINE) LOCK FLUSH IV SOLN 100 UNIT/ML 100 UNIT/ML
500 SOLUTION INTRAVENOUS AS NEEDED
Status: DISCONTINUED | OUTPATIENT
Start: 2024-02-15 | End: 2024-02-16 | Stop reason: HOSPADM

## 2024-02-15 RX ORDER — SODIUM CHLORIDE 9 MG/ML
20 INJECTION, SOLUTION INTRAVENOUS ONCE
OUTPATIENT
Start: 2024-03-08

## 2024-02-15 RX ORDER — SODIUM CHLORIDE 0.9 % (FLUSH) 0.9 %
20 SYRINGE (ML) INJECTION AS NEEDED
Status: DISCONTINUED | OUTPATIENT
Start: 2024-02-15 | End: 2024-02-16 | Stop reason: HOSPADM

## 2024-02-15 RX ADMIN — HEPARIN 500 UNITS: 100 SYRINGE at 15:15

## 2024-02-15 RX ADMIN — Medication 20 ML: at 15:13

## 2024-02-15 NOTE — CODE DOCUMENTATION
Blood drawn from right ac on first attempt with 23 g butterfly needle. Sterile 2x2 applied. Pt tolerated well.

## 2024-02-16 ENCOUNTER — HOSPITAL ENCOUNTER (OUTPATIENT)
Dept: INFUSION THERAPY | Facility: HOSPITAL | Age: 64
Discharge: HOME | End: 2024-02-16
Payer: COMMERCIAL

## 2024-02-16 VITALS
SYSTOLIC BLOOD PRESSURE: 151 MMHG | TEMPERATURE: 97.7 F | HEART RATE: 87 BPM | OXYGEN SATURATION: 99 % | RESPIRATION RATE: 16 BRPM | DIASTOLIC BLOOD PRESSURE: 81 MMHG

## 2024-02-16 DIAGNOSIS — C78.7 ADENOCARCINOMA OF COLON METASTATIC TO LIVER: Primary | ICD-10-CM

## 2024-02-16 DIAGNOSIS — D64.9 ANEMIA, UNSPECIFIED TYPE: ICD-10-CM

## 2024-02-16 DIAGNOSIS — C18.9 ADENOCARCINOMA OF COLON METASTATIC TO LIVER: Primary | ICD-10-CM

## 2024-02-16 PROCEDURE — 36430 TRANSFUSION BLD/BLD COMPNT: CPT

## 2024-02-16 PROCEDURE — P9040 RBC LEUKOREDUCED IRRADIATED: HCPCS

## 2024-02-16 PROCEDURE — P9016 RBC LEUKOCYTES REDUCED: HCPCS

## 2024-02-16 PROCEDURE — 86900 BLOOD TYPING SEROLOGIC ABO: CPT

## 2024-02-16 PROCEDURE — 25010000002 HEPARIN LOCK FLUSH PER 10 UNITS: Performed by: INTERNAL MEDICINE

## 2024-02-16 RX ORDER — SODIUM CHLORIDE 0.9 % (FLUSH) 0.9 %
20 SYRINGE (ML) INJECTION AS NEEDED
OUTPATIENT
Start: 2024-02-16

## 2024-02-16 RX ORDER — SODIUM CHLORIDE 0.9 % (FLUSH) 0.9 %
20 SYRINGE (ML) INJECTION AS NEEDED
Status: DISCONTINUED | OUTPATIENT
Start: 2024-02-16 | End: 2024-02-18 | Stop reason: HOSPADM

## 2024-02-16 RX ORDER — HEPARIN SODIUM (PORCINE) LOCK FLUSH IV SOLN 100 UNIT/ML 100 UNIT/ML
500 SOLUTION INTRAVENOUS AS NEEDED
Status: DISCONTINUED | OUTPATIENT
Start: 2024-02-16 | End: 2024-02-18 | Stop reason: HOSPADM

## 2024-02-16 RX ORDER — SODIUM CHLORIDE 9 MG/ML
250 INJECTION, SOLUTION INTRAVENOUS AS NEEDED
Status: DISCONTINUED | OUTPATIENT
Start: 2024-02-16 | End: 2024-02-18 | Stop reason: HOSPADM

## 2024-02-16 RX ORDER — HEPARIN SODIUM (PORCINE) LOCK FLUSH IV SOLN 100 UNIT/ML 100 UNIT/ML
500 SOLUTION INTRAVENOUS AS NEEDED
OUTPATIENT
Start: 2024-02-16

## 2024-02-16 RX ADMIN — Medication 20 ML: at 15:09

## 2024-02-16 RX ADMIN — HEPARIN 500 UNITS: 100 SYRINGE at 15:16

## 2024-02-17 LAB
BH BB BLOOD EXPIRATION DATE: NORMAL
BH BB BLOOD TYPE BARCODE: 9500
BH BB DISPENSE STATUS: NORMAL
BH BB PRODUCT CODE: NORMAL
BH BB UNIT NUMBER: NORMAL
CROSSMATCH INTERPRETATION: NORMAL
UNIT  ABO: NORMAL
UNIT  RH: NORMAL

## 2024-03-11 ENCOUNTER — TELEPHONE (OUTPATIENT)
Dept: ONCOLOGY | Facility: CLINIC | Age: 64
End: 2024-03-11
Payer: COMMERCIAL

## 2024-03-11 NOTE — TELEPHONE ENCOUNTER
Called to r/s appt due to Dr Adams being out of the office this day. No answer, left v/m for call back

## 2024-03-28 ENCOUNTER — HOSPITAL ENCOUNTER (OUTPATIENT)
Dept: ONCOLOGY | Facility: HOSPITAL | Age: 64
Discharge: HOME | End: 2024-03-28
Payer: COMMERCIAL

## 2024-03-28 DIAGNOSIS — C78.7 ADENOCARCINOMA OF COLON METASTATIC TO LIVER: Primary | ICD-10-CM

## 2024-03-28 DIAGNOSIS — C18.9 ADENOCARCINOMA OF COLON METASTATIC TO LIVER: Primary | ICD-10-CM

## 2024-03-28 PROCEDURE — 96523 IRRIG DRUG DELIVERY DEVICE: CPT

## 2024-03-28 PROCEDURE — 25010000002 HEPARIN LOCK FLUSH PER 10 UNITS: Performed by: INTERNAL MEDICINE

## 2024-03-28 RX ORDER — SODIUM CHLORIDE 0.9 % (FLUSH) 0.9 %
20 SYRINGE (ML) INJECTION AS NEEDED
Status: DISCONTINUED | OUTPATIENT
Start: 2024-03-28 | End: 2024-03-30 | Stop reason: HOSPADM

## 2024-03-28 RX ORDER — HEPARIN SODIUM (PORCINE) LOCK FLUSH IV SOLN 100 UNIT/ML 100 UNIT/ML
500 SOLUTION INTRAVENOUS AS NEEDED
Status: DISCONTINUED | OUTPATIENT
Start: 2024-03-28 | End: 2024-03-30 | Stop reason: HOSPADM

## 2024-03-28 RX ORDER — SODIUM CHLORIDE 0.9 % (FLUSH) 0.9 %
20 SYRINGE (ML) INJECTION AS NEEDED
OUTPATIENT
Start: 2024-03-28

## 2024-03-28 RX ORDER — HEPARIN SODIUM (PORCINE) LOCK FLUSH IV SOLN 100 UNIT/ML 100 UNIT/ML
500 SOLUTION INTRAVENOUS AS NEEDED
OUTPATIENT
Start: 2024-03-28

## 2024-03-28 RX ADMIN — Medication 20 ML: at 13:28

## 2024-03-28 RX ADMIN — HEPARIN 500 UNITS: 100 SYRINGE at 13:30

## 2024-03-31 LAB
ALBUMIN SERPL-MCNC: 3.8 G/DL (ref 3.9–4.9)
ALBUMIN/GLOB SERPL: 2 {RATIO} (ref 1.2–2.2)
ALP SERPL-CCNC: 91 IU/L (ref 44–121)
ALT SERPL-CCNC: 8 IU/L (ref 0–32)
AMBIG ABBREV CMP14 DEFAULT: NORMAL
AMBIG ABBREV LP DEFAULT: NORMAL
AST SERPL-CCNC: 10 IU/L (ref 0–40)
BASOPHILS # BLD AUTO: 0 X10E3/UL (ref 0–0.2)
BASOPHILS NFR BLD AUTO: 1 %
BILIRUB SERPL-MCNC: 0.6 MG/DL (ref 0–1.2)
BUN SERPL-MCNC: 23 MG/DL (ref 8–27)
BUN/CREAT SERPL: 21 (ref 12–28)
CALCIUM SERPL-MCNC: 8.9 MG/DL (ref 8.7–10.3)
CHLORIDE SERPL-SCNC: 105 MMOL/L (ref 96–106)
CHOLEST SERPL-MCNC: 113 MG/DL (ref 100–199)
CO2 SERPL-SCNC: 25 MMOL/L (ref 20–29)
CREAT SERPL-MCNC: 1.07 MG/DL (ref 0.57–1)
EGFRCR SERPLBLD CKD-EPI 2021: 59 ML/MIN/1.73
EOSINOPHIL # BLD AUTO: 0 X10E3/UL (ref 0–0.4)
EOSINOPHIL NFR BLD AUTO: 1 %
ERYTHROCYTE [DISTWIDTH] IN BLOOD BY AUTOMATED COUNT: 20.8 % (ref 11.7–15.4)
GLOBULIN SER CALC-MCNC: 1.9 G/DL (ref 1.5–4.5)
GLUCOSE SERPL-MCNC: 110 MG/DL (ref 70–99)
HCT VFR BLD AUTO: 21.6 % (ref 34–46.6)
HDLC SERPL-MCNC: 42 MG/DL
HGB BLD-MCNC: 7.1 G/DL (ref 11.1–15.9)
IMM GRANULOCYTES # BLD AUTO: 0 X10E3/UL (ref 0–0.1)
IMM GRANULOCYTES NFR BLD AUTO: 1 %
LDLC SERPL CALC-MCNC: 53 MG/DL (ref 0–99)
LYMPHOCYTES # BLD AUTO: 0.8 X10E3/UL (ref 0.7–3.1)
LYMPHOCYTES NFR BLD AUTO: 38 %
MCH RBC QN AUTO: 32.6 PG (ref 26.6–33)
MCHC RBC AUTO-ENTMCNC: 32.9 G/DL (ref 31.5–35.7)
MCV RBC AUTO: 99 FL (ref 79–97)
MONOCYTES # BLD AUTO: 0.2 X10E3/UL (ref 0.1–0.9)
MONOCYTES NFR BLD AUTO: 11 %
NEUTROPHILS # BLD AUTO: 1.1 X10E3/UL (ref 1.4–7)
NEUTROPHILS NFR BLD AUTO: 48 %
PLATELET # BLD AUTO: 158 X10E3/UL (ref 150–450)
POTASSIUM SERPL-SCNC: 3.8 MMOL/L (ref 3.5–5.2)
PROT SERPL-MCNC: 5.7 G/DL (ref 6–8.5)
RBC # BLD AUTO: 2.18 X10E6/UL (ref 3.77–5.28)
SODIUM SERPL-SCNC: 143 MMOL/L (ref 134–144)
TRIGL SERPL-MCNC: 92 MG/DL (ref 0–149)
VLDLC SERPL CALC-MCNC: 18 MG/DL (ref 5–40)
WBC # BLD AUTO: 2.1 X10E3/UL (ref 3.4–10.8)

## 2024-04-09 ENCOUNTER — OFFICE VISIT (OUTPATIENT)
Dept: FAMILY MEDICINE CLINIC | Facility: CLINIC | Age: 64
End: 2024-04-09
Payer: COMMERCIAL

## 2024-04-09 VITALS
BODY MASS INDEX: 22.18 KG/M2 | WEIGHT: 120.5 LBS | RESPIRATION RATE: 18 BRPM | OXYGEN SATURATION: 100 % | TEMPERATURE: 97.3 F | HEIGHT: 62 IN | HEART RATE: 102 BPM | SYSTOLIC BLOOD PRESSURE: 102 MMHG | DIASTOLIC BLOOD PRESSURE: 64 MMHG

## 2024-04-09 DIAGNOSIS — D50.8 OTHER IRON DEFICIENCY ANEMIA: Primary | ICD-10-CM

## 2024-04-09 DIAGNOSIS — C18.9 MALIGNANT NEOPLASM OF COLON METASTATIC TO BONE: ICD-10-CM

## 2024-04-09 DIAGNOSIS — R00.0 TACHYCARDIA: ICD-10-CM

## 2024-04-09 DIAGNOSIS — C79.51 MALIGNANT NEOPLASM OF COLON METASTATIC TO BONE: ICD-10-CM

## 2024-04-09 DIAGNOSIS — I25.10 CORONARY ARTERY DISEASE INVOLVING NATIVE CORONARY ARTERY OF NATIVE HEART WITHOUT ANGINA PECTORIS: ICD-10-CM

## 2024-04-09 DIAGNOSIS — I10 PRIMARY HYPERTENSION: ICD-10-CM

## 2024-04-09 DIAGNOSIS — R42 DIZZINESS: ICD-10-CM

## 2024-04-09 DIAGNOSIS — R53.83 FATIGUE, UNSPECIFIED TYPE: ICD-10-CM

## 2024-04-09 LAB
BILIRUB BLD-MCNC: NEGATIVE MG/DL
CLARITY, POC: CLEAR
COLOR UR: YELLOW
GLUCOSE UR STRIP-MCNC: NEGATIVE MG/DL
KETONES UR QL: NEGATIVE
LEUKOCYTE EST, POC: NEGATIVE
NITRITE UR-MCNC: NEGATIVE MG/ML
PH UR: 5 [PH] (ref 5–8)
PROT UR STRIP-MCNC: NEGATIVE MG/DL
RBC # UR STRIP: NEGATIVE /UL
SP GR UR: 1.01 (ref 1–1.03)
UROBILINOGEN UR QL: NORMAL

## 2024-04-09 PROCEDURE — 81002 URINALYSIS NONAUTO W/O SCOPE: CPT | Performed by: FAMILY MEDICINE

## 2024-04-09 PROCEDURE — 99214 OFFICE O/P EST MOD 30 MIN: CPT | Performed by: FAMILY MEDICINE

## 2024-04-09 RX ORDER — TRIFLURIDINE AND TIPIRACIL 20; 8.19 MG/1; MG/1
TABLET, FILM COATED ORAL
COMMUNITY
Start: 2024-03-22

## 2024-04-09 NOTE — PROGRESS NOTES
Chief Complaint  Dizziness, Fatigue, Coronary Artery Disease, and Hypertension    Subjective     CC  Problem List  Visit Diagnosis   Encounters  Notes  Medications  Labs  Result Review Imaging  Media    Samantha Massey presents to [facility] for   History of Present Illness  Mary suffers from metastatic colon cancer. She is on long term chem Rx now x greater than 10 yrs. She reports that she continues to have recurrent anemia secondary to her Rx. She is feeling very fatigued at present.   Coronary Artery Disease  Presents for follow-up visit. Symptoms include dizziness. Pertinent negatives include no chest pain, chest pressure, chest tightness, leg swelling, muscle weakness, palpitations or shortness of breath. The symptoms have been stable. Compliance with diet is good. Compliance with exercise is good. Compliance with medications is good.   Hypertension  This is a chronic problem. The current episode started more than 1 year ago. The problem is unchanged. The problem is controlled. Associated symptoms include malaise/fatigue. Pertinent negatives include no anxiety, blurred vision, chest pain, headaches, neck pain, orthopnea, palpitations, peripheral edema, PND, shortness of breath or sweats. There are no associated agents to hypertension. Risk factors for coronary artery disease include post-menopausal state and dyslipidemia. Current antihypertension treatment includes beta blockers. The current treatment provides moderate improvement. There are no compliance problems.  Hypertensive end-organ damage includes CAD/MI and heart failure. There is no history of kidney disease.   Fatigue  This is a new problem. The current episode started in the past 7 days. The problem occurs intermittently. The problem has been unchanged. Associated symptoms include fatigue, myalgias and weakness. Pertinent negatives include no abdominal pain, anorexia, arthralgias, change in bowel habit,  chest pain, chills, congestion, coughing, diaphoresis, fever, headaches, joint swelling, nausea, neck pain, numbness, rash, sore throat, swollen glands, urinary symptoms, vertigo, visual change or vomiting. Nothing aggravates the symptoms. She has tried nothing for the symptoms.   Dizziness  This is a new problem. The current episode started in the past 7 days. The problem occurs intermittently. The problem has been unchanged. Associated symptoms include fatigue, myalgias and weakness. Pertinent negatives include no abdominal pain, anorexia, arthralgias, change in bowel habit, chest pain, chills, congestion, coughing, diaphoresis, fever, headaches, joint swelling, nausea, neck pain, numbness, rash, sore throat, swollen glands, urinary symptoms, vertigo, visual change or vomiting. Exacerbated by: going up steps. She has tried nothing for the symptoms.       Review of Systems   Constitutional:  Positive for fatigue and malaise/fatigue. Negative for appetite change, chills, diaphoresis, fever and unexpected weight loss.   HENT:  Negative for congestion, sore throat and swollen glands.    Eyes:  Negative for blurred vision.   Respiratory:  Negative for cough, chest tightness and shortness of breath.    Cardiovascular:  Negative for chest pain, palpitations, orthopnea, leg swelling and PND.   Gastrointestinal:  Negative for abdominal pain, anorexia, change in bowel habit, constipation, diarrhea, nausea and vomiting.   Endocrine: Negative for cold intolerance, heat intolerance, polydipsia and polyuria.   Genitourinary:  Negative for dysuria.   Musculoskeletal:  Positive for myalgias. Negative for arthralgias, joint swelling, muscle weakness and neck pain.   Skin:  Negative for rash.   Neurological:  Positive for dizziness and weakness. Negative for vertigo and numbness.   Hematological:  Negative for adenopathy. Does not bruise/bleed easily.        Objective   Vital Signs:   /64 (BP Location: Left arm, Patient  "Position: Sitting, Cuff Size: Adult)   Pulse 102   Temp 97.3 °F (36.3 °C) (Temporal)   Resp 18   Ht 157.5 cm (62\")   Wt 54.7 kg (120 lb 8 oz)   SpO2 100% Comment: room air  BMI 22.04 kg/m²     Physical Exam  Constitutional:       General: She is not in acute distress.     Comments: pale   HENT:      Right Ear: Tympanic membrane normal.      Left Ear: Tympanic membrane normal.   Neck:      Thyroid: No thyromegaly.      Vascular: No JVD.   Cardiovascular:      Rate and Rhythm: Regular rhythm.   Pulmonary:      Effort: Pulmonary effort is normal.      Breath sounds: Normal breath sounds. No wheezing.   Abdominal:      General: Abdomen is flat.      Palpations: Abdomen is soft. There is no hepatomegaly, splenomegaly or mass.      Tenderness: There is no abdominal tenderness. There is no right CVA tenderness or left CVA tenderness.   Musculoskeletal:      Cervical back: Neck supple.      Right lower leg: No edema.      Left lower leg: No edema.   Lymphadenopathy:      Cervical: No cervical adenopathy.   Skin:     Findings: No rash.   Neurological:      Mental Status: She is alert.        Result Review :Labs  Result Review  Imaging  Med Tab  Media                 Assessment and Plan CC Problem List  Visit Diagnosis  ROS  Review (Popup)  Health Maintenance  Quality  BestPractice  Medications  SmartSets  SnapShot Encounters  Media  Problem List Items Addressed This Visit          High    Primary hypertension    Overview     control on amloride (potassium sparing diurectic)         Current Assessment & Plan     Hypertension is stable and controlled  Continue current treatment regimen.  Blood pressure will be reassessed in 6 months.         CAD (coronary artery disease)    Overview     Hx of acute non ST eleation MI 12/07/2023  100% occlusion of LAD with nateral collaterals   Moderate to severe stenosis involving the left main supplying only a left circumflex artery with no critical stenosis  Moderate " stenosis involving the ostium of the right coronary artery that supplies collaterals to the LAD territory  Severe LV dysfunction, with need for ballon pump  Sxs improved, after Rx of pneumona, aggravated by metastatic colon cancer.   On Entresto              Unprioritized    Anemia - Primary    Malignant neoplasm of colon metastatic to bone    Overview     Metastatic to the liver and bone on going Rx    Doing well followed with [facility] oncology Angie         Relevant Medications    Lonsurf 20-8.19 MG per tablet     Other Visit Diagnoses       Fatigue, unspecified type        pt Hgb was found to be 5.4 04/09/2024, she was sent from outpt to [facility] ED for further evalution and Rx.    Relevant Orders    POCT urinalysis dipstick, manual (Completed)    Dizziness        Relevant Orders    POCT urinalysis dipstick, manual (Completed)    Tachycardia                Follow Up Instructions Charge Capture  Follow-up Communications  Return in about 2 weeks (around 4/23/2024), or if symptoms worsen or fail to improve.  Patient was given instructions and counseling regarding her condition or for health maintenance advice. Please see specific information pulled into the AVS if appropriate.

## 2024-04-10 ENCOUNTER — OFFICE VISIT (OUTPATIENT)
Dept: CARDIOLOGY | Facility: CLINIC | Age: 64
End: 2024-04-10
Payer: COMMERCIAL

## 2024-04-10 VITALS
HEART RATE: 92 BPM | HEIGHT: 62 IN | DIASTOLIC BLOOD PRESSURE: 72 MMHG | SYSTOLIC BLOOD PRESSURE: 108 MMHG | OXYGEN SATURATION: 100 % | WEIGHT: 120 LBS | BODY MASS INDEX: 22.08 KG/M2

## 2024-04-10 DIAGNOSIS — I10 PRIMARY HYPERTENSION: ICD-10-CM

## 2024-04-10 DIAGNOSIS — E78.5 HYPERLIPIDEMIA LDL GOAL <100: ICD-10-CM

## 2024-04-10 DIAGNOSIS — I25.10 CORONARY ARTERY DISEASE INVOLVING NATIVE CORONARY ARTERY OF NATIVE HEART WITHOUT ANGINA PECTORIS: ICD-10-CM

## 2024-04-10 DIAGNOSIS — I25.2 HISTORY OF NON-ST ELEVATION MYOCARDIAL INFARCTION (NSTEMI): ICD-10-CM

## 2024-04-10 DIAGNOSIS — I51.81 STRESS-INDUCED CARDIOMYOPATHY: Primary | ICD-10-CM

## 2024-04-10 DIAGNOSIS — I50.22 CHRONIC SYSTOLIC CONGESTIVE HEART FAILURE: ICD-10-CM

## 2024-04-10 NOTE — PROGRESS NOTES
Cardiology Office Visit      Encounter Date:  04/10/2024    Patient ID:   Samantha Massey is a 63 y.o. female.  Reason For Followup:  Coronary artery disease and cardiomyopathy    Brief Clinical History:  Dear Dr. Spencer, Diana JOHNSTON MD    I had the pleasure of seeing Samantha Massey today. As you are well aware, this is a 63 y.o. female with a past medical history that is significant for history of presented to the emergency department at [facility] 12/7/2023 from infusion center with complaint of worsening shortness of breath and nonproductive cough during her session.  She reports that her symptoms actually began 3-4 days earlier, but worsened during infusion.  Due to concerns of possible allergic reaction, she was sent to the ED.  Chest CT revealed pulmonary edema, multifocal pneumonia.  No evidence of PE.  proBNP elevated at 2176.  She was started on IV antibiotics per attending as well as Tucker.  She was given Lasix 40 mg IV and scheduled twice daily.  Pulmonary was consulted.  High-sensitivity troponins were ordered with initial result 28.  The patient developed chest discomfort early this morning.  Additional troponins ordered.  She was covered with therapeutic Lovenox and given nitro as needed.  Troponin did trend upward at 80 and 136 respectively.  Cardiologist was notified and she was taken urgently to the cardiac Cath Lab.  Heart cath revealed severe single-vessel coronary disease with 100% LAD, right to left collaterals likely this is a chronic total occlusion.  Moderate-severe stenosis left main supplying only a left circumflex artery with no critical stenosis.  Moderate stenosis ostium of the RCA that supplies collaterals to the LAD territory.  She was found to have severe LV dysfunction with elevated left-sided filling pressures.  An intra-aortic balloon pump was inserted for hemodynamic support.     Interval History:  Denies any further symptoms of chest pain shortness of breath  dizziness or syncope  Shortness of breath is improved  Compliant with medical therapy denies any new cardiac symptoms  Compliant with medical therapy    Assessment & Plan    Impressions:  Acute non-ST elevation MI  Sinus tachycardia  Multivessel coronary artery disease  Possible stress cardiomyopathy  Hypoxic respiratory failure  LV dysfunction  Stage IV metastatic colon cancer  Multifocal pneumonia  Metastatic colon cancer  Cardiomyopathy with recovery of LV systolic function with most recent echocardiogram suggestive of normalization of LV systolic function  Stress cardiomyopathy with recovery of LV systolic function      Impressions:/Cardiac catheterization  Severe single-vessel coronary artery disease 100% occlusion of the LAD with right to left collaterals likely chronic total occlusion  Moderate to severe stenosis involving the left main supplying only a left circumflex artery with no critical stenosis  Moderate stenosis involving the ostium of the right coronary artery that supplies collaterals to the LAD territory  Severe LV dysfunction  Elevated left-sided filling pressures  Possible stress cardiomyopathy based on the LV gram findings and hypokinesis involving the mid to distal segments and also EKG changes that are out of proportion to elevated troponin  Multivessel coronary artery disease with LV dysfunction possible stress cardiomyopathy  Cardiac diagnosis and treatment options reviewed and discussed with patient      Recommendations:  Guideline directed medical therapy as patient's clinical condition allows     Patient is currently taking aspirin, statin, Jardiance, furosemide, Toprol-XL, and Entresto  Repeat echocardiogram with normalization of LV systolic function  Use Lasix as a as needed medication  Send labs and workup reviewed and discussed with patient  Continue current medical therapy with aspirin 81 mg p.o. once a day Lipitor 80 mg p.o. once a day Lasix 40 mg as needed as needed Toprol-XL 25 mg  "p.o. once a day Entresto 24 x 26 1 tablet p.o. twice daily patient is on Jardiance 10 mg p.o. once a day  Follow-up in office in 6 months        Vitals:  Vitals:    04/10/24 1212   BP: 108/72   Pulse: 92   SpO2: 100%   Weight: 54.4 kg (120 lb)   Height: 157.5 cm (62\")       Physical Exam:    General: Alert, cooperative, no distress, appears stated age  Head:  Normocephalic, atraumatic, mucous membranes moist  Eyes:  Conjunctiva/corneas clear, EOM's intact     Neck:  Supple,  no adenopathy;      Lungs: Clear to auscultation bilaterally, no wheezes rhonchi rales are noted  Chest wall: No tenderness  Heart::  Regular rate and rhythm, S1 and S2 normal, no murmur, rub or gallop  Abdomen: Soft, non-tender, nondistended bowel sounds active  Extremities: No cyanosis, clubbing, or edema  Pulses: 2+ and symmetric all extremities  Skin:  No rashes or lesions  Neuro/psych: A&O x3. CN II through XII are grossly intact with appropriate affect              Lab Results   Component Value Date    GLUCOSE 110 (H) 03/30/2024    BUN 23 03/30/2024    CREATININE 1.07 (H) 03/30/2024    EGFRRESULT 59 (L) 03/30/2024    EGFR 70.5 02/15/2024    BCR 21 03/30/2024    K 3.8 03/30/2024    CO2 25 03/30/2024    CALCIUM 8.9 03/30/2024    PROTENTOTREF 5.7 (L) 03/30/2024    ALBUMIN 3.8 (L) 03/30/2024    BILITOT 0.6 03/30/2024    AST 10 03/30/2024    ALT 8 03/30/2024     Results for orders placed during the hospital encounter of 01/23/24    Adult Transthoracic Echo Complete W/ Cont if Necessary Per Protocol    Interpretation Summary    Left ventricular systolic function is normal. Calculated left ventricular EF = 55% Left ventricular ejection fraction appears to be 51 - 55%.    Left ventricular diastolic function is consistent with (grade I) impaired relaxation.    The left atrial cavity is mildly dilated.    Estimated right ventricular systolic pressure from tricuspid regurgitation is mildly elevated (35-45 mmHg).     Results for orders placed during " the hospital encounter of 12/07/23    Cardiac Catheterization/Vascular Study    Conclusion  Table formatting from the original result was not included.  Cardiac Catheterization Operative Report    Samantha Massey  [id number]  12/8/2023  @PCP@    She underwent cardiac catheterization.    Indications for the procedure include: acute coronary syndrome.    No results found for this or any previous visit.    No results found for this or any previous visit.        Procedure Details:  The risks, benefits, complications, treatment options, and expected outcomes were discussed with the patient. The patient and/or family concurred with the proposed plan, giving informed consent.    After informed consent the patient was brought to the cath lab after appropriate IV hydration was begun and oral premedication was given. She was further sedated with fentanyl. She was prepped and draped in the usual manner. Using the modified Seldinger access technique, a 6 Scottish sheath was placed in the femoral artery. A left heart catheterization with coronary arteriography was performed. Findings are discussed below.    After the procedure was completed, sedation was stopped and the sheaths and catheters were all removed. Hemostasis was achieved per established hospital protocols.    Conscious sedation:  Conscious sedation was performed according to protocol.  I supervised and directed an independent trained observer with the assistance of monitoring the patient's level of consciousness and physiologic status throughout the procedure.  Intraoperative service time was 120 minutes.    Findings:    Hemodynamics Central aortic pressure systolic 144 diastolic 68 with a mean pressure of 90 mmHg  LV end-diastolic pressure of 39 mmHg  There was no gradient across the aortic valve on the pullback of the pigtail catheter    Left Main Small caliber vessel with significant dampening with 6 Scottish catheter  Left main has a diffuse angiographic 60 to 70%  stenosis with some dampening with a 6 [place] catheter but no significant dampening with a 4 [place] catheter supplies on the left circumflex artery  RCA Large-caliber vessel dominant vessel  Right coronary artery has ostial angiographic 50 to 60% stenosis with some dampening with the 6 [place] catheter  Mid right coronary artery has another focal area of 20% stenosis  Distal right coronary artery has a focal area of 30 to 40% stenosis  PLB branch of the right coronary artery is a large-caliber vessel with mid angiographic 30 to 40% stenosis  PDA branch of the right coronary artery is a large-caliber vessel has ostial 50 to 60% stenosis  Right coronary artery provides collaterals to the LAD  % occluded in the ostial portion after the left circumflex artery  Circ Moderate to large caliber vessel angiographically normal  LV Moderate to severe hypokinesis involving the mid mid to distal anterior anteroapical wall to LV apex inferior apical wall with estimated LV ejection fraction of 35%  Coronary Dominance Right coronary artery    Estimated Blood Loss:  Minimal    Specimens: None    Complications:  None; patient tolerated the procedure well.    Disposition: PACU - hemodynamically stable.    Condition: stable    Impressions:  Severe single-vessel coronary artery disease 100% occlusion of the LAD with right to left collaterals likely chronic total occlusion  Moderate to severe stenosis involving the left main supplying only a left circumflex artery with no critical stenosis  Moderate stenosis involving the ostium of the right coronary artery that supplies collaterals to the LAD territory  Severe LV dysfunction  Elevated left-sided filling pressures  Possible stress cardiomyopathy based on the LV gram findings and hypokinesis involving the mid to distal segments and also EKG changes that are out of proportion to elevated troponin      Recommendations:  Admit patient to [facility]  Intra-aortic balloon pump for hemodynamic  support  Diuresis  Continued aggressive risk factor modification  Test results reviewed and discussed with patient and family     Lab Results   Component Value Date    CHLPL 113 03/30/2024    TRIG 92 03/30/2024    HDL 42 03/30/2024    LDL 53 03/30/2024                Objective:          Allergies:  Allergies   Allergen Reactions    Hydrocodone Nausea And Vomiting    Iodinated Contrast Media Hives and Itching     PT HAD SOME HIVES DURING SCAN.  PRIOR TO SCAN 8-3-2013.    Latex Rash     Blisters     Penicillins Hives       Medication Review:     Current Outpatient Medications:     aspirin 81 MG EC tablet, Take 1 tablet by mouth Daily., Disp: , Rfl:     atorvastatin (LIPITOR) 80 MG tablet, Take 1 tablet by mouth Daily for 360 days., Disp: 90 tablet, Rfl: 3    Cyanocobalamin (Vitamin B-12) 5000 MCG sublingual tablet, Place 1 tablet under the tongue Every Morning., Disp: , Rfl:     empagliflozin (JARDIANCE) 10 MG tablet tablet, Take 1 tablet by mouth Daily for 360 days., Disp: 90 tablet, Rfl: 3    furosemide (LASIX) 40 MG tablet, 1 tab daily, Disp: 90 tablet, Rfl: 0    Lonsurf 20-8.19 MG per tablet, , Disp: , Rfl:     Lysine 500 MG capsule, Take 1 tablet by mouth 2 (Two) Times a Day., Disp: , Rfl:     metoprolol succinate XL (TOPROL-XL) 25 MG 24 hr tablet, Take 1 tablet by mouth Daily for 360 days., Disp: 90 tablet, Rfl: 3    sacubitril-valsartan (ENTRESTO) 24-26 MG tablet, Take 1 tablet by mouth Every 12 (Twelve) Hours for 360 days., Disp: 180 tablet, Rfl: 3    Family History:  Family History   Problem Relation Age of Onset    Heart disease Mother     Lung cancer Father 70        Associated to cigarette smoking    Heart disease Father     Hyperlipidemia Father     Cancer Sister         Bladder cancer       Past Medical History:  Past Medical History:   Diagnosis Date    Acute systolic heart failure 12/09/2023    CAD (coronary artery disease) 12/09/2023    CHF (congestive heart failure) 12/07/23    Chronic kidney disease      colon cancer     Metastatic to liver and bones    Colon cancer     Congenital heart disease 24    History of colon cancer, stage IV     Stage SHELL with progression    Hypertension     NSTEMI (non-ST elevated myocardial infarction) 2023    PONV (postoperative nausea and vomiting)     Pulmonary arterial hypertension     Radiculopathy 2012    Right L5/S1       Past surgical History:  Past Surgical History:   Procedure Laterality Date    ABDOMINAL SURGERY      CARDIAC CATHETERIZATION N/A 2023    Procedure: Left Heart Cath;  Surgeon: Ramiro Olivera MD;  Location: [facility] CATH INVASIVE LOCATION;  Service: Cardiovascular;  Laterality: N/A;    CYST REMOVAL      cyst removed from [employer] in     PORTACATH PLACEMENT  2017    VENOUS ACCESS DEVICE (PORT) INSERTION Left 2021    Procedure: INSERTION VENOUS ACCESS DEVICE;  Surgeon: Jay Yeh MD;  Location: [facility] MAIN OR;  Service: General;  Laterality: Left;    VENOUS ACCESS DEVICE (PORT) REMOVAL Right 2020    Procedure: REMOVAL VENOUS ACCESS DEVICE;  Surgeon: Jay Yeh MD;  Location: [facility] MAIN OR;  Service: General;  Laterality: Right;    VENTRAL/INCISIONAL HERNIA REPAIR N/A 2023    Procedure: VENTRAL/INCISIONAL HERNIA REPAIR WITH MESH;  Surgeon: Jay Yeh MD;  Location: [facility] MAIN OR;  Service: General;  Laterality: N/A;       Social History:  Social History     Socioeconomic History    Marital status: Single   Tobacco Use    Smoking status: Former     Current packs/day: 0.00     Average packs/day: 1 pack/day for 31.0 years (31.0 ttl pk-yrs)     Types: Cigarettes     Start date:      Quit date: 2012     Years since quittin.2     Passive exposure: Past    Smokeless tobacco: Never   Vaping Use    Vaping status: Never Used   Substance and Sexual Activity    Alcohol use: Yes     Comment: 2 or 3 a month    Drug use: Never    Sexual activity: Not Currently     Partners:  Female     Birth control/protection: Same-sex partner       Review of Systems:  The following systems were reviewed as they relate to the cardiovascular system: Constitutional, Eyes, ENT, Cardiovascular, Respiratory, Gastrointestinal, Integumentary, Neurological, Psychiatric, Hematologic, Endocrine, Musculoskeletal, and Genitourinary. The pertinent cardiovascular findings are reported above with all other cardiovascular points within those systems being negative.    Diagnostic Study Review:     Current Electrocardiogram:    ECG 12 Lead    Date/Time: 4/10/2024 12:45 PM  Performed by: Ramiro Olivera MD    Authorized by: Ramiro Olivera MD  Comparison: compared with previous ECG   Similar to previous ECG  Rhythm: sinus rhythm  Rate: normal  BPM: 89  Conduction: conduction normal  ST Segments: ST segments normal  T Waves: T waves normal  QRS axis: normal    Clinical impression: normal ECG                NOTE: The following portions of the patient's history were reviewed and updated this visit as appropriate: allergies, current medications, past family history, past medical history, past social history, past surgical history and problem list.

## 2024-04-15 NOTE — PROGRESS NOTES
HEMATOLOGY ONCOLOGY OUTPATIENT FOLLOW-UP       Patient name: Samantha Massey  : 1960  MRN: [id number]  Primary Care Physician: Diana Spencer MD  Referring Physician: Diana Spencer MD  Reason For Consult: Colon cancer.     History of Present Illness:  Patient is a 63 y.o.     2024: In the office for the first time in transfer from [facility]. Ms. Massey reported that sometime in  she developed severe constipation that did not seem to improve. This slowly became associated to fatigue and weakness. Eventually she sought attention and was found to have normocytic anemia in 2012. A CT scan of the abdomen reported innumerable lesions throughout the liver. There was thickening of the distal sigmoid colon. There were some non-specific lymph nodes. She underwent a colonoscopy in early January that revealed a tumor in the distal colon. The final report of pathology was of invasive, moderately differentiated adenocarcinoma without microsatellite instability. She underwent a laparoscopic low anterior resection. The final report of pathology was of a poorly differentiated adenocarcinoma of the rectosigmoid with invasion through the muscularis propria and extension of the mesocolon. No lymph nodes were involved, but she underwent an image guided biopsy of the liver reported metastatic disease consistent with primary colon cancer. In 2012 she started treatment with FOLFOX/bevacizumab. Testing at the time reported KRAS wild type. A PET scan done shortly after the commencement of the treatment reported multiple metastatic lesions in the liver. In  of the same year there was evidence of response to the treatment. Later, in August, the lesions were stable. She continued treatment with the same regimen through 2012 at which time she was transitioned to capecitabine. Radioembolization to the liver was delivered initially to the right lobe and then to  the left lobe in November of that same year. There was no suggestion of disease outside of the liver and the lesions in the liver showed evidence of response. In August of 2014 the capecitabine was discontinued as there was no longer evidence of disease. She did well until October of 2017 when she had radiographic evidence of progressive disease in the liver and an image guided biopsy confirmed colonic primary. FOLFOX/bevacizumab was re-introduced in October of that year.  In February of 2018 she had evidence of response and no suggestion of extrahepatic disease and she underwent left hepatectomy. Following that she was placed on capecitabine as adjuvant treatment. She continued treatment with this agent until September of 2018. In early January of 2019 there was evidence of disease in the lungs and the mediastinal lymph nodes. In July of 2019 she started treatment with FOLFIRI/bevacizumab. In the setting of progressive disease the bevacizumab was changed to cetuximab. In December of 2020 metastatic bone disease was identified. To continue treatment with cetuximab and irinotecan. There was no evidence of progression until January of 2023 that revealed enlarging pulmonary nodules and enlargement of a right seventh rib metastatic lesion. For that reason she was prescribed regorafenib and began treatment in February of the same year.  In April of 2023 she had radiation to the right 7th rib. The regorafenib resulted in hand foot syndrome. In June of 2023 a scan revealed progressive disease and in late July of the same year she began treatment with trifluridine/tipracil and in August of the same year bevacizumab was added. She developed myelosuppression and doses were modified. At the time of this first visit she was still taking the above regimen. She was rather asymptomatic and on exam there were no changes. There was a report of a scan done in early December of 2023 that reported possible enlargement of the rib  metastatic lesion. A decision was made to continue with the same treatment and plans to take over the prescription of trifluridine/tipracil. To have scans in February of 2024.     2/15/2024: Feeling well.  Taking the trifluridine/tipracil without difficulties.  Generally energetic and with good appetite.  Afebrile.  On exam she does not appear in distress and she does not seem acutely ill.  The lungs are clear bilaterally and the heart is regular.  The abdomen is soft and nontender.  No edema.  Reviewed the laboratory exams that suggest anemia.  She is not pale and off for the anemia reported on the blood count and does it will be repeated.  If indeed her hemoglobin is 7.3 g/dL she will be transfused.  I reviewed independently the images and the reports of the scans.  She has stable disease at this time.  Continue with the same treatment and see me in 6 weeks.     4/18/2024: Feeling well and without new problems.  Continues to take the trifluridine tipiracil without any difficulties.  She has been afebrile.  She continues to eat reasonably well and her weight is stable.  She denies pain.  On exam no distress.  No oral lesions.  Respirations not labored and lungs clear.  Heart regular.  Abdomen soft and without palpable tumors.  No edema.  The laboratory exams were reviewed.  She has significant neutropenia which I discussed with her.  Prior to the commencement of the next cycle of medication she needs to have her blood counts checked.  I discussed with her the importance of neutropenic precautions and urged her to call if febrile.    Past Medical History:   Diagnosis Date    Acute systolic heart failure 12/09/2023    CAD (coronary artery disease) 12/09/2023    CHF (congestive heart failure) 12/07/23    Chronic kidney disease     colon cancer     Metastatic to liver and bones    Colon cancer     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    History of colon cancer, stage IV     Stage SHELL with progression    Hypertension      NSTEMI (non-ST elevated myocardial infarction) 12/09/2023    PONV (postoperative nausea and vomiting)     Pulmonary arterial hypertension     Radiculopathy 07/2012    Right L5/S1     Past Surgical History:   Procedure Laterality Date    ABDOMINAL SURGERY      CARDIAC CATHETERIZATION N/A 12/08/2023    Procedure: Left Heart Cath;  Surgeon: Ramiro Olivera MD;  Location: [facility] CATH INVASIVE LOCATION;  Service: Cardiovascular;  Laterality: N/A;    CYST REMOVAL  1998    cyst removed from [facility] in 1998    PORTACATH PLACEMENT  2017    VENOUS ACCESS DEVICE (PORT) INSERTION Left 01/12/2021    Procedure: INSERTION VENOUS ACCESS DEVICE;  Surgeon: Jay Yeh MD;  Location: [facility] MAIN OR;  Service: General;  Laterality: Left;    VENOUS ACCESS DEVICE (PORT) REMOVAL Right 08/27/2020    Procedure: REMOVAL VENOUS ACCESS DEVICE;  Surgeon: Jay Yeh MD;  Location: [facility] MAIN OR;  Service: General;  Laterality: Right;    VENTRAL/INCISIONAL HERNIA REPAIR N/A 03/02/2023    Procedure: VENTRAL/INCISIONAL HERNIA REPAIR WITH MESH;  Surgeon: Jay Yeh MD;  Location: [facility] MAIN OR;  Service: General;  Laterality: N/A;       Current Outpatient Medications:     aspirin 81 MG EC tablet, Take 1 tablet by mouth Daily., Disp: , Rfl:     atorvastatin (LIPITOR) 80 MG tablet, Take 1 tablet by mouth Daily for 360 days., Disp: 90 tablet, Rfl: 3    Cyanocobalamin (Vitamin B-12) 5000 MCG sublingual tablet, Place 1 tablet under the tongue Every Morning., Disp: , Rfl:     empagliflozin (JARDIANCE) 10 MG tablet tablet, Take 1 tablet by mouth Daily for 360 days., Disp: 90 tablet, Rfl: 3    furosemide (LASIX) 40 MG tablet, 1 tab daily, Disp: 90 tablet, Rfl: 0    Lonsurf 20-8.19 MG per tablet, , Disp: , Rfl:     Lysine 500 MG capsule, Take 1 tablet by mouth 2 (Two) Times a Day., Disp: , Rfl:     metoprolol succinate XL (TOPROL-XL) 25 MG 24 hr tablet, Take 1 tablet by mouth Daily for 360 days., Disp: 90  tablet, Rfl: 3    sacubitril-valsartan (ENTRESTO) 24-26 MG tablet, Take 1 tablet by mouth Every 12 (Twelve) Hours for 360 days., Disp: 180 tablet, Rfl: 3  No current facility-administered medications for this visit.    Facility-Administered Medications Ordered in Other Visits:     heparin injection 500 Units, 500 Units, Intravenous, PRN, Sean Adams MD, 500 Units at 24 1514    sodium chloride 0.9 % flush 20 mL, 20 mL, Intravenous, PRN, Sean Adams MD, 20 mL at 24 1512    Allergies   Allergen Reactions    Hydrocodone Nausea And Vomiting    Iodinated Contrast Media Hives and Itching     PT HAD SOME HIVES DURING SCAN.  PRIOR TO SCAN 8-3-2013.    Latex Rash     Blisters     Penicillins Hives     Family History   Problem Relation Age of Onset    Heart disease Mother     Lung cancer Father 70        Associated to cigarette smoking    Heart disease Father     Hyperlipidemia Father     Cancer Sister         Bladder cancer     Cancer-related family history includes Cancer in her sister; Lung cancer (age of onset: 70) in her father.    Social History     Tobacco Use    Smoking status: Former     Current packs/day: 0.00     Average packs/day: 1 pack/day for 31.0 years (31.0 ttl pk-yrs)     Types: Cigarettes     Start date:      Quit date: 2012     Years since quittin.3     Passive exposure: Past    Smokeless tobacco: Never   Vaping Use    Vaping status: Never Used   Substance Use Topics    Alcohol use: Yes     Comment: 2 or 3 a month    Drug use: Never     Social History     Social History Narrative    Not on file     ROS:   Review of Systems   Constitutional:  Negative for activity change, appetite change, chills, diaphoresis, fatigue, fever and unexpected weight change.   HENT:  Negative for congestion, dental problem, drooling, ear discharge, ear pain, facial swelling, hearing loss, mouth sores, nosebleeds, postnasal drip, rhinorrhea, sinus pressure, sinus pain, sneezing, sore throat,  "tinnitus, trouble swallowing and voice change.    Eyes:  Negative for photophobia, pain, discharge, redness, itching and visual disturbance.   Respiratory:  Negative for apnea, cough, choking, chest tightness, shortness of breath, wheezing and stridor.    Cardiovascular:  Negative for chest pain, palpitations and leg swelling.   Gastrointestinal:  Negative for abdominal distention, abdominal pain, anal bleeding, blood in stool, constipation, diarrhea, nausea, rectal pain and vomiting.   Endocrine: Negative for cold intolerance, heat intolerance, polydipsia and polyuria.   Genitourinary:  Negative for decreased urine volume, difficulty urinating, dysuria, flank pain, frequency, genital sores, hematuria and urgency.   Musculoskeletal:  Negative for arthralgias, back pain, gait problem, joint swelling, myalgias, neck pain and neck stiffness.   Skin:  Negative for color change, pallor and rash.   Neurological:  Negative for dizziness, tremors, seizures, syncope, facial asymmetry, speech difficulty, weakness, light-headedness, numbness and headaches.   Hematological:  Negative for adenopathy. Does not bruise/bleed easily.   Psychiatric/Behavioral:  Negative for agitation, behavioral problems, confusion, decreased concentration, hallucinations, self-injury, sleep disturbance and suicidal ideas. The patient is not nervous/anxious.      Objective:    Vital Signs:  Vitals:    04/18/24 1510   BP: 110/70   Pulse: 86   Resp: 14   Temp: 97.6 °F (36.4 °C)   SpO2: 100%   Weight: 55.6 kg (122 lb 9.6 oz)   Height: 157.5 cm (62\")   PainSc: 0-No pain     Body mass index is 22.42 kg/m².    ECOG  (0) Fully active, able to carry on all predisease performance without restriction    Physical Exam:   Physical Exam  Constitutional:       General: She is not in acute distress.     Appearance: She is not ill-appearing, toxic-appearing or diaphoretic.   HENT:      Head: Normocephalic and atraumatic.      Right Ear: External ear normal.      " Left Ear: External ear normal.      Nose: Nose normal.      Mouth/Throat:      Mouth: Mucous membranes are moist.      Pharynx: Oropharynx is clear. No oropharyngeal exudate or posterior oropharyngeal erythema.   Eyes:      General: No scleral icterus.        Right eye: No discharge.         Left eye: No discharge.      Conjunctiva/sclera: Conjunctivae normal.      Pupils: Pupils are equal, round, and reactive to light.   Cardiovascular:      Rate and Rhythm: Normal rate and regular rhythm.      Pulses: Normal pulses.      Heart sounds: No murmur heard.     No friction rub. No gallop.   Pulmonary:      Effort: No respiratory distress.      Breath sounds: No stridor. No wheezing, rhonchi or rales.   Abdominal:      General: Abdomen is flat. Bowel sounds are normal. There is no distension.      Palpations: Abdomen is soft. There is no mass.      Tenderness: There is no abdominal tenderness. There is no right CVA tenderness, left CVA tenderness, guarding or rebound.      Hernia: No hernia is present.   Musculoskeletal:         General: No swelling, tenderness, deformity or signs of injury.      Cervical back: No rigidity.      Right lower leg: No edema.      Left lower leg: No edema.   Lymphadenopathy:      Cervical: No cervical adenopathy.   Skin:     Coloration: Skin is not jaundiced.      Findings: No bruising, lesion or rash.   Neurological:      General: No focal deficit present.      Mental Status: She is alert and oriented to person, place, and time.      Cranial Nerves: No cranial nerve deficit.      Motor: No weakness.      Gait: Gait normal.   Psychiatric:         Mood and Affect: Mood normal.         Behavior: Behavior normal.         Thought Content: Thought content normal.         Judgment: Judgment normal.     JG Adams MD performed the physical exam on 4/18/2024 as documented above.    Lab Results - Last 18 Months   Lab Units 04/18/24  1514 03/30/24  0735 02/15/24  1605   WBC 10*3/mm3 1.44* 2.1*  3.40   HEMOGLOBIN g/dL 9.0* 7.1* 7.5*   HEMATOCRIT % 28.4* 21.6* 23.4*   PLATELETS 10*3/mm3 93* 158 139*   MCV fL 103.6* 99* 98.4*     Lab Results - Last 18 Months   Lab Units 04/18/24  1514 03/30/24  0735 02/15/24  1515   SODIUM mmol/L 143 143 141   POTASSIUM mmol/L 3.7 3.8 4.3   CHLORIDE mmol/L 108* 105 109*   CO2 mmol/L 23.0 25 23.0   BUN mg/dL 16 23 22   CREATININE mg/dL 0.91 1.07* 0.92   CALCIUM mg/dL 9.1 8.9 8.6   BILIRUBIN mg/dL 0.8 0.6 0.5   ALK PHOS U/L 106 91 103   ALT (SGPT) U/L 12 8 13   AST (SGOT) U/L 13 10 20   GLUCOSE mg/dL 124* 110* 83     Lab Results   Component Value Date    GLUCOSE 124 (H) 04/18/2024    BUN 16 04/18/2024    CREATININE 0.91 04/18/2024    EGFRIFNONA 71 08/19/2021    BCR 17.6 04/18/2024    K 3.7 04/18/2024    CO2 23.0 04/18/2024    CALCIUM 9.1 04/18/2024    PROTENTOTREF 5.7 (L) 03/30/2024    ALBUMIN 4.0 04/18/2024    LABIL2 2.0 03/30/2024    AST 13 04/18/2024    ALT 12 04/18/2024     Lab Results - Last 18 Months   Lab Units 12/09/23  0222 12/08/23  1923 12/08/23  1237 12/07/23  1631   INR   --   --  1.07 0.96   APTT seconds 65.6 62.4 28.8* 26.3*     Lab Results   Component Value Date    IRON 45 12/11/2019    TIBC 368 12/11/2019    FERRITIN 490.60 (H) 12/11/2019     Lab Results   Component Value Date    FOLATE >20.00 12/11/2019     Lab Results   Component Value Date    RETICCTPCT 4.90 (H) 12/12/2023     Lab Results   Component Value Date    DCCFVRTQ03 >2,000 (H) 12/11/2019     LDH   Date Value Ref Range Status   12/09/2023 307 (H) 135 - 214 U/L Final     Lab Results   Component Value Date    HAPTOGLOBIN 29 (L) 12/08/2023     Lab Results   Component Value Date    PTT 65.6 12/09/2023    INR 1.07 12/08/2023     Lab Results   Component Value Date    CEA 26.00 02/15/2024     Assessment & Plan     Metastatic colon cancer on multiple lines of treatment.  Tolerating the trifluridine/tipiracil well.  Her neutropenia is a result of the drug.  She is to be checked before she resumes  treatment.  Congestive heart failure that had been attributed to continued use of bevacizumab. Suggested to correspond to [facility]ots[facility].   Reviewed all laboratory exams.  She will see me in approximately 6 weeks with new results including scans.    Sean Adams MD on 4/18/2024 at 1718.

## 2024-04-18 ENCOUNTER — OFFICE VISIT (OUTPATIENT)
Dept: ONCOLOGY | Facility: CLINIC | Age: 64
End: 2024-04-18
Payer: COMMERCIAL

## 2024-04-18 ENCOUNTER — HOSPITAL ENCOUNTER (OUTPATIENT)
Dept: ONCOLOGY | Facility: HOSPITAL | Age: 64
Discharge: HOME | End: 2024-04-18
Admitting: INTERNAL MEDICINE
Payer: COMMERCIAL

## 2024-04-18 VITALS
HEIGHT: 62 IN | WEIGHT: 122.6 LBS | OXYGEN SATURATION: 100 % | RESPIRATION RATE: 14 BRPM | DIASTOLIC BLOOD PRESSURE: 70 MMHG | TEMPERATURE: 97.6 F | HEART RATE: 86 BPM | BODY MASS INDEX: 22.56 KG/M2 | SYSTOLIC BLOOD PRESSURE: 110 MMHG

## 2024-04-18 DIAGNOSIS — C18.9 ADENOCARCINOMA OF COLON METASTATIC TO LIVER: Primary | ICD-10-CM

## 2024-04-18 DIAGNOSIS — C78.7 ADENOCARCINOMA OF COLON METASTATIC TO LIVER: Primary | ICD-10-CM

## 2024-04-18 LAB
ALBUMIN SERPL-MCNC: 4 G/DL (ref 3.5–5.2)
ALBUMIN/GLOB SERPL: 1.8 G/DL
ALP SERPL-CCNC: 106 U/L (ref 39–117)
ALT SERPL W P-5'-P-CCNC: 12 U/L (ref 1–33)
ANION GAP SERPL CALCULATED.3IONS-SCNC: 12 MMOL/L (ref 5–15)
AST SERPL-CCNC: 13 U/L (ref 1–32)
BASOPHILS # BLD AUTO: 0.02 10*3/MM3 (ref 0–0.2)
BASOPHILS NFR BLD AUTO: 1.4 % (ref 0–1.5)
BILIRUB SERPL-MCNC: 0.8 MG/DL (ref 0–1.2)
BILIRUB UR QL STRIP: NEGATIVE
BUN SERPL-MCNC: 16 MG/DL (ref 8–23)
BUN/CREAT SERPL: 17.6 (ref 7–25)
CALCIUM SPEC-SCNC: 9.1 MG/DL (ref 8.6–10.5)
CHLORIDE SERPL-SCNC: 108 MMOL/L (ref 98–107)
CLARITY UR: CLEAR
CO2 SERPL-SCNC: 23 MMOL/L (ref 22–29)
COLOR UR: YELLOW
CREAT SERPL-MCNC: 0.91 MG/DL (ref 0.57–1)
DEPRECATED RDW RBC AUTO: 77.6 FL (ref 37–54)
EGFRCR SERPLBLD CKD-EPI 2021: 71.5 ML/MIN/1.73
EOSINOPHIL # BLD AUTO: 0.02 10*3/MM3 (ref 0–0.4)
EOSINOPHIL NFR BLD AUTO: 1.4 % (ref 0.3–6.2)
ERYTHROCYTE [DISTWIDTH] IN BLOOD BY AUTOMATED COUNT: 21.9 % (ref 12.3–15.4)
GLOBULIN UR ELPH-MCNC: 2.2 GM/DL
GLUCOSE SERPL-MCNC: 124 MG/DL (ref 65–99)
GLUCOSE UR STRIP-MCNC: ABNORMAL MG/DL
HCT VFR BLD AUTO: 28.4 % (ref 34–46.6)
HGB BLD-MCNC: 9 G/DL (ref 12–15.9)
HGB UR QL STRIP.AUTO: NEGATIVE
KETONES UR QL STRIP: NEGATIVE
LEUKOCYTE ESTERASE UR QL STRIP.AUTO: NEGATIVE
LYMPHOCYTES # BLD AUTO: 0.8 10*3/MM3 (ref 0.7–3.1)
LYMPHOCYTES NFR BLD AUTO: 55.6 % (ref 19.6–45.3)
MCH RBC QN AUTO: 32.8 PG (ref 26.6–33)
MCHC RBC AUTO-ENTMCNC: 31.7 G/DL (ref 31.5–35.7)
MCV RBC AUTO: 103.6 FL (ref 79–97)
MONOCYTES # BLD AUTO: 0.28 10*3/MM3 (ref 0.1–0.9)
MONOCYTES NFR BLD AUTO: 19.4 % (ref 5–12)
NEUTROPHILS NFR BLD AUTO: 0.32 10*3/MM3 (ref 1.7–7)
NEUTROPHILS NFR BLD AUTO: 22.2 % (ref 42.7–76)
NITRITE UR QL STRIP: NEGATIVE
PH UR STRIP.AUTO: 6.5 [PH] (ref 5–8)
PLATELET # BLD AUTO: 93 10*3/MM3 (ref 140–450)
PMV BLD AUTO: 10.7 FL (ref 6–12)
POTASSIUM SERPL-SCNC: 3.7 MMOL/L (ref 3.5–5.2)
PROT SERPL-MCNC: 6.2 G/DL (ref 6–8.5)
PROT UR QL STRIP: NEGATIVE
RBC # BLD AUTO: 2.74 10*6/MM3 (ref 3.77–5.28)
SODIUM SERPL-SCNC: 143 MMOL/L (ref 136–145)
SP GR UR STRIP: 1.01 (ref 1–1.03)
UROBILINOGEN UR QL STRIP: ABNORMAL
WBC NRBC COR # BLD AUTO: 1.44 10*3/MM3 (ref 3.4–10.8)

## 2024-04-18 PROCEDURE — 80053 COMPREHEN METABOLIC PANEL: CPT | Performed by: INTERNAL MEDICINE

## 2024-04-18 PROCEDURE — 82378 CARCINOEMBRYONIC ANTIGEN: CPT | Performed by: INTERNAL MEDICINE

## 2024-04-18 PROCEDURE — 25010000002 HEPARIN LOCK FLUSH PER 10 UNITS: Performed by: INTERNAL MEDICINE

## 2024-04-18 PROCEDURE — 85025 COMPLETE CBC W/AUTO DIFF WBC: CPT | Performed by: INTERNAL MEDICINE

## 2024-04-18 PROCEDURE — 81003 URINALYSIS AUTO W/O SCOPE: CPT | Performed by: INTERNAL MEDICINE

## 2024-04-18 RX ORDER — SODIUM CHLORIDE 0.9 % (FLUSH) 0.9 %
20 SYRINGE (ML) INJECTION AS NEEDED
Status: DISCONTINUED | OUTPATIENT
Start: 2024-04-18 | End: 2024-04-19 | Stop reason: HOSPADM

## 2024-04-18 RX ORDER — HEPARIN SODIUM (PORCINE) LOCK FLUSH IV SOLN 100 UNIT/ML 100 UNIT/ML
500 SOLUTION INTRAVENOUS AS NEEDED
Status: DISCONTINUED | OUTPATIENT
Start: 2024-04-18 | End: 2024-04-19 | Stop reason: HOSPADM

## 2024-04-18 RX ORDER — SODIUM CHLORIDE 0.9 % (FLUSH) 0.9 %
20 SYRINGE (ML) INJECTION AS NEEDED
OUTPATIENT
Start: 2024-04-18

## 2024-04-18 RX ORDER — HEPARIN SODIUM (PORCINE) LOCK FLUSH IV SOLN 100 UNIT/ML 100 UNIT/ML
500 SOLUTION INTRAVENOUS AS NEEDED
OUTPATIENT
Start: 2024-04-18

## 2024-04-18 RX ADMIN — Medication 20 ML: at 15:12

## 2024-04-18 RX ADMIN — HEPARIN 500 UNITS: 100 SYRINGE at 15:14

## 2024-04-19 LAB — CEA SERPL-MCNC: 29.3 NG/ML

## 2024-04-22 ENCOUNTER — SPECIALTY PHARMACY (OUTPATIENT)
Dept: PHARMACY | Facility: HOSPITAL | Age: 64
End: 2024-04-22
Payer: COMMERCIAL

## 2024-04-22 ENCOUNTER — LAB (OUTPATIENT)
Dept: LAB | Facility: HOSPITAL | Age: 64
End: 2024-04-22
Payer: COMMERCIAL

## 2024-04-22 DIAGNOSIS — C78.7 ADENOCARCINOMA OF COLON METASTATIC TO LIVER: Primary | ICD-10-CM

## 2024-04-22 DIAGNOSIS — C78.7 ADENOCARCINOMA OF COLON METASTATIC TO LIVER: ICD-10-CM

## 2024-04-22 DIAGNOSIS — C18.9 ADENOCARCINOMA OF COLON METASTATIC TO LIVER: Primary | ICD-10-CM

## 2024-04-22 DIAGNOSIS — C18.9 ADENOCARCINOMA OF COLON METASTATIC TO LIVER: ICD-10-CM

## 2024-04-22 LAB
ALBUMIN SERPL-MCNC: 4.5 G/DL (ref 3.5–5.2)
ALBUMIN/GLOB SERPL: 1.6 G/DL
ALP SERPL-CCNC: 93 U/L (ref 39–117)
ALT SERPL W P-5'-P-CCNC: 12 U/L (ref 1–33)
ANION GAP SERPL CALCULATED.3IONS-SCNC: 11 MMOL/L (ref 5–15)
AST SERPL-CCNC: 16 U/L (ref 1–32)
BASOPHILS # BLD AUTO: 0 10*3/MM3 (ref 0–0.2)
BASOPHILS NFR BLD AUTO: 0 % (ref 0–1.5)
BILIRUB SERPL-MCNC: 0.9 MG/DL (ref 0–1.2)
BUN SERPL-MCNC: 12 MG/DL (ref 8–23)
BUN/CREAT SERPL: 12.6 (ref 7–25)
CALCIUM SPEC-SCNC: 9.7 MG/DL (ref 8.6–10.5)
CEA SERPL-MCNC: 34.9 NG/ML
CHLORIDE SERPL-SCNC: 101 MMOL/L (ref 98–107)
CO2 SERPL-SCNC: 27 MMOL/L (ref 22–29)
CREAT SERPL-MCNC: 0.95 MG/DL (ref 0.57–1)
DEPRECATED RDW RBC AUTO: 77.6 FL (ref 37–54)
EGFRCR SERPLBLD CKD-EPI 2021: 67.9 ML/MIN/1.73
EOSINOPHIL # BLD AUTO: 0.01 10*3/MM3 (ref 0–0.4)
EOSINOPHIL NFR BLD AUTO: 0.5 % (ref 0.3–6.2)
ERYTHROCYTE [DISTWIDTH] IN BLOOD BY AUTOMATED COUNT: 21.8 % (ref 12.3–15.4)
GLOBULIN UR ELPH-MCNC: 2.8 GM/DL
GLUCOSE SERPL-MCNC: 103 MG/DL (ref 65–99)
HCT VFR BLD AUTO: 34.4 % (ref 34–46.6)
HGB BLD-MCNC: 10.8 G/DL (ref 12–15.9)
LYMPHOCYTES # BLD AUTO: 0.67 10*3/MM3 (ref 0.7–3.1)
LYMPHOCYTES NFR BLD AUTO: 34.2 % (ref 19.6–45.3)
MCH RBC QN AUTO: 32.6 PG (ref 26.6–33)
MCHC RBC AUTO-ENTMCNC: 31.4 G/DL (ref 31.5–35.7)
MCV RBC AUTO: 103.9 FL (ref 79–97)
MONOCYTES # BLD AUTO: 0.54 10*3/MM3 (ref 0.1–0.9)
MONOCYTES NFR BLD AUTO: 27.6 % (ref 5–12)
NEUTROPHILS NFR BLD AUTO: 0.74 10*3/MM3 (ref 1.7–7)
NEUTROPHILS NFR BLD AUTO: 37.7 % (ref 42.7–76)
PLATELET # BLD AUTO: 95 10*3/MM3 (ref 140–450)
PMV BLD AUTO: 8.8 FL (ref 6–12)
POTASSIUM SERPL-SCNC: 3.8 MMOL/L (ref 3.5–5.2)
PROT SERPL-MCNC: 7.3 G/DL (ref 6–8.5)
RBC # BLD AUTO: 3.31 10*6/MM3 (ref 3.77–5.28)
SODIUM SERPL-SCNC: 139 MMOL/L (ref 136–145)
WBC NRBC COR # BLD AUTO: 1.96 10*3/MM3 (ref 3.4–10.8)

## 2024-04-22 PROCEDURE — 36415 COLL VENOUS BLD VENIPUNCTURE: CPT

## 2024-04-22 PROCEDURE — 82378 CARCINOEMBRYONIC ANTIGEN: CPT | Performed by: INTERNAL MEDICINE

## 2024-04-22 PROCEDURE — 85025 COMPLETE CBC W/AUTO DIFF WBC: CPT

## 2024-04-22 PROCEDURE — 80053 COMPREHEN METABOLIC PANEL: CPT | Performed by: INTERNAL MEDICINE

## 2024-04-22 NOTE — PROGRESS NOTES
Re: Refills of Oral Specialty Medication - Lonsurf (trifluridine-tipiracil)    Per Dr. Adams, patient is not to restart bevacizumab at this time.  Patient is to continue  Drug-Drug Interactions: The current medication list was reviewed and there are no relevant drug-drug interactions with the specialty medication.  Medication Allergies: The patient has NKDA  Review of Labs/Dose Adjustments: NO DOSE CHANGE - I reviewed the most recent note and labs and the patient will continue without any dose changes.  I sent refills as described below.    Drug: Lonsurf (trifluridine-tipiracil)  Strength: 20-8.19 mg  Directions: Take 2 tablets by mouth  daily with breakfast & dinner. Take 2 tabs with breakfast and 2 with dinner on days 1-5 and 8-12 every 28 days  Quantity: 40  Refills: 5  Pharmacy prescription sent to: [facility] upon MD signature    Name/Credentials Aria THURMAN PharmD      4/22/2024  11:25 EDT

## 2024-05-06 NOTE — PROGRESS NOTES
Chief Complaint  Annual Exam, Hypertension, and Coronary Artery Disease    Subjective     CC  Problem List  Visit Diagnosis   Encounters  Notes  Medications  Labs  Result Review Imaging  Media    Samantha Massey presents to [facility] for an annual exam. The patient is here: for coordination of medical care to discuss health maintenance and disease prevention. Overall has: moderate activity with work/home activities, good appetite, feels well with minor complaints, decreased energy level, and is sleeping poorly. Nutrition: appropriate balanced diet. Last tetanus shot was unknown.     History of Present Illness  Samantha suffers from metatastatic colon cancer to bone and liver, dx 2012 her disease is gradually progressive. Most recently she is experiencing moderate anemia.   Hypertension  This is a chronic problem. The current episode started more than 1 year ago. The problem is unchanged. The problem is controlled. Associated symptoms include malaise/fatigue. Pertinent negatives include no chest pain, headaches, neck pain, orthopnea, palpitations, peripheral edema or shortness of breath. Risk factors for coronary artery disease include post-menopausal state and smoking/tobacco exposure. Past treatments include nothing. Current antihypertension treatment includes diuretics and beta blockers. The current treatment provides mild improvement. There are no compliance problems.  Hypertensive end-organ damage includes kidney disease, CAD/MI and heart failure.   Coronary Artery Disease  Presents for follow-up visit. Pertinent negatives include no chest pain, chest pressure, chest tightness, dizziness, leg swelling, palpitations, shortness of breath or weight gain. The symptoms have been resolved. Compliance with diet is variable. Compliance with exercise is variable. Compliance with medications is variable.       Review of Systems   Constitutional:  Positive for fatigue,  "malaise/fatigue and unexpected weight loss. Negative for activity change, appetite change, fever and unexpected weight gain.   HENT:  Negative for congestion, ear pain, hearing loss, rhinorrhea, sinus pressure, sore throat, swollen glands, trouble swallowing and voice change.    Eyes:  Negative for visual disturbance.   Respiratory:  Negative for apnea, cough, chest tightness, shortness of breath and wheezing.    Cardiovascular:  Negative for chest pain, palpitations, orthopnea and leg swelling.   Gastrointestinal:  Negative for abdominal pain, blood in stool, constipation, diarrhea, nausea, vomiting and indigestion.   Endocrine: Negative for cold intolerance, heat intolerance, polydipsia and polyuria.   Genitourinary:  Negative for breast discharge, breast lump, breast pain, dysuria, flank pain, frequency, pelvic pain and vaginal bleeding.   Musculoskeletal:  Negative for arthralgias, joint swelling, myalgias and neck pain.   Skin:  Negative for rash, skin lesions and wound.   Allergic/Immunologic: Negative for environmental allergies and immunocompromised state.   Neurological:  Negative for dizziness, syncope, weakness, numbness, headache and memory problem.   Hematological:  Negative for adenopathy. Does not bruise/bleed easily.   Psychiatric/Behavioral:  Negative for suicidal ideas and depressed mood. The patient is not nervous/anxious.         Objective   Vital Signs:   BP 90/68 (BP Location: Right arm, Patient Position: Sitting, Cuff Size: Adult)   Pulse 105   Temp 98 °F (36.7 °C) (Temporal)   Resp 18   Ht 157.5 cm (62.01\")   Wt 53.5 kg (118 lb)   SpO2 99%   BMI 21.58 kg/m²     Physical Exam  Constitutional:       General: She is not in acute distress.     Appearance: She is well-developed.   HENT:      Head: Normocephalic. Hair is normal.      Right Ear: Tympanic membrane and external ear normal.      Left Ear: Tympanic membrane and external ear normal.      Nose: Nose normal. No mucosal edema.      " Mouth/Throat:      Pharynx: Uvula midline.   Eyes:      General:         Right eye: No discharge.         Left eye: No discharge.      Conjunctiva/sclera: Conjunctivae normal.      Pupils: Pupils are equal, round, and reactive to light.   Neck:      Thyroid: No thyromegaly.      Vascular: No JVD.   Cardiovascular:      Rate and Rhythm: Normal rate and regular rhythm.      Chest Wall: PMI is not displaced.      Pulses:           Carotid pulses are 2+ on the right side and 2+ on the left side.       Femoral pulses are 2+ on the right side and 2+ on the left side.       Dorsalis pedis pulses are 2+ on the right side and 2+ on the left side.      Heart sounds: Normal heart sounds. No murmur heard.     No friction rub. No gallop.      Comments: Negative Homans' no edema  Pulmonary:      Effort: Pulmonary effort is normal. No respiratory distress.      Breath sounds: Normal breath sounds. No decreased breath sounds, wheezing, rhonchi or rales.   Chest:   Breasts:     Breasts are symmetrical.      Right: No inverted nipple, mass, nipple discharge, skin change or tenderness.      Left: No inverted nipple, mass, nipple discharge, skin change or tenderness.   Abdominal:      General: Bowel sounds are normal. There is no distension or abdominal bruit.      Palpations: Abdomen is soft. There is no mass.      Tenderness: There is no abdominal tenderness.   Musculoskeletal:         General: No tenderness. Deformity: knees and distal fingers..Normal range of motion.      Cervical back: Normal range of motion and neck supple. No muscular tenderness.   Lymphadenopathy:      Cervical: No cervical adenopathy.      Upper Body:      Right upper body: No supraclavicular adenopathy.      Left upper body: No supraclavicular adenopathy.   Skin:     General: Skin is warm and dry.      Findings: No ecchymosis, erythema, lesion or rash.   Neurological:      Mental Status: She is alert and oriented to person, place, and time.      Cranial  Nerves: No cranial nerve deficit.      Sensory: No sensory deficit.      Motor: No abnormal muscle tone.      Coordination: Coordination normal.      Deep Tendon Reflexes: Reflexes are normal and symmetric. Reflexes normal.   Psychiatric:         Speech: Speech normal.         Behavior: Behavior normal.         Thought Content: Thought content normal. Thought content does not include suicidal ideation.         Cognition and Memory: She does not exhibit impaired recent memory or impaired remote memory.         Judgment: Judgment normal. Judgment is not impulsive.        Recent Lab Results:  Lab Results   Component Value Date    CHLPL 119 05/08/2024    TRIG 114 05/08/2024    HDL 38 (L) 05/08/2024    LDL 60 05/08/2024    VLDL 21 05/08/2024      Result Review :Labs  Result Review  Imaging  Med Tab  Media                 Assessment and Plan CC Problem List  Visit Diagnosis  ROS  Review (Popup)  Health Maintenance  Quality  BestPractice  Medications  SmartSets  SnapShot Encounters  Media  Problem List Items Addressed This Visit          High    Adenocarcinoma of colon metastatic to liver    Overview     Metastatatic to the liver , lung, and bone. Followed with Angie.  Her disease is slowly progressive.         Relevant Orders    Comprehensive Metabolic Panel (Completed)    Primary hypertension    Overview     Values are low since MI and beginning beta blocker at low dose, also on entresto, cardiology apt soon she is encouraged to discuss a possible decrease in dose.          Current Assessment & Plan     Hypertension is stable and controlled  Continue current treatment regimen.  Blood pressure will be reassessed in 3 months.         CAD (coronary artery disease)    Overview     Hx of acute non ST eleation MI 12/07/2023  100% occlusion of LAD with nateral collaterals   Moderate to severe stenosis involving the left main supplying only a left circumflex artery with no critical stenosis  Moderate stenosis  involving the ostium of the right coronary artery that supplies collaterals to the LAD territory  Severe LV dysfunction, with need for ballon pump temporarily, good recovery.  EF 55% 01/24  On Entresto           Stress-induced cardiomyopathy    Overview     Compensated after resolution of pneumonia. Compliant with meds and cariology follow up Pendyla. EF improved to 51 on echo 01/2024            Medium    Hyperlipidemia LDL goal <100    Overview     Tolerating Atorvastatin, compliant watch liver function given CA with liver mets.          Current Assessment & Plan      Lipid abnormalities are stable    Plan:  Continue same medication/s without change.      Discussed medication dosage, use, side effects, and goals of treatment in detail.    Counseled patient on lifestyle modifications to help control hyperlipidemia.     Patient Treatment Goals:   LDL goal is less than 70    Followup in 6 months.         Relevant Orders    Lipid Panel With / Chol / HDL Ratio (Completed)    TSH (Completed)       Unprioritized    Anemia    Overview     Hgb averages 7.0 secondary to meds         Relevant Orders    CBC & Differential (Completed)    Malignant neoplasm of colon metastatic to bone    Overview     Metastatic to the liver and bone on going Rx    Doing well followed with [facility] oncology Angie         History of non-ST elevation myocardial infarction (NSTEMI)    Overview     12/07/2023, risk factors modified no sxs compliant with meds.          Chest wall pain, chronic    Overview     Right ant 3 rd rib         Relevant Medications    traMADol (ULTRAM) 50 MG tablet     Other Visit Diagnoses       Annual physical exam    -  Primary    Relevant Orders    POCT urinalysis dipstick, manual (Completed)    Encounter for screening mammogram for breast cancer        Relevant Orders    Mammo Screening Digital Tomosynthesis Bilateral With CAD    BMI 22.0-22.9, adult                Follow Up Instructions Charge Capture  Follow-up  Communications  Return in about 6 months (around 11/8/2024).  Patient was given instructions and counseling regarding her condition or for health maintenance advice. Please see specific information pulled into the AVS if appropriate.

## 2024-05-07 ENCOUNTER — HOSPITAL ENCOUNTER (OUTPATIENT)
Dept: PET IMAGING | Facility: HOSPITAL | Age: 64
Discharge: HOME | End: 2024-05-07
Admitting: INTERNAL MEDICINE
Payer: COMMERCIAL

## 2024-05-07 DIAGNOSIS — C18.9 ADENOCARCINOMA OF COLON METASTATIC TO LIVER: ICD-10-CM

## 2024-05-07 DIAGNOSIS — C78.7 ADENOCARCINOMA OF COLON METASTATIC TO LIVER: ICD-10-CM

## 2024-05-07 PROCEDURE — 74176 CT ABD & PELVIS W/O CONTRAST: CPT

## 2024-05-07 PROCEDURE — 71250 CT THORAX DX C-: CPT

## 2024-05-08 ENCOUNTER — OFFICE VISIT (OUTPATIENT)
Dept: FAMILY MEDICINE CLINIC | Facility: CLINIC | Age: 64
End: 2024-05-08
Payer: COMMERCIAL

## 2024-05-08 VITALS
TEMPERATURE: 98 F | OXYGEN SATURATION: 99 % | HEART RATE: 105 BPM | BODY MASS INDEX: 21.71 KG/M2 | RESPIRATION RATE: 18 BRPM | DIASTOLIC BLOOD PRESSURE: 68 MMHG | WEIGHT: 118 LBS | HEIGHT: 62 IN | SYSTOLIC BLOOD PRESSURE: 90 MMHG

## 2024-05-08 DIAGNOSIS — I10 PRIMARY HYPERTENSION: ICD-10-CM

## 2024-05-08 DIAGNOSIS — C79.51 MALIGNANT NEOPLASM OF COLON METASTATIC TO BONE: ICD-10-CM

## 2024-05-08 DIAGNOSIS — C18.9 ADENOCARCINOMA OF COLON METASTATIC TO LIVER: ICD-10-CM

## 2024-05-08 DIAGNOSIS — I51.81 STRESS-INDUCED CARDIOMYOPATHY: ICD-10-CM

## 2024-05-08 DIAGNOSIS — I25.10 CORONARY ARTERY DISEASE INVOLVING NATIVE CORONARY ARTERY OF NATIVE HEART WITHOUT ANGINA PECTORIS: ICD-10-CM

## 2024-05-08 DIAGNOSIS — I25.2 HISTORY OF NON-ST ELEVATION MYOCARDIAL INFARCTION (NSTEMI): ICD-10-CM

## 2024-05-08 DIAGNOSIS — D50.8 OTHER IRON DEFICIENCY ANEMIA: ICD-10-CM

## 2024-05-08 DIAGNOSIS — C78.7 ADENOCARCINOMA OF COLON METASTATIC TO LIVER: ICD-10-CM

## 2024-05-08 DIAGNOSIS — Z00.00 ANNUAL PHYSICAL EXAM: Primary | ICD-10-CM

## 2024-05-08 DIAGNOSIS — E78.5 HYPERLIPIDEMIA LDL GOAL <100: ICD-10-CM

## 2024-05-08 DIAGNOSIS — Z12.31 ENCOUNTER FOR SCREENING MAMMOGRAM FOR BREAST CANCER: ICD-10-CM

## 2024-05-08 DIAGNOSIS — G89.29 CHEST WALL PAIN, CHRONIC: ICD-10-CM

## 2024-05-08 DIAGNOSIS — R07.89 CHEST WALL PAIN, CHRONIC: ICD-10-CM

## 2024-05-08 DIAGNOSIS — C18.9 MALIGNANT NEOPLASM OF COLON METASTATIC TO BONE: ICD-10-CM

## 2024-05-08 LAB
BILIRUB BLD-MCNC: NEGATIVE MG/DL
CLARITY, POC: CLEAR
COLOR UR: YELLOW
GLUCOSE UR STRIP-MCNC: ABNORMAL MG/DL
KETONES UR QL: NEGATIVE
LEUKOCYTE EST, POC: NEGATIVE
NITRITE UR-MCNC: NEGATIVE MG/ML
PH UR: 6.5 [PH] (ref 5–8)
PROT UR STRIP-MCNC: NEGATIVE MG/DL
RBC # UR STRIP: NEGATIVE /UL
SP GR UR: 1 (ref 1–1.03)
UROBILINOGEN UR QL: NORMAL

## 2024-05-08 PROCEDURE — 81002 URINALYSIS NONAUTO W/O SCOPE: CPT | Performed by: FAMILY MEDICINE

## 2024-05-08 PROCEDURE — 99214 OFFICE O/P EST MOD 30 MIN: CPT | Performed by: FAMILY MEDICINE

## 2024-05-08 PROCEDURE — 99396 PREV VISIT EST AGE 40-64: CPT | Performed by: FAMILY MEDICINE

## 2024-05-08 RX ORDER — TRAMADOL HYDROCHLORIDE 50 MG/1
50 TABLET ORAL EVERY 6 HOURS PRN
Qty: 50 TABLET | Refills: 1 | Status: SHIPPED | OUTPATIENT
Start: 2024-05-08

## 2024-05-09 LAB
ALBUMIN SERPL-MCNC: 4.3 G/DL (ref 3.9–4.9)
ALBUMIN/GLOB SERPL: 2.2 {RATIO} (ref 1.2–2.2)
ALP SERPL-CCNC: 98 IU/L (ref 44–121)
ALT SERPL-CCNC: 10 IU/L (ref 0–32)
AST SERPL-CCNC: 17 IU/L (ref 0–40)
BASOPHILS # BLD AUTO: 0 X10E3/UL (ref 0–0.2)
BASOPHILS NFR BLD AUTO: 0 %
BILIRUB SERPL-MCNC: 1.1 MG/DL (ref 0–1.2)
BUN SERPL-MCNC: 17 MG/DL (ref 8–27)
BUN/CREAT SERPL: 18 (ref 12–28)
CALCIUM SERPL-MCNC: 8.8 MG/DL (ref 8.7–10.3)
CHLORIDE SERPL-SCNC: 105 MMOL/L (ref 96–106)
CHOLEST SERPL-MCNC: 119 MG/DL (ref 100–199)
CHOLEST/HDLC SERPL: 3.1 RATIO (ref 0–4.4)
CO2 SERPL-SCNC: 24 MMOL/L (ref 20–29)
CREAT SERPL-MCNC: 0.96 MG/DL (ref 0.57–1)
EGFRCR SERPLBLD CKD-EPI 2021: 67 ML/MIN/1.73
EOSINOPHIL # BLD AUTO: 0 X10E3/UL (ref 0–0.4)
EOSINOPHIL NFR BLD AUTO: 0 %
ERYTHROCYTE [DISTWIDTH] IN BLOOD BY AUTOMATED COUNT: 19.2 % (ref 11.7–15.4)
GLOBULIN SER CALC-MCNC: 2 G/DL (ref 1.5–4.5)
GLUCOSE SERPL-MCNC: 103 MG/DL (ref 70–99)
HCT VFR BLD AUTO: 22.3 % (ref 34–46.6)
HDLC SERPL-MCNC: 38 MG/DL
HGB BLD-MCNC: 7.2 G/DL (ref 11.1–15.9)
IMM GRANULOCYTES # BLD AUTO: 0 X10E3/UL (ref 0–0.1)
IMM GRANULOCYTES NFR BLD AUTO: 0 %
LDLC SERPL CALC-MCNC: 60 MG/DL (ref 0–99)
LYMPHOCYTES # BLD AUTO: 1 X10E3/UL (ref 0.7–3.1)
LYMPHOCYTES NFR BLD AUTO: 40 %
MCH RBC QN AUTO: 31.6 PG (ref 26.6–33)
MCHC RBC AUTO-ENTMCNC: 32.3 G/DL (ref 31.5–35.7)
MCV RBC AUTO: 98 FL (ref 79–97)
MONOCYTES # BLD AUTO: 0.2 X10E3/UL (ref 0.1–0.9)
MONOCYTES NFR BLD AUTO: 6 %
MORPHOLOGY BLD-IMP: ABNORMAL
NEUTROPHILS # BLD AUTO: 1.3 X10E3/UL (ref 1.4–7)
NEUTROPHILS NFR BLD AUTO: 54 %
NRBC BLD AUTO-RTO: 1 % (ref 0–0)
PLATELET # BLD AUTO: 69 X10E3/UL (ref 150–450)
POTASSIUM SERPL-SCNC: 4.2 MMOL/L (ref 3.5–5.2)
PROT SERPL-MCNC: 6.3 G/DL (ref 6–8.5)
RBC # BLD AUTO: 2.28 X10E6/UL (ref 3.77–5.28)
SODIUM SERPL-SCNC: 142 MMOL/L (ref 134–144)
TRIGL SERPL-MCNC: 114 MG/DL (ref 0–149)
TSH SERPL DL<=0.005 MIU/L-ACNC: 2.26 UIU/ML (ref 0.45–4.5)
VLDLC SERPL CALC-MCNC: 21 MG/DL (ref 5–40)
WBC # BLD AUTO: 2.4 X10E3/UL (ref 3.4–10.8)

## 2024-05-10 ENCOUNTER — TELEPHONE (OUTPATIENT)
Dept: FAMILY MEDICINE CLINIC | Facility: CLINIC | Age: 64
End: 2024-05-10
Payer: COMMERCIAL

## 2024-05-10 DIAGNOSIS — D50.8 OTHER IRON DEFICIENCY ANEMIA: Primary | ICD-10-CM

## 2024-05-10 NOTE — PROGRESS NOTES
Called patient about labs   Good morning we have your labs back and per Dr. Spencer   Your white blood cell count has improved slightly since it was checked 2 weeks ago. Your RBC are remaining about the same and stable as well as your hemoglobin and hematocrit.  You have normal liver, kidney and thyroid function, your electrolyte function is normal as well. Your glucose was 103 .  Your total cholesterol was 119 this has increased from the last time it was checked as it was 113, your triglycerides was at 114 this has increased from the last time it was checked as it was 92, your HDL (healthy cholesterol) was 38 this has decreased from the last time it was checked as it was 42( we want this to be as high as we can get it and we do this by exercise, exercise), your LDL ( lousy cholesterol) was 60 this has increased from the last time it was checked as it was 53. Your total cholesterol/cardiac ratio was 3.1 this has remained the same from the last time as it was 3.1.  Continue to work on diet along with exercise and taking your medications as prescribed and we would like to check a CBC again in 1 week if not done at the hematologist/ oncologist.

## 2024-05-17 NOTE — PROGRESS NOTES
HEMATOLOGY ONCOLOGY OUTPATIENT FOLLOW-UP       Patient name: Samantha Massey  : 1960  MRN: [id number]  Primary Care Physician: Diana Spnecer MD  Referring Physician: No ref. provider found  Reason For Consult: Colon cancer.     History of Present Illness:  Patient is a 63 y.o.     2024: In the office for the first time in transfer from [facility]. Ms. Massey reported that sometime in  she developed severe constipation that did not seem to improve. This slowly became associated to fatigue and weakness. Eventually she sought attention and was found to have normocytic anemia in 2012. A CT scan of the abdomen reported innumerable lesions throughout the liver. There was thickening of the distal sigmoid colon. There were some non-specific lymph nodes. She underwent a colonoscopy in early January that revealed a tumor in the distal colon. The final report of pathology was of invasive, moderately differentiated adenocarcinoma without microsatellite instability. She underwent a laparoscopic low anterior resection. The final report of pathology was of a poorly differentiated adenocarcinoma of the rectosigmoid with invasion through the muscularis propria and extension of the mesocolon. No lymph nodes were involved, but she underwent an image guided biopsy of the liver reported metastatic disease consistent with primary colon cancer. In 2012 she started treatment with FOLFOX/bevacizumab. Testing at the time reported KRAS wild type. A PET scan done shortly after the commencement of the treatment reported multiple metastatic lesions in the liver. In  of the same year there was evidence of response to the treatment. Later, in August, the lesions were stable. She continued treatment with the same regimen through 2012 at which time she was transitioned to capecitabine. Radioembolization to the liver was delivered initially to the right lobe and then to  the left lobe in November of that same year. There was no suggestion of disease outside of the liver and the lesions in the liver showed evidence of response. In August of 2014 the capecitabine was discontinued as there was no longer evidence of disease. She did well until October of 2017 when she had radiographic evidence of progressive disease in the liver and an image guided biopsy confirmed colonic primary. FOLFOX/bevacizumab was re-introduced in October of that year.  In February of 2018 she had evidence of response and no suggestion of extrahepatic disease and she underwent left hepatectomy. Following that she was placed on capecitabine as adjuvant treatment. She continued treatment with this agent until September of 2018. In early January of 2019 there was evidence of disease in the lungs and the mediastinal lymph nodes. In July of 2019 she started treatment with FOLFIRI/bevacizumab. In the setting of progressive disease the bevacizumab was changed to cetuximab. In December of 2020 metastatic bone disease was identified. To continue treatment with cetuximab and irinotecan. There was no evidence of progression until January of 2023 that revealed enlarging pulmonary nodules and enlargement of a right seventh rib metastatic lesion. For that reason she was prescribed regorafenib and began treatment in February of the same year.  In April of 2023 she had radiation to the right 7th rib. The regorafenib resulted in hand foot syndrome. In June of 2023 a scan revealed progressive disease and in late July of the same year she began treatment with trifluridine/tipracil and in August of the same year bevacizumab was added. She developed myelosuppression and doses were modified. At the time of this first visit she was still taking the above regimen. She was rather asymptomatic and on exam there were no changes. There was a report of a scan done in early December of 2023 that reported possible enlargement of the rib  metastatic lesion. A decision was made to continue with the same treatment and plans to take over the prescription of trifluridine/tipracil. To have scans in February of 2024.     2/15/2024: Feeling well.  Taking the trifluridine/tipracil without difficulties.  Generally energetic and with good appetite.  Afebrile.  On exam she does not appear in distress and she does not seem acutely ill.  The lungs are clear bilaterally and the heart is regular.  The abdomen is soft and nontender.  No edema.  Reviewed the laboratory exams that suggest anemia.  She is not pale and off for the anemia reported on the blood count and does it will be repeated.  If indeed her hemoglobin is 7.3 g/dL she will be transfused.  I reviewed independently the images and the reports of the scans.  She has stable disease at this time.  Continue with the same treatment and see me in 6 weeks.     4/18/2024: Feeling well and without new problems.  Continues to take the trifluridine tipiracil without any difficulties.  She has been afebrile.  She continues to eat reasonably well and her weight is stable.  She denies pain.  On exam no distress.  No oral lesions.  Respirations not labored and lungs clear.  Heart regular.  Abdomen soft and without palpable tumors.  No edema.  The laboratory exams were reviewed.  She has significant neutropenia which I discussed with her.  Prior to the commencement of the next cycle of medication she needs to have her blood counts checked.  I discussed with her the importance of neutropenic precautions and urged her to call if febrile.    5/24/2024: Does not feel as well as at the time of the last visit.  She has noted the enlargement of nodules on her anterior chest that resulted in pain.  She has been taking acetaminophen and tramadol prescribed by her primary care physician that does result in relief of the pain.  She has not had any fevers.  She has been off of the trifluridine/tipiracil since she was instructed to  do so because of pancytopenia.  She has had no dyspnea and denies coughing.  Also no abdominal pain.  Has been able to eat and her weight is stable.  On exam she appears chronically ill but is not in distress.  No jaundice or pallor.  Indeed there are palpable tumors on the anterior chest particularly.  The lungs are clear.  The heart is regular.  Abdomen is soft.  Reviewed the scans.  There is clear progression of the disease with dose nodules that she feels corresponding to metastatic deposits that clearly have increased in size.  The right parasternal chest wall metastatic deposit, which is the largest, increased from 2.6 cm to 4.3 cm.  The metastatic deposits in the lungs have also increased and the left lower lobe lesion has gone from 2.1 to 3.2 cm.  Within the abdomen and pelvis there are been no changes.  Discussed with her.  I have asked her to see Dr. Colunga for consideration of radiation to relieve pain on the anterior chest.  I have also asked her to have a biopsy of one of the lung lesions particularly the left lower lobe lesion, which appears to be accessible and is largest.  I would like to consider not only histologic analysis but also particularly next-generation sequencing and PD-L1 expression.  Discussed with her.    Past Medical History:   Diagnosis Date    Acute systolic heart failure 12/09/2023    Allergic laytex,peniciline,contrast dye    CAD (coronary artery disease) 12/09/2023    CHF (congestive heart failure) 12/07/23    Chronic kidney disease     colon cancer     Metastatic to liver and bones    Colon cancer     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    History of colon cancer, stage IV     Stage SHELL with progression    Hypertension     NSTEMI (non-ST elevated myocardial infarction) 12/09/2023    PONV (postoperative nausea and vomiting)     Pulmonary arterial hypertension     Radiculopathy 07/2012    Right L5/S1     Past Surgical History:   Procedure Laterality Date    ABDOMINAL SURGERY       CARDIAC CATHETERIZATION N/A 12/08/2023    Procedure: Left Heart Cath;  Surgeon: Ramiro Olivera MD;  Location: [facility] CATH INVASIVE LOCATION;  Service: Cardiovascular;  Laterality: N/A;    COLON SURGERY  Colon resection    CYST REMOVAL  1998    cyst removed from bacck in 1998    HERNIA REPAIR  incisional    PORTACATH PLACEMENT  2017    VENOUS ACCESS DEVICE (PORT) INSERTION Left 01/12/2021    Procedure: INSERTION VENOUS ACCESS DEVICE;  Surgeon: Jay Yeh MD;  Location: [facility] MAIN OR;  Service: General;  Laterality: Left;    VENOUS ACCESS DEVICE (PORT) REMOVAL Right 08/27/2020    Procedure: REMOVAL VENOUS ACCESS DEVICE;  Surgeon: Jay Yeh MD;  Location: [facility] MAIN OR;  Service: General;  Laterality: Right;    VENTRAL/INCISIONAL HERNIA REPAIR N/A 03/02/2023    Procedure: VENTRAL/INCISIONAL HERNIA REPAIR WITH MESH;  Surgeon: Jay Yeh MD;  Location: [facility] MAIN OR;  Service: General;  Laterality: N/A;       Current Outpatient Medications:     aspirin 81 MG EC tablet, Take 1 tablet by mouth Daily., Disp: , Rfl:     atorvastatin (LIPITOR) 80 MG tablet, Take 1 tablet by mouth Daily for 360 days., Disp: 90 tablet, Rfl: 3    Cyanocobalamin (Vitamin B-12) 5000 MCG sublingual tablet, Place 1 tablet under the tongue Every Morning., Disp: , Rfl:     empagliflozin (JARDIANCE) 10 MG tablet tablet, Take 1 tablet by mouth Daily for 360 days., Disp: 90 tablet, Rfl: 3    furosemide (LASIX) 40 MG tablet, 1 tab daily, Disp: 90 tablet, Rfl: 0    Lysine 500 MG capsule, Take 1 tablet by mouth 2 (Two) Times a Day., Disp: , Rfl:     metoprolol succinate XL (TOPROL-XL) 25 MG 24 hr tablet, Take 1 tablet by mouth Daily for 360 days., Disp: 90 tablet, Rfl: 3    sacubitril-valsartan (ENTRESTO) 24-26 MG tablet, Take 1 tablet by mouth Every 12 (Twelve) Hours for 360 days., Disp: 180 tablet, Rfl: 3    traMADol (ULTRAM) 50 MG tablet, Take 1 tablet by mouth Every 6 (Six) Hours As Needed for  Moderate Pain., Disp: 50 tablet, Rfl: 1    trifluridine-tipiracil (LONSURF) 20-8.19 MG per tablet, Take 2 tablets by mouth Daily With Breakfast & Dinner. Take 2 tabs with Breakfast and 2 with Dinner Day 1-5 and 8-12 every 28 days., Disp: 40 tablet, Rfl: 5    Allergies   Allergen Reactions    Hydrocodone Nausea And Vomiting    Iodinated Contrast Media Hives and Itching     PT HAD SOME HIVES DURING SCAN.  PRIOR TO SCAN 8-3-2013.    Latex Rash     Blisters     Penicillins Hives     Family History   Problem Relation Age of Onset    Heart disease Mother     Lung cancer Father 70        Associated to cigarette smoking    Heart disease Father     Hyperlipidemia Father     Cancer Father     Cancer Sister         Bladder cancer     Cancer-related family history includes Cancer in her father and sister; Lung cancer (age of onset: 70) in her father.    Social History     Tobacco Use    Smoking status: Former     Current packs/day: 0.00     Average packs/day: 1 pack/day for 31.0 years (31.0 ttl pk-yrs)     Types: Cigarettes     Start date: 1981     Quit date: 2012     Years since quittin.3     Passive exposure: Past    Smokeless tobacco: Never   Vaping Use    Vaping status: Never Used   Substance Use Topics    Alcohol use: Yes     Comment: 2 or 3 a month    Drug use: Never     Social History     Social History Narrative    Not on file     ROS:   Review of Systems   Constitutional:  Negative for activity change, appetite change, chills, diaphoresis, fatigue, fever and unexpected weight change.   HENT:  Negative for congestion, dental problem, drooling, ear discharge, ear pain, facial swelling, hearing loss, mouth sores, nosebleeds, postnasal drip, rhinorrhea, sinus pressure, sinus pain, sneezing, sore throat, tinnitus, trouble swallowing and voice change.    Eyes:  Negative for photophobia, pain, discharge, redness, itching and visual disturbance.   Respiratory:  Negative for apnea, cough, choking, chest tightness,  shortness of breath, wheezing and stridor.    Cardiovascular:  Positive for chest pain (The pain is centered on the anterior chest, at the site of metastatic lesions.  The most intense pain is at the level of the third or fourth ribs, close to the articulation with the sternum.). Negative for palpitations and leg swelling.   Gastrointestinal:  Negative for abdominal distention, abdominal pain, anal bleeding, blood in stool, constipation, diarrhea, nausea, rectal pain and vomiting.   Endocrine: Negative for cold intolerance, heat intolerance, polydipsia and polyuria.   Genitourinary:  Negative for decreased urine volume, difficulty urinating, dysuria, flank pain, frequency, genital sores, hematuria and urgency.   Musculoskeletal:  Negative for arthralgias, back pain, gait problem, joint swelling, myalgias, neck pain and neck stiffness.   Skin:  Negative for color change, pallor and rash.   Neurological:  Negative for dizziness, tremors, seizures, syncope, facial asymmetry, speech difficulty, weakness, light-headedness, numbness and headaches.   Hematological:  Negative for adenopathy. Does not bruise/bleed easily.   Psychiatric/Behavioral:  Negative for agitation, behavioral problems, confusion, decreased concentration, hallucinations, self-injury, sleep disturbance and suicidal ideas. The patient is not nervous/anxious.      Objective:    Vital Signs:  There were no vitals filed for this visit.    There is no height or weight on file to calculate BMI.    ECOG  (0) Fully active, able to carry on all predisease performance without restriction    Physical Exam:   Physical Exam  Constitutional:       General: She is not in acute distress.     Appearance: She is not ill-appearing, toxic-appearing or diaphoretic.   HENT:      Head: Normocephalic and atraumatic.      Right Ear: External ear normal.      Left Ear: External ear normal.      Nose: Nose normal.      Mouth/Throat:      Mouth: Mucous membranes are moist.       Pharynx: Oropharynx is clear. No oropharyngeal exudate or posterior oropharyngeal erythema.   Eyes:      General: No scleral icterus.        Right eye: No discharge.         Left eye: No discharge.      Conjunctiva/sclera: Conjunctivae normal.      Pupils: Pupils are equal, round, and reactive to light.   Cardiovascular:      Rate and Rhythm: Normal rate and regular rhythm.      Pulses: Normal pulses.      Heart sounds: No murmur heard.     No friction rub. No gallop.   Pulmonary:      Effort: No respiratory distress.      Breath sounds: No stridor. No wheezing, rhonchi or rales.   Chest:      Comments: There are palpable nodules particularly on the right side of the chest, at the level of the third or fourth ribs, close to or even at the level of the articulation with the sternum.  Abdominal:      General: Abdomen is flat. Bowel sounds are normal. There is no distension.      Palpations: Abdomen is soft. There is no mass.      Tenderness: There is no abdominal tenderness. There is no right CVA tenderness, left CVA tenderness, guarding or rebound.      Hernia: No hernia is present.   Musculoskeletal:         General: No swelling, tenderness, deformity or signs of injury.      Cervical back: No rigidity.      Right lower leg: No edema.      Left lower leg: No edema.   Lymphadenopathy:      Cervical: No cervical adenopathy.   Skin:     Coloration: Skin is not jaundiced.      Findings: No bruising, lesion or rash.   Neurological:      General: No focal deficit present.      Mental Status: She is alert and oriented to person, place, and time.      Cranial Nerves: No cranial nerve deficit.      Motor: No weakness.      Gait: Gait normal.   Psychiatric:         Mood and Affect: Mood normal.         Behavior: Behavior normal.         Thought Content: Thought content normal.         Judgment: Judgment normal.     JG Adams MD performed the physical exam on 5/24/2024 as documented above.    Lab Results - Last 18  Months   Lab Units 05/08/24  1618 04/22/24  0853 04/18/24  1514   WBC x10E3/uL 2.4* 1.96* 1.44*   HEMOGLOBIN g/dL 7.2* 10.8* 9.0*   HEMATOCRIT % 22.3* 34.4 28.4*   PLATELETS x10E3/uL 69* 95* 93*   MCV fL 98* 103.9* 103.6*     Lab Results - Last 18 Months   Lab Units 05/08/24  1618 04/22/24  0853 04/18/24  1514   SODIUM mmol/L 142 139 143   POTASSIUM mmol/L 4.2 3.8 3.7   CHLORIDE mmol/L 105 101 108*   CO2 mmol/L 24 27.0 23.0   BUN mg/dL 17 12 16   CREATININE mg/dL 0.96 0.95 0.91   CALCIUM mg/dL 8.8 9.7 9.1   BILIRUBIN mg/dL 1.1 0.9 0.8   ALK PHOS IU/L 98 93 106   ALT (SGPT) IU/L 10 12 12   AST (SGOT) IU/L 17 16 13   GLUCOSE mg/dL 103* 103* 124*     Lab Results   Component Value Date    GLUCOSE 103 (H) 05/08/2024    BUN 17 05/08/2024    CREATININE 0.96 05/08/2024    EGFRIFNONA 71 08/19/2021    BCR 18 05/08/2024    K 4.2 05/08/2024    CO2 24 05/08/2024    CALCIUM 8.8 05/08/2024    PROTENTOTREF 6.3 05/08/2024    ALBUMIN 4.3 05/08/2024    LABIL2 2.2 05/08/2024    AST 17 05/08/2024    ALT 10 05/08/2024     Lab Results - Last 18 Months   Lab Units 12/09/23  0222 12/08/23  1923 12/08/23  1237 12/07/23  1631   INR   --   --  1.07 0.96   APTT seconds 65.6 62.4 28.8* 26.3*     Lab Results   Component Value Date    IRON 45 12/11/2019    TIBC 368 12/11/2019    FERRITIN 490.60 (H) 12/11/2019     Lab Results   Component Value Date    FOLATE >20.00 12/11/2019     Lab Results   Component Value Date    RETICCTPCT 4.90 (H) 12/12/2023     Lab Results   Component Value Date    DKZQVOFN63 >2,000 (H) 12/11/2019     LDH   Date Value Ref Range Status   12/09/2023 307 (H) 135 - 214 U/L Final     Lab Results   Component Value Date    HAPTOGLOBIN 29 (L) 12/08/2023     Lab Results   Component Value Date    PTT 65.6 12/09/2023    INR 1.07 12/08/2023     Lab Results   Component Value Date    CEA 34.90 04/22/2024     Assessment & Plan     Metastatic colon cancer on multiple lines of treatment.  With evidence of progression on the most recent  scans, with enlargement of pulmonary lesions as well as thoracic wall lesions.  I have requested a biopsy, ideally of the left lower lobe lesion to consider next ration sequencing and PD-L1 expression.  Congestive heart failure that had been attributed to continued use of bevacizumab. Suggested to correspond to Takotsubo.   Reviewed the images of the scans and reviewed the laboratory exams with her.  Discussed the significance of the findings.  Discussed with her the options of treatment, which are limited, given that she has received multiple lines of treatment at this time.  She is to see me in approximately 3 weeks with results.    Sean Adams MD on 5/24/2024 at 1616.

## 2024-05-18 LAB
BASOPHILS # BLD AUTO: 0 X10E3/UL (ref 0–0.2)
BASOPHILS NFR BLD AUTO: 0 %
EOSINOPHIL # BLD AUTO: 0.1 X10E3/UL (ref 0–0.4)
EOSINOPHIL NFR BLD AUTO: 4 %
ERYTHROCYTE [DISTWIDTH] IN BLOOD BY AUTOMATED COUNT: 19.7 % (ref 11.7–15.4)
HCT VFR BLD AUTO: 23.9 % (ref 34–46.6)
HGB BLD-MCNC: 7.9 G/DL (ref 11.1–15.9)
LYMPHOCYTES # BLD AUTO: 0.7 X10E3/UL (ref 0.7–3.1)
LYMPHOCYTES NFR BLD AUTO: 44 %
MCH RBC QN AUTO: 33.6 PG (ref 26.6–33)
MCHC RBC AUTO-ENTMCNC: 33.1 G/DL (ref 31.5–35.7)
MCV RBC AUTO: 102 FL (ref 79–97)
MONOCYTES # BLD AUTO: 0.2 X10E3/UL (ref 0.1–0.9)
MONOCYTES NFR BLD AUTO: 12 %
MORPHOLOGY BLD-IMP: ABNORMAL
NEUTROPHILS # BLD AUTO: 0.6 X10E3/UL (ref 1.4–7)
NEUTROPHILS NFR BLD AUTO: 40 %
PLATELET # BLD AUTO: 115 X10E3/UL (ref 150–450)
RBC # BLD AUTO: 2.35 X10E6/UL (ref 3.77–5.28)
WBC # BLD AUTO: 1.6 X10E3/UL (ref 3.4–10.8)

## 2024-05-21 ENCOUNTER — TELEPHONE (OUTPATIENT)
Dept: ONCOLOGY | Facility: CLINIC | Age: 64
End: 2024-05-21
Payer: COMMERCIAL

## 2024-05-21 PROBLEM — I50.9 CONGESTIVE HEART FAILURE: Status: RESOLVED | Noted: 2023-12-09 | Resolved: 2024-05-21

## 2024-05-21 PROBLEM — E87.6 HYPOKALEMIA: Status: RESOLVED | Noted: 2019-12-12 | Resolved: 2024-05-21

## 2024-05-21 NOTE — TELEPHONE ENCOUNTER
"Provider: valentino    Caller: juan manuel \"savanah\" hubert    Relationship to Patient: self    Phone Number: [phone]    Reason for Call: pt advises that hemoglobin is 7.9, and wishes to confirm whether she should take her lonsurf rx. she's concerned that her hemoglobin will drop even more. she says that if she doesn't answer, you may leave a voice message     When was the patient last seen: 04/18/2024  "

## 2024-05-21 NOTE — TELEPHONE ENCOUNTER
Specialty Pharmacy Note: Lonsurf (trifluridine-tipiracil)    Reviewed labs with Dr. Adams and per Dr. Adams, pt to hold current Lonsurf cycle due to low ANC and decrease in hemoglobin.  Per provider, he will likely recommend a dose reduction. He will discuss with pt and next appt on 5/24/24.    Contacted pt and informed her of above information. Per pt she was supposed to start her next cycle of Lonsurf today (5/21/24) but she has not started it yet and will not start until she is given instructions from provider or office.      Thank you,  Alley Byers, Pharm.D.

## 2024-05-24 ENCOUNTER — OFFICE VISIT (OUTPATIENT)
Dept: ONCOLOGY | Facility: CLINIC | Age: 64
End: 2024-05-24
Payer: COMMERCIAL

## 2024-05-24 ENCOUNTER — LAB (OUTPATIENT)
Dept: LAB | Facility: HOSPITAL | Age: 64
End: 2024-05-24
Payer: COMMERCIAL

## 2024-05-24 VITALS
OXYGEN SATURATION: 100 % | TEMPERATURE: 98.6 F | SYSTOLIC BLOOD PRESSURE: 149 MMHG | DIASTOLIC BLOOD PRESSURE: 81 MMHG | BODY MASS INDEX: 21.6 KG/M2 | HEIGHT: 62 IN | WEIGHT: 117.4 LBS | HEART RATE: 93 BPM

## 2024-05-24 DIAGNOSIS — C78.7 ADENOCARCINOMA OF COLON METASTATIC TO LIVER: Primary | ICD-10-CM

## 2024-05-24 DIAGNOSIS — C18.9 ADENOCARCINOMA OF COLON METASTATIC TO LIVER: Primary | ICD-10-CM

## 2024-05-24 LAB
ALBUMIN SERPL-MCNC: 4.1 G/DL (ref 3.5–5.2)
ALBUMIN/GLOB SERPL: 1.5 G/DL
ALP SERPL-CCNC: 89 U/L (ref 39–117)
ALT SERPL W P-5'-P-CCNC: 10 U/L (ref 1–33)
ANION GAP SERPL CALCULATED.3IONS-SCNC: 11 MMOL/L (ref 5–15)
AST SERPL-CCNC: 15 U/L (ref 1–32)
BASOPHILS # BLD AUTO: 0.01 10*3/MM3 (ref 0–0.2)
BASOPHILS NFR BLD AUTO: 0.3 % (ref 0–1.5)
BILIRUB SERPL-MCNC: 0.5 MG/DL (ref 0–1.2)
BUN SERPL-MCNC: 18 MG/DL (ref 8–23)
BUN/CREAT SERPL: 18.4 (ref 7–25)
CALCIUM SPEC-SCNC: 9.2 MG/DL (ref 8.6–10.5)
CEA SERPL-MCNC: 35.3 NG/ML
CHLORIDE SERPL-SCNC: 105 MMOL/L (ref 98–107)
CO2 SERPL-SCNC: 25 MMOL/L (ref 22–29)
CREAT SERPL-MCNC: 0.98 MG/DL (ref 0.57–1)
DEPRECATED RDW RBC AUTO: 78.3 FL (ref 37–54)
EGFRCR SERPLBLD CKD-EPI 2021: 65.4 ML/MIN/1.73
EOSINOPHIL # BLD AUTO: 0.02 10*3/MM3 (ref 0–0.4)
EOSINOPHIL NFR BLD AUTO: 0.6 % (ref 0.3–6.2)
ERYTHROCYTE [DISTWIDTH] IN BLOOD BY AUTOMATED COUNT: 20.1 % (ref 12.3–15.4)
GLOBULIN UR ELPH-MCNC: 2.8 GM/DL
GLUCOSE SERPL-MCNC: 114 MG/DL (ref 65–99)
HCT VFR BLD AUTO: 26.8 % (ref 34–46.6)
HGB BLD-MCNC: 8.3 G/DL (ref 12–15.9)
HOLD SPECIMEN: NORMAL
LYMPHOCYTES # BLD AUTO: 0.75 10*3/MM3 (ref 0.7–3.1)
LYMPHOCYTES NFR BLD AUTO: 22 % (ref 19.6–45.3)
MCH RBC QN AUTO: 34.4 PG (ref 26.6–33)
MCHC RBC AUTO-ENTMCNC: 31 G/DL (ref 31.5–35.7)
MCV RBC AUTO: 111.2 FL (ref 79–97)
MONOCYTES # BLD AUTO: 0.68 10*3/MM3 (ref 0.1–0.9)
MONOCYTES NFR BLD AUTO: 19.9 % (ref 5–12)
NEUTROPHILS NFR BLD AUTO: 1.95 10*3/MM3 (ref 1.7–7)
NEUTROPHILS NFR BLD AUTO: 57.2 % (ref 42.7–76)
PLATELET # BLD AUTO: 126 10*3/MM3 (ref 140–450)
PMV BLD AUTO: 9.4 FL (ref 6–12)
POTASSIUM SERPL-SCNC: 3.8 MMOL/L (ref 3.5–5.2)
PROT SERPL-MCNC: 6.9 G/DL (ref 6–8.5)
RBC # BLD AUTO: 2.41 10*6/MM3 (ref 3.77–5.28)
SODIUM SERPL-SCNC: 141 MMOL/L (ref 136–145)
WBC NRBC COR # BLD AUTO: 3.41 10*3/MM3 (ref 3.4–10.8)

## 2024-05-24 PROCEDURE — 82378 CARCINOEMBRYONIC ANTIGEN: CPT | Performed by: INTERNAL MEDICINE

## 2024-05-24 PROCEDURE — 36415 COLL VENOUS BLD VENIPUNCTURE: CPT

## 2024-05-24 PROCEDURE — 85025 COMPLETE CBC W/AUTO DIFF WBC: CPT

## 2024-05-24 PROCEDURE — 80053 COMPREHEN METABOLIC PANEL: CPT | Performed by: INTERNAL MEDICINE

## 2024-05-28 ENCOUNTER — SPECIALTY PHARMACY (OUTPATIENT)
Dept: PHARMACY | Facility: HOSPITAL | Age: 64
End: 2024-05-28
Payer: COMMERCIAL

## 2024-06-03 NOTE — PROGRESS NOTES
[facility] RADIATION ONCOLOGY  CONSULTATION NOTE    NAME: Samantha Massey  YOB: 1960  MRN #: [id number]  DATE OF SERVICE: 6/3/2024  REFERRING PROVIDER: Diana Spencer MD   PRIMARY CARE PROVIDER: Diana Spencer MD    CHIEF COMPLAINT:  Pain to anterior chest (3rd/4th ribs close to articulation with sternum)      DIAGNOSIS:    Encounter Diagnoses   Name Primary?    Malignant neoplasm of colon metastatic to bone Yes    Adenocarcinoma of colon metastatic to liver       Cancer Staging   Stage SHELL (T3, N0, M1a)     Pacemaker?:  no   Prior XRT?:  04/10/2023-04/14/2023: 2000 cGy in 5 fractions to right anterior ribs    Contraindications?:  no  Pregnancy Test?:  No, patient is female and >55 years and/or has undergone hysterectomy     HISTORY OF PRESENT ILLNESS     Samantha Massey is a 63 y.o. female with history of metastatic colon cancer with metastases to her liver, lungs, and bones.   was last seen in our office on 6/1/2023 for a one month follow-up after XRT completion for palliative radiation to her right anterior ribs. She did well after radiation therapy. No formal appointment made after that visit. She was following with Dr. Mark Perera at that time for metastatic colon cancer to her bone and liver.  More recently, the patient transitioned her care to [facility] after Dr[facility].    5/7/2024 CT of the chest abdomen and pelvis was performed.  Imaging showed progression of disease within the thorax with enlargement of multiple pulmonary nodules, a right parasternal chest wall metastasis, and internal mammary metastases.  There was no evidence of disease progression in the abdomen or pelvis.  Her osseous metastatic disease within the thorax appeared stable.    5/24/2024 the patient met with Dr. Adams.  He recommended palliative radiation for her painful sternal/parasternal metastases.  He also recommended biopsy of a left lower lobe  pulmonary metastasis to guide systemic therapy options.    Today the patient reports significant pain in her sternal/parasternal region.  Her pain can be as high as 10 out of 10.  She takes Tylenol and tramadol to help with her pain.  This can lower her pain score to a 5 or 6 out of 10.  She does have difficulty sleeping due to her pain.  She benefited from radiation previously and is hopeful that radiation therapy will improve her pain again.    IMAGING     CT Chest Abdomen Pelvis Without Contrast  Facility: [facility]  Result Date: 5/8/2024  Impression: 1. Progression of disease within the thorax, with enlargement of multiple pulmonary nodules, right parasternal chest wall and internal mammary metastases. 2. No evidence of metastatic disease within the abdomen or pelvis by noncontrast CT. 3. Osseous metastatic disease within the thorax appears unchanged. Stable indeterminate sclerotic lesion in the right ilium. Electronically Signed: Hernandez Hathaway MD  5/8/2024 1:02 PM EDT  Workstation ID: [id number]       PATHOLOGY     Tissue Pathology Exam  Facility: [facility]  Date: 12/29/2020  Final Diagnosis   Lesion, right rib, biopsy:    Metastatic moderately differentiated adenocarcinoma consistent with colonic primary    See comment   Electronically signed by Doug Powers MD on 12/30/2020 at 1319   Comment    Immunohistochemical stains were performed with valid controls and are reported as follows: The tumor is positive for cytokeratin 20 and CDX-2. These findings, in conjunction with the patient's known colon carcinoma, support the rendered diagnosis.        LABS     HEMATOLOGY:  WBC   Date Value Ref Range Status   05/24/2024 3.41 3.40 - 10.80 10*3/mm3 Final   05/17/2024 1.6 (L) 3.4 - 10.8 x10E3/uL Final   02/12/2018 5.46 4.5 - 11.0 10*3/uL Final     RBC   Date Value Ref Range Status   05/24/2024 2.41 (L) 3.77 - 5.28 10*6/mm3 Final   05/17/2024 2.35 (L) 3.77 - 5.28 x10E6/uL Final     Comment:      Ovalocytes present.   02/12/2018 2.82 (L) 4.0 - 5.2 10*6/uL Final     Hemoglobin   Date Value Ref Range Status   05/24/2024 8.3 (L) 12.0 - 15.9 g/dL Final   02/12/2018 8.1 (L) 12.0 - 16.0 g/dL Final     Hematocrit   Date Value Ref Range Status   05/24/2024 26.8 (L) 34.0 - 46.6 % Final   02/12/2018 25.3 (L) 36.0 - 46.0 % Final     Platelets   Date Value Ref Range Status   05/24/2024 126 (L) 140 - 450 10*3/mm3 Final   02/12/2018 157 140 - 440 10*3/uL Final     CHEMISTRY:  Lab Results   Component Value Date    GLUCOSE 114 (H) 05/24/2024    BUN 18 05/24/2024    CREATININE 0.98 05/24/2024    EGFRIFNONA 71 08/19/2021    BCR 18.4 05/24/2024    K 3.8 05/24/2024    CO2 25.0 05/24/2024    CALCIUM 9.2 05/24/2024    PROTENTOTREF 6.3 05/08/2024    ALBUMIN 4.1 05/24/2024    LABIL2 2.2 05/08/2024    AST 15 05/24/2024    ALT 10 05/24/2024     PROBLEM LIST     Patient Active Problem List   Diagnosis    Adenocarcinoma of colon metastatic to liver    Allergic reaction to contrast dye    Anemia    Encounter for chemotherapy management    Liver cancer    Malignant neoplasm of colon metastatic to bone    Primary hypertension    History of non-ST elevation myocardial infarction (NSTEMI)    CAD (coronary artery disease)    Stress-induced cardiomyopathy    Hyperlipidemia LDL goal <100    Chest wall pain, chronic      CURRENT MEDICATIONS     Current Outpatient Medications   Medication Instructions    aspirin 81 mg, Oral, Daily    atorvastatin (LIPITOR) 80 mg, Oral, Daily    Cyanocobalamin (Vitamin B-12) 5000 MCG sublingual tablet 1 tablet, Sublingual, Every Early Morning    empagliflozin (JARDIANCE) 10 mg, Oral, Daily    furosemide (LASIX) 40 MG tablet 1 tab daily    Lysine 500 MG capsule 1 tablet, Oral, 2 Times Daily    metoprolol succinate XL (TOPROL-XL) 25 mg, Oral, Every 24 Hours Scheduled    sacubitril-valsartan (ENTRESTO) 24-26 MG tablet 1 tablet, Oral, Every 12 Hours Scheduled    traMADol (ULTRAM) 50 mg, Oral, Every 6 Hours PRN     trifluridine-tipiracil (LONSURF) 20-8.19 MG per tablet 40 mg, Oral, Daily With Breakfast & Dinner, Take 2 tabs with Breakfast and 2 with Dinner Day 1-5 and 8-12 every 28 days.      ALLERGIES     Allergies   Allergen Reactions    Hydrocodone Nausea And Vomiting    Iodinated Contrast Media Hives and Itching     PT HAD SOME HIVES DURING SCAN.  PRIOR TO SCAN 8-3-2013.    Latex Rash     Blisters     Penicillins Hives       FAMILY HISTORY     Family History   Problem Relation Age of Onset    Heart disease Mother     Lung cancer Father 70        Associated to cigarette smoking    Heart disease Father     Hyperlipidemia Father     Cancer Father     Cancer Sister         Bladder cancer      SOCIAL HISTORY     Social History     Tobacco Use    Smoking status: Former     Current packs/day: 0.00     Average packs/day: 1 pack/day for 31.0 years (31.0 ttl pk-yrs)     Types: Cigarettes     Start date: 1981     Quit date: 2012     Years since quittin.4     Passive exposure: Past    Smokeless tobacco: Never   Vaping Use    Vaping status: Never Used   Substance Use Topics    Alcohol use: Yes     Comment: 2 or 3 a month    Drug use: Never      REVIEW OF SYSTEMS     Review of Systems   Constitutional:  Positive for fatigue.   Musculoskeletal:         Anterior chest wall/sternum pain        There were no vitals filed for this visit.   Physical Exam  Constitutional:       General: She is not in acute distress.  HENT:      Head: Normocephalic and atraumatic.   Pulmonary:      Effort: Pulmonary effort is normal. No respiratory distress.   Chest:          Comments: Protruding right medial chest wall metastases.  These are tender to touch.  Neurological:      Mental Status: She is alert and oriented to person, place, and time. Mental status is at baseline.   Psychiatric:         Mood and Affect: Mood normal.         Behavior: Behavior normal.            ECOG:  Restricted in physically strenuous activity but ambulatory and able  to carry out work of a light or sedentary nature, e.g., light house work, office work = 1      ASSESSMENT AND PLAN     ASSESSMENT:    Samantha Massey is a 63 y.o. female with history of metastatic colon cancer with metastases to her liver, lungs, and bones. She was last seen in our office on 6/1/2023 for a one month follow-up after XRT completion for palliative radiation to her right anterior ribs. She now presents with progression of disease at the right parasternal/sternum region.      Diagnoses and all orders for this visit:    1. Malignant neoplasm of colon metastatic to bone (Primary)    2. Adenocarcinoma of colon metastatic to liver       PLAN:      Orders:  - CT simulation for right parasternal/sternal metastases    We reviewed the patient's workup and treatment history to date.  We discussed the findings and symptoms of her right parasternal/metastases causing pain.  We discussed the imaging findings of disease progression in this area.  We discussed her positive response to radiation therapy previously.  We discussed an additional course of radiation therapy to improve her pain and discomfort.  We discussed how radiation therapy is planned and delivered.  We discussed potential acute and late side effects of treatment.  We discussed the potential overlap from her prior course of radiation with her current course of radiation.  We discussed the recommendation of IMRT to help reduce overlap and minimize toxicity from overlapping courses of radiation therapy.  The patient was able to ask questions and have them answered to her apparent satisfaction.  She is in agreement with the plan to proceed with radiation.    I spent 45 minutes caring for Samantha on this date of service. This time includes time spent by me in the following activities:preparing for the visit, reviewing tests, obtaining and/or reviewing a separately obtained history, performing a medically appropriate examination and/or evaluation ,  counseling and educating the patient/family/caregiver, ordering medications, tests, or procedures, documenting information in the medical record, and independently interpreting results and communicating that information with the patient/family/caregiver    FOLLOW UP     No follow-ups on file.     CC: Diana Spencer MD Embry, Candace L, MD

## 2024-06-04 ENCOUNTER — HOSPITAL ENCOUNTER (OUTPATIENT)
Dept: RADIATION ONCOLOGY | Facility: HOSPITAL | Age: 64
Discharge: HOME | End: 2024-06-04

## 2024-06-04 ENCOUNTER — CONSULT (OUTPATIENT)
Dept: RADIATION ONCOLOGY | Facility: HOSPITAL | Age: 64
End: 2024-06-04
Payer: COMMERCIAL

## 2024-06-04 ENCOUNTER — HOSPITAL ENCOUNTER (OUTPATIENT)
Dept: RADIATION ONCOLOGY | Facility: HOSPITAL | Age: 64
Setting detail: RADIATION/ONCOLOGY SERIES
End: 2024-06-04
Payer: COMMERCIAL

## 2024-06-04 VITALS
DIASTOLIC BLOOD PRESSURE: 78 MMHG | HEART RATE: 97 BPM | OXYGEN SATURATION: 99 % | RESPIRATION RATE: 18 BRPM | BODY MASS INDEX: 21.1 KG/M2 | WEIGHT: 115.4 LBS | SYSTOLIC BLOOD PRESSURE: 171 MMHG

## 2024-06-04 DIAGNOSIS — C18.9 MALIGNANT NEOPLASM OF COLON METASTATIC TO BONE: Primary | ICD-10-CM

## 2024-06-04 DIAGNOSIS — C79.51 MALIGNANT NEOPLASM OF COLON METASTATIC TO BONE: Primary | ICD-10-CM

## 2024-06-04 DIAGNOSIS — C18.9 ADENOCARCINOMA OF COLON METASTATIC TO LIVER: ICD-10-CM

## 2024-06-04 DIAGNOSIS — C78.7 ADENOCARCINOMA OF COLON METASTATIC TO LIVER: ICD-10-CM

## 2024-06-04 PROCEDURE — 77334 RADIATION TREATMENT AID(S): CPT | Performed by: INTERNAL MEDICINE

## 2024-06-04 PROCEDURE — G0463 HOSPITAL OUTPT CLINIC VISIT: HCPCS | Performed by: INTERNAL MEDICINE

## 2024-06-04 PROCEDURE — 77263 THER RADIOLOGY TX PLNG CPLX: CPT | Performed by: INTERNAL MEDICINE

## 2024-06-05 PROCEDURE — 77301 RADIOTHERAPY DOSE PLAN IMRT: CPT | Performed by: INTERNAL MEDICINE

## 2024-06-05 PROCEDURE — 77338 DESIGN MLC DEVICE FOR IMRT: CPT | Performed by: INTERNAL MEDICINE

## 2024-06-05 PROCEDURE — 77300 RADIATION THERAPY DOSE PLAN: CPT | Performed by: INTERNAL MEDICINE

## 2024-06-06 ENCOUNTER — HOSPITAL ENCOUNTER (OUTPATIENT)
Dept: RADIATION ONCOLOGY | Facility: HOSPITAL | Age: 64
Discharge: HOME | End: 2024-06-06

## 2024-06-06 LAB
RAD ONC ARIA COURSE END DATE: NORMAL
RAD ONC ARIA COURSE ID: NORMAL
RAD ONC ARIA COURSE ID: NORMAL
RAD ONC ARIA COURSE LAST TREATMENT DATE: NORMAL
RAD ONC ARIA COURSE LAST TREATMENT DATE: NORMAL
RAD ONC ARIA COURSE START DATE: NORMAL
RAD ONC ARIA COURSE START DATE: NORMAL
RAD ONC ARIA COURSE TREATMENT ELAPSED DAYS: 0
RAD ONC ARIA COURSE TREATMENT ELAPSED DAYS: 4
RAD ONC ARIA FIRST TREATMENT DATE: NORMAL
RAD ONC ARIA FIRST TREATMENT DATE: NORMAL
RAD ONC ARIA PLAN FRACTIONS TREATED TO DATE: 1
RAD ONC ARIA PLAN FRACTIONS TREATED TO DATE: 5
RAD ONC ARIA PLAN ID: NORMAL
RAD ONC ARIA PLAN ID: NORMAL
RAD ONC ARIA PLAN NAME: NORMAL
RAD ONC ARIA PLAN PRESCRIBED DOSE PER FRACTION: 4 GY
RAD ONC ARIA PLAN PRESCRIBED DOSE PER FRACTION: 4 GY
RAD ONC ARIA PLAN PRIMARY REFERENCE POINT: NORMAL
RAD ONC ARIA PLAN PRIMARY REFERENCE POINT: NORMAL
RAD ONC ARIA PLAN TOTAL FRACTIONS PRESCRIBED: 5
RAD ONC ARIA PLAN TOTAL FRACTIONS PRESCRIBED: 5
RAD ONC ARIA PLAN TOTAL PRESCRIBED DOSE: 2000 CGY
RAD ONC ARIA PLAN TOTAL PRESCRIBED DOSE: 2000 CGY
RAD ONC ARIA REFERENCE POINT DOSAGE GIVEN TO DATE: 20 GY
RAD ONC ARIA REFERENCE POINT DOSAGE GIVEN TO DATE: 4 GY
RAD ONC ARIA REFERENCE POINT ID: NORMAL
RAD ONC ARIA REFERENCE POINT ID: NORMAL
RAD ONC ARIA REFERENCE POINT SESSION DOSAGE GIVEN: 4 GY

## 2024-06-06 PROCEDURE — 77427 RADIATION TX MANAGEMENT X5: CPT | Performed by: INTERNAL MEDICINE

## 2024-06-06 PROCEDURE — 77014 CHG CT GUIDANCE RADIATION THERAPY FLDS PLACEMENT: CPT | Performed by: INTERNAL MEDICINE

## 2024-06-06 PROCEDURE — 77386: CPT | Performed by: INTERNAL MEDICINE

## 2024-06-07 ENCOUNTER — HOSPITAL ENCOUNTER (OUTPATIENT)
Dept: CT IMAGING | Facility: HOSPITAL | Age: 64
Discharge: HOME | End: 2024-06-07
Payer: COMMERCIAL

## 2024-06-07 ENCOUNTER — HOSPITAL ENCOUNTER (OUTPATIENT)
Dept: RADIATION ONCOLOGY | Facility: HOSPITAL | Age: 64
Discharge: HOME | End: 2024-06-07
Payer: COMMERCIAL

## 2024-06-07 ENCOUNTER — HOSPITAL ENCOUNTER (OUTPATIENT)
Dept: GENERAL RADIOLOGY | Facility: HOSPITAL | Age: 64
Discharge: HOME | End: 2024-06-07
Payer: COMMERCIAL

## 2024-06-07 VITALS
TEMPERATURE: 97.9 F | HEART RATE: 90 BPM | OXYGEN SATURATION: 99 % | DIASTOLIC BLOOD PRESSURE: 88 MMHG | BODY MASS INDEX: 21.16 KG/M2 | RESPIRATION RATE: 17 BRPM | SYSTOLIC BLOOD PRESSURE: 171 MMHG | HEIGHT: 62 IN | WEIGHT: 115 LBS

## 2024-06-07 DIAGNOSIS — C18.9 ADENOCARCINOMA OF COLON METASTATIC TO LIVER: ICD-10-CM

## 2024-06-07 DIAGNOSIS — C78.7 ADENOCARCINOMA OF COLON METASTATIC TO LIVER: ICD-10-CM

## 2024-06-07 LAB
APTT PPP: 25.5 SECONDS (ref 24–31)
BASOPHILS # BLD AUTO: 0.02 10*3/MM3 (ref 0–0.2)
BASOPHILS NFR BLD AUTO: 0.4 % (ref 0–1.5)
DEPRECATED RDW RBC AUTO: 66.4 FL (ref 37–54)
EOSINOPHIL # BLD AUTO: 0.06 10*3/MM3 (ref 0–0.4)
EOSINOPHIL NFR BLD AUTO: 1.3 % (ref 0.3–6.2)
ERYTHROCYTE [DISTWIDTH] IN BLOOD BY AUTOMATED COUNT: 16.8 % (ref 12.3–15.4)
HCT VFR BLD AUTO: 29.9 % (ref 34–46.6)
HGB BLD-MCNC: 9.2 G/DL (ref 12–15.9)
IMM GRANULOCYTES # BLD AUTO: 0.02 10*3/MM3 (ref 0–0.05)
IMM GRANULOCYTES NFR BLD AUTO: 0.4 % (ref 0–0.5)
INR PPP: 1.02 (ref 0.93–1.1)
LYMPHOCYTES # BLD AUTO: 0.5 10*3/MM3 (ref 0.7–3.1)
LYMPHOCYTES NFR BLD AUTO: 10.7 % (ref 19.6–45.3)
MCH RBC QN AUTO: 32.6 PG (ref 26.6–33)
MCHC RBC AUTO-ENTMCNC: 30.8 G/DL (ref 31.5–35.7)
MCV RBC AUTO: 106 FL (ref 79–97)
MONOCYTES # BLD AUTO: 0.57 10*3/MM3 (ref 0.1–0.9)
MONOCYTES NFR BLD AUTO: 12.2 % (ref 5–12)
NEUTROPHILS NFR BLD AUTO: 3.51 10*3/MM3 (ref 1.7–7)
NEUTROPHILS NFR BLD AUTO: 75 % (ref 42.7–76)
NRBC BLD AUTO-RTO: 0 /100 WBC (ref 0–0.2)
PLATELET # BLD AUTO: 158 10*3/MM3 (ref 140–450)
PMV BLD AUTO: 9.1 FL (ref 6–12)
PROTHROMBIN TIME: 11.1 SECONDS (ref 9.6–11.7)
RAD ONC ARIA COURSE ID: NORMAL
RAD ONC ARIA COURSE LAST TREATMENT DATE: NORMAL
RAD ONC ARIA COURSE START DATE: NORMAL
RAD ONC ARIA COURSE TREATMENT ELAPSED DAYS: 1
RAD ONC ARIA FIRST TREATMENT DATE: NORMAL
RAD ONC ARIA PLAN FRACTIONS TREATED TO DATE: 2
RAD ONC ARIA PLAN ID: NORMAL
RAD ONC ARIA PLAN PRESCRIBED DOSE PER FRACTION: 4 GY
RAD ONC ARIA PLAN PRIMARY REFERENCE POINT: NORMAL
RAD ONC ARIA PLAN TOTAL FRACTIONS PRESCRIBED: 5
RAD ONC ARIA PLAN TOTAL PRESCRIBED DOSE: 2000 CGY
RAD ONC ARIA REFERENCE POINT DOSAGE GIVEN TO DATE: 8 GY
RAD ONC ARIA REFERENCE POINT ID: NORMAL
RAD ONC ARIA REFERENCE POINT SESSION DOSAGE GIVEN: 4 GY
RBC # BLD AUTO: 2.82 10*6/MM3 (ref 3.77–5.28)
WBC NRBC COR # BLD AUTO: 4.68 10*3/MM3 (ref 3.4–10.8)

## 2024-06-07 PROCEDURE — 25010000002 MIDAZOLAM PER 1 MG: Performed by: RADIOLOGY

## 2024-06-07 PROCEDURE — 77386: CPT | Performed by: INTERNAL MEDICINE

## 2024-06-07 PROCEDURE — 77012 CT SCAN FOR NEEDLE BIOPSY: CPT

## 2024-06-07 PROCEDURE — 77014 CHG CT GUIDANCE RADIATION THERAPY FLDS PLACEMENT: CPT | Performed by: INTERNAL MEDICINE

## 2024-06-07 PROCEDURE — 99152 MOD SED SAME PHYS/QHP 5/>YRS: CPT

## 2024-06-07 PROCEDURE — 25810000003 SODIUM CHLORIDE 0.9 % SOLUTION: Performed by: RADIOLOGY

## 2024-06-07 PROCEDURE — 25010000002 FENTANYL CITRATE (PF) 50 MCG/ML SOLUTION: Performed by: RADIOLOGY

## 2024-06-07 PROCEDURE — 25010000002 HEPARIN LOCK FLUSH PER 10 UNITS: Performed by: RADIOLOGY

## 2024-06-07 PROCEDURE — 25010000002 ONDANSETRON PER 1 MG: Performed by: RADIOLOGY

## 2024-06-07 PROCEDURE — 85610 PROTHROMBIN TIME: CPT | Performed by: RADIOLOGY

## 2024-06-07 PROCEDURE — 85730 THROMBOPLASTIN TIME PARTIAL: CPT | Performed by: RADIOLOGY

## 2024-06-07 PROCEDURE — 71045 X-RAY EXAM CHEST 1 VIEW: CPT

## 2024-06-07 PROCEDURE — 85025 COMPLETE CBC W/AUTO DIFF WBC: CPT | Performed by: RADIOLOGY

## 2024-06-07 PROCEDURE — 25010000002 LIDOCAINE 1 % SOLUTION: Performed by: RADIOLOGY

## 2024-06-07 PROCEDURE — 99153 MOD SED SAME PHYS/QHP EA: CPT

## 2024-06-07 RX ORDER — FENTANYL CITRATE 50 UG/ML
INJECTION, SOLUTION INTRAMUSCULAR; INTRAVENOUS AS NEEDED
Status: COMPLETED | OUTPATIENT
Start: 2024-06-07 | End: 2024-06-07

## 2024-06-07 RX ORDER — HEPARIN SODIUM (PORCINE) LOCK FLUSH IV SOLN 100 UNIT/ML 100 UNIT/ML
500 SOLUTION INTRAVENOUS AS NEEDED
Status: DISCONTINUED | OUTPATIENT
Start: 2024-06-07 | End: 2024-06-08 | Stop reason: HOSPADM

## 2024-06-07 RX ORDER — MIDAZOLAM HYDROCHLORIDE 1 MG/ML
INJECTION INTRAMUSCULAR; INTRAVENOUS AS NEEDED
Status: COMPLETED | OUTPATIENT
Start: 2024-06-07 | End: 2024-06-07

## 2024-06-07 RX ORDER — ONDANSETRON 2 MG/ML
INJECTION INTRAMUSCULAR; INTRAVENOUS AS NEEDED
Status: COMPLETED | OUTPATIENT
Start: 2024-06-07 | End: 2024-06-07

## 2024-06-07 RX ORDER — SODIUM CHLORIDE 9 MG/ML
75 INJECTION, SOLUTION INTRAVENOUS CONTINUOUS
Status: DISCONTINUED | OUTPATIENT
Start: 2024-06-07 | End: 2024-06-08 | Stop reason: HOSPADM

## 2024-06-07 RX ORDER — LIDOCAINE HYDROCHLORIDE 10 MG/ML
INJECTION, SOLUTION INFILTRATION; PERINEURAL AS NEEDED
Status: COMPLETED | OUTPATIENT
Start: 2024-06-07 | End: 2024-06-07

## 2024-06-07 RX ORDER — HEPARIN SODIUM (PORCINE) LOCK FLUSH IV SOLN 100 UNIT/ML 100 UNIT/ML
SOLUTION INTRAVENOUS
Status: DISPENSED
Start: 2024-06-07 | End: 2024-06-07

## 2024-06-07 RX ADMIN — ONDANSETRON 4 MG: 2 INJECTION INTRAMUSCULAR; INTRAVENOUS at 08:40

## 2024-06-07 RX ADMIN — LIDOCAINE HYDROCHLORIDE 7 ML: 10 INJECTION, SOLUTION INFILTRATION; PERINEURAL at 08:59

## 2024-06-07 RX ADMIN — MIDAZOLAM 0.5 MG: 1 INJECTION INTRAMUSCULAR; INTRAVENOUS at 08:54

## 2024-06-07 RX ADMIN — FENTANYL CITRATE 100 MCG: 50 INJECTION, SOLUTION INTRAMUSCULAR; INTRAVENOUS at 08:42

## 2024-06-07 RX ADMIN — SODIUM CHLORIDE 75 ML/HR: 9 INJECTION, SOLUTION INTRAVENOUS at 08:00

## 2024-06-07 RX ADMIN — MIDAZOLAM 0.5 MG: 1 INJECTION INTRAMUSCULAR; INTRAVENOUS at 08:55

## 2024-06-07 RX ADMIN — MIDAZOLAM 1 MG: 1 INJECTION INTRAMUSCULAR; INTRAVENOUS at 08:42

## 2024-06-07 RX ADMIN — Medication 500 UNITS: at 09:42

## 2024-06-07 NOTE — PRE-PROCEDURE NOTE
.  [facility]   Interventional Radiology H&P    Patient Name: Samantha Massey  : 1960  MRN: [id number]  Primary Care Physician:  Diana Spencer MD  Referring Physician: Sean Adams MD  Date of admission: 2024    Subjective   Subjective     HPI:  Samantha Massey is a 63 y.o. female with metastatic colon CA    Review of Systems:   Constitutional no fever,  no weight loss       Otolaryngeal no difficulty swallowing   Cardiovascular no chest pain   Pulmonary no cough, no sputum production   Gastrointestinal no constipation, no diarrhea                         Personal History       Past Medical/Surgical History:   Past Medical History:   Diagnosis Date    Acute systolic heart failure 2023    Allergic laytex,peniciline,contrast dye    CAD (coronary artery disease) 2023    CHF (congestive heart failure) 23    Chronic kidney disease     colon cancer     Metastatic to liver and bones    Colon cancer     Congenital heart disease 23    History of colon cancer, stage IV     Stage SHELL with progression    Hypertension     NSTEMI (non-ST elevated myocardial infarction) 2023    PONV (postoperative nausea and vomiting)     Pulmonary arterial hypertension     Radiculopathy 2012    Right L5/S1     Past Surgical History:   Procedure Laterality Date    ABDOMINAL SURGERY      CARDIAC CATHETERIZATION N/A 2023    Procedure: Left Heart Cath;  Surgeon: Ramiro Olivera MD;  Location: [facility] CATH INVASIVE LOCATION;  Service: Cardiovascular;  Laterality: N/A;    COLON SURGERY  Colon resection    CYST REMOVAL      cyst removed from [facility] in     HERNIA REPAIR  incisional    PORTACATH PLACEMENT  2017    VENOUS ACCESS DEVICE (PORT) INSERTION Left 2021    Procedure: INSERTION VENOUS ACCESS DEVICE;  Surgeon: Jay Yeh MD;  Location: [facility] MAIN OR;  Service: General;  Laterality: Left;    VENOUS ACCESS DEVICE (PORT) REMOVAL Right 2020     "Procedure: REMOVAL VENOUS ACCESS DEVICE;  Surgeon: Jay Yeh MD;  Location: [facility] MAIN OR;  Service: General;  Laterality: Right;    VENTRAL/INCISIONAL HERNIA REPAIR N/A 03/02/2023    Procedure: VENTRAL/INCISIONAL HERNIA REPAIR WITH MESH;  Surgeon: Jay Yeh MD;  Location: [facility] MAIN OR;  Service: General;  Laterality: N/A;       Social History:  reports that she quit smoking about 12 years ago. Her smoking use included cigarettes. She started smoking about 43 years ago. She has a 31 pack-year smoking history. She has been exposed to tobacco smoke. She has never used smokeless tobacco. She reports current alcohol use. She reports that she does not use drugs.    Medications:  (Not in a hospital admission)    Current medications:     Current IV drips:  sodium chloride, 75 mL/hr        Allergies:  Allergies   Allergen Reactions    Hydrocodone Nausea And Vomiting    Iodinated Contrast Media Hives and Itching     PT HAD SOME HIVES DURING SCAN.  PRIOR TO SCAN 8-3-2013.    Latex Rash     Blisters     Penicillins Hives       Objective    Objective     Vitals:   Temp:  [97.9 °F (36.6 °C)] 97.9 °F (36.6 °C)  Heart Rate:  [85-96] 96  Resp:  [8-12] 11  BP: (154-169)/(77-90) 155/80      Physical Exam:   Constitutional: Awake, alert, No acute distress    Respiratory: Clear to auscultation bilaterally, nonlabored respirations    Cardiovascular: RRR, no murmurs, rubs, or gallops, palpable pedal pulses bilaterally   Gastrointestinal: Positive bowel sounds, soft, nontender, nondistended        ASA SCALE ASSESSMENT:  2-Mild to moderate systemic disease, medically well controlled, with no functional limitation    MALLAMPATI CLASSIFICATION:  2-Able to visualize the soft palate, fauces, uvula. The anterior & posterior tonsilar pillars are hidden by the tongue.       Result Review        Result Review:     No results found for: \"NA\"    No results found for: \"K\"    No results found for: \"CL\"    No results " "found for: \"PLASMABICARB\"    No results found for: \"BUN\"    No results found for: \"CREATININE\"    No results found for: \"CALCIUM\"        No components found for: \"GLUCOSE.*\"  Results from last 7 days   Lab Units 06/07/24  0753   WBC 10*3/mm3 4.68   HEMOGLOBIN g/dL 9.2*   HEMATOCRIT % 29.9*   PLATELETS 10*3/mm3 158      Results from last 7 days   Lab Units 06/07/24  0753   INR  1.02           Assessment / Plan     Assesment:   Metastatic colon CA      Plan:   Chest wall biopsy    The risks and benefits of the procedure were discussed with the patient.    Electronically signed by Jay Baer III, MD, 06/07/24, 9:07 AM EDT.   "

## 2024-06-07 NOTE — NURSING NOTE
Pt discharged to friend. Discharge instructions given with verbal understanding received. Pt assisted into vehicle

## 2024-06-10 ENCOUNTER — HOSPITAL ENCOUNTER (OUTPATIENT)
Dept: RADIATION ONCOLOGY | Facility: HOSPITAL | Age: 64
Discharge: HOME | End: 2024-06-10
Payer: COMMERCIAL

## 2024-06-10 ENCOUNTER — RADIATION ONCOLOGY WEEKLY ASSESSMENT (OUTPATIENT)
Dept: RADIATION ONCOLOGY | Facility: HOSPITAL | Age: 64
End: 2024-06-10
Payer: COMMERCIAL

## 2024-06-10 VITALS
DIASTOLIC BLOOD PRESSURE: 82 MMHG | OXYGEN SATURATION: 98 % | WEIGHT: 113.8 LBS | HEART RATE: 103 BPM | RESPIRATION RATE: 18 BRPM | TEMPERATURE: 97.8 F | HEIGHT: 62 IN | BODY MASS INDEX: 20.94 KG/M2 | SYSTOLIC BLOOD PRESSURE: 138 MMHG

## 2024-06-10 DIAGNOSIS — C18.9 ADENOCARCINOMA OF COLON METASTATIC TO LIVER: ICD-10-CM

## 2024-06-10 DIAGNOSIS — C78.7 ADENOCARCINOMA OF COLON METASTATIC TO LIVER: ICD-10-CM

## 2024-06-10 DIAGNOSIS — C18.9 MALIGNANT NEOPLASM OF COLON METASTATIC TO BONE: Primary | ICD-10-CM

## 2024-06-10 DIAGNOSIS — C79.51 MALIGNANT NEOPLASM OF COLON METASTATIC TO BONE: Primary | ICD-10-CM

## 2024-06-10 LAB
RAD ONC ARIA COURSE ID: NORMAL
RAD ONC ARIA COURSE LAST TREATMENT DATE: NORMAL
RAD ONC ARIA COURSE START DATE: NORMAL
RAD ONC ARIA COURSE TREATMENT ELAPSED DAYS: 4
RAD ONC ARIA FIRST TREATMENT DATE: NORMAL
RAD ONC ARIA PLAN FRACTIONS TREATED TO DATE: 3
RAD ONC ARIA PLAN ID: NORMAL
RAD ONC ARIA PLAN PRESCRIBED DOSE PER FRACTION: 4 GY
RAD ONC ARIA PLAN PRIMARY REFERENCE POINT: NORMAL
RAD ONC ARIA PLAN TOTAL FRACTIONS PRESCRIBED: 5
RAD ONC ARIA PLAN TOTAL PRESCRIBED DOSE: 2000 CGY
RAD ONC ARIA REFERENCE POINT DOSAGE GIVEN TO DATE: 12 GY
RAD ONC ARIA REFERENCE POINT ID: NORMAL
RAD ONC ARIA REFERENCE POINT SESSION DOSAGE GIVEN: 4 GY

## 2024-06-10 PROCEDURE — FACE2FACE: Performed by: INTERNAL MEDICINE

## 2024-06-10 NOTE — PROGRESS NOTES
[facility] RADIATION ONCOLOGY  ON-TREATMENT VISIT NOTE    NAME: Samantha Massey  YOB: 1960  MRN #: [id number]  DATE OF SERVICE: 6/10/2024  PRESCRIBING PHYSICIAN: Sj Burgos MD    DIAGNOSIS:      ICD-10-CM ICD-9-CM   1. Malignant neoplasm of colon metastatic to bone  C18.9 153.9    C79.51 198.5   2. Adenocarcinoma of colon metastatic to liver  C18.9 153.9    C78.7 197.7      RADIATION THERAPY VISIT:  Continue radiation therapy, Dosimetry plan remains acceptable, Films reviewed and remains acceptable, Pain assessed, Pain management planned, Radiation dose schedule reviewed and remains acceptable, Radiation technique remains acceptable, and Symptoms within expected range    Radiation Treatments       Active   Plans   Sternum   Most recent treatment: Dose planned: 400 cGy (fraction 3 on 6/10/2024)   Total: Dose planned: 2,000 cGy (5 fractions)   Elapsed Days: 4      Reference Points   Sternum   Most recent treatment: Dose given: 400 cGy (on 6/10/2024)   Total: Dose given: 1,200 cGy   Elapsed Days: 4                   PHYSICAL ASSESSMENT         Vitals:    06/10/24 1716   BP: 138/82   Pulse: 103   Resp: 18   Temp: 97.8 °F (36.6 °C)   SpO2: 98%      Wt Readings from Last 3 Encounters:   06/10/24 51.6 kg (113 lb 12.8 oz)   06/07/24 52.2 kg (115 lb)   06/04/24 52.3 kg (115 lb 6.4 oz)     Restricted in physically strenuous activity but ambulatory and able to carry out work of a light or sedentary nature, e.g., light house work, office work = 1    We examined the relevant areas: yes  Findings are within the expected range for this stage of treatment: yes    ACTION ITEMS     Patient tolerating treatment well and as expected for this stage in their treatment and Continue radiation therapy as planned    Estimated Completion Date: 6/12/2024    Anticipatory guidance provided.    1 month follow-up to assess treatment response.       Sj Burgos MD  Radiation Oncology  [facility]  [facility]

## 2024-06-11 ENCOUNTER — HOSPITAL ENCOUNTER (OUTPATIENT)
Dept: RADIATION ONCOLOGY | Facility: HOSPITAL | Age: 64
Setting detail: RADIATION/ONCOLOGY SERIES
Discharge: HOME | End: 2024-06-11
Payer: COMMERCIAL

## 2024-06-11 LAB
RAD ONC ARIA COURSE ID: NORMAL
RAD ONC ARIA COURSE LAST TREATMENT DATE: NORMAL
RAD ONC ARIA COURSE START DATE: NORMAL
RAD ONC ARIA COURSE TREATMENT ELAPSED DAYS: 5
RAD ONC ARIA FIRST TREATMENT DATE: NORMAL
RAD ONC ARIA PLAN FRACTIONS TREATED TO DATE: 4
RAD ONC ARIA PLAN ID: NORMAL
RAD ONC ARIA PLAN PRESCRIBED DOSE PER FRACTION: 4 GY
RAD ONC ARIA PLAN PRIMARY REFERENCE POINT: NORMAL
RAD ONC ARIA PLAN TOTAL FRACTIONS PRESCRIBED: 5
RAD ONC ARIA PLAN TOTAL PRESCRIBED DOSE: 2000 CGY
RAD ONC ARIA REFERENCE POINT DOSAGE GIVEN TO DATE: 16 GY
RAD ONC ARIA REFERENCE POINT ID: NORMAL
RAD ONC ARIA REFERENCE POINT SESSION DOSAGE GIVEN: 4 GY

## 2024-06-11 PROCEDURE — 77014 CHG CT GUIDANCE RADIATION THERAPY FLDS PLACEMENT: CPT | Performed by: INTERNAL MEDICINE

## 2024-06-11 PROCEDURE — 77386: CPT | Performed by: INTERNAL MEDICINE

## 2024-06-11 NOTE — PROGRESS NOTES
HEMATOLOGY ONCOLOGY OUTPATIENT FOLLOW-UP       Patient name: Samantha Massey  : 1960  MRN: [id number]  Primary Care Physician: Diana Spencer MD  Referring Physician: No ref. provider found  Reason For Consult: Colon cancer.     History of Present Illness:  Patient is a 63 y.o.     2024: In the office for the first time in transfer from [facility]. Ms. Massey reported that sometime in  she developed severe constipation that did not seem to improve. This slowly became associated to fatigue and weakness. Eventually she sought attention and was found to have normocytic anemia in 2012. A CT scan of the abdomen reported innumerable lesions throughout the liver. There was thickening of the distal sigmoid colon. There were some non-specific lymph nodes. She underwent a colonoscopy in early January that revealed a tumor in the distal colon. The final report of pathology was of invasive, moderately differentiated adenocarcinoma without microsatellite instability. She underwent a laparoscopic low anterior resection. The final report of pathology was of a poorly differentiated adenocarcinoma of the rectosigmoid with invasion through the muscularis propria and extension of the mesocolon. No lymph nodes were involved, but she underwent an image guided biopsy of the liver reported metastatic disease consistent with primary colon cancer. In 2012 she started treatment with FOLFOX/bevacizumab. Testing at the time reported KRAS wild type. A PET scan done shortly after the commencement of the treatment reported multiple metastatic lesions in the liver. In  of the same year there was evidence of response to the treatment. Later, in August, the lesions were stable. She continued treatment with the same regimen through 2012 at which time she was transitioned to capecitabine. Radioembolization to the liver was delivered initially to the right lobe and then to  the left lobe in November of that same year. There was no suggestion of disease outside of the liver and the lesions in the liver showed evidence of response. In August of 2014 the capecitabine was discontinued as there was no longer evidence of disease. She did well until October of 2017 when she had radiographic evidence of progressive disease in the liver and an image guided biopsy confirmed colonic primary. FOLFOX/bevacizumab was re-introduced in October of that year.  In February of 2018 she had evidence of response and no suggestion of extrahepatic disease and she underwent left hepatectomy. Following that she was placed on capecitabine as adjuvant treatment. She continued treatment with this agent until September of 2018. In early January of 2019 there was evidence of disease in the lungs and the mediastinal lymph nodes. In July of 2019 she started treatment with FOLFIRI/bevacizumab. In the setting of progressive disease the bevacizumab was changed to cetuximab. In December of 2020 metastatic bone disease was identified. To continue treatment with cetuximab and irinotecan. There was no evidence of progression until January of 2023 that revealed enlarging pulmonary nodules and enlargement of a right seventh rib metastatic lesion. For that reason she was prescribed regorafenib and began treatment in February of the same year.  In April of 2023 she had radiation to the right 7th rib. The regorafenib resulted in hand foot syndrome. In June of 2023 a scan revealed progressive disease and in late July of the same year she began treatment with trifluridine/tipracil and in August of the same year bevacizumab was added. She developed myelosuppression and doses were modified. At the time of this first visit she was still taking the above regimen. She was rather asymptomatic and on exam there were no changes. There was a report of a scan done in early December of 2023 that reported possible enlargement of the rib  metastatic lesion. A decision was made to continue with the same treatment and plans to take over the prescription of trifluridine/tipracil. To have scans in February of 2024.     2/15/2024: Feeling well.  Taking the trifluridine/tipracil without difficulties.  Generally energetic and with good appetite.  Afebrile.  On exam she does not appear in distress and she does not seem acutely ill.  The lungs are clear bilaterally and the heart is regular.  The abdomen is soft and nontender.  No edema.  Reviewed the laboratory exams that suggest anemia.  She is not pale and off for the anemia reported on the blood count and does it will be repeated.  If indeed her hemoglobin is 7.3 g/dL she will be transfused.  I reviewed independently the images and the reports of the scans.  She has stable disease at this time.  Continue with the same treatment and see me in 6 weeks.     4/18/2024: Feeling well and without new problems.  Continues to take the trifluridine tipiracil without any difficulties.  She has been afebrile.  She continues to eat reasonably well and her weight is stable.  She denies pain.  On exam no distress.  No oral lesions.  Respirations not labored and lungs clear.  Heart regular.  Abdomen soft and without palpable tumors.  No edema.  The laboratory exams were reviewed.  She has significant neutropenia which I discussed with her.  Prior to the commencement of the next cycle of medication she needs to have her blood counts checked.  I discussed with her the importance of neutropenic precautions and urged her to call if febrile.    5/24/2024: Does not feel as well as at the time of the last visit.  She has noted the enlargement of nodules on her anterior chest that resulted in pain.  She has been taking acetaminophen and tramadol prescribed by her primary care physician that does result in relief of the pain.  She has not had any fevers.  She has been off of the trifluridine/tipiracil since she was instructed to  do so because of pancytopenia.  She has had no dyspnea and denies coughing.  Also no abdominal pain.  Has been able to eat and her weight is stable.  On exam she appears chronically ill but is not in distress.  No jaundice or pallor.  Indeed there are palpable tumors on the anterior chest particularly.  The lungs are clear.  The heart is regular.  Abdomen is soft.  Reviewed the scans.  There is clear progression of the disease with dose nodules that she feels corresponding to metastatic deposits that clearly have increased in size.  The right parasternal chest wall metastatic deposit, which is the largest, increased from 2.6 cm to 4.3 cm.  The metastatic deposits in the lungs have also increased and the left lower lobe lesion has gone from 2.1 to 3.2 cm.  Within the abdomen and pelvis there are been no changes.  Discussed with her.  I have asked her to see Dr. Colunga for consideration of radiation to relieve pain on the anterior chest.  I have also asked her to have a biopsy of one of the lung lesions particularly the left lower lobe lesion, which appears to be accessible and is largest.  I would like to consider not only histologic analysis but also particularly next-generation sequencing and PD-L1 expression.  Discussed with her.    6/12/2024: Feels better. Has less chest pain though she is tender after the biopsy. Eats well and has no nausea or vomiting. No chest pain or cough. No abdominal pain or diarrhea and no dysuria. On exam alert and conversant no distress and no jaundice. No oral lesions and respirations not labored. The lungs are clear and heart is regular. Abdomen soft. No edema. Reviewed and discussed with her the report of the biopsy. Next generation sequencing  requested. She is to continue with radiation and will see me in approximately 2 weeks.     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    History of colon cancer, stage IV     Stage SHELL with progression    Hypertension     NSTEMI (non-ST elevated myocardial infarction) 12/09/2023    PONV (postoperative nausea and vomiting)     Pulmonary arterial hypertension     Radiculopathy 07/2012    Right L5/S1     Past Surgical History:   Procedure Laterality Date    ABDOMINAL SURGERY      CARDIAC CATHETERIZATION N/A 12/08/2023    Procedure: Left Heart Cath;  Surgeon: Ramiro Olivera MD;  Location: [facility] CATH INVASIVE LOCATION;  Service: Cardiovascular;  Laterality: N/A;    COLON SURGERY  Colon resection    CYST REMOVAL  1998    cyst removed from [facility] in 1998    HERNIA REPAIR  incisional    PORTACATH PLACEMENT  2017    VENOUS ACCESS DEVICE (PORT) INSERTION Left 01/12/2021    Procedure: INSERTION VENOUS ACCESS DEVICE;  Surgeon: Jay Yeh MD;  Location: [facility] MAIN OR;  Service: General;  Laterality: Left;    VENOUS ACCESS DEVICE (PORT) REMOVAL Right 08/27/2020    Procedure: REMOVAL VENOUS ACCESS DEVICE;  Surgeon: Jay Yeh MD;  Location: [facility] MAIN OR;  Service: General;  Laterality: Right;    VENTRAL/INCISIONAL HERNIA REPAIR N/A 03/02/2023    Procedure: VENTRAL/INCISIONAL HERNIA REPAIR WITH MESH;  Surgeon: Jay Yeh MD;  Location: [facility] MAIN OR;  Service: General;  Laterality: N/A;       Current Outpatient Medications:     aspirin 81 MG EC tablet, Take 1 tablet by mouth Daily., Disp: , Rfl:     atorvastatin (LIPITOR) 80 MG tablet, Take 1 tablet by mouth Daily for 360 days., Disp: 90 tablet, Rfl: 3    Cyanocobalamin (Vitamin B-12) 5000 MCG sublingual tablet, Place 1 tablet under the tongue Every Morning., Disp: , Rfl:     empagliflozin (JARDIANCE) 10 MG tablet tablet, Take 1 tablet by mouth Daily for 360 days.,  Disp: 90 tablet, Rfl: 3    furosemide (LASIX) 40 MG tablet, 1 tab daily, Disp: 90 tablet, Rfl: 0    Lysine 500 MG capsule, Take 1 tablet by mouth 2 (Two) Times a Day., Disp: , Rfl:     metoprolol succinate XL (TOPROL-XL) 25 MG 24 hr tablet, Take 1 tablet by mouth Daily for 360 days., Disp: 90 tablet, Rfl: 3    traMADol (ULTRAM) 50 MG tablet, Take 1 tablet by mouth Every 6 (Six) Hours As Needed for Moderate Pain., Disp: 50 tablet, Rfl: 1    sacubitril-valsartan (ENTRESTO) 24-26 MG tablet, Take 1 tablet by mouth Every 12 (Twelve) Hours for 360 days. (Patient not taking: Reported on 2024), Disp: 180 tablet, Rfl: 3    Allergies   Allergen Reactions    Hydrocodone Nausea And Vomiting    Iodinated Contrast Media Hives and Itching     PT HAD SOME HIVES DURING SCAN.  PRIOR TO SCAN 8-3-2013.    Latex Rash     Blisters     Penicillins Hives     Family History   Problem Relation Age of Onset    Heart disease Mother     Lung cancer Father 70        Associated to cigarette smoking    Heart disease Father     Hyperlipidemia Father     Cancer Father     Cancer Sister         Bladder cancer     Cancer-related family history includes Cancer in her father and sister; Lung cancer (age of onset: 70) in her father.    Social History     Tobacco Use    Smoking status: Former     Current packs/day: 0.00     Average packs/day: 1 pack/day for 31.0 years (31.0 ttl pk-yrs)     Types: Cigarettes     Start date: 1981     Quit date: 2012     Years since quittin.4     Passive exposure: Past    Smokeless tobacco: Never   Vaping Use    Vaping status: Never Used   Substance Use Topics    Alcohol use: Yes     Comment: 2 or 3 a month    Drug use: Never     Social History     Social History Narrative    Not on file     ROS:   Review of Systems   Constitutional:  Negative for activity change, appetite change, chills, diaphoresis, fatigue, fever and unexpected weight change.   HENT:  Negative for congestion, dental problem, drooling,  "ear discharge, ear pain, facial swelling, hearing loss, mouth sores, nosebleeds, postnasal drip, rhinorrhea, sinus pressure, sinus pain, sneezing, sore throat, tinnitus, trouble swallowing and voice change.    Eyes:  Negative for photophobia, pain, discharge, redness, itching and visual disturbance.   Respiratory:  Negative for apnea, cough, choking, chest tightness, shortness of breath, wheezing and stridor.    Cardiovascular:  Positive for chest pain (The pain is centered on the anterior chest, at the site of metastatic lesions.  The most intense pain is at the level of the third or fourth ribs, close to the articulation with the sternum.). Negative for palpitations and leg swelling.   Gastrointestinal:  Negative for abdominal distention, abdominal pain, anal bleeding, blood in stool, constipation, diarrhea, nausea, rectal pain and vomiting.   Endocrine: Negative for cold intolerance, heat intolerance, polydipsia and polyuria.   Genitourinary:  Negative for decreased urine volume, difficulty urinating, dysuria, flank pain, frequency, genital sores, hematuria and urgency.   Musculoskeletal:  Negative for arthralgias, back pain, gait problem, joint swelling, myalgias, neck pain and neck stiffness.   Skin:  Negative for color change, pallor and rash.   Neurological:  Negative for dizziness, tremors, seizures, syncope, facial asymmetry, speech difficulty, weakness, light-headedness, numbness and headaches.   Hematological:  Negative for adenopathy. Does not bruise/bleed easily.   Psychiatric/Behavioral:  Negative for agitation, behavioral problems, confusion, decreased concentration, hallucinations, self-injury, sleep disturbance and suicidal ideas. The patient is not nervous/anxious.      Objective:    Vital Signs:  Vitals:    06/12/24 1139   BP: 123/79   Pulse: 92   Temp: 98.2 °F (36.8 °C)   TempSrc: Oral   SpO2: 100%   Weight: 51.4 kg (113 lb 6.4 oz)   Height: 157.5 cm (62\")   PainSc: 0-No pain     Body mass index " is 20.74 kg/m².    ECOG  (0) Fully active, able to carry on all predisease performance without restriction    Physical Exam:   Physical Exam  Constitutional:       General: She is not in acute distress.     Appearance: She is not ill-appearing, toxic-appearing or diaphoretic.   HENT:      Head: Normocephalic and atraumatic.      Right Ear: External ear normal.      Left Ear: External ear normal.      Nose: Nose normal.      Mouth/Throat:      Mouth: Mucous membranes are moist.      Pharynx: Oropharynx is clear. No oropharyngeal exudate or posterior oropharyngeal erythema.   Eyes:      General: No scleral icterus.        Right eye: No discharge.         Left eye: No discharge.      Conjunctiva/sclera: Conjunctivae normal.      Pupils: Pupils are equal, round, and reactive to light.   Cardiovascular:      Rate and Rhythm: Normal rate and regular rhythm.      Pulses: Normal pulses.      Heart sounds: No murmur heard.     No friction rub. No gallop.   Pulmonary:      Effort: No respiratory distress.      Breath sounds: No stridor. No wheezing, rhonchi or rales.   Chest:      Comments: The before easily palpable nodules, seem diminished in size.   Abdominal:      General: Abdomen is flat. Bowel sounds are normal. There is no distension.      Palpations: Abdomen is soft. There is no mass.      Tenderness: There is no abdominal tenderness. There is no right CVA tenderness, left CVA tenderness, guarding or rebound.      Hernia: No hernia is present.   Musculoskeletal:         General: No swelling, tenderness, deformity or signs of injury.      Cervical back: No rigidity.      Right lower leg: No edema.      Left lower leg: No edema.   Lymphadenopathy:      Cervical: No cervical adenopathy.   Skin:     Coloration: Skin is not jaundiced.      Findings: No bruising, lesion or rash.   Neurological:      General: No focal deficit present.      Mental Status: She is alert and oriented to person, place, and time.      Cranial  Nerves: No cranial nerve deficit.      Motor: No weakness.      Gait: Gait normal.   Psychiatric:         Mood and Affect: Mood normal.         Behavior: Behavior normal.         Thought Content: Thought content normal.         Judgment: Judgment normal.     JG Adams MD performed the physical exam on 6/12/2024 as documented above.     Lab Results - Last 18 Months   Lab Units 06/12/24  1125 06/07/24  0753 05/24/24  1439   WBC 10*3/mm3 6.56 4.68 3.41   HEMOGLOBIN g/dL 9.4* 9.2* 8.3*   HEMATOCRIT % 30.5* 29.9* 26.8*   PLATELETS 10*3/mm3 169 158 126*   MCV fL 107.8* 106.0* 111.2*     Lab Results - Last 18 Months   Lab Units 05/24/24  1439 05/08/24  1618 04/22/24  0853   SODIUM mmol/L 141 142 139   POTASSIUM mmol/L 3.8 4.2 3.8   CHLORIDE mmol/L 105 105 101   CO2 mmol/L 25.0 24 27.0   BUN mg/dL 18 17 12   CREATININE mg/dL 0.98 0.96 0.95   CALCIUM mg/dL 9.2 8.8 9.7   BILIRUBIN mg/dL 0.5 1.1 0.9   ALK PHOS U/L 89 98 93   ALT (SGPT) U/L 10 10 12   AST (SGOT) U/L 15 17 16   GLUCOSE mg/dL 114* 103* 103*     Lab Results   Component Value Date    GLUCOSE 114 (H) 05/24/2024    BUN 18 05/24/2024    CREATININE 0.98 05/24/2024    EGFRIFNONA 71 08/19/2021    BCR 18.4 05/24/2024    K 3.8 05/24/2024    CO2 25.0 05/24/2024    CALCIUM 9.2 05/24/2024    PROTENTOTREF 6.3 05/08/2024    ALBUMIN 4.1 05/24/2024    LABIL2 2.2 05/08/2024    AST 15 05/24/2024    ALT 10 05/24/2024     Lab Results - Last 18 Months   Lab Units 06/07/24  0753 12/09/23  0222 12/08/23  1923 12/08/23  1237 12/07/23  1631   INR  1.02  --   --  1.07 0.96   APTT seconds 25.5 65.6 62.4 28.8* 26.3*     Lab Results   Component Value Date    IRON 45 12/11/2019    TIBC 368 12/11/2019    FERRITIN 490.60 (H) 12/11/2019     Lab Results   Component Value Date    FOLATE >20.00 12/11/2019     Lab Results   Component Value Date    RETICCTPCT 4.90 (H) 12/12/2023     Lab Results   Component Value Date    EHNCKBFI25 >2,000 (H) 12/11/2019     LDH   Date Value Ref Range Status    12/09/2023 307 (H) 135 - 214 U/L Final     Lab Results   Component Value Date    HAPTOGLOBIN 29 (L) 12/08/2023     Lab Results   Component Value Date    PTT 25.5 06/07/2024    INR 1.02 06/07/2024     Lab Results   Component Value Date    CEA 35.30 05/24/2024     Assessment & Plan     Metastatic colon cancer on multiple lines of treatment.  With evidence of progression on the most recent scans, with enlargement of pulmonary lesions as well as thoracic wall lesions. The biopsy of the thoracic lesion confirms adenocarcinoma with immunohistochemical pattern consistent with colon primary. I have requested next generation sequencing and PD-L1 expression.   Congestive heart failure that had been attributed to continued use of bevacizumab. Suggested to correspond to Takotsubo.   Reviewed the report of pathology and all laboratory exams.   She will return to see me in approximately 2 weeks.     Sean Adams MD on 6/12/2024 at 13:06

## 2024-06-12 ENCOUNTER — OFFICE VISIT (OUTPATIENT)
Dept: ONCOLOGY | Facility: CLINIC | Age: 64
End: 2024-06-12
Payer: COMMERCIAL

## 2024-06-12 ENCOUNTER — HOSPITAL ENCOUNTER (OUTPATIENT)
Dept: RADIATION ONCOLOGY | Facility: HOSPITAL | Age: 64
Setting detail: RADIATION/ONCOLOGY SERIES
Discharge: HOME | End: 2024-06-12
Payer: COMMERCIAL

## 2024-06-12 ENCOUNTER — RADIATION ONCOLOGY WEEKLY ASSESSMENT (OUTPATIENT)
Dept: RADIATION ONCOLOGY | Facility: HOSPITAL | Age: 64
End: 2024-06-12
Payer: COMMERCIAL

## 2024-06-12 ENCOUNTER — LAB (OUTPATIENT)
Dept: LAB | Facility: HOSPITAL | Age: 64
End: 2024-06-12
Payer: COMMERCIAL

## 2024-06-12 VITALS
WEIGHT: 113.4 LBS | HEART RATE: 92 BPM | TEMPERATURE: 98.2 F | BODY MASS INDEX: 20.87 KG/M2 | SYSTOLIC BLOOD PRESSURE: 123 MMHG | OXYGEN SATURATION: 100 % | HEIGHT: 62 IN | DIASTOLIC BLOOD PRESSURE: 79 MMHG

## 2024-06-12 DIAGNOSIS — C79.51 MALIGNANT NEOPLASM OF COLON METASTATIC TO BONE: Primary | ICD-10-CM

## 2024-06-12 DIAGNOSIS — C18.9 MALIGNANT NEOPLASM OF COLON METASTATIC TO BONE: Primary | ICD-10-CM

## 2024-06-12 DIAGNOSIS — C18.9 ADENOCARCINOMA OF COLON METASTATIC TO LIVER: Primary | ICD-10-CM

## 2024-06-12 DIAGNOSIS — C78.7 ADENOCARCINOMA OF COLON METASTATIC TO LIVER: ICD-10-CM

## 2024-06-12 DIAGNOSIS — C78.7 ADENOCARCINOMA OF COLON METASTATIC TO LIVER: Primary | ICD-10-CM

## 2024-06-12 DIAGNOSIS — C18.9 ADENOCARCINOMA OF COLON METASTATIC TO LIVER: ICD-10-CM

## 2024-06-12 LAB
ALBUMIN SERPL-MCNC: 3.8 G/DL (ref 3.5–5.2)
ALBUMIN/GLOB SERPL: 1.4 G/DL
ALP SERPL-CCNC: 99 U/L (ref 39–117)
ALT SERPL W P-5'-P-CCNC: 8 U/L (ref 1–33)
ANION GAP SERPL CALCULATED.3IONS-SCNC: 11.5 MMOL/L (ref 5–15)
AST SERPL-CCNC: 18 U/L (ref 1–32)
BASOPHILS # BLD AUTO: 0.02 10*3/MM3 (ref 0–0.2)
BASOPHILS NFR BLD AUTO: 0.3 % (ref 0–1.5)
BILIRUB SERPL-MCNC: 0.5 MG/DL (ref 0–1.2)
BUN SERPL-MCNC: 19 MG/DL (ref 8–23)
BUN/CREAT SERPL: 23.5 (ref 7–25)
CALCIUM SPEC-SCNC: 9.3 MG/DL (ref 8.6–10.5)
CEA SERPL-MCNC: 40.9 NG/ML
CHLORIDE SERPL-SCNC: 104 MMOL/L (ref 98–107)
CO2 SERPL-SCNC: 23.5 MMOL/L (ref 22–29)
CREAT SERPL-MCNC: 0.81 MG/DL (ref 0.57–1)
DEPRECATED RDW RBC AUTO: 59.8 FL (ref 37–54)
EGFRCR SERPLBLD CKD-EPI 2021: 82.2 ML/MIN/1.73
EOSINOPHIL # BLD AUTO: 0.06 10*3/MM3 (ref 0–0.4)
EOSINOPHIL NFR BLD AUTO: 0.9 % (ref 0.3–6.2)
ERYTHROCYTE [DISTWIDTH] IN BLOOD BY AUTOMATED COUNT: 15.6 % (ref 12.3–15.4)
GLOBULIN UR ELPH-MCNC: 2.8 GM/DL
GLUCOSE SERPL-MCNC: 170 MG/DL (ref 65–99)
HCT VFR BLD AUTO: 30.5 % (ref 34–46.6)
HGB BLD-MCNC: 9.4 G/DL (ref 12–15.9)
LAB AP CARIS, ADDENDUM: NORMAL
LAB AP CASE REPORT: NORMAL
LAB AP DIAGNOSIS COMMENT: NORMAL
LYMPHOCYTES # BLD AUTO: 0.6 10*3/MM3 (ref 0.7–3.1)
LYMPHOCYTES NFR BLD AUTO: 9.1 % (ref 19.6–45.3)
Lab: NORMAL
MCH RBC QN AUTO: 33.2 PG (ref 26.6–33)
MCHC RBC AUTO-ENTMCNC: 30.8 G/DL (ref 31.5–35.7)
MCV RBC AUTO: 107.8 FL (ref 79–97)
MONOCYTES # BLD AUTO: 1 10*3/MM3 (ref 0.1–0.9)
MONOCYTES NFR BLD AUTO: 15.2 % (ref 5–12)
NEUTROPHILS NFR BLD AUTO: 4.88 10*3/MM3 (ref 1.7–7)
NEUTROPHILS NFR BLD AUTO: 74.5 % (ref 42.7–76)
PATH REPORT.FINAL DX SPEC: NORMAL
PATH REPORT.GROSS SPEC: NORMAL
PLATELET # BLD AUTO: 169 10*3/MM3 (ref 140–450)
PMV BLD AUTO: 10.1 FL (ref 6–12)
POTASSIUM SERPL-SCNC: 3.6 MMOL/L (ref 3.5–5.2)
PROT SERPL-MCNC: 6.6 G/DL (ref 6–8.5)
RAD ONC ARIA COURSE ID: NORMAL
RAD ONC ARIA COURSE LAST TREATMENT DATE: NORMAL
RAD ONC ARIA COURSE START DATE: NORMAL
RAD ONC ARIA COURSE TREATMENT ELAPSED DAYS: 6
RAD ONC ARIA FIRST TREATMENT DATE: NORMAL
RAD ONC ARIA PLAN FRACTIONS TREATED TO DATE: 5
RAD ONC ARIA PLAN ID: NORMAL
RAD ONC ARIA PLAN PRESCRIBED DOSE PER FRACTION: 4 GY
RAD ONC ARIA PLAN PRIMARY REFERENCE POINT: NORMAL
RAD ONC ARIA PLAN TOTAL FRACTIONS PRESCRIBED: 5
RAD ONC ARIA PLAN TOTAL PRESCRIBED DOSE: 2000 CGY
RAD ONC ARIA REFERENCE POINT DOSAGE GIVEN TO DATE: 20 GY
RAD ONC ARIA REFERENCE POINT ID: NORMAL
RAD ONC ARIA REFERENCE POINT SESSION DOSAGE GIVEN: 4 GY
RBC # BLD AUTO: 2.83 10*6/MM3 (ref 3.77–5.28)
SODIUM SERPL-SCNC: 139 MMOL/L (ref 136–145)
WBC NRBC COR # BLD AUTO: 6.56 10*3/MM3 (ref 3.4–10.8)

## 2024-06-12 PROCEDURE — 36415 COLL VENOUS BLD VENIPUNCTURE: CPT

## 2024-06-12 PROCEDURE — 82378 CARCINOEMBRYONIC ANTIGEN: CPT | Performed by: INTERNAL MEDICINE

## 2024-06-12 PROCEDURE — 77014 CHG CT GUIDANCE RADIATION THERAPY FLDS PLACEMENT: CPT | Performed by: INTERNAL MEDICINE

## 2024-06-12 PROCEDURE — 85025 COMPLETE CBC W/AUTO DIFF WBC: CPT

## 2024-06-12 PROCEDURE — 80053 COMPREHEN METABOLIC PANEL: CPT | Performed by: INTERNAL MEDICINE

## 2024-06-12 PROCEDURE — 77386: CPT | Performed by: INTERNAL MEDICINE

## 2024-06-12 NOTE — PROGRESS NOTES
RADIATION THERAPY COMPLETION and DISCHARGE     Samantha Massey completed radiation therapy on 06/12/2024 for Malignant neoplasm of colon metastatic to bone [C18.9, C79.51]. The summary of her treatment is as follows:    TREATMENT SITE:   [facility] START DATE:   06/06/2024   TOTAL DOSE (cGy):   2000 END DATE:   06/12/2024    DOSE/FRACTION:   400 ELAPSED DAYS:   5   TOTAL FACTIONS:   5 PHYSICIAN:    Dr. Sj Burgos     her one-month follow-up appointment is scheduled on July 16, 2024 at 11:00 am with Dr. Sj Burgos.  _______________________________________________________________________    The following instructions were provided to the patient and/or family in their printed after visit summary (AVS) as well as discussed in-person by the radiation oncology nurse or medical assistant. The patient and/or family had the opportunity to ask questions and verbalized their questions were adequately answered. Encouraged patient to contact our department with any questions or concerns.    RADIATION THERAPY DISCHARGE INSTRUCTIONS  General    CONGRATULATIONS! You completed 5 radiation treatments for treatment of your metastatic colon cancer. These discharge instructions are important for you to follow until your one-month follow up appointment with your radiation oncologist. Please make sure to review these instructions and call the Radiation Oncology Department if you have any questions or concerns with symptoms you may experience. Thank you for trusting us with your cancer treatment!    DIET  Eat a regular, well balanced diet that is high in protein such as meat, eggs, cheese, and nut butters.  Drink 48 to 64 ounces of fluid daily.  Use nutritional supplements if you are not able to eat full meals.  Monitor your weight and report continued weight loss to your doctor.    MEDICATIONS  Use Tylenol as needed to decrease discomfort and irritation to treatment area.  Take pain medications only as prescribed.  Take a  laxative or stool softener as needed to prevent constipation due to pain medications.    SKIN CARE  Wash treated skin gently with your hands using a mild, non-drying soap such as Dove® or Aveeno® until skin returns to normal.  Pat skin dry - do not rub.  Keep treated skin moist with twice daily applications of Eucerin® or Aquaphor®.  Always protect your treated skin when outdoors by wearing protective clothing and applying sunscreen SPF 15 or higher at least 30 minutes before going outdoors and reapply frequently.    ACTIVITY  Fatigue is a normal side effect of radiation therapy and should improve over time.  Alternate rest and activity.  Exercise such as walking may help to improve your fatigue.    FOLLOW-UP  Continue follow-up appointments with all other doctors as necessary.  Call your radiation oncology doctor if you are concerned with any side effects you are experiencing: [phone].  _______________________________________________________________________    Completed by: Alma Ivan MA, Radiation Oncology Medical Assistant on 06/12/24 at 10:05 EDT

## 2024-06-12 NOTE — PATIENT INSTRUCTIONS
RADIATION THERAPY DISCHARGE INSTRUCTIONS  General    CONGRATULATIONS! You completed 5 radiation treatments for treatment of your metastatic colon cancer. These discharge instructions are important for you to follow until your one-month follow up appointment with your radiation oncologist. Please make sure to review these instructions and call the Radiation Oncology Department if you have any questions or concerns with symptoms you may experience. Thank you for trusting us with your cancer treatment!    DIET  Eat a regular, well balanced diet that is high in protein such as meat, eggs, cheese, and nut butters.  Drink 48 to 64 ounces of fluid daily.  Use nutritional supplements if you are not able to eat full meals.  Monitor your weight and report continued weight loss to your doctor.    MEDICATIONS  Use Tylenol as needed to decrease discomfort and irritation to treatment area.  Take pain medications only as prescribed.  Take a laxative or stool softener as needed to prevent constipation due to pain medications.    SKIN CARE  Wash treated skin gently with your hands using a mild, non-drying soap such as Dove® or Aveeno® until skin returns to normal.  Pat skin dry - do not rub.  Keep treated skin moist with twice daily applications of Eucerin® or Aquaphor®.  Always protect your treated skin when outdoors by wearing protective clothing and applying sunscreen SPF 15 or higher at least 30 minutes before going outdoors and reapply frequently.    ACTIVITY  Fatigue is a normal side effect of radiation therapy and should improve over time.  Alternate rest and activity.  Exercise such as walking may help to improve your fatigue.    FOLLOW-UP  Continue follow-up appointments with all other doctors as necessary.  Call your radiation oncology doctor if you are concerned with any side effects you are experiencing: [phone].    _______________________________________________________________________    Completed by: Alma  ROBIN Ivan on 6/12/2024 at 10:04 EDT

## 2024-06-13 ENCOUNTER — SPECIALTY PHARMACY (OUTPATIENT)
Dept: PHARMACY | Facility: HOSPITAL | Age: 64
End: 2024-06-13
Payer: COMMERCIAL

## 2024-06-24 LAB — REF LAB TEST METHOD: NORMAL

## 2024-06-25 ENCOUNTER — APPOINTMENT (OUTPATIENT)
Dept: CT IMAGING | Facility: HOSPITAL | Age: 64
DRG: 054 | End: 2024-06-25
Payer: COMMERCIAL

## 2024-06-25 ENCOUNTER — HOSPITAL ENCOUNTER (INPATIENT)
Facility: HOSPITAL | Age: 64
LOS: 3 days | Discharge: HOME | DRG: 054 | End: 2024-06-28
Attending: INTERNAL MEDICINE | Admitting: INTERNAL MEDICINE
Payer: COMMERCIAL

## 2024-06-25 ENCOUNTER — APPOINTMENT (OUTPATIENT)
Dept: GENERAL RADIOLOGY | Facility: HOSPITAL | Age: 64
DRG: 054 | End: 2024-06-25
Payer: COMMERCIAL

## 2024-06-25 DIAGNOSIS — C18.9 MALIGNANT NEOPLASM OF COLON METASTATIC TO BONE: ICD-10-CM

## 2024-06-25 DIAGNOSIS — C79.51 MALIGNANT NEOPLASM OF COLON METASTATIC TO BONE: ICD-10-CM

## 2024-06-25 DIAGNOSIS — R53.1 LEFT-SIDED WEAKNESS: Primary | ICD-10-CM

## 2024-06-25 DIAGNOSIS — C79.31 METASTASIS TO BRAIN: ICD-10-CM

## 2024-06-25 PROBLEM — R29.898 LEFT HAND WEAKNESS: Status: ACTIVE | Noted: 2024-06-25

## 2024-06-25 LAB
ABO GROUP BLD: NORMAL
ALBUMIN SERPL-MCNC: 4.2 G/DL (ref 3.5–5.2)
ALBUMIN/GLOB SERPL: 1.5 G/DL
ALP SERPL-CCNC: 92 U/L (ref 39–117)
ALT SERPL W P-5'-P-CCNC: 8 U/L (ref 1–33)
ANION GAP SERPL CALCULATED.3IONS-SCNC: 13.9 MMOL/L (ref 5–15)
AST SERPL-CCNC: 21 U/L (ref 1–32)
BASOPHILS # BLD AUTO: 0.02 10*3/MM3 (ref 0–0.2)
BASOPHILS NFR BLD AUTO: 0.3 % (ref 0–1.5)
BILIRUB SERPL-MCNC: 0.5 MG/DL (ref 0–1.2)
BLD GP AB SCN SERPL QL: NEGATIVE
BUN SERPL-MCNC: 19 MG/DL (ref 8–23)
BUN/CREAT SERPL: 24.7 (ref 7–25)
CALCIUM SPEC-SCNC: 9.9 MG/DL (ref 8.6–10.5)
CHLORIDE SERPL-SCNC: 104 MMOL/L (ref 98–107)
CO2 SERPL-SCNC: 22.1 MMOL/L (ref 22–29)
CREAT SERPL-MCNC: 0.77 MG/DL (ref 0.57–1)
DEPRECATED RDW RBC AUTO: 57 FL (ref 37–54)
EGFRCR SERPLBLD CKD-EPI 2021: 87.3 ML/MIN/1.73
EOSINOPHIL # BLD AUTO: 0.17 10*3/MM3 (ref 0–0.4)
EOSINOPHIL NFR BLD AUTO: 2.9 % (ref 0.3–6.2)
ERYTHROCYTE [DISTWIDTH] IN BLOOD BY AUTOMATED COUNT: 15.2 % (ref 12.3–15.4)
GLOBULIN UR ELPH-MCNC: 2.8 GM/DL
GLUCOSE SERPL-MCNC: 101 MG/DL (ref 65–99)
HCT VFR BLD AUTO: 32.8 % (ref 34–46.6)
HGB BLD-MCNC: 9.9 G/DL (ref 12–15.9)
HOLD SPECIMEN: NORMAL
HOLD SPECIMEN: NORMAL
IMM GRANULOCYTES # BLD AUTO: 0.01 10*3/MM3 (ref 0–0.05)
IMM GRANULOCYTES NFR BLD AUTO: 0.2 % (ref 0–0.5)
INR PPP: 0.96 (ref 0.93–1.1)
LYMPHOCYTES # BLD AUTO: 0.75 10*3/MM3 (ref 0.7–3.1)
LYMPHOCYTES NFR BLD AUTO: 13 % (ref 19.6–45.3)
MCH RBC QN AUTO: 30.7 PG (ref 26.6–33)
MCHC RBC AUTO-ENTMCNC: 30.2 G/DL (ref 31.5–35.7)
MCV RBC AUTO: 101.9 FL (ref 79–97)
MONOCYTES # BLD AUTO: 0.64 10*3/MM3 (ref 0.1–0.9)
MONOCYTES NFR BLD AUTO: 11.1 % (ref 5–12)
NEUTROPHILS NFR BLD AUTO: 4.2 10*3/MM3 (ref 1.7–7)
NEUTROPHILS NFR BLD AUTO: 72.5 % (ref 42.7–76)
NRBC BLD AUTO-RTO: 0 /100 WBC (ref 0–0.2)
PLATELET # BLD AUTO: 175 10*3/MM3 (ref 140–450)
PMV BLD AUTO: 9.4 FL (ref 6–12)
POTASSIUM SERPL-SCNC: 3.8 MMOL/L (ref 3.5–5.2)
PROT SERPL-MCNC: 7 G/DL (ref 6–8.5)
PROTHROMBIN TIME: 10.5 SECONDS (ref 9.6–11.7)
RBC # BLD AUTO: 3.22 10*6/MM3 (ref 3.77–5.28)
RH BLD: NEGATIVE
SODIUM SERPL-SCNC: 140 MMOL/L (ref 136–145)
T&S EXPIRATION DATE: NORMAL
WBC NRBC COR # BLD AUTO: 5.79 10*3/MM3 (ref 3.4–10.8)
WHOLE BLOOD HOLD COAG: NORMAL
WHOLE BLOOD HOLD SPECIMEN: NORMAL

## 2024-06-25 PROCEDURE — 86900 BLOOD TYPING SEROLOGIC ABO: CPT | Performed by: PHYSICIAN ASSISTANT

## 2024-06-25 PROCEDURE — 70450 CT HEAD/BRAIN W/O DYE: CPT

## 2024-06-25 PROCEDURE — 85025 COMPLETE CBC W/AUTO DIFF WBC: CPT | Performed by: PHYSICIAN ASSISTANT

## 2024-06-25 PROCEDURE — 70496 CT ANGIOGRAPHY HEAD: CPT

## 2024-06-25 PROCEDURE — 25510000001 IOPAMIDOL PER 1 ML: Performed by: PHYSICIAN ASSISTANT

## 2024-06-25 PROCEDURE — 25010000002 METHYLPREDNISOLONE PER 125 MG: Performed by: PHYSICIAN ASSISTANT

## 2024-06-25 PROCEDURE — 80053 COMPREHEN METABOLIC PANEL: CPT | Performed by: PHYSICIAN ASSISTANT

## 2024-06-25 PROCEDURE — 70498 CT ANGIOGRAPHY NECK: CPT

## 2024-06-25 PROCEDURE — 71045 X-RAY EXAM CHEST 1 VIEW: CPT

## 2024-06-25 PROCEDURE — 93005 ELECTROCARDIOGRAM TRACING: CPT | Performed by: PHYSICIAN ASSISTANT

## 2024-06-25 PROCEDURE — 99285 EMERGENCY DEPT VISIT HI MDM: CPT

## 2024-06-25 PROCEDURE — 86901 BLOOD TYPING SEROLOGIC RH(D): CPT | Performed by: PHYSICIAN ASSISTANT

## 2024-06-25 PROCEDURE — 25010000002 DIPHENHYDRAMINE PER 50 MG: Performed by: PHYSICIAN ASSISTANT

## 2024-06-25 PROCEDURE — 36415 COLL VENOUS BLD VENIPUNCTURE: CPT

## 2024-06-25 PROCEDURE — 85610 PROTHROMBIN TIME: CPT | Performed by: PHYSICIAN ASSISTANT

## 2024-06-25 PROCEDURE — 86850 RBC ANTIBODY SCREEN: CPT | Performed by: PHYSICIAN ASSISTANT

## 2024-06-25 RX ORDER — SODIUM CHLORIDE 0.9 % (FLUSH) 0.9 %
10 SYRINGE (ML) INJECTION EVERY 12 HOURS SCHEDULED
Status: DISCONTINUED | OUTPATIENT
Start: 2024-06-25 | End: 2024-06-28 | Stop reason: HOSPADM

## 2024-06-25 RX ORDER — ASPIRIN 81 MG/1
81 TABLET, CHEWABLE ORAL DAILY
Status: DISCONTINUED | OUTPATIENT
Start: 2024-06-26 | End: 2024-06-26

## 2024-06-25 RX ORDER — DEXAMETHASONE SODIUM PHOSPHATE 4 MG/ML
4 INJECTION, SOLUTION INTRA-ARTICULAR; INTRALESIONAL; INTRAMUSCULAR; INTRAVENOUS; SOFT TISSUE EVERY 6 HOURS
Status: DISCONTINUED | OUTPATIENT
Start: 2024-06-26 | End: 2024-06-28 | Stop reason: HOSPADM

## 2024-06-25 RX ORDER — ENOXAPARIN SODIUM 100 MG/ML
40 INJECTION SUBCUTANEOUS EVERY 24 HOURS
Status: DISCONTINUED | OUTPATIENT
Start: 2024-06-26 | End: 2024-06-28 | Stop reason: HOSPADM

## 2024-06-25 RX ORDER — SODIUM CHLORIDE 0.9 % (FLUSH) 0.9 %
10 SYRINGE (ML) INJECTION AS NEEDED
Status: DISCONTINUED | OUTPATIENT
Start: 2024-06-25 | End: 2024-06-28 | Stop reason: HOSPADM

## 2024-06-25 RX ORDER — METOPROLOL SUCCINATE 25 MG/1
25 TABLET, EXTENDED RELEASE ORAL
Status: DISCONTINUED | OUTPATIENT
Start: 2024-06-26 | End: 2024-06-26

## 2024-06-25 RX ORDER — DEXAMETHASONE SODIUM PHOSPHATE 10 MG/ML
10 INJECTION, SOLUTION INTRAMUSCULAR; INTRAVENOUS ONCE
Status: COMPLETED | OUTPATIENT
Start: 2024-06-25 | End: 2024-06-26

## 2024-06-25 RX ORDER — ACETAMINOPHEN 325 MG/1
650 TABLET ORAL EVERY 4 HOURS PRN
Status: DISCONTINUED | OUTPATIENT
Start: 2024-06-25 | End: 2024-06-28 | Stop reason: HOSPADM

## 2024-06-25 RX ORDER — LEVETIRACETAM 500 MG/5ML
1000 INJECTION, SOLUTION, CONCENTRATE INTRAVENOUS ONCE
Status: COMPLETED | OUTPATIENT
Start: 2024-06-25 | End: 2024-06-26

## 2024-06-25 RX ORDER — DIPHENHYDRAMINE HYDROCHLORIDE 50 MG/ML
50 INJECTION INTRAMUSCULAR; INTRAVENOUS
Status: COMPLETED | OUTPATIENT
Start: 2024-06-25 | End: 2024-06-25

## 2024-06-25 RX ORDER — PANTOPRAZOLE SODIUM 40 MG/10ML
40 INJECTION, POWDER, LYOPHILIZED, FOR SOLUTION INTRAVENOUS
Status: DISCONTINUED | OUTPATIENT
Start: 2024-06-26 | End: 2024-06-28 | Stop reason: HOSPADM

## 2024-06-25 RX ORDER — ACETAMINOPHEN 650 MG/1
650 SUPPOSITORY RECTAL EVERY 4 HOURS PRN
Status: DISCONTINUED | OUTPATIENT
Start: 2024-06-25 | End: 2024-06-28 | Stop reason: HOSPADM

## 2024-06-25 RX ORDER — FAMOTIDINE 10 MG/ML
20 INJECTION, SOLUTION INTRAVENOUS ONCE
Status: COMPLETED | OUTPATIENT
Start: 2024-06-25 | End: 2024-06-25

## 2024-06-25 RX ORDER — ASPIRIN 300 MG/1
300 SUPPOSITORY RECTAL DAILY
Status: DISCONTINUED | OUTPATIENT
Start: 2024-06-26 | End: 2024-06-26

## 2024-06-25 RX ORDER — SODIUM CHLORIDE 9 MG/ML
40 INJECTION, SOLUTION INTRAVENOUS AS NEEDED
Status: DISCONTINUED | OUTPATIENT
Start: 2024-06-25 | End: 2024-06-28 | Stop reason: HOSPADM

## 2024-06-25 RX ORDER — ATORVASTATIN CALCIUM 40 MG/1
80 TABLET, FILM COATED ORAL NIGHTLY
Status: DISCONTINUED | OUTPATIENT
Start: 2024-06-25 | End: 2024-06-26

## 2024-06-25 RX ORDER — LEVETIRACETAM 500 MG/5ML
1000 INJECTION, SOLUTION, CONCENTRATE INTRAVENOUS EVERY 12 HOURS SCHEDULED
Status: DISCONTINUED | OUTPATIENT
Start: 2024-06-26 | End: 2024-06-28 | Stop reason: HOSPADM

## 2024-06-25 RX ORDER — TRAMADOL HYDROCHLORIDE 50 MG/1
50 TABLET ORAL EVERY 6 HOURS PRN
Status: DISCONTINUED | OUTPATIENT
Start: 2024-06-25 | End: 2024-06-26

## 2024-06-25 RX ORDER — METHYLPREDNISOLONE SODIUM SUCCINATE 125 MG/2ML
125 INJECTION, POWDER, LYOPHILIZED, FOR SOLUTION INTRAMUSCULAR; INTRAVENOUS ONCE
Status: COMPLETED | OUTPATIENT
Start: 2024-06-25 | End: 2024-06-25

## 2024-06-25 RX ADMIN — METHYLPREDNISOLONE SODIUM SUCCINATE 125 MG: 125 INJECTION, POWDER, FOR SOLUTION INTRAMUSCULAR; INTRAVENOUS at 19:44

## 2024-06-25 RX ADMIN — DIPHENHYDRAMINE HYDROCHLORIDE 50 MG: 50 INJECTION, SOLUTION INTRAMUSCULAR; INTRAVENOUS at 19:44

## 2024-06-25 RX ADMIN — IOPAMIDOL 100 ML: 755 INJECTION, SOLUTION INTRAVENOUS at 21:09

## 2024-06-25 RX ADMIN — FAMOTIDINE 20 MG: 10 INJECTION INTRAVENOUS at 19:44

## 2024-06-25 NOTE — ED PROVIDER NOTES
Subjective   History of Present Illness  Chief Complaint: Left-sided weakness    Patient is a 63-year-old female history of CAD CHF CKD colon cancer with liver metastases hypertension presents to the ER with complaints of left-sided weakness and numbness since yesterday around noon.  Patient states she noticed yesterday but it did not really bother her.  She states that she presented today because it persisted.  She reports a facial droop with some slurred speech, left arm weakness compared to the right.  Mild tingling in the left leg but no weakness.  She denies chest pain shortness of breath abdominal pain nausea or vomiting.  No fever or chills.    PCP: Diana Spencer    History provided by:  Patient      Review of Systems   Constitutional:  Negative for chills and fever.   HENT:  Negative for sore throat and trouble swallowing.    Eyes: Negative.    Respiratory:  Negative for shortness of breath and wheezing.    Cardiovascular:  Negative for chest pain.   Gastrointestinal:  Negative for abdominal pain.   Endocrine: Negative.    Genitourinary:  Negative for dysuria.   Musculoskeletal:  Positive for neck pain. Negative for neck stiffness.   Skin:  Negative for rash.   Allergic/Immunologic: Negative.    Neurological:  Positive for facial asymmetry, speech difficulty, weakness, numbness and headaches. Negative for seizures.   Psychiatric/Behavioral:  Negative for behavioral problems.    All other systems reviewed and are negative.      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  PRIOR TO SCAN 8-3-2013.    Latex Rash     Blisters     Penicillins Hives       Past Surgical History:   Procedure Laterality Date    ABDOMINAL SURGERY      CARDIAC CATHETERIZATION N/A 12/08/2023    Procedure: Left Heart Cath;  Surgeon: Ramiro Olivera MD;  Location: [facility] CATH INVASIVE LOCATION;  Service: Cardiovascular;  Laterality: N/A;    COLON SURGERY  Colon resection    CYST REMOVAL  1998    cyst removed from [facility] in 1998    HERNIA REPAIR  incisional    PORTACATH PLACEMENT  2017    VENOUS ACCESS DEVICE (PORT) INSERTION Left 01/12/2021    Procedure: INSERTION VENOUS ACCESS DEVICE;  Surgeon: Jay Yeh MD;  Location: [facility] MAIN OR;  Service: General;  Laterality: Left;    VENOUS ACCESS DEVICE (PORT) REMOVAL Right 08/27/2020    Procedure: REMOVAL VENOUS ACCESS DEVICE;  Surgeon: Jay Yeh MD;  Location: [facility] MAIN OR;  Service: General;  Laterality: Right;    VENTRAL/INCISIONAL HERNIA REPAIR N/A 03/02/2023    Procedure: VENTRAL/INCISIONAL HERNIA REPAIR WITH MESH;  Surgeon: Jay Yeh MD;  Location: [facility] MAIN OR;  Service: General;  Laterality: N/A;       Family History   Problem Relation Age of Onset    Heart disease Mother     Lung cancer Father 70        Associated to cigarette smoking    Heart disease Father     Hyperlipidemia Father     Cancer Father     Cancer Sister         Bladder cancer       Social History     Socioeconomic History    Marital status: Single   Tobacco Use    Smoking status: Former     Current packs/day: 0.00     Average packs/day: 1 pack/day for 31.0 years (31.0 ttl pk-yrs)     Types: Cigarettes     Start date: 1/1/1981     Quit date: 1/1/2012     Years since  quittin.4     Passive exposure: Past    Smokeless tobacco: Never   Vaping Use    Vaping status: Never Used   Substance and Sexual Activity    Alcohol use: Yes     Comment: 2 or 3 a month    Drug use: Never    Sexual activity: Not Currently     Partners: Female     Birth control/protection: Same-sex partner           Objective   Physical Exam  Vitals and nursing note reviewed.   Constitutional:       Appearance: Normal appearance. She is well-developed. She is not ill-appearing or toxic-appearing.   HENT:      Head: Normocephalic and atraumatic.   Eyes:      Pupils: Pupils are equal, round, and reactive to light.   Cardiovascular:      Rate and Rhythm: Normal rate and regular rhythm.      Pulses: Normal pulses.      Heart sounds: Normal heart sounds. No murmur heard.  Pulmonary:      Effort: Pulmonary effort is normal. No respiratory distress.      Breath sounds: Normal breath sounds. No wheezing.   Abdominal:      General: Bowel sounds are normal. There is no distension.      Palpations: Abdomen is soft.      Tenderness: There is no abdominal tenderness. There is no right CVA tenderness or left CVA tenderness.   Skin:     General: Skin is warm and dry.      Capillary Refill: Capillary refill takes less than 2 seconds.      Findings: No rash.   Neurological:      General: No focal deficit present.      Mental Status: She is alert and oriented to person, place, and time.      GCS: GCS eye subscore is 4. GCS verbal subscore is 5. GCS motor subscore is 6.      Cranial Nerves: Facial asymmetry present.      Motor: Weakness and pronator drift present.      Coordination: Finger-Nose-Finger Test and Heel to Shin Test normal.      Comments:  strength 4/5 left arm compared to right.    Motor strength 5/5 bilateral lower extremities    Left-sided facial droop   Psychiatric:         Mood and Affect: Mood normal.         Behavior: Behavior normal.         ECG 12 Lead      Date/Time: 2024 7:41 PM    Performed by:  "Molly Caldwell PA  Authorized by: Molly Caldwell PA  Interpreted by ED physician  Previous ECG: no previous ECG available  Rhythm: sinus rhythm  Rate: normal  BPM: 98  QRS axis: normal  Conduction: conduction normal  Clinical impression: non-specific ECG               ED Course  ED Course as of 06/25/24 2350   Tue Jun 25, 2024 1920 Discussed case with ER attending Dr. Cade who recommended neuroconsult []   1929 Spoke with neurology, Dr. Purvis, recommended CT CTA at this time no perfusion study.  Recommended admission for MRI studies.  If patient is able to pass swallow study switch to Plavix.  If patient cannot pass swallow study can continue aspirin []   2107 Spoke with radiology, Dr. Esquivel, reports CT head suspicious for brain metastasis []   2226 Spoke with neurosurgery Dr. Vasquez, recommended Keppra 1 g, Decadron 10 mg followed by 4 mg every 6 hours and Pepcid daily []      ED Course User Index  [] Molly Caldwell PA    /97   Pulse 104   Temp 97.8 °F (36.6 °C) (Oral)   Resp 16   Ht 157.5 cm (62\")   Wt 51.4 kg (113 lb 5.1 oz)   LMP  (LMP Unknown)   SpO2 97%   BMI 20.73 kg/m²   Labs Reviewed   COMPREHENSIVE METABOLIC PANEL - Abnormal; Notable for the following components:       Result Value    Glucose 101 (*)     All other components within normal limits    Narrative:     GFR Normal >60  Chronic Kidney Disease <60  Kidney Failure <15     CBC WITH AUTO DIFFERENTIAL - Abnormal; Notable for the following components:    RBC 3.22 (*)     Hemoglobin 9.9 (*)     Hematocrit 32.8 (*)     .9 (*)     MCHC 30.2 (*)     RDW-SD 57.0 (*)     Lymphocyte % 13.0 (*)     All other components within normal limits   PROTIME-INR - Normal   RAINBOW DRAW    Narrative:     The following orders were created for panel order [place] Draw.  Procedure                               Abnormality         Status                     ---------                               -----------         ------           "           Green Top (Gel)[[id number]]                                  Final result               Lavender Top[[id number]]                                     Final result               Gold Top - SST[[id number]]                                   Final result               Light Blue Top[[id number]]                                   Final result                 Please view results for these tests on the individual orders.   HEMOGLOBIN A1C   LIPID PANEL   POCT GLUCOSE FINGERSTICK   POCT GLUCOSE FINGERSTICK   POCT GLUCOSE FINGERSTICK   POCT GLUCOSE FINGERSTICK   POCT GLUCOSE FINGERSTICK   TYPE AND SCREEN   CBC AND DIFFERENTIAL    Narrative:     The following orders were created for panel order CBC & Differential.  Procedure                               Abnormality         Status                     ---------                               -----------         ------                     CBC Auto Differential[[id number]]        Abnormal            Final result                 Please view results for these tests on the individual orders.   GREEN TOP   LAVENDER TOP   GOLD TOP - SST   LIGHT BLUE TOP     Medications   sodium chloride 0.9 % flush 10 mL (has no administration in time range)   levETIRAcetam (KEPPRA) injection 1,000 mg (has no administration in time range)   dexAMETHasone sodium phosphate injection 10 mg (has no administration in time range)   sodium chloride 0.9 % flush 10 mL (has no administration in time range)   sodium chloride 0.9 % flush 10 mL (has no administration in time range)   sodium chloride 0.9 % infusion 40 mL (has no administration in time range)   atorvastatin (LIPITOR) tablet 80 mg (has no administration in time range)   Enoxaparin Sodium (LOVENOX) syringe 40 mg (has no administration in time range)   aspirin chewable tablet 81 mg (has no administration in time range)     Or   aspirin suppository 300 mg (has no administration in time range)   acetaminophen (TYLENOL) tablet 650 mg (has no  administration in time range)     Or   acetaminophen (TYLENOL) suppository 650 mg (has no administration in time range)   dexAMETHasone (DECADRON) injection 4 mg (has no administration in time range)   levETIRAcetam (KEPPRA) injection 1,000 mg (has no administration in time range)   pantoprazole (PROTONIX) injection 40 mg (has no administration in time range)   traMADol (ULTRAM) tablet 50 mg (has no administration in time range)   metoprolol succinate XL (TOPROL-XL) 24 hr tablet 25 mg (has no administration in time range)   diphenhydrAMINE (BENADRYL) injection 50 mg (50 mg Intravenous Given 6/25/24 1944)   methylPREDNISolone sodium succinate (SOLU-Medrol) injection 125 mg (125 mg Intravenous Given 6/25/24 1944)   famotidine (PEPCID) injection 20 mg (20 mg Intravenous Given 6/25/24 1944)   iopamidol (ISOVUE-370) 76 % injection 100 mL (100 mL Intravenous Given 6/25/24 2109)     CT Angiogram Head    Result Date: 6/25/2024  Mild atheromatous disease. No vessel stenosis or occlusion. Known intracranial masses demonstrate enhancement. Pulmonary masses have increased in size. Electronically Signed: Praveen Esquivel MD  6/25/2024 9:28 PM EDT  Workstation ID: [id number]    CT Angiogram Neck    Result Date: 6/25/2024  Mild atheromatous disease. No vessel stenosis or occlusion. Known intracranial masses demonstrate enhancement. Pulmonary masses have increased in size. Electronically Signed: Praveen Esquivel MD  6/25/2024 9:28 PM EDT  Workstation ID: [id number]    CT Head Without Contrast Stroke Protocol    Result Date: 6/25/2024  Findings concerning for metastatic disease to the brain. MRI of the brain with contrast recommended. Electronically Signed: Praveen Esquivel MD  6/25/2024 9:08 PM EDT  Workstation ID: [id number]    XR Chest 1 View    Result Date: 6/25/2024  Right lower lobe infiltrate versus mass. Right upper lobe mass. Vascular congestion. Electronically Signed: Praveen Esquivel MD  6/25/2024 8:13 PM EDT  Workstation ID: [id number]                          Total (NIH Stroke Scale): 3                  Medical Decision Making  Differential Dx (Includes but not limited to): Stroke intracranial hemorrhage brain tumor metastases  Medical Records Reviewed: Patient seen by Dr. Bocanegra 6/12/2024 for follow-up.  In February 2012 when she was diagnosed and she was started on treatment.  Patient was undergoing chemotherapy was unable to do so due to not tolerating it, patient currently undergoing no treatment.  Labs:, Interpretation, CBC no leukocytosis, CMP glucose 101  Imaging: Imaging was reviewed by myself, independently interpreted by Dr. Shook, CT head shows findings concerning for metastatic disease of the brain.  CTA shows no vessel stenosis or occlusion.  Telemetry: EKG was reviewed by myself, independently interpreted by Dr. Shook, sinus rhythm rate of 98 with no acute ST changes  Testing considered but not ordered: CT perfusion, not felt warranted as CT head shows brain metastases which is likely causing patient's symptoms.  Also not recommended per neurology  Nature of Complaint: Acute  Admission vs Discharge: Admission      Discussion: While in the ED IV was placed and labs were obtained appropriate PPE was worn during exam and throughout all encounters with the patient.  Patient with above evaluation.  She is afebrile nontoxic appearance in no acute distress.  Patient's symptoms started around noon yesterday, she is not a tPA candidate.  Spoke with neurology who recommended CT CTA and admission for stroke workup.  Lab work reassuring.  CT concerning for brain metastases recommending MRI.  Spoke with neurosurgery recommending patient to be initiated on Keppra, Decadron and Pepcid.  I spoke Dr. Alston regarding hospitalist admission for oncology consultation, and neurosurgery consultation and further treatment.  Patient agreeable with plan for admission.    Based on the clinical findings at this time I anticipate that the patient will require 2  midnight stay.    Case discussed with ER attending Dr. Costello who agreed with plan of care.    Problems Addressed:  Left-sided weakness: acute illness or injury  Metastasis to brain: acute illness or injury    Amount and/or Complexity of Data Reviewed  External Data Reviewed: notes.  Labs: ordered. Decision-making details documented in ED Course.  Radiology: ordered. Decision-making details documented in ED Course.  ECG/medicine tests: ordered. Decision-making details documented in ED Course.    Risk  Prescription drug management.  Decision regarding hospitalization.        Final diagnoses:   Left-sided weakness   Metastasis to brain       ED Disposition  ED Disposition       ED Disposition   Decision to Admit    Condition   --    Comment   Level of Care: Telemetry [5]   Diagnosis: Metastasis to brain [[id number]]   Admitting Physician: ALISHA TORREZ [[id number]]   Attending Physician: ALISHA TORREZ [[id number]]   Isolate for COVID?: No [0]   Bed Request Comments: NICOLE   Certification: I Certify That Inpatient Hospital Services Are Medically Necessary For Greater Than 2 Midnights                 No follow-up provider specified.       Medication List      No changes were made to your prescriptions during this visit.            Molly Caldwell PA  06/25/24 2138

## 2024-06-25 NOTE — Clinical Note
Level of Care: Telemetry [5]   Admitting Physician: ALISHA TORREZ [[id number]]   Attending Physician: ALISHA TORREZ [[id number]]

## 2024-06-26 ENCOUNTER — APPOINTMENT (OUTPATIENT)
Dept: MRI IMAGING | Facility: HOSPITAL | Age: 64
DRG: 054 | End: 2024-06-26
Payer: COMMERCIAL

## 2024-06-26 LAB
ANION GAP SERPL CALCULATED.3IONS-SCNC: 9.8 MMOL/L (ref 5–15)
BASOPHILS # BLD AUTO: 0 10*3/MM3 (ref 0–0.2)
BASOPHILS NFR BLD AUTO: 0 % (ref 0–1.5)
BUN SERPL-MCNC: 16 MG/DL (ref 8–23)
BUN/CREAT SERPL: 22.5 (ref 7–25)
CALCIUM SPEC-SCNC: 9.6 MG/DL (ref 8.6–10.5)
CHLORIDE SERPL-SCNC: 106 MMOL/L (ref 98–107)
CHOLEST SERPL-MCNC: 201 MG/DL (ref 0–200)
CO2 SERPL-SCNC: 24.2 MMOL/L (ref 22–29)
CREAT SERPL-MCNC: 0.71 MG/DL (ref 0.57–1)
DEPRECATED RDW RBC AUTO: 55.7 FL (ref 37–54)
EGFRCR SERPLBLD CKD-EPI 2021: 96.3 ML/MIN/1.73
EOSINOPHIL # BLD AUTO: 0 10*3/MM3 (ref 0–0.4)
EOSINOPHIL NFR BLD AUTO: 0 % (ref 0.3–6.2)
ERYTHROCYTE [DISTWIDTH] IN BLOOD BY AUTOMATED COUNT: 14.9 % (ref 12.3–15.4)
GLUCOSE BLDC GLUCOMTR-MCNC: 147 MG/DL (ref 70–105)
GLUCOSE BLDC GLUCOMTR-MCNC: 152 MG/DL (ref 70–105)
GLUCOSE BLDC GLUCOMTR-MCNC: 161 MG/DL (ref 70–105)
GLUCOSE BLDC GLUCOMTR-MCNC: 166 MG/DL (ref 70–105)
GLUCOSE BLDC GLUCOMTR-MCNC: 203 MG/DL (ref 70–105)
GLUCOSE SERPL-MCNC: 155 MG/DL (ref 65–99)
HBA1C MFR BLD: 5.21 % (ref 4.8–5.6)
HCT VFR BLD AUTO: 33.2 % (ref 34–46.6)
HDLC SERPL-MCNC: 53 MG/DL (ref 40–60)
HGB BLD-MCNC: 10.2 G/DL (ref 12–15.9)
IMM GRANULOCYTES # BLD AUTO: 0.01 10*3/MM3 (ref 0–0.05)
IMM GRANULOCYTES NFR BLD AUTO: 0.3 % (ref 0–0.5)
LDLC SERPL CALC-MCNC: 137 MG/DL (ref 0–100)
LDLC/HDLC SERPL: 2.55 {RATIO}
LYMPHOCYTES # BLD AUTO: 0.23 10*3/MM3 (ref 0.7–3.1)
LYMPHOCYTES NFR BLD AUTO: 6 % (ref 19.6–45.3)
MCH RBC QN AUTO: 31 PG (ref 26.6–33)
MCHC RBC AUTO-ENTMCNC: 30.7 G/DL (ref 31.5–35.7)
MCV RBC AUTO: 100.9 FL (ref 79–97)
MONOCYTES # BLD AUTO: 0.03 10*3/MM3 (ref 0.1–0.9)
MONOCYTES NFR BLD AUTO: 0.8 % (ref 5–12)
NEUTROPHILS NFR BLD AUTO: 3.55 10*3/MM3 (ref 1.7–7)
NEUTROPHILS NFR BLD AUTO: 92.9 % (ref 42.7–76)
NRBC BLD AUTO-RTO: 0 /100 WBC (ref 0–0.2)
PLATELET # BLD AUTO: 154 10*3/MM3 (ref 140–450)
PMV BLD AUTO: 9.4 FL (ref 6–12)
POTASSIUM SERPL-SCNC: 3.9 MMOL/L (ref 3.5–5.2)
QT INTERVAL: 371 MS
QTC INTERVAL: 474 MS
RBC # BLD AUTO: 3.29 10*6/MM3 (ref 3.77–5.28)
SODIUM SERPL-SCNC: 140 MMOL/L (ref 136–145)
TRIGL SERPL-MCNC: 63 MG/DL (ref 0–150)
VLDLC SERPL-MCNC: 11 MG/DL (ref 5–40)
WBC NRBC COR # BLD AUTO: 3.82 10*3/MM3 (ref 3.4–10.8)

## 2024-06-26 PROCEDURE — 80048 BASIC METABOLIC PNL TOTAL CA: CPT | Performed by: HOSPITALIST

## 2024-06-26 PROCEDURE — 80061 LIPID PANEL: CPT | Performed by: INTERNAL MEDICINE

## 2024-06-26 PROCEDURE — 82948 REAGENT STRIP/BLOOD GLUCOSE: CPT | Performed by: INTERNAL MEDICINE

## 2024-06-26 PROCEDURE — 70553 MRI BRAIN STEM W/O & W/DYE: CPT

## 2024-06-26 PROCEDURE — 82948 REAGENT STRIP/BLOOD GLUCOSE: CPT

## 2024-06-26 PROCEDURE — 97162 PT EVAL MOD COMPLEX 30 MIN: CPT

## 2024-06-26 PROCEDURE — 25010000002 GADOTERIDOL PER 1 ML: Performed by: HOSPITALIST

## 2024-06-26 PROCEDURE — 25010000002 LEVETRIRACETAM PER 10 MG: Performed by: PHYSICIAN ASSISTANT

## 2024-06-26 PROCEDURE — 92611 MOTION FLUOROSCOPY/SWALLOW: CPT

## 2024-06-26 PROCEDURE — 85025 COMPLETE CBC W/AUTO DIFF WBC: CPT | Performed by: HOSPITALIST

## 2024-06-26 PROCEDURE — 97165 OT EVAL LOW COMPLEX 30 MIN: CPT

## 2024-06-26 PROCEDURE — 25010000002 LEVETRIRACETAM PER 10 MG: Performed by: INTERNAL MEDICINE

## 2024-06-26 PROCEDURE — 25010000002 DEXAMETHASONE SODIUM PHOSPHATE 10 MG/ML SOLUTION: Performed by: PHYSICIAN ASSISTANT

## 2024-06-26 PROCEDURE — 25010000002 DEXAMETHASONE PER 1 MG: Performed by: INTERNAL MEDICINE

## 2024-06-26 PROCEDURE — 99222 1ST HOSP IP/OBS MODERATE 55: CPT

## 2024-06-26 PROCEDURE — A9579 GAD-BASE MR CONTRAST NOS,1ML: HCPCS | Performed by: HOSPITALIST

## 2024-06-26 PROCEDURE — 83036 HEMOGLOBIN GLYCOSYLATED A1C: CPT | Performed by: INTERNAL MEDICINE

## 2024-06-26 PROCEDURE — 25010000002 ENOXAPARIN PER 10 MG: Performed by: INTERNAL MEDICINE

## 2024-06-26 RX ORDER — TRAMADOL HYDROCHLORIDE 50 MG/1
50 TABLET ORAL EVERY 6 HOURS PRN
Status: DISCONTINUED | OUTPATIENT
Start: 2024-06-26 | End: 2024-06-28 | Stop reason: HOSPADM

## 2024-06-26 RX ORDER — ATORVASTATIN CALCIUM 40 MG/1
80 TABLET, FILM COATED ORAL DAILY
Status: DISCONTINUED | OUTPATIENT
Start: 2024-06-26 | End: 2024-06-28 | Stop reason: HOSPADM

## 2024-06-26 RX ORDER — METOPROLOL SUCCINATE 25 MG/1
25 TABLET, EXTENDED RELEASE ORAL
Status: DISCONTINUED | OUTPATIENT
Start: 2024-06-26 | End: 2024-06-28 | Stop reason: HOSPADM

## 2024-06-26 RX ADMIN — ACETAMINOPHEN 650 MG: 325 TABLET, FILM COATED ORAL at 20:35

## 2024-06-26 RX ADMIN — LEVETIRACETAM 1000 MG: 500 INJECTION, SOLUTION INTRAVENOUS at 20:35

## 2024-06-26 RX ADMIN — METOPROLOL SUCCINATE 25 MG: 25 TABLET, EXTENDED RELEASE ORAL at 09:37

## 2024-06-26 RX ADMIN — METOPROLOL SUCCINATE 25 MG: 25 TABLET, EXTENDED RELEASE ORAL at 17:20

## 2024-06-26 RX ADMIN — Medication 10 ML: at 09:37

## 2024-06-26 RX ADMIN — EMPAGLIFLOZIN 10 MG: 10 TABLET, FILM COATED ORAL at 17:20

## 2024-06-26 RX ADMIN — DEXAMETHASONE SODIUM PHOSPHATE 4 MG: 4 INJECTION, SOLUTION INTRAMUSCULAR; INTRAVENOUS at 17:20

## 2024-06-26 RX ADMIN — DEXAMETHASONE SODIUM PHOSPHATE 4 MG: 4 INJECTION, SOLUTION INTRAMUSCULAR; INTRAVENOUS at 05:14

## 2024-06-26 RX ADMIN — LEVETIRACETAM 1000 MG: 500 INJECTION, SOLUTION INTRAVENOUS at 09:36

## 2024-06-26 RX ADMIN — ACETAMINOPHEN 650 MG: 325 TABLET, FILM COATED ORAL at 12:44

## 2024-06-26 RX ADMIN — ENOXAPARIN SODIUM 40 MG: 100 INJECTION SUBCUTANEOUS at 17:20

## 2024-06-26 RX ADMIN — ATORVASTATIN CALCIUM 80 MG: 40 TABLET, FILM COATED ORAL at 17:20

## 2024-06-26 RX ADMIN — LEVETIRACETAM 1000 MG: 500 INJECTION INTRAVENOUS at 00:51

## 2024-06-26 RX ADMIN — GADOTERIDOL 10 ML: 279.3 INJECTION, SOLUTION INTRAVENOUS at 11:16

## 2024-06-26 RX ADMIN — DEXAMETHASONE SODIUM PHOSPHATE 4 MG: 4 INJECTION, SOLUTION INTRAMUSCULAR; INTRAVENOUS at 13:11

## 2024-06-26 RX ADMIN — Medication 10 ML: at 00:54

## 2024-06-26 RX ADMIN — Medication 10 ML: at 20:35

## 2024-06-26 RX ADMIN — PANTOPRAZOLE SODIUM 40 MG: 40 INJECTION, POWDER, FOR SOLUTION INTRAVENOUS at 05:14

## 2024-06-26 RX ADMIN — DEXAMETHASONE SODIUM PHOSPHATE 10 MG: 10 INJECTION, SOLUTION INTRAMUSCULAR; INTRAVENOUS at 00:50

## 2024-06-26 RX ADMIN — DEXAMETHASONE SODIUM PHOSPHATE 4 MG: 4 INJECTION, SOLUTION INTRAMUSCULAR; INTRAVENOUS at 23:58

## 2024-06-26 RX ADMIN — ASPIRIN 81 MG: 81 TABLET, CHEWABLE ORAL at 09:37

## 2024-06-26 NOTE — THERAPY EVALUATION
Patient Name: Samantha Massey  : 1960    MRN: [id number]                              Today's Date: 2024       Admit Date: 2024    Visit Dx:     ICD-10-CM ICD-9-CM   1. Left-sided weakness  R53.1 728.87   2. Metastasis to brain  C79.31 198.3     Patient Active Problem List   Diagnosis    Adenocarcinoma of colon metastatic to liver    Allergic reaction to contrast dye    Anemia    Encounter for chemotherapy management    Liver cancer    Malignant neoplasm of colon metastatic to bone    Primary hypertension    History of non-ST elevation myocardial infarction (NSTEMI)    CAD (coronary artery disease)    Stress-induced cardiomyopathy    Hyperlipidemia LDL goal <100    Chest wall pain, chronic    Metastasis to brain    Left hand weakness     Past Medical History:   Diagnosis Date    Acute systolic heart failure 2023    Allergic laytex,peniciline,contrast dye    CAD (coronary artery disease) 2023    CHF (congestive heart failure) 23    Chronic kidney disease     colon cancer     Metastatic to liver and bones    Colon cancer     Congenital heart disease 23    History of colon cancer, stage IV     Stage SHELL with progression    Hypertension     NSTEMI (non-ST elevated myocardial infarction) 2023    PONV (postoperative nausea and vomiting)     Pulmonary arterial hypertension     Radiculopathy 2012    Right L5/S1     Past Surgical History:   Procedure Laterality Date    ABDOMINAL SURGERY      CARDIAC CATHETERIZATION N/A 2023    Procedure: Left Heart Cath;  Surgeon: Ramiro Olivera MD;  Location: [facility] CATH INVASIVE LOCATION;  Service: Cardiovascular;  Laterality: N/A;    COLON SURGERY  Colon resection    CYST REMOVAL      cyst removed from [facility] in     HERNIA REPAIR  incisional    PORTACATH PLACEMENT  2017    VENOUS ACCESS DEVICE (PORT) INSERTION Left 2021    Procedure: INSERTION VENOUS ACCESS DEVICE;  Surgeon: Jay Yeh MD;   Location: [facility] MAIN OR;  Service: General;  Laterality: Left;    VENOUS ACCESS DEVICE (PORT) REMOVAL Right 08/27/2020    Procedure: REMOVAL VENOUS ACCESS DEVICE;  Surgeon: Jay Yeh MD;  Location: [facility] MAIN OR;  Service: General;  Laterality: Right;    VENTRAL/INCISIONAL HERNIA REPAIR N/A 03/02/2023    Procedure: VENTRAL/INCISIONAL HERNIA REPAIR WITH MESH;  Surgeon: Jay Yeh MD;  Location: [facility] MAIN OR;  Service: General;  Laterality: N/A;      General Information       Row Name 06/26/24 1637          OT Time and Intention    Document Type evaluation  -MP     Mode of Treatment occupational therapy  -       Row Name 06/26/24 1637          General Information    Patient Profile Reviewed yes  -MP     Prior Level of Function independent:;ADL's  -MP     Existing Precautions/Restrictions no known precautions/restrictions  -       Row Name 06/26/24 1637          Living Environment    People in Home friend(s)  -       Row Name 06/26/24 1637          Cognition    Orientation Status (Cognition) oriented x 4  -       Row Name 06/26/24 1637          Safety Issues, Functional Mobility    Impairments Affecting Function (Mobility) strength  -MP               User Key  (r) = Recorded By, (t) = Taken By, (c) = Cosigned By      Initials Name Provider Type    MP Ghassan Sanchez OT Occupational Therapist                     Mobility/ADL's       Row Name 06/26/24 1641          Bed Mobility    Bed Mobility bed mobility (all) activities  -MP     All Activities, [place] (Bed Mobility) modified independence  -     Assistive Device (Bed Mobility) bed rails;head of bed elevated  -       Row Name 06/26/24 1641          Sit-Stand Transfer    Sit-Stand [place] (Transfers) modified independence  -       Row Name 06/26/24 1641          Functional Mobility    Functional Mobility- Ind. Level independent  -       Row Name 06/26/24 1641          Activities of Daily Living    BADL  Assessment/Intervention lower body dressing  -MP       Row Name 06/26/24 1641          Lower Body Dressing Assessment/Training    [place] Level (Lower Body Dressing) lower body dressing skills;set up  -MP               User Key  (r) = Recorded By, (t) = Taken By, (c) = Cosigned By      Initials Name Provider Type    Ghassan Orozco OT Occupational Therapist                   Obj/Interventions       Row Name 06/26/24 1643          Range of Motion Comprehensive    Comment, General Range of Motion BUE WFL  -MP       Row Name 06/26/24 1643          Strength Comprehensive (MMT)    Comment, General Manual Muscle Testing (MMT) Assessment RUE WFL, LUE extensors 3+/5  -MP       Row Name 06/26/24 1643          Balance    Balance Interventions sitting;standing;sit to stand;supported;dynamic;static  -MP               User Key  (r) = Recorded By, (t) = Taken By, (c) = Cosigned By      Initials Name Provider Type    Ghassan Orozco OT Occupational Therapist                   Goals/Plan    No documentation.                  Clinical Impression       Row Name 06/26/24 1644          Pain Assessment    Pretreatment Pain Rating 0/10 - no pain  -MP     Posttreatment Pain Rating 0/10 - no pain  -MP       [facility] Name 06/26/24 1644          Plan of Care Review    Plan of Care Reviewed With patient  -MP     Progress no change  -MP     Outcome Evaluation Pt is a 64 y/o F admitted to [facility] on 6/25/24 with complaints of L-sided weakness and numbness since the day prior. There are also complaints of facial droop and mild slurred speech. She does have known colon cancer with liver metastasis. XR Chest (+) for R upper lobe mass and CT Head concerning for metastatic disease to the brain while MRI Brain (+) for 3 mass lesions in the brain with associated vasogenic edema. Admitted for monitoring of brain metastasis. Pt. states she lives at home w/ friend and maintains ADL independence at baseline. Pt. demonstrates LUE weakness this  date, albeit maintains ability to dress and feed self, ambulating ad-shira. Pt. may have increased difficulty w/ IADLs, recommend OP therapy to address aforementioned deficits. Does not require furthur IP OT.  -MP       Row Name 06/26/24 1644          Therapy Assessment/Plan (OT)    Criteria for Skilled Therapeutic Interventions Met (OT) no;no problems identified which require skilled intervention;does not meet criteria for skilled intervention  -MP     Therapy Frequency (OT) evaluation only  -MP       Row Name 06/26/24 1644          Therapy Plan Review/Discharge Plan (OT)    Anticipated Discharge Disposition (OT) home with outpatient therapy services;home with assist  -MP       Row Name 06/26/24 1644          Vital Signs    Pre Patient Position Supine  -MP     Intra Patient Position Standing  -MP     Post Patient Position Sitting  -MP       Row Name 06/26/24 1644          Positioning and Restraints    Pre-Treatment Position in bed  -MP     Post Treatment Position bed  -MP     In Bed sitting EOB  -MP               User Key  (r) = Recorded By, (t) = Taken By, (c) = Cosigned By      Initials Name Provider Type    Ghassan Orozco, OT Occupational Therapist                   Outcome Measures       Row Name 06/26/24 1630 06/26/24 0800       How much help from another person do you currently need...    Turning from your back to your side while in flat bed without using bedrails? 4  -MB 4  -GK    Moving from lying on back to sitting on the side of a flat bed without bedrails? 4  -MB 4  -GK    Moving to and from a bed to a chair (including a wheelchair)? 4  -MB 3  -GK    Standing up from a chair using your arms (e.g., wheelchair, bedside chair)? 4  -MB 3  -GK    Climbing 3-5 steps with a railing? 3  -MB 3  -GK    To walk in hospital room? 3  -MB 3  -GK    AM-PAC 6 Clicks Score (PT) 22  -MB 20  -GK    Highest Level of Mobility Goal 7 --> Walk 25 feet or more  -MB 6 --> Walk 10 steps or more  -GK              User Key   (r) = Recorded By, (t) = Taken By, (c) = Cosigned By      Initials Name Provider Type    Dania Jon, RN Registered Nurse    Dawson Echavarria, PT Physical Therapist                      OT Recommendation and Plan  Therapy Frequency (OT): evaluation only  Plan of Care Review  Plan of Care Reviewed With: patient  Progress: no change  Outcome Evaluation: Pt is a 64 y/o F admitted to [facility] on 6/25/24 with complaints of L-sided weakness and numbness since the day prior. There are also complaints of facial droop and mild slurred speech. She does have known colon cancer with liver metastasis. XR Chest (+) for R upper lobe mass and CT Head concerning for metastatic disease to the brain while MRI Brain (+) for 3 mass lesions in the brain with associated vasogenic edema. Admitted for monitoring of brain metastasis. Pt. states she lives at home w/ friend and maintains ADL independence at baseline. Pt. demonstrates LUE weakness this date, albeit maintains ability to dress and feed self, ambulating ad-shira. Pt. may have increased difficulty w/ IADLs, recommend OP therapy to address aforementioned deficits. Does not require furthur IP OT.     Time Calculation:         Time Calculation- OT       Row Name 06/26/24 1646             Time Calculation- OT    OT Start Time 1145  -MP      OT Stop Time 1200  -MP      OT Time Calculation (min) 15 min  -MP      Total Timed Code Minutes- OT 0 minute(s)  -MP      OT Received On 06/26/24  -                User Key  (r) = Recorded By, (t) = Taken By, (c) = Cosigned By      Initials Name Provider Type    Ghassan Orozco OT Occupational Therapist                  Therapy Charges for Today       Code Description Service Date Service Provider Modifiers Qty    [id number]  OT EVAL LOW COMPLEXITY 3 6/26/2024 Ghassan Sanchez OT GO 1                 Ghassan Sanchez OT  6/26/2024

## 2024-06-26 NOTE — CONSULTS
Initial spiritual care visit. Pt sleeping and friend at bedside. Friend stated they were both tired and had no needs at this time.  offered to return if any needs should arise, she need only let the nurse know. Friend was appreciative of the offer. No other needs at this time.

## 2024-06-26 NOTE — CONSULTS
"Diabetes Education  Assessment/Teaching    Patient Name:  Samantha Massey  YOB: 1960  MRN: [id number]  Admit Date:  6/25/2024      Assessment Date:  6/26/2024  Flowsheet Row Most Recent Value   General Information     Height 157.5 cm (62\")   Height Method Stated   Weight 51.4 kg (113 lb 5.1 oz)   Weight Method Standing scale   Pregnancy Assessment    Diabetes History    Education Preferences    Nutrition Information    Assessment Topics    DM Goals                   Other Comments:  MD consult per stroke protocol. On 6/26/2024 A1c was 5.21% with no history of diabetes. Patient does not meet criteria for consult at this time.         Electronically signed by:  Molly Borden RN  06/26/24 08:15 EDT    "

## 2024-06-26 NOTE — PLAN OF CARE
Problem: Adult Inpatient Plan of Care  Goal: Plan of Care Review  Outcome: Ongoing, Progressing  Goal: Patient-Specific Goal (Individualized)  Outcome: Ongoing, Progressing  Goal: Absence of Hospital-Acquired Illness or Injury  Outcome: Ongoing, Progressing  Intervention: Identify and Manage Fall Risk  Recent Flowsheet Documentation  Taken 6/26/2024 1614 by Teresa Basilio RN  Safety Promotion/Fall Prevention:   safety round/check completed   room organization consistent   nonskid shoes/slippers when out of bed   lighting adjusted   fall prevention program maintained   clutter free environment maintained  Taken 6/26/2024 1500 by Teresa Basilio RN  Safety Promotion/Fall Prevention:   safety round/check completed   room organization consistent   nonskid shoes/slippers when out of bed   lighting adjusted   clutter free environment maintained   assistive device/personal items within reach  Taken 6/26/2024 1351 by Teresa Basilio RN  Safety Promotion/Fall Prevention:   safety round/check completed   room organization consistent   nonskid shoes/slippers when out of bed   lighting adjusted   clutter free environment maintained   assistive device/personal items within reach  Intervention: Prevent Skin Injury  Recent Flowsheet Documentation  Taken 6/26/2024 1614 by Teresa Basilio RN  Body Position: position changed independently  Taken 6/26/2024 1500 by Teresa Basilio RN  Body Position: position changed independently  Taken 6/26/2024 1351 by Teresa Basilio RN  Body Position: (sitting at edge of bed) other (see comments)  Intervention: Prevent and Manage VTE (Venous Thromboembolism) Risk  Recent Flowsheet Documentation  Taken 6/26/2024 1614 by Teresa Basilio RN  Activity Management:   ambulated to bathroom   up ad shira  Taken 6/26/2024 1351 by Teresa Basilio RN  Activity Management:   ambulated in room   up ad shira  Goal: Optimal Comfort and Wellbeing  Outcome: Ongoing,  Progressing  Intervention: Provide Person-Centered Care  Recent Flowsheet Documentation  Taken 6/26/2024 1614 by Teresa Basilio, RN  Trust Relationship/Rapport:   care explained   choices provided   questions answered   questions encouraged   thoughts/feelings acknowledged   reassurance provided  Goal: Readiness for Transition of Care  Outcome: Ongoing, Progressing     Problem: Fall Injury Risk  Goal: Absence of Fall and Fall-Related Injury  Outcome: Ongoing, Progressing  Intervention: Identify and Manage Contributors  Recent Flowsheet Documentation  Taken 6/26/2024 1351 by Teresa Basilio RN  Medication Review/Management: medications reviewed  Intervention: Promote Injury-Free Environment  Recent Flowsheet Documentation  Taken 6/26/2024 1614 by Teresa Basilio, RN  Safety Promotion/Fall Prevention:   safety round/check completed   room organization consistent   nonskid shoes/slippers when out of bed   lighting adjusted   fall prevention program maintained   clutter free environment maintained  Taken 6/26/2024 1500 by Teresa Basilio RN  Safety Promotion/Fall Prevention:   safety round/check completed   room organization consistent   nonskid shoes/slippers when out of bed   lighting adjusted   clutter free environment maintained   assistive device/personal items within reach  Taken 6/26/2024 1351 by Teresa Basilio, RN  Safety Promotion/Fall Prevention:   safety round/check completed   room organization consistent   nonskid shoes/slippers when out of bed   lighting adjusted   clutter free environment maintained   assistive device/personal items within reach     Problem: Pain Acute  Goal: Acceptable Pain Control and Functional Ability  Outcome: Ongoing, Progressing  Intervention: Prevent or Manage Pain  Recent Flowsheet Documentation  Taken 6/26/2024 1351 by Teresa Basilio, RN  Medication Review/Management: medications reviewed   Goal Outcome Evaluation:

## 2024-06-26 NOTE — PLAN OF CARE
Goal Outcome Evaluation:  Plan of Care Reviewed With: patient        Progress: no change  Outcome Evaluation: Pt is a 62 y/o F admitted to [facility] on 6/25/24 with complaints of L-sided weakness and numbness since the day prior. There are also complaints of facial droop and mild slurred speech. She does have known colon cancer with liver metastasis. XR Chest (+) for R upper lobe mass and CT Head concerning for metastatic disease to the brain while MRI Brain (+) for 3 mass lesions in the brain with associated vasogenic edema. Admitted for monitoring of brain metastasis. Pt. states she lives at home w/ friend and maintains ADL independence at baseline. Pt. demonstrates LUE weakness this date, albeit maintains ability to dress and feed self, ambulating ad-shira. Pt. may have increased difficulty w/ IADLs, recommend OP therapy to address aforementioned deficits. Does not require furthur IP OT.      Anticipated Discharge Disposition (OT): home with outpatient therapy services, home with assist

## 2024-06-26 NOTE — THERAPY EVALUATION
Acute Care - Speech Language Pathology   Swallow Initial Evaluation [facility]     Patient Name: Samantha Massey  : 1960  MRN: [id number]  Today's Date: 2024               Admit Date: 2024    Visit Dx:     ICD-10-CM ICD-9-CM   1. Left-sided weakness  R53.1 728.87   2. Metastasis to brain  C79.31 198.3     Patient Active Problem List   Diagnosis    Adenocarcinoma of colon metastatic to liver    Allergic reaction to contrast dye    Anemia    Encounter for chemotherapy management    Liver cancer    Malignant neoplasm of colon metastatic to bone    Primary hypertension    History of non-ST elevation myocardial infarction (NSTEMI)    CAD (coronary artery disease)    Stress-induced cardiomyopathy    Hyperlipidemia LDL goal <100    Chest wall pain, chronic    Metastasis to brain    Left hand weakness     Past Medical History:   Diagnosis Date    Acute systolic heart failure 2023    Allergic laytex,peniciline,contrast dye    CAD (coronary artery disease) 2023    CHF (congestive heart failure) 23    Chronic kidney disease     colon cancer     Metastatic to liver and bones    Colon cancer     Congenital heart disease 23    History of colon cancer, stage IV     Stage SHELL with progression    Hypertension     NSTEMI (non-ST elevated myocardial infarction) 2023    PONV (postoperative nausea and vomiting)     Pulmonary arterial hypertension     Radiculopathy 2012    Right L5/S1     Past Surgical History:   Procedure Laterality Date    ABDOMINAL SURGERY      CARDIAC CATHETERIZATION N/A 2023    Procedure: Left Heart Cath;  Surgeon: Ramiro Olivera MD;  Location: [facility] INVASIVE LOCATION;  Service: Cardiovascular;  Laterality: N/A;    COLON SURGERY  Colon resection    CYST REMOVAL      cyst removed from [facility] in     HERNIA REPAIR  incisional    PORTACATH PLACEMENT  2017    VENOUS ACCESS DEVICE (PORT) INSERTION Left 2021    Procedure: INSERTION VENOUS  ACCESS DEVICE;  Surgeon: Jay Yeh MD;  Location: [facility] MAIN OR;  Service: General;  Laterality: Left;    VENOUS ACCESS DEVICE (PORT) REMOVAL Right 08/27/2020    Procedure: REMOVAL VENOUS ACCESS DEVICE;  Surgeon: Jay Yeh MD;  Location: [facility] MAIN OR;  Service: General;  Laterality: Right;    VENTRAL/INCISIONAL HERNIA REPAIR N/A 03/02/2023    Procedure: VENTRAL/INCISIONAL HERNIA REPAIR WITH MESH;  Surgeon: Jay Yeh MD;  Location: [facility] MAIN OR;  Service: General;  Laterality: N/A;       SLP Recommendation and Plan  SLP Swallowing Diagnosis: swallow WFL/no suspected pharyngeal impairment (06/26/24 0900)  SLP Diet Recommendation: thin liquids, regular textures (06/26/24 0900)  Recommended Precautions and Strategies: upright posture during/after eating (06/26/24 0900)  SLP Rec. for Method of Medication Administration: as tolerated (06/26/24 0900)     Monitor for Signs of Aspiration: yes, notify SLP if any concerns (06/26/24 0900)  Recommended Diagnostics: SLE/Cog/Motor Speech Evaluation (06/26/24 0900)  Swallow Criteria for Skilled Therapeutic Interventions Met: demonstrates skilled criteria (06/26/24 0900)  Anticipated Discharge Disposition (SLP): unknown (06/26/24 0900)  Rehab Potential/Prognosis, Swallowing: good, to achieve stated therapy goals (06/26/24 0900)  Therapy Frequency (Swallow): evaluation only (06/26/24 0900)  Predicted Duration Therapy Intervention (Days): until discharge (06/26/24 0900)  Oral Care Recommendations: Oral Care before breakfast, after meals and PRN (06/26/24 0900)        Treatment Assessment (SLP): no clinical signs of, aspiration, suspected (06/26/24 0900)     Plan for Continued Treatment (SLP): continue treatment per plan of care (06/26/24 0900)       SWALLOW EVALUATION (Last 72 Hours)       SLP Adult Swallow Evaluation       Row Name 06/26/24 0900       Rehab Evaluation    Document Type evaluation  -MS    Subjective Information no  complaints  -MS    Patient Observations alert;cooperative;agree to therapy  -MS    Patient/Family/Caregiver Comments/Observations No family at bedside.  -MS    Patient Effort good  -MS    Symptoms Noted During/After Treatment none  -MS       General Information    Patient Profile Reviewed yes  -MS    Pertinent History Of Current Problem  Samantha Massey is a 63 y.o. female with a past medical history of hyperlipidemia, coronary artery disease, hypertension, grade 1 diastolic heart dysfunction, and colon cancer status postchemotherapy and metastasis to the lung.  She has had 2 treatments of radiation to the chest.  She was her baseline until yesterday whenever she had left hand and left arm weakness.  She had difficulty controlling her left hand.  She was hoping that it would get better but today her sister came over and insisted that she come to the emergency room.  A stroke workup ensued and it appears that she has metastatic cancer to the brain.  Neurosurgery and neurology were consulted by the ER practitioner.  They requested an MRI and continue the workup.  Neurosurgery requested placing the patient on dexamethasone and Keppra.  Patient will be admitted PT OT evaluated evaluated by neurology neurosurgery and hematology oncology.  Patient states that she is able to ambulate without any difficulties.  She also reports no loss of consciousness. ST consulted for dysphagia evaluation per stroke protocol. MRI is currently pending.   -MS    Current Method of Nutrition NPO  -MS    Precautions/Limitations, Vision WFL  -MS    Precautions/Limitations, Hearing WFL  -MS    Prior Level of Function-Communication WFL  -MS    Prior Level of Function-Swallowing no diet consistency restrictions  -MS    Plans/Goals Discussed with patient  -MS    Barriers to Rehab none identified  -MS    Patient's Goals for Discharge patient did not state  -MS       Pain    Additional Documentation Pain Scale: FACES Pre/Post-Treatment (Group)  -MS        Pain Scale: FACES Pre/Post-Treatment    Pain: FACES Scale, Pretreatment 0-->no hurt  -MS    Posttreatment Pain Rating 0-->no hurt  -MS       Oral Motor Structure and Function    Dentition Assessment missing teeth;teeth are in poor condition;upper dentures/partial in place  -MS    Secretion Management WNL/WFL  -MS    Mucosal Quality dry  -MS    Volitional Swallow WFL  -MS    Volitional Cough WFL  -MS       Oral Musculature and Cranial Nerve Assessment    Oral Motor General Assessment generalized oral motor weakness  -MS    Oral Labial or Buccal Impairment, Detail, Cranial Nerve VII (Facial): left labial droop  -MS       General Eating/Swallowing Observations    Respiratory Support Currently in Use room air  -MS    Eating/Swallowing Skills self-fed  -MS    Positioning During Eating upright 90 degree;upright in bed  -MS    Utensils Used spoon;cup;straw  -MS    Consistencies Trialed regular textures;soft to chew textures;mechanical ground textures;mixed consistency;thin liquids;pureed  -MS       Respiratory    Respiratory Status WFL  -MS       Clinical Swallow Eval    Oral Prep Phase WFL  -MS    Oral Transit WFL  -MS    Oral Residue WFL  -MS    Pharyngeal Phase WFL  -MS    Esophageal Phase unremarkable  -MS    Clinical Swallow Evaluation Summary donn Clinical swallow eval completed. Pt was lying in bed w/ nursing in room upon room entry. Pt alert and oriented x5. SLP adjusted pt to sitting at 90 degrees in bed prior to PO acceptance. Pt was NPO at the time of evaluation secondary to stroke protocol. MRI is currently pending, concerns for metastasis of the brain. Pt wearing upper full set of dentures w/ poor natural bottom teeth.  Pt reported regular/thin liquid is baseline diet and denied swallowing difficulty.  Trials assessed: thin by cup x3, thin by straw x3, puree x2, mixed/mech soft x2, and soft to chew x2, and regular solid x2. Pt had good bolus control of all trials and and no difficulty using the spoon or  straw.  No labial spillage occurred.  Adequate labial seal w/ straw. Mastication adequate for STC and slower regular, but functional.  Oral transit unremarkable.  There was no pocketing. No residue during all trials.  Digital palpation suggests timely swallow.  After the swallow, pt had clear vocal quality, no cough or other overt s/s of aspiration.  Per above, pt presents WNL for swallow function. It is recommended pt it sitting at 90 degrees during all PO intake. Meds given as tolerated. ST will follow for communication evaluation per MRI. Pt verbalized agreement with plan.      SLP Plan & Recommendation:      - Regular diet and thin liquids  - Meds: as tolerated  - Safe swallow strategies: HOB at a 90 degree angle, slow rate of intake, small bites/sips, alternate liquid and solid  -Oral care at least x2 daily   - ST will complete communication evaluation.      -MS       SLP Evaluation Clinical Impression    SLP Swallowing Diagnosis swallow WFL/no suspected pharyngeal impairment  -MS    Functional Impact no impact on function  -MS    Rehab Potential/Prognosis, Swallowing good, to achieve stated therapy goals  -MS    Swallow Criteria for Skilled Therapeutic Interventions Met demonstrates skilled criteria  -MS       SLP Treatment Clinical Impressions    Treatment Assessment (SLP) no clinical signs of;aspiration;suspected  -MS    Plan for Continued Treatment (SLP) continue treatment per plan of care  -MS    Care Plan Review evaluation/treatment results reviewed  -MS       Recommendations    Therapy Frequency (Swallow) evaluation only  -MS    Predicted Duration Therapy Intervention (Days) until discharge  -MS    SLP Diet Recommendation thin liquids;regular textures  -MS    Recommended Diagnostics SLE/Cog/Motor Speech Evaluation  -MS    Recommended Precautions and Strategies upright posture during/after eating  -MS    Oral Care Recommendations Oral Care before breakfast, after meals and PRN  -MS    SLP Rec. for Method  of Medication Administration as tolerated  -MS    Monitor for Signs of Aspiration yes;notify SLP if any concerns  -MS    Anticipated Discharge Disposition (SLP) unknown  -MS              User Key  (r) = Recorded By, (t) = Taken By, (c) = Cosigned By      Initials Name Effective Dates    David Chanel, SLP 04/22/24 -                     EDUCATION  The patient has been educated in the following areas:   Dysphagia (Swallowing Impairment).         CLARIBEL Morgan  6/26/2024

## 2024-06-26 NOTE — CASE MANAGEMENT/SOCIAL WORK
Discharge Planning Assessment   Rad     Patient Name: Samantha Massey  MRN: [id number]  Today's Date: 6/26/2024    Admit Date: 6/25/2024    Plan: From routine home   Discharge Needs Assessment       Row Name 06/26/24 0900       Living Environment    People in Home friend(s);child(huong), dependent    Name(s) of People in Home Friend Molly and 13y.o. granddaughter Piper    Current Living Arrangements home    Potentially Unsafe Housing Conditions none    In the past 12 months has the electric, gas, oil, or water company threatened to shut off services in your home? No    Primary Care Provided by self    Provides Primary Care For no one    Family Caregiver if Needed other (see comments)  Friend Molly    Quality of Family Relationships unable to assess    Able to Return to Prior Arrangements yes       Resource/Environmental Concerns    Resource/Environmental Concerns none    Transportation Concerns none       Transportation Needs    In the past 12 months, has lack of transportation kept you from medical appointments or from getting medications? no    In the past 12 months, has lack of transportation kept you from meetings, work, or from getting things needed for daily living? No       Food Insecurity    Within the past 12 months, you worried that your food would run out before you got the money to buy more. Never true    Within the past 12 months, the food you bought just didn't last and you didn't have money to get more. Never true       Transition Planning    Patient/Family Anticipates Transition to home with family    Patient/Family Anticipated Services at Transition none    Transportation Anticipated family or friend will provide       Discharge Needs Assessment    Readmission Within the Last 30 Days no previous admission in last 30 days    Equipment Currently Used at Home bp cuff;glucometer    Concerns to be Addressed no discharge needs identified;denies needs/concerns at this time    Anticipated Changes Related  to Illness none    Equipment Needed After Discharge none    Provided Post Acute Provider List? N/A    Provided Post Acute Provider Quality & Resource List? N/A                   Discharge Plan       Row Name 06/26/24 0902       Plan    Plan From routine home    Patient/Family in Agreement with Plan yes    Provided Post Acute Provider List? N/A    Provided Post Acute Provider Quality & Resource List? N/A    Plan Comments CM met with patient at the bedside. Confirmed PCP, insurance, and pharmacy. Patient accepts [facility]. Patient denies any difficulty affording food, utilities, or medications. Patient is not current with any HHC/OPPT/OT services. Patient lives at home with her friend Molly and Molly's 13 year old granddaughter, Janelle, is IADLs and drives. Molly to transport patient at d/c. DC Barriers: Nutrition/Neuro sx/neurology/Diabetes educator/Hem/onc following, IV decadron, IV Keppra, IV protonix, elevated blood sugars, brain MRI pending                  Continued Care and Services - Admitted Since 6/25/2024    No active coordination exists for this encounter.        Demographic Summary       Row Name 06/26/24 0900       General Information    Admission Type inpatient    Arrived From emergency department    Required Notices Provided Important Message from Medicare    Referral Source admission list    Reason for Consult discharge planning    Preferred Language English       Contact Information    Permission Granted to Share Info With     Contact Information Obtained for                    Functional Status       Row Name 06/26/24 0900       Functional Status    Usual Activity Tolerance good    Current Activity Tolerance good       Functional Status, IADL    Medications independent    Meal Preparation independent    Housekeeping independent    Laundry independent    Shopping independent       Mental Status    General Appearance WDL WDL       Mental Status Summary    Recent Changes in Mental  Status/Cognitive Functioning no changes             Kavya Barrera RN    [phone]  [email or web address]

## 2024-06-26 NOTE — PLAN OF CARE
Goal Outcome Evaluation:   Clinical swallow eval completed. Pt was lying in bed w/ nursing in room upon room entry. Pt alert and oriented x5. SLP adjusted pt to sitting at 90 degrees in bed prior to PO acceptance. Pt was NPO at the time of evaluation secondary to stroke protocol. MRI is currently pending, concerns for metastasis of the brain. Pt wearing upper full set of dentures w/ poor natural bottom teeth.  Pt reported regular/thin liquid is baseline diet and denied swallowing difficulty.  Trials assessed: thin by cup x3, thin by straw x3, puree x2, mixed/mech soft x2, and soft to chew x2, and regular solid x2. Pt had good bolus control of all trials and and no difficulty using the spoon or straw.  No labial spillage occurred.  Adequate labial seal w/ straw. Mastication adequate for STC and slower regular, but functional.  Oral transit unremarkable.  There was no pocketing. No residue during all trials.  Digital palpation suggests timely swallow.  After the swallow, pt had clear vocal quality, no cough or other overt s/s of aspiration.  Per above, pt presents WNL for swallow function. It is recommended pt it sitting at 90 degrees during all PO intake. Meds given as tolerated. ST will follow for communication evaluation per MRI. Pt verbalized agreement with plan.      SLP Plan & Recommendation:      - Regular diet and thin liquids  - Meds: as tolerated  - Safe swallow strategies: HOB at a 90 degree angle, slow rate of intake, small bites/sips, alternate liquid and solid  -Oral care at least x2 daily   - ST will complete communication evaluation.     Anticipated Discharge Disposition (SLP): unknown          SLP Swallowing Diagnosis: swallow WFL/no suspected pharyngeal impairment (06/26/24 0900)  Treatment Assessment (SLP): no clinical signs of, aspiration, suspected (06/26/24 0900)     Plan for Continued Treatment (SLP): continue treatment per plan of care (06/26/24 0900)

## 2024-06-26 NOTE — THERAPY EVALUATION
Patient Name: Samantha Massey  : 1960    MRN: [id number]                              Today's Date: 2024       Admit Date: 2024    Visit Dx:     ICD-10-CM ICD-9-CM   1. Left-sided weakness  R53.1 728.87   2. Metastasis to brain  C79.31 198.3     Patient Active Problem List   Diagnosis    Adenocarcinoma of colon metastatic to liver    Allergic reaction to contrast dye    Anemia    Encounter for chemotherapy management    Liver cancer    Malignant neoplasm of colon metastatic to bone    Primary hypertension    History of non-ST elevation myocardial infarction (NSTEMI)    CAD (coronary artery disease)    Stress-induced cardiomyopathy    Hyperlipidemia LDL goal <100    Chest wall pain, chronic    Metastasis to brain    Left hand weakness     Past Medical History:   Diagnosis Date    Acute systolic heart failure 2023    Allergic laytex,peniciline,contrast dye    CAD (coronary artery disease) 2023    CHF (congestive heart failure) 23    Chronic kidney disease     colon cancer     Metastatic to liver and bones    Colon cancer     Congenital heart disease 23    History of colon cancer, stage IV     Stage SHELL with progression    Hypertension     NSTEMI (non-ST elevated myocardial infarction) 2023    PONV (postoperative nausea and vomiting)     Pulmonary arterial hypertension     Radiculopathy 2012    Right L5/S1     Past Surgical History:   Procedure Laterality Date    ABDOMINAL SURGERY      CARDIAC CATHETERIZATION N/A 2023    Procedure: Left Heart Cath;  Surgeon: Ramiro Olivera MD;  Location: [facility] CATH INVASIVE LOCATION;  Service: Cardiovascular;  Laterality: N/A;    COLON SURGERY  Colon resection    CYST REMOVAL      cyst removed from [facility] in     HERNIA REPAIR  incisional    PORTACATH PLACEMENT  2017    VENOUS ACCESS DEVICE (PORT) INSERTION Left 2021    Procedure: INSERTION VENOUS ACCESS DEVICE;  Surgeon: Jay Yeh MD;   Location: [facility] MAIN OR;  Service: General;  Laterality: Left;    VENOUS ACCESS DEVICE (PORT) REMOVAL Right 08/27/2020    Procedure: REMOVAL VENOUS ACCESS DEVICE;  Surgeon: Jay Yeh MD;  Location: [facility] MAIN OR;  Service: General;  Laterality: Right;    VENTRAL/INCISIONAL HERNIA REPAIR N/A 03/02/2023    Procedure: VENTRAL/INCISIONAL HERNIA REPAIR WITH MESH;  Surgeon: Jay Yeh MD;  Location: [facility] MAIN OR;  Service: General;  Laterality: N/A;      General Information       Row Name 06/26/24 1617          Physical Therapy Time and Intention    Document Type evaluation  -MB     Mode of Treatment physical therapy  -MB       Row Name 06/26/24 1617          General Information    Patient Profile Reviewed yes  -MB     Prior Level of Function independent:;all household mobility;community mobility;gait;transfer;ADL's;home management  -MB     Existing Precautions/Restrictions no known precautions/restrictions  -MB     Barriers to Rehab none identified  -MB       Row Name 06/26/24 1617          Living Environment    People in Home friend(s)  -MB       Row Name 06/26/24 1617          Home Main Entrance    Number of Stairs, Main Entrance other (see comments)  13  -MB     Stair Railings, Main Entrance railings safe and in good condition  -MB       Row Name 06/26/24 1617          Stairs Within Home, Primary    Stairs, Within Home, Primary Flight to basement  -MB       Row Name 06/26/24 1617          Cognition    Orientation Status (Cognition) oriented x 4  -MB       Row Name 06/26/24 1617          Safety Issues, Functional Mobility    Impairments Affecting Function (Mobility) balance;strength  -MB               User Key  (r) = Recorded By, (t) = Taken By, (c) = Cosigned By      Initials Name Provider Type    Dawson Echavarria, PT Physical Therapist                   Mobility       Row Name 06/26/24 1619          Bed Mobility    Bed Mobility bed mobility (all) activities  -MB     All Activities,  [place] (Bed Mobility) standby assist  -MB     Assistive Device (Bed Mobility) bed rails;head of bed elevated  -MB       Row Name 06/26/24 1619          Sit-Stand Transfer    Sit-Stand [place] (Transfers) standby assist  -MB     Comment, (Sit-Stand Transfer) no AD  -MB       Row Name 06/26/24 1619          Gait/Stairs (Locomotion)    [place] Level (Gait) contact guard;standby assist  -MB     Patient was able to Ambulate yes  -MB     Distance in Feet (Gait) 40  -MB     Comment, (Gait/Stairs) 2x20' CGA>SBA with no AD  -MB               User Key  (r) = Recorded By, (t) = Taken By, (c) = Cosigned By      Initials Name Provider Type    Dawson Echavarria, LAUREN Physical Therapist                   Obj/Interventions       Row Name 06/26/24 1620          Range of Motion Comprehensive    General Range of Motion no range of motion deficits identified  -MB       Row Name 06/26/24 1620          Strength Comprehensive (MMT)    Comment, General Manual Muscle Testing (MMT) Assessment RLE Strength 4+/5 grossly, LLE Strength 4/5 grossly  -MB       Row Name 06/26/24 1620          Balance    Balance Assessment sitting static balance;sitting dynamic balance;sit to stand dynamic balance;standing static balance;standing dynamic balance  -MB     Static Sitting Balance independent  -MB     Dynamic Sitting Balance independent  -MB     Position, Sitting Balance unsupported;sitting edge of bed  -MB     Sit to Stand Dynamic Balance independent  -MB     Static Standing Balance standby assist  -MB     Dynamic Standing Balance contact guard  -MB       Row Name 06/26/24 1620          Sensory Assessment (Somatosensory)    Sensory Assessment (Somatosensory) sensation intact  -MB               User Key  (r) = Recorded By, (t) = Taken By, (c) = Cosigned By      Initials Name Provider Type    Dawson Echavarria, PT Physical Therapist                   Goals/Plan       Row Name 06/26/24 1626          Bed Mobility Goal 1 (PT)    Activity/Assistive  Device (Bed Mobility Goal 1, PT) bed mobility activities, all  -MB     [place] Level/Cues Needed (Bed Mobility Goal 1, PT) independent  -MB     Time Frame (Bed Mobility Goal 1, PT) long term goal (LTG);2 weeks  -MB       Row Name 06/26/24 1626          Transfer Goal 1 (PT)    Activity/Assistive Device (Transfer Goal 1, PT) transfers, all  -MB     [place] Level/Cues Needed (Transfer Goal 1, PT) independent  -MB     Time Frame (Transfer Goal 1, PT) long term goal (LTG);2 weeks  -MB       Row Name 06/26/24 1626          Gait Training Goal 1 (PT)    Activity/Assistive Device (Gait Training Goal 1, PT) gait (walking locomotion)  -MB     [place] Level (Gait Training Goal 1, PT) independent  -MB     Distance (Gait Training Goal 1, PT) 100  -MB     Time Frame (Gait Training Goal 1, PT) long term goal (LTG);2 weeks  -MB       Row Name 06/26/24 1626          Therapy Assessment/Plan (PT)    Planned Therapy Interventions (PT) balance training;bed mobility training;gait training;home exercise program;neuromuscular re-education;patient/family education;strengthening;transfer training  -MB               User Key  (r) = Recorded By, (t) = Taken By, (c) = Cosigned By      Initials Name Provider Type    Dawson Echavarria, PT Physical Therapist                   Clinical Impression       Row Name 06/26/24 1621          Pain    Pretreatment Pain Rating 0/10 - no pain  -MB     Posttreatment Pain Rating 0/10 - no pain  -MB       Row Name 06/26/24 1636 06/26/24 1621       Plan of Care Review    Plan of Care Reviewed With -- patient  -MB    Progress -- no change  -MB    Outcome Evaluation Pt is a 64 y/o F admitted to [facility] on 6/25/24 with complaints of L-sided weakness and numbness since the day prior. There are also complaints of facial droop and mild slurred speech. She does have known colon cancer with liver metastasis. XR Chest (+) for R upper lobe mass and CT Head concerning for metastatic disease to the brain while MRI  Brain (+) for 3 mass lesions in the brain with associated vasogenic edema. Admitted for monitoring of brain metastasis. Oncology relating LUE weakness to metastasis. She is currently living at home with friend in [facility] with 13 steps to enter. At baseline, she is normally IND with household mobility and ADLs with no AD. This date, she is AAOx4 and lying supine in bed with no complaints of pain. She completes bed mobility SBA, transfers SBA, and amb 2x20' CGA>SBA with no AD. There is only mild LLE weakness, but seems to have marked LUE weakness when compared to the RUE. Ambulates safely, seemingly to be at her baseline, but PT would like to keep on caseload to ensure safety with stair navigation prior to d/c. PT recommending OPPT at d/c to improve strength of LUE. Will follow during stay and will need to assess safety with stairs.  -MB --      Row Name 06/26/24 1621          Therapy Assessment/Plan (PT)    Rehab Potential (PT) good, to achieve stated therapy goals  -MB     Criteria for Skilled Interventions Met (PT) yes;meets criteria  -MB     Therapy Frequency (PT) 3 times/wk  -MB     Predicted Duration of Therapy Intervention (PT) until d/c  -MB       Row Name 06/26/24 1621          Vital Signs    O2 Delivery Pre Treatment room air  -MB     O2 Delivery Intra Treatment room air  -MB     O2 Delivery Post Treatment room air  -MB     Pre Patient Position Supine  -MB     Intra Patient Position Standing  -MB     Post Patient Position Supine  -MB       Row Name 06/26/24 1621          Positioning and Restraints    Pre-Treatment Position in bed  -MB     Post Treatment Position bed  -MB     In Bed notified nsg;supine;call light within reach;encouraged to call for assist  -MB               User Key  (r) = Recorded By, (t) = Taken By, (c) = Cosigned By      Initials Name Provider Type    Dawson Echavarria, PT Physical Therapist                   Outcome Measures       Row Name 06/26/24 1630 06/26/24 0800       How much help from  another person do you currently need...    Turning from your back to your side while in flat bed without using bedrails? 4  -MB 4  -GK    Moving from lying on back to sitting on the side of a flat bed without bedrails? 4  -MB 4  -GK    Moving to and from a bed to a chair (including a wheelchair)? 4  -MB 3  -GK    Standing up from a chair using your arms (e.g., wheelchair, bedside chair)? 4  -MB 3  -GK    Climbing 3-5 steps with a railing? 3  -MB 3  -GK    To walk in hospital room? 3  -MB 3  -GK    AM-PAC 6 Clicks Score (PT) 22  -MB 20  -GK    Highest Level of Mobility Goal 7 --> Walk 25 feet or more  -MB 6 --> Walk 10 steps or more  -GK              User Key  (r) = Recorded By, (t) = Taken By, (c) = Cosigned By      Initials Name Provider Type    Dania Jon RN Registered Nurse    Dawson Echavarria, PT Physical Therapist                                   PT Recommendation and Plan  Planned Therapy Interventions (PT): balance training, bed mobility training, gait training, home exercise program, neuromuscular re-education, patient/family education, strengthening, transfer training  Plan of Care Reviewed With: patient  Progress: no change  Outcome Evaluation: Pt is a 62 y/o F admitted to [facility] on 6/25/24 with complaints of L-sided weakness and numbness since the day prior. There are also complaints of facial droop and mild slurred speech. She does have known colon cancer with liver metastasis. XR Chest (+) for R upper lobe mass and CT Head concerning for metastatic disease to the brain while MRI Brain (+) for 3 mass lesions in the brain with associated vasogenic edema. Admitted for monitoring of brain metastasis. Oncology relating LUE weakness to metastasis. She is currently living at home with friend in [facility] with 13 steps to enter. At baseline, she is normally IND with household mobility and ADLs with no AD. This date, she is AAOx4 and lying supine in bed with no complaints of pain. She completes bed mobility  SBA, transfers SBA, and amb 2x20' CGA>SBA with no AD. There is only mild LLE weakness, but seems to have marked LUE weakness when compared to the RUE. Ambulates safely, seemingly to be at her baseline, but PT would like to keep on caseload to ensure safety with stair navigation prior to d/c. PT recommending OPPT at d/c to improve strength of LUE. Will follow during stay and will need to assess safety with stairs.     Time Calculation:   PT Evaluation Complexity  History, PT Evaluation Complexity: 1-2 personal factors and/or comorbidities  Examination of Body Systems (PT Eval Complexity): total of 3 or more elements  Clinical Presentation (PT Evaluation Complexity): evolving  Clinical Decision Making (PT Evaluation Complexity): moderate complexity  Overall Complexity (PT Evaluation Complexity): moderate complexity     PT Charges       Row Name 06/26/24 1630             Time Calculation    Start Time 1413  -MB      Stop Time 1428  -MB      Time Calculation (min) 15 min  -MB      PT Received On 06/26/24  -MB      PT - Next Appointment 06/28/24  -MB         Time Calculation- PT    Total Timed Code Minutes- PT 0 minute(s)  -MB                User Key  (r) = Recorded By, (t) = Taken By, (c) = Cosigned By      Initials Name Provider Type    Dawson Echavarria, PT Physical Therapist                  Therapy Charges for Today       Code Description Service Date Service Provider Modifiers Qty    [id number] HC PT EVAL MOD COMPLEXITY 3 6/26/2024 Dawson Ortiz, PT GP 1            PT G-Codes  AM-PAC 6 Clicks Score (PT): 22  PT Discharge Summary  Anticipated Discharge Disposition (PT): home with outpatient therapy services    Dawson Ortiz PT  6/26/2024

## 2024-06-26 NOTE — NURSING NOTE
Nurse told in report Dr. Purvis (neuro) signed off. MRI negative for infarcts. NIHs discontinued per Dr. Wagoner.

## 2024-06-26 NOTE — H&P
[facility] Medicine Services  History & Physical    Patient Name: Samantha Massey  : 1960  MRN: [id number]  Primary Care Physician:  Diana Spencer MD  Date of admission: 2024  Date and Time of Service: 2024 at 22:30    Subjective      Chief Complaint: Left-sided weakness upper extremity    History of Present Illness: Samantha Massey is a 63 y.o. female with a past medical history of hyperlipidemia, coronary artery disease, hypertension, grade 1 diastolic heart dysfunction, and colon cancer status postchemotherapy and metastasis to the lung.  She has had 2 treatments of radiation to the chest.  She was her baseline until yesterday whenever she had left hand and left arm weakness.  She had difficulty controlling her left hand.  She was hoping that it would get better but today her sister came over and insisted that she come to the emergency room.  A stroke workup ensued and it appears that she has metastatic cancer to the brain.  Neurosurgery and neurology were consulted by the ER practitioner.  They requested an MRI and continue the workup.  Neurosurgery requested placing the patient on dexamethasone and Keppra.  Patient will be admitted PT OT evaluated evaluated by neurology neurosurgery and hematology oncology.  Patient states that she is able to ambulate without any difficulties.  She also reports no loss of consciousness    Sister was at bedside and patient both parties agree that she is DO NOT RESUSCITATE if her heart should stop    Review of Systems left hand weakness and left arm weakness, patient denies headache, chest pain, shortness of breath, fever, chills, loss of consciousness, altered mental status, abdominal pain, constipation, diarrhea, dysuria, rash, unusual joint pain, congestion, cough, changes in vision, and the rest of 15 essential review of systems have been reviewed and are negative    Personal History     Past Medical History:   Diagnosis Date    Acute systolic  heart failure 12/09/2023    Allergic laytex,peniciline,contrast dye    CAD (coronary artery disease) 12/09/2023    CHF (congestive heart failure) 12/07/23    Chronic kidney disease     colon cancer     Metastatic to liver and bones    Colon cancer     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    History of colon cancer, stage IV     Stage SHELL with progression    Hypertension     NSTEMI (non-ST elevated myocardial infarction) 12/09/2023    PONV (postoperative nausea and vomiting)     Pulmonary arterial hypertension     Radiculopathy 07/2012    Right L5/S1       Past Surgical History:   Procedure Laterality Date    ABDOMINAL SURGERY      CARDIAC CATHETERIZATION N/A 12/08/2023    Procedure: Left Heart Cath;  Surgeon: Ramiro Olivera MD;  Location: [facility] CATH INVASIVE LOCATION;  Service: Cardiovascular;  Laterality: N/A;    COLON SURGERY  Colon resection    CYST REMOVAL  1998    cyst removed from [facility] in 1998    HERNIA REPAIR  incisional    PORTACATH PLACEMENT  2017    VENOUS ACCESS DEVICE (PORT) INSERTION Left 01/12/2021    Procedure: INSERTION VENOUS ACCESS DEVICE;  Surgeon: Jay Yeh MD;  Location: [facility] MAIN OR;  Service: General;  Laterality: Left;    VENOUS ACCESS DEVICE (PORT) REMOVAL Right 08/27/2020    Procedure: REMOVAL VENOUS ACCESS DEVICE;  Surgeon: Jay Yeh MD;  Location: [facility] MAIN OR;  Service: General;  Laterality: Right;    VENTRAL/INCISIONAL HERNIA REPAIR N/A 03/02/2023    Procedure: VENTRAL/INCISIONAL HERNIA REPAIR WITH MESH;  Surgeon: Jay Yeh MD;  Location: [facility] MAIN OR;  Service: General;  Laterality: N/A;       Family History: family history includes Cancer in her father and sister; Heart disease in her father and mother; Hyperlipidemia in her father; Lung cancer (age of onset: 70) in her father. Otherwise pertinent FHx was reviewed and not pertinent to current issue.    Social History:  reports that she quit smoking about 12 years ago. Her  smoking use included cigarettes. She started smoking about 43 years ago. She has a 31 pack-year smoking history. She has been exposed to tobacco smoke. She has never used smokeless tobacco. She reports current alcohol use. She reports that she does not use drugs.    Home Medications:  Prior to Admission Medications       Prescriptions Last Dose Informant Patient Reported? Taking?    aspirin 81 MG EC tablet   Yes Yes    Take 1 tablet by mouth Daily.    atorvastatin (LIPITOR) 80 MG tablet   No Yes    Take 1 tablet by mouth Daily for 360 days.    Cyanocobalamin (Vitamin B-12) 5000 MCG sublingual tablet   Yes Yes    Place 1 tablet under the tongue Every Morning.    empagliflozin (JARDIANCE) 10 MG tablet tablet   No Yes    Take 1 tablet by mouth Daily for 360 days.    Lysine 500 MG capsule   Yes Yes    Take 1 tablet by mouth 2 (Two) Times a Day.    metoprolol succinate XL (TOPROL-XL) 25 MG 24 hr tablet   No Yes    Take 1 tablet by mouth Daily for 360 days.    traMADol (ULTRAM) 50 MG tablet   No Yes    Take 1 tablet by mouth Every 6 (Six) Hours As Needed for Moderate Pain.              Allergies:  Allergies   Allergen Reactions    Hydrocodone Nausea And Vomiting    Iodinated Contrast Media Hives and Itching     PT HAD SOME HIVES DURING SCAN.  PRIOR TO SCAN 8-3-2013.    Latex Rash     Blisters     Penicillins Hives       Objective      Vitals:   Temp:  [97.8 °F (36.6 °C)] 97.8 °F (36.6 °C)  Heart Rate:  [] 104  Resp:  [16] 16  BP: (147-173)/(71-97) 154/97  Body mass index is 20.73 kg/m².  Physical Exam    Diagnostic Data:  Lab Results (last 24 hours)       Procedure Component Value Units Date/Time    Comprehensive Metabolic Panel [[id number]]  (Abnormal) Collected: 06/25/24 1922    Specimen: Blood Updated: 06/25/24 1953     Glucose 101 mg/dL      BUN 19 mg/dL      Creatinine 0.77 mg/dL      Sodium 140 mmol/L      Potassium 3.8 mmol/L      Chloride 104 mmol/L      CO2 22.1 mmol/L      Calcium 9.9 mg/dL      Total  Protein 7.0 g/dL      Albumin 4.2 g/dL      ALT (SGPT) 8 U/L      AST (SGOT) 21 U/L      Alkaline Phosphatase 92 U/L      Total Bilirubin 0.5 mg/dL      Globulin 2.8 gm/dL      A/G Ratio 1.5 g/dL      BUN/Creatinine Ratio 24.7     Anion Gap 13.9 mmol/L      eGFR 87.3 mL/min/1.73     Narrative:      GFR Normal >60  Chronic Kidney Disease <60  Kidney Failure <15      Protime-INR [[id number]]  (Normal) Collected: 06/25/24 1922    Specimen: Blood Updated: 06/25/24 1940     Protime 10.5 Seconds      INR 0.96    [place] Draw [[id number]] Collected: 06/25/24 1922    Specimen: Blood Updated: 06/25/24 1931    Narrative:      The following orders were created for panel order [place] Draw.  Procedure                               Abnormality         Status                     ---------                               -----------         ------                     Green Top (Gel)[[id number]]                                  Final result               Lavender Top[[id number]]                                     Final result               Gold Top - SST[[id number]]                                   Final result               Light Blue Top[[id number]]                                   Final result                 Please view results for these tests on the individual orders.    Green Top (Gel) [[id number]] Collected: 06/25/24 1922    Specimen: Blood Updated: 06/25/24 1931     Extra Tube Hold for add-ons.     Comment: Auto resulted.       Lavender Top [[id number]] Collected: 06/25/24 1922    Specimen: Blood Updated: 06/25/24 1931     Extra Tube hold for add-on     Comment: Auto resulted       Gold Top - SST [[id number]] Collected: 06/25/24 1922    Specimen: Blood Updated: 06/25/24 1931     Extra Tube Hold for add-ons.     Comment: Auto resulted.       Light Blue Top [[id number]] Collected: 06/25/24 1922    Specimen: Blood Updated: 06/25/24 1931     Extra Tube Hold for add-ons.     Comment: Auto resulted       CBC & Differential [[id number]]   (Abnormal) Collected: 06/25/24 1922    Specimen: Blood Updated: 06/25/24 1930    Narrative:      The following orders were created for panel order CBC & Differential.  Procedure                               Abnormality         Status                     ---------                               -----------         ------                     CBC Auto Differential[[id number]]        Abnormal            Final result                 Please view results for these tests on the individual orders.    CBC Auto Differential [[id number]]  (Abnormal) Collected: 06/25/24 1922    Specimen: Blood Updated: 06/25/24 1930     WBC 5.79 10*3/mm3      RBC 3.22 10*6/mm3      Hemoglobin 9.9 g/dL      Hematocrit 32.8 %      .9 fL      MCH 30.7 pg      MCHC 30.2 g/dL      RDW 15.2 %      RDW-SD 57.0 fl      MPV 9.4 fL      Platelets 175 10*3/mm3      Neutrophil % 72.5 %      Lymphocyte % 13.0 %      Monocyte % 11.1 %      Eosinophil % 2.9 %      Basophil % 0.3 %      Immature Grans % 0.2 %      Neutrophils, Absolute 4.20 10*3/mm3      Lymphocytes, Absolute 0.75 10*3/mm3      Monocytes, Absolute 0.64 10*3/mm3      Eosinophils, Absolute 0.17 10*3/mm3      Basophils, Absolute 0.02 10*3/mm3      Immature Grans, Absolute 0.01 10*3/mm3      nRBC 0.0 /100 WBC              Imaging Results (Last 24 Hours)       Procedure Component Value Units Date/Time    CT Angiogram Head [[id number]] Collected: 06/25/24 2122     Updated: 06/25/24 2130    Narrative:      CT ANGIOGRAM HEAD, CT ANGIOGRAM NECK    Date of Exam: 6/25/2024 8:55 PM EDT    Indication: Stroke.    Comparison: 12/7/2020.    Technique: CTA of the head and neck was performed after the uneventful intravenous administration of iodinated contrast. Reconstructed coronal and sagittal images were also obtained. In addition, a 3-D volume rendered image was created for   interpretation. Automated exposure control and iterative reconstruction methods were used.    Findings: Atheromatous disease of  the aortic arch. The right common carotid artery is normal. Mild atheromatous disease of the right carotid bulb and proximal right internal carotid artery. Mild atheromatous disease involving the intracranial segments of   the right internal carotid artery. The right carotid terminus is normal. The visualized branches of the right anterior and middle cerebral arteries are normal.    Mild atheromatous disease of the left carotid bulb and proximal left internal carotid artery with no significant narrowing per NASCET criteria. Mild atheromatous disease involving the intracranial segments of the left internal carotid artery. The left   carotid terminus is normal. The visualized branches of the left anterior and middle cerebral arteries are normal.    Both vertebral arteries arise from the subclavian arteries. The right vertebral artery is dominant. The origin of the left vertebral artery is occluded with some tenuous flow progressing intracranially with retrograde flow from the basilar artery   present. Both vertebral arteries supply the basilar artery. The basilar artery and basilar artery tip are normal. The basilar artery terminates in bilateral posterior cerebral arteries which appear normal.    The known intracranial masses demonstrate enhancement. Orbital and periorbital soft tissues are normal. The paranasal sinuses are clear. No cervical adenopathy. The thyroid gland is normal. Compared with the prior CT of the chest there are multiple   pulmonary nodules and masses better identified on the prior CT of the chest. These have significantly increased in size however a dedicated CT of the chest is recommended. A previously identified right upper lobe nodule which previously measured 1.6 cm   currently measures 2.2 cm x 2.1 cm. Right hilar mass. Bilateral chest wall masses. No lytic or blastic disease.      Impression:      Mild atheromatous disease. No vessel stenosis or occlusion. Known intracranial masses  demonstrate enhancement. Pulmonary masses have increased in size.      Electronically Signed: Praveen Esquivel MD    6/25/2024 9:28 PM EDT    Workstation ID: [id number]    CT Angiogram Neck [[id number]] Collected: 06/25/24 2122     Updated: 06/25/24 2130    Narrative:      CT ANGIOGRAM HEAD, CT ANGIOGRAM NECK    Date of Exam: 6/25/2024 8:55 PM EDT    Indication: Stroke.    Comparison: 12/7/2020.    Technique: CTA of the head and neck was performed after the uneventful intravenous administration of iodinated contrast. Reconstructed coronal and sagittal images were also obtained. In addition, a 3-D volume rendered image was created for   interpretation. Automated exposure control and iterative reconstruction methods were used.    Findings: Atheromatous disease of the aortic arch. The right common carotid artery is normal. Mild atheromatous disease of the right carotid bulb and proximal right internal carotid artery. Mild atheromatous disease involving the intracranial segments of   the right internal carotid artery. The right carotid terminus is normal. The visualized branches of the right anterior and middle cerebral arteries are normal.    Mild atheromatous disease of the left carotid bulb and proximal left internal carotid artery with no significant narrowing per NASCET criteria. Mild atheromatous disease involving the intracranial segments of the left internal carotid artery. The left   carotid terminus is normal. The visualized branches of the left anterior and middle cerebral arteries are normal.    Both vertebral arteries arise from the subclavian arteries. The right vertebral artery is dominant. The origin of the left vertebral artery is occluded with some tenuous flow progressing intracranially with retrograde flow from the basilar artery   present. Both vertebral arteries supply the basilar artery. The basilar artery and basilar artery tip are normal. The basilar artery terminates in bilateral posterior cerebral  arteries which appear normal.    The known intracranial masses demonstrate enhancement. Orbital and periorbital soft tissues are normal. The paranasal sinuses are clear. No cervical adenopathy. The thyroid gland is normal. Compared with the prior CT of the chest there are multiple   pulmonary nodules and masses better identified on the prior CT of the chest. These have significantly increased in size however a dedicated CT of the chest is recommended. A previously identified right upper lobe nodule which previously measured 1.6 cm   currently measures 2.2 cm x 2.1 cm. Right hilar mass. Bilateral chest wall masses. No lytic or blastic disease.      Impression:      Mild atheromatous disease. No vessel stenosis or occlusion. Known intracranial masses demonstrate enhancement. Pulmonary masses have increased in size.      Electronically Signed: Praveen Esquivel MD    6/25/2024 9:28 PM EDT    Workstation ID: [id number]    CT Head Without Contrast Stroke Protocol [[id number]] Collected: 06/25/24 2102     Updated: 06/25/24 2114    Narrative:      CT HEAD WO CONTRAST STROKE PROTOCOL    Date of Exam: 6/25/2024 8:53 PM EDT    Indication: Stroke, follow up  Neuro deficit, acute, stroke suspected. The patient has a recent pathology diagnosis of adenocarcinoma of the colon with metastatic disease.    Comparison: 12/2/2019    Technique: Axial CT images were obtained of the head without contrast administration.  Reconstructed coronal and sagittal images were also obtained. Automated exposure control and iterative construction methods were used.    Scan Time: 2100  Results discussed with DEA Isaacs at 2104.    Findings: Rounded high density lesion within the right frontal lobe with significant surrounding edema. This has a ringlike appearance despite the lack of contrast and measures 3.5 cm x 3.2 cm concerning for metastatic disease to the brain. Higher   density ovoid lesion within the left frontal lobe measuring 2 cm x 1.5 cm with  significant surrounding edema also concerning for metastatic disease. 1 cm x 0.8 cm lesion within the left parietal lobe also concerning for metastatic disease to the brain.   Additional smaller lesions are suspected. The posterior fossa is normal. No definite intracranial hemorrhage or infarct.    The calvarium is intact. Orbital and parable soft tissues are normal. The paranasal sinuses are clear.      Impression:      Findings concerning for metastatic disease to the brain. MRI of the brain with contrast recommended.        Electronically Signed: Praveen Esquivel MD    6/25/2024 9:08 PM EDT    Workstation ID: [id number]    XR Chest 1 View [[id number]] Collected: 06/25/24 2011     Updated: 06/25/24 2015    Narrative:      XR CHEST 1 VW    Date of Exam: 6/25/2024 8:00 PM EDT    Indication: Stroke Protocol (Onset > 12 hrs)    Comparison: None available.    Findings: Right upper lobe mass. Right lower lobe infiltrate versus mass. Vascular congestion. Left-sided infusion port with the tip in the superior vena cava. No pneumothorax or pleural effusion. The clavicles are intact. No rib fractures. Smaller   bilateral pulmonary nodules are also noted.      Impression:      Right lower lobe infiltrate versus mass. Right upper lobe mass. Vascular congestion.      Electronically Signed: Praveen Esquivel MD    6/25/2024 8:13 PM EDT    Workstation ID: [id number]              Assessment & Plan        This is a 63 y.o. female with:    Active and Resolved Problems  Active Hospital Problems    Diagnosis  POA    **Metastasis to brain [C79.31]  Yes     Priority: High    Left hand weakness [R29.898]  Yes     Priority: High    Hyperlipidemia LDL goal <100 [E78.5]  Yes    Primary hypertension [I10]  Yes      Resolved Hospital Problems   No resolved problems to display.       1.  Left-handed weakness stroke workup ensued but believe that this may be secondary to brain metastasis patient is on an aspirin, PT OT has been evaluated neurology has been  consulted as well.  Patient is on telemetry  2.  Metastasis to the brain neurosurgery recommended that we place the patient on dexamethasone and Keppra these medications have been started.  Hematology oncology has been consulted as well  3.  Hyperlipidemia continue atorvastatin 80  4.  Hypertension continue metoprolol hydralazine as needed    VTE Prophylaxis:  Pharmacologic VTE prophylaxis orders are present.        The patient desires to be as follows:    CODE STATUS:    Level Of Support Discussed With: Patient  Code Status (Patient has no pulse and is not breathing): No CPR (Do Not Attempt to Resuscitate)  Medical Interventions (Patient has pulse or is breathing): Comfort Measures      Admission Status:  I believe this patient meets inpatient status.    Expected Length of Stay: 3 days    PDMP and Medication Dispenses via Sidebar reviewed and consistent with patient reported medications.    I discussed the patient's findings and my recommendations with patient and family.      Signature:     This document has been electronically signed by Jay Michelle MD on June 25, 2024 23:12 EDT   [facility] Hospitalist Team

## 2024-06-26 NOTE — CONSULTS
Hematology/Oncology Inpatient Consultation    Patient name: Samantha Massey  : 1960  MRN: [id number]  Referring Provider: Dr. Michelle  Reason for Consultation: Colon cancer, new brain mets    Chief complaint: Left sided weakness and numbness    History of present illness:    Samantha Massey is a 63 y.o. female who presented to [facility] on 2024 with complaints of strokelike symptoms.  She reports a 1 day history of headache, left-sided weakness and numbness as well as left-sided facial droop and slurred speech.  She states she had difficulty controlling her left hand.  She was hoping that it would get better but her sister insisted that she come to the emergency room for further evaluation.      In the ED, chest x-ray showed right lower lobe infiltrate versus mass as well as a right upper lobe mass and vascular congestion.  CT imaging of the head without contrast showed a rounded high density lesion within the right frontal lobe with significant surrounding edema.  This has a ringlike appearance despite the lack of contrast and measures 3.5 x 3.2 cm concerning for metastatic disease to the brain.  There is also a higher density ovoid lesion within the left frontal lobe measuring 2 x 1.5 cm with significant surrounding edema also concerning for metastatic disease.  1 x 0.8 cm lesion within the left parietal lobe as well as additional smaller lesions are suspected.  CT angiogram of the head and neck showed no vessel stenosis or occlusion.    She was started on dexamethasone and Keppra per neurosurgery.    MRI brain showed 3 enhancing intracranial lesions that are consistent with the appearance of metastatic disease.  There is a dominant lesion in the right frontal lobe that measures 3.9 x 3.6 x 3.8 cm.  A lesion within the left frontal lobe measures 2.8 x 2.1 x 2.9 cm.  The lesion within the high left parietal lobe posterior medially measures 1.2 x 1.3 x 1.2 cm.  There are features of  vasogenic edema surrounding each of the aforementioned masses with no midline shift.    06/26/24  Hematology/Oncology was consulted.  She is established with Dr. Adams for metastatic colon cancer.  She recently completed radiation on 6/12/2024 thoracic chest wall lesions    Oncology History (from record):  1/11/2024: In the office for the first time in transfer from [facility]. Ms. Massey reported that sometime in 2011 she developed severe constipation that did not seem to improve. This slowly became associated to fatigue and weakness. Eventually she sought attention and was found to have normocytic anemia in January of 2012. A CT scan of the abdomen reported innumerable lesions throughout the liver. There was thickening of the distal sigmoid colon. There were some non-specific lymph nodes. She underwent a colonoscopy in early January that revealed a tumor in the distal colon. The final report of pathology was of invasive, moderately differentiated adenocarcinoma without microsatellite instability. She underwent a laparoscopic low anterior resection. The final report of pathology was of a poorly differentiated adenocarcinoma of the rectosigmoid with invasion through the muscularis propria and extension of the mesocolon. No lymph nodes were involved, but she underwent an image guided biopsy of the liver reported metastatic disease consistent with primary colon cancer. In February of 2012 she started treatment with FOLFOX/bevacizumab. Testing at the time reported KRAS wild type. A PET scan done shortly after the commencement of the treatment reported multiple metastatic lesions in the liver. In June of the same year there was evidence of response to the treatment. Later, in August, the lesions were stable. She continued treatment with the same regimen through September of 2012 at which time she was transitioned to capecitabine. Radioembolization to the liver was delivered initially to the right lobe and then to the left  lobe in November of that same year. There was no suggestion of disease outside of the liver and the lesions in the liver showed evidence of response. In August of 2014 the capecitabine was discontinued as there was no longer evidence of disease. She did well until October of 2017 when she had radiographic evidence of progressive disease in the liver and an image guided biopsy confirmed colonic primary. FOLFOX/bevacizumab was re-introduced in October of that year.  In February of 2018 she had evidence of response and no suggestion of extrahepatic disease and she underwent left hepatectomy. Following that she was placed on capecitabine as adjuvant treatment. She continued treatment with this agent until September of 2018. In early January of 2019 there was evidence of disease in the lungs and the mediastinal lymph nodes. In July of 2019 she started treatment with FOLFIRI/bevacizumab. In the setting of progressive disease the bevacizumab was changed to cetuximab. In December of 2020 metastatic bone disease was identified. To continue treatment with cetuximab and irinotecan. There was no evidence of progression until January of 2023 that revealed enlarging pulmonary nodules and enlargement of a right seventh rib metastatic lesion. For that reason she was prescribed regorafenib and began treatment in February of the same year.  In April of 2023 she had radiation to the right 7th rib. The regorafenib resulted in hand foot syndrome. In June of 2023 a scan revealed progressive disease and in late July of the same year she began treatment with trifluridine/tipracil and in August of the same year bevacizumab was added. She developed myelosuppression and doses were modified. At the time of this first visit she was still taking the above regimen. She was rather asymptomatic and on exam there were no changes. There was a report of a scan done in early December of 2023 that reported possible enlargement of the rib metastatic  lesion. A decision was made to continue with the same treatment and plans to take over the prescription of trifluridine/tipracil. To have scans in February of 2024.      2/15/2024: Feeling well.  Taking the trifluridine/tipracil without difficulties.  Generally energetic and with good appetite.  Afebrile.  On exam she does not appear in distress and she does not seem acutely ill.  The lungs are clear bilaterally and the heart is regular.  The abdomen is soft and nontender.  No edema.  Reviewed the laboratory exams that suggest anemia.  She is not pale and off for the anemia reported on the blood count and does it will be repeated.  If indeed her hemoglobin is 7.3 g/dL she will be transfused.  I reviewed independently the images and the reports of the scans.  She has stable disease at this time.  Continue with the same treatment and see me in 6 weeks.      4/18/2024: Feeling well and without new problems.  Continues to take the trifluridine tipiracil without any difficulties.  She has been afebrile.  She continues to eat reasonably well and her weight is stable.  She denies pain.  On exam no distress.  No oral lesions.  Respirations not labored and lungs clear.  Heart regular.  Abdomen soft and without palpable tumors.  No edema.  The laboratory exams were reviewed.  She has significant neutropenia which I discussed with her.  Prior to the commencement of the next cycle of medication she needs to have her blood counts checked.  I discussed with her the importance of neutropenic precautions and urged her to call if febrile.     5/24/2024: Does not feel as well as at the time of the last visit.  She has noted the enlargement of nodules on her anterior chest that resulted in pain.  She has been taking acetaminophen and tramadol prescribed by her primary care physician that does result in relief of the pain.  She has not had any fevers.  She has been off of the trifluridine/tipiracil since she was instructed to do so  because of pancytopenia.  She has had no dyspnea and denies coughing.  Also no abdominal pain.  Has been able to eat and her weight is stable.  On exam she appears chronically ill but is not in distress.  No jaundice or pallor.  Indeed there are palpable tumors on the anterior chest particularly.  The lungs are clear.  The heart is regular.  Abdomen is soft.  Reviewed the scans.  There is clear progression of the disease with dose nodules that she feels corresponding to metastatic deposits that clearly have increased in size.  The right parasternal chest wall metastatic deposit, which is the largest, increased from 2.6 cm to 4.3 cm.  The metastatic deposits in the lungs have also increased and the left lower lobe lesion has gone from 2.1 to 3.2 cm.  Within the abdomen and pelvis there are been no changes.  Discussed with her.  I have asked her to see Dr. Colunga for consideration of radiation to relieve pain on the anterior chest.  I have also asked her to have a biopsy of one of the lung lesions particularly the left lower lobe lesion, which appears to be accessible and is largest.  I would like to consider not only histologic analysis but also particularly next-generation sequencing and PD-L1 expression.  Discussed with her.     6/12/2024: Feels better. Has less chest pain though she is tender after the biopsy. Eats well and has no nausea or vomiting. No chest pain or cough. No abdominal pain or diarrhea and no dysuria. On exam alert and conversant no distress and no jaundice. No oral lesions and respirations not labored. The lungs are clear and heart is regular. Abdomen soft. No edema. Reviewed and discussed with her the report of the biopsy. Next generation sequencing  requested. She is to continue with radiation and will see me in approximately 2 weeks.     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    PCP: Diana Spencer MD    History:  Past Medical History:   Diagnosis Date    Acute systolic heart failure 12/09/2023    Allergic laytex,peniciline,contrast dye    CAD (coronary artery disease) 12/09/2023    CHF (congestive heart failure) 12/07/23    Chronic kidney disease     colon cancer     Metastatic to liver and bones    Colon cancer     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    History of colon cancer, stage IV     Stage SHELL with progression    Hypertension     NSTEMI (non-ST elevated myocardial infarction) 12/09/2023    PONV (postoperative nausea and vomiting)     Pulmonary arterial hypertension     Radiculopathy 07/2012    Right L5/S1   ,   Past Surgical History:   Procedure Laterality Date    ABDOMINAL SURGERY      CARDIAC CATHETERIZATION N/A 12/08/2023    Procedure: Left Heart Cath;  Surgeon: Ramiro Olivera MD;  Location: [facility] CATH INVASIVE LOCATION;  Service: Cardiovascular;  Laterality: N/A;    COLON SURGERY  Colon resection    CYST REMOVAL  1998    cyst [facility] from [facility] in 1998    HERNIA REPAIR  incisional    PORTACATH PLACEMENT  2017    VENOUS ACCESS DEVICE (PORT) INSERTION Left 01/12/2021    Procedure: INSERTION VENOUS ACCESS DEVICE;  Surgeon: Jay Yeh MD;  Location: [facility] MAIN OR;  Service: General;  Laterality: Left;    VENOUS ACCESS DEVICE (PORT) REMOVAL Right 08/27/2020    Procedure: REMOVAL VENOUS ACCESS DEVICE;  Surgeon: Jay Yeh MD;  Location: [facility] MAIN OR;  Service: General;  Laterality: Right;    VENTRAL/INCISIONAL HERNIA REPAIR N/A 03/02/2023    Procedure: VENTRAL/INCISIONAL HERNIA REPAIR  "WITH MESH;  Surgeon: Jay Yeh MD;  Location: [facility] MAIN OR;  Service: General;  Laterality: N/A;   ,   Family History   Problem Relation Age of Onset    Heart disease Mother     Lung cancer Father 70        Associated to cigarette smoking    Heart disease Father     Hyperlipidemia Father     Cancer Father     Cancer Sister         Bladder cancer   ,   Social History     Tobacco Use    Smoking status: Former     Current packs/day: 0.00     Average packs/day: 1 pack/day for 31.0 years (31.0 ttl pk-yrs)     Types: Cigarettes     Start date: 1981     Quit date: 2012     Years since quittin.4     Passive exposure: Past    Smokeless tobacco: Never   Vaping Use    Vaping status: Never Used   Substance Use Topics    Alcohol use: Yes     Comment: 2 or 3 a month    Drug use: Never   , (Not in a hospital admission)  , Scheduled Meds:  aspirin, 81 mg, Oral, Daily   Or  aspirin, 300 mg, Rectal, Daily  atorvastatin, 80 mg, Oral, Nightly  dexAMETHasone, 4 mg, Intravenous, Q6H  enoxaparin, 40 mg, Subcutaneous, Q24H  levETIRAcetam, 1,000 mg, Intravenous, Q12H  metoprolol succinate XL, 25 mg, Oral, Q24H  pantoprazole, 40 mg, Intravenous, Q AM  sodium chloride, 10 mL, Intravenous, Q12H    , Continuous Infusions:   , PRN Meds:    acetaminophen **OR** acetaminophen    sodium chloride    sodium chloride    sodium chloride    traMADol   Allergies:  Hydrocodone, Iodinated contrast media, Latex, and Penicillins    Subjective     ROS:  Review of Systems     Objective   Vital Signs:   /79 (BP Location: Left arm, Patient Position: Lying)   Pulse 90   Temp 97.5 °F (36.4 °C) (Oral)   Resp 19   Ht 157.5 cm (62\")   Wt 51.4 kg (113 lb 5.1 oz)   LMP  (LMP Unknown)   SpO2 94%   BMI 20.73 kg/m²     Physical Exam: (performed by MD)  Physical Exam    Results Review:  Lab Results (last 48 hours)       Procedure Component Value Units Date/Time    Basic Metabolic Panel [[id number]]  (Abnormal) Collected: 24 " 0829    Specimen: Blood from Arm, Left Updated: 06/26/24 0901     Glucose 155 mg/dL      BUN 16 mg/dL      Creatinine 0.71 mg/dL      Sodium 140 mmol/L      Potassium 3.9 mmol/L      Chloride 106 mmol/L      CO2 24.2 mmol/L      Calcium 9.6 mg/dL      BUN/Creatinine Ratio 22.5     Anion Gap 9.8 mmol/L      eGFR 96.3 mL/min/1.73     Narrative:      GFR Normal >60  Chronic Kidney Disease <60  Kidney Failure <15      CBC & Differential [[id number]]  (Abnormal) Collected: 06/26/24 0829    Specimen: Blood from Arm, Left Updated: 06/26/24 0839    Narrative:      The following orders were created for panel order CBC & Differential.  Procedure                               Abnormality         Status                     ---------                               -----------         ------                     CBC Auto Differential[[id number]]        Abnormal            Final result                 Please view results for these tests on the individual orders.    CBC Auto Differential [[id number]]  (Abnormal) Collected: 06/26/24 0829    Specimen: Blood from Arm, Left Updated: 06/26/24 0839     WBC 3.82 10*3/mm3      RBC 3.29 10*6/mm3      Hemoglobin 10.2 g/dL      Hematocrit 33.2 %      .9 fL      MCH 31.0 pg      MCHC 30.7 g/dL      RDW 14.9 %      RDW-SD 55.7 fl      MPV 9.4 fL      Platelets 154 10*3/mm3      Neutrophil % 92.9 %      Lymphocyte % 6.0 %      Monocyte % 0.8 %      Eosinophil % 0.0 %      Basophil % 0.0 %      Immature Grans % 0.3 %      Neutrophils, Absolute 3.55 10*3/mm3      Lymphocytes, Absolute 0.23 10*3/mm3      Monocytes, Absolute 0.03 10*3/mm3      Eosinophils, Absolute 0.00 10*3/mm3      Basophils, Absolute 0.00 10*3/mm3      Immature Grans, Absolute 0.01 10*3/mm3      nRBC 0.0 /100 WBC     POC Glucose Once [[id number]]  (Abnormal) Collected: 06/26/24 0817    Specimen: Blood Updated: 06/26/24 0818     Glucose 152 mg/dL      Comment: Serial Number: [id number]:  [id number]       POC Glucose Q6H  [[id number]]  (Abnormal) Collected: 06/26/24 0639    Specimen: Blood Updated: 06/26/24 0640     Glucose 147 mg/dL      Comment: Serial Number: [id number]:  [id number]       Lipid Panel [[id number]]  (Abnormal) Collected: 06/26/24 0248    Specimen: Blood from Arm, Left Updated: 06/26/24 0332     Total Cholesterol 201 mg/dL      Triglycerides 63 mg/dL      HDL Cholesterol 53 mg/dL      LDL Cholesterol  137 mg/dL      VLDL Cholesterol 11 mg/dL      LDL/HDL Ratio 2.55    Narrative:      Cholesterol Reference Ranges  (U.S. Department of Health and Human Services ATP III Classifications)    Desirable          <200 mg/dL  Borderline High    200-239 mg/dL  High Risk          >240 mg/dL      Triglyceride Reference Ranges  (U.S. Department of Health and Human Services ATP III Classifications)    Normal           <150 mg/dL  Borderline High  150-199 mg/dL  High             200-499 mg/dL  Very High        >500 mg/dL    HDL Reference Ranges  (U.S. Department of Health and Human Services ATP III Classifications)    Low     <40 mg/dl (major risk factor for CHD)  High    >60 mg/dl ('negative' risk factor for CHD)        LDL Reference Ranges  (U.S. Department of Health and Human Services ATP III Classifications)    Optimal          <100 mg/dL  Near Optimal     100-129 mg/dL  Borderline High  130-159 mg/dL  High             160-189 mg/dL  Very High        >189 mg/dL    Hemoglobin A1c [[id number]]  (Normal) Collected: 06/26/24 0248    Specimen: Blood from Arm, Left Updated: 06/26/24 0311     Hemoglobin A1C 5.21 %     POC Glucose Q6H [[id number]]  (Abnormal) Collected: 06/26/24 0058    Specimen: Blood Updated: 06/26/24 0100     Glucose 161 mg/dL      Comment: Serial Number: [id number]:  [id number]       Comprehensive Metabolic Panel [[id number]]  (Abnormal) Collected: 06/25/24 1922    Specimen: Blood Updated: 06/25/24 1953     Glucose 101 mg/dL      BUN 19 mg/dL      Creatinine 0.77 mg/dL      Sodium 140 mmol/L      Potassium  3.8 mmol/L      Chloride 104 mmol/L      CO2 22.1 mmol/L      Calcium 9.9 mg/dL      Total Protein 7.0 g/dL      Albumin 4.2 g/dL      ALT (SGPT) 8 U/L      AST (SGOT) 21 U/L      Alkaline Phosphatase 92 U/L      Total Bilirubin 0.5 mg/dL      Globulin 2.8 gm/dL      A/G Ratio 1.5 g/dL      BUN/Creatinine Ratio 24.7     Anion Gap 13.9 mmol/L      eGFR 87.3 mL/min/1.73     Narrative:      GFR Normal >60  Chronic Kidney Disease <60  Kidney Failure <15      Protime-INR [[id number]]  (Normal) Collected: 06/25/24 1922    Specimen: Blood Updated: 06/25/24 1940     Protime 10.5 Seconds      INR 0.96    Davis Draw [[id number]] Collected: 06/25/24 1922    Specimen: Blood Updated: 06/25/24 1931    Narrative:      The following orders were created for panel order Davis Draw.  Procedure                               Abnormality         Status                     ---------                               -----------         ------                     Green Top (Gel)[[id number]]                                  Final result               Lavender Top[[id number]]                                     Final result               Gold Top - SST[[id number]]                                   Final result               Light Blue Top[[id number]]                                   Final result                 Please view results for these tests on the individual orders.    Green Top (Gel) [[id number]] Collected: 06/25/24 1922    Specimen: Blood Updated: 06/25/24 1931     Extra Tube Hold for add-ons.     Comment: Auto resulted.       Lavender Top [[id number]] Collected: 06/25/24 1922    Specimen: Blood Updated: 06/25/24 1931     Extra Tube hold for add-on     Comment: Auto resulted       Gold Top - SST [[id number]] Collected: 06/25/24 1922    Specimen: Blood Updated: 06/25/24 1931     Extra Tube Hold for add-ons.     Comment: Auto resulted.       Light Blue Top [[id number]] Collected: 06/25/24 1922    Specimen: Blood Updated: 06/25/24 1931     Extra  Tube Hold for add-ons.     Comment: Auto resulted       CBC & Differential [[id number]]  (Abnormal) Collected: 06/25/24 1922    Specimen: Blood Updated: 06/25/24 1930    Narrative:      The following orders were created for panel order CBC & Differential.  Procedure                               Abnormality         Status                     ---------                               -----------         ------                     CBC Auto Differential[[id number]]        Abnormal            Final result                 Please view results for these tests on the individual orders.    CBC Auto Differential [[id number]]  (Abnormal) Collected: 06/25/24 1922    Specimen: Blood Updated: 06/25/24 1930     WBC 5.79 10*3/mm3      RBC 3.22 10*6/mm3      Hemoglobin 9.9 g/dL      Hematocrit 32.8 %      .9 fL      MCH 30.7 pg      MCHC 30.2 g/dL      RDW 15.2 %      RDW-SD 57.0 fl      MPV 9.4 fL      Platelets 175 10*3/mm3      Neutrophil % 72.5 %      Lymphocyte % 13.0 %      Monocyte % 11.1 %      Eosinophil % 2.9 %      Basophil % 0.3 %      Immature Grans % 0.2 %      Neutrophils, Absolute 4.20 10*3/mm3      Lymphocytes, Absolute 0.75 10*3/mm3      Monocytes, Absolute 0.64 10*3/mm3      Eosinophils, Absolute 0.17 10*3/mm3      Basophils, Absolute 0.02 10*3/mm3      Immature Grans, Absolute 0.01 10*3/mm3      nRBC 0.0 /100 WBC              Pending Results: Brain MRI    Imaging Reviewed:   MRI Brain With & Without Contrast    Result Date: 6/26/2024  1. Three enhancing intracranial mass lesions are consistent with the appearance of metastatic disease. Measurements as provided above. 2. Features of vasogenic edema surrounding each of the aforementioned masses. No midline shift. 3. No convincing MR evidence of acute or subacute ischemic insult. Electronically Signed: Denisse Ramos MD  6/26/2024 12:06 PM EDT  Workstation ID: [id number]    CT Angiogram Head    Result Date: 6/25/2024  Mild atheromatous disease. No vessel stenosis or  occlusion. Known intracranial masses demonstrate enhancement. Pulmonary masses have increased in size. Electronically Signed: Praveen Esquivel MD  6/25/2024 9:28 PM EDT  Workstation ID: [id number]    CT Angiogram Neck    Result Date: 6/25/2024  Mild atheromatous disease. No vessel stenosis or occlusion. Known intracranial masses demonstrate enhancement. Pulmonary masses have increased in size. Electronically Signed: Praveen Esquivel MD  6/25/2024 9:28 PM EDT  Workstation ID: [id number]    CT Head Without Contrast Stroke Protocol    Result Date: 6/25/2024  Findings concerning for metastatic disease to the brain. MRI of the brain with contrast recommended. Electronically Signed: Praveen Esquivel MD  6/25/2024 9:08 PM EDT  Workstation ID: [id number]    XR Chest 1 View    Result Date: 6/25/2024  Right lower lobe infiltrate versus mass. Right upper lobe mass. Vascular congestion. Electronically Signed: Praveen Esquivel MD  6/25/2024 8:13 PM EDT  Workstation ID: [id number]          Assessment & Plan   ASSESSMENT  Metastatic colon cancer with known osseous, hepatic and pulmonary metastases status post multiple lines of treatment.  Recent scans from May 2024 with progression of disease.  Biopsy of the thoracic lesion confirmed adenocarcinoma with immunohistochemical pattern consistent with colon primary.  Next generation sequencing and PD-L1 expression has been requested.  Now with multiple brain lesions likely metastatic disease.  Neurosurgery has been consulted and awaiting recommendations.  May also consider evaluation by radiation oncology for palliative radiation.  Left-handed weakness: Likely secondary to brain metastases as above.      PLAN  As above  Further recommendations per Dr. Adams    Electronically signed by May Matute PA-C, 06/26/24 6/27/2024: I've known Ms. Massey since January of this year. She has metastatic colon cancer and has been receiving treatment with chemotherapy for several years. She has at this point  received all the reasonable options for treatment. In the recent past she has had radiation to treat symptomatic bone metastases. She has maintained an adequate quality of life despite the progression of the disease. She presented with left hemiparesis and was found to have 3 lesions, one in the right frontal lobe, one in the left frontal lobe and one in the left parietal lobe. They have the appearance of metastatic disease. On exam she is alert and conversant. She is oriented and in no distress. No jaundice. No oral lesions and respirations not labored. No skin rash. No edema. Reviewed the images of the CT scan and the MRI. Reviewed the laboratory exams. Discussed with her and her family. Discussed with neurosurgery. I'm told excision of the frontal lesions is possible. The important question is whether this is a reasonable approach to her illness. I've explained to her the course of action that would be necessary. Following surgery she would need radiation and then ideally an effective chemotherapy regimen. Because she has received all the reasonable agents at this time, the long term prognosis, particularly with the development of brain metastases, is poor. The alternative to surgery, thus, would be to treat her with radiation and then provide symptomatic and supportive care perhaps with Hospice care. She has yet to make a decision and will discuss later this afternoon.   I reviewed the records with Ms. Matute and agree with her note. I've formulated the analysis and the plans.     Sean Adams MD on 6/27/2024 at 8:24 AM.

## 2024-06-26 NOTE — CONSULTS
Neurosurgery Consultation      Patient: Samantha Massey    Sex: female    YOB: 1960    Date of Admission: 6/25/2024  7:00 PM    Admitting Dx: Metastasis to brain [C79.31]    Reason for Consult: Brain mass      Subjective     CC: Left-sided weakness and numbness x 2 days    HPI:  Patient is a 63 y.o. female with CAD, CHF, CKD, and stage IV colon cancer with known osseous, hepatic, and pulmonary metastases who presented to the ED yesterday with strokelike symptoms.  She takes daily 81 mg aspirin at home.  She reported a 1-day history of headache, left-sided weakness and numbness particularly in her hand, as well as left-sided facial droop and slurred speech.      During my evaluation patient feels much of the symptoms have improved, though she still has some mild left-sided facial droop and left eye ptosis.  She denies dizziness, visual disturbance, difficulty ambulating, and bowel/bladder dysfunction.  She has no history of seizures.    Patient has undergone multiple rounds of chemotherapy and radiation in the past.  She reports she is currently considering immunotherapy with her oncologist, Dr. Adams.        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    History of colon cancer, stage IV     Stage SHELL with progression    Hypertension     NSTEMI (non-ST elevated myocardial infarction) 12/09/2023    PONV (postoperative nausea and vomiting)     Pulmonary arterial hypertension     Radiculopathy 07/2012    Right L5/S1         Current Facility-Administered Medications:     acetaminophen (TYLENOL) tablet 650 mg, 650 mg, Oral, Q4H PRN **OR** acetaminophen (TYLENOL) suppository 650 mg, 650 mg, Rectal, Q4H  PRN, Jay Michelle MD    aspirin chewable tablet 81 mg, 81 mg, Oral, Daily **OR** aspirin suppository 300 mg, 300 mg, Rectal, Daily, Jay Michelle MD    atorvastatin (LIPITOR) tablet 80 mg, 80 mg, Oral, Nightly, Jay Michelle MD    dexAMETHasone (DECADRON) injection 4 mg, 4 mg, Intravenous, Q6H, Jay Michelle MD, 4 mg at 06/26/24 0514    Enoxaparin Sodium (LOVENOX) syringe 40 mg, 40 mg, Subcutaneous, Q24H, Jay Michelle MD    levETIRAcetam (KEPPRA) injection 1,000 mg, 1,000 mg, Intravenous, Q12H, Jay Michelle MD    metoprolol succinate XL (TOPROL-XL) 24 hr tablet 25 mg, 25 mg, Oral, Q24H, Jay Michelle MD    pantoprazole (PROTONIX) injection 40 mg, 40 mg, Intravenous, Q AM, Jay Michelle MD, 40 mg at 06/26/24 0514    sodium chloride 0.9 % flush 10 mL, 10 mL, Intravenous, PRN, Molly Caldwell PA    sodium chloride 0.9 % flush 10 mL, 10 mL, Intravenous, Q12H, Jay Michelle MD, 10 mL at 06/26/24 0054    sodium chloride 0.9 % flush 10 mL, 10 mL, Intravenous, PRN, Jay Michelle MD    sodium chloride 0.9 % infusion 40 mL, 40 mL, Intravenous, PRN, Jay Michelle MD    traMADol (ULTRAM) tablet 50 mg, 50 mg, Oral, Q6H PRN, Jay Michelle MD    Current Outpatient Medications:     aspirin 81 MG EC tablet, Take 1 tablet by mouth Daily., Disp: , Rfl:     atorvastatin (LIPITOR) 80 MG tablet, Take 1 tablet by mouth Daily for 360 days., Disp: 90 tablet, Rfl: 3    Cyanocobalamin (Vitamin B-12) 5000 MCG sublingual tablet, Place 1 tablet under the tongue Every Morning., Disp: , Rfl:     empagliflozin (JARDIANCE) 10 MG tablet tablet, Take 1 tablet by mouth Daily for 360 days., Disp: 90 tablet, Rfl: 3    Lysine 500 MG capsule, Take 1 tablet by mouth 2 (Two) Times a Day., Disp: , Rfl:     metoprolol succinate XL (TOPROL-XL) 25 MG 24 hr tablet, Take 1 tablet by mouth Daily for 360 days., Disp: 90 tablet, Rfl: 3    traMADol (ULTRAM) 50 MG tablet, Take 1 tablet by mouth Every 6 (Six) Hours As Needed for  Moderate Pain., Disp: 50 tablet, Rfl: 1    Allergies   Allergen Reactions    Hydrocodone Nausea And Vomiting    Iodinated Contrast Media Hives and Itching     PT HAD SOME HIVES DURING SCAN.  PRIOR TO SCAN 8-3-2013.    Latex Rash     Blisters     Penicillins Hives       Past Surgical History:   Procedure Laterality Date    ABDOMINAL SURGERY      CARDIAC CATHETERIZATION N/A 2023    Procedure: Left Heart Cath;  Surgeon: Ramiro Olivera MD;  Location: [facility] CATH INVASIVE LOCATION;  Service: Cardiovascular;  Laterality: N/A;    COLON SURGERY  Colon resection    CYST REMOVAL      cyst removed from [facility] in     HERNIA REPAIR  incisional    PORTACATH PLACEMENT  2017    VENOUS ACCESS DEVICE (PORT) INSERTION Left 2021    Procedure: INSERTION VENOUS ACCESS DEVICE;  Surgeon: Jay Yeh MD;  Location: [facility] MAIN OR;  Service: General;  Laterality: Left;    VENOUS ACCESS DEVICE (PORT) REMOVAL Right 2020    Procedure: REMOVAL VENOUS ACCESS DEVICE;  Surgeon: Jay Yeh MD;  Location: [facility] MAIN OR;  Service: General;  Laterality: Right;    VENTRAL/INCISIONAL HERNIA REPAIR N/A 2023    Procedure: VENTRAL/INCISIONAL HERNIA REPAIR WITH MESH;  Surgeon: Jay Yeh MD;  Location: [facility] MAIN OR;  Service: General;  Laterality: N/A;       Social History     Socioeconomic History    Marital status: Single   Tobacco Use    Smoking status: Former     Current packs/day: 0.00     Average packs/day: 1 pack/day for 31.0 years (31.0 ttl pk-yrs)     Types: Cigarettes     Start date: 1981     Quit date: 2012     Years since quittin.4     Passive exposure: Past    Smokeless tobacco: Never   Vaping Use    Vaping status: Never Used   Substance and Sexual Activity    Alcohol use: Yes     Comment: 2 or 3 a month    Drug use: Never    Sexual activity: Not Currently     Partners: Female     Birth control/protection: Same-sex partner       Family History    Problem Relation Age of Onset    Heart disease Mother     Lung cancer Father 70        Associated to cigarette smoking    Heart disease Father     Hyperlipidemia Father     Cancer Father     Cancer Sister         Bladder cancer         Current Medications:  Scheduled Meds:aspirin, 81 mg, Oral, Daily   Or  aspirin, 300 mg, Rectal, Daily  atorvastatin, 80 mg, Oral, Nightly  dexAMETHasone, 4 mg, Intravenous, Q6H  enoxaparin, 40 mg, Subcutaneous, Q24H  levETIRAcetam, 1,000 mg, Intravenous, Q12H  metoprolol succinate XL, 25 mg, Oral, Q24H  pantoprazole, 40 mg, Intravenous, Q AM  sodium chloride, 10 mL, Intravenous, Q12H      Continuous Infusions:   PRN Meds:   acetaminophen **OR** acetaminophen    sodium chloride    sodium chloride    sodium chloride    traMADol    ROS: 14 point review of systems was completed and is negative except for what is noted in HPI      Objective     Vitals:    06/26/24 0250 06/26/24 0305 06/26/24 0320 06/26/24 0451   BP: 159/93 128/72 124/68 139/70   BP Location:    Right arm   Patient Position:    Lying   Pulse: 93 91 92 91   Resp:    14   Temp:    98.2 °F (36.8 °C)   TempSrc:    Oral   SpO2: 95% 94% 93% 94%   Weight:       Height:           Physical exam  General  - awake, cooperative, in no acute distress  HEENT  - Normocephalic, atraumatic  - Mild left eyelid ptosis  Cardiac  - RRR, no peripheral edema  Respiratory  - Normal respiratory rate and effort      NEUROLOGIC    MENTAL STATUS:  - A/O x3  - GCS 4-6-5  CRANIAL NERVES:  II:      Pupils 2mm equal, round, and reactive to light  III, IV, VI: EOM intact, no gaze preference or deviation  V:      Normal sensation in all 3 segments bilaterally  VII:      Mild left-sided facial droop  VIII:      Normal hearing to speech  IX, X:      Normal palatal elevation, no uvular deviation  XI:      Normal head turn and shoulder shrug bilaterally  XII:      Midline tongue protrusion  MOTOR:  - MAURICE symmetrically w/ good strength  REFLEXES:  - Brisk and  symmetric throughout  - Negative clonus  SENSORY:  - Normal to touch and pinprick throughout  COORDINATION:  - No dysmetria  GAIT:  - Deferred            RESULTS REVIEW:  Results from last 7 days   Lab Units 06/25/24 1922   WBC 10*3/mm3 5.79   HEMOGLOBIN g/dL 9.9*   HEMATOCRIT % 32.8*   PLATELETS 10*3/mm3 175        Results from last 7 days   Lab Units 06/25/24 1922   SODIUM mmol/L 140   POTASSIUM mmol/L 3.8   CHLORIDE mmol/L 104   CO2 mmol/L 22.1   BUN mg/dL 19   CREATININE mg/dL 0.77   CALCIUM mg/dL 9.9   BILIRUBIN mg/dL 0.5   ALK PHOS U/L 92   ALT (SGPT) U/L 8   AST (SGOT) U/L 21   GLUCOSE mg/dL 101*                 CT Head Without Contrast Stroke Protocol    Result Date: 6/25/2024  CT HEAD WO CONTRAST STROKE PROTOCOL Date of Exam: 6/25/2024 8:53 PM EDT Indication: Stroke, follow up Neuro deficit, acute, stroke suspected. The patient has a recent pathology diagnosis of adenocarcinoma of the colon with metastatic disease. Comparison: 12/2/2019 Technique: Axial CT images were obtained of the head without contrast administration.  Reconstructed coronal and sagittal images were also obtained. Automated exposure control and iterative construction methods were used. Scan Time: 2100 Results discussed with DEA Isaacs at 2104. Findings: Rounded high density lesion within the right frontal lobe with significant surrounding edema. This has a ringlike appearance despite the lack of contrast and measures 3.5 cm x 3.2 cm concerning for metastatic disease to the brain. Higher density ovoid lesion within the left frontal lobe measuring 2 cm x 1.5 cm with significant surrounding edema also concerning for metastatic disease. 1 cm x 0.8 cm lesion within the left parietal lobe also concerning for metastatic disease to the brain. Additional smaller lesions are suspected. The posterior fossa is normal. No definite intracranial hemorrhage or infarct. The calvarium is intact. Orbital and parable soft tissues are normal. The paranasal  sinuses are clear.     Impression: Findings concerning for metastatic disease to the brain. MRI of the brain with contrast recommended. Electronically Signed: Praveen Esquivel MD  6/25/2024 9:08 PM EDT  Workstation ID: [id number]      CT Angiogram Head    Result Date: 6/25/2024  CT ANGIOGRAM HEAD, CT ANGIOGRAM NECK Date of Exam: 6/25/2024 8:55 PM EDT Indication: Stroke. Comparison: 12/7/2020. Technique: CTA of the head and neck was performed after the uneventful intravenous administration of iodinated contrast. Reconstructed coronal and sagittal images were also obtained. In addition, a 3-D volume rendered image was created for interpretation. Automated exposure control and iterative reconstruction methods were used. Findings: Atheromatous disease of the aortic arch. The right common carotid artery is normal. Mild atheromatous disease of the right carotid bulb and proximal right internal carotid artery. Mild atheromatous disease involving the intracranial segments of  the right internal carotid artery. The right carotid terminus is normal. The visualized branches of the right anterior and middle cerebral arteries are normal. Mild atheromatous disease of the left carotid bulb and proximal left internal carotid artery with no significant narrowing per NASCET criteria. Mild atheromatous disease involving the intracranial segments of the left internal carotid artery. The left carotid terminus is normal. The visualized branches of the left anterior and middle cerebral arteries are normal. Both vertebral arteries arise from the subclavian arteries. The right vertebral artery is dominant. The origin of the left vertebral artery is occluded with some tenuous flow progressing intracranially with retrograde flow from the basilar artery present. Both vertebral arteries supply the basilar artery. The basilar artery and basilar artery tip are normal. The basilar artery terminates in bilateral posterior cerebral arteries which appear  normal. The known intracranial masses demonstrate enhancement. Orbital and periorbital soft tissues are normal. The paranasal sinuses are clear. No cervical adenopathy. The thyroid gland is normal. Compared with the prior CT of the chest there are multiple pulmonary nodules and masses better identified on the prior CT of the chest. These have significantly increased in size however a dedicated CT of the chest is recommended. A previously identified right upper lobe nodule which previously measured 1.6 cm currently measures 2.2 cm x 2.1 cm. Right hilar mass. Bilateral chest wall masses. No lytic or blastic disease.     Impression: Mild atheromatous disease. No vessel stenosis or occlusion. Known intracranial masses demonstrate enhancement. Pulmonary masses have increased in size. Electronically Signed: Praveen Esquievl MD  6/25/2024 9:28 PM EDT  Workstation ID: [id number]         Assessment     MDM: This is a 63 y.o. female with a history of colon cancer with known osseous, hepatic, and pulmonary metastases who presented to the ED yesterday with strokelike symptoms.  Today she mainly has mild left-sided ptosis and facial droop.  Cognitively she appears intact.  CT head shows a right frontal lobe lesion with prominent vasogenic edema, most likely representing metastasis.    I have ordered a contrast MRI brain w/ [facility]  protocols for better evaluation of this lesion.  Further neurosurgical recommendations pending completion of the study as well as discussion with oncology.        Plan     Brain mass  - MRI brain with and without contrast; [facility] protocols  - Continue Decadron and Keppra  - Hold ASA for now  - Routine sodium monitoring  - Hematology oncology consult  - Fine for normal diet today from neurosurgical respective    Neurosurgery will continue to follow.  Call with any questions or concerns.      I discussed my assessment and recommendations with Dr. Randee Awan PA-C  6/26/2024  07:44 EDT      Part  of this note may be an electronic transcription/translation of spoken language to printed text using the Dragon Dictation System.

## 2024-06-26 NOTE — PROGRESS NOTES
[facility] Medicine Services Daily Progress Note    Patient Name: Samantha Massey  : 1960  MRN: [id number]  Primary Care Physician:  Diana Spencer MD  Date of admission: 2024      Subjective      Chief Complaint:     Subjective   Patient Reports feeling weak on left side. Denies for any nausea or vomiting.     ROS       Objective      Vitals:   Temp:  [97.5 °F (36.4 °C)-98.2 °F (36.8 °C)] 97.5 °F (36.4 °C)  Heart Rate:  [] 90  Resp:  [14-19] 19  BP: (122-179)/() 146/76    Physical Exam  Vitals and nursing note reviewed.   Constitutional:       General: She is not in acute distress.     Appearance: Normal appearance. She is well-developed. She is not ill-appearing, toxic-appearing or diaphoretic.   HENT:      Head: Normocephalic and atraumatic.      Right Ear: Ear canal and external ear normal.      Left Ear: Ear canal and external ear normal.      Nose: Nose normal. No congestion or rhinorrhea.      Mouth/Throat:      Mouth: Mucous membranes are moist.      Pharynx: No oropharyngeal exudate.   Eyes:      General: No scleral icterus.        Right eye: No discharge.         Left eye: No discharge.      Extraocular Movements: Extraocular movements intact.      Conjunctiva/sclera: Conjunctivae normal.      Pupils: Pupils are equal, round, and reactive to light.   Neck:      Thyroid: No thyromegaly.      Vascular: No carotid bruit or JVD.      Trachea: No tracheal deviation.   Cardiovascular:      Rate and Rhythm: Normal rate and regular rhythm.      Pulses: Normal pulses.      Heart sounds: Normal heart sounds. No murmur heard.     No friction rub. No gallop.   Pulmonary:      Effort: Pulmonary effort is normal. No respiratory distress.      Breath sounds: Normal breath sounds. No stridor. No wheezing, rhonchi or rales.   Chest:      Chest wall: No tenderness.   Abdominal:      General: Bowel sounds are normal. There is no distension.      Palpations: Abdomen is soft.  There is no mass.      Tenderness: There is no abdominal tenderness. There is no guarding or rebound.      Hernia: No hernia is present.   Musculoskeletal:         General: No swelling, tenderness, deformity or signs of injury. Normal range of motion.      Cervical back: Normal range of motion and neck supple. No rigidity. No muscular tenderness.      Right lower leg: No edema.      Left lower leg: No edema.   Lymphadenopathy:      Cervical: No cervical adenopathy.   Skin:     General: Skin is warm and dry.      Coloration: Skin is not jaundiced or pale.      Findings: No bruising, erythema or rash.   Neurological:      General: No focal deficit present.      Mental Status: She is alert and oriented to person, place, and time. Mental status is at baseline.      Cranial Nerves: No cranial nerve deficit.      Sensory: No sensory deficit.      Motor: No weakness or abnormal muscle tone.      Coordination: Coordination normal.   Psychiatric:         Mood and Affect: Mood normal.         Behavior: Behavior normal.         Thought Content: Thought content normal.         Judgment: Judgment normal.             Result Review    Result Review:  I have personally reviewed the results from the time of this admission to 6/26/2024 13:19 EDT and agree with these findings:  [x]  Laboratory  [x]  Microbiology  [x]  Radiology  []  EKG/Telemetry   []  Cardiology/Vascular   []  Pathology  []  Old records  []  Other:  Most notable findings include:           Assessment & Plan      Brief Patient Summary:  Samantha Massey is a 63 y.o. female who       aspirin, 81 mg, Oral, Daily   Or  aspirin, 300 mg, Rectal, Daily  atorvastatin, 80 mg, Oral, Nightly  dexAMETHasone, 4 mg, Intravenous, Q6H  enoxaparin, 40 mg, Subcutaneous, Q24H  levETIRAcetam, 1,000 mg, Intravenous, Q12H  metoprolol succinate XL, 25 mg, Oral, Q24H  pantoprazole, 40 mg, Intravenous, Q AM  sodium chloride, 10 mL, Intravenous, Q12H             Active Hospital  Problems:  Active Hospital Problems    Diagnosis     **Metastasis to brain     Left hand weakness     Hyperlipidemia LDL goal <100     Primary hypertension      Plan:       1.  Left-handed weakness stroke workup ensued but believe that this may be secondary to brain metastasis. D/c asa. Neurosurgery on board.     2.  Metastasis to the brain neurosurgery recommended that we place the patient on dexamethasone and Keppra these medications have been started.  Hematology oncology has been consulted as well.     3.  Hyperlipidemia continue atorvastatin 80    4.  Hypertension continue metoprolol hydralazine as needed.     VTE Prophylaxis:  Pharmacologic VTE prophylaxis orders are present.           The patient desires to be as follows:     CODE STATUS:    Level Of Support Discussed With: Patient  Code Status (Patient has no pulse and is not breathing): No CPR (Do Not Attempt to Resuscitate)  Medical Interventions (Patient has pulse or is breathing): Comfort Measures        Admission Status:  I believe this patient meets inpatient status.      VTE Prophylaxis:  Pharmacologic VTE prophylaxis orders are present.        CODE STATUS:    Level Of Support Discussed With: Patient  Code Status (Patient has no pulse and is not breathing): CPR (Attempt to Resuscitate)  Medical Interventions (Patient has pulse or is breathing): Full Support      Disposition:  I expect patient to be discharged as per clinical course.    This patient has been examined wearing appropriate Personal Protective Equipment and discussed with hospital infection control department. 06/26/24      Electronically signed by Gillian Wagoner MD, 06/26/24, 13:19 EDT.  [facility] Hospitalist Team

## 2024-06-26 NOTE — PLAN OF CARE
Goal Outcome Evaluation:  Plan of Care Reviewed With: patient        Progress: no change   Pt is a 64 y/o F admitted to [facility] on 6/25/24 with complaints of L-sided weakness and numbness since the day prior. There are also complaints of facial droop and mild slurred speech. She does have known colon cancer with liver metastasis. XR Chest (+) for R upper lobe mass and CT Head concerning for metastatic disease to the brain while MRI Brain (+) for 3 mass lesions in the brain with associated vasogenic edema. Admitted for monitoring of brain metastasis. Oncology relating LUE weakness to metastasis. She is currently living at home with friend in [facility] with 13 steps to enter. At baseline, she is normally IND with household mobility and ADLs with no AD. This date, she is AAOx4 and lying supine in bed with no complaints of pain. She completes bed mobility SBA, transfers SBA, and amb 2x20' CGA>SBA with no AD. There is only mild LLE weakness, but seems to have marked LUE weakness when compared to the RUE. Ambulates safely, seemingly to be at her baseline, but PT would like to keep on caseload to ensure safety with stair navigation prior to d/c. PT recommending OPPT at d/c to improve strength of LUE. Will follow during stay and will need to assess safety with stairs.    Anticipated Discharge Disposition (PT): home with outpatient therapy services

## 2024-06-26 NOTE — NURSING NOTE
Pt requested for CPR but no intubation and currently wishes to be full treatment; on-call provider informed

## 2024-06-26 NOTE — PLAN OF CARE
Goal Outcome Evaluation:                          Problem: Adult Inpatient Plan of Care  Goal: Absence of Hospital-Acquired Illness or Injury  Intervention: Identify and Manage Fall Risk  Recent Flowsheet Documentation  Taken 6/26/2024 0000 by Donato Mistry RN  Safety Promotion/Fall Prevention:   safety round/check completed   room organization consistent   nonskid shoes/slippers when out of bed   lighting adjusted   assistive device/personal items within reach   clutter free environment maintained   fall prevention program maintained  Taken 6/25/2024 2230 by Donato Mistry RN  Safety Promotion/Fall Prevention:   safety round/check completed   room organization consistent   nonskid shoes/slippers when out of bed   lighting adjusted   assistive device/personal items within reach   clutter free environment maintained   fall prevention program maintained  Intervention: Prevent Skin Injury  Recent Flowsheet Documentation  Taken 6/26/2024 0000 by Donato Mistry RN  Body Position: position changed independently  Taken 6/25/2024 2230 by Donato Mistry RN  Body Position: position changed independently  Goal: Optimal Comfort and Wellbeing  Intervention: Monitor Pain and Promote Comfort  Recent Flowsheet Documentation  Taken 6/26/2024 0000 by Donato Mistry RN  Pain Management Interventions:   care clustered   pillow support provided   position adjusted   quiet environment facilitated  Intervention: Provide Person-Centered Care  Recent Flowsheet Documentation  Taken 6/26/2024 0000 by Donato Mistry RN  Trust Relationship/Rapport:   care explained   choices provided   emotional support provided   empathic listening provided   questions answered   questions encouraged   reassurance provided   thoughts/feelings acknowledged  Goal: Readiness for Transition of Care  Intervention: Mutually Develop Transition Plan  Recent Flowsheet Documentation  Taken 6/26/2024 0342 by Donato Mistry RN  Transportation Anticipated:  family or friend will provide  Patient/Family Anticipated Services at Transition: none  Patient/Family Anticipates Transition to: home with family  Taken 6/26/2024 0338 by Donato Mistry RN  Equipment Currently Used at Home: bp cuff     Problem: Fall Injury Risk  Goal: Absence of Fall and Fall-Related Injury  Intervention: Identify and Manage Contributors  Recent Flowsheet Documentation  Taken 6/26/2024 0000 by Donato Mistry RN  Medication Review/Management: medications reviewed  Taken 6/25/2024 2230 by Donato Mistry RN  Medication Review/Management: medications reviewed  Intervention: Promote Injury-Free Environment  Recent Flowsheet Documentation  Taken 6/26/2024 0000 by Donato Mistry RN  Safety Promotion/Fall Prevention:   safety round/check completed   room organization consistent   nonskid shoes/slippers when out of bed   lighting adjusted   assistive device/personal items within reach   clutter free environment maintained   fall prevention program maintained  Taken 6/25/2024 2230 by Donato Mistry RN  Safety Promotion/Fall Prevention:   safety round/check completed   room organization consistent   nonskid shoes/slippers when out of bed   lighting adjusted   assistive device/personal items within reach   clutter free environment maintained   fall prevention program maintained     Problem: Pain Acute  Goal: Acceptable Pain Control and Functional Ability  Intervention: Prevent or Manage Pain  Recent Flowsheet Documentation  Taken 6/26/2024 0000 by Donato Mistry RN  Medication Review/Management: medications reviewed  Taken 6/25/2024 2230 by Donato Mistry RN  Medication Review/Management: medications reviewed  Intervention: Develop Pain Management Plan  Recent Flowsheet Documentation  Taken 6/26/2024 0000 by Donato Mistry RN  Pain Management Interventions:   care clustered   pillow support provided   position adjusted   quiet environment facilitated  Intervention: Optimize Psychosocial  Wellbeing  Recent Flowsheet Documentation  Taken 6/26/2024 0000 by Donato Mistry, RN  Diversional Activities: television

## 2024-06-27 LAB
GLUCOSE BLDC GLUCOMTR-MCNC: 147 MG/DL (ref 70–105)
GLUCOSE BLDC GLUCOMTR-MCNC: 181 MG/DL (ref 70–105)
GLUCOSE BLDC GLUCOMTR-MCNC: 184 MG/DL (ref 70–105)

## 2024-06-27 PROCEDURE — 99223 1ST HOSP IP/OBS HIGH 75: CPT | Performed by: INTERNAL MEDICINE

## 2024-06-27 PROCEDURE — 25010000002 LEVETRIRACETAM PER 10 MG: Performed by: INTERNAL MEDICINE

## 2024-06-27 PROCEDURE — 80048 BASIC METABOLIC PNL TOTAL CA: CPT | Performed by: INTERNAL MEDICINE

## 2024-06-27 PROCEDURE — 82948 REAGENT STRIP/BLOOD GLUCOSE: CPT

## 2024-06-27 PROCEDURE — 85025 COMPLETE CBC W/AUTO DIFF WBC: CPT | Performed by: INTERNAL MEDICINE

## 2024-06-27 PROCEDURE — 99232 SBSQ HOSP IP/OBS MODERATE 35: CPT

## 2024-06-27 PROCEDURE — 25010000002 DEXAMETHASONE PER 1 MG: Performed by: INTERNAL MEDICINE

## 2024-06-27 PROCEDURE — 25010000002 ENOXAPARIN PER 10 MG: Performed by: INTERNAL MEDICINE

## 2024-06-27 PROCEDURE — 92523 SPEECH SOUND LANG COMPREHEN: CPT

## 2024-06-27 PROCEDURE — 82948 REAGENT STRIP/BLOOD GLUCOSE: CPT | Performed by: INTERNAL MEDICINE

## 2024-06-27 RX ADMIN — PANTOPRAZOLE SODIUM 40 MG: 40 INJECTION, POWDER, FOR SOLUTION INTRAVENOUS at 05:24

## 2024-06-27 RX ADMIN — DEXAMETHASONE SODIUM PHOSPHATE 4 MG: 4 INJECTION, SOLUTION INTRAMUSCULAR; INTRAVENOUS at 23:11

## 2024-06-27 RX ADMIN — Medication 10 ML: at 22:14

## 2024-06-27 RX ADMIN — ENOXAPARIN SODIUM 40 MG: 100 INJECTION SUBCUTANEOUS at 15:59

## 2024-06-27 RX ADMIN — LEVETIRACETAM 1000 MG: 500 INJECTION, SOLUTION INTRAVENOUS at 09:10

## 2024-06-27 RX ADMIN — DEXAMETHASONE SODIUM PHOSPHATE 4 MG: 4 INJECTION, SOLUTION INTRAMUSCULAR; INTRAVENOUS at 12:16

## 2024-06-27 RX ADMIN — Medication 10 ML: at 09:11

## 2024-06-27 RX ADMIN — ACETAMINOPHEN 650 MG: 325 TABLET, FILM COATED ORAL at 15:59

## 2024-06-27 RX ADMIN — EMPAGLIFLOZIN 10 MG: 10 TABLET, FILM COATED ORAL at 17:12

## 2024-06-27 RX ADMIN — METOPROLOL SUCCINATE 25 MG: 25 TABLET, EXTENDED RELEASE ORAL at 09:11

## 2024-06-27 RX ADMIN — LEVETIRACETAM 1000 MG: 500 INJECTION, SOLUTION INTRAVENOUS at 22:14

## 2024-06-27 RX ADMIN — TRAMADOL HYDROCHLORIDE 50 MG: 50 TABLET, COATED ORAL at 22:44

## 2024-06-27 RX ADMIN — ATORVASTATIN CALCIUM 80 MG: 40 TABLET, FILM COATED ORAL at 09:10

## 2024-06-27 RX ADMIN — DEXAMETHASONE SODIUM PHOSPHATE 4 MG: 4 INJECTION, SOLUTION INTRAMUSCULAR; INTRAVENOUS at 05:24

## 2024-06-27 RX ADMIN — ACETAMINOPHEN 650 MG: 325 TABLET, FILM COATED ORAL at 09:10

## 2024-06-27 RX ADMIN — ACETAMINOPHEN 650 MG: 325 TABLET, FILM COATED ORAL at 22:09

## 2024-06-27 RX ADMIN — DEXAMETHASONE SODIUM PHOSPHATE 4 MG: 4 INJECTION, SOLUTION INTRAMUSCULAR; INTRAVENOUS at 17:12

## 2024-06-27 NOTE — PAYOR COMM NOTE
"Inpatient order 6/25/24    ER admit -   MD notes and clinical attached.     ==================  AUTHORIZATION PENDING:   PLEASE FAX OR CALL DETERMINATION TO CONTACT BELOW:       THANK YOU,    GOPAL Emmanuel, RN  Utilization Review  [facility]  Phone: [phone]  Fax: [phone]      NPI: [id number]  Tax ID: [id number]        Daniela Masseypamela CARIAS \"Mary\" (63 y.o. Female)       Date of Birth   1960    Social Security Number       Address   [address] [place] [address] [place] [address]    Home Phone   [phone]    MRN   [id number]       Mu-ism   Non-Uatsdin    Marital Status   Single                            Admission Date   6/25/24    Admission Type   Emergency    Admitting Provider   Jay Michelle MD    Attending Provider   Ravin Guy MD    Department, Room/Bed   [facility] NEURO, 251/1       Discharge Date       Discharge Disposition       Discharge Destination                                 Attending Provider: Ravin Guy MD    Allergies: Hydrocodone, Iodinated Contrast Media, Latex, Penicillins    Isolation: None   Infection: None   Code Status: CPR    Ht: 157.5 cm (62\")   Wt: 51.4 kg (113 lb 5.1 oz)    Admission Cmt: None   Principal Problem: Metastasis to brain [C79.31]                   Active Insurance as of 6/25/2024       Primary Coverage       Payor Plan Insurance Group Employer/Plan Group    ANTHEM BLUE CROSS ANTHEM BLUE CROSS BLUE SHIELD PPO [id number]       Payor Plan Address Payor Plan Phone Number Payor Plan Fax Number Effective Dates    [address] [phone]  11/1/2020 - None Entered    [facility] [address]         Subscriber Name Subscriber Birth Date Member ID       ANDREAS MASSEY ARETHA 1960 [id number]                     Emergency Contacts        (Rel.) Home Phone Work Phone Mobile Phone    BRENT FINE (Other) [phone] -- [phone]                 History & Physical        Jay Michelle MD at 06/25/24 2307  "             [facility] Medicine Services  History & Physical    Patient Name: Samantha Massey  : 1960  MRN: [id number]  Primary Care Physician:  Diana Spencer MD  Date of admission: 2024  Date and Time of Service: 2024 at 22:30    Subjective      Chief Complaint: Left-sided weakness upper extremity    History of Present Illness: Samantha Massey is a 63 y.o. female with a past medical history of hyperlipidemia, coronary artery disease, hypertension, grade 1 diastolic heart dysfunction, and colon cancer status postchemotherapy and metastasis to the lung.  She has had 2 treatments of radiation to the chest.  She was her baseline until yesterday whenever she had left hand and left arm weakness.  She had difficulty controlling her left hand.  She was hoping that it would get better but today her sister came over and insisted that she come to the emergency room.  A stroke workup ensued and it appears that she has metastatic cancer to the brain.  Neurosurgery and neurology were consulted by the ER practitioner.  They requested an MRI and continue the workup.  Neurosurgery requested placing the patient on dexamethasone and Keppra.  Patient will be admitted PT OT evaluated evaluated by neurology neurosurgery and hematology oncology.  Patient states that she is able to ambulate without any difficulties.  She also reports no loss of consciousness    Sister was at bedside and patient both parties agree that she is DO NOT RESUSCITATE if her heart should stop    Review of Systems left hand weakness and left arm weakness, patient denies headache, chest pain, shortness of breath, fever, chills, loss of consciousness, altered mental status, abdominal pain, constipation, diarrhea, dysuria, rash, unusual joint pain, congestion, cough, changes in vision, and the rest of 15 essential review of systems have been reviewed and are negative    Personal History     Past Medical History:   Diagnosis Date    Acute  systolic heart failure 12/09/2023    Allergic laytex,peniciline,contrast dye    CAD (coronary artery disease) 12/09/2023    CHF (congestive heart failure) 12/07/23    Chronic kidney disease     colon cancer     Metastatic to liver and bones    Colon cancer     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    History of colon cancer, stage IV     Stage SHELL with progression    Hypertension     NSTEMI (non-ST elevated myocardial infarction) 12/09/2023    PONV (postoperative nausea and vomiting)     Pulmonary arterial hypertension     Radiculopathy 07/2012    Right L5/S1       Past Surgical History:   Procedure Laterality Date    ABDOMINAL SURGERY      CARDIAC CATHETERIZATION N/A 12/08/2023    Procedure: Left Heart Cath;  Surgeon: Ramiro Olivera MD;  Location: [facility] CATH INVASIVE LOCATION;  Service: Cardiovascular;  Laterality: N/A;    COLON SURGERY  Colon resection    CYST REMOVAL  1998    cyst removed from [facility] in 1998    HERNIA REPAIR  incisional    PORTACATH PLACEMENT  2017    VENOUS ACCESS DEVICE (PORT) INSERTION Left 01/12/2021    Procedure: INSERTION VENOUS ACCESS DEVICE;  Surgeon: Jay Yeh MD;  Location: [facility] MAIN OR;  Service: General;  Laterality: Left;    VENOUS ACCESS DEVICE (PORT) REMOVAL Right 08/27/2020    Procedure: REMOVAL VENOUS ACCESS DEVICE;  Surgeon: Jay Yeh MD;  Location: [facility] MAIN OR;  Service: General;  Laterality: Right;    VENTRAL/INCISIONAL HERNIA REPAIR N/A 03/02/2023    Procedure: VENTRAL/INCISIONAL HERNIA REPAIR WITH MESH;  Surgeon: Jay Yeh MD;  Location: [facility] MAIN OR;  Service: General;  Laterality: N/A;       Family History: family history includes Cancer in her father and sister; Heart disease in her father and mother; Hyperlipidemia in her father; Lung cancer (age of onset: 70) in her father. Otherwise pertinent FHx was reviewed and not pertinent to current issue.    Social History:  reports that she quit smoking about 12 years ago.  Her smoking use included cigarettes. She started smoking about 43 years ago. She has a 31 pack-year smoking history. She has been exposed to tobacco smoke. She has never used smokeless tobacco. She reports current alcohol use. She reports that she does not use drugs.    Home Medications:  Prior to Admission Medications       Prescriptions Last Dose Informant Patient Reported? Taking?    aspirin 81 MG EC tablet   Yes Yes    Take 1 tablet by mouth Daily.    atorvastatin (LIPITOR) 80 MG tablet   No Yes    Take 1 tablet by mouth Daily for 360 days.    Cyanocobalamin (Vitamin B-12) 5000 MCG sublingual tablet   Yes Yes    Place 1 tablet under the tongue Every Morning.    empagliflozin (JARDIANCE) 10 MG tablet tablet   No Yes    Take 1 tablet by mouth Daily for 360 days.    Lysine 500 MG capsule   Yes Yes    Take 1 tablet by mouth 2 (Two) Times a Day.    metoprolol succinate XL (TOPROL-XL) 25 MG 24 hr tablet   No Yes    Take 1 tablet by mouth Daily for 360 days.    traMADol (ULTRAM) 50 MG tablet   No Yes    Take 1 tablet by mouth Every 6 (Six) Hours As Needed for Moderate Pain.              Allergies:  Allergies   Allergen Reactions    Hydrocodone Nausea And Vomiting    Iodinated Contrast Media Hives and Itching     PT HAD SOME HIVES DURING SCAN.  PRIOR TO SCAN 8-3-2013.    Latex Rash     Blisters     Penicillins Hives       Objective      Vitals:   Temp:  [97.8 °F (36.6 °C)] 97.8 °F (36.6 °C)  Heart Rate:  [] 104  Resp:  [16] 16  BP: (147-173)/(71-97) 154/97  Body mass index is 20.73 kg/m².  Physical Exam    Diagnostic Data:  Lab Results (last 24 hours)       Procedure Component Value Units Date/Time    Comprehensive Metabolic Panel [[id number]]  (Abnormal) Collected: 06/25/24 1922    Specimen: Blood Updated: 06/25/24 1953     Glucose 101 mg/dL      BUN 19 mg/dL      Creatinine 0.77 mg/dL      Sodium 140 mmol/L      Potassium 3.8 mmol/L      Chloride 104 mmol/L      CO2 22.1 mmol/L      Calcium 9.9 mg/dL      Total  Protein 7.0 g/dL      Albumin 4.2 g/dL      ALT (SGPT) 8 U/L      AST (SGOT) 21 U/L      Alkaline Phosphatase 92 U/L      Total Bilirubin 0.5 mg/dL      Globulin 2.8 gm/dL      A/G Ratio 1.5 g/dL      BUN/Creatinine Ratio 24.7     Anion Gap 13.9 mmol/L      eGFR 87.3 mL/min/1.73     Narrative:      GFR Normal >60  Chronic Kidney Disease <60  Kidney Failure <15      Protime-INR [[id number]]  (Normal) Collected: 06/25/24 1922    Specimen: Blood Updated: 06/25/24 1940     Protime 10.5 Seconds      INR 0.96    [place] Draw [[id number]] Collected: 06/25/24 1922    Specimen: Blood Updated: 06/25/24 1931    Narrative:      The following orders were created for panel order [place] Draw.  Procedure                               Abnormality         Status                     ---------                               -----------         ------                     Green Top (Gel)[[id number]]                                  Final result               Lavender Top[[id number]]                                     Final result               Gold Top - SST[[id number]]                                   Final result               Light Blue Top[[id number]]                                   Final result                 Please view results for these tests on the individual orders.    Green Top (Gel) [[id number]] Collected: 06/25/24 1922    Specimen: Blood Updated: 06/25/24 1931     Extra Tube Hold for add-ons.     Comment: Auto resulted.       Lavender Top [[id number]] Collected: 06/25/24 1922    Specimen: Blood Updated: 06/25/24 1931     Extra Tube hold for add-on     Comment: Auto resulted       Gold Top - SST [[id number]] Collected: 06/25/24 1922    Specimen: Blood Updated: 06/25/24 1931     Extra Tube Hold for add-ons.     Comment: Auto resulted.       Light Blue Top [[id number]] Collected: 06/25/24 1922    Specimen: Blood Updated: 06/25/24 1931     Extra Tube Hold for add-ons.     Comment: Auto resulted       CBC & Differential [[id number]]   (Abnormal) Collected: 06/25/24 1922    Specimen: Blood Updated: 06/25/24 1930    Narrative:      The following orders were created for panel order CBC & Differential.  Procedure                               Abnormality         Status                     ---------                               -----------         ------                     CBC Auto Differential[[id number]]        Abnormal            Final result                 Please view results for these tests on the individual orders.    CBC Auto Differential [[id number]]  (Abnormal) Collected: 06/25/24 1922    Specimen: Blood Updated: 06/25/24 1930     WBC 5.79 10*3/mm3      RBC 3.22 10*6/mm3      Hemoglobin 9.9 g/dL      Hematocrit 32.8 %      .9 fL      MCH 30.7 pg      MCHC 30.2 g/dL      RDW 15.2 %      RDW-SD 57.0 fl      MPV 9.4 fL      Platelets 175 10*3/mm3      Neutrophil % 72.5 %      Lymphocyte % 13.0 %      Monocyte % 11.1 %      Eosinophil % 2.9 %      Basophil % 0.3 %      Immature Grans % 0.2 %      Neutrophils, Absolute 4.20 10*3/mm3      Lymphocytes, Absolute 0.75 10*3/mm3      Monocytes, Absolute 0.64 10*3/mm3      Eosinophils, Absolute 0.17 10*3/mm3      Basophils, Absolute 0.02 10*3/mm3      Immature Grans, Absolute 0.01 10*3/mm3      nRBC 0.0 /100 WBC              Imaging Results (Last 24 Hours)       Procedure Component Value Units Date/Time    CT Angiogram Head [[id number]] Collected: 06/25/24 2122     Updated: 06/25/24 2130    Narrative:      CT ANGIOGRAM HEAD, CT ANGIOGRAM NECK    Date of Exam: 6/25/2024 8:55 PM EDT    Indication: Stroke.    Comparison: 12/7/2020.    Technique: CTA of the head and neck was performed after the uneventful intravenous administration of iodinated contrast. Reconstructed coronal and sagittal images were also obtained. In addition, a 3-D volume rendered image was created for   interpretation. Automated exposure control and iterative reconstruction methods were used.    Findings: Atheromatous disease of  the aortic arch. The right common carotid artery is normal. Mild atheromatous disease of the right carotid bulb and proximal right internal carotid artery. Mild atheromatous disease involving the intracranial segments of   the right internal carotid artery. The right carotid terminus is normal. The visualized branches of the right anterior and middle cerebral arteries are normal.    Mild atheromatous disease of the left carotid bulb and proximal left internal carotid artery with no significant narrowing per NASCET criteria. Mild atheromatous disease involving the intracranial segments of the left internal carotid artery. The left   carotid terminus is normal. The visualized branches of the left anterior and middle cerebral arteries are normal.    Both vertebral arteries arise from the subclavian arteries. The right vertebral artery is dominant. The origin of the left vertebral artery is occluded with some tenuous flow progressing intracranially with retrograde flow from the basilar artery   present. Both vertebral arteries supply the basilar artery. The basilar artery and basilar artery tip are normal. The basilar artery terminates in bilateral posterior cerebral arteries which appear normal.    The known intracranial masses demonstrate enhancement. Orbital and periorbital soft tissues are normal. The paranasal sinuses are clear. No cervical adenopathy. The thyroid gland is normal. Compared with the prior CT of the chest there are multiple   pulmonary nodules and masses better identified on the prior CT of the chest. These have significantly increased in size however a dedicated CT of the chest is recommended. A previously identified right upper lobe nodule which previously measured 1.6 cm   currently measures 2.2 cm x 2.1 cm. Right hilar mass. Bilateral chest wall masses. No lytic or blastic disease.      Impression:      Mild atheromatous disease. No vessel stenosis or occlusion. Known intracranial masses  demonstrate enhancement. Pulmonary masses have increased in size.      Electronically Signed: Praveen Esquivel MD    6/25/2024 9:28 PM EDT    Workstation ID: [id number]    CT Angiogram Neck [[id number]] Collected: 06/25/24 2122     Updated: 06/25/24 2130    Narrative:      CT ANGIOGRAM HEAD, CT ANGIOGRAM NECK    Date of Exam: 6/25/2024 8:55 PM EDT    Indication: Stroke.    Comparison: 12/7/2020.    Technique: CTA of the head and neck was performed after the uneventful intravenous administration of iodinated contrast. Reconstructed coronal and sagittal images were also obtained. In addition, a 3-D volume rendered image was created for   interpretation. Automated exposure control and iterative reconstruction methods were used.    Findings: Atheromatous disease of the aortic arch. The right common carotid artery is normal. Mild atheromatous disease of the right carotid bulb and proximal right internal carotid artery. Mild atheromatous disease involving the intracranial segments of   the right internal carotid artery. The right carotid terminus is normal. The visualized branches of the right anterior and middle cerebral arteries are normal.    Mild atheromatous disease of the left carotid bulb and proximal left internal carotid artery with no significant narrowing per NASCET criteria. Mild atheromatous disease involving the intracranial segments of the left internal carotid artery. The left   carotid terminus is normal. The visualized branches of the left anterior and middle cerebral arteries are normal.    Both vertebral arteries arise from the subclavian arteries. The right vertebral artery is dominant. The origin of the left vertebral artery is occluded with some tenuous flow progressing intracranially with retrograde flow from the basilar artery   present. Both vertebral arteries supply the basilar artery. The basilar artery and basilar artery tip are normal. The basilar artery terminates in bilateral posterior cerebral  arteries which appear normal.    The known intracranial masses demonstrate enhancement. Orbital and periorbital soft tissues are normal. The paranasal sinuses are clear. No cervical adenopathy. The thyroid gland is normal. Compared with the prior CT of the chest there are multiple   pulmonary nodules and masses better identified on the prior CT of the chest. These have significantly increased in size however a dedicated CT of the chest is recommended. A previously identified right upper lobe nodule which previously measured 1.6 cm   currently measures 2.2 cm x 2.1 cm. Right hilar mass. Bilateral chest wall masses. No lytic or blastic disease.      Impression:      Mild atheromatous disease. No vessel stenosis or occlusion. Known intracranial masses demonstrate enhancement. Pulmonary masses have increased in size.      Electronically Signed: Praveen Esquivel MD    6/25/2024 9:28 PM EDT    Workstation ID: [id number]    CT Head Without Contrast Stroke Protocol [[id number]] Collected: 06/25/24 2102     Updated: 06/25/24 2114    Narrative:      CT HEAD WO CONTRAST STROKE PROTOCOL    Date of Exam: 6/25/2024 8:53 PM EDT    Indication: Stroke, follow up  Neuro deficit, acute, stroke suspected. The patient has a recent pathology diagnosis of adenocarcinoma of the colon with metastatic disease.    Comparison: 12/2/2019    Technique: Axial CT images were obtained of the head without contrast administration.  Reconstructed coronal and sagittal images were also obtained. Automated exposure control and iterative construction methods were used.    Scan Time: 2100  Results discussed with DEA Isaacs at 2104.    Findings: Rounded high density lesion within the right frontal lobe with significant surrounding edema. This has a ringlike appearance despite the lack of contrast and measures 3.5 cm x 3.2 cm concerning for metastatic disease to the brain. Higher   density ovoid lesion within the left frontal lobe measuring 2 cm x 1.5 cm with  significant surrounding edema also concerning for metastatic disease. 1 cm x 0.8 cm lesion within the left parietal lobe also concerning for metastatic disease to the brain.   Additional smaller lesions are suspected. The posterior fossa is normal. No definite intracranial hemorrhage or infarct.    The calvarium is intact. Orbital and parable soft tissues are normal. The paranasal sinuses are clear.      Impression:      Findings concerning for metastatic disease to the brain. MRI of the brain with contrast recommended.        Electronically Signed: Praveen Esquivel MD    6/25/2024 9:08 PM EDT    Workstation ID: [id number]    XR Chest 1 View [[id number]] Collected: 06/25/24 2011     Updated: 06/25/24 2015    Narrative:      XR CHEST 1 VW    Date of Exam: 6/25/2024 8:00 PM EDT    Indication: Stroke Protocol (Onset > 12 hrs)    Comparison: None available.    Findings: Right upper lobe mass. Right lower lobe infiltrate versus mass. Vascular congestion. Left-sided infusion port with the tip in the superior vena cava. No pneumothorax or pleural effusion. The clavicles are intact. No rib fractures. Smaller   bilateral pulmonary nodules are also noted.      Impression:      Right lower lobe infiltrate versus mass. Right upper lobe mass. Vascular congestion.      Electronically Signed: Praveen Esquivel MD    6/25/2024 8:13 PM EDT    Workstation ID: [id number]              Assessment & Plan        This is a 63 y.o. female with:    Active and Resolved Problems  Active Hospital Problems    Diagnosis  POA    **Metastasis to brain [C79.31]  Yes     Priority: High    Left hand weakness [R29.898]  Yes     Priority: High    Hyperlipidemia LDL goal <100 [E78.5]  Yes    Primary hypertension [I10]  Yes      Resolved Hospital Problems   No resolved problems to display.       1.  Left-handed weakness stroke workup ensued but believe that this may be secondary to brain metastasis patient is on an aspirin, PT OT has been evaluated neurology has been  consulted as well.  Patient is on telemetry  2.  Metastasis to the brain neurosurgery recommended that we place the patient on dexamethasone and Keppra these medications have been started.  Hematology oncology has been consulted as well  3.  Hyperlipidemia continue atorvastatin 80  4.  Hypertension continue metoprolol hydralazine as needed    VTE Prophylaxis:  Pharmacologic VTE prophylaxis orders are present.        The patient desires to be as follows:    CODE STATUS:    Level Of Support Discussed With: Patient  Code Status (Patient has no pulse and is not breathing): No CPR (Do Not Attempt to Resuscitate)  Medical Interventions (Patient has pulse or is breathing): Comfort Measures      Admission Status:  I believe this patient meets inpatient status.    Expected Length of Stay: 3 days    PDMP and Medication Dispenses via Sidebar reviewed and consistent with patient reported medications.    I discussed the patient's findings and my recommendations with patient and family.      Signature:     This document has been electronically signed by Jay Michelle MD on June 25, 2024 23:12 EDT   [facility]    Electronically signed by Jay Michelle MD at 06/25/24 2314          Emergency Department Notes        Molly Caldwell PA at 06/25/24 1918        Procedure Orders    1. ECG 12 Lead [[id number]] ordered by Molly Caldwell PA              Attestation signed by Servando Costello DO at 06/26/24 0018        SHARED APC NON FACE TO FACE: I performed a substantive part of the MDM during the patient's E/M visit. I personally made or approved the documented management plan and acknowledge its risk of complications.   Servando Costello DO 6/26/2024 00:18 EDT                         Subjective   History of Present Illness  Chief Complaint: Left-sided weakness    Patient is a 63-year-old female history of CAD CHF CKD colon cancer with liver metastases hypertension presents to the ER with complaints of  left-sided weakness and numbness since yesterday around noon.  Patient states she noticed yesterday but it did not really bother her.  She states that she presented today because it persisted.  She reports a facial droop with some slurred speech, left arm weakness compared to the right.  Mild tingling in the left leg but no weakness.  She denies chest pain shortness of breath abdominal pain nausea or vomiting.  No fever or chills.    PCP: Diana Spencer    History provided by:  Patient      Review of Systems   Constitutional:  Negative for chills and fever.   HENT:  Negative for sore throat and trouble swallowing.    Eyes: Negative.    Respiratory:  Negative for shortness of breath and wheezing.    Cardiovascular:  Negative for chest pain.   Gastrointestinal:  Negative for abdominal pain.   Endocrine: Negative.    Genitourinary:  Negative for dysuria.   Musculoskeletal:  Positive for neck pain. Negative for neck stiffness.   Skin:  Negative for rash.   Allergic/Immunologic: Negative.    Neurological:  Positive for facial asymmetry, speech difficulty, weakness, numbness and headaches. Negative for seizures.   Psychiatric/Behavioral:  Negative for behavioral problems.    All other systems reviewed and are negative.      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  PRIOR TO SCAN 8-3-2013.    Latex Rash     Blisters     Penicillins Hives       Past Surgical History:   Procedure Laterality Date    ABDOMINAL SURGERY      CARDIAC CATHETERIZATION N/A 2023    Procedure: Left Heart Cath;  Surgeon: Ramiro Olivera MD;  Location: [facility] CATH INVASIVE LOC[facility];  Service: Cardiovascular;  Laterality: N/A;    COLON SURGERY  Colon resection    CYST REMOVAL      cyst removed from [facility] in     HERNIA REPAIR  incisional    PORTACATH PLACEMENT  2017    VENOUS ACCESS DEVICE (PORT) INSERTION Left 2021    Procedure: INSERTION VENOUS ACCESS DEVICE;  Surgeon: Jay Yeh MD;  Location: [facility] MAIN OR;  Service: General;  Laterality: Left;    VENOUS ACCESS DEVICE (PORT) REMOVAL Right 2020    Procedure: REMOVAL VENOUS ACCESS DEVICE;  Surgeon: Jya Yeh MD;  Location: [facility] MAIN OR;  Service: General;  Laterality: Right;    VENTRAL/INCISIONAL HERNIA REPAIR N/A 2023    Procedure: VENTRAL/INCISIONAL HERNIA REPAIR WITH MESH;  Surgeon: Jay Yeh MD;  Location: [facility] MAIN OR;  Service: General;  Laterality: N/A;       Family History   Problem Relation Age of Onset    Heart disease Mother     Lung cancer Father 70        Associated to cigarette smoking    Heart disease Father     Hyperlipidemia Father     Cancer Father     Cancer Sister         Bladder cancer       Social History     Socioeconomic History    Marital status: Single   Tobacco Use    Smoking status: Former     Current packs/day: 0.00     Average packs/day: 1 pack/day for 31.0 years (31.0 ttl pk-yrs)     Types: Cigarettes     Start date: 1981     Quit date: 2012     Years since quittin.4     Passive exposure: Past    Smokeless tobacco: Never   Vaping Use    Vaping status: Never Used   Substance and Sexual Activity    Alcohol use: Yes     Comment: 2 or 3 a month    Drug use: Never    Sexual  activity: Not Currently     Partners: Female     Birth control/protection: Same-sex partner           Objective   Physical Exam  Vitals and nursing note reviewed.   Constitutional:       Appearance: Normal appearance. She is well-developed. She is not ill-appearing or toxic-appearing.   HENT:      Head: Normocephalic and atraumatic.   Eyes:      Pupils: Pupils are equal, round, and reactive to light.   Cardiovascular:      Rate and Rhythm: Normal rate and regular rhythm.      Pulses: Normal pulses.      Heart sounds: Normal heart sounds. No murmur heard.  Pulmonary:      Effort: Pulmonary effort is normal. No respiratory distress.      Breath sounds: Normal breath sounds. No wheezing.   Abdominal:      General: Bowel sounds are normal. There is no distension.      Palpations: Abdomen is soft.      Tenderness: There is no abdominal tenderness. There is no right CVA tenderness or left CVA tenderness.   Skin:     General: Skin is warm and dry.      Capillary Refill: Capillary refill takes less than 2 seconds.      Findings: No rash.   Neurological:      General: No focal deficit present.      Mental Status: She is alert and oriented to person, place, and time.      GCS: GCS eye subscore is 4. GCS verbal subscore is 5. GCS motor subscore is 6.      Cranial Nerves: Facial asymmetry present.      Motor: Weakness and pronator drift present.      Coordination: Finger-Nose-Finger Test and Heel to Shin Test normal.      Comments:  strength 4/5 left arm compared to right.    Motor strength 5/5 bilateral lower extremities    Left-sided facial droop   Psychiatric:         Mood and Affect: Mood normal.         Behavior: Behavior normal.         ECG 12 Lead      Date/Time: 6/25/2024 7:41 PM    Performed by: Molly Caldwell PA  Authorized by: Molly Caldwell PA  Interpreted by ED physician  Previous ECG: no previous ECG available  Rhythm: sinus rhythm  Rate: normal  BPM: 98  QRS axis: normal  Conduction: conduction  "normal  Clinical impression: non-specific ECG              ED Course  ED Course as of 06/25/24 2350   Tue Jun 25, 2024 1920 Discussed case with ER attending Dr. Cade who recommended neuroconsult []   1929 Spoke with neurology, Dr. Purvis, recommended CT CTA at this time no perfusion study.  Recommended admission for MRI studies.  If patient is able to pass swallow study switch to Plavix.  If patient cannot pass swallow study can continue aspirin []   2107 Spoke with radiology, Dr. Esquivel, reports CT head suspicious for brain metastasis []   2226 Spoke with neurosurgery Dr. Vasquez, recommended Keppra 1 g, Decadron 10 mg followed by 4 mg every 6 hours and Pepcid daily []      ED Course User Index  [] Molly Caldwell PA    /97   Pulse 104   Temp 97.8 °F (36.6 °C) (Oral)   Resp 16   Ht 157.5 cm (62\")   Wt 51.4 kg (113 lb 5.1 oz)   LMP  (LMP Unknown)   SpO2 97%   BMI 20.73 kg/m²   Labs Reviewed   COMPREHENSIVE METABOLIC PANEL - Abnormal; Notable for the following components:       Result Value    Glucose 101 (*)     All other components within normal limits    Narrative:     GFR Normal >60  Chronic Kidney Disease <60  Kidney Failure <15     CBC WITH AUTO DIFFERENTIAL - Abnormal; Notable for the following components:    RBC 3.22 (*)     Hemoglobin 9.9 (*)     Hematocrit 32.8 (*)     .9 (*)     MCHC 30.2 (*)     RDW-SD 57.0 (*)     Lymphocyte % 13.0 (*)     All other components within normal limits   PROTIME-INR - Normal   RAINBOW DRAW    Narrative:     The following orders were created for panel order [place] Draw.  Procedure                               Abnormality         Status                     ---------                               -----------         ------                     Green Top (Gel)[[id number]]                                  Final result               Lavender Top[[id number]]                                     Final result               Gold Top - SST[[id number]]     "                               Final result               Light Blue Top[[id number]]                                   Final result                 Please view results for these tests on the individual orders.   HEMOGLOBIN A1C   LIPID PANEL   POCT GLUCOSE FINGERSTICK   POCT GLUCOSE FINGERSTICK   POCT GLUCOSE FINGERSTICK   POCT GLUCOSE FINGERSTICK   POCT GLUCOSE FINGERSTICK   TYPE AND SCREEN   CBC AND DIFFERENTIAL    Narrative:     The following orders were created for panel order CBC & Differential.  Procedure                               Abnormality         Status                     ---------                               -----------         ------                     CBC Auto Differential[[id number]]        Abnormal            Final result                 Please view results for these tests on the individual orders.   GREEN TOP   LAVENDER TOP   GOLD TOP - [facility]   LIGHT BLUE TOP     Medications   sodium chloride 0.9 % flush 10 mL (has no administration in time range)   levETIRAcetam (KEPPRA) injection 1,000 mg (has no administration in time range)   dexAMETHasone sodium phosphate injection 10 mg (has no administration in time range)   sodium chloride 0.9 % flush 10 mL (has no administration in time range)   sodium chloride 0.9 % flush 10 mL (has no administration in time range)   sodium chloride 0.9 % infusion 40 mL (has no administration in time range)   atorvastatin (LIPITOR) tablet 80 mg (has no administration in time range)   Enoxaparin Sodium (LOVENOX) syringe 40 mg (has no administration in time range)   aspirin chewable tablet 81 mg (has no administration in time range)     Or   aspirin suppository 300 mg (has no administration in time range)   acetaminophen (TYLENOL) tablet 650 mg (has no administration in time range)     Or   acetaminophen (TYLENOL) suppository 650 mg (has no administration in time range)   dexAMETHasone (DECADRON) injection 4 mg (has no administration in time range)   levETIRAcetam (KEPPRA)  injection 1,000 mg (has no administration in time range)   pantoprazole (PROTONIX) injection 40 mg (has no administration in time range)   traMADol (ULTRAM) tablet 50 mg (has no administration in time range)   metoprolol succinate XL (TOPROL-XL) 24 hr tablet 25 mg (has no administration in time range)   diphenhydrAMINE (BENADRYL) injection 50 mg (50 mg Intravenous Given 6/25/24 1944)   methylPREDNISolone sodium succinate (SOLU-Medrol) injection 125 mg (125 mg Intravenous Given 6/25/24 1944)   famotidine (PEPCID) injection 20 mg (20 mg Intravenous Given 6/25/24 1944)   iopamidol (ISOVUE-370) 76 % injection 100 mL (100 mL Intravenous Given 6/25/24 2109)     CT Angiogram Head    Result Date: 6/25/2024  Mild atheromatous disease. No vessel stenosis or occlusion. Known intracranial masses demonstrate enhancement. Pulmonary masses have increased in size. Electronically Signed: Praveen Esquivel MD  6/25/2024 9:28 PM EDT  Workstation ID: [id number]    CT Angiogram Neck    Result Date: 6/25/2024  Mild atheromatous disease. No vessel stenosis or occlusion. Known intracranial masses demonstrate enhancement. Pulmonary masses have increased in size. Electronically Signed: Praveen Esquivel MD  6/25/2024 9:28 PM EDT  Workstation ID: [id number]    CT Head Without Contrast Stroke Protocol    Result Date: 6/25/2024  Findings concerning for metastatic disease to the brain. MRI of the brain with contrast recommended. Electronically Signed: Praveen Esquivel MD  6/25/2024 9:08 PM EDT  Workstation ID: [id number]    XR Chest 1 View    Result Date: 6/25/2024  Right lower lobe infiltrate versus mass. Right upper lobe mass. Vascular congestion. Electronically Signed: Praveen Esquivel MD  6/25/2024 8:13 PM EDT  Workstation ID: [id number]                         Total (NIH Stroke Scale): 3                  Medical Decision Making  Differential Dx (Includes but not limited to): Stroke intracranial hemorrhage brain tumor metastases  Medical Records Reviewed: Patient  seen by Dr. Bocanegra 6/12/2024 for follow-up.  In February 2012 when she was diagnosed and she was started on treatment.  Patient was undergoing chemotherapy was unable to do so due to not tolerating it, patient currently undergoing no treatment.  Labs:, Interpretation, CBC no leukocytosis, CMP glucose 101  Imaging: Imaging was reviewed by myself, independently interpreted by Dr. Shook, CT head shows findings concerning for metastatic disease of the brain.  CTA shows no vessel stenosis or occlusion.  Telemetry: EKG was reviewed by myself, independently interpreted by Dr. Shook, sinus rhythm rate of 98 with no acute ST changes  Testing considered but not ordered: CT perfusion, not felt warranted as CT head shows brain metastases which is likely causing patient's symptoms.  Also not recommended per neurology  Nature of Complaint: Acute  Admission vs Discharge: Admission      Discussion: While in the ED IV was placed and labs were obtained appropriate PPE was worn during exam and throughout all encounters with the patient.  Patient with above evaluation.  She is afebrile nontoxic appearance in no acute distress.  Patient's symptoms started around noon yesterday, she is not a tPA candidate.  Spoke with neurology who recommended CT CTA and admission for stroke workup.  Lab work reassuring.  CT concerning for brain metastases recommending MRI.  Spoke with neurosurgery recommending patient to be initiated on Keppra, Decadron and Pepcid.  I spoke Dr. Alston regarding hospitalist admission for oncology consultation, and neurosurgery consultation and further treatment.  Patient agreeable with plan for admission.    Based on the clinical findings at this time I anticipate that the patient will require 2 midnight stay.    Case discussed with ER attending Dr. Shook who agreed with plan of care.    Problems Addressed:  Left-sided weakness: acute illness or injury  Metastasis to brain: acute illness or injury    Amount and/or  Complexity of Data Reviewed  External Data Reviewed: notes.  Labs: ordered. Decision-making details documented in ED Course.  Radiology: ordered. Decision-making details documented in ED Course.  ECG/medicine tests: ordered. Decision-making details documented in ED Course.    Risk  Prescription drug management.  Decision regarding hospitalization.        Final diagnoses:   Left-sided weakness   Metastasis to brain       ED Disposition  ED Disposition       ED Disposition   Decision to Admit    Condition   --    Comment   Level of Care: Telemetry [5]   Diagnosis: Metastasis to brain [221582]   Admitting Physician: ALSIHA TORREZ [[id number]]   Attending Physician: ALISHA TORREZ [[id number]]   Isolate for COVID?: No [0]   Bed Request Comments: NICOLE   Certification: I Certify That Inpatient Hospital Services Are Medically Necessary For Greater Than 2 Midnights                 No follow-up provider specified.       Medication List      No changes were made to your prescriptions during this visit.            Molly Caldwell PA  06/25/24 2350      Electronically signed by Servando Costello DO at 06/26/24 0018       Vital Signs (last day)       Date/Time Temp Temp src Pulse Resp BP Patient Position SpO2    06/27/24 0725 97.9 (36.6) Oral 82 15 122/72 Lying --    06/27/24 0530 97.8 (36.6) Oral -- 14 138/69 Lying 98    06/26/24 2358 98.4 (36.9) Axillary 80 15 107/64 Lying 96    06/26/24 1930 98.3 (36.8) -- -- 20 -- Lying 100    06/26/24 1720 -- -- -- -- 129/75 -- --    06/26/24 1715 97.9 (36.6) Oral 92 19 -- Lying 100    06/26/24 1351 97.4 (36.3) Oral 96 18 -- Sitting 100    06/26/24 1218 97.5 (36.4) Oral 90 19 146/76 Lying 100    06/26/24 0937 -- -- 99 -- -- -- --    06/26/24 0818 97.6 (36.4) Oral 92 14 130/79 Lying 94    06/26/24 0451 98.2 (36.8) Oral 91 14 139/70 Lying 94    06/26/24 0320 -- -- 92 -- 124/68 -- 93    06/26/24 0305 -- -- 91 -- 128/72 -- 94    06/26/24 0250 -- -- 93 -- 159/93 -- 95    06/26/24 0235 -- -- 87 --  148/71 -- 94    06/26/24 0220 -- -- 88 -- 145/78 -- 94    06/26/24 0205 -- -- 104 -- 142/87 -- 96    06/26/24 0150 -- -- 95 -- 122/67 -- 93    06/26/24 0135 -- -- 94 -- 126/82 -- 93    06/26/24 0120 -- -- 104 -- 147/86 -- 97    06/26/24 0105 -- -- 94 -- 155/91 -- 95    06/26/24 0050 -- -- 90 -- 136/87 -- 94    06/26/24 0035 -- -- 96 -- 159/80 -- 94    06/26/24 0020 -- -- 115 -- 149/90 -- 97    06/26/24 0005 -- -- 101 -- 172/102 -- 96    06/26/24 0000 97.9 (36.6) Oral 99 17 179/89 Lying 95          Facility-Administered Medications as of 6/27/2024   Medication Dose Route Frequency Provider Last Rate Last Admin    acetaminophen (TYLENOL) tablet 650 mg  650 mg Oral Q4H PRN Jay Michelle MD   650 mg at 06/27/24 0910    Or    acetaminophen (TYLENOL) suppository 650 mg  650 mg Rectal Q4H PRN Jay Michelle MD        atorvastatin (LIPITOR) tablet 80 mg  80 mg Oral Daily Gillian Wagoner MD   80 mg at 06/27/24 0910    dexAMETHasone (DECADRON) injection 4 mg  4 mg Intravenous Q6H Jay Michelle MD   4 mg at 06/27/24 0524    [COMPLETED] dexAMETHasone sodium phosphate injection 10 mg  10 mg Intravenous Once Molly Caldwell PA   10 mg at 06/26/24 0050    [COMPLETED] diphenhydrAMINE (BENADRYL) injection 50 mg  50 mg Intravenous Once When Specified Molly Caldwell PA   50 mg at 06/25/24 1944    empagliflozin (JARDIANCE) tablet 10 mg  10 mg Oral Daily Gillian Wagoner MD   10 mg at 06/26/24 1720    Enoxaparin Sodium (LOVENOX) syringe 40 mg  40 mg Subcutaneous Q24H Jay Michelle MD   40 mg at 06/26/24 1720    [COMPLETED] famotidine (PEPCID) injection 20 mg  20 mg Intravenous Once Molly Caldwell PA   20 mg at 06/25/24 1944    [COMPLETED] gadoteridol (PROHANCE) injection 15 mL  15 mL Intravenous Once in imaging Gillian Wagoner MD   10 mL at 06/26/24 1116    [COMPLETED] iopamidol (ISOVUE-370) 76 % injection 100 mL  100 mL Intravenous Once in imaging Molly Caldwell PA   100 mL at 06/25/24 5169     [COMPLETED] levETIRAcetam (KEPPRA) injection 1,000 mg  1,000 mg Intravenous Once Molly Caldwell PA   1,000 mg at 06/26/24 0051    levETIRAcetam (KEPPRA) injection 1,000 mg  1,000 mg Intravenous Q12H Jay Michelle MD   1,000 mg at 06/27/24 0910    [COMPLETED] methylPREDNISolone sodium succinate (SOLU-Medrol) injection 125 mg  125 mg Intravenous Once Molly Caldwell PA   125 mg at 06/25/24 1944    metoprolol succinate XL (TOPROL-XL) 24 hr tablet 25 mg  25 mg Oral Q24H Gillian Wagoner MD   25 mg at 06/27/24 0911    pantoprazole (PROTONIX) injection 40 mg  40 mg Intravenous Q AM Jay Michelle MD   40 mg at 06/27/24 0524    sodium chloride 0.9 % flush 10 mL  10 mL Intravenous PRN Molly Caldwell PA        sodium chloride 0.9 % flush 10 mL  10 mL Intravenous Q12H Jay Michelle MD   10 mL at 06/27/24 0911    sodium chloride 0.9 % flush 10 mL  10 mL Intravenous PRN Jay Michelle MD        sodium chloride 0.9 % infusion 40 mL  40 mL Intravenous PRN Jay Michelle MD        traMADol (ULTRAM) tablet 50 mg  50 mg Oral Q6H PRN Gillian Wagoner MD         Lab Results (last 24 hours)       Procedure Component Value Units Date/Time    POC Glucose Once [[id number]]  (Abnormal) Collected: 06/27/24 0528    Specimen: Blood Updated: 06/27/24 0530     Glucose 147 mg/dL      Comment: Serial Number: [id number]:  [id number]       POC Glucose Once [[id number]]  (Abnormal) Collected: 06/26/24 2358    Specimen: Blood Updated: 06/27/24 0001     Glucose 181 mg/dL      Comment: Serial Number: [id number]:  [id number]       POC Glucose Once [[id number]]  (Abnormal) Collected: 06/26/24 1722    Specimen: Blood Updated: 06/26/24 1724     Glucose 166 mg/dL      Comment: Serial Number: [id number]:  [id number]       POC Glucose Q6H [[id number]]  (Abnormal) Collected: 06/26/24 1310    Specimen: Blood Updated: 06/26/24 1312     Glucose 203 mg/dL      Comment: Serial Number: [id number]:  [id number]              Imaging Results (All)       Procedure Component Value Units Date/Time    MRI Brain With & Without Contrast [[id number]] Collected: 06/26/24 1142     Updated: 06/26/24 1209    Narrative:      MRI BRAIN W WO CONTRAST    Date of Exam: 6/26/2024 10:48 AM EDT    Indication: Brain mass - [facility] protocols 0.6mm thin cuts. History of colon cancer with liver metastases.     Comparison: CT angiography of the head 6/25/2024, noncontrast CT head 6/25/2024    Technique:  Routine multiplanar/multisequence sequence images of the brain were obtained before and after the uneventful administration of 10 cc Prohance.      Findings:  There are 3 enhancing intracranial masses consistent with the appearance of metastatic disease. 2 larger lesions appear to have internal cystic mucinous or necrotic components with eccentric solid nodularity and peripheral capsular enhancement. The   smallest appears more solid and homogeneously enhancing.    A dominant lesion in the right frontal lobe measures 3.9 x 3.6 x 3.8 cm (AP oblique by transverse oblique by craniocaudal). A lesion within the left frontal lobe measures 2.8 x 2.1 x 2.9 cm. A lesion within the high left parietal lobe posteromedially   measures 1.2 x 1.3 x 1.2 cm.    Surrounding each of these lesions is T2 and FLAIR signal hyperintensity, thought to represent vasogenic edema, which manifests as shine through on diffusion sequences. The diffusion signal changes demonstrate no ADC correlate, arguing against acute or   subacute ischemic insult.    There is no midline shift. Ventricular configuration is within normal limits. Orbital structures are within the limits. Paranasal sinuses are clear. Mastoid air cells are clear.      Impression:      1. Three enhancing intracranial mass lesions are consistent with the appearance of metastatic disease. Measurements as provided above.  2. Features of vasogenic edema surrounding each of the aforementioned masses. No midline shift.  3. No  convincing MR evidence of acute or subacute ischemic insult.      Electronically Signed: Denisse Ramos MD    6/26/2024 12:06 PM EDT    Workstation ID: [id number]    CT Angiogram Head [[id number]] Collected: 06/25/24 2122     Updated: 06/25/24 2130    Narrative:      CT ANGIOGRAM HEAD, CT ANGIOGRAM NECK    Date of Exam: 6/25/2024 8:55 PM EDT    Indication: Stroke.    Comparison: 12/7/2020.    Technique: CTA of the head and neck was performed after the uneventful intravenous administration of iodinated contrast. Reconstructed coronal and sagittal images were also obtained. In addition, a 3-D volume rendered image was created for   interpretation. Automated exposure control and iterative reconstruction methods were used.    Findings: Atheromatous disease of the aortic arch. The right common carotid artery is normal. Mild atheromatous disease of the right carotid bulb and proximal right internal carotid artery. Mild atheromatous disease involving the intracranial segments of   the right internal carotid artery. The right carotid terminus is normal. The visualized branches of the right anterior and middle cerebral arteries are normal.    Mild atheromatous disease of the left carotid bulb and proximal left internal carotid artery with no significant narrowing per NASCET criteria. Mild atheromatous disease involving the intracranial segments of the left internal carotid artery. The left   carotid terminus is normal. The visualized branches of the left anterior and middle cerebral arteries are normal.    Both vertebral arteries arise from the subclavian arteries. The right vertebral artery is dominant. The origin of the left vertebral artery is occluded with some tenuous flow progressing intracranially with retrograde flow from the basilar artery   present. Both vertebral arteries supply the basilar artery. The basilar artery and basilar artery tip are normal. The basilar artery terminates in bilateral posterior cerebral  arteries which appear normal.    The known intracranial masses demonstrate enhancement. Orbital and periorbital soft tissues are normal. The paranasal sinuses are clear. No cervical adenopathy. The thyroid gland is normal. Compared with the prior CT of the chest there are multiple   pulmonary nodules and masses better identified on the prior CT of the chest. These have significantly increased in size however a dedicated CT of the chest is recommended. A previously identified right upper lobe nodule which previously measured 1.6 cm   currently measures 2.2 cm x 2.1 cm. Right hilar mass. Bilateral chest wall masses. No lytic or blastic disease.      Impression:      Mild atheromatous disease. No vessel stenosis or occlusion. Known intracranial masses demonstrate enhancement. Pulmonary masses have increased in size.      Electronically Signed: Praveen Esquivel MD    6/25/2024 9:28 PM EDT    Workstation ID: [id number]    CT Angiogram Neck [[id number]] Collected: 06/25/24 2122     Updated: 06/25/24 2130    Narrative:      CT ANGIOGRAM HEAD, CT ANGIOGRAM NECK    Date of Exam: 6/25/2024 8:55 PM EDT    Indication: Stroke.    Comparison: 12/7/2020.    Technique: CTA of the head and neck was performed after the uneventful intravenous administration of iodinated contrast. Reconstructed coronal and sagittal images were also obtained. In addition, a 3-D volume rendered image was created for   interpretation. Automated exposure control and iterative reconstruction methods were used.    Findings: Atheromatous disease of the aortic arch. The right common carotid artery is normal. Mild atheromatous disease of the right carotid bulb and proximal right internal carotid artery. Mild atheromatous disease involving the intracranial segments of   the right internal carotid artery. The right carotid terminus is normal. The visualized branches of the right anterior and middle cerebral arteries are normal.    Mild atheromatous disease of the left  carotid bulb and proximal left internal carotid artery with no significant narrowing per NASCET criteria. Mild atheromatous disease involving the intracranial segments of the left internal carotid artery. The left   carotid terminus is normal. The visualized branches of the left anterior and middle cerebral arteries are normal.    Both vertebral arteries arise from the subclavian arteries. The right vertebral artery is dominant. The origin of the left vertebral artery is occluded with some tenuous flow progressing intracranially with retrograde flow from the basilar artery   present. Both vertebral arteries supply the basilar artery. The basilar artery and basilar artery tip are normal. The basilar artery terminates in bilateral posterior cerebral arteries which appear normal.    The known intracranial masses demonstrate enhancement. Orbital and periorbital soft tissues are normal. The paranasal sinuses are clear. No cervical adenopathy. The thyroid gland is normal. Compared with the prior CT of the chest there are multiple   pulmonary nodules and masses better identified on the prior CT of the chest. These have significantly increased in size however a dedicated CT of the chest is recommended. A previously identified right upper lobe nodule which previously measured 1.6 cm   currently measures 2.2 cm x 2.1 cm. Right hilar mass. Bilateral chest wall masses. No lytic or blastic disease.      Impression:      Mild atheromatous disease. No vessel stenosis or occlusion. Known intracranial masses demonstrate enhancement. Pulmonary masses have increased in size.      Electronically Signed: Praveen Esquivel MD    6/25/2024 9:28 PM EDT    Workstation ID: [id number]    CT Head Without Contrast Stroke Protocol [[id number]] Collected: 06/25/24 2102     Updated: 06/25/24 2114    Narrative:      CT HEAD WO CONTRAST STROKE PROTOCOL    Date of Exam: 6/25/2024 8:53 PM EDT    Indication: Stroke, follow up  Neuro deficit, acute, stroke  suspected. The patient has a recent pathology diagnosis of adenocarcinoma of the colon with metastatic disease.    Comparison: 12/2/2019    Technique: Axial CT images were obtained of the head without contrast administration.  Reconstructed coronal and sagittal images were also obtained. Automated exposure control and iterative construction methods were used.    Scan Time: 2100  Results discussed with DEA Isaacs at 2104.    Findings: Rounded high density lesion within the right frontal lobe with significant surrounding edema. This has a ringlike appearance despite the lack of contrast and measures 3.5 cm x 3.2 cm concerning for metastatic disease to the brain. Higher   density ovoid lesion within the left frontal lobe measuring 2 cm x 1.5 cm with significant surrounding edema also concerning for metastatic disease. 1 cm x 0.8 cm lesion within the left parietal lobe also concerning for metastatic disease to the brain.   Additional smaller lesions are suspected. The posterior fossa is normal. No definite intracranial hemorrhage or infarct.    The calvarium is intact. Orbital and parable soft tissues are normal. The paranasal sinuses are clear.      Impression:      Findings concerning for metastatic disease to the brain. MRI of the brain with contrast recommended.        Electronically Signed: Praveen Esquivel MD    6/25/2024 9:08 PM EDT    Workstation ID: [id number]    XR Chest 1 View [[id number]] Collected: 06/25/24 2011     Updated: 06/25/24 2015    Narrative:      XR CHEST 1 VW    Date of Exam: 6/25/2024 8:00 PM EDT    Indication: Stroke Protocol (Onset > 12 hrs)    Comparison: None available.    Findings: Right upper lobe mass. Right lower lobe infiltrate versus mass. Vascular congestion. Left-sided infusion port with the tip in the superior vena cava. No pneumothorax or pleural effusion. The clavicles are intact. No rib fractures. Smaller   bilateral pulmonary nodules are also noted.      Impression:      Right lower  lobe infiltrate versus mass. Right upper lobe mass. Vascular congestion.      Electronically Signed: Praveen Esquivel MD    2024 8:13 PM EDT    Workstation ID: [id number]             Physician Progress Notes (last 48 hours)        Gillian Wagoner MD at 24 [facility]9              [facility] Medicine Services Daily Progress Note    Patient Name: Samantha Massey  : 1960  MRN: [id number]  Primary Care Physician:  Diana Spencer MD  Date of admission: 2024      Subjective      Chief Complaint:     Subjective   Patient Reports feeling weak on left side. Denies for any nausea or vomiting.     ROS       Objective      Vitals:   Temp:  [97.5 °F (36.4 °C)-98.2 °F (36.8 °C)] 97.5 °F (36.4 °C)  Heart Rate:  [] 90  Resp:  [14-19] 19  BP: (122-179)/() 146/76    Physical Exam  Vitals and nursing note reviewed.   Constitutional:       General: She is not in acute distress.     Appearance: Normal appearance. She is well-developed. She is not ill-appearing, toxic-appearing or diaphoretic.   HENT:      Head: Normocephalic and atraumatic.      Right Ear: Ear canal and external ear normal.      Left Ear: Ear canal and external ear normal.      Nose: Nose normal. No congestion or rhinorrhea.      Mouth/Throat:      Mouth: Mucous membranes are moist.      Pharynx: No oropharyngeal exudate.   Eyes:      General: No scleral icterus.        Right eye: No discharge.         Left eye: No discharge.      Extraocular Movements: Extraocular movements intact.      Conjunctiva/sclera: Conjunctivae normal.      Pupils: Pupils are equal, round, and reactive to light.   Neck:      Thyroid: No thyromegaly.      Vascular: No carotid bruit or JVD.      Trachea: No tracheal deviation.   Cardiovascular:      Rate and Rhythm: Normal rate and regular rhythm.      Pulses: Normal pulses.      Heart sounds: Normal heart sounds. No murmur heard.     No friction rub. No gallop.   Pulmonary:      Effort:  Pulmonary effort is normal. No respiratory distress.      Breath sounds: Normal breath sounds. No stridor. No wheezing, rhonchi or rales.   Chest:      Chest wall: No tenderness.   Abdominal:      General: Bowel sounds are normal. There is no distension.      Palpations: Abdomen is soft. There is no mass.      Tenderness: There is no abdominal tenderness. There is no guarding or rebound.      Hernia: No hernia is present.   Musculoskeletal:         General: No swelling, tenderness, deformity or signs of injury. Normal range of motion.      Cervical back: Normal range of motion and neck supple. No rigidity. No muscular tenderness.      Right lower leg: No edema.      Left lower leg: No edema.   Lymphadenopathy:      Cervical: No cervical adenopathy.   Skin:     General: Skin is warm and dry.      Coloration: Skin is not jaundiced or pale.      Findings: No bruising, erythema or rash.   Neurological:      General: No focal deficit present.      Mental Status: She is alert and oriented to person, place, and time. Mental status is at baseline.      Cranial Nerves: No cranial nerve deficit.      Sensory: No sensory deficit.      Motor: No weakness or abnormal muscle tone.      Coordination: Coordination normal.   Psychiatric:         Mood and Affect: Mood normal.         Behavior: Behavior normal.         Thought Content: Thought content normal.         Judgment: Judgment normal.             Result Review    Result Review:  I have personally reviewed the results from the time of this admission to 6/26/2024 13:19 EDT and agree with these findings:  [x]  Laboratory  [x]  Microbiology  [x]  Radiology  []  EKG/Telemetry   []  Cardiology/Vascular   []  Pathology  []  Old records  []  Other:  Most notable findings include:           Assessment & Plan      Brief Patient Summary:  Samantha Massey is a 63 y.o. female who       aspirin, 81 mg, Oral, Daily   Or  aspirin, 300 mg, Rectal, Daily  atorvastatin, 80 mg, Oral,  Nightly  dexAMETHasone, 4 mg, Intravenous, Q6H  enoxaparin, 40 mg, Subcutaneous, Q24H  levETIRAcetam, 1,000 mg, Intravenous, Q12H  metoprolol succinate XL, 25 mg, Oral, Q24H  pantoprazole, 40 mg, Intravenous, Q AM  sodium chloride, 10 mL, Intravenous, Q12H             Active Hospital Problems:  Active Hospital Problems    Diagnosis     **Metastasis to brain     Left hand weakness     Hyperlipidemia LDL goal <100     Primary hypertension      Plan:       1.  Left-handed weakness stroke workup ensued but believe that this may be secondary to brain metastasis. D/c asa. Neurosurgery on board.     2.  Metastasis to the brain neurosurgery recommended that we place the patient on dexamethasone and Keppra these medications have been started.  Hematology oncology has been consulted as well.     3.  Hyperlipidemia continue atorvastatin 80    4.  Hypertension continue metoprolol hydralazine as needed.     VTE Prophylaxis:  Pharmacologic VTE prophylaxis orders are present.           The patient desires to be as follows:     CODE STATUS:    Level Of Support Discussed With: Patient  Code Status (Patient has no pulse and is not breathing): No CPR (Do Not Attempt to Resuscitate)  Medical Interventions (Patient has pulse or is breathing): Comfort Measures        Admission Status:  I believe this patient meets inpatient status.      VTE Prophylaxis:  Pharmacologic VTE prophylaxis orders are present.        CODE STATUS:    Level Of Support Discussed With: Patient  Code Status (Patient has no pulse and is not breathing): CPR (Attempt to Resuscitate)  Medical Interventions (Patient has pulse or is breathing): Full Support      Disposition:  I expect patient to be discharged as per clinical course.    This patient has been examined wearing appropriate Personal Protective Equipment and discussed with hospital infection control department. 06/26/24      Electronically signed by Gillian Wagoner MD, 06/26/24, 13:19 EDT.  Brady Leroy  Hospitalist Team             Electronically signed by Gillian Wagoner MD at 24 1332          Consult Notes (last 48 hours)        Sean Adams MD at 24 0809        Consult Orders    1. Hematology & Oncology Inpatient Consult [[id number]] ordered by Jay Michelle MD at 24 2302                         Hematology/Oncology Inpatient Consultation    Patient name: Samantha Massey  : 1960  MRN: [id number]  Referring Provider: Dr. Michelle  Reason for Consultation: Colon cancer, new brain mets    Chief complaint: Left sided weakness and numbness    History of present illness:    Samantha Massey is a 63 y.o. female who presented [facility] on [facility]24 with complaints of strokelike symptoms.  She reports a 1 day history of headache, left-sided weakness and numbness as well as left-sided facial droop and slurred speech.  She states she had difficulty controlling her left hand.  She was hoping that it would get better but her sister insisted that she come to the emergency room for further evaluation.      In the ED, chest x-ray showed right lower lobe infiltrate versus mass as well as a right upper lobe mass and vascular congestion.  CT imaging of the head without contrast showed a rounded high density lesion within the right frontal lobe with significant surrounding edema.  This has a ringlike appearance despite the lack of contrast and measures 3.5 x 3.2 cm concerning for metastatic disease to the brain.  There is also a higher density ovoid lesion within the left frontal lobe measuring 2 x 1.5 cm with significant surrounding edema also concerning for metastatic disease.  1 x 0.8 cm lesion within the left parietal lobe as well as additional smaller lesions are suspected.  CT angiogram of the head and neck showed no vessel stenosis or occlusion.    She was started on dexamethasone and Keppra per neurosurgery.    MRI brain showed 3 enhancing intracranial lesions that are  consistent with the appearance of metastatic disease.  There is a dominant lesion in the right frontal lobe that measures 3.9 x 3.6 x 3.8 cm.  A lesion within the left frontal lobe measures 2.8 x 2.1 x 2.9 cm.  The lesion within the high left parietal lobe posterior medially measures 1.2 x 1.3 x 1.2 cm.  There are features of vasogenic edema surrounding each of the aforementioned masses with no midline shift.    06/26/24  Hematology/Oncology was consulted.  She is established with Dr. Adams for metastatic colon cancer.  She recently completed radiation on 6/12/2024 thoracic chest wall lesions    Oncology History (from record):  1/11/2024: In the office for the first time in transfer from [facility]. Ms. Massey reported that sometime in 2011 she developed severe constipation that did not seem to improve. This slowly became associated to fatigue and weakness. Eventually she sought attention and was found to have normocytic anemia in January of 2012. A CT scan of the abdomen reported innumerable lesions throughout the liver. There was thickening of the distal sigmoid colon. There were some non-specific lymph nodes. She underwent a colonoscopy in early January that revealed a tumor in the distal colon. The final report of pathology was of invasive, moderately differentiated adenocarcinoma without microsatellite instability. She underwent a laparoscopic low anterior resection. The final report of pathology was of a poorly differentiated adenocarcinoma of the rectosigmoid with invasion through the muscularis propria and extension of the mesocolon. No lymph nodes were involved, but she underwent an image guided biopsy of the liver reported metastatic disease consistent with primary colon cancer. In February of 2012 she started treatment with FOLFOX/bevacizumab. Testing at the time reported KRAS wild type. A PET scan done shortly after the commencement of the treatment reported multiple metastatic lesions in the liver. In June  of the same year there was evidence of response to the treatment. Later, in August, the lesions were stable. She continued treatment with the same regimen through September of 2012 at which time she was transitioned to capecitabine. Radioembolization to the liver was delivered initially to the right lobe and then to the left lobe in November of that same year. There was no suggestion of disease outside of the liver and the lesions in the liver showed evidence of response. In August of 2014 the capecitabine was discontinued as there was no longer evidence of disease. She did well until October of 2017 when she had radiographic evidence of progressive disease in the liver and an image guided biopsy confirmed colonic primary. FOLFOX/bevacizumab was re-introduced in October of that year.  In February of 2018 she had evidence of response and no suggestion of extrahepatic disease and she underwent left hepatectomy. Following that she was placed on capecitabine as adjuvant treatment. She continued treatment with this agent until September of 2018. In early January of 2019 there was evidence of disease in the lungs and the mediastinal lymph nodes. In July of 2019 she started treatment with FOLFIRI/bevacizumab. In the setting of progressive disease the bevacizumab was changed to cetuximab. In December of 2020 metastatic bone disease was identified. To continue treatment with cetuximab and irinotecan. There was no evidence of progression until January of 2023 that revealed enlarging pulmonary nodules and enlargement of a right seventh rib metastatic lesion. For that reason she was prescribed regorafenib and began treatment in February of the same year.  In April of 2023 she had radiation to the right 7th rib. The regorafenib resulted in hand foot syndrome. In June of 2023 a scan revealed progressive disease and in late July of the same year she began treatment with trifluridine/tipracil and in August of the same year  bevacizumab was added. She developed myelosuppression and doses were modified. At the time of this first visit she was still taking the above regimen. She was rather asymptomatic and on exam there were no changes. There was a report of a scan done in early December of 2023 that reported possible enlargement of the rib metastatic lesion. A decision was made to continue with the same treatment and plans to take over the prescription of trifluridine/tipracil. To have scans in February of 2024.      2/15/2024: Feeling well.  Taking the trifluridine/tipracil without difficulties.  Generally energetic and with good appetite.  Afebrile.  On exam she does not appear in distress and she does not seem acutely ill.  The lungs are clear bilaterally and the heart is regular.  The abdomen is soft and nontender.  No edema.  Reviewed the laboratory exams that suggest anemia.  She is not pale and off for the anemia reported on the blood count and does it will be repeated.  If indeed her hemoglobin is 7.3 g/dL she will be transfused.  I reviewed independently the images and the reports of the scans.  She has stable disease at this time.  Continue with the same treatment and see me in 6 weeks.      4/18/2024: Feeling well and without new problems.  Continues to take the trifluridine tipiracil without any difficulties.  She has been afebrile.  She continues to eat reasonably well and her weight is stable.  She denies pain.  On exam no distress.  No oral lesions.  Respirations not labored and lungs clear.  Heart regular.  Abdomen soft and without palpable tumors.  No edema.  The laboratory exams were reviewed.  She has significant neutropenia which I discussed with her.  Prior to the commencement of the next cycle of medication she needs to have her blood counts checked.  I discussed with her the importance of neutropenic precautions and urged her to call if febrile.     5/24/2024: Does not feel as well as at the time of the last visit.   She has noted the enlargement of nodules on her anterior chest that resulted in pain.  She has been taking acetaminophen and tramadol prescribed by her primary care physician that does result in relief of the pain.  She has not had any fevers.  She has been off of the trifluridine/tipiracil since she was instructed to do so because of pancytopenia.  She has had no dyspnea and denies coughing.  Also no abdominal pain.  Has been able to eat and her weight is stable.  On exam she appears chronically ill but is not in distress.  No jaundice or pallor.  Indeed there are palpable tumors on the anterior chest particularly.  The lungs are clear.  The heart is regular.  Abdomen is soft.  Reviewed the scans.  There is clear progression of the disease with dose nodules that she feels corresponding to metastatic deposits that clearly have increased in size.  The right parasternal chest wall metastatic deposit, which is the largest, increased from 2.6 cm to 4.3 cm.  The metastatic deposits in the lungs have also increased and the left lower lobe lesion has gone from 2.1 to 3.2 cm.  Within the abdomen and pelvis there are been no changes.  Discussed with her.  I have asked her to see Dr. Colunga for consideration of radiation to relieve pain on the anterior chest.  I have also asked her to have a biopsy of one of the lung lesions particularly the left lower lobe lesion, which appears to be accessible and is largest.  I would like to consider not only histologic analysis but also particularly next-generation sequencing and PD-L1 expression.  Discussed with her.     6/12/2024: Feels better. Has less chest pain though she is tender after the biopsy. Eats well and has no nausea or vomiting. No chest pain or cough. No abdominal pain or diarrhea and no dysuria. On exam alert and conversant no distress and no jaundice. No oral lesions and respirations not labored. The lungs are clear and heart is regular. Abdomen soft. No edema. Reviewed  and discussed with her the report of the biopsy. Next generation sequencing  requested. She is to continue with radiation and will see me in approximately 2 weeks.     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    PCP: Diana Spencer MD    History:  Past Medical History:   Diagnosis Date    Acute systolic heart failure 12/09/2023    Allergic laytex,peniciline,contrast dye    CAD (coronary artery disease) 12/09/2023    CHF (congestive heart failure) 12/07/23    Chronic kidney disease     colon cancer     Metastatic to liver and bones    Colon cancer     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    History of colon cancer, stage IV     Stage SHELL with progression    Hypertension     NSTEMI (non-ST elevated myocardial infarction) 12/09/2023    PONV (postoperative nausea and vomiting)     Pulmonary arterial hypertension     Radiculopathy 07/2012    Right L5/S1   ,   Past Surgical History:   Procedure Laterality Date    ABDOMINAL SURGERY      CARDIAC CATHETERIZATION N/A 12/08/2023    Procedure: Left Heart Cath;  Surgeon: Ramiro Olivera MD;  Location: [facility] CATH INVASIVE LOC[facility];  Service: Cardiovascular;  Laterality: N/A;    COLON SURGERY  Colon resection    CYST REMOVAL  1998    cyst [facility] from [facility] in 1998    HERNIA REPAIR  incisional    PORTACATH PLACEMENT  2017    VENOUS ACCESS DEVICE (PORT) INSERTION Left 01/12/2021    Procedure: INSERTION VENOUS ACCESS DEVICE;  Surgeon: Jay Yeh MD;  Location: [facility] OR;  Service: General;   Laterality: Left;    VENOUS ACCESS DEVICE (PORT) REMOVAL Right 2020    Procedure: REMOVAL VENOUS ACCESS DEVICE;  Surgeon: Jay Yeh MD;  Location: [facility] MAIN OR;  Service: General;  Laterality: Right;    VENTRAL/INCISIONAL HERNIA REPAIR N/A 2023    Procedure: VENTRAL/INCISIONAL HERNIA REPAIR WITH MESH;  Surgeon: Jay Yeh MD;  Location: [facility] MAIN OR;  Service: General;  Laterality: N/A;   ,   Family History   Problem Relation Age of Onset    Heart disease Mother     Lung cancer Father 70        Associated to cigarette smoking    Heart disease Father     Hyperlipidemia Father     Cancer Father     Cancer Sister         Bladder cancer   ,   Social History     Tobacco Use    Smoking status: Former     Current packs/day: 0.00     Average packs/day: 1 pack/day for 31.0 years (31.0 ttl pk-yrs)     Types: Cigarettes     Start date: 1981     Quit date: 2012     Years since quittin.4     Passive exposure: Past    Smokeless tobacco: Never   Vaping Use    Vaping status: Never Used   Substance Use Topics    Alcohol use: Yes     Comment: 2 or 3 a month    Drug use: Never   , (Not in a hospital admission)  , Scheduled Meds:  aspirin, 81 mg, Oral, Daily   Or  aspirin, 300 mg, Rectal, Daily  atorvastatin, 80 mg, Oral, Nightly  dexAMETHasone, 4 mg, Intravenous, Q6H  enoxaparin, 40 mg, Subcutaneous, Q24H  levETIRAcetam, 1,000 mg, Intravenous, Q12H  metoprolol succinate XL, 25 mg, Oral, Q24H  pantoprazole, 40 mg, Intravenous, Q AM  sodium chloride, 10 mL, Intravenous, Q12H    , Continuous Infusions:   , PRN Meds:    acetaminophen **OR** acetaminophen    sodium chloride    sodium chloride    sodium chloride    traMADol   Allergies:  Hydrocodone, Iodinated contrast media, Latex, and Penicillins    Subjective     ROS:  Review of Systems     Objective   Vital Signs:   /79 (BP Location: Left arm, Patient Position: Lying)   Pulse 90   Temp 97.5 °F (36.4 °C) (Oral)   Resp 19   " Ht 157.5 cm (62\")   Wt 51.4 kg (113 lb 5.1 oz)   LMP  (LMP Unknown)   SpO2 94%   BMI 20.73 kg/m²     Physical Exam: (performed by MD)  Physical Exam    Results Review:  Lab Results (last 48 hours)       Procedure Component Value Units Date/Time    Basic Metabolic Panel [[id number]]  (Abnormal) Collected: 06/26/24 0829    Specimen: Blood from Arm, Left Updated: 06/26/24 0901     Glucose 155 mg/dL      BUN 16 mg/dL      Creatinine 0.71 mg/dL      Sodium 140 mmol/L      Potassium 3.9 mmol/L      Chloride 106 mmol/L      CO2 24.2 mmol/L      Calcium 9.6 mg/dL      BUN/Creatinine Ratio 22.5     Anion Gap 9.8 mmol/L      eGFR 96.3 mL/min/1.73     Narrative:      GFR Normal >60  Chronic Kidney Disease <60  Kidney Failure <15      CBC & Differential [[id number]]  (Abnormal) Collected: 06/26/24 0829    Specimen: Blood from Arm, Left Updated: 06/26/24 0839    Narrative:      The following orders were created for panel order CBC & Differential.  Procedure                               Abnormality         Status                     ---------                               -----------         ------                     CBC Auto Differential[[id number]]        Abnormal            Final result                 Please view results for these tests on the individual orders.    CBC Auto Differential [[id number]]  (Abnormal) Collected: 06/26/24 0829    Specimen: Blood from Arm, Left Updated: 06/26/24 0839     WBC 3.82 10*3/mm3      RBC 3.29 10*6/mm3      Hemoglobin 10.2 g/dL      Hematocrit 33.2 %      .9 fL      MCH 31.0 pg      MCHC 30.7 g/dL      RDW 14.9 %      RDW-SD 55.7 fl      MPV 9.4 fL      Platelets 154 10*3/mm3      Neutrophil % 92.9 %      Lymphocyte % 6.0 %      Monocyte % 0.8 %      Eosinophil % 0.0 %      Basophil % 0.0 %      Immature Grans % 0.3 %      Neutrophils, Absolute 3.55 10*3/mm3      Lymphocytes, Absolute 0.23 10*3/mm3      Monocytes, Absolute 0.03 10*3/mm3      Eosinophils, Absolute 0.00 10*3/mm3      " Basophils, Absolute 0.00 10*3/mm3      Immature Grans, Absolute 0.01 10*3/mm3      nRBC 0.0 /100 WBC     POC Glucose Once [[id number]]  (Abnormal) Collected: 06/26/24 0817    Specimen: Blood Updated: 06/26/24 0818     Glucose 152 mg/dL      Comment: Serial Number: [id number]:  [id number]       POC Glucose Q6H [[id number]]  (Abnormal) Collected: 06/26/24 0639    Specimen: Blood Updated: 06/26/24 0640     Glucose 147 mg/dL      Comment: Serial Number: [id number]:  [id number]       Lipid Panel [[id number]]  (Abnormal) Collected: 06/26/24 0248    Specimen: Blood from Arm, Left Updated: 06/26/24 0332     Total Cholesterol 201 mg/dL      Triglycerides 63 mg/dL      HDL Cholesterol 53 mg/dL      LDL Cholesterol  137 mg/dL      VLDL Cholesterol 11 mg/dL      LDL/HDL Ratio 2.55    Narrative:      Cholesterol Reference Ranges  (U.S. Department of Health and Human Services ATP III Classifications)    Desirable          <200 mg/dL  Borderline High    200-239 mg/dL  High Risk          >240 mg/dL      Triglyceride Reference Ranges  (U.S. Department of Health and Human Services ATP III Classifications)    Normal           <150 mg/dL  Borderline High  150-199 mg/dL  High             200-499 mg/dL  Very High        >500 mg/dL    HDL Reference Ranges  (U.S. Department of Health and Human Services ATP III Classifications)    Low     <40 mg/dl (major risk factor for CHD)  High    >60 mg/dl ('negative' risk factor for CHD)        LDL Reference Ranges  (U.S. Department of Health and Human Services ATP III Classifications)    Optimal          <100 mg/dL  Near Optimal     100-129 mg/dL  Borderline High  130-159 mg/dL  High             160-189 mg/dL  Very High        >189 mg/dL    Hemoglobin A1c [[id number]]  (Normal) Collected: 06/26/24 0248    Specimen: Blood from Arm, Left Updated: 06/26/24 0311     Hemoglobin A1C 5.21 %     POC Glucose Q6H [[id number]]  (Abnormal) Collected: 06/26/24 0058    Specimen: Blood Updated: 06/26/24 0100      Glucose 161 mg/dL      Comment: Serial Number: [id number]:  [id number]       Comprehensive Metabolic Panel [[id number]]  (Abnormal) Collected: 06/25/24 1922    Specimen: Blood Updated: 06/25/24 1953     Glucose 101 mg/dL      BUN 19 mg/dL      Creatinine 0.77 mg/dL      Sodium 140 mmol/L      Potassium 3.8 mmol/L      Chloride 104 mmol/L      CO2 22.1 mmol/L      Calcium 9.9 mg/dL      Total Protein 7.0 g/dL      Albumin 4.2 g/dL      ALT (SGPT) 8 U/L      AST (SGOT) 21 U/L      Alkaline Phosphatase 92 U/L      Total Bilirubin 0.5 mg/dL      Globulin 2.8 gm/dL      A/G Ratio 1.5 g/dL      BUN/Creatinine Ratio 24.7     Anion Gap 13.9 mmol/L      eGFR 87.3 mL/min/1.73     Narrative:      GFR Normal >60  Chronic Kidney Disease <60  Kidney Failure <15      Protime-INR [[id number]]  (Normal) Collected: 06/25/24 1922    Specimen: Blood Updated: 06/25/24 1940     Protime 10.5 Seconds      INR 0.96    [place] Draw [[id number]] Collected: 06/25/24 1922    Specimen: Blood Updated: 06/25/24 1931    Narrative:      The following orders were created for panel order [place] Draw.  Procedure                               Abnormality         Status                     ---------                               -----------         ------                     Green Top (Gel)[[id number]]                                  Final result               Lavender Top[[id number]]                                     Final result               Gold Top - SST[[id number]]                                   Final result               Light Blue Top[[id number]]                                   Final result                 Please view results for these tests on the individual orders.    Green Top (Gel) [[id number]] Collected: 06/25/24 1922    Specimen: Blood Updated: 06/25/24 1931     Extra Tube Hold for add-ons.     Comment: Auto resulted.       Lavender Top [[id number]] Collected: 06/25/24 1922    Specimen: Blood Updated: 06/25/24 1931     Extra Tube  hold for add-on     Comment: Auto resulted       Gold Top - SST [[id number]] Collected: 06/25/24 1922    Specimen: Blood Updated: 06/25/24 1931     Extra Tube Hold for add-ons.     Comment: Auto resulted.       Light Blue Top [[id number]] Collected: 06/25/24 1922    Specimen: Blood Updated: 06/25/24 1931     Extra Tube Hold for add-ons.     Comment: Auto resulted       CBC & Differential [[id number]]  (Abnormal) Collected: 06/25/24 1922    Specimen: Blood Updated: 06/25/24 1930    Narrative:      The following orders were created for panel order CBC & Differential.  Procedure                               Abnormality         Status                     ---------                               -----------         ------                     CBC Auto Differential[[id number]]        Abnormal            Final result                 Please view results for these tests on the individual orders.    CBC Auto Differential [[id number]]  (Abnormal) Collected: 06/25/24 1922    Specimen: Blood Updated: 06/25/24 1930     WBC 5.79 10*3/mm3      RBC 3.22 10*6/mm3      Hemoglobin 9.9 g/dL      Hematocrit 32.8 %      .9 fL      MCH 30.7 pg      MCHC 30.2 g/dL      RDW 15.2 %      RDW-SD 57.0 fl      MPV 9.4 fL      Platelets 175 10*3/mm3      Neutrophil % 72.5 %      Lymphocyte % 13.0 %      Monocyte % 11.1 %      Eosinophil % 2.9 %      Basophil % 0.3 %      Immature Grans % 0.2 %      Neutrophils, Absolute 4.20 10*3/mm3      Lymphocytes, Absolute 0.75 10*3/mm3      Monocytes, Absolute 0.64 10*3/mm3      Eosinophils, Absolute 0.17 10*3/mm3      Basophils, Absolute 0.02 10*3/mm3      Immature Grans, Absolute 0.01 10*3/mm3      nRBC 0.0 /100 WBC              Pending Results: Brain MRI    Imaging Reviewed:   MRI Brain With & Without Contrast    Result Date: 6/26/2024  1. Three enhancing intracranial mass lesions are consistent with the appearance of metastatic disease. Measurements as provided above. 2. Features of vasogenic edema  surrounding each of the aforementioned masses. No midline shift. 3. No convincing MR evidence of acute or subacute ischemic insult. Electronically Signed: Denisse Ramos MD  6/26/2024 12:06 PM EDT  Workstation ID: [id number]    CT Angiogram Head    Result Date: 6/25/2024  Mild atheromatous disease. No vessel stenosis or occlusion. Known intracranial masses demonstrate enhancement. Pulmonary masses have increased in size. Electronically Signed: Praveen Esquivel MD  6/25/2024 9:28 PM EDT  Workstation ID: [id number]    CT Angiogram Neck    Result Date: 6/25/2024  Mild atheromatous disease. No vessel stenosis or occlusion. Known intracranial masses demonstrate enhancement. Pulmonary masses have increased in size. Electronically Signed: Praveen Esquivel MD  6/25/2024 9:28 PM EDT  Workstation ID: [id number]    CT Head Without Contrast Stroke Protocol    Result Date: 6/25/2024  Findings concerning for metastatic disease to the brain. MRI of the brain with contrast recommended. Electronically Signed: Praveen Esquivel MD  6/25/2024 9:08 PM EDT  Workstation ID: [id number]    XR Chest 1 View    Result Date: 6/25/2024  Right lower lobe infiltrate versus mass. Right upper lobe mass. Vascular congestion. Electronically Signed: Praveen Esquivel MD  6/25/2024 8:13 PM EDT  Workstation ID: [id number]         Assessment & Plan   ASSESSMENT  Metastatic colon cancer with known osseous, hepatic and pulmonary metastases status post multiple lines of treatment.  Recent scans from May 2024 with progression of disease.  Biopsy of the thoracic lesion confirmed adenocarcinoma with immunohistochemical pattern consistent with colon primary.  Next generation sequencing and PD-L1 expression has been requested.  Now with multiple brain lesions likely metastatic disease.  Neurosurgery has been consulted and awaiting recommendations.  May also consider evaluation by radiation oncology for palliative radiation.  Left-handed weakness: Likely secondary to brain metastases as  above.      PLAN  As above  Further recommendations per Dr. Adams    Electronically signed by May Matute PA-C, 06/26/24 6/27/2024: I've known Ms. Massey since January of this year. She has metastatic colon cancer and has been receiving treatment with chemotherapy for several years. She has at this point received all the reasonable options for treatment. In the recent past she has had radiation to treat symptomatic bone metastases. She has maintained an adequate quality of life despite the progression of the disease. She presented with left hemiparesis and was found to have 3 lesions, one in the right frontal lobe, one in the left frontal lobe and one in the left parietal lobe. They have the appearance of metastatic disease. On exam she is alert and conversant. She is oriented and in no distress. No jaundice. No oral lesions and respirations not labored. No skin rash. No edema. Reviewed the images of the CT scan and the MRI. Reviewed the laboratory exams. Discussed with her and her family. Discussed with neurosurgery. I'm told excision of the frontal lesions is possible. The important question is whether this is a reasonable approach to her illness. I've explained to her the course of action that would be necessary. Following surgery she would need radiation and then ideally an effective chemotherapy regimen. Because she has received all the reasonable agents at this time, the long term prognosis, particularly with the development of brain metastases, is poor. The alternative to surgery, thus, would be to treat her with radiation and then provide symptomatic and supportive care perhaps with Hospice care. She has yet to make a decision and will discuss later this afternoon.   I reviewed the records with Ms. Matute and agree with her note. I've formulated the analysis and the plans.     Sean Adams MD on 6/27/2024 at 8:24 AM.           Electronically signed by Sean Adams MD at 06/27/24  0825       Neptali Rodriguez at 06/26/24 1646        Consult Orders    1. Inpatient Spiritual Care Consult [[id number]] ordered by Jay Michelle MD at 06/26/24 0352                 Initial spiritual care visit. Pt sleeping and friend at bedside. Friend stated they were both tired and had no needs at this time.  offered to return if any needs should arise, she need only let the nurse know. Friend was appreciative of the offer. No other needs at this time.     Electronically signed by Neptali Rodriguez at 06/26/24 1648       Dontrell Awan PA at 06/26/24 0744        Consult Orders    1. Inpatient Neurosurgery Consult [[id number]] ordered by Jay Michelle MD at 06/25/24 2300    2. Neurosurgery (on-call MD unless specified) [[id number]] ordered by Molly Caldwell PA at 06/25/24 2213              Attestation signed by Philip Jeff MD at 06/27/24 0722    I have reviewed this documentation and agree.    She has multiple brain mets.  The left and right frontal large metastatic lesions are potentially resectable but the benefit of performing craniotomy and tumor resection is questionable.  It would depend on how she is currently doing with her systemic disease.  If her prognosis is excellent based on her current control of systemic disease then it may be worthwhile, otherwise would recommend stereotactic radiation for these lesions.  I appreciate oncology assistance.                Neurosurgery Consultation      Patient: Samantha Massey    Sex: female    YOB: 1960    Date of Admission: 6/25/2024  7:00 PM    Admitting Dx: Metastasis to brain [C79.31]    Reason for Consult: Brain mass      Subjective     CC: Left-sided weakness and numbness x 2 days    HPI:  Patient is a 63 y.o. female with CAD, CHF, CKD, and stage IV colon cancer with known osseous, hepatic, and pulmonary metastases who presented to the ED yesterday with strokelike symptoms.  She takes daily 81 mg aspirin at home.  She  reported a 1-day history of headache, left-sided weakness and numbness particularly in her hand, as well as left-sided facial droop and slurred speech.      During my evaluation patient feels much of the symptoms have improved, though she still has some mild left-sided facial droop and left eye ptosis.  She denies dizziness, visual disturbance, difficulty ambulating, and bowel/bladder dysfunction.  She has no history of seizures.    Patient has undergone multiple rounds of chemotherapy and radiation in the past.  She reports she is currently considering immunotherapy with her oncologist, Dr. Adams.        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    History of colon cancer, stage IV     Stage SHELL with progression    Hypertension     NSTEMI (non-ST elevated myocardial infarction) 12/09/2023    PONV (postoperative nausea and vomiting)     Pulmonary arterial hypertension     Radiculopathy 07/2012    Right L5/S1         Current Facility-Administered Medications:     acetaminophen (TYLENOL) tablet 650 mg, 650 mg, Oral, Q4H PRN **OR** acetaminophen (TYLENOL) suppository 650 mg, 650 mg, Rectal, Q4H PRN, Jay Michelle MD    aspirin chewable tablet 81 mg, 81 mg, Oral, Daily **OR** aspirin suppository 300 mg, 300 mg, Rectal, Daily, Jay Michelle MD    atorvastatin (LIPITOR) tablet 80 mg, 80 mg, Oral, Nightly, Jay Michelle MD    dexAMETHasone (DECADRON) injection 4 mg, 4 mg, Intravenous, Q6H, Jay Michelle MD, 4 mg at 06/26/24 0514    Enoxaparin Sodium (LOVENOX) syringe 40 mg, 40 mg, Subcutaneous, Q24H, Jay Michelle MD    levETIRAcetam (KEPPRA) injection 1,000 mg, 1,000 mg, Intravenous, Q12H, Jay Michelle  MD GERONIMO    metoprolol succinate XL (TOPROL-XL) 24 hr tablet 25 mg, 25 mg, Oral, Q24H, Jay Michelle MD    pantoprazole (PROTONIX) injection 40 mg, 40 mg, Intravenous, Q AM, Jay Michelle MD, 40 mg at 06/26/24 0514    sodium chloride 0.9 % flush 10 mL, 10 mL, Intravenous, PRN, Molly Caldwell PA    sodium chloride 0.9 % flush 10 mL, 10 mL, Intravenous, Q12H, Jay Michelle MD, 10 mL at 06/26/24 0054    sodium chloride 0.9 % flush 10 mL, 10 mL, Intravenous, PRN, Jay Michelle MD    sodium chloride 0.9 % infusion 40 mL, 40 mL, Intravenous, PRN, Jay Michelle MD    traMADol (ULTRAM) tablet 50 mg, 50 mg, Oral, Q6H PRN, Jay Michelle MD    Current Outpatient Medications:     aspirin 81 MG EC tablet, Take 1 tablet by mouth Daily., Disp: , Rfl:     atorvastatin (LIPITOR) 80 MG tablet, Take 1 tablet by mouth Daily for 360 days., Disp: 90 tablet, Rfl: 3    Cyanocobalamin (Vitamin B-12) 5000 MCG sublingual tablet, Place 1 tablet under the tongue Every Morning., Disp: , Rfl:     empagliflozin (JARDIANCE) 10 MG tablet tablet, Take 1 tablet by mouth Daily for 360 days., Disp: 90 tablet, Rfl: 3    Lysine 500 MG capsule, Take 1 tablet by mouth 2 (Two) Times a Day., Disp: , Rfl:     metoprolol succinate XL (TOPROL-XL) 25 MG 24 hr tablet, Take 1 tablet by mouth Daily for 360 days., Disp: 90 tablet, Rfl: 3    traMADol (ULTRAM) 50 MG tablet, Take 1 tablet by mouth Every 6 (Six) Hours As Needed for Moderate Pain., Disp: 50 tablet, Rfl: 1    Allergies   Allergen Reactions    Hydrocodone Nausea And Vomiting    Iodinated Contrast Media Hives and Itching     PT HAD SOME HIVES DURING SCAN.  PRIOR TO SCAN 8-3-2013.    Latex Rash     Blisters     Penicillins Hives       Past Surgical History:   Procedure Laterality Date    ABDOMINAL SURGERY      CARDIAC CATHETERIZATION N/A 12/08/2023    Procedure: Left Heart Cath;  Surgeon: Ramiro Olivera MD;  Location: [facility] CATH INVASIVE LOC[facility];  Service: Cardiovascular;   Laterality: N/A;    COLON SURGERY  Colon resection    CYST REMOVAL      cyst removed from [place] in     HERNIA REPAIR  incisional    PORTACATH PLACEMENT  2017    VENOUS ACCESS DEVICE (PORT) INSERTION Left 2021    Procedure: INSERTION VENOUS ACCESS DEVICE;  Surgeon: Jay Yeh MD;  Location: [facility] MAIN OR;  Service: General;  Laterality: Left;    VENOUS ACCESS DEVICE (PORT) REMOVAL Right 2020    Procedure: REMOVAL VENOUS ACCESS DEVICE;  Surgeon: Jay Yeh MD;  Location: [facility] MAIN OR;  Service: General;  Laterality: Right;    VENTRAL/INCISIONAL HERNIA REPAIR N/A 2023    Procedure: VENTRAL/INCISIONAL HERNIA REPAIR WITH MESH;  Surgeon: Jay Yeh MD;  Location: [facility] MAIN OR;  Service: General;  Laterality: N/A;       Social History     Socioeconomic History    Marital status: Single   Tobacco Use    Smoking status: Former     Current packs/day: 0.00     Average packs/day: 1 pack/day for 31.0 years (31.0 ttl pk-yrs)     Types: Cigarettes     Start date: 1981     Quit date: 2012     Years since quittin.4     Passive exposure: Past    Smokeless tobacco: Never   Vaping Use    Vaping status: Never Used   Substance and Sexual Activity    Alcohol use: Yes     Comment: 2 or 3 a month    Drug use: Never    Sexual activity: Not Currently     Partners: Female     Birth control/protection: Same-sex partner       Family History   Problem Relation Age of Onset    Heart disease Mother     Lung cancer Father 70        Associated to cigarette smoking    Heart disease Father     Hyperlipidemia Father     Cancer Father     Cancer Sister         Bladder cancer         Current Medications:  Scheduled Meds:aspirin, 81 mg, Oral, Daily   Or  aspirin, 300 mg, Rectal, Daily  atorvastatin, 80 mg, Oral, Nightly  dexAMETHasone, 4 mg, Intravenous, Q6H  enoxaparin, 40 mg, Subcutaneous, Q24H  levETIRAcetam, 1,000 mg, Intravenous, Q12H  metoprolol succinate XL, 25 mg,  Oral, Q24H  pantoprazole, 40 mg, Intravenous, Q AM  sodium chloride, 10 mL, Intravenous, Q12H      Continuous Infusions:   PRN Meds:   acetaminophen **OR** acetaminophen    sodium chloride    sodium chloride    sodium chloride    traMADol    ROS: 14 point review of systems was completed and is negative except for what is noted in HPI      Objective     Vitals:    06/26/24 0250 06/26/24 0305 06/26/24 0320 06/26/24 0451   BP: 159/93 128/72 124/68 139/70   BP Location:    Right arm   Patient Position:    Lying   Pulse: 93 91 92 91   Resp:    14   Temp:    98.2 °F (36.8 °C)   TempSrc:    Oral   SpO2: 95% 94% 93% 94%   Weight:       Height:           Physical exam  General  - awake, cooperative, in no acute distress  HEENT  - Normocephalic, atraumatic  - Mild left eyelid ptosis  Cardiac  - RRR, no peripheral edema  Respiratory  - Normal respiratory rate and effort      NEUROLOGIC    MENTAL STATUS:  - A/O x3  - GCS 4-6-5  CRANIAL NERVES:  II:      Pupils 2mm equal, round, and reactive to light  III, IV, VI: EOM intact, no gaze preference or deviation  V:      Normal sensation in all 3 segments bilaterally  VII:      Mild left-sided facial droop  VIII:      Normal hearing to speech  IX, X:      Normal palatal elevation, no uvular deviation  XI:      Normal head turn and shoulder shrug bilaterally  XII:      Midline tongue protrusion  MOTOR:  - MAURICE symmetrically w/ good strength  REFLEXES:  - Brisk and symmetric throughout  - Negative clonus  SENSORY:  - Normal to touch and pinprick throughout  COORDINATION:  - No dysmetria  GAIT:  - Deferred            RESULTS REVIEW:  Results from last 7 days   Lab Units 06/25/24 1922   WBC 10*3/mm3 5.79   HEMOGLOBIN g/dL 9.9*   HEMATOCRIT % 32.8*   PLATELETS 10*3/mm3 175        Results from last 7 days   Lab Units 06/25/24 1922   SODIUM mmol/L 140   POTASSIUM mmol/L 3.8   CHLORIDE mmol/L 104   CO2 mmol/L 22.1   BUN mg/dL 19   CREATININE mg/dL 0.77   CALCIUM mg/dL 9.9   BILIRUBIN mg/dL  0.5   ALK PHOS U/L 92   ALT (SGPT) U/L 8   AST (SGOT) U/L 21   GLUCOSE mg/dL 101*                 CT Head Without Contrast Stroke Protocol    Result Date: 6/25/2024  CT HEAD WO CONTRAST STROKE PROTOCOL Date of Exam: 6/25/2024 8:53 PM EDT Indication: Stroke, follow up Neuro deficit, acute, stroke suspected. The patient has a recent pathology diagnosis of adenocarcinoma of the colon with metastatic disease. Comparison: 12/2/2019 Technique: Axial CT images were obtained of the head without contrast administration.  Reconstructed coronal and sagittal images were also obtained. Automated exposure control and iterative construction methods were used. Scan Time: 2100 Results discussed with DEA Isaacs at 2104. Findings: Rounded high density lesion within the right frontal lobe with significant surrounding edema. This has a ringlike appearance despite the lack of contrast and measures 3.5 cm x 3.2 cm concerning for metastatic disease to the brain. Higher density ovoid lesion within the left frontal lobe measuring 2 cm x 1.5 cm with significant surrounding edema also concerning for metastatic disease. 1 cm x 0.8 cm lesion within the left parietal lobe also concerning for metastatic disease to the brain. Additional smaller lesions are suspected. The posterior fossa is normal. No definite intracranial hemorrhage or infarct. The calvarium is intact. Orbital and parable soft tissues are normal. The paranasal sinuses are clear.     Impression: Findings concerning for metastatic disease to the brain. MRI of the brain with contrast recommended. Electronically Signed: Praveen Esquivel MD  6/25/2024 9:08 PM EDT  Workstation ID: [id number]      CT Angiogram Head    Result Date: 6/25/2024  CT ANGIOGRAM HEAD, CT ANGIOGRAM NECK Date of Exam: 6/25/2024 8:55 PM EDT Indication: Stroke. Comparison: 12/7/2020. Technique: CTA of the head and neck was performed after the uneventful intravenous administration of iodinated contrast. Reconstructed coronal  and sagittal images were also obtained. In addition, a 3-D volume rendered image was created for interpretation. Automated exposure control and iterative reconstruction methods were used. Findings: Atheromatous disease of the aortic arch. The right common carotid artery is normal. Mild atheromatous disease of the right carotid bulb and proximal right internal carotid artery. Mild atheromatous disease involving the intracranial segments of  the right internal carotid artery. The right carotid terminus is normal. The visualized branches of the right anterior and middle cerebral arteries are normal. Mild atheromatous disease of the left carotid bulb and proximal left internal carotid artery with no significant narrowing per NASCET criteria. Mild atheromatous disease involving the intracranial segments of the left internal carotid artery. The left carotid terminus is normal. The visualized branches of the left anterior and middle cerebral arteries are normal. Both vertebral arteries arise from the subclavian arteries. The right vertebral artery is dominant. The origin of the left vertebral artery is occluded with some tenuous flow progressing intracranially with retrograde flow from the basilar artery present. Both vertebral arteries supply the basilar artery. The basilar artery and basilar artery tip are normal. The basilar artery terminates in bilateral posterior cerebral arteries which appear normal. The known intracranial masses demonstrate enhancement. Orbital and periorbital soft tissues are normal. The paranasal sinuses are clear. No cervical adenopathy. The thyroid gland is normal. Compared with the prior CT of the chest there are multiple pulmonary nodules and masses better identified on the prior CT of the chest. These have significantly increased in size however a dedicated CT of the chest is recommended. A previously identified right upper lobe nodule which previously measured 1.6 cm currently measures 2.2  cm x 2.1 cm. Right hilar mass. Bilateral chest wall masses. No lytic or blastic disease.     Impression: Mild atheromatous disease. No vessel stenosis or occlusion. Known intracranial masses demonstrate enhancement. Pulmonary masses have increased in size. Electronically Signed: Praveen Esquivel MD  6/25/2024 9:28 PM EDT  Workstation ID: [id number]         Assessment     MDM: This is a 63 y.o. female with a history of colon cancer with known osseous, hepatic, and pulmonary metastases who presented to the ED yesterday with strokelike symptoms.  Today she mainly has mild left-sided ptosis and facial droop.  Cognitively she appears intact.  CT head shows a right frontal lobe lesion with prominent vasogenic edema, most likely representing metastasis.    I have ordered a contrast MRI brain w/ [facility]  protocols for better evaluation of this lesion.  Further neurosurgical recommendations pending completion of the study as well as discussion with oncology.        Plan     Brain mass  - MRI brain with and without contrast; [facility] protocols  - Continue Decadron and Keppra  - Hold ASA for now  - Routine sodium monitoring  - Hematology oncology consult  - Fine for normal diet today from neurosurgical respective    Neurosurgery will continue to follow.  Call with any questions or concerns.      I discussed my assessment and recommendations with Dr. Randee Awan PA-C  6/26/2024  07:44 EDT      Part of this note may be an electronic transcription/translation of spoken language to printed text using the Dragon Dictation System.      Electronically signed by Philip Jeff MD at 06/27/24 0702

## 2024-06-27 NOTE — SIGNIFICANT NOTE
06/27/24 1255   Rehab Time/Intention   Session Not Performed patient/family declined treatment  (not feeling well enough to work with PT this date despite education)   Recommendation   PT - Next Appointment 06/28/24

## 2024-06-27 NOTE — PROGRESS NOTES
Neurosurgery Progress Note    Date: 24     Patient: Samantha Massey   Sex: female   : 1960      SUBJECTIVE    CC: Left-sided weakness    Interval history:  No adverse events overnight.  No significant neurologic changes.  No new complaints.        Current Medications:  Scheduled Meds:atorvastatin, 80 mg, Oral, Daily  dexAMETHasone, 4 mg, Intravenous, Q6H  empagliflozin, 10 mg, Oral, Daily  enoxaparin, 40 mg, Subcutaneous, Q24H  levETIRAcetam, 1,000 mg, Intravenous, Q12H  metoprolol succinate XL, 25 mg, Oral, Q24H  pantoprazole, 40 mg, Intravenous, Q AM  sodium chloride, 10 mL, Intravenous, Q12H      Continuous Infusions:   PRN Meds:   acetaminophen **OR** acetaminophen    sodium chloride    sodium chloride    sodium chloride    traMADol      OBJECTIVE  Vitals:    24 2358 24 0530 24 0725 24 1136   BP: 107/64 138/69 122/72 133/70   BP Location: Left arm Left arm Left arm Left arm   Patient Position: Lying Lying Lying Lying   Pulse: 80  82 82   Resp: 15 14 15 17   Temp: 98.4 °F (36.9 °C) 97.8 °F (36.6 °C) 97.9 °F (36.6 °C) 97.8 °F (36.6 °C)   TempSrc: Axillary Oral Oral Oral   SpO2: 96% 98%     Weight:       Height:           Physical exam    General  - Awake, cooperative, in no acute distress  HEENT  - Normocephalic, atraumatic  - Mild left eyelid ptosis  Respiratory  - Normal respiratory rate and effort  NEUROLOGIC  - A/O x3  - Mild left-sided facial droop; otherwise cranial nerves are grossly intact  - Moves all extremities symmetrically  - Sensation intact throughout      Results review  CBC          2024    11:25 2024    19:22 2024    08:29   CBC   WBC 6.56  5.79  3.82    RBC 2.83  3.22  3.29    Hemoglobin 9.4  9.9  10.2    Hematocrit 30.5  32.8  33.2    .8  101.9  100.9    MCH 33.2  30.7  31.0    MCHC 30.8  30.2  30.7    RDW 15.6  15.2  14.9    Platelets 169  175  154        BMP          2024    11:25 2024    19:22 2024    08:29   BMP    BUN 19  19  16    Creatinine 0.81  0.77  0.71    Sodium 139  140  140    Potassium 3.6  3.8  3.9    Chloride 104  104  106    CO2 23.5  22.1  24.2    Calcium 9.3  9.9  9.6                   CT Head Without Contrast Stroke Protocol    Result Date: 6/25/2024  CT HEAD WO CONTRAST STROKE PROTOCOL Date of Exam: 6/25/2024 8:53 PM EDT Indication: Stroke, follow up Neuro deficit, acute, stroke suspected. The patient has a recent pathology diagnosis of adenocarcinoma of the colon with metastatic disease. Comparison: 12/2/2019 Technique: Axial CT images were obtained of the head without contrast administration.  Reconstructed coronal and sagittal images were also obtained. Automated exposure control and iterative construction methods were used. Scan Time: 2100 Results discussed with DEA Isaacs at 2104. Findings: Rounded high density lesion within the right frontal lobe with significant surrounding edema. This has a ringlike appearance despite the lack of contrast and measures 3.5 cm x 3.2 cm concerning for metastatic disease to the brain. Higher density ovoid lesion within the left frontal lobe measuring 2 cm x 1.5 cm with significant surrounding edema also concerning for metastatic disease. 1 cm x 0.8 cm lesion within the left parietal lobe also concerning for metastatic disease to the brain. Additional smaller lesions are suspected. The posterior fossa is normal. No definite intracranial hemorrhage or infarct. The calvarium is intact. Orbital and parable soft tissues are normal. The paranasal sinuses are clear.     Impression: Findings concerning for metastatic disease to the brain. MRI of the brain with contrast recommended. Electronically Signed: Praveen Esquivel MD  6/25/2024 9:08 PM EDT  Workstation ID: [id number]   MRI Brain With & Without Contrast    Result Date: 6/26/2024  MRI BRAIN W WO CONTRAST Date of Exam: 6/26/2024 10:48 AM EDT Indication: Brain mass - [facility] protocols 0.6mm thin cuts. History of colon cancer with  liver metastases.  Comparison: CT angiography of the head 6/25/2024, noncontrast CT head 6/25/2024 Technique:  Routine multiplanar/multisequence sequence images of the brain were obtained before and after the uneventful administration of 10 cc Prohance. Findings: There are 3 enhancing intracranial masses consistent with the appearance of metastatic disease. 2 larger lesions appear to have internal cystic mucinous or necrotic components with eccentric solid nodularity and peripheral capsular enhancement. The smallest appears more solid and homogeneously enhancing. A dominant lesion in the right frontal lobe measures 3.9 x 3.6 x 3.8 cm (AP oblique by transverse oblique by craniocaudal). A lesion within the left frontal lobe measures 2.8 x 2.1 x 2.9 cm. A lesion within the high left parietal lobe posteromedially measures 1.2 x 1.3 x 1.2 cm. Surrounding each of these lesions is T2 and FLAIR signal hyperintensity, thought to represent vasogenic edema, which manifests as shine through on diffusion sequences. The diffusion signal changes demonstrate no ADC correlate, arguing against acute or subacute ischemic insult. There is no midline shift. Ventricular configuration is within normal limits. Orbital structures are within the limits. Paranasal sinuses are clear. Mastoid air cells are clear.     Impression: 1. Three enhancing intracranial mass lesions are consistent with the appearance of metastatic disease. Measurements as provided above. 2. Features of vasogenic edema surrounding each of the aforementioned masses. No midline shift. 3. No convincing MR evidence of acute or subacute ischemic insult. Electronically Signed: Denisse Ramos MD  6/26/2024 12:06 PM EDT  Workstation ID: [id number]       ASSESSMENT/PLAN  This is a 63 y.o. female with a history of stage IV colon cancer being followed by neurosurgery for brain mets.  She has 3 different lesions.  The 2 most prominent lesions are in the right and left frontal lobes,  respectively.  There is an additional smaller lesion in the left posterior parietal lobe.  There is a fair amount of edema but no midline shift.  Patient has mild left-sided facial droop and ptosis but is otherwise neurologically intact.    I had a discussion with the patient and spouse, as well as oncology.  If surgical intervention were pursued it would be paramount she undergo adjuvant radiation and chemotherapy.  Unfortunately she is already exhausted all of the reasonable chemotherapy options, per oncology.  As such she has a relatively poor long-term prognosis.  Based on this neurosurgery would not recommend surgical intervention at this time.  Patient would likely benefit from SRS.  Also considering hospice.  Will defer further management to patient's oncology team.    Brain metastases  Stage IV colon cancer  - No neurosurgical intervention recommended at this time  - Will defer to oncology for further management  - Medical management per primary team    Nothing further to offer from a neurosurgery standpoint.  We will sign off at this time but will be available for any questions or concerns.        I discussed my assessment and recommendations with Dr. Randee Awan PA-C  06/27/24  13:15 EDT      Part of this note may be an electronic transcription/translation of spoken language to printed text using the Dragon Dictation System.

## 2024-06-27 NOTE — THERAPY EVALUATION
Acute Care - Speech Language Pathology Initial Evaluation  [facility]     Patient Name: Samantha Massey  : 1960  MRN: [id number]  Today's Date: 2024               Admit Date: 2024     Visit Dx:    ICD-10-CM ICD-9-CM   1. Left-sided weakness  R53.1 728.87   2. Metastasis to brain  C79.31 198.3     Patient Active Problem List   Diagnosis    Adenocarcinoma of colon metastatic to liver    Allergic reaction to contrast dye    Anemia    Encounter for chemotherapy management    Liver cancer    Malignant neoplasm of colon metastatic to bone    Primary hypertension    History of non-ST elevation myocardial infarction (NSTEMI)    CAD (coronary artery disease)    Stress-induced cardiomyopathy    Hyperlipidemia LDL goal <100    Chest wall pain, chronic    Metastasis to brain    Left hand weakness     Past Medical History:   Diagnosis Date    Acute systolic heart failure 2023    Allergic laytex,peniciline,contrast dye    CAD (coronary artery disease) 2023    CHF (congestive heart failure) 23    Chronic kidney disease     colon cancer     Metastatic to liver and bones    Colon cancer     Congenital heart disease 23    History of colon cancer, stage IV     Stage SHELL with progression    Hypertension     NSTEMI (non-ST elevated myocardial infarction) 2023    PONV (postoperative nausea and vomiting)     Pulmonary arterial hypertension     Radiculopathy 2012    Right L5/S1     Past Surgical History:   Procedure Laterality Date    ABDOMINAL SURGERY      CARDIAC CATHETERIZATION N/A 2023    Procedure: Left Heart Cath;  Surgeon: Ramiro Olivera MD;  Location: [facility] INVASIVE LOCATION;  Service: Cardiovascular;  Laterality: N/A;    COLON SURGERY  Colon resection    CYST REMOVAL      cyst removed from [facility] in     HERNIA REPAIR  incisional    PORTACATH PLACEMENT  2017    VENOUS ACCESS DEVICE (PORT) INSERTION Left 2021    Procedure: INSERTION VENOUS ACCESS  DEVICE;  Surgeon: Jay Yeh MD;  Location: [facility] MAIN OR;  Service: General;  Laterality: Left;    VENOUS ACCESS DEVICE (PORT) REMOVAL Right 08/27/2020    Procedure: REMOVAL VENOUS ACCESS DEVICE;  Surgeon: Jay Yeh MD;  Location: [facility] MAIN OR;  Service: General;  Laterality: Right;    VENTRAL/INCISIONAL HERNIA REPAIR N/A 03/02/2023    Procedure: VENTRAL/INCISIONAL HERNIA REPAIR WITH MESH;  Surgeon: Jay Yeh MD;  Location: [facility] MAIN OR;  Service: General;  Laterality: N/A;       SLP Recommendation and Plan  SLP Diagnosis: mild, cognitive-linguistic disorder (06/27/24 1000)  SLP Diagnosis Comments: see comment (06/27/24 1000)           [facility] Criteria for Skilled Therapy Interventions Met: baseline status (06/27/24 1000)  Anticipated Discharge Disposition (SLP): unknown (06/27/24 1000)        Therapy Frequency (SLP SLC): evaluation only, other (see comments) (Recommending outpatient services) (06/27/24 1000)                                       SLP EVALUATION (Last 72 Hours)       SLP SLC Evaluation       Row Name 06/27/24 1000       Communication Assessment/Intervention    Document Type evaluation  -MB    Subjective Information no complaints  -MB    Patient Observations alert;cooperative;agree to therapy  -MB    Patient/Family/Caregiver Comments/Observations Roommate present in room, has noticed gradual mild changes in thinking skills. Previously wondered whether it was age-related.  -MB    Patient Effort good  -MB    Comment Pt was seen for skilled ST targeting cognitive-linguistic evaluation. Pt was found upright and alert in bed on room air with roommate present. Oriented x6; pleasant and agreeable to evaluation. Oral mech performed; noted that pt's lower dentition in good condition, while upper bridge in place; both sets of dentition appeared in good condition, as pt endorses regular oral care. Followed all commands with total accuracy; noted slight L labial droop  "with good labial seal, SP elevation, tongue ROM and facial muscle movement. Pt pragmatics presented intact, with a clear, resonant voice. Motor speech was characterized by slightly slowed speech with none-mild slurring, which pt endorses. Performed well particularly on memory tasks, problem, solving, sequencing, yes/no, automatics, naming, repetition, and spontaneous speech. Pt exhibited some difficulty with executive functioning and reasoning tasks. Clockdraw characterized by gradual placement discrepancies of numbers, and extra numbers with time labeled correctly after self-correction. When asked what happened during this exercise, she reported that she \"got foggy\" as she sometimes tends to do. Clinician encouraged pt to continue thinking through things, especially when difficult, allowing her brain to take breaks when needed. Also informed her of outpatient services if she has a desire to pursue cognitive-linguistic therapy, given that she reported these are baseline deficits. Dropped off business card to pt's room.    Recommend: outpatient cognitive-linguistic therapy services. ST signing off on orders for communication disorder at this time. Please re-consult if further changes in cognitive status occur.  -MB       General Information    Patient Profile Reviewed yes  -MB    Pertinent History Of Current Problem Samantha Massey is a 63 y.o. female with a past medical history of hyperlipidemia, coronary artery disease, hypertension, grade 1 diastolic heart dysfunction, and colon cancer status postchemotherapy and metastasis to the lung.  She has had 2 treatments of radiation to the chest.  She was her baseline until yesterday whenever she had left hand and left arm weakness.  She had difficulty controlling her left hand.  She was hoping that it would get better but today her sister came over and insisted that she come to the emergency room.  A stroke workup ensued and it appears that she has metastatic cancer to " the brain.  Neurosurgery and neurology were consulted by the ER practitioner.  They requested an MRI and continue the workup.  Neurosurgery requested placing the patient on dexamethasone and Keppra.  Patient will be admitted PT OT evaluated evaluated by neurology neurosurgery and hematology oncology.  Patient states that she is able to ambulate without any difficulties.  She also reports no loss of consciousness. ST initially consulted for swallow eval, so pt was placed on regular diet. D/t reported speech slurring and perceivable changes in thinking, pt was seen for Speech/language evaluation.  -MB    Prior Level of Function-Communication WFL  -MB    Plans/Goals Discussed with patient;family  -MB    Barriers to Rehab none identified  -MB    Patient's Goals for Discharge patient did not state  -MB    Family Goals for Discharge family did not state  -MB    Standardized Assessment Used other (see comments)  Informal cognitive assessment  -MB       Pain    Additional Documentation Pain Scale: FACES Pre/Post-Treatment (Group)  -MB       Pain Scale: Numbers Pre/Post-Treatment    Pretreatment Pain Rating 0/10 - no pain  -MB    Posttreatment Pain Rating 0/10 - no pain  -MB       Comprehension Assessment/Intervention    Comprehension Assessment/Intervention Auditory Comprehension;Reading Comprehension  -MB       Auditory Comprehension Assessment/Intervention    Auditory Comprehension (Communication) WFL  -MB    Able to Identify Objects/Pictures (Communication) familiar objects;WFL  -MB    Answers Questions (Communication) yes/no;wh questions;personal;simple;concrete;mulit-unit;complex;abstract;WFL  -MB    Able to Follow Commands (Communication) 1-step;2-step;body part;WFL  -MB    Narrative Discourse simple paragraph level;complex paragraph level;conversational level;WFL  -MB       Reading Comprehension Assessment/Intervention    Reading Comprehension (Communication) WFL  -MB    Scanning (Reading)  letters;words;phrases;sentences;[facility]  -MB    Single Word Level [facility]  -MB    Phrase Level [facility]  -MB    Paragraph Level [facility]  -MB       Expression Assessment/Intervention    Expression Assessment/Intervention verbal expression;graphic expression  -MB       Verbal Expression Assessment/Intervention    Verbal Expression [facility]  -MB    Automatic Speech (Communication) counting 1-20;days of week;pledge of allegiance;[facility]  -MB    Repetition sounds;words;phrases;sentences;[facility]  -MB    Phrase Completion [facility]  -MB    Responsive Naming [facility]  -MB    Confrontational Naming [facility]  -MB    Spontaneous/Functional Words [facility]  -MB    Sentence Formulation [facility]  -MB    Conversational Discourse/Fluency [facility]  -MB       Graphic Expression Assessment/Intervention    Graphic Expression [facility]  -MB    Graphic Expression to Dictation shapes;numbers;letters;words;[facility]  -MB    Biographical Information name;address;telephone number;;[facility]  -MB    Functional Correspondence [facility]  -MB    Sentence Formulation simple;complex;[facility]  -MB       Oral Motor Structure and Function    Oral Motor Structure and Function mild impairment  -MB    Dentition Assessment natural, present and adequate;missing teeth;upper dentures/partial in place  -MB    Mucosal Quality moist, healthy  -MB       Oral Musculature and Cranial Nerve Assessment    Oral Motor General Assessment generalized oral motor weakness  -MB       Motor Speech Assessment/Intervention    Motor Speech Function [facility]  -MB    Characteristics Consistent with Dysarthria slow rate;decreased articulation;other (see comments)  Presence of decreased articulation was none-mild  -MB    Initiation of Phonation (Communication) voluntary;L  -MB    Automatic Speech (Communication) counting 1-20;days of week;pledge of allegiance;response to greeting;[facility]  -MB    Verbal Repetition (Communication) monosyllabic words;polysyllabic words;words of increasing length;phrases;WFL;sentences;mild impairment  -MB    Phase Completion [facility]  -MB     Conversational Speech (Communication) mild impairment  -MB    Speech intelligibility 100%;other (see comments)  Clinician understood 100% of what was said  -MB       Cursory Voice Assessment/Intervention    Quality and Resonance (Voice) [facility]  -MB       Cognitive Assessment Intervention- SLP    Cognitive Function (Cognition) mild impairment  -MB    Orientation Status (Cognition) [facility]  -MB    Memory (Cognitive) immediate;delayed;auditory;[facility]  -MB    Attention (Cognitive) [facility]  -MB    Thought Organization (Cognitive) concrete divergent;abstract divergent;concrete convergent;abstract convergent;[facility]  -MB    Reasoning (Cognitive) deductive;mild impairment  -MB    Problem Solving (Cognitive) [facility]  -MB    Executive Function (Cognition) time management;mild impairment  -MB    Pragmatics (Communication) [facility]  -MB       SLP Evaluation Clinical Impressions    SLP Diagnosis mild;cognitive-linguistic disorder  -MB    SLP Diagnosis Comments see comment  -MB    Rehab Potential/Prognosis good  -MB    SLC Criteria for Skilled Therapy Interventions Met baseline status  -MB    Functional Impact functional impact in ADLs;difficulty completing home management task  -MB       Recommendations    Therapy Frequency (SLP SLC) evaluation only;other (see comments)  Recommending outpatient services  -MB    Anticipated Discharge Disposition (SLP) unknown  -MB              User Key  (r) = Recorded By, (t) = Taken By, (c) = Cosigned By      Initials Name Effective Dates    Joseph Ortiz, SLP 04/30/24 -                        EDUCATION  The patient has been educated in the following areas:     Cognitive Impairment Communication Impairment.                        Time Calculation:                        CLARIBEL Grace  6/27/2024

## 2024-06-27 NOTE — PROGRESS NOTES
[facility]     Progress Note    Patient Name: Samantha Massey  : 1960  MRN: [id number]  Primary Care Physician:  Diana Spencer MD  Date of admission: 2024  Service date and time: 24 16:35 EDT  Subjective   Subjective     Chief Complaint:   Left-sided weakness upper extremity   HPI:  Patient feels better today       Objective   Objective     Vitals:   Temp:  [97.8 °F (36.6 °C)-98.4 °F (36.9 °C)] 97.8 °F (36.6 °C)  Heart Rate:  [78-92] 78  Resp:  [14-20] 15  BP: (107-143)/(64-76) 143/76  Physical Exam    Constitutional: Awake, alert in no distress    Eyes: PERRLA, sclerae anicteric, no conjunctival injection   HENT: NCAT, mucous membranes moist   Neck: Supple, no thyromegaly, no lymphadenopathy, trachea midline   Respiratory: Clear to auscultation bilaterally, nonlabored respirations    Cardiovascular: RRR, no murmurs, rubs, or gallops, palpable pedal pulses bilaterally   Gastrointestinal: Positive bowel sounds, soft, nontender, nondistended   Musculoskeletal: No bilateral ankle edema, no clubbing or cyanosis to extremities   Psychiatric: Appropriate affect, cooperative   Neurologic: Oriented x 3, strength symmetric in all extremities, Cranial Nerves grossly intact to confrontation, speech clear   Skin: No rashes     Result Review    Result Review:  I have personally reviewed the results from the time of this admission to 2024 16:35 EDT and agree with these findings:  [x]  Laboratory list / accordion  []  Microbiology  []  Radiology  []  EKG/Telemetry   []  Cardiology/Vascular   []  Pathology  []  Old records  []  Other:  Most notable findings include: Hemoglobin 10.2, hematocrit 33.2, WBC count of 3.8, platelet count 154, glucose 154, BUN 16, creatinine 0.71, A1c around 5.21      Assessment & Plan   Assessment / Plan       Active Hospital Problems:  Active Hospital Problems    Diagnosis     **Metastasis to brain     Left hand weakness     Hyperlipidemia LDL goal <100     Primary  hypertension      Plan:    1.  Left-handed weakness stroke workup ensued but believe that this may be secondary to brain metastasis. D/c asa. Neurosurgery on board.      2.  Metastasis to the brain neurosurgery recommended that we place the patient on dexamethasone and Keppra these medications have been started.  Hematology oncology has been consulted as well.   Patient was seen by oncology and consideration was for possible radiation for palliative purposes, patient already received chemotherapy as outpatient, patient was also evaluated by neurosurgery who felt that there is no intervention per their services at this time     3.  Hyperlipidemia continue atorvastatin 80     4.  Hypertension continue metoprolol hydralazine as needed.    VTE Prophylaxis:  Pharmacologic VTE prophylaxis orders are present.        CODE STATUS:   Level Of Support Discussed With: Patient  Code Status (Patient has no pulse and is not breathing): CPR (Attempt to Resuscitate)  Medical Interventions (Patient has pulse or is breathing): Full Support    Disposition:  I expect patient to be discharged on 6/28/2024.    Ravin Guy MD

## 2024-06-27 NOTE — PLAN OF CARE
"Goal Outcome Evaluation:   Pt was seen for skilled ST targeting cognitive-linguistic evaluation. Pt was found upright and alert in bed on room air with roommate present. Oriented x6; pleasant and agreeable to evaluation. Oral mech performed; noted that pt's lower dentition in good condition, while upper bridge in place; both sets of dentition appeared in good condition, as pt endorses regular oral care. Followed all commands with total accuracy; noted slight L labial droop with good labial seal, SP elevation, tongue ROM and facial muscle movement. Pt pragmatics presented intact, with a clear, resonant voice. Motor speech was characterized by slightly slowed speech with none-mild slurring, which pt endorses. Performed well particularly on memory tasks, problem, solving, sequencing, yes/no, automatics, naming, repetition, and spontaneous speech. Pt exhibited some difficulty with executive functioning and reasoning tasks. Clockdraw characterized by gradual placement discrepancies of numbers, and extra numbers with time labeled correctly after self-correction. When asked what happened during this exercise, she reported that she \"got foggy\" as she sometimes tends to do. Clinician encouraged pt to continue thinking through things, especially when difficult, allowing her brain to take breaks when needed. Also informed her of outpatient services if she has a desire to pursue cognitive-linguistic therapy, given that she reported these are baseline deficits. Dropped off business card to pt's room.     Recommend: outpatient cognitive-linguistic therapy services. ST signing off on orders for communication disorder at this time. Please re-consult if further changes in cognitive status occur.                  Anticipated Discharge Disposition (SLP): unknown    SLP Diagnosis: mild, cognitive-linguistic disorder (06/27/24 1000)  SLP Diagnosis Comments: see comment (06/27/24 1000)                "

## 2024-06-27 NOTE — PLAN OF CARE
Problem: Adult Inpatient Plan of Care  Goal: Plan of Care Review  Outcome: Ongoing, Progressing  Goal: Patient-Specific Goal (Individualized)  Outcome: Ongoing, Progressing  Goal: Absence of Hospital-Acquired Illness or Injury  Outcome: Ongoing, Progressing  Intervention: Identify and Manage Fall Risk  Recent Flowsheet Documentation  Taken 6/27/2024 0600 by Alejandra Hurtado RN  Safety Promotion/Fall Prevention:   activity supervised   assistive device/personal items within reach   clutter free environment maintained   fall prevention program maintained   safety round/check completed   room organization consistent  Taken 6/27/2024 0400 by Alejandra Hurtado RN  Safety Promotion/Fall Prevention:   activity supervised   assistive device/personal items within reach   clutter free environment maintained   fall prevention program maintained   safety round/check completed   room organization consistent  Taken 6/27/2024 0200 by Alejandra Hurtado RN  Safety Promotion/Fall Prevention:   activity supervised   assistive device/personal items within reach   clutter free environment maintained   fall prevention program maintained   safety round/check completed   room organization consistent  Taken 6/27/2024 0000 by Alejandra Hurtado RN  Safety Promotion/Fall Prevention:   activity supervised   assistive device/personal items within reach   clutter free environment maintained   fall prevention program maintained   safety round/check completed   room organization consistent  Taken 6/26/2024 2200 by Alejandra Hurtado RN  Safety Promotion/Fall Prevention:   assistive device/personal items within reach   activity supervised   clutter free environment maintained   fall prevention program maintained   room organization consistent   safety round/check completed  Taken 6/26/2024 2015 by Alejandra Hurtado RN  Safety Promotion/Fall Prevention:   activity supervised   assistive device/personal items within reach   clutter free  environment maintained   fall prevention program maintained   safety round/check completed   room organization consistent  Intervention: Prevent Skin Injury  Recent Flowsheet Documentation  Taken 6/27/2024 0600 by Alejandra Hurtado RN  Body Position: position changed independently  Taken 6/27/2024 0400 by Alejandra Hurtado RN  Skin Protection:   adhesive use limited   tubing/devices free from skin contact   transparent dressing maintained  Taken 6/27/2024 0000 by Alejandra Hurtado RN  Body Position: position changed independently  Taken 6/26/2024 2015 by Alejandra Hurtado RN  Body Position: position changed independently  Intervention: Prevent Infection  Recent Flowsheet Documentation  Taken 6/27/2024 0600 by Alejandra Hurtado RN  Infection Prevention:   environmental surveillance performed   equipment surfaces disinfected   hand hygiene promoted   personal protective equipment utilized   rest/sleep promoted   single patient room provided  Taken 6/27/2024 0400 by Alejandra Hurtado RN  Infection Prevention:   environmental surveillance performed   equipment surfaces disinfected   hand hygiene promoted   personal protective equipment utilized   rest/sleep promoted   single patient room provided  Taken 6/27/2024 0200 by Alejandra Hurtado RN  Infection Prevention:   environmental surveillance performed   equipment surfaces disinfected   hand hygiene promoted   personal protective equipment utilized   rest/sleep promoted   single patient room provided  Taken 6/27/2024 0000 by Alejandra Hurtado RN  Infection Prevention:   environmental surveillance performed   equipment surfaces disinfected   hand hygiene promoted   personal protective equipment utilized   rest/sleep promoted   single patient room provided  Taken 6/26/2024 2200 by Alejandra Hurtado RN  Infection Prevention:   environmental surveillance performed   equipment surfaces disinfected   hand hygiene promoted   personal protective equipment utilized    rest/sleep promoted   single patient room provided  Taken 6/26/2024 2015 by Alejandra Hurtado RN  Infection Prevention:   environmental surveillance performed   equipment surfaces disinfected   hand hygiene promoted   personal protective equipment utilized   single patient room provided  Goal: Optimal Comfort and Wellbeing  Outcome: Ongoing, Progressing  Intervention: Provide Person-Centered Care  Recent Flowsheet Documentation  Taken 6/26/2024 2015 by Alejandra Hutrado RN  Trust Relationship/Rapport:   care explained   choices provided   thoughts/feelings acknowledged   reassurance provided  Goal: Readiness for Transition of Care  Outcome: Ongoing, Progressing     Problem: Fall Injury Risk  Goal: Absence of Fall and Fall-Related Injury  Outcome: Ongoing, Progressing  Intervention: Promote Injury-Free Environment  Recent Flowsheet Documentation  Taken 6/27/2024 0600 by Alejandra Hurtado RN  Safety Promotion/Fall Prevention:   activity supervised   assistive device/personal items within reach   clutter free environment maintained   fall prevention program maintained   safety round/check completed   room organization consistent  Taken 6/27/2024 0400 by Alejandra Hurtado RN  Safety Promotion/Fall Prevention:   activity supervised   assistive device/personal items within reach   clutter free environment maintained   fall prevention program maintained   safety round/check completed   room organization consistent  Taken 6/27/2024 0200 by Alejandra Hurtado RN  Safety Promotion/Fall Prevention:   activity supervised   assistive device/personal items within reach   clutter free environment maintained   fall prevention program maintained   safety round/check completed   room organization consistent  Taken 6/27/2024 0000 by Alejandra Hurtado RN  Safety Promotion/Fall Prevention:   activity supervised   assistive device/personal items within reach   clutter free environment maintained   fall prevention program  maintained   safety round/check completed   room organization consistent  Taken 6/26/2024 2200 by Alejandra Hurtado, RN  Safety Promotion/Fall Prevention:   assistive device/personal items within reach   activity supervised   clutter free environment maintained   fall prevention program maintained   room organization consistent   safety round/check completed  Taken 6/26/2024 2015 by Alejandra Hurtado RN  Safety Promotion/Fall Prevention:   activity supervised   assistive device/personal items within reach   clutter free environment maintained   fall prevention program maintained   safety round/check completed   room organization consistent     Problem: Pain Acute  Goal: Acceptable Pain Control and Functional Ability  Outcome: Ongoing, Progressing  Intervention: Optimize Psychosocial Wellbeing  Recent Flowsheet Documentation  Taken 6/27/2024 0400 by Alejandra Hurtado, RN  Supportive Measures: active listening utilized  Taken 6/26/2024 2015 by Alejandra Hurtado RN  Diversional Activities:   television   smartphone   Goal Outcome Evaluation:

## 2024-06-27 NOTE — PLAN OF CARE
Problem: Adult Inpatient Plan of Care  Goal: Plan of Care Review  Outcome: Ongoing, Progressing  Goal: Patient-Specific Goal (Individualized)  Outcome: Ongoing, Progressing  Goal: Absence of Hospital-Acquired Illness or Injury  Outcome: Ongoing, Progressing  Intervention: Identify and Manage Fall Risk  Recent Flowsheet Documentation  Taken 6/27/2024 1400 by Sasha Cormier RN  Safety Promotion/Fall Prevention:   fall prevention program maintained   safety round/check completed  Taken 6/27/2024 1300 by Sasha Cormier RN  Safety Promotion/Fall Prevention:   fall prevention program maintained   safety round/check completed  Taken 6/27/2024 1200 by Sasha Cormier RN  Safety Promotion/Fall Prevention:   fall prevention program maintained   safety round/check completed  Taken 6/27/2024 1000 by Sasha Cormier RN  Safety Promotion/Fall Prevention:   fall prevention program maintained   safety round/check completed  Taken 6/27/2024 0800 by Sasha Cormier RN  Safety Promotion/Fall Prevention:   fall prevention program maintained   safety round/check completed  Intervention: Prevent Infection  Recent Flowsheet Documentation  Taken 6/27/2024 1400 by Sasha Cormier RN  Infection Prevention: rest/sleep promoted  Taken 6/27/2024 1300 by Sasha Cormier RN  Infection Prevention: hand hygiene promoted  Taken 6/27/2024 1200 by Sasha Cormier RN  Infection Prevention: rest/sleep promoted  Taken 6/27/2024 1000 by Sasha Cormier RN  Infection Prevention: hand hygiene promoted  Taken 6/27/2024 0800 by Sasha Cormier RN  Infection Prevention: hand hygiene promoted  Goal: Optimal Comfort and Wellbeing  Outcome: Ongoing, Progressing  Goal: Readiness for Transition of Care  Outcome: Ongoing, Progressing     Problem: Fall Injury Risk  Goal: Absence of Fall and Fall-Related Injury  Outcome: Ongoing, Progressing  Intervention: Promote Injury-Free Environment  Recent Flowsheet Documentation  Taken 6/27/2024 1400 by Genia  ALETA Baez  Safety Promotion/Fall Prevention:   fall prevention program maintained   safety round/check completed  Taken 6/27/2024 1300 by Sasha Cormier RN  Safety Promotion/Fall Prevention:   fall prevention program maintained   safety round/check completed  Taken 6/27/2024 1200 by Sasha Cormier RN  Safety Promotion/Fall Prevention:   fall prevention program maintained   safety round/check completed  Taken 6/27/2024 1000 by Sasha Cormier RN  Safety Promotion/Fall Prevention:   fall prevention program maintained   safety round/check completed  Taken 6/27/2024 0800 by Sasha Cormier RN  Safety Promotion/Fall Prevention:   fall prevention program maintained   safety round/check completed     Problem: Pain Acute  Goal: Acceptable Pain Control and Functional Ability  Outcome: Ongoing, Progressing   Goal Outcome Evaluation:

## 2024-06-28 ENCOUNTER — READMISSION MANAGEMENT (OUTPATIENT)
Dept: CALL CENTER | Facility: HOSPITAL | Age: 64
End: 2024-06-28
Payer: COMMERCIAL

## 2024-06-28 ENCOUNTER — HOSPITAL ENCOUNTER (OUTPATIENT)
Dept: RADIATION ONCOLOGY | Facility: HOSPITAL | Age: 64
Setting detail: RADIATION/ONCOLOGY SERIES
End: 2024-06-28
Payer: COMMERCIAL

## 2024-06-28 VITALS
HEIGHT: 62 IN | RESPIRATION RATE: 14 BRPM | WEIGHT: 107.36 LBS | TEMPERATURE: 97.6 F | DIASTOLIC BLOOD PRESSURE: 75 MMHG | SYSTOLIC BLOOD PRESSURE: 138 MMHG | BODY MASS INDEX: 19.76 KG/M2 | HEART RATE: 73 BPM | OXYGEN SATURATION: 96 %

## 2024-06-28 LAB
ANION GAP SERPL CALCULATED.3IONS-SCNC: 11.3 MMOL/L (ref 5–15)
BASOPHILS # BLD AUTO: 0 10*3/MM3 (ref 0–0.2)
BASOPHILS NFR BLD AUTO: 0 % (ref 0–1.5)
BILIRUB UR QL STRIP: NEGATIVE
BUN SERPL-MCNC: 20 MG/DL (ref 8–23)
BUN/CREAT SERPL: 25 (ref 7–25)
CALCIUM SPEC-SCNC: 8.6 MG/DL (ref 8.6–10.5)
CHLORIDE SERPL-SCNC: 110 MMOL/L (ref 98–107)
CLARITY UR: CLEAR
CO2 SERPL-SCNC: 23.7 MMOL/L (ref 22–29)
COLOR UR: YELLOW
CREAT SERPL-MCNC: 0.8 MG/DL (ref 0.57–1)
DEPRECATED RDW RBC AUTO: 57.4 FL (ref 37–54)
EGFRCR SERPLBLD CKD-EPI 2021: 83.4 ML/MIN/1.73
EOSINOPHIL # BLD AUTO: 0 10*3/MM3 (ref 0–0.4)
EOSINOPHIL NFR BLD AUTO: 0 % (ref 0.3–6.2)
ERYTHROCYTE [DISTWIDTH] IN BLOOD BY AUTOMATED COUNT: 15.3 % (ref 12.3–15.4)
GLUCOSE BLDC GLUCOMTR-MCNC: 116 MG/DL (ref 70–105)
GLUCOSE SERPL-MCNC: 144 MG/DL (ref 65–99)
GLUCOSE UR STRIP-MCNC: ABNORMAL MG/DL
HCT VFR BLD AUTO: 31.7 % (ref 34–46.6)
HGB BLD-MCNC: 9.5 G/DL (ref 12–15.9)
HGB UR QL STRIP.AUTO: NEGATIVE
IMM GRANULOCYTES # BLD AUTO: 0.04 10*3/MM3 (ref 0–0.05)
IMM GRANULOCYTES NFR BLD AUTO: 0.4 % (ref 0–0.5)
KETONES UR QL STRIP: NEGATIVE
LEUKOCYTE ESTERASE UR QL STRIP.AUTO: NEGATIVE
LYMPHOCYTES # BLD AUTO: 0.27 10*3/MM3 (ref 0.7–3.1)
LYMPHOCYTES NFR BLD AUTO: 2.8 % (ref 19.6–45.3)
MCH RBC QN AUTO: 31 PG (ref 26.6–33)
MCHC RBC AUTO-ENTMCNC: 30 G/DL (ref 31.5–35.7)
MCV RBC AUTO: 103.6 FL (ref 79–97)
MONOCYTES # BLD AUTO: 0.59 10*3/MM3 (ref 0.1–0.9)
MONOCYTES NFR BLD AUTO: 6.1 % (ref 5–12)
NEUTROPHILS NFR BLD AUTO: 8.71 10*3/MM3 (ref 1.7–7)
NEUTROPHILS NFR BLD AUTO: 90.7 % (ref 42.7–76)
NITRITE UR QL STRIP: NEGATIVE
NRBC BLD AUTO-RTO: 0 /100 WBC (ref 0–0.2)
PH UR STRIP.AUTO: 7 [PH] (ref 5–8)
PLATELET # BLD AUTO: 151 10*3/MM3 (ref 140–450)
PMV BLD AUTO: 9.8 FL (ref 6–12)
POTASSIUM SERPL-SCNC: 3.9 MMOL/L (ref 3.5–5.2)
PROT UR QL STRIP: NEGATIVE
RBC # BLD AUTO: 3.06 10*6/MM3 (ref 3.77–5.28)
SODIUM SERPL-SCNC: 145 MMOL/L (ref 136–145)
SP GR UR STRIP: 1.01 (ref 1–1.03)
UROBILINOGEN UR QL STRIP: ABNORMAL
WBC NRBC COR # BLD AUTO: 9.61 10*3/MM3 (ref 3.4–10.8)

## 2024-06-28 PROCEDURE — 99232 SBSQ HOSP IP/OBS MODERATE 35: CPT | Performed by: INTERNAL MEDICINE

## 2024-06-28 PROCEDURE — 97116 GAIT TRAINING THERAPY: CPT

## 2024-06-28 PROCEDURE — 25010000002 DEXAMETHASONE PER 1 MG: Performed by: INTERNAL MEDICINE

## 2024-06-28 PROCEDURE — 97530 THERAPEUTIC ACTIVITIES: CPT

## 2024-06-28 PROCEDURE — 97535 SELF CARE MNGMENT TRAINING: CPT

## 2024-06-28 PROCEDURE — 25010000002 ENOXAPARIN PER 10 MG: Performed by: INTERNAL MEDICINE

## 2024-06-28 PROCEDURE — 82948 REAGENT STRIP/BLOOD GLUCOSE: CPT | Performed by: INTERNAL MEDICINE

## 2024-06-28 PROCEDURE — 25010000002 LEVETRIRACETAM PER 10 MG: Performed by: INTERNAL MEDICINE

## 2024-06-28 PROCEDURE — 81003 URINALYSIS AUTO W/O SCOPE: CPT | Performed by: INTERNAL MEDICINE

## 2024-06-28 RX ORDER — LEVETIRACETAM 1000 MG/1
1000 TABLET ORAL 2 TIMES DAILY
Qty: 60 TABLET | Refills: 0 | Status: SHIPPED | OUTPATIENT
Start: 2024-06-28

## 2024-06-28 RX ORDER — DEXAMETHASONE 4 MG/1
4 TABLET ORAL 4 TIMES DAILY
Qty: 60 TABLET | Refills: 0 | Status: SHIPPED | OUTPATIENT
Start: 2024-06-28

## 2024-06-28 RX ADMIN — ACETAMINOPHEN 650 MG: 325 TABLET, FILM COATED ORAL at 14:07

## 2024-06-28 RX ADMIN — DEXAMETHASONE SODIUM PHOSPHATE 4 MG: 4 INJECTION, SOLUTION INTRAMUSCULAR; INTRAVENOUS at 05:47

## 2024-06-28 RX ADMIN — METOPROLOL SUCCINATE 25 MG: 25 TABLET, EXTENDED RELEASE ORAL at 08:46

## 2024-06-28 RX ADMIN — ENOXAPARIN SODIUM 40 MG: 100 INJECTION SUBCUTANEOUS at 14:07

## 2024-06-28 RX ADMIN — DEXAMETHASONE SODIUM PHOSPHATE 4 MG: 4 INJECTION, SOLUTION INTRAMUSCULAR; INTRAVENOUS at 14:06

## 2024-06-28 RX ADMIN — DEXAMETHASONE SODIUM PHOSPHATE 4 MG: 4 INJECTION, SOLUTION INTRAMUSCULAR; INTRAVENOUS at 08:46

## 2024-06-28 RX ADMIN — EMPAGLIFLOZIN 10 MG: 10 TABLET, FILM COATED ORAL at 08:46

## 2024-06-28 RX ADMIN — PANTOPRAZOLE SODIUM 40 MG: 40 INJECTION, POWDER, FOR SOLUTION INTRAVENOUS at 05:54

## 2024-06-28 RX ADMIN — ATORVASTATIN CALCIUM 80 MG: 40 TABLET, FILM COATED ORAL at 08:46

## 2024-06-28 RX ADMIN — LEVETIRACETAM 1000 MG: 500 INJECTION, SOLUTION INTRAVENOUS at 08:46

## 2024-06-28 RX ADMIN — Medication 10 ML: at 09:01

## 2024-06-28 RX ADMIN — TRAMADOL HYDROCHLORIDE 50 MG: 50 TABLET, COATED ORAL at 14:07

## 2024-06-28 NOTE — OUTREACH NOTE
Prep Survey      Flowsheet Row Responses   [facility] patient discharged from? Rad   Is LACE score < 7 ? No   Eligibility [facility]   Date of Admission 06/26/24   Date of Discharge 06/28/24   Discharge Disposition Home or Self Care   Discharge diagnosis Metastasis to brain   Does the patient have one of the following disease processes/diagnoses(primary or secondary)? Other   Does the patient have Home health ordered? No   Is there a DME ordered? No   Prep survey completed? Yes            [place] [facility] - Registered Nurse

## 2024-06-28 NOTE — PLAN OF CARE
Problem: Adult Inpatient Plan of Care  Goal: Plan of Care Review  Outcome: Ongoing, Progressing  Goal: Patient-Specific Goal (Individualized)  Outcome: Ongoing, Progressing  Goal: Absence of Hospital-Acquired Illness or Injury  Outcome: Ongoing, Progressing  Intervention: Identify and Manage Fall Risk  Recent Flowsheet Documentation  Taken 6/28/2024 1200 by Sasha Cormier RN  Safety Promotion/Fall Prevention:   fall prevention program maintained   safety round/check completed  Intervention: Prevent Infection  Recent Flowsheet Documentation  Taken 6/28/2024 1200 by Sasha Cormier RN  Infection Prevention: rest/sleep promoted  Goal: Optimal Comfort and Wellbeing  Outcome: Ongoing, Progressing  Goal: Readiness for Transition of Care  Outcome: Ongoing, Progressing     Problem: Fall Injury Risk  Goal: Absence of Fall and Fall-Related Injury  Outcome: Ongoing, Progressing  Intervention: Promote Injury-Free Environment  Recent Flowsheet Documentation  Taken 6/28/2024 1200 by Sasha Cormier RN  Safety Promotion/Fall Prevention:   fall prevention program maintained   safety round/check completed     Problem: Pain Acute  Goal: Acceptable Pain Control and Functional Ability  Outcome: Ongoing, Progressing   Goal Outcome Evaluation:

## 2024-06-28 NOTE — DISCHARGE SUMMARY
[facility]         DISCHARGE SUMMARY    Patient Name: Samantha Massey  : 1960  MRN: [id number]    Date of Admission: 2024  Date of Discharge:  2024  Primary Care Physician: Diana Spencer MD    Consults       Date and Time Order Name Status Description    2024  7:15 AM Inpatient Radiation Oncology Consult      2024 11:06 AM Inpatient Neurosurgery Consult Completed     2024 11:02 PM Hematology & Oncology Inpatient Consult Completed     2024 11:00 PM Inpatient Neurosurgery Consult Completed     2024 10:17 PM Hospitalist (on-call MD unless specified)      2024 10:17 PM Neurosurgery (on-call MD unless specified) Completed             Presenting Problem:   Metastasis to brain [C79.31]  Left-sided weakness [R53.1]    Active and Resolved Hospital Problems:  Active Hospital Problems    Diagnosis POA    **Metastasis to brain [C79.31] Yes    Left hand weakness [R29.898] Yes    Hyperlipidemia LDL goal <100 [E78.5] Yes    Primary hypertension [I10] Yes      Resolved Hospital Problems   No resolved problems to display.         Hospital Course     Hospital Course:   Samantha Massey is a 63 y.o. female with a past medical history of hyperlipidemia, coronary artery disease, hypertension, grade 1 diastolic heart dysfunction, and colon cancer status postchemotherapy and metastasis to the lung.  She has had 2 treatments of radiation to the chest.  She was her baseline until yesterday whenever she had left hand and left arm weakness.  She had difficulty controlling her left hand.  She was hoping that it would get better but today her sister came over and insisted that she come to the emergency room.  A stroke workup ensued and it appears that she has metastatic cancer to the brain.  Neurosurgery and neurology were consulted by the ER practitioner.  They requested an MRI and continue the workup.  Neurosurgery requested placing the patient on dexamethasone and  Keppra.  Patient will be admitted PT OT evaluated evaluated by neurology neurosurgery and hematology oncology.  Patient states that she is able to ambulate without any difficulties.  She also reports no loss of consciousness    Patient was evaluated by neurosurgery and oncology, also she was seen by radiation oncology, patient was started on IV Keppra and dexamethasone IV.  Patient will get stereotactic radiosurgery.  She will follow-up with radiation oncology in the office.         Day of Discharge     Vital Signs:  Temp:  [97.5 °F (36.4 °C)-98.1 °F (36.7 °C)] 97.6 °F (36.4 °C)  Heart Rate:  [71-75] 73  Resp:  [11-16] 14  BP: (130-142)/(69-91) 142/73  Physical Exam:  Constitutional: Awake, alert   Eyes: PERRLA, sclerae anicteric, no conjunctival injection   HENT: NCAT, mucous membranes moist   Neck: Supple, no thyromegaly, no lymphadenopathy, trachea midline   Respiratory: Clear to auscultation bilaterally, nonlabored respirations    Cardiovascular: RRR, no murmurs, rubs, or gallops, palpable pedal pulses bilaterally   Gastrointestinal: Positive bowel sounds, soft, nontender, nondistended   Musculoskeletal: No bilateral ankle edema, no clubbing or cyanosis to extremities   Psychiatric: Appropriate affect, cooperative   Neurologic: Oriented x 3, strength symmetric in all extremities, Cranial Nerves grossly intact to confrontation, speech clear   Skin: No rashes     Pertinent  and/or Most Recent Results     LAB RESULTS:      Lab 06/27/24 2355 06/26/24  0829 06/25/24 1922   WBC 9.61 3.82 5.79   HEMOGLOBIN 9.5* 10.2* 9.9*   HEMATOCRIT 31.7* 33.2* 32.8*   PLATELETS 151 154 175   NEUTROS ABS 8.71* 3.55 4.20   IMMATURE GRANS (ABS) 0.04 0.01 0.01   LYMPHS ABS 0.27* 0.23* 0.75   MONOS ABS 0.59 0.03* 0.64   EOS ABS 0.00 0.00 0.17   .6* 100.9* 101.9*   PROTIME  --   --  10.5         Lab 06/27/24 2355 06/26/24  0829 06/26/24  0248 06/25/24 1922   SODIUM 145 140  --  140   POTASSIUM 3.9 3.9  --  3.8   CHLORIDE 110*  106  --  104   CO2 23.7 24.2  --  22.1   ANION GAP 11.3 9.8  --  13.9   BUN 20 16  --  19   CREATININE 0.80 0.71  --  0.77   EGFR 83.4 96.3  --  87.3   GLUCOSE 144* 155*  --  101*   CALCIUM 8.6 9.6  --  9.9   HEMOGLOBIN A1C  --   --  5.21  --          Lab 06/25/24 1922   TOTAL PROTEIN 7.0   ALBUMIN 4.2   GLOBULIN 2.8   ALT (SGPT) 8   AST (SGOT) 21   BILIRUBIN 0.5   ALK PHOS 92         Lab 06/25/24 1922   PROTIME 10.5   INR 0.96         Lab 06/26/24  0248   CHOLESTEROL 201*   LDL CHOL 137*   HDL CHOL 53   TRIGLYCERIDES 63         Lab 06/25/24 1922   ABO TYPING O   RH TYPING Negative   ANTIBODY SCREEN Negative         Brief Urine Lab Results  (Last result in the past 365 days)        Color   Clarity   Blood   Leuk Est   Nitrite   Protein   CREAT   Urine HCG        06/28/24 1031 Yellow   Clear   Negative   Negative   Negative   Negative                 Microbiology Results (last 10 days)       ** No results found for the last 240 hours. **            MRI Brain With & Without Contrast    Result Date: 6/26/2024  Impression: 1. Three enhancing intracranial mass lesions are consistent with the appearance of metastatic disease. Measurements as provided above. 2. Features of vasogenic edema surrounding each of the aforementioned masses. No midline shift. 3. No convincing MR evidence of acute or subacute ischemic insult. Electronically Signed: Denisse Ramos MD  6/26/2024 12:06 PM EDT  Workstation ID: [id number]    CT Angiogram Head    Result Date: 6/25/2024  Impression: Mild atheromatous disease. No vessel stenosis or occlusion. Known intracranial masses demonstrate enhancement. Pulmonary masses have increased in size. Electronically Signed: Praveen Esquivel MD  6/25/2024 9:28 PM EDT  Workstation ID: [id number]    CT Angiogram Neck    Result Date: 6/25/2024  Impression: Mild atheromatous disease. No vessel stenosis or occlusion. Known intracranial masses demonstrate enhancement. Pulmonary masses have increased in size.  Electronically Signed: Praveen Esquivel MD  6/25/2024 9:28 PM EDT  Workstation ID: [id number]    CT Head Without Contrast Stroke Protocol    Result Date: 6/25/2024  Impression: Findings concerning for metastatic disease to the brain. MRI of the brain with contrast recommended. Electronically Signed: Praveen Esquivel MD  6/25/2024 9:08 PM EDT  Workstation ID: [id number]    XR Chest 1 View    Result Date: 6/25/2024  Impression: Right lower lobe infiltrate versus mass. Right upper lobe mass. Vascular congestion. Electronically Signed: Praveen Esquivel MD  6/25/2024 8:13 PM EDT  Workstation ID: [id number]    XR Chest 1 View    Result Date: 6/7/2024  Impression: Impression: No pneumothorax after biopsy. Right upper lobe lung opacity again noted. No new abnormality. Electronically Signed: Juana Samuels MD  6/7/2024 9:44 AM EDT  Workstation ID: [id number]    CT Needle Biopsy Bone Superficial    Result Date: 6/7/2024  Impression: Impression: Technically successful CT-guided biopsy of the right anterior chest wall mass. Electronically Signed: Jay Baer MD  6/7/2024 9:25 AM EDT  Workstation ID: [id number]             Results for orders placed during the hospital encounter of 01/23/24    Adult Transthoracic Echo Complete W/ Cont if Necessary Per Protocol    Interpretation Summary    Left ventricular systolic function is normal. Calculated left ventricular EF = 55% Left ventricular ejection fraction appears to be 51 - 55%.    Left ventricular diastolic function is consistent with (grade I) impaired relaxation.    The left atrial cavity is mildly dilated.    Estimated right ventricular systolic pressure from tricuspid regurgitation is mildly elevated (35-45 mmHg).      Labs Pending at Discharge:        Discharge Details        Discharge Medications        New Medications        Instructions Start Date   dexAMETHasone 4 MG tablet  Commonly known as: DECADRON   4 mg, Oral, 4 Times Daily      levETIRAcetam 1000 MG tablet  Commonly known as:  Keppra   1,000 mg, Oral, 2 Times Daily             Continue These Medications        Instructions Start Date   aspirin 81 MG EC tablet   81 mg, Oral, Daily      atorvastatin 80 MG tablet  Commonly known as: LIPITOR   80 mg, Oral, Daily      empagliflozin 10 MG tablet tablet  Commonly known as: JARDIANCE   10 mg, Oral, Daily      Lysine 500 MG capsule   1 tablet, Oral, 2 Times Daily      metoprolol succinate XL 25 MG 24 hr tablet  Commonly known as: TOPROL-XL   25 mg, Oral, Every 24 Hours Scheduled      traMADol 50 MG tablet  Commonly known as: ULTRAM   50 mg, Oral, Every 6 Hours PRN      Vitamin B-12 5000 MCG sublingual tablet   1 tablet, Sublingual, Every Early Morning               Allergies   Allergen Reactions    Hydrocodone Nausea And Vomiting    Iodinated Contrast Media Hives and Itching     PT HAD SOME HIVES DURING SCAN.  PRIOR TO SCAN 8-3-2013.    Latex Rash     Blisters     Penicillins Hives         Discharge Disposition:   Home or Self Care    Discharge Condition: .cond    Diet:  Hospital:  Diet Order   Procedures    Diet: Regular/House; Fluid Consistency: Thin (IDDSI 0)         Discharge Activity:         CODE STATUS:  Code Status and Medical Interventions:   Ordered at: 06/26/24 0621     Level Of Support Discussed With:    Patient     Code Status (Patient has no pulse and is not breathing):    CPR (Attempt to Resuscitate)     Medical Interventions (Patient has pulse or is breathing):    Full Support         Future Appointments   Date Time Provider Department Center   6/30/2024 11:00 AM Sj Burgos MD  [facility] None   7/1/2024  2:30 PM Sj Burgos MD  [facility] None   7/16/2024 11:00 AM Sj Burgos MD [facility] None   10/23/2024  2:00 PM Ramiro Olivera MD [facility]       Additional Instructions for the Follow-ups that You Need to Schedule       Ambulatory Referral to Occupational Therapy   As directed      Follow-up needed: Yes        Ambulatory Referral to Physical  Therapy   As directed      Follow-up needed: Yes                Time spent on Discharge including face to face service:  30 minutes    Ravin Guy MD

## 2024-06-28 NOTE — THERAPY TREATMENT NOTE
"Subjective: Pt agreeable to therapeutic plan of care.    Objective:     Bed mobility - SBA  Transfers - SBA including to/from commode with pt managing LBD and hygiene.   Ambulation - 200 feet SBA and CGA, no AD.  Stairs - 16 stairs with single rail (R with ascent/L with descent) with CGA, reciprocal ascent and non-reciprocal descent.     Vitals: WNL    Pain: 0 VAS   Location: no complaints  Intervention for pain: N/A    Education: Provided education on the importance of mobility in the acute care setting, Verbal/Tactile Cues, ADL training, Transfer Training, Gait Training, and Stroke prevention and risk factors    Assessment: Samantha Massey presents with functional mobility impairments which indicate the need for skilled intervention. Tolerating session today without incident. Pt with slow marry requiring increased time with mobility, but progressed with standing/gait challenges to include stair training to ensure pt able to access home environment. Will continue to follow and progress as tolerated.     Plan/Recommendations:   If medically appropriate, Low Intensity Therapy recommended post-acute care - This is recommended as therapy feels this patient would require 2-3 visits per week. OP or HH would be the best option depending on patient's home bound status. Consider, if the patient has other  \"skilled\" needs such as wounds, IV antibiotics, etc. Combined with \"low intensity\" could also equate to a SNF. If patient is medically complex, consider LTAC. Pt requires no DME at discharge.     Pt desires Home with family assist and Outpatient therapy at discharge. Pt cooperative; agreeable to therapeutic recommendations and plan of care.     Modified [facility]: 3 = Moderate disability (Requires some help, but able to walk unassisted)    Post-Tx Position: Up in Chair, Alarms activated, and Call light and personal items within reach  PPE: gloves and surgical mask    "

## 2024-06-28 NOTE — PLAN OF CARE
Problem: Adult Inpatient Plan of Care  Goal: Plan of Care Review  Outcome: Ongoing, Progressing  Goal: Patient-Specific Goal (Individualized)  Outcome: Ongoing, Progressing  Goal: Absence of Hospital-Acquired Illness or Injury  Outcome: Ongoing, Progressing  Intervention: Identify and Manage Fall Risk  Recent Flowsheet Documentation  Taken 6/28/2024 0400 by Rachael Collier LPN  Safety Promotion/Fall Prevention:   activity supervised   assistive device/personal items within reach   clutter free environment maintained   fall prevention program maintained   safety round/check completed   room organization consistent  Taken 6/28/2024 0200 by Rachael Collier LPN  Safety Promotion/Fall Prevention:   activity supervised   assistive device/personal items within reach   clutter free environment maintained   fall prevention program maintained   gait belt   safety round/check completed   room organization consistent  Taken 6/27/2024 2354 by Rachael Collier LPN  Safety Promotion/Fall Prevention:   activity supervised   clutter free environment maintained   assistive device/personal items within reach   fall prevention program maintained   safety round/check completed   room organization consistent  Taken 6/27/2024 2200 by Rachael Collier LPN  Safety Promotion/Fall Prevention:   safety round/check completed   room organization consistent   activity supervised   assistive device/personal items within reach   clutter free environment maintained   fall prevention program maintained  Taken 6/27/2024 2000 by Rachael Collier LPN  Safety Promotion/Fall Prevention:   activity supervised   assistive device/personal items within reach   clutter free environment maintained   fall prevention program maintained   safety round/check completed   room organization consistent  Intervention: Prevent Skin Injury  Recent Flowsheet Documentation  Taken 6/28/2024 0400 by Rachael Collier LPN  Skin Protection:   adhesive use limited   skin-to-skin areas padded    transparent dressing maintained   tubing/devices free from skin contact  Taken 6/27/2024 2354 by Rachael Collier LPN  Body Position: position changed independently  Taken 6/27/2024 2000 by Rachael Collier LPN  Body Position: position changed independently  Skin Protection:   adhesive use limited   skin-to-device areas padded   skin-to-skin areas padded   transparent dressing maintained   tubing/devices free from skin contact  Intervention: Prevent and Manage VTE (Venous Thromboembolism) Risk  Recent Flowsheet Documentation  Taken 6/28/2024 0400 by Rachael Collier LPN  Activity Management:   up ad shira   activity encouraged  Range of Motion: active ROM (range of motion) encouraged  Taken 6/27/2024 2354 by Rachael Collier LPN  Activity Management: up ad shira  Taken 6/27/2024 2000 by Rachael Collier LPN  Activity Management: up ad shira  Range of Motion: active ROM (range of motion) encouraged  Intervention: Prevent Infection  Recent Flowsheet Documentation  Taken 6/28/2024 0400 by Rachael Collier LPN  Infection Prevention:   environmental surveillance performed   equipment surfaces disinfected   hand hygiene promoted   personal protective equipment utilized   rest/sleep promoted   single patient room provided  Taken 6/28/2024 0200 by Rachael Collier LPN  Infection Prevention:   environmental surveillance performed   equipment surfaces disinfected   hand hygiene promoted   personal protective equipment utilized   rest/sleep promoted   single patient room provided  Taken 6/27/2024 2354 by Rachael Collier LPN  Infection Prevention:   environmental surveillance performed   equipment surfaces disinfected   hand hygiene promoted   personal protective equipment utilized   rest/sleep promoted   single patient room provided  Taken 6/27/2024 2200 by Rachael Collier LPN  Infection Prevention:   environmental surveillance performed   equipment surfaces disinfected   rest/sleep promoted   personal protective equipment utilized   hand hygiene promoted   single  patient room provided  Taken 6/27/2024 2000 by Rachael Collier LPN  Infection Prevention:   environmental surveillance performed   equipment surfaces disinfected   hand hygiene promoted   personal protective equipment utilized   rest/sleep promoted   single patient room provided  Goal: Optimal Comfort and Wellbeing  Outcome: Ongoing, Progressing  Intervention: Monitor Pain and Promote Comfort  Recent Flowsheet Documentation  Taken 6/28/2024 0400 by Rachael Collier LPN  Pain Management Interventions:   care clustered   quiet environment facilitated  Taken 6/27/2024 2354 by Rachael Collier LPN  Pain Management Interventions: care clustered  Taken 6/27/2024 2000 by Rachael Collier LPN  Pain Management Interventions:   care clustered   quiet environment facilitated   relaxation techniques promoted  Intervention: Provide Person-Centered Care  Recent Flowsheet Documentation  Taken 6/28/2024 0400 by Rachael Collier LPN  Trust Relationship/Rapport:   care explained   choices provided   emotional support provided   empathic listening provided   questions answered   questions encouraged   reassurance provided   thoughts/feelings acknowledged  Taken 6/27/2024 2354 by Rachael Collier LPN  Trust Relationship/Rapport:   care explained   emotional support provided   choices provided   empathic listening provided   questions answered   questions encouraged   reassurance provided   thoughts/feelings acknowledged  Taken 6/27/2024 2000 by Rachael Collier LPN  Trust Relationship/Rapport:   care explained   choices provided   emotional support provided   empathic listening provided   questions answered   questions encouraged   reassurance provided   thoughts/feelings acknowledged  Goal: Readiness for Transition of Care  Outcome: Ongoing, Progressing     Problem: Fall Injury Risk  Goal: Absence of Fall and Fall-Related Injury  Outcome: Ongoing, Progressing  Intervention: Identify and Manage Contributors  Recent Flowsheet Documentation  Taken 6/28/2024 0400  by Rachael Collier LPN  Medication Review/Management: medications reviewed  Self-Care Promotion: independence encouraged  Taken 6/28/2024 0200 by Rachael Collier LPN  Medication Review/Management: medications reviewed  Taken 6/27/2024 2354 by Rachael Collier LPN  Medication Review/Management: medications reviewed  Taken 6/27/2024 2200 by Rachael Collier LPN  Medication Review/Management: medications reviewed  Taken 6/27/2024 2000 by Rachael Collier LPN  Medication Review/Management: medications reviewed  Self-Care Promotion: independence encouraged  Intervention: Promote Injury-Free Environment  Recent Flowsheet Documentation  Taken 6/28/2024 0400 by Rachael Collier LPN  Safety Promotion/Fall Prevention:   activity supervised   assistive device/personal items within reach   clutter free environment maintained   fall prevention program maintained   safety round/check completed   room organization consistent  Taken 6/28/2024 0200 by Rachael Collier LPN  Safety Promotion/Fall Prevention:   activity supervised   assistive device/personal items within reach   clutter free environment maintained   fall prevention program maintained   gait belt   safety round/check completed   room organization consistent  Taken 6/27/2024 2354 by Rachael Collier LPN  Safety Promotion/Fall Prevention:   activity supervised   clutter free environment maintained   assistive device/personal items within reach   fall prevention program maintained   safety round/check completed   room organization consistent  Taken 6/27/2024 2200 by Rachael Collier LPN  Safety Promotion/Fall Prevention:   safety round/check completed   room organization consistent   activity supervised   assistive device/personal items within reach   clutter free environment maintained   fall prevention program maintained  Taken 6/27/2024 2000 by Rachael Collier LPN  Safety Promotion/Fall Prevention:   activity supervised   assistive device/personal items within reach   clutter free environment maintained    fall prevention program maintained   safety round/check completed   room organization consistent     Problem: Pain Acute  Goal: Acceptable Pain Control and Functional Ability  Outcome: Ongoing, Progressing  Intervention: Prevent or Manage Pain  Recent Flowsheet Documentation  Taken 6/28/2024 0400 by Rachael Collier LPN  Sensory Stimulation Regulation:   care clustered   lighting decreased   quiet environment promoted  Sleep/Rest Enhancement:   awakenings minimized   noise level reduced   room darkened   regular sleep/rest pattern promoted  Medication Review/Management: medications reviewed  Taken 6/28/2024 0200 by Rachael Collier LPN  Medication Review/Management: medications reviewed  Taken 6/27/2024 2354 by Rachael Collier LPN  Sensory Stimulation Regulation:   care clustered   quiet environment promoted  Medication Review/Management: medications reviewed  Taken 6/27/2024 2200 by Rachael Collier LPN  Medication Review/Management: medications reviewed  Taken 6/27/2024 2000 by Rachael Collier LPN  Sensory Stimulation Regulation:   care clustered   lighting decreased   quiet environment promoted  Sleep/Rest Enhancement:   awakenings minimized   room darkened  Medication Review/Management: medications reviewed  Intervention: Develop Pain Management Plan  Recent Flowsheet Documentation  Taken 6/28/2024 0400 by Rachael Collier LPN  Pain Management Interventions:   care clustered   quiet environment facilitated  Taken 6/27/2024 2354 by Rachael Collier LPN  Pain Management Interventions: care clustered  Taken 6/27/2024 2000 by Rachael Collier LPN  Pain Management Interventions:   care clustered   quiet environment facilitated   relaxation techniques promoted  Intervention: Optimize Psychosocial Wellbeing  Recent Flowsheet Documentation  Taken 6/28/2024 0400 by Rachael Collier LPN  Diversional Activities: television  Taken 6/27/2024 2354 by Rachael Collier LPN  Diversional Activities: television  Taken 6/27/2024 2000 by Rachael Collier LPN  Supportive  Measures:   active listening utilized   self-reflection promoted   self-responsibility promoted   self-care encouraged   relaxation techniques promoted  Diversional Activities: television   Goal Outcome Evaluation:

## 2024-06-28 NOTE — PLAN OF CARE
"Assessment: Samantha Massey presents with functional mobility impairments which indicate the need for skilled intervention. Tolerating session today without incident. Pt with slow marry requiring increased time with mobility, but progressed with standing/gait challenges to include stair training to ensure pt able to access home environment. Will continue to follow and progress as tolerated.     Plan/Recommendations:   If medically appropriate, Low Intensity Therapy recommended post-acute care - This is recommended as therapy feels this patient would require 2-3 visits per week. OP or HH would be the best option depending on patient's home bound status. Consider, if the patient has other  \"skilled\" needs such as wounds, IV antibiotics, etc. Combined with \"low intensity\" could also equate to a SNF. If patient is medically complex, consider LTAC. Pt requires no DME at discharge.     Pt desires Home with family assist and Outpatient therapy at discharge. Pt cooperative; agreeable to therapeutic recommendations and plan of care.                                               "

## 2024-06-28 NOTE — DISCHARGE PLACEMENT REQUEST
"Andreas Massey \"Mary\" (63 y.o. Female)       Date of Birth   1960    Social Security Number       Address   [address] [place] [address]    Home Phone   [phone]    MRN   [id number]       Jain   Non-Church    Marital Status   Single                            Admission Date   24    Admission Type   Emergency    Admitting Provider   Jay Michelle MD    Attending Provider   Ravin Guy MD    Department, Room/Bed   [facility], /       Discharge Date       Discharge Disposition       Discharge Destination                                 Attending Provider: Ravin Guy MD    Allergies: Hydrocodone, Iodinated Contrast Media, Latex, Penicillins    Isolation: None   Infection: None   Code Status: CPR    Ht: 157.5 cm (62\")   Wt: 48.7 kg (107 lb 5.8 oz)    Admission Cmt: None   Principal Problem: Metastasis to brain [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]                   Active Insurance as of 2024       Primary Coverage       Payor Plan Insurance Group Employer/Plan Group    [employer]HE[employer] BLUE CROSS ANTHEM BLUE CROSS BLUE SHIELD PPO [id number]       Payor Plan Address Payor Plan Phone Number Payor Plan Fax Number Effective Dates    [address] [phone]  2020 - None Entered    [place] [address]         Subscriber Name Subscriber Birth Date Member ID       ANDRESA MASSEY 1960 [id number]                     Emergency Contacts        (Rel.) Home Phone Work Phone Mobile Phone    BRENT FINE (Other) [phone] -- [phone]             [address] [place] [address]  Phone:  [phone]  Fax:  [phone] Date: 2024      Ambulatory Referral to Occupational Therapy     Patient:  Andreas Massey MRN:  [id number]   [address] [place] [address] :  1960  SSN:    Phone: [phone] Sex:  F      INSURANCE PAYOR PLAN GROUP # SUBSCRIBER ID   Primary:    SAAD BLUE CANDELARIO " [id number]      Referring Provider Information:  RAVIN GUY Phone: [phone] Fax: [phone]       Referral Information:   # Visits:  1 Referral Type: Occupational Therapy [AD1]   Urgency:  Routine Referral Reason: Specialty Services Required   Start Date: 2024 End Date:  To be determined by Insurer   Diagnosis: Malignant neoplasm of colon metastatic to bone (C18.9,C79.51 [ICD-10-CM] 153.9,198.5 [ICD-9-CM])      Refer to Dept:   Refer to Provider:   Refer to Provider Phone:   Refer to Facility:       Follow-up needed: Yes     This document serves as a request of services and does not constitute Insurance authorization or approval of services.  To determine eligibility, please contact the members Insurance carrier to verify and review coverage.     If you have medical questions regarding this request for services. Please contact [facility] at [phone] during normal business hours.        Authorizing Provider:Ravin Guy MD  Authorizing Provider's NPI: [id number]  Order Entered By: Ravin Guy MD 2024  2:34 PM     Electronically signed by: Ravin Guy MD 2024  2:34 PM          [facility]  [address] [facility] [place] [address]  Phone:  [phone]  Fax:  [phone] Date: 2024      Ambulatory Referral to Physical Therapy     Patient:  Samantha Massey MRN:  [id number]   [address] [place] [address] :  1960  SSN:    Phone: [phone] Sex:  F      INSURANCE PAYOR PLAN GROUP # SUBSCRIBER ID   Primary:    SAAD PALOMINO [id number]      Referring Provider Information:  RAVIN GUY Phone: [phone] Fax: [phone]       Referral Information:   # Visits:  1 Referral Type: Physical Therapy [AE1]   Urgency:  Routine Referral Reason: Specialty Services Required   Start Date: 2024 End Date:  To be determined by Insurer   Diagnosis: Malignant  neoplasm of colon metastatic to bone (C18.9,C79.51 [ICD-10-CM] 153.9,198.5 [ICD-9-CM])      Refer to Dept:   Refer to Provider:   Refer to Provider Phone:   Refer to Facility:       Follow-up needed: Yes     This document serves as a request of services and does not constitute Insurance authorization or approval of services.  To determine eligibility, please contact the members Insurance carrier to verify and review coverage.     If you have medical questions regarding this request for services. Please contact [facility] at [phone] during normal business hours.        Authorizing Provider:Ravin Guy MD  Authorizing Provider's NPI: [id number]  Order Entered By: Ravin Guy MD 6/28/2024  2:34 PM     Electronically signed by: Ravin Guy MD 6/28/2024  2:34 PM

## 2024-06-28 NOTE — CASE MANAGEMENT/SOCIAL WORK
Continued Stay Note  [facility]     Patient Name: Samantha Massey  MRN: [id number]  Today's Date: 6/28/2024    Admit Date: 6/25/2024    Plan: D/C Plan: Home with outpt P.T and OT at [facility].  Orders written, faxed thru [facility], and left VM.  Transport by family car   Discharge Plan       Row Name 06/28/24 1618       Plan    Plan D/C Plan: Home with outpt P.T and OT at [facility].  Orders written, faxed thru [facility], and left VM.  Transport by family car               Expected Discharge Date and Time       Expected Discharge Date Expected Discharge Time    Jun 28, 2024               Hermelinda Borden RN

## 2024-06-28 NOTE — NURSING NOTE
Patient's port was never accessed during this hospital stay.  CHG baths were given per protocol.    Patient is being discharged with her port un-accessed.

## 2024-06-30 ENCOUNTER — HOSPITAL ENCOUNTER (OUTPATIENT)
Dept: RADIATION ONCOLOGY | Facility: HOSPITAL | Age: 64
Discharge: HOME | End: 2024-06-30
Payer: COMMERCIAL

## 2024-06-30 PROCEDURE — 77334 RADIATION TREATMENT AID(S): CPT | Performed by: INTERNAL MEDICINE

## 2024-06-30 RX ORDER — MEMANTINE HYDROCHLORIDE 28 MG/1
28 CAPSULE, EXTENDED RELEASE ORAL DAILY
Qty: 30 CAPSULE | Refills: 5 | Status: SHIPPED | OUTPATIENT
Start: 2024-06-30

## 2024-06-30 NOTE — CONSULTS
[facility] Radiation Oncology   Inpatient Consult    Chief Complaint  New brain metastases      Diagnosis:    Diagnosis Plan   1. Left-sided weakness        2. Metastasis to brain        3. Malignant neoplasm of colon metastatic to bone  Ambulatory Referral to Physical Therapy    Ambulatory Referral to Occupational Therapy            Overall Stage   Stage IV      Pacemaker: no  Prior History of Radiation: yes, 20 Gy in 5 fx to anterior right ribs completed on 4/14/2023, 20 Gy in 5 fractions to the sternum completed on 6/12/2024  Contraindications to Radiation: no  Patient Requires Pregnancy Test: No, patient is female and >55 years and/or has undergone hysterectomy      HPI:    Samantha Massey is a 63 y.o. female with history of metastatic colon cancer. She has received prior palliative courses of radiation to her right anterior ribs and sternum. She presents due to recent diagnosis of brain metastases.     6/12/2024 The patient recently completed palliative radiation to her sternum.    6/25/2024 The patient presented to the emergency department [facility] with complaints of left arm weakness and balance issues. She also reports partial vision loss.     6/26/2024 MRI Brain was performed. Imaging showed three enhancing intracranial lesions consistent with appearance of metastatic disease. The largest measured 3.9 cm in largest diameter.  There is significant peritumoral edema around the right frontal lesion in the left frontal lobe lesion.  The third lesion was smaller located in the high left parietal lobe.    The patient was not a candidate for surgical resection given the extent of her metastatic disease which includes pulmonary nodules, internal mammary disease, and recently treated right parasternal chest wall metastasis.    The patient was seen by medical oncology and has limited treatment options.  She was started on steroids with symptomatic improvement including improved vision, improved strength and  coordination on her left, and improved balance.  She was referred for consideration of radiation therapy to the progressing brain metastases.    Imaging:      MRI Brain With & Without Contrast    Result Date: 6/26/2024  MRI BRAIN W WO CONTRAST Date of Exam: 6/26/2024 10:48 AM EDT Indication: Brain mass - STEALTH protocols 0.6mm thin cuts. History of colon cancer with liver metastases.  Comparison: CT angiography of the head 6/25/2024, noncontrast CT head 6/25/2024 Technique:  Routine multiplanar/multisequence sequence images of the brain were obtained before and after the uneventful administration of 10 cc Prohance. Findings: There are 3 enhancing intracranial masses consistent with the appearance of metastatic disease. 2 larger lesions appear to have internal cystic mucinous or necrotic components with eccentric solid nodularity and peripheral capsular enhancement. The smallest appears more solid and homogeneously enhancing. A dominant lesion in the right frontal lobe measures 3.9 x 3.6 x 3.8 cm (AP oblique by transverse oblique by craniocaudal). A lesion within the left frontal lobe measures 2.8 x 2.1 x 2.9 cm. A lesion within the high left parietal lobe posteromedially measures 1.2 x 1.3 x 1.2 cm. Surrounding each of these lesions is T2 and FLAIR signal hyperintensity, thought to represent vasogenic edema, which manifests as shine through on diffusion sequences. The diffusion signal changes demonstrate no ADC correlate, arguing against acute or subacute ischemic insult. There is no midline shift. Ventricular configuration is within normal limits. Orbital structures are within the limits. Paranasal sinuses are clear. Mastoid air cells are clear.     1. Three enhancing intracranial mass lesions are consistent with the appearance of metastatic disease. Measurements as provided above. 2. Features of vasogenic edema surrounding each of the aforementioned masses. No midline shift. 3. No convincing MR evidence of acute  or subacute ischemic insult. Electronically Signed: Denisse Ramos MD  6/26/2024 12:06 PM EDT  Workstation ID: [id number]    CT Angiogram Head    Result Date: 6/25/2024  CT ANGIOGRAM HEAD, CT ANGIOGRAM NECK Date of Exam: 6/25/2024 8:55 PM EDT Indication: Stroke. Comparison: 12/7/2020. Technique: CTA of the head and neck was performed after the uneventful intravenous administration of iodinated contrast. Reconstructed coronal and sagittal images were also obtained. In addition, a 3-D volume rendered image was created for interpretation. Automated exposure control and iterative reconstruction methods were used. Findings: Atheromatous disease of the aortic arch. The right common carotid artery is normal. Mild atheromatous disease of the right carotid bulb and proximal right internal carotid artery. Mild atheromatous disease involving the intracranial segments of  the right internal carotid artery. The right carotid terminus is normal. The visualized branches of the right anterior and middle cerebral arteries are normal. Mild atheromatous disease of the left carotid bulb and proximal left internal carotid artery with no significant narrowing per NASCET criteria. Mild atheromatous disease involving the intracranial segments of the left internal carotid artery. The left carotid terminus is normal. The visualized branches of the left anterior and middle cerebral arteries are normal. Both vertebral arteries arise from the subclavian arteries. The right vertebral artery is dominant. The origin of the left vertebral artery is occluded with some tenuous flow progressing intracranially with retrograde flow from the basilar artery present. Both vertebral arteries supply the basilar artery. The basilar artery and basilar artery tip are normal. The basilar artery terminates in bilateral posterior cerebral arteries which appear normal. The known intracranial masses demonstrate enhancement. Orbital and periorbital soft tissues are  normal. The paranasal sinuses are clear. No cervical adenopathy. The thyroid gland is normal. Compared with the prior CT of the chest there are multiple pulmonary nodules and masses better identified on the prior CT of the chest. These have significantly increased in size however a dedicated CT of the chest is recommended. A previously identified right upper lobe nodule which previously measured 1.6 cm currently measures 2.2 cm x 2.1 cm. Right hilar mass. Bilateral chest wall masses. No lytic or blastic disease.     Mild atheromatous disease. No vessel stenosis or occlusion. Known intracranial masses demonstrate enhancement. Pulmonary masses have increased in size. Electronically Signed: Praveen Esquivel MD  6/25/2024 9:28 PM EDT  Workstation ID: [id number]    CT Angiogram Neck    Result Date: 6/25/2024  CT ANGIOGRAM HEAD, CT ANGIOGRAM NECK Date of Exam: 6/25/2024 8:55 PM EDT Indication: Stroke. Comparison: 12/7/2020. Technique: CTA of the head and neck was performed after the uneventful intravenous administration of iodinated contrast. Reconstructed coronal and sagittal images were also obtained. In addition, a 3-D volume rendered image was created for interpretation. Automated exposure control and iterative reconstruction methods were used. Findings: Atheromatous disease of the aortic arch. The right common carotid artery is normal. Mild atheromatous disease of the right carotid bulb and proximal right internal carotid artery. Mild atheromatous disease involving the intracranial segments of  the right internal carotid artery. The right carotid terminus is normal. The visualized branches of the right anterior and middle cerebral arteries are normal. Mild atheromatous disease of the left carotid bulb and proximal left internal carotid artery with no significant narrowing per NASCET criteria. Mild atheromatous disease involving the intracranial segments of the left internal carotid artery. The left carotid terminus is  normal. The visualized branches of the left anterior and middle cerebral arteries are normal. Both vertebral arteries arise from the subclavian arteries. The right vertebral artery is dominant. The origin of the left vertebral artery is occluded with some tenuous flow progressing intracranially with retrograde flow from the basilar artery present. Both vertebral arteries supply the basilar artery. The basilar artery and basilar artery tip are normal. The basilar artery terminates in bilateral posterior cerebral arteries which appear normal. The known intracranial masses demonstrate enhancement. Orbital and periorbital soft tissues are normal. The paranasal sinuses are clear. No cervical adenopathy. The thyroid gland is normal. Compared with the prior CT of the chest there are multiple pulmonary nodules and masses better identified on the prior CT of the chest. These have significantly increased in size however a dedicated CT of the chest is recommended. A previously identified right upper lobe nodule which previously measured 1.6 cm currently measures 2.2 cm x 2.1 cm. Right hilar mass. Bilateral chest wall masses. No lytic or blastic disease.     Mild atheromatous disease. No vessel stenosis or occlusion. Known intracranial masses demonstrate enhancement. Pulmonary masses have increased in size. Electronically Signed: Praveen Esquivel MD  6/25/2024 9:28 PM EDT  Workstation ID: [id number]    CT Head Without Contrast Stroke Protocol    Result Date: 6/25/2024  CT HEAD WO CONTRAST STROKE PROTOCOL Date of Exam: 6/25/2024 8:53 PM EDT Indication: Stroke, follow up Neuro deficit, acute, stroke suspected. The patient has a recent pathology diagnosis of adenocarcinoma of the colon with metastatic disease. Comparison: 12/2/2019 Technique: Axial CT images were obtained of the head without contrast administration.  Reconstructed coronal and sagittal images were also obtained. Automated exposure control and iterative construction  methods were used. Scan Time: 2100 Results discussed with DEA Isaacs at 2104. Findings: Rounded high density lesion within the right frontal lobe with significant surrounding edema. This has a ringlike appearance despite the lack of contrast and measures 3.5 cm x 3.2 cm concerning for metastatic disease to the brain. Higher density ovoid lesion within the left frontal lobe measuring 2 cm x 1.5 cm with significant surrounding edema also concerning for metastatic disease. 1 cm x 0.8 cm lesion within the left parietal lobe also concerning for metastatic disease to the brain. Additional smaller lesions are suspected. The posterior fossa is normal. No definite intracranial hemorrhage or infarct. The calvarium is intact. Orbital and parable soft tissues are normal. The paranasal sinuses are clear.     Findings concerning for metastatic disease to the brain. MRI of the brain with contrast recommended. Electronically Signed: Praveen Esquivel MD  6/25/2024 9:08 PM EDT  Workstation ID: [id number]    XR Chest 1 View    Result Date: 6/25/2024  XR CHEST 1 VW Date of Exam: 6/25/2024 8:00 PM EDT Indication: Stroke Protocol (Onset > 12 hrs) Comparison: None available. Findings: Right upper lobe mass. Right lower lobe infiltrate versus mass. Vascular congestion. Left-sided infusion port with the tip in the superior vena cava. No pneumothorax or pleural effusion. The clavicles are intact. No rib fractures. Smaller bilateral pulmonary nodules are also noted.     Right lower lobe infiltrate versus mass. Right upper lobe mass. Vascular congestion. Electronically Signed: Praveen Esquivel MD  6/25/2024 8:13 PM EDT  Workstation ID: [id number]    XR Chest 1 View    Result Date: 6/7/2024  XR CHEST 1 VW Date of Exam: 6/7/2024 9:36 AM EDT Indication: right chest wall biopsy with IR Comparison: CT chest of 5/7/2024. Findings: There is no identifiable pneumothorax. Nodular opacity of the right upper lobe is again seen. Left chest port is unchanged in  position. The patient's known right chest wall masses are not well seen on this exam. Heart and pulmonary vessels appear within normal limits.    Impression: No pneumothorax after biopsy. Right upper lobe lung opacity again noted. No new abnormality. Electronically Signed: Juana Samuels MD  6/7/2024 9:44 AM EDT  Workstation ID: [id number]    CT Needle Biopsy Bone Superficial    Result Date: 6/7/2024  CT NEEDLE BIOPSY BONE SUPERFICIAL Date of Exam: 6/7/2024 8:40 AM EDT Indication: Adenocarcinoma of colon metastatic to liver, BIOPSY OF ONE OF THE CHEST LESIONS FOR HYSTOLOGY AND NEXT GENERATION SEQUENCING/PDL1. Comparison: CT chest dated 5/7/2024 Technique: The procedure, risks and options were discussed with the patient and informed written consent was obtained. The right anterior chest wall lesion was chosen for biopsy. The patient was placed in the CT fluoroscopy suite in the supine position. Preliminary images were obtained and a site was chosen over the right anterior chest wall. The skin was marked, prepped and draped. Using maximal sterile barrier technique and under local anesthesia a 17-gauge guide needle was advanced incrementally into the right anterior chest wall lesion. 2 18-gauge core specimens were obtained. These were reviewed by pathology and deemed adequate for diagnosis. The patient received Versed and fentanyl for conscious sedation and was monitored by the IR nurse during the entire procedure. Physician monitored sedation time was 24 minutes. A sterile dressing was applied. Post procedure chest radiograph shows no pneumothorax. Fluoroscopic Time: CT fluoroscopy time 2 seconds     Impression: Technically successful CT-guided biopsy of the right anterior chest wall mass. Electronically Signed: Jay Baer MD  6/7/2024 9:25 AM EDT  Workstation ID: [id number]       Pathology:    No new pathology to review    Labs:    Lab Results   Component Value Date    CREATININE 0.80 06/27/2024                  Problem List:    Metastasis to brain    Primary hypertension    Hyperlipidemia LDL goal <100    Left hand weakness         Medications:  No current facility-administered medications on file prior to encounter.     Current Outpatient Medications on File Prior to Encounter   Medication Sig Dispense Refill    aspirin 81 MG EC tablet Take 1 tablet by mouth Daily.      atorvastatin (LIPITOR) 80 MG tablet Take 1 tablet by mouth Daily for 360 days. 90 tablet 3    Cyanocobalamin (Vitamin B-12) 5000 MCG sublingual tablet Place 1 tablet under the tongue Every Morning.      empagliflozin (JARDIANCE) 10 MG tablet tablet Take 1 tablet by mouth Daily for 360 days. 90 tablet 3    Lysine 500 MG capsule Take 1 tablet by mouth 2 (Two) Times a Day.      metoprolol succinate XL (TOPROL-XL) 25 MG 24 hr tablet Take 1 tablet by mouth Daily for 360 days. 90 tablet 3    traMADol (ULTRAM) 50 MG tablet Take 1 tablet by mouth Every 6 (Six) Hours As Needed for Moderate Pain. 50 tablet 1          Allergies:  Allergies   Allergen Reactions    Hydrocodone Nausea And Vomiting    Iodinated Contrast Media Hives and Itching     PT HAD SOME HIVES DURING SCAN.  PRIOR TO SCAN 8-3-2013.    Latex Rash     Blisters     Penicillins Hives         Family History:  Family History   Problem Relation Age of Onset    Heart disease Mother     Lung cancer Father 70        Associated to cigarette smoking    Heart disease Father     Hyperlipidemia Father     Cancer Father     Cancer Sister         Bladder cancer           Social History:  Social History     Socioeconomic History    Marital status: Single   Tobacco Use    Smoking status: Former     Current packs/day: 0.00     Average packs/day: 1 pack/day for 31.0 years (31.0 ttl pk-yrs)     Types: Cigarettes     Start date: 1981     Quit date: 2012     Years since quittin.5     Passive exposure: Past    Smokeless tobacco: Never   Vaping Use    Vaping status: Never Used   Substance and  "Sexual Activity    Alcohol use: Yes     Comment: 2 or 3 a month    Drug use: Never    Sexual activity: Not Currently     Partners: Female     Birth control/protection: Same-sex partner         Distance From Clinic: <30 minutes    Patient has someone who can assist with transportation: yes      Review of Systems:    Review of Systems   Constitutional:  Positive for activity change.   Eyes:  Positive for visual disturbance.   Neurological:  Positive for dizziness and weakness.         Vital Signs:  /75 (BP Location: Left arm, Patient Position: Lying)   Pulse 73   Temp 97.6 °F (36.4 °C) (Oral)   Resp 14   Ht 157.5 cm (62\")   Wt 48.7 kg (107 lb 5.8 oz)   SpO2 96%   BMI 19.64 kg/m²   Estimated body mass index is 19.64 kg/m² as calculated from the following:    Height as of this encounter: 157.5 cm (62\").    Weight as of this encounter: 48.7 kg (107 lb 5.8 oz).  There were no vitals filed for this visit.      ECOG: Ambulatory and capable of all selfcare but unable to carry out any work activities; up and about more than 50% of waking hours = 2    Physical Exam  Constitutional:       General: She is not in acute distress.  HENT:      Head: Normocephalic and atraumatic.   Pulmonary:      Effort: Pulmonary effort is normal. No respiratory distress.   Musculoskeletal:      Comments: Some residual weakness on patient's left.    Neurological:      Mental Status: She is alert and oriented to person, place, and time. Mental status is at baseline.   Psychiatric:         Mood and Affect: Mood normal.         Behavior: Behavior normal.          Result Review :             Assessment:  Samantha Massey is a 63 y.o. female with history of metastatic colon cancer. She has received prior palliative courses of radiation to her right anterior ribs and sternum. She presents due to recent diagnosis of brain metastases.       Plan:    Orders:  - CT simulation for hippocampal sparing whole brain radiation  - Continue steroids, " will taper once radiation has been started    We reviewed the patient's recent diagnosis.  We discussed her recent presentation and discovery of brain metastasis.  We discussed treatment options for brain metastasis.  We discussed options including stereotactic radiosurgery, whole brain radiation with or without hippocampal sparing and memantine, or supportive treatment without additional cancer directed therapies.    Given the size of the patient's lesions, the lack of surgical options, the limited systemic options, and the concern for posttreatment swelling resulting in additional side effects, I do not recommend stereotactic radiosurgery at this time.  We discussed the option of whole brain radiation therapy with hippocampal sparing and memantine to help preserve memory.  We discussed how radiation therapy is planned and delivered.  We discussed potential acute and late side effects of treatment.  The patient and her family were able to ask questions and have them answered to her apparent satisfaction.  She is in agreement to proceed as discussed.       I spent 60 minutes caring for Samantha on this date of service. This time includes time spent by me in the following activities:preparing for the visit, reviewing tests, obtaining and/or reviewing a separately obtained history, performing a medically appropriate examination and/or evaluation , counseling and educating the patient/family/caregiver, ordering medications, tests, or procedures, documenting information in the medical record, and independently interpreting results and communicating that information with the patient/family/caregiver  Follow Up   No follow-ups on file.  Patient was given instructions and counseling regarding her condition or for health maintenance advice. Please see specific information pulled into the AVS if appropriate.     Sj Burgos MD

## 2024-07-01 ENCOUNTER — HOSPITAL ENCOUNTER (OUTPATIENT)
Dept: RADIATION ONCOLOGY | Facility: HOSPITAL | Age: 64
Discharge: HOME | End: 2024-07-01
Payer: COMMERCIAL

## 2024-07-01 ENCOUNTER — TRANSITIONAL CARE MANAGEMENT TELEPHONE ENCOUNTER (OUTPATIENT)
Dept: CALL CENTER | Facility: HOSPITAL | Age: 64
End: 2024-07-01
Payer: COMMERCIAL

## 2024-07-01 ENCOUNTER — RADIATION ONCOLOGY WEEKLY ASSESSMENT (OUTPATIENT)
Dept: RADIATION ONCOLOGY | Facility: HOSPITAL | Age: 64
End: 2024-07-01
Payer: COMMERCIAL

## 2024-07-01 ENCOUNTER — HOSPITAL ENCOUNTER (OUTPATIENT)
Dept: RADIATION ONCOLOGY | Facility: HOSPITAL | Age: 64
Setting detail: RADIATION/ONCOLOGY SERIES
End: 2024-07-01
Payer: COMMERCIAL

## 2024-07-01 VITALS
RESPIRATION RATE: 20 BRPM | DIASTOLIC BLOOD PRESSURE: 80 MMHG | WEIGHT: 111 LBS | SYSTOLIC BLOOD PRESSURE: 163 MMHG | HEART RATE: 66 BPM | BODY MASS INDEX: 20.3 KG/M2 | OXYGEN SATURATION: 98 %

## 2024-07-01 DIAGNOSIS — C78.7 ADENOCARCINOMA OF COLON METASTATIC TO LIVER: ICD-10-CM

## 2024-07-01 DIAGNOSIS — C18.9 ADENOCARCINOMA OF COLON METASTATIC TO LIVER: ICD-10-CM

## 2024-07-01 DIAGNOSIS — C79.31 METASTASIS TO BRAIN: Primary | ICD-10-CM

## 2024-07-01 LAB
RAD ONC ARIA COURSE END DATE: NORMAL
RAD ONC ARIA COURSE ID: NORMAL
RAD ONC ARIA COURSE ID: NORMAL
RAD ONC ARIA COURSE LAST TREATMENT DATE: NORMAL
RAD ONC ARIA COURSE LAST TREATMENT DATE: NORMAL
RAD ONC ARIA COURSE START DATE: NORMAL
RAD ONC ARIA COURSE START DATE: NORMAL
RAD ONC ARIA COURSE TREATMENT ELAPSED DAYS: 0
RAD ONC ARIA COURSE TREATMENT ELAPSED DAYS: 6
RAD ONC ARIA FIRST TREATMENT DATE: NORMAL
RAD ONC ARIA FIRST TREATMENT DATE: NORMAL
RAD ONC ARIA PLAN FRACTIONS TREATED TO DATE: 1
RAD ONC ARIA PLAN FRACTIONS TREATED TO DATE: 5
RAD ONC ARIA PLAN ID: NORMAL
RAD ONC ARIA PLAN ID: NORMAL
RAD ONC ARIA PLAN NAME: NORMAL
RAD ONC ARIA PLAN PRESCRIBED DOSE PER FRACTION: 3 GY
RAD ONC ARIA PLAN PRESCRIBED DOSE PER FRACTION: 4 GY
RAD ONC ARIA PLAN PRIMARY REFERENCE POINT: NORMAL
RAD ONC ARIA PLAN PRIMARY REFERENCE POINT: NORMAL
RAD ONC ARIA PLAN TOTAL FRACTIONS PRESCRIBED: 10
RAD ONC ARIA PLAN TOTAL FRACTIONS PRESCRIBED: 5
RAD ONC ARIA PLAN TOTAL PRESCRIBED DOSE: 2000 CGY
RAD ONC ARIA PLAN TOTAL PRESCRIBED DOSE: 3000 CGY
RAD ONC ARIA REFERENCE POINT DOSAGE GIVEN TO DATE: 20 GY
RAD ONC ARIA REFERENCE POINT DOSAGE GIVEN TO DATE: 3 GY
RAD ONC ARIA REFERENCE POINT ID: NORMAL
RAD ONC ARIA REFERENCE POINT ID: NORMAL
RAD ONC ARIA REFERENCE POINT SESSION DOSAGE GIVEN: 3 GY

## 2024-07-01 PROCEDURE — 77300 RADIATION THERAPY DOSE PLAN: CPT | Performed by: INTERNAL MEDICINE

## 2024-07-01 PROCEDURE — 77427 RADIATION TX MANAGEMENT X5: CPT | Performed by: INTERNAL MEDICINE

## 2024-07-01 PROCEDURE — 77338 DESIGN MLC DEVICE FOR IMRT: CPT | Performed by: INTERNAL MEDICINE

## 2024-07-01 PROCEDURE — 77301 RADIOTHERAPY DOSE PLAN IMRT: CPT | Performed by: INTERNAL MEDICINE

## 2024-07-01 PROCEDURE — 77386: CPT | Performed by: INTERNAL MEDICINE

## 2024-07-01 RX ORDER — DEXAMETHASONE 1 MG
TABLET ORAL
Qty: 28 TABLET | Refills: 0 | Status: SHIPPED | OUTPATIENT
Start: 2024-07-08 | End: 2024-07-20

## 2024-07-01 NOTE — CASE MANAGEMENT/SOCIAL WORK
Case Management Discharge Note      Final Note: Home with outpt therapy at [facility]    Provided Post Acute Provider List?: N/A  Provided Post Acute Provider Quality & Resource List?: N/A            Transportation Services  Private: Car    Final Discharge Disposition Code: 01 - home or self-care

## 2024-07-01 NOTE — PROGRESS NOTES
[facility] RADIATION ONCOLOGY  ON-TREATMENT VISIT NOTE    NAME: Samantha Massey  YOB: 1960  MRN #: [id number]  DATE OF SERVICE: 7/1/2024  PRESCRIBING PHYSICIAN: No care team member to display     DIAGNOSIS:      ICD-10-CM ICD-9-CM   1. Metastasis to brain  C79.31 198.3   2. Adenocarcinoma of colon metastatic to liver  C18.9 153.9    C78.7 197.7      RADIATION THERAPY VISIT:  Continue radiation therapy, Dosimetry plan remains acceptable, Films reviewed and remains acceptable, Pain assessed, Pain management planned, Radiation dose schedule reviewed and remains acceptable, Radiation technique remains acceptable, and Symptoms within expected range    Radiation Treatments       Active   Plans   HA WhBrain   Most recent treatment: Dose planned: 300 cGy (fraction 1 on 7/1/2024)   Total: Dose planned: 3,000 cGy (10 fractions)   Elapsed Days: 0      Reference Points   Sternum   Most recent treatment: Dose given: -- (on 6/12/2024)   Total: Dose given: 2,000 cGy   Elapsed Days: 6      GONZALEZ WhBrain   Most recent treatment: Dose given: 300 cGy (on 7/1/2024)   Total: Dose given: 300 cGy   Elapsed Days: 0                    [] Concurrent Chemo   Regimen:       PHYSICAL ASSESSMENT         Vitals:    07/01/24 1438   BP: 163/80   Pulse: 66   Resp: 20   SpO2: 98%      Wt Readings from Last 3 Encounters:   07/01/24 50.3 kg (111 lb)   06/28/24 48.7 kg (107 lb 5.8 oz)   06/12/24 51.4 kg (113 lb 6.4 oz)     Restricted in physically strenuous activity but ambulatory and able to carry out work of a light or sedentary nature, e.g., light house work, office work = 1    We examined the relevant areas: yes  Findings are within the expected range for this stage of treatment: yes    ACTION ITEMS     Patient tolerating treatment well and as expected for this stage in their treatment and Continue radiation therapy as planned    Estimated Completion Date: 7/16/2024    Anticipatory guidance provided.    Prescribed  memantine for memory aid.    Provided dexamethasone taper as follows    6/30 - 7/3      4 mg 3 times daily  7/4 - 7/7        4 mg 2 times daily  7/8 - 7/11      4 mg daily  7/12 - 7/15    2 mg daily  7/16 - 7/19    1 mg daily    Encouraged the patient to reach out if her previous neurologic symptoms return.  Should they return then she should go back to the previously effective dexamethasone dose and continue at that dose until further instructions are provided.      No care team member to display   Radiation Oncology  [facility]

## 2024-07-01 NOTE — OUTREACH NOTE
Call Center TCM Note      Flowsheet Row Responses   [facility] patient discharged from? Rad   Does the patient have one of the following disease processes/diagnoses(primary or secondary)? Other   TCM attempt successful? Yes   Call start time 1622   Call end time 1623   Discharge diagnosis Metastasis to brain   Person spoke with today (if not patient) and relationship Patient   Meds reviewed with patient/caregiver? Yes   Does the patient have all medications ordered at discharge? Yes   Is the patient taking all medications as directed (includes completed medication regime)? Yes   Comments 7/8/2024  8:00 AM  HOSPITAL FOLLOW UP 30 min [facility] Tj, Diana JOHNSTON MD   Does the patient have an appointment with their PCP within 7-14 days of discharge? No   Nursing Interventions Assisted patient with making appointment per protocol   Has home health visited the patient within 72 hours of discharge? N/A   Psychosocial issues? No   Did the patient receive a copy of their discharge instructions? Yes   Nursing interventions Reviewed instructions with patient   What is the patient's perception of their health status since discharge? Improving   Is the patient/caregiver able to teach back signs and symptoms related to disease process for when to call PCP? Yes   Is the patient/caregiver able to teach back signs and symptoms related to disease process for when to call 911? Yes   Is the patient/caregiver able to teach back the hierarchy of who to call/visit for symptoms/problems? PCP, Specialist, Home health nurse, Urgent Care, ED, 911 Yes   TCM call completed? Yes   Wrap up additional comments Patient reports doing well. No concerns or questions noted.   Call end time 1623   Would this patient benefit from a Referral to Amb Social Work? No   Is the patient interested in additional calls from an ambulatory ? No            Emperatriz Escamilla RN    7/1/2024, 16:24 EDT

## 2024-07-02 ENCOUNTER — HOSPITAL ENCOUNTER (OUTPATIENT)
Dept: RADIATION ONCOLOGY | Facility: HOSPITAL | Age: 64
Discharge: HOME | End: 2024-07-02

## 2024-07-02 LAB
RAD ONC ARIA COURSE ID: NORMAL
RAD ONC ARIA COURSE LAST TREATMENT DATE: NORMAL
RAD ONC ARIA COURSE START DATE: NORMAL
RAD ONC ARIA COURSE TREATMENT ELAPSED DAYS: 1
RAD ONC ARIA FIRST TREATMENT DATE: NORMAL
RAD ONC ARIA PLAN FRACTIONS TREATED TO DATE: 2
RAD ONC ARIA PLAN ID: NORMAL
RAD ONC ARIA PLAN PRESCRIBED DOSE PER FRACTION: 3 GY
RAD ONC ARIA PLAN PRIMARY REFERENCE POINT: NORMAL
RAD ONC ARIA PLAN TOTAL FRACTIONS PRESCRIBED: 10
RAD ONC ARIA PLAN TOTAL PRESCRIBED DOSE: 3000 CGY
RAD ONC ARIA REFERENCE POINT DOSAGE GIVEN TO DATE: 6 GY
RAD ONC ARIA REFERENCE POINT ID: NORMAL
RAD ONC ARIA REFERENCE POINT SESSION DOSAGE GIVEN: 3 GY

## 2024-07-02 PROCEDURE — 77014 CHG CT GUIDANCE RADIATION THERAPY FLDS PLACEMENT: CPT | Performed by: INTERNAL MEDICINE

## 2024-07-02 PROCEDURE — 77386: CPT | Performed by: INTERNAL MEDICINE

## 2024-07-02 NOTE — PROGRESS NOTES
Chief Complaint  Coronary Artery Disease, Hyperlipidemia, Hypertension, and Extremity Weakness    Subjective     CC  Problem List  Visit Diagnosis   Encounters  Notes  Medications  Labs  Result Review Imaging  Media    Samantha Massey presents to [facility] for   History of Present Illness  Samantha was seen at [facility] . She was admitted on 06/25/2024  for left hand and upper extremity weakness. She was discharged on 06/28/2024. Discharge diagnosis was colon cancer, Metastasis to Brain, left hand weakness, hyperlipidemia LDL goal <100, hypertension. Labs that were performed during the encounter included: CBC-RBC: 3.06,Hemoglobin:9.5,Hematocrit:31.7, MCV:103.6 and Lipid- Total Cholesterol: 201, LDL: 137, Urinalysis-Glucose: >1000, BMP- Glucose: 144, Chloride:110. Diagnostic studies that were performed included: Chest x-ray-Right lower lobe infiltrate versus mass, right upper lobe mass, vascular congestion, MRI-Brain with and without contrast:3 enhancing intracranial mass lesions are consistent with the appearance of metastatic disease, vasogenic edema surrounding each of the aforementioned masses, no midline shift. and CT Scan-Angiogram Neck: Mild atheromatous disease, No vessel stenosis or occlusion, Pulmonary masses increased in size, CT Angiogram Head:  Currently Samantha receives care at home. Complications from the hospital stay include none. The patient stated that they do need help with their daily life and activities. The patient stated that they do have emotional support at home.   Hypertension  This is a chronic problem. The current episode started more than 1 year ago. The problem is unchanged. The problem is controlled. Associated symptoms include headaches and malaise/fatigue. Pertinent negatives include no neck pain, orthopnea, palpitations or peripheral edema. There are no associated agents to hypertension. Risk factors for coronary artery disease  "include post-menopausal state and smoking/tobacco exposure. Past treatments include nothing. Current antihypertension treatment includes diuretics and beta blockers. The current treatment provides mild improvement. There are no compliance problems.  Hypertensive end-organ damage includes kidney disease, CAD/MI and heart failure.   Coronary Artery Disease  Presents for follow-up visit. Symptoms include muscle weakness. Pertinent negatives include no chest pressure, chest tightness, dizziness, leg swelling or palpitations. The symptoms have been resolved. Compliance with diet is variable. Compliance with exercise is variable. Compliance with medications is variable.       Review of Systems   Constitutional:  Positive for fatigue and malaise/fatigue. Negative for activity change, appetite change and unexpected weight loss.   Eyes:  Negative for visual disturbance.   Respiratory:  Negative for chest tightness.    Cardiovascular:  Negative for palpitations, orthopnea and leg swelling.   Gastrointestinal:  Negative for abdominal pain, constipation, diarrhea, nausea, vomiting and indigestion.   Endocrine: Negative for cold intolerance, heat intolerance, polydipsia and polyuria.   Genitourinary:  Negative for dysuria.   Musculoskeletal:  Positive for muscle weakness. Negative for neck pain. Arthralgias: multiple.  Skin:  Negative for rash and bruise.   Neurological:  Positive for weakness (left upper ext and hand mild and improving.). Negative for dizziness and numbness.   Hematological:  Negative for adenopathy. Does not bruise/bleed easily.   Psychiatric/Behavioral:  Negative for suicidal ideas. The patient is not nervous/anxious.         Objective   Vital Signs:   /68 (BP Location: Right arm, Patient Position: Sitting, Cuff Size: Adult)   Pulse 81   Temp 98.7 °F (37.1 °C) (Temporal)   Resp 18   Ht 157.5 cm (62.01\")   Wt 50.3 kg (111 lb)   SpO2 99%   BMI 20.30 kg/m²     Physical Exam  Constitutional:       " General: She is not in acute distress.  Cardiovascular:      Rate and Rhythm: Normal rate and regular rhythm.      Heart sounds: No murmur heard.  Pulmonary:      Effort: Pulmonary effort is normal.      Breath sounds: Normal breath sounds. No wheezing.   Abdominal:      General: Abdomen is flat. There is no distension.      Palpations: Abdomen is soft.      Tenderness: There is no abdominal tenderness.      Comments: fullness   Musculoskeletal:      Cervical back: Neck supple.   Lymphadenopathy:      Cervical: No cervical adenopathy.   Skin:     Findings: No bruising or rash.   Neurological:      Mental Status: She is alert and oriented to person, place, and time.      Sensory: No sensory deficit.      Motor: Weakness (left upper ext.) present.      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  Followed with Angie.  Her disease is now more progressive.         Relevant Medications    HYDROcodone-acetaminophen (NORCO) 5-325 MG per tablet    Primary hypertension    Overview     Values are low since MI and beginning beta blocker at low dose, also on entresto, cardiology apt soon she is encouraged to discuss a possible decrease in dose.          Current Assessment & Plan     Hypertension is stable and controlled  Continue current treatment regimen.  Blood pressure will be reassessed in 3 months.         CAD (coronary artery disease)    Overview     Hx of acute non ST eleation MI 12/07/2023  100% occlusion of LAD with nateral collaterals   Moderate to severe stenosis involving the left main supplying only a left circumflex  artery with no critical stenosis  Moderate stenosis involving the ostium of the right coronary artery that supplies collaterals to the LAD territory  Severe LV dysfunction, with need for ballon pump temporarily, good recovery.  EF 55% 01/24  Off entresto, improved.            Metastatic adenocarcinoma to brain - Primary    Overview     Complete steroids, she is improved, and follow up as scheduled with oncology.   Increasing pain Rx given follow.             Medium    Hyperlipidemia LDL goal <100    Overview     Tolerating Atorvastatin, compliant watch liver function given CA with liver mets.             Unprioritized    Malignant neoplasm of colon metastatic to bone    Overview     Metastatic to the liver and bone on going Rx    Doing well followed with [facility] oncology Angie         Left hand weakness       Follow Up Instructions Charge Capture  Follow-up Communications  Return as needed and as we can help.  Patient was given instructions and counseling regarding her condition or for health maintenance advice. Please see specific information pulled into the AVS if appropriate.

## 2024-07-03 ENCOUNTER — HOSPITAL ENCOUNTER (OUTPATIENT)
Dept: RADIATION ONCOLOGY | Facility: HOSPITAL | Age: 64
Discharge: HOME | End: 2024-07-03

## 2024-07-03 LAB
RAD ONC ARIA COURSE ID: NORMAL
RAD ONC ARIA COURSE LAST TREATMENT DATE: NORMAL
RAD ONC ARIA COURSE START DATE: NORMAL
RAD ONC ARIA COURSE TREATMENT ELAPSED DAYS: 2
RAD ONC ARIA FIRST TREATMENT DATE: NORMAL
RAD ONC ARIA PLAN FRACTIONS TREATED TO DATE: 3
RAD ONC ARIA PLAN ID: NORMAL
RAD ONC ARIA PLAN PRESCRIBED DOSE PER FRACTION: 3 GY
RAD ONC ARIA PLAN PRIMARY REFERENCE POINT: NORMAL
RAD ONC ARIA PLAN TOTAL FRACTIONS PRESCRIBED: 10
RAD ONC ARIA PLAN TOTAL PRESCRIBED DOSE: 3000 CGY
RAD ONC ARIA REFERENCE POINT DOSAGE GIVEN TO DATE: 9 GY
RAD ONC ARIA REFERENCE POINT ID: NORMAL
RAD ONC ARIA REFERENCE POINT SESSION DOSAGE GIVEN: 3 GY

## 2024-07-03 PROCEDURE — 77386: CPT | Performed by: INTERNAL MEDICINE

## 2024-07-03 PROCEDURE — 77336 RADIATION PHYSICS CONSULT: CPT | Performed by: INTERNAL MEDICINE

## 2024-07-03 PROCEDURE — 77014 CHG CT GUIDANCE RADIATION THERAPY FLDS PLACEMENT: CPT | Performed by: INTERNAL MEDICINE

## 2024-07-08 ENCOUNTER — HOSPITAL ENCOUNTER (OUTPATIENT)
Dept: RADIATION ONCOLOGY | Facility: HOSPITAL | Age: 64
Discharge: HOME | End: 2024-07-08
Payer: COMMERCIAL

## 2024-07-08 ENCOUNTER — OFFICE VISIT (OUTPATIENT)
Dept: FAMILY MEDICINE CLINIC | Facility: CLINIC | Age: 64
End: 2024-07-08
Payer: COMMERCIAL

## 2024-07-08 VITALS
WEIGHT: 111 LBS | DIASTOLIC BLOOD PRESSURE: 68 MMHG | HEART RATE: 81 BPM | TEMPERATURE: 98.7 F | BODY MASS INDEX: 20.43 KG/M2 | HEIGHT: 62 IN | OXYGEN SATURATION: 99 % | SYSTOLIC BLOOD PRESSURE: 120 MMHG | RESPIRATION RATE: 18 BRPM

## 2024-07-08 DIAGNOSIS — C79.31 METASTATIC ADENOCARCINOMA TO BRAIN: Primary | ICD-10-CM

## 2024-07-08 DIAGNOSIS — E78.5 HYPERLIPIDEMIA LDL GOAL <100: ICD-10-CM

## 2024-07-08 DIAGNOSIS — R29.898 LEFT HAND WEAKNESS: ICD-10-CM

## 2024-07-08 DIAGNOSIS — C18.9 ADENOCARCINOMA OF COLON METASTATIC TO LIVER: ICD-10-CM

## 2024-07-08 DIAGNOSIS — C18.9 MALIGNANT NEOPLASM OF COLON METASTATIC TO BONE: ICD-10-CM

## 2024-07-08 DIAGNOSIS — I25.10 CORONARY ARTERY DISEASE INVOLVING NATIVE CORONARY ARTERY OF NATIVE HEART WITHOUT ANGINA PECTORIS: ICD-10-CM

## 2024-07-08 DIAGNOSIS — C79.51 MALIGNANT NEOPLASM OF COLON METASTATIC TO BONE: ICD-10-CM

## 2024-07-08 DIAGNOSIS — I10 PRIMARY HYPERTENSION: ICD-10-CM

## 2024-07-08 DIAGNOSIS — C78.7 ADENOCARCINOMA OF COLON METASTATIC TO LIVER: ICD-10-CM

## 2024-07-08 LAB
RAD ONC ARIA COURSE ID: NORMAL
RAD ONC ARIA COURSE LAST TREATMENT DATE: NORMAL
RAD ONC ARIA COURSE START DATE: NORMAL
RAD ONC ARIA COURSE TREATMENT ELAPSED DAYS: 7
RAD ONC ARIA FIRST TREATMENT DATE: NORMAL
RAD ONC ARIA PLAN FRACTIONS TREATED TO DATE: 4
RAD ONC ARIA PLAN ID: NORMAL
RAD ONC ARIA PLAN PRESCRIBED DOSE PER FRACTION: 3 GY
RAD ONC ARIA PLAN PRIMARY REFERENCE POINT: NORMAL
RAD ONC ARIA PLAN TOTAL FRACTIONS PRESCRIBED: 10
RAD ONC ARIA PLAN TOTAL PRESCRIBED DOSE: 3000 CGY
RAD ONC ARIA REFERENCE POINT DOSAGE GIVEN TO DATE: 12 GY
RAD ONC ARIA REFERENCE POINT ID: NORMAL
RAD ONC ARIA REFERENCE POINT SESSION DOSAGE GIVEN: 3 GY

## 2024-07-08 PROCEDURE — 77386: CPT | Performed by: INTERNAL MEDICINE

## 2024-07-08 PROCEDURE — 99495 TRANSJ CARE MGMT MOD F2F 14D: CPT | Performed by: FAMILY MEDICINE

## 2024-07-08 PROCEDURE — 77014 CHG CT GUIDANCE RADIATION THERAPY FLDS PLACEMENT: CPT | Performed by: INTERNAL MEDICINE

## 2024-07-08 RX ORDER — HYDROCODONE BITARTRATE AND ACETAMINOPHEN 5; 325 MG/1; MG/1
1 TABLET ORAL EVERY 6 HOURS PRN
Qty: 30 TABLET | Refills: 0 | Status: SHIPPED | OUTPATIENT
Start: 2024-07-08

## 2024-07-09 ENCOUNTER — HOSPITAL ENCOUNTER (OUTPATIENT)
Dept: RADIATION ONCOLOGY | Facility: HOSPITAL | Age: 64
Discharge: HOME | End: 2024-07-09

## 2024-07-09 LAB
RAD ONC ARIA COURSE ID: NORMAL
RAD ONC ARIA COURSE LAST TREATMENT DATE: NORMAL
RAD ONC ARIA COURSE START DATE: NORMAL
RAD ONC ARIA COURSE TREATMENT ELAPSED DAYS: 8
RAD ONC ARIA FIRST TREATMENT DATE: NORMAL
RAD ONC ARIA PLAN FRACTIONS TREATED TO DATE: 5
RAD ONC ARIA PLAN ID: NORMAL
RAD ONC ARIA PLAN PRESCRIBED DOSE PER FRACTION: 3 GY
RAD ONC ARIA PLAN PRIMARY REFERENCE POINT: NORMAL
RAD ONC ARIA PLAN TOTAL FRACTIONS PRESCRIBED: 10
RAD ONC ARIA PLAN TOTAL PRESCRIBED DOSE: 3000 CGY
RAD ONC ARIA REFERENCE POINT DOSAGE GIVEN TO DATE: 15 GY
RAD ONC ARIA REFERENCE POINT ID: NORMAL
RAD ONC ARIA REFERENCE POINT SESSION DOSAGE GIVEN: 3 GY

## 2024-07-09 PROCEDURE — 77014 CHG CT GUIDANCE RADIATION THERAPY FLDS PLACEMENT: CPT | Performed by: INTERNAL MEDICINE

## 2024-07-09 PROCEDURE — 77386: CPT | Performed by: INTERNAL MEDICINE

## 2024-07-10 ENCOUNTER — HOSPITAL ENCOUNTER (OUTPATIENT)
Dept: RADIATION ONCOLOGY | Facility: HOSPITAL | Age: 64
Discharge: HOME | End: 2024-07-10

## 2024-07-10 LAB
RAD ONC ARIA COURSE ID: NORMAL
RAD ONC ARIA COURSE LAST TREATMENT DATE: NORMAL
RAD ONC ARIA COURSE START DATE: NORMAL
RAD ONC ARIA COURSE TREATMENT ELAPSED DAYS: 9
RAD ONC ARIA FIRST TREATMENT DATE: NORMAL
RAD ONC ARIA PLAN FRACTIONS TREATED TO DATE: 6
RAD ONC ARIA PLAN ID: NORMAL
RAD ONC ARIA PLAN PRESCRIBED DOSE PER FRACTION: 3 GY
RAD ONC ARIA PLAN PRIMARY REFERENCE POINT: NORMAL
RAD ONC ARIA PLAN TOTAL FRACTIONS PRESCRIBED: 10
RAD ONC ARIA PLAN TOTAL PRESCRIBED DOSE: 3000 CGY
RAD ONC ARIA REFERENCE POINT DOSAGE GIVEN TO DATE: 18 GY
RAD ONC ARIA REFERENCE POINT ID: NORMAL
RAD ONC ARIA REFERENCE POINT SESSION DOSAGE GIVEN: 3 GY

## 2024-07-10 PROCEDURE — 77386: CPT | Performed by: INTERNAL MEDICINE

## 2024-07-10 PROCEDURE — 77427 RADIATION TX MANAGEMENT X5: CPT | Performed by: INTERNAL MEDICINE

## 2024-07-10 PROCEDURE — 77014 CHG CT GUIDANCE RADIATION THERAPY FLDS PLACEMENT: CPT | Performed by: INTERNAL MEDICINE

## 2024-07-11 ENCOUNTER — HOSPITAL ENCOUNTER (OUTPATIENT)
Dept: RADIATION ONCOLOGY | Facility: HOSPITAL | Age: 64
Discharge: HOME | End: 2024-07-11

## 2024-07-11 LAB
RAD ONC ARIA COURSE ID: NORMAL
RAD ONC ARIA COURSE LAST TREATMENT DATE: NORMAL
RAD ONC ARIA COURSE START DATE: NORMAL
RAD ONC ARIA COURSE TREATMENT ELAPSED DAYS: 10
RAD ONC ARIA FIRST TREATMENT DATE: NORMAL
RAD ONC ARIA PLAN FRACTIONS TREATED TO DATE: 7
RAD ONC ARIA PLAN ID: NORMAL
RAD ONC ARIA PLAN PRESCRIBED DOSE PER FRACTION: 3 GY
RAD ONC ARIA PLAN PRIMARY REFERENCE POINT: NORMAL
RAD ONC ARIA PLAN TOTAL FRACTIONS PRESCRIBED: 10
RAD ONC ARIA PLAN TOTAL PRESCRIBED DOSE: 3000 CGY
RAD ONC ARIA REFERENCE POINT DOSAGE GIVEN TO DATE: 21 GY
RAD ONC ARIA REFERENCE POINT ID: NORMAL
RAD ONC ARIA REFERENCE POINT SESSION DOSAGE GIVEN: 3 GY

## 2024-07-11 PROCEDURE — 77386: CPT | Performed by: INTERNAL MEDICINE

## 2024-07-11 PROCEDURE — 77014 CHG CT GUIDANCE RADIATION THERAPY FLDS PLACEMENT: CPT | Performed by: INTERNAL MEDICINE

## 2024-07-11 PROCEDURE — 77336 RADIATION PHYSICS CONSULT: CPT | Performed by: INTERNAL MEDICINE

## 2024-07-12 ENCOUNTER — TELEPHONE (OUTPATIENT)
Dept: ONCOLOGY | Facility: CLINIC | Age: 64
End: 2024-07-12
Payer: COMMERCIAL

## 2024-07-12 ENCOUNTER — HOSPITAL ENCOUNTER (OUTPATIENT)
Dept: RADIATION ONCOLOGY | Facility: HOSPITAL | Age: 64
Discharge: HOME | End: 2024-07-12

## 2024-07-12 LAB
RAD ONC ARIA COURSE ID: NORMAL
RAD ONC ARIA COURSE LAST TREATMENT DATE: NORMAL
RAD ONC ARIA COURSE START DATE: NORMAL
RAD ONC ARIA COURSE TREATMENT ELAPSED DAYS: 11
RAD ONC ARIA FIRST TREATMENT DATE: NORMAL
RAD ONC ARIA PLAN FRACTIONS TREATED TO DATE: 8
RAD ONC ARIA PLAN ID: NORMAL
RAD ONC ARIA PLAN PRESCRIBED DOSE PER FRACTION: 3 GY
RAD ONC ARIA PLAN PRIMARY REFERENCE POINT: NORMAL
RAD ONC ARIA PLAN TOTAL FRACTIONS PRESCRIBED: 10
RAD ONC ARIA PLAN TOTAL PRESCRIBED DOSE: 3000 CGY
RAD ONC ARIA REFERENCE POINT DOSAGE GIVEN TO DATE: 24 GY
RAD ONC ARIA REFERENCE POINT ID: NORMAL
RAD ONC ARIA REFERENCE POINT SESSION DOSAGE GIVEN: 3 GY

## 2024-07-12 PROCEDURE — 77014 CHG CT GUIDANCE RADIATION THERAPY FLDS PLACEMENT: CPT | Performed by: INTERNAL MEDICINE

## 2024-07-12 PROCEDURE — 77386: CPT | Performed by: INTERNAL MEDICINE

## 2024-07-12 NOTE — TELEPHONE ENCOUNTER
"After speaking with Dr. Adams, I contacted Molly. Molly stated she was contacted today by members of the patients cancer policy company as well as her life insurance policy. She stated she is needing information regarding the plan of care for the patient to be given to the patients life insurance company. I informed her that we do not have an exact plan of care yet because the patient has not yet completed radiation and she will need to speak with Dr. Adams at her follow up regarding the plan of care. She stated that the member who contacted her regarding the patients cancer policy stated they will mail her paperwork to have completed by the MD; I stated that we will complete the paperwork for the patient once we receive it. She confirmed. She stated that the member from the life insurance policy company needs medical records regarding the upcoming plan of care therefore she is needing to know the \"game plan\". I informed her that we do not have a confirmed game plan at this time however, I can print her a schedule of the patients appts that she can give to them that way they are aware that a plan of care will be discussed at the follow up appt. She confirmed and stated she will pick it up on Monday. I confirmed and advised she contact our office if she has any further questions or concerns. She v/u.  "

## 2024-07-12 NOTE — TELEPHONE ENCOUNTER
Provider: valentino    Caller: gabriele floyd    Relationship to Patient: relative    Phone Number: [phone]    Reason for Call: pt is needing treatment plan information for cancer policy. ongoing chemo treatment information - life expectancy information  she'd like to  on monday when she has her radiation treatment if this is possible     When was the patient last seen: 06/12/2024

## 2024-07-15 ENCOUNTER — HOSPITAL ENCOUNTER (OUTPATIENT)
Dept: RADIATION ONCOLOGY | Facility: HOSPITAL | Age: 64
Discharge: HOME | End: 2024-07-15
Payer: COMMERCIAL

## 2024-07-15 ENCOUNTER — RADIATION ONCOLOGY WEEKLY ASSESSMENT (OUTPATIENT)
Dept: RADIATION ONCOLOGY | Facility: HOSPITAL | Age: 64
End: 2024-07-15
Payer: COMMERCIAL

## 2024-07-15 VITALS
HEART RATE: 85 BPM | DIASTOLIC BLOOD PRESSURE: 74 MMHG | TEMPERATURE: 98 F | BODY MASS INDEX: 20.57 KG/M2 | OXYGEN SATURATION: 98 % | WEIGHT: 111.8 LBS | SYSTOLIC BLOOD PRESSURE: 158 MMHG | HEIGHT: 62 IN | RESPIRATION RATE: 18 BRPM

## 2024-07-15 DIAGNOSIS — C79.51 MALIGNANT NEOPLASM OF COLON METASTATIC TO BONE: ICD-10-CM

## 2024-07-15 DIAGNOSIS — G89.29 CHEST WALL PAIN, CHRONIC: ICD-10-CM

## 2024-07-15 DIAGNOSIS — C18.9 MALIGNANT NEOPLASM OF COLON METASTATIC TO BONE: ICD-10-CM

## 2024-07-15 DIAGNOSIS — C18.9 ADENOCARCINOMA OF COLON METASTATIC TO LIVER: ICD-10-CM

## 2024-07-15 DIAGNOSIS — R07.89 CHEST WALL PAIN, CHRONIC: ICD-10-CM

## 2024-07-15 DIAGNOSIS — C79.31 METASTASIS TO BRAIN: Primary | ICD-10-CM

## 2024-07-15 DIAGNOSIS — C78.7 ADENOCARCINOMA OF COLON METASTATIC TO LIVER: ICD-10-CM

## 2024-07-15 LAB
LAB AP CARIS, ADDENDUM: NORMAL
LAB AP CASE REPORT: NORMAL
LAB AP DIAGNOSIS COMMENT: NORMAL
Lab: NORMAL
PATH REPORT.FINAL DX SPEC: NORMAL
PATH REPORT.GROSS SPEC: NORMAL
RAD ONC ARIA COURSE ID: NORMAL
RAD ONC ARIA COURSE LAST TREATMENT DATE: NORMAL
RAD ONC ARIA COURSE START DATE: NORMAL
RAD ONC ARIA COURSE TREATMENT ELAPSED DAYS: 14
RAD ONC ARIA FIRST TREATMENT DATE: NORMAL
RAD ONC ARIA PLAN FRACTIONS TREATED TO DATE: 9
RAD ONC ARIA PLAN ID: NORMAL
RAD ONC ARIA PLAN PRESCRIBED DOSE PER FRACTION: 3 GY
RAD ONC ARIA PLAN PRIMARY REFERENCE POINT: NORMAL
RAD ONC ARIA PLAN TOTAL FRACTIONS PRESCRIBED: 10
RAD ONC ARIA PLAN TOTAL PRESCRIBED DOSE: 3000 CGY
RAD ONC ARIA REFERENCE POINT DOSAGE GIVEN TO DATE: 27 GY
RAD ONC ARIA REFERENCE POINT ID: NORMAL
RAD ONC ARIA REFERENCE POINT SESSION DOSAGE GIVEN: 3 GY

## 2024-07-15 PROCEDURE — FACE2FACE: Performed by: INTERNAL MEDICINE

## 2024-07-15 PROCEDURE — 77014 CHG CT GUIDANCE RADIATION THERAPY FLDS PLACEMENT: CPT | Performed by: INTERNAL MEDICINE

## 2024-07-15 PROCEDURE — 77386: CPT | Performed by: INTERNAL MEDICINE

## 2024-07-15 RX ORDER — TRAMADOL HYDROCHLORIDE 50 MG/1
50 TABLET ORAL EVERY 6 HOURS PRN
Qty: 50 TABLET | Refills: 1 | Status: SHIPPED | OUTPATIENT
Start: 2024-07-15

## 2024-07-15 NOTE — PROGRESS NOTES
[facility] RADIATION ONCOLOGY  ON-TREATMENT VISIT NOTE    NAME: Samantha Massey  YOB: 1960  MRN #: [id number]  DATE OF SERVICE: 7/15/2024  PRESCRIBING PHYSICIAN: Sj Burgos MD    DIAGNOSIS:      ICD-10-CM ICD-9-CM   1. Metastasis to brain  C79.31 198.3   2. Adenocarcinoma of colon metastatic to liver  C18.9 153.9    C78.7 197.7   3. Malignant neoplasm of colon metastatic to bone  C18.9 153.9    C79.51 198.5      RADIATION THERAPY VISIT:  Continue radiation therapy, Dosimetry plan remains acceptable, Films reviewed and remains acceptable, Pain assessed, Pain management planned, Radiation dose schedule reviewed and remains acceptable, Radiation technique remains acceptable, and Symptoms within expected range    Radiation Treatments       Active   Plans   HA WhBrain   Most recent treatment: Dose planned: 300 cGy (fraction 9 on 7/15/2024)   Total: Dose planned: 3,000 cGy (10 fractions)   Elapsed Days: 14      Reference Points   GONZALEZ WhBrain   Most recent treatment: Dose given: 300 cGy (on 7/15/2024)   Total: Dose given: 2,700 cGy   Elapsed Days: 14                   PHYSICAL ASSESSMENT         Vitals:    07/15/24 1210   BP: 158/74   Pulse: 85   Resp: 18   Temp: 98 °F (36.7 °C)   SpO2: 98%      Wt Readings from Last 3 Encounters:   07/15/24 50.7 kg (111 lb 12.8 oz)   07/08/24 50.3 kg (111 lb)   07/01/24 50.3 kg (111 lb)     Restricted in physically strenuous activity but ambulatory and able to carry out work of a light or sedentary nature, e.g., light house work, office work = 1    We examined the relevant areas: yes  Findings are within the expected range for this stage of treatment: yes    ACTION ITEMS     Patient tolerating treatment well and as expected for this stage in their treatment and Continue radiation therapy as planned    Estimated Completion Date: 7/16/2024    Anticipatory guidance provided.    Prescribed memantine for memory aid.    Provided dexamethasone taper as  follows    6/30 - 7/3      4 mg 3 times daily  7/4 - 7/7        4 mg 2 times daily  7/8 - 7/11      4 mg daily  7/12 - 7/15    2 mg daily  7/16 - 7/19    1 mg daily    Encouraged the patient to reach out if her previous neurologic symptoms return.  Should they return then she should go back to the previously effective dexamethasone dose and continue at that dose until further instructions are provided.    Plan for follow-up in 1 month with brain MRI in 3 months.       Sj Burgos MD  Radiation Oncology  [facility]

## 2024-07-15 NOTE — PROGRESS NOTES
Radiation Treatment Summary Note      Patient Name: Samantha Massey  : 1960    Attending Provider: No care team member to display      Diagnosis:     ICD-10-CM ICD-9-CM   1. Malignant neoplasm of colon metastatic to bone  C18.9 153.9    C79.51 198.5       Radiation Start Date: 2024    Radiation Completion Date: 2024      Prescription:     Site: Sternum  Laterality: N/A  Total Dose: 2000 cGy  Dose per Fraction: 400 cGy  Total Fractions: 5  Daily or BID: Daily  Modality: Photon  Technique: IMRT/VMAT/Rapid Arc  Bolus: No    Final Delivered Dose Deviated From Initially Prescribed Dose: No    Concurrent Chemotherapy: No    Patient Tolerated Treatment Without Unexpected Side Effects/Complications: Yes    ECOG: Restricted in physically strenuous activity but ambulatory and able to carry out work of a light or sedentary nature, e.g., light house work, office work = 1    Pain Management Plan: Opioid or other pain medication prescribed by other provider    Follow-Up Plan: 4-6 weeks    Imaging Ordered for Follow-Up: Yes, describe: Per Med Onc        Sj Burgos MD

## 2024-07-16 ENCOUNTER — RADIATION ONCOLOGY WEEKLY ASSESSMENT (OUTPATIENT)
Dept: RADIATION ONCOLOGY | Facility: HOSPITAL | Age: 64
End: 2024-07-16
Payer: COMMERCIAL

## 2024-07-16 ENCOUNTER — HOSPITAL ENCOUNTER (OUTPATIENT)
Dept: RADIATION ONCOLOGY | Facility: HOSPITAL | Age: 64
Discharge: HOME | End: 2024-07-16
Payer: COMMERCIAL

## 2024-07-16 DIAGNOSIS — C79.51 MALIGNANT NEOPLASM OF COLON METASTATIC TO BONE: ICD-10-CM

## 2024-07-16 DIAGNOSIS — C79.31 METASTASIS TO BRAIN: Primary | ICD-10-CM

## 2024-07-16 DIAGNOSIS — C18.9 MALIGNANT NEOPLASM OF COLON METASTATIC TO BONE: ICD-10-CM

## 2024-07-16 DIAGNOSIS — C18.9 ADENOCARCINOMA OF COLON METASTATIC TO LIVER: ICD-10-CM

## 2024-07-16 DIAGNOSIS — C79.31 METASTATIC ADENOCARCINOMA TO BRAIN: Primary | ICD-10-CM

## 2024-07-16 DIAGNOSIS — C78.7 ADENOCARCINOMA OF COLON METASTATIC TO LIVER: ICD-10-CM

## 2024-07-16 LAB
RAD ONC ARIA COURSE ID: NORMAL
RAD ONC ARIA COURSE LAST TREATMENT DATE: NORMAL
RAD ONC ARIA COURSE START DATE: NORMAL
RAD ONC ARIA COURSE TREATMENT ELAPSED DAYS: 15
RAD ONC ARIA FIRST TREATMENT DATE: NORMAL
RAD ONC ARIA PLAN FRACTIONS TREATED TO DATE: 10
RAD ONC ARIA PLAN ID: NORMAL
RAD ONC ARIA PLAN PRESCRIBED DOSE PER FRACTION: 3 GY
RAD ONC ARIA PLAN PRIMARY REFERENCE POINT: NORMAL
RAD ONC ARIA PLAN TOTAL FRACTIONS PRESCRIBED: 10
RAD ONC ARIA PLAN TOTAL PRESCRIBED DOSE: 3000 CGY
RAD ONC ARIA REFERENCE POINT DOSAGE GIVEN TO DATE: 30 GY
RAD ONC ARIA REFERENCE POINT ID: NORMAL
RAD ONC ARIA REFERENCE POINT SESSION DOSAGE GIVEN: 3 GY

## 2024-07-16 PROCEDURE — 77014 CHG CT GUIDANCE RADIATION THERAPY FLDS PLACEMENT: CPT | Performed by: INTERNAL MEDICINE

## 2024-07-16 PROCEDURE — 77386: CPT | Performed by: INTERNAL MEDICINE

## 2024-07-16 NOTE — PATIENT INSTRUCTIONS
RADIATION THERAPY DISCHARGE INSTRUCTIONS  Brain    CONGRATULATIONS! You completed 10 radiation treatments for treatment of your colon cancer metastasis to brain. These discharge instructions are important for you to follow until your one-month follow up appointment with your radiation oncologist. Please make sure to review these instructions and call the Radiation Oncology Department if you have any questions or concerns with symptoms you may experience. Thank you for trusting us with your cancer treatment!    DIET  Eat a regular, well balanced diet that is high in protein such as meat, eggs, cheese, and nut butters.  Drink 48 to 64 ounces of fluid daily.  Use nutritional supplements if you are not able to eat full meals.  Monitor your weight and report continued weight loss to your doctor.    MEDICATIONS  Use Tylenol as needed to decrease discomfort and irritation to treatment area.  Use an over-the-counter decongestant (such as Sudafed®) if your ears feel plugged.    SKIN CARE  Wash treated skin gently with your hands using a mild, non-drying soap such as Dove® or Aveeno® until skin returns to normal.  Pat skin dry - do not rub.  Keep treated skin moist with frequent applications of Aquaphor® or unscented lotion (such as Eucerin)®.  Always protect your treated skin when outdoors by wearing protective clothing and applying sunscreen SPF 15 or higher at least 30 minutes before going outdoors and reapply frequently.    ACTIVITY  Fatigue is a normal side effect of radiation therapy and should improve over time  Alternate rest and activity.  Exercise such as walking may help to improve your fatigue.    FOLLOW-UP  Continue follow-up appointments with all other doctors as necessary.  Call your radiation oncology doctor if you are concerned with any side effects you are experiencing: [phone].    SPECIAL INSTRUCTIONS  Do not stop taking your steroid medication suddenly; your medical or radiation oncologist will  slowly decrease your dose to prevent any adverse effects.  Do not drive until your doctor has said it's okay to do so.    _______________________________________________________________________    Completed by: Alma Ivan MA on 7/16/2024 at 10:57 EDT

## 2024-07-16 NOTE — PROGRESS NOTES
RADIATION THERAPY COMPLETION and DISCHARGE     Samantha Massey completed radiation therapy on 07/16/2024 for Metastasis to brain [C79.31]. The summary of her treatment is as follows:    TREATMENT SITE:   Whole Brain START DATE:   07/01/2024   TOTAL DOSE (cGy):   3000 END DATE:   07/16/2024    DOSE/FRACTION:   300 ELAPSED DAYS:   15   TOTAL FACTIONS:   10 PHYSICIAN:    Dr. Sj Burgos     She is scheduled for a 1-month follow-up appointment with Dr. Sj Burgos on 08/16/2024 at 11:30 am.  _______________________________________________________________________    The following instructions were provided to the patient and/or family in their printed after visit summary (AVS) as well as discussed in-person by the radiation oncology nurse or medical assistant. The patient and/or family had the opportunity to ask questions and verbalized their questions were adequately answered. Encouraged patient to contact our department with any questions or concerns.    RADIATION THERAPY DISCHARGE INSTRUCTIONS  Brain    CONGRATULATIONS! You completed 10 radiation treatments for treatment of your colon cancer metastasis to brain. These discharge instructions are important for you to follow until your one-month follow up appointment with your radiation oncologist. Please make sure to review these instructions and call the Radiation Oncology Department if you have any questions or concerns with symptoms you may experience. Thank you for trusting us with your cancer treatment!    DIET  Eat a regular, well balanced diet that is high in protein such as meat, eggs, cheese, and nut butters.  Drink 48 to 64 ounces of fluid daily.  Use nutritional supplements if you are not able to eat full meals.  Monitor your weight and report continued weight loss to your doctor.    MEDICATIONS  Use Tylenol as needed to decrease discomfort and irritation to treatment area.  Use an over-the-counter decongestant (such as Sudafed®) if your ears feel  plugged.    SKIN CARE  Wash treated skin gently with your hands using a mild, non-drying soap such as Dove® or Aveeno® until skin returns to normal.  Pat skin dry - do not rub.  Keep treated skin moist with frequent applications of Aquaphor® or unscented lotion (such as Eucerin)®.  Always protect your treated skin when outdoors by wearing protective clothing and applying sunscreen SPF 15 or higher at least 30 minutes before going outdoors and reapply frequently.    ACTIVITY  Fatigue is a normal side effect of radiation therapy and should improve over time  Alternate rest and activity.  Exercise such as walking may help to improve your fatigue.    FOLLOW-UP  Continue follow-up appointments with all other doctors as necessary.  Call your radiation oncology doctor if you are concerned with any side effects you are experiencing: [phone].    SPECIAL INSTRUCTIONS  Do not stop taking your steroid medication suddenly; your medical or radiation oncologist will slowly decrease your dose to prevent any adverse effects.  Do not drive until your doctor has said it's okay to do so.  _______________________________________________________________________    Completed by: Alma Ivan MA, Radiation Oncology Medical Assistant on 07/16/24 at 10:58 EDT

## 2024-07-18 LAB
RAD ONC ARIA COURSE END DATE: NORMAL
RAD ONC ARIA COURSE ID: NORMAL
RAD ONC ARIA COURSE LAST TREATMENT DATE: NORMAL
RAD ONC ARIA COURSE START DATE: NORMAL
RAD ONC ARIA COURSE TREATMENT ELAPSED DAYS: 15
RAD ONC ARIA FIRST TREATMENT DATE: NORMAL
RAD ONC ARIA PLAN FRACTIONS TREATED TO DATE: 10
RAD ONC ARIA PLAN ID: NORMAL
RAD ONC ARIA PLAN NAME: NORMAL
RAD ONC ARIA PLAN PRESCRIBED DOSE PER FRACTION: 3 GY
RAD ONC ARIA PLAN PRIMARY REFERENCE POINT: NORMAL
RAD ONC ARIA PLAN TOTAL FRACTIONS PRESCRIBED: 10
RAD ONC ARIA PLAN TOTAL PRESCRIBED DOSE: 3000 CGY
RAD ONC ARIA REFERENCE POINT DOSAGE GIVEN TO DATE: 30 GY
RAD ONC ARIA REFERENCE POINT ID: NORMAL

## 2024-07-24 ENCOUNTER — TELEPHONE (OUTPATIENT)
Dept: ONCOLOGY | Facility: CLINIC | Age: 64
End: 2024-07-24
Payer: COMMERCIAL

## 2024-07-24 NOTE — TELEPHONE ENCOUNTER
Caller: BRENT FINE - FRIEND    Relationship: Emergency Contact    Best call back number: [phone]    Requested Prescriptions: PAIN MEDICATION   Requested Prescriptions      No prescriptions requested or ordered in this encounter        Pharmacy where request should be sent: [facility] IN [place], [place]  [id number]    Last office visit with prescribing clinician: 6/12/2024   Last telemedicine visit with prescribing clinician: Visit date not found   Next office visit with prescribing clinician: 8/8/2024     Additional details provided by patient: BRENT STATED THE TRAMADOL IS NOT WORKING FOR PAIN & THEY ARE REQUESTING SOMETHING STRONGER.  SHE CANNOT TAKE HYDROCODONE.  BRENT STATES PT IS TAKING ALEVE & IS NOT SURE IF THAT IS OKAY, PLEASE ADVISE.    Does the patient have less than a 3 day supply:  [x] Yes  [] No    Would you like a call back once the refill request has been completed: [] Yes [x] No    If the office needs to give you a call back, can they leave a voicemail: [] Yes [x] No

## 2024-07-25 DIAGNOSIS — G89.3 CANCER ASSOCIATED PAIN: Primary | ICD-10-CM

## 2024-07-25 RX ORDER — OXYCODONE HYDROCHLORIDE 5 MG/1
5 TABLET ORAL EVERY 4 HOURS PRN
Qty: 120 TABLET | Refills: 0 | Status: SHIPPED | OUTPATIENT
Start: 2024-07-25

## 2024-07-25 NOTE — TELEPHONE ENCOUNTER
"After speaking with Dr. Adams, I contacted Ms. Massey's friend Molly regarding her message we received. I informed Molly, per Dr. Adams, he sent a prescription to her pharmacy for Ms. Massey to take oxycodone 5 mg (1 tablet) PO every 4 hrs PRN for pain. Molly stated that the patient cannot take oxycodone because it makes her \"deathly sick\". I asked her if the patient cannot take oxycodone or hydrocodone because the message we received stated hydrocodone; she stated that she did not know there was a difference. She stated she checked the patients medication bottles and confirmed that she cannot take oxycodone nor hydrocodone. I asked if she is taking the pain medication with food rather than on an empty stomach; she stated that the patient is taking it with food and she has also tried taking it with phenergan which did not help. She stated that Ms. Massey vomits everywhere when taking the medication. She asked if the patient can take 2 tramadol tablets. I informed her that I will verify with Dr. Adams regarding what he advises then I will return to the phone. She confirmed.     After speaking with Dr. Adams, I informed her that he does not advise that she take 2 tramadol tablets due to her decrease kidney function therefore he did advise that she try taking the oxycodone prescription with food. I stated that he recommended that she try taking 0.5 of the tablet to start with and if she does not vomit when taking the 0.5 tablet then increase to 1 tablet at her next dose. Molly confirmed and stated she will try. I informed her that the prescription has been sent. She confirmed. I advised her to contact our office if they have any questions, concerns or if the patient has any symptoms with the medication. She v/u.   "

## 2024-08-05 NOTE — PROGRESS NOTES
HEMATOLOGY ONCOLOGY OUTPATIENT FOLLOW-UP       Patient name: Samantha Massey  : 1960  MRN: [id number]  Primary Care Physician: Diana Spencer MD  Referring Physician: No ref. provider found  Reason For Consult: Colon cancer.     History of Present Illness:  Patient is a 63 y.o.     2024: In the office for the first time in [facility] [place]. Ms. Massey reported that sometime in  she developed severe constipation that did not seem to improve. This slowly became associated to fatigue and weakness. Eventually she sought attention and was found to have normocytic anemia in 2012. A CT scan of the abdomen reported innumerable lesions throughout the liver. There was thickening of the distal sigmoid colon. There were some non-specific lymph nodes. She underwent a colonoscopy in early January that revealed a tumor in the distal colon. The final report of pathology was of invasive, moderately differentiated adenocarcinoma without microsatellite instability. She underwent a laparoscopic low anterior resection. The final report of pathology was of a poorly differentiated adenocarcinoma of the rectosigmoid with invasion through the muscularis propria and extension of the mesocolon. No lymph nodes were involved, but she underwent an image guided biopsy of the liver reported metastatic disease consistent with primary colon cancer. In 2012 she started treatment with FOLFOX/bevacizumab. Testing at the time reported KRAS wild type. A PET scan done shortly after the commencement of the treatment reported multiple metastatic lesions in the liver. In  of the same year there was evidence of response to the treatment. Later, in August, the lesions were stable. She continued treatment with the same regimen through 2012 at which time she was transitioned to capecitabine. Radioembolization to the liver was delivered initially to the right lobe and then to  the left lobe in November of that same year. There was no suggestion of disease outside of the liver and the lesions in the liver showed evidence of response. In August of 2014 the capecitabine was discontinued as there was no longer evidence of disease. She did well until October of 2017 when she had radiographic evidence of progressive disease in the liver and an image guided biopsy confirmed colonic primary. FOLFOX/bevacizumab was re-introduced in October of that year.  In February of 2018 she had evidence of response and no suggestion of extrahepatic disease and she underwent left hepatectomy. Following that she was placed on capecitabine as adjuvant treatment. She continued treatment with this agent until September of 2018. In early January of 2019 there was evidence of disease in the lungs and the mediastinal lymph nodes. In July of 2019 she started treatment with FOLFIRI/bevacizumab. In the setting of progressive disease the bevacizumab was changed to cetuximab. In December of 2020 metastatic bone disease was identified. To continue treatment with cetuximab and irinotecan. There was no evidence of progression until January of 2023 that revealed enlarging pulmonary nodules and enlargement of a right seventh rib metastatic lesion. For that reason she was prescribed regorafenib and began treatment in February of the same year.  In April of 2023 she had radiation to the right 7th rib. The regorafenib resulted in hand foot syndrome. In June of 2023 a scan revealed progressive disease and in late July of the same year she began treatment with trifluridine/tipracil and in August of the same year bevacizumab was added. She developed myelosuppression and doses were modified. At the time of this first visit she was still taking the above regimen. She was rather asymptomatic and on exam there were no changes. There was a report of a scan done in early December of 2023 that reported possible enlargement of the rib  metastatic lesion. A decision was made to continue with the same treatment and plans to take over the prescription of trifluridine/tipracil. To have scans in February of 2024.     2/15/2024: Feeling well.  Taking the trifluridine/tipracil without difficulties.  Generally energetic and with good appetite.  Afebrile.  On exam she does not appear in distress and she does not seem acutely ill.  The lungs are clear bilaterally and the heart is regular.  The abdomen is soft and nontender.  No edema.  Reviewed the laboratory exams that suggest anemia.  She is not pale and off for the anemia reported on the blood count and does it will be repeated.  If indeed her hemoglobin is 7.3 g/dL she will be transfused.  I reviewed independently the images and the reports of the scans.  She has stable disease at this time.  Continue with the same treatment and see me in 6 weeks.     4/18/2024: Feeling well and without new problems.  Continues to take the trifluridine tipiracil without any difficulties.  She has been afebrile.  She continues to eat reasonably well and her weight is stable.  She denies pain.  On exam no distress.  No oral lesions.  Respirations not labored and lungs clear.  Heart regular.  Abdomen soft and without palpable tumors.  No edema.  The laboratory exams were reviewed.  She has significant neutropenia which I discussed with her.  Prior to the commencement of the next cycle of medication she needs to have her blood counts checked.  I discussed with her the importance of neutropenic precautions and urged her to call if febrile.    5/24/2024: Does not feel as well as at the time of the last visit.  She has noted the enlargement of nodules on her anterior chest that resulted in pain.  She has been taking acetaminophen and tramadol prescribed by her primary care physician that does result in relief of the pain.  She has not had any fevers.  She has been off of the trifluridine/tipiracil since she was instructed to  "do so because of pancytopenia.  She has had no dyspnea and denies coughing.  Also no abdominal pain.  Has been able to eat and her weight is stable.  On exam she appears chronically ill but is not in distress.  No jaundice or pallor.  Indeed there are palpable tumors on the anterior chest particularly.  The lungs are clear.  The heart is regular.  Abdomen is soft.  Reviewed the scans.  There is clear progression of the disease with dose nodules that she feels corresponding to metastatic deposits that clearly have increased in size.  The right parasternal chest wall metastatic deposit, which is the largest, increased from 2.6 cm to 4.3 cm.  The metastatic deposits in the lungs have also increased and the left lower lobe lesion has gone from 2.1 to 3.2 cm.  Within the abdomen and pelvis there are been no changes.  Discussed with her.  I have asked her to see Dr. Colunga for consideration of radiation to relieve pain on the anterior chest.  I have also asked her to have a biopsy of one of the lung lesions particularly the left lower lobe lesion, which appears to be accessible and is largest.  I would like to consider not only histologic analysis but also particularly next-generation sequencing and PD-L1 expression.  Discussed with her.    6/12/2024: Feels better. Has less chest pain though she is tender after the biopsy. Eats well and has no nausea or vomiting. No chest pain or cough. No abdominal pain or diarrhea and no dysuria. On exam alert and conversant no distress and no jaundice. No oral lesions and respirations not labored. The lungs are clear and heart is regular. Abdomen soft. No edema. Reviewed and discussed with her the report of the biopsy. Next generation sequencing  requested. She is to continue with radiation and will see me in approximately 2 weeks.     8/8/2024: Has completed the radiation. She has not been feeling well. \"This is not quality of life\". She did not regain much movement of the right upper " extremity. She is not able to move much. She maintains a good appetite. No nausea or vomiting. No chest pain or cough and no abdominal pain. Maintains adequate bowel function. On exam alert. She appears ill. No distress. No oral lesions and respirations not labored. Lungs diminished bilaterally and heart is regular. No edema. Reviewed the laboratory exams. Discussed with her at length. We discussed her goals for treatment. She has made it clear she does not want to take antineoplastic treatment any more. She wants to continue supportive care and symptomatic treatment only. She wants to receive the services of Hospice. She is to see me in 4 weeks.     Past Medical History:   Diagnosis Date    Acute systolic heart failure 12/09/2023    Allergic laytex,peniciline,contrast dye    CAD (coronary artery disease) 12/09/2023    CHF (congestive heart failure) 12/07/23    Chronic kidney disease     colon cancer     Metastatic to liver and bones    Colon cancer     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    History of colon cancer, stage IV     Stage SHELL with progression    Hypertension     NSTEMI (non-ST elevated myocardial infarction) 12/09/2023    PONV (postoperative nausea and vomiting)     Pulmonary arterial hypertension     Radiculopathy 07/2012    Right L5/S1     Past Surgical History:   Procedure Laterality Date    ABDOMINAL SURGERY      CARDIAC CATHETERIZATION N/A 12/08/2023    Procedure: Left Heart Cath;  Surgeon: Ramiro Olivera MD;  Location: [facility] CATH INVASIVE LOCATION;  Service: Cardiovascular;  Laterality: N/A;    COLON SURGERY  Colon resection    CYST REMOVAL  1998    cyst removed from [facility] in 1998    HERNIA REPAIR  incisional    PORTACATH PLACEMENT  2017    VENOUS ACCESS DEVICE (PORT) INSERTION Left 01/12/2021    Procedure: INSERTION VENOUS ACCESS DEVICE;  Surgeon: Jay Yeh MD;  Location: [facility] MAIN OR;  Service: General;  Laterality: Left;    VENOUS ACCESS DEVICE (PORT) REMOVAL Right  08/27/2020    Procedure: REMOVAL VENOUS ACCESS DEVICE;  Surgeon: Jay Yeh MD;  Location: [facility] MAIN OR;  Service: General;  Laterality: Right;    VENTRAL/INCISIONAL HERNIA REPAIR N/A 03/02/2023    Procedure: VENTRAL/INCISIONAL HERNIA REPAIR WITH MESH;  Surgeon: Jay Yeh MD;  Location: [facility] MAIN OR;  Service: General;  Laterality: N/A;       Current Outpatient Medications:     aspirin 81 MG EC tablet, Take 1 tablet by mouth Daily., Disp: , Rfl:     atorvastatin (LIPITOR) 80 MG tablet, Take 1 tablet by mouth Daily for 360 days., Disp: 90 tablet, Rfl: 3    Cyanocobalamin (Vitamin B-12) 5000 MCG sublingual tablet, Place 1 tablet under the tongue Every Morning., Disp: , Rfl:     dexAMETHasone (DECADRON) 4 MG tablet, Take 1 tablet by mouth 4 (Four) Times a Day., Disp: 60 tablet, Rfl: 0    empagliflozin (JARDIANCE) 10 MG tablet tablet, Take 1 tablet by mouth Daily for 360 days., Disp: 90 tablet, Rfl: 3    HYDROcodone-acetaminophen (NORCO) 5-325 MG per tablet, Take 1 tablet by mouth Every 6 (Six) Hours As Needed for Moderate Pain., Disp: 30 tablet, Rfl: 0    levETIRAcetam (Keppra) 1000 MG tablet, Take 1 tablet by mouth 2 (Two) Times a Day., Disp: 60 tablet, Rfl: 0    Lysine 500 MG capsule, Take 1 tablet by mouth 2 (Two) Times a Day., Disp: , Rfl:     memantine (NAMENDA XR) 28 MG capsule sustained-release 24 hr extended release capsule, Take 1 capsule by mouth Daily., Disp: 30 capsule, Rfl: 5    metoprolol succinate XL (TOPROL-XL) 25 MG 24 hr tablet, Take 1 tablet by mouth Daily for 360 days., Disp: 90 tablet, Rfl: 3    oxyCODONE (Roxicodone) 5 MG immediate release tablet, Take 1 tablet by mouth Every 4 (Four) Hours As Needed for Moderate Pain., Disp: 120 tablet, Rfl: 0    traMADol (ULTRAM) 50 MG tablet, Take 1 tablet by mouth Every 6 (Six) Hours As Needed for Moderate Pain., Disp: 50 tablet, Rfl: 1  No current facility-administered medications for this visit.    Facility-Administered  Medications Ordered in Other Visits:     heparin injection 500 Units, 500 Units, Intravenous, PRN, Sean Adams MD, 500 Units at 24 0820    sodium chloride 0.9 % flush 20 mL, 20 mL, Intravenous, PRN, Sean Adams MD, 20 mL at 24 0818    Allergies   Allergen Reactions    Hydrocodone Nausea And Vomiting    Iodinated Contrast Media Hives and Itching     PT HAD SOME HIVES DURING SCAN.  PRIOR TO SCAN 8-3-2013.    Latex Rash     Blisters     Penicillins Hives     Family History   Problem Relation Age of Onset    Heart disease Mother     Lung cancer Father 70        Associated to cigarette smoking    Heart disease Father     Hyperlipidemia Father     Cancer Father     Cancer Sister         Bladder cancer     Cancer-related family history includes Cancer in her father and sister; Lung cancer (age of onset: 70) in her father.    Social History     Tobacco Use    Smoking status: Former     Current packs/day: 0.00     Average packs/day: 1 pack/day for 31.0 years (31.0 ttl pk-yrs)     Types: Cigarettes     Start date: 1981     Quit date: 2012     Years since quittin.6     Passive exposure: Past    Smokeless tobacco: Never   Vaping Use    Vaping status: Never Used   Substance Use Topics    Alcohol use: Yes     Comment: 2 or 3 a month    Drug use: Never     Social History     Social History Narrative    Not on file     ROS:   Review of Systems   Constitutional:  Negative for activity change, appetite change, chills, diaphoresis, fatigue, fever and unexpected weight change.   HENT:  Negative for congestion, dental problem, drooling, ear discharge, ear pain, facial swelling, hearing loss, mouth sores, nosebleeds, postnasal drip, rhinorrhea, sinus pressure, sinus pain, sneezing, sore throat, tinnitus, trouble swallowing and voice change.    Eyes:  Negative for photophobia, pain, discharge, redness, itching and visual disturbance.   Respiratory:  Negative for apnea, cough, choking, chest tightness,  "shortness of breath, wheezing and stridor.    Cardiovascular:  Positive for chest pain (The pain is centered on the anterior chest, at the site of metastatic lesions.  The most intense pain is at the level of the third or fourth ribs, close to the articulation with the sternum.). Negative for palpitations and leg swelling.   Gastrointestinal:  Negative for abdominal distention, abdominal pain, anal bleeding, blood in stool, constipation, diarrhea, nausea, rectal pain and vomiting.   Endocrine: Negative for cold intolerance, heat intolerance, polydipsia and polyuria.   Genitourinary:  Negative for decreased urine volume, difficulty urinating, dysuria, flank pain, frequency, genital sores, hematuria and urgency.   Musculoskeletal:  Negative for arthralgias, back pain, gait problem, joint swelling, myalgias, neck pain and neck stiffness.   Skin:  Negative for color change, pallor and rash.   Neurological:  Negative for dizziness, tremors, seizures, syncope, facial asymmetry, speech difficulty, weakness, light-headedness, numbness and headaches.   Hematological:  Negative for adenopathy. Does not bruise/bleed easily.   Psychiatric/Behavioral:  Negative for agitation, behavioral problems, confusion, decreased concentration, hallucinations, self-injury, sleep disturbance and suicidal ideas. The patient is not nervous/anxious.      Objective:    Vital Signs:  Vitals:    08/08/24 0838   BP: 132/80   Pulse: 112   Resp: 20   Temp: 98.3 °F (36.8 °C)   TempSrc: Infrared   SpO2: 96%   Height: 157.5 cm (62\")   PainSc:   7     Body mass index is 20.45 kg/m².    ECOG  (0) Fully active, able to carry on all predisease performance without restriction    Physical Exam:   Physical Exam  Constitutional:       General: She is not in acute distress.     Appearance: She is not ill-appearing, toxic-appearing or diaphoretic.   HENT:      Head: Normocephalic and atraumatic.      Right Ear: External ear normal.      Left Ear: External ear " normal.      Nose: Nose normal.      Mouth/Throat:      Mouth: Mucous membranes are moist.      Pharynx: Oropharynx is clear. No oropharyngeal exudate or posterior oropharyngeal erythema.   Eyes:      General: No scleral icterus.        Right eye: No discharge.         Left eye: No discharge.      Conjunctiva/sclera: Conjunctivae normal.      Pupils: Pupils are equal, round, and reactive to light.   Cardiovascular:      Rate and Rhythm: Normal rate and regular rhythm.      Pulses: Normal pulses.      Heart sounds: No murmur heard.     No friction rub. No gallop.   Pulmonary:      Effort: No respiratory distress.      Breath sounds: No stridor. No wheezing, rhonchi or rales.   Chest:      Comments: The before easily palpable nodules, seem diminished in size.   Abdominal:      General: Abdomen is flat. Bowel sounds are normal. There is no distension.      Palpations: Abdomen is soft. There is no mass.      Tenderness: There is no abdominal tenderness. There is no right CVA tenderness, left CVA tenderness, guarding or rebound.      Hernia: No hernia is present.   Musculoskeletal:         General: No swelling, tenderness, deformity or signs of injury.      Cervical back: No rigidity.      Right lower leg: No edema.      Left lower leg: No edema.   Lymphadenopathy:      Cervical: No cervical adenopathy.   Skin:     Coloration: Skin is not jaundiced.      Findings: No bruising, lesion or rash.   Neurological:      General: No focal deficit present.      Mental Status: She is alert and oriented to person, place, and time.      Cranial Nerves: No cranial nerve deficit.      Motor: No weakness.      Gait: Gait normal.   Psychiatric:         Mood and Affect: Mood normal.         Behavior: Behavior normal.         Thought Content: Thought content normal.         Judgment: Judgment normal.     JG Adams MD performed the physical exam on 8/8/2024 as documented above.     Lab Results - Last 18 Months   Lab Units  08/08/24  0820 06/27/24 2355 06/26/24 0829   WBC 10*3/mm3 7.04 9.61 3.82   HEMOGLOBIN g/dL 10.2* 9.5* 10.2*   HEMATOCRIT % 31.8* 31.7* 33.2*   PLATELETS 10*3/mm3 70* 151 154   MCV fL 100.6* 103.6* 100.9*     Lab Results - Last 18 Months   Lab Units 06/27/24 2355 06/26/24 0829 06/25/24 1922 06/12/24  1125 05/24/24  1439   SODIUM mmol/L 145 140 140 139 141   POTASSIUM mmol/L 3.9 3.9 3.8 3.6 3.8   CHLORIDE mmol/L 110* 106 104 104 105   CO2 mmol/L 23.7 24.2 22.1 23.5 25.0   BUN mg/dL 20 16 19 19 18   CREATININE mg/dL 0.80 0.71 0.77 0.81 0.98   CALCIUM mg/dL 8.6 9.6 9.9 9.3 9.2   BILIRUBIN mg/dL  --   --  0.5 0.5 0.5   ALK PHOS U/L  --   --  92 99 89   ALT (SGPT) U/L  --   --  8 8 10   AST (SGOT) U/L  --   --  21 18 15   GLUCOSE mg/dL 144* 155* 101* 170* 114*     Lab Results   Component Value Date    GLUCOSE 144 (H) 06/27/2024    BUN 20 06/27/2024    CREATININE 0.80 06/27/2024    EGFRIFNONA 71 08/19/2021    BCR 25.0 06/27/2024    K 3.9 06/27/2024    CO2 23.7 06/27/2024    CALCIUM 8.6 06/27/2024    PROTENTOTREF 6.3 05/08/2024    ALBUMIN 4.2 06/25/2024    LABIL2 2.2 05/08/2024    AST 21 06/25/2024    ALT 8 06/25/2024     Lab Results - Last 18 Months   Lab Units 06/25/24 1922 06/07/24  0753 12/09/23 0222 12/08/23 1923 12/08/23  1237   INR  0.96 1.02  --   --  1.07   APTT seconds  --  25.5 65.6 62.4 28.8*     Lab Results   Component Value Date    IRON 45 12/11/2019    TIBC 368 12/11/2019    FERRITIN 490.60 (H) 12/11/2019     Lab Results   Component Value Date    FOLATE >20.00 12/11/2019     Lab Results   Component Value Date    RETICCTPCT 4.90 (H) 12/12/2023     Lab Results   Component Value Date    CTQOXLAE87 >2,000 (H) 12/11/2019     LDH   Date Value Ref Range Status   12/09/2023 307 (H) 135 - 214 U/L Final     Lab Results   Component Value Date    HAPTOGLOBIN 29 (L) 12/08/2023     Lab Results   Component Value Date    PTT 25.5 06/07/2024    INR 0.96 06/25/2024     Lab Results   Component Value Date    CEA 40.90  06/12/2024     Assessment & Plan     Metastatic colon cancer on multiple lines of treatment. As per her wishes to continue supportive and symptomatic treatment only. She is to be referred to Hospice.   Congestive heart failure that had been attributed to continued use of bevacizumab. Suggested to correspond to [facility].   Discussed with her the result of the most recent imaging studies.   Referred to Hospice. See me in 4 weeks.     Sean Adams MD on 8/8/2024 at 12:49 PM.

## 2024-08-08 ENCOUNTER — TELEPHONE (OUTPATIENT)
Dept: ONCOLOGY | Facility: CLINIC | Age: 64
End: 2024-08-08

## 2024-08-08 ENCOUNTER — HOSPITAL ENCOUNTER (OUTPATIENT)
Dept: ONCOLOGY | Facility: HOSPITAL | Age: 64
Discharge: HOME | End: 2024-08-08
Admitting: INTERNAL MEDICINE
Payer: COMMERCIAL

## 2024-08-08 ENCOUNTER — OFFICE VISIT (OUTPATIENT)
Dept: ONCOLOGY | Facility: CLINIC | Age: 64
End: 2024-08-08
Payer: COMMERCIAL

## 2024-08-08 VITALS
OXYGEN SATURATION: 96 % | SYSTOLIC BLOOD PRESSURE: 132 MMHG | HEIGHT: 62 IN | BODY MASS INDEX: 20.45 KG/M2 | HEART RATE: 112 BPM | RESPIRATION RATE: 20 BRPM | DIASTOLIC BLOOD PRESSURE: 80 MMHG | TEMPERATURE: 98.3 F

## 2024-08-08 DIAGNOSIS — G89.3 CANCER ASSOCIATED PAIN: ICD-10-CM

## 2024-08-08 DIAGNOSIS — C18.9 ADENOCARCINOMA OF COLON METASTATIC TO LIVER: Primary | ICD-10-CM

## 2024-08-08 DIAGNOSIS — C78.7 ADENOCARCINOMA OF COLON METASTATIC TO LIVER: Primary | ICD-10-CM

## 2024-08-08 LAB
BASOPHILS # BLD AUTO: 0.02 10*3/MM3 (ref 0–0.2)
BASOPHILS NFR BLD AUTO: 0.3 % (ref 0–1.5)
DEPRECATED RDW RBC AUTO: 59.1 FL (ref 37–54)
EOSINOPHIL # BLD AUTO: 0.04 10*3/MM3 (ref 0–0.4)
EOSINOPHIL NFR BLD AUTO: 0.6 % (ref 0.3–6.2)
ERYTHROCYTE [DISTWIDTH] IN BLOOD BY AUTOMATED COUNT: 17.5 % (ref 12.3–15.4)
HCT VFR BLD AUTO: 31.8 % (ref 34–46.6)
HGB BLD-MCNC: 10.2 G/DL (ref 12–15.9)
LYMPHOCYTES # BLD AUTO: 0.56 10*3/MM3 (ref 0.7–3.1)
LYMPHOCYTES NFR BLD AUTO: 8 % (ref 19.6–45.3)
MCH RBC QN AUTO: 32.3 PG (ref 26.6–33)
MCHC RBC AUTO-ENTMCNC: 32.1 G/DL (ref 31.5–35.7)
MCV RBC AUTO: 100.6 FL (ref 79–97)
MONOCYTES # BLD AUTO: 0.71 10*3/MM3 (ref 0.1–0.9)
MONOCYTES NFR BLD AUTO: 10.1 % (ref 5–12)
NEUTROPHILS NFR BLD AUTO: 5.71 10*3/MM3 (ref 1.7–7)
NEUTROPHILS NFR BLD AUTO: 81 % (ref 42.7–76)
PLATELET # BLD AUTO: 70 10*3/MM3 (ref 140–450)
PMV BLD AUTO: 9.8 FL (ref 6–12)
RBC # BLD AUTO: 3.16 10*6/MM3 (ref 3.77–5.28)
WBC NRBC COR # BLD AUTO: 7.04 10*3/MM3 (ref 3.4–10.8)

## 2024-08-08 PROCEDURE — 85025 COMPLETE CBC W/AUTO DIFF WBC: CPT | Performed by: INTERNAL MEDICINE

## 2024-08-08 PROCEDURE — 25010000002 HEPARIN LOCK FLUSH PER 10 UNITS: Performed by: INTERNAL MEDICINE

## 2024-08-08 PROCEDURE — 36591 DRAW BLOOD OFF VENOUS DEVICE: CPT

## 2024-08-08 RX ORDER — HEPARIN SODIUM (PORCINE) LOCK FLUSH IV SOLN 100 UNIT/ML 100 UNIT/ML
500 SOLUTION INTRAVENOUS AS NEEDED
Status: DISCONTINUED | OUTPATIENT
Start: 2024-08-08 | End: 2024-08-09 | Stop reason: HOSPADM

## 2024-08-08 RX ORDER — SODIUM CHLORIDE 0.9 % (FLUSH) 0.9 %
20 SYRINGE (ML) INJECTION AS NEEDED
Status: DISCONTINUED | OUTPATIENT
Start: 2024-08-08 | End: 2024-08-09 | Stop reason: HOSPADM

## 2024-08-08 RX ORDER — SODIUM CHLORIDE 0.9 % (FLUSH) 0.9 %
20 SYRINGE (ML) INJECTION AS NEEDED
OUTPATIENT
Start: 2024-08-08

## 2024-08-08 RX ORDER — HEPARIN SODIUM (PORCINE) LOCK FLUSH IV SOLN 100 UNIT/ML 100 UNIT/ML
500 SOLUTION INTRAVENOUS AS NEEDED
OUTPATIENT
Start: 2024-08-08

## 2024-08-08 RX ADMIN — Medication 20 ML: at 08:18

## 2024-08-08 RX ADMIN — HEPARIN 500 UNITS: 100 SYRINGE at 08:20

## 2024-08-08 NOTE — TELEPHONE ENCOUNTER
Attempted to contact the patients friend Molly regarding the phone call we received from [facility]. Unable to speak with Molly due to no answer. Voicemail left requesting a callback. Callback number stated on voicemail.

## 2024-08-08 NOTE — TELEPHONE ENCOUNTER
Caller: HOSPICE    Relationship: [facility]    Best call back number: [phone]    Who are you requesting to speak with (clinical staff, provider,  specific staff member): CLINICAL    What was the call regarding: [facility] CALLING FROM HOSPICE TO LET THE OFFICE NO THEY HAVE RECEIVED THE REFERRAL BUT HAVE BEEN UNABLE TO CONTACT THE PATIENT.

## 2024-08-08 NOTE — PROGRESS NOTES
Port accessed and flushed with good blood return noted. Labs drawn from port as ordered.Port flushed with Normal Saline and Heparin prior to needle removal.

## 2024-08-08 NOTE — ADDENDUM NOTE
Encounter addended by: Kristen Juárez LPN on: 8/8/2024 9:12 AM   Actions taken: Clinical Note Signed

## 2024-08-09 ENCOUNTER — SPECIALTY PHARMACY (OUTPATIENT)
Dept: PHARMACY | Facility: HOSPITAL | Age: 64
End: 2024-08-09
Payer: COMMERCIAL

## 2024-08-09 NOTE — PROGRESS NOTES
Specialty Pharmacy Note: Lonsurf discontinued    Patient is opting for supportive care and will not receive Lonsurf any longer. Specialty Pharmacy will not follow patient any longer. If treatment goals change, please consult again.      Thanks,    Aria THURMAN, PharmD

## 2024-08-10 ENCOUNTER — HOSPITAL ENCOUNTER (INPATIENT)
Facility: HOSPITAL | Age: 64
LOS: 1 days | Discharge: HOME | DRG: 100 | End: 2024-08-11
Attending: EMERGENCY MEDICINE | Admitting: INTERNAL MEDICINE
Payer: COMMERCIAL

## 2024-08-10 ENCOUNTER — APPOINTMENT (OUTPATIENT)
Dept: GENERAL RADIOLOGY | Facility: HOSPITAL | Age: 64
DRG: 100 | End: 2024-08-10
Payer: COMMERCIAL

## 2024-08-10 ENCOUNTER — APPOINTMENT (OUTPATIENT)
Dept: CT IMAGING | Facility: HOSPITAL | Age: 64
DRG: 100 | End: 2024-08-10
Payer: COMMERCIAL

## 2024-08-10 DIAGNOSIS — A41.9 SEPSIS, DUE TO UNSPECIFIED ORGANISM, UNSPECIFIED WHETHER ACUTE ORGAN DYSFUNCTION PRESENT: ICD-10-CM

## 2024-08-10 DIAGNOSIS — R56.9 SEIZURE: Primary | ICD-10-CM

## 2024-08-10 DIAGNOSIS — N39.0 ACUTE UTI: ICD-10-CM

## 2024-08-10 DIAGNOSIS — G89.3 CANCER ASSOCIATED PAIN: ICD-10-CM

## 2024-08-10 DIAGNOSIS — C18.9 ADENOCARCINOMA OF COLON METASTATIC TO LIVER: ICD-10-CM

## 2024-08-10 DIAGNOSIS — C78.7 ADENOCARCINOMA OF COLON METASTATIC TO LIVER: ICD-10-CM

## 2024-08-10 PROBLEM — G40.919 BREAKTHROUGH SEIZURE: Status: ACTIVE | Noted: 2024-08-10

## 2024-08-10 PROBLEM — T83.511A UTI (URINARY TRACT INFECTION) DUE TO URINARY INDWELLING CATHETER: Status: ACTIVE | Noted: 2024-08-10

## 2024-08-10 LAB
ALBUMIN SERPL-MCNC: 3.6 G/DL (ref 3.5–5.2)
ALBUMIN/GLOB SERPL: 1.4 G/DL
ALP SERPL-CCNC: 89 U/L (ref 39–117)
ALT SERPL W P-5'-P-CCNC: 31 U/L (ref 1–33)
ANION GAP SERPL CALCULATED.3IONS-SCNC: 14.8 MMOL/L (ref 5–15)
AST SERPL-CCNC: 38 U/L (ref 1–32)
B PARAPERT DNA SPEC QL NAA+PROBE: NOT DETECTED
B PERT DNA SPEC QL NAA+PROBE: NOT DETECTED
BACTERIA UR QL AUTO: ABNORMAL /HPF
BASOPHILS # BLD AUTO: 0.01 10*3/MM3 (ref 0–0.2)
BASOPHILS NFR BLD AUTO: 0.2 % (ref 0–1.5)
BILIRUB SERPL-MCNC: 1.1 MG/DL (ref 0–1.2)
BILIRUB UR QL STRIP: NEGATIVE
BUN SERPL-MCNC: 24 MG/DL (ref 8–23)
BUN/CREAT SERPL: 47.1 (ref 7–25)
C PNEUM DNA NPH QL NAA+NON-PROBE: NOT DETECTED
CALCIUM SPEC-SCNC: 8.8 MG/DL (ref 8.6–10.5)
CHLORIDE SERPL-SCNC: 104 MMOL/L (ref 98–107)
CLARITY UR: CLEAR
CO2 SERPL-SCNC: 18.2 MMOL/L (ref 22–29)
COLOR UR: YELLOW
CREAT SERPL-MCNC: 0.51 MG/DL (ref 0.57–1)
D-LACTATE SERPL-SCNC: 1.3 MMOL/L (ref 0.3–2)
DEPRECATED RDW RBC AUTO: 60.4 FL (ref 37–54)
EGFRCR SERPLBLD CKD-EPI 2021: 105.7 ML/MIN/1.73
EOSINOPHIL # BLD AUTO: 0.03 10*3/MM3 (ref 0–0.4)
EOSINOPHIL NFR BLD AUTO: 0.6 % (ref 0.3–6.2)
ERYTHROCYTE [DISTWIDTH] IN BLOOD BY AUTOMATED COUNT: 18.2 % (ref 12.3–15.4)
FLUAV SUBTYP SPEC NAA+PROBE: NOT DETECTED
FLUBV RNA ISLT QL NAA+PROBE: NOT DETECTED
GLOBULIN UR ELPH-MCNC: 2.6 GM/DL
GLUCOSE SERPL-MCNC: 94 MG/DL (ref 65–99)
GLUCOSE UR STRIP-MCNC: ABNORMAL MG/DL
HADV DNA SPEC NAA+PROBE: NOT DETECTED
HCOV 229E RNA SPEC QL NAA+PROBE: NOT DETECTED
HCOV HKU1 RNA SPEC QL NAA+PROBE: NOT DETECTED
HCOV NL63 RNA SPEC QL NAA+PROBE: NOT DETECTED
HCOV OC43 RNA SPEC QL NAA+PROBE: NOT DETECTED
HCT VFR BLD AUTO: 31.1 % (ref 34–46.6)
HGB BLD-MCNC: 9.9 G/DL (ref 12–15.9)
HGB UR QL STRIP.AUTO: NEGATIVE
HMPV RNA NPH QL NAA+NON-PROBE: NOT DETECTED
HOLD SPECIMEN: NORMAL
HOLD SPECIMEN: NORMAL
HPIV1 RNA ISLT QL NAA+PROBE: NOT DETECTED
HPIV2 RNA SPEC QL NAA+PROBE: NOT DETECTED
HPIV3 RNA NPH QL NAA+PROBE: NOT DETECTED
HPIV4 P GENE NPH QL NAA+PROBE: NOT DETECTED
HYALINE CASTS UR QL AUTO: ABNORMAL /LPF
IMM GRANULOCYTES # BLD AUTO: 0.09 10*3/MM3 (ref 0–0.05)
IMM GRANULOCYTES NFR BLD AUTO: 1.8 % (ref 0–0.5)
KETONES UR QL STRIP: ABNORMAL
LEUKOCYTE ESTERASE UR QL STRIP.AUTO: ABNORMAL
LYMPHOCYTES # BLD AUTO: 0.3 10*3/MM3 (ref 0.7–3.1)
LYMPHOCYTES NFR BLD AUTO: 6 % (ref 19.6–45.3)
M PNEUMO IGG SER IA-ACNC: NOT DETECTED
MCH RBC QN AUTO: 30.7 PG (ref 26.6–33)
MCHC RBC AUTO-ENTMCNC: 31.8 G/DL (ref 31.5–35.7)
MCV RBC AUTO: 96.3 FL (ref 79–97)
MONOCYTES # BLD AUTO: 0.5 10*3/MM3 (ref 0.1–0.9)
MONOCYTES NFR BLD AUTO: 10.1 % (ref 5–12)
NEUTROPHILS NFR BLD AUTO: 4.04 10*3/MM3 (ref 1.7–7)
NEUTROPHILS NFR BLD AUTO: 81.3 % (ref 42.7–76)
NITRITE UR QL STRIP: POSITIVE
NRBC BLD AUTO-RTO: 0.4 /100 WBC (ref 0–0.2)
PH UR STRIP.AUTO: 6 [PH] (ref 5–8)
PLATELET # BLD AUTO: 54 10*3/MM3 (ref 140–450)
PMV BLD AUTO: 10.7 FL (ref 6–12)
POTASSIUM SERPL-SCNC: 3.5 MMOL/L (ref 3.5–5.2)
PROCALCITONIN SERPL-MCNC: 0.28 NG/ML (ref 0–0.25)
PROT SERPL-MCNC: 6.2 G/DL (ref 6–8.5)
PROT UR QL STRIP: ABNORMAL
RBC # BLD AUTO: 3.23 10*6/MM3 (ref 3.77–5.28)
RBC # UR STRIP: ABNORMAL /HPF
REF LAB TEST METHOD: ABNORMAL
RHINOVIRUS RNA SPEC NAA+PROBE: NOT DETECTED
RSV RNA NPH QL NAA+NON-PROBE: NOT DETECTED
SARS-COV-2 RNA NPH QL NAA+NON-PROBE: NOT DETECTED
SODIUM SERPL-SCNC: 137 MMOL/L (ref 136–145)
SP GR UR STRIP: 1.02 (ref 1–1.03)
SQUAMOUS #/AREA URNS HPF: ABNORMAL /HPF
UROBILINOGEN UR QL STRIP: ABNORMAL
WBC # UR STRIP: ABNORMAL /HPF
WBC NRBC COR # BLD AUTO: 4.97 10*3/MM3 (ref 3.4–10.8)
WHOLE BLOOD HOLD COAG: NORMAL
WHOLE BLOOD HOLD SPECIMEN: NORMAL

## 2024-08-10 PROCEDURE — 25010000002 LEVETRIRACETAM PER 10 MG: Performed by: EMERGENCY MEDICINE

## 2024-08-10 PROCEDURE — 87040 BLOOD CULTURE FOR BACTERIA: CPT | Performed by: EMERGENCY MEDICINE

## 2024-08-10 PROCEDURE — 99223 1ST HOSP IP/OBS HIGH 75: CPT | Performed by: NURSE PRACTITIONER

## 2024-08-10 PROCEDURE — 25010000002 CEFTRIAXONE PER 250 MG: Performed by: EMERGENCY MEDICINE

## 2024-08-10 PROCEDURE — 83605 ASSAY OF LACTIC ACID: CPT

## 2024-08-10 PROCEDURE — 70450 CT HEAD/BRAIN W/O DYE: CPT

## 2024-08-10 PROCEDURE — 63710000001 DEXAMETHASONE PER 0.25 MG: Performed by: INTERNAL MEDICINE

## 2024-08-10 PROCEDURE — 93005 ELECTROCARDIOGRAM TRACING: CPT | Performed by: EMERGENCY MEDICINE

## 2024-08-10 PROCEDURE — 0202U NFCT DS 22 TRGT SARS-COV-2: CPT | Performed by: EMERGENCY MEDICINE

## 2024-08-10 PROCEDURE — 36415 COLL VENOUS BLD VENIPUNCTURE: CPT

## 2024-08-10 PROCEDURE — 99285 EMERGENCY DEPT VISIT HI MDM: CPT

## 2024-08-10 PROCEDURE — 85025 COMPLETE CBC W/AUTO DIFF WBC: CPT | Performed by: EMERGENCY MEDICINE

## 2024-08-10 PROCEDURE — 25010000002 LEVETRIRACETAM PER 10 MG: Performed by: INTERNAL MEDICINE

## 2024-08-10 PROCEDURE — 80053 COMPREHEN METABOLIC PANEL: CPT | Performed by: EMERGENCY MEDICINE

## 2024-08-10 PROCEDURE — 81001 URINALYSIS AUTO W/SCOPE: CPT | Performed by: EMERGENCY MEDICINE

## 2024-08-10 PROCEDURE — 63710000001 ONDANSETRON ODT 4 MG TABLET DISPERSIBLE: Performed by: INTERNAL MEDICINE

## 2024-08-10 PROCEDURE — 84145 PROCALCITONIN (PCT): CPT | Performed by: EMERGENCY MEDICINE

## 2024-08-10 PROCEDURE — P9612 CATHETERIZE FOR URINE SPEC: HCPCS

## 2024-08-10 PROCEDURE — 71045 X-RAY EXAM CHEST 1 VIEW: CPT

## 2024-08-10 RX ORDER — SODIUM CHLORIDE 0.9 % (FLUSH) 0.9 %
10 SYRINGE (ML) INJECTION EVERY 12 HOURS SCHEDULED
Status: DISCONTINUED | OUTPATIENT
Start: 2024-08-10 | End: 2024-08-11 | Stop reason: HOSPADM

## 2024-08-10 RX ORDER — LEVETIRACETAM 500 MG/5ML
1000 INJECTION, SOLUTION, CONCENTRATE INTRAVENOUS ONCE
Status: COMPLETED | OUTPATIENT
Start: 2024-08-10 | End: 2024-08-10

## 2024-08-10 RX ORDER — AMLODIPINE BESYLATE 5 MG/1
5 TABLET ORAL
Status: DISCONTINUED | OUTPATIENT
Start: 2024-08-11 | End: 2024-08-11 | Stop reason: HOSPADM

## 2024-08-10 RX ORDER — NITROGLYCERIN 0.4 MG/1
0.4 TABLET SUBLINGUAL
Status: DISCONTINUED | OUTPATIENT
Start: 2024-08-10 | End: 2024-08-11 | Stop reason: HOSPADM

## 2024-08-10 RX ORDER — ACETAMINOPHEN 160 MG/5ML
650 SOLUTION ORAL EVERY 4 HOURS PRN
Status: DISCONTINUED | OUTPATIENT
Start: 2024-08-10 | End: 2024-08-11 | Stop reason: HOSPADM

## 2024-08-10 RX ORDER — METOPROLOL SUCCINATE 25 MG/1
25 TABLET, EXTENDED RELEASE ORAL
Status: DISCONTINUED | OUTPATIENT
Start: 2024-08-10 | End: 2024-08-10

## 2024-08-10 RX ORDER — LABETALOL HYDROCHLORIDE 5 MG/ML
10 INJECTION, SOLUTION INTRAVENOUS EVERY 4 HOURS PRN
Status: DISCONTINUED | OUTPATIENT
Start: 2024-08-10 | End: 2024-08-11 | Stop reason: HOSPADM

## 2024-08-10 RX ORDER — ACETAMINOPHEN 325 MG/1
650 TABLET ORAL EVERY 4 HOURS PRN
Status: DISCONTINUED | OUTPATIENT
Start: 2024-08-10 | End: 2024-08-11 | Stop reason: HOSPADM

## 2024-08-10 RX ORDER — ACETAMINOPHEN 650 MG/1
650 SUPPOSITORY RECTAL ONCE
Status: COMPLETED | OUTPATIENT
Start: 2024-08-10 | End: 2024-08-10

## 2024-08-10 RX ORDER — OXYCODONE HYDROCHLORIDE 5 MG/1
5 TABLET ORAL EVERY 4 HOURS PRN
Status: DISCONTINUED | OUTPATIENT
Start: 2024-08-10 | End: 2024-08-11 | Stop reason: HOSPADM

## 2024-08-10 RX ORDER — AMLODIPINE BESYLATE 5 MG/1
10 TABLET ORAL
Status: DISCONTINUED | OUTPATIENT
Start: 2024-08-10 | End: 2024-08-10

## 2024-08-10 RX ORDER — SODIUM CHLORIDE 0.9 % (FLUSH) 0.9 %
10 SYRINGE (ML) INJECTION AS NEEDED
Status: DISCONTINUED | OUTPATIENT
Start: 2024-08-10 | End: 2024-08-11 | Stop reason: HOSPADM

## 2024-08-10 RX ORDER — ONDANSETRON 2 MG/ML
4 INJECTION INTRAMUSCULAR; INTRAVENOUS EVERY 6 HOURS PRN
Status: DISCONTINUED | OUTPATIENT
Start: 2024-08-10 | End: 2024-08-11 | Stop reason: HOSPADM

## 2024-08-10 RX ORDER — METOPROLOL SUCCINATE 50 MG/1
50 TABLET, EXTENDED RELEASE ORAL
Status: DISCONTINUED | OUTPATIENT
Start: 2024-08-11 | End: 2024-08-11 | Stop reason: HOSPADM

## 2024-08-10 RX ORDER — ALUMINA, MAGNESIA, AND SIMETHICONE 2400; 2400; 240 MG/30ML; MG/30ML; MG/30ML
15 SUSPENSION ORAL EVERY 6 HOURS PRN
Status: DISCONTINUED | OUTPATIENT
Start: 2024-08-10 | End: 2024-08-11 | Stop reason: HOSPADM

## 2024-08-10 RX ORDER — METOPROLOL TARTRATE 1 MG/ML
5 INJECTION, SOLUTION INTRAVENOUS EVERY 4 HOURS PRN
Status: DISCONTINUED | OUTPATIENT
Start: 2024-08-10 | End: 2024-08-11 | Stop reason: HOSPADM

## 2024-08-10 RX ORDER — AMOXICILLIN 250 MG
2 CAPSULE ORAL 2 TIMES DAILY PRN
Status: DISCONTINUED | OUTPATIENT
Start: 2024-08-10 | End: 2024-08-11 | Stop reason: HOSPADM

## 2024-08-10 RX ORDER — DEXAMETHASONE 4 MG/1
4 TABLET ORAL EVERY 6 HOURS SCHEDULED
Status: DISCONTINUED | OUTPATIENT
Start: 2024-08-10 | End: 2024-08-11 | Stop reason: HOSPADM

## 2024-08-10 RX ORDER — ACETAMINOPHEN 650 MG/1
650 SUPPOSITORY RECTAL EVERY 4 HOURS PRN
Status: DISCONTINUED | OUTPATIENT
Start: 2024-08-10 | End: 2024-08-11 | Stop reason: HOSPADM

## 2024-08-10 RX ORDER — LEVETIRACETAM 500 MG/5ML
1000 INJECTION, SOLUTION, CONCENTRATE INTRAVENOUS EVERY 12 HOURS SCHEDULED
Status: DISCONTINUED | OUTPATIENT
Start: 2024-08-10 | End: 2024-08-11 | Stop reason: HOSPADM

## 2024-08-10 RX ORDER — POLYETHYLENE GLYCOL 3350 17 G/17G
17 POWDER, FOR SOLUTION ORAL DAILY PRN
Status: DISCONTINUED | OUTPATIENT
Start: 2024-08-10 | End: 2024-08-11 | Stop reason: HOSPADM

## 2024-08-10 RX ORDER — METOPROLOL TARTRATE 1 MG/ML
5 INJECTION, SOLUTION INTRAVENOUS ONCE
Status: COMPLETED | OUTPATIENT
Start: 2024-08-10 | End: 2024-08-10

## 2024-08-10 RX ORDER — BISACODYL 5 MG/1
5 TABLET, DELAYED RELEASE ORAL DAILY PRN
Status: DISCONTINUED | OUTPATIENT
Start: 2024-08-10 | End: 2024-08-11 | Stop reason: HOSPADM

## 2024-08-10 RX ORDER — SODIUM CHLORIDE 9 MG/ML
40 INJECTION, SOLUTION INTRAVENOUS AS NEEDED
Status: DISCONTINUED | OUTPATIENT
Start: 2024-08-10 | End: 2024-08-11 | Stop reason: HOSPADM

## 2024-08-10 RX ORDER — HYDROCODONE BITARTRATE AND ACETAMINOPHEN 5; 325 MG/1; MG/1
1 TABLET ORAL EVERY 6 HOURS PRN
Status: DISCONTINUED | OUTPATIENT
Start: 2024-08-10 | End: 2024-08-11 | Stop reason: HOSPADM

## 2024-08-10 RX ORDER — BISACODYL 10 MG
10 SUPPOSITORY, RECTAL RECTAL DAILY PRN
Status: DISCONTINUED | OUTPATIENT
Start: 2024-08-10 | End: 2024-08-11 | Stop reason: HOSPADM

## 2024-08-10 RX ORDER — ONDANSETRON 4 MG/1
4 TABLET, ORALLY DISINTEGRATING ORAL EVERY 6 HOURS PRN
Status: DISCONTINUED | OUTPATIENT
Start: 2024-08-10 | End: 2024-08-11 | Stop reason: HOSPADM

## 2024-08-10 RX ADMIN — HYDROCODONE BITARTRATE AND ACETAMINOPHEN 1 TABLET: 5; 325 TABLET ORAL at 15:54

## 2024-08-10 RX ADMIN — OXYCODONE HYDROCHLORIDE 5 MG: 5 TABLET ORAL at 23:00

## 2024-08-10 RX ADMIN — METOPROLOL TARTRATE 5 MG: 1 INJECTION, SOLUTION INTRAVENOUS at 07:20

## 2024-08-10 RX ADMIN — DEXAMETHASONE 4 MG: 4 TABLET ORAL at 17:23

## 2024-08-10 RX ADMIN — CEFTRIAXONE 2000 MG: 2 INJECTION, POWDER, FOR SOLUTION INTRAMUSCULAR; INTRAVENOUS at 06:45

## 2024-08-10 RX ADMIN — LEVETIRACETAM 1000 MG: 100 INJECTION INTRAVENOUS at 16:35

## 2024-08-10 RX ADMIN — BISACODYL 5 MG: 5 TABLET, COATED ORAL at 08:51

## 2024-08-10 RX ADMIN — METOPROLOL SUCCINATE 25 MG: 25 TABLET, EXTENDED RELEASE ORAL at 08:44

## 2024-08-10 RX ADMIN — ACETAMINOPHEN 650 MG: 650 SUPPOSITORY RECTAL at 05:37

## 2024-08-10 RX ADMIN — DEXAMETHASONE 4 MG: 4 TABLET ORAL at 12:00

## 2024-08-10 RX ADMIN — DEXAMETHASONE 4 MG: 4 TABLET ORAL at 08:44

## 2024-08-10 RX ADMIN — AMLODIPINE BESYLATE 10 MG: 5 TABLET ORAL at 08:45

## 2024-08-10 RX ADMIN — DEXAMETHASONE 4 MG: 4 TABLET ORAL at 23:00

## 2024-08-10 RX ADMIN — Medication 10 ML: at 20:26

## 2024-08-10 RX ADMIN — OXYCODONE HYDROCHLORIDE 5 MG: 5 TABLET ORAL at 08:44

## 2024-08-10 RX ADMIN — OXYCODONE HYDROCHLORIDE 5 MG: 5 TABLET ORAL at 15:54

## 2024-08-10 RX ADMIN — LEVETIRACETAM 1000 MG: 100 INJECTION INTRAVENOUS at 05:37

## 2024-08-10 RX ADMIN — HYDROCODONE BITARTRATE AND ACETAMINOPHEN 1 TABLET: 5; 325 TABLET ORAL at 08:44

## 2024-08-10 RX ADMIN — ONDANSETRON 4 MG: 4 TABLET, ORALLY DISINTEGRATING ORAL at 08:51

## 2024-08-10 NOTE — LETTER
"    EMS Transport Request  For use at [facility], [place], [place], [place], and [place] only   Patient Name: Samantha Massey : 1960   Weight:50.7 kg (111 lb 12.4 oz) Pick-up Location: [facility] BLS/ALS: BLS/ALS: BLS   Insurance: [employer] BLUE CROSS Auth End Date: 24   Pre-Cert #: D/C Summary complete:    Destination: Home How many stairs 15, Will the patient be on the main level yes, Is there a ramp available no, Can the patient stand and pivot no, Address ***, and Name/contact number for who will be present n/a   Contact Precautions: None   Equipment (O2, Fluids, etc.): None   Arrive By Date/Time: 24 Stretcher/WC: Wheelchair   CM Requesting: Zena Chandler RN Ext: [phone]   Notes/Medical Necessity: ***     ______________________________________________________________________    *Only 2 patient bags OR 1 carry-on size bag are permitted.  Wheelchairs and walkers CANNOT transported with the patient. Acknowledge: {Acknowledge:[id number]::\"Yes\"}    "

## 2024-08-10 NOTE — CASE MANAGEMENT/SOCIAL WORK
Discharge Planning Assessment   Rad     Patient Name: Samantha Masesy  MRN: [id number]  Today's Date: 8/10/2024    Admit Date: 8/10/2024    Plan: Anticipate return home with SO and home hospice. Pt may need WC or other DME prior to return home.   Discharge Needs Assessment       Row Name 08/10/24 1227       Living Environment    People in Home significant other    Current Living Arrangements home    Potentially Unsafe Housing Conditions none    In the past 12 months has the electric, gas, oil, or water company threatened to shut off services in your home? No    Primary Care Provided by self    Provides Primary Care For no one, unable/limited ability to care for self    Family Caregiver if Needed significant other    Quality of Family Relationships supportive;helpful    Able to Return to Prior Arrangements yes       Resource/Environmental Concerns    Resource/Environmental Concerns none    Transportation Concerns none       Transportation Needs    In the past 12 months, has lack of transportation kept you from medical appointments or from getting medications? no    In the past 12 months, has lack of transportation kept you from meetings, work, or from getting things needed for daily living? No       Food Insecurity    Within the past 12 months, you worried that your food would run out before you got the money to buy more. Never true    Within the past 12 months, the food you bought just didn't last and you didn't have money to get more. Never true       Transition Planning    Patient/Family Anticipates Transition to home with family;home with help/services    Patient/Family Anticipated Services at Transition hospice care    Transportation Anticipated health plan transportation       Discharge Needs Assessment    Readmission Within the Last 30 Days no previous admission in last 30 days    Equipment Currently Used at Home walker, rolling    Concerns to be Addressed discharge planning    Anticipated Changes Related  to Illness none    Equipment Needed After Discharge none    Outpatient/Agency/Support Group Needs home hospice                   Discharge Plan       Row Name 08/10/24 1228       Plan    Plan Anticipate return home with SO and home hospice. Pt may need WC or other DME prior to return home.    Patient/Family in Agreement with Plan yes    Plan Comments Met with pt and her sister and nieces in ED room. CM assessment questions were answer by patient family. Pt lives with her SO, is partially IADLs, pt SO assists with things she cannot. Pt SO provides transportation. PCP and pharmacy verified. Denies trouble affording home medications. Pt has a meeting scheduled with hospice agency Monday 8/12 at her home. Spoke with pt SO Molly via phone to get hospice agency name. She is unsure of agency, and said cancer care center arranged. Attempted to call cancer care center, they are closed on the weekend. Pt may need additional DME prior to returning home, such as a wheelchair.                  Continued Care and Services - Admitted Since 8/10/2024           Expected Discharge Date and Time       Expected Discharge Date Expected Discharge Time    Aug 13, 2024            Demographic Summary       Row Name 08/10/24 1226       General Information    Admission Type inpatient    Arrived From emergency department    Referral Source emergency department    Reason for Consult discharge planning    Preferred Language English                   Functional Status       Row Name 08/10/24 1226       Functional Status    Usual Activity Tolerance moderate    Current Activity Tolerance fair       Functional Status, IADL    Medications assistive person    Meal Preparation assistive person    Housekeeping assistive person    Laundry assistive person    Shopping assistive person       Mental Status    General Appearance WDL WDL       Mental Status Summary    Recent Changes in Mental Status/Cognitive Functioning unable to assess                           Maye Parker RN

## 2024-08-10 NOTE — ED NOTES
Attempted to call report and was told nurse would call back and have been unsuccessful with receiving that call for approximently 15 mins. Attempted to call again and on hold.

## 2024-08-10 NOTE — PLAN OF CARE
Goal Outcome Evaluation:      Rcvd pt to room. Seizure precautions initiated. Family at bedside. Safety measures in place. [place] [facility] reach. WCTM and endorse to next shift

## 2024-08-10 NOTE — Clinical Note
Level of Care: Telemetry [5]   Admitting Physician: LOKI HERNANDEZ [[id number]]   Bed Request Comments: NICOLE

## 2024-08-10 NOTE — CONSULTS
Primary Care Provider: Provider, No Known     Consult requested by: Dr. Maki    Reason for Consultation: Neurological evaluation, seizure     History taken from: patient chart    Chief complaint: Seizure       SUBJECTIVE:    History of present illness: Background per H&P: Samantha Massey is a 63 y.o. year old female with a past medical history of hyperlipidemia, coronary artery disease, hypertension, grade 1 diastolic heart dysfunction, and colon cancer status postchemotherapy and metastasis to the lung and brain ( no plan for surgery ) presented with 1 min of tonic clonic seizure at home and confused after. This is her first time seizure. She got the radiation 3 weeks ago to brain and since then decadron has been tapered down and Keppra plan for taper down as well to alternate day. She has been lethargic x 1 day and seems like there is foaming from her mouth on Friday -? Unwitnessed seizure  and she got seizure witnessed by family 3 AM today  In the ED, CT head showing brain metastasis with vasogenic oedema. She has UTI and increased procal as well. CXR with masses. Rocephin started. Keppra 1000 mg IV bid started. Neuro consulted for the further seizure med adjustment. Decadron resumed.     Of note, She was admitted recently for weakness and found to have brain metastasis and dc with plan for stereotactic radiosurgery. Now plan for hospice care.     - Portions of the above HPI were copied from previous encounters and edited as appropriate. PMH as detailed below.     Chart reviewed    Patient's sister and brother at bedside.  Patient had 1 witnessed seizure and since then has been sleeping.  She does wake up to voice and able to tell me name and location.  She does follow simple commands.  Family states that patient has decided to go hospice care and stop all treatment.  Family feels that both the steroids and the Keppra was stopped within the past week after she made that decision although they are not for sure.  We  discussed that the brain metastasis and UTI definitely put her at risk for additional seizures and would recommend continue at least the 1 g of Keppra twice daily during hospice.     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    History of colon cancer, stage IV     Stage SHELL with progression    Hypertension     NSTEMI (non-ST elevated myocardial infarction) 12/09/2023    PONV (postoperative nausea and vomiting)     Pulmonary arterial hypertension     Radiculopathy 07/2012    Right L5/S1   ,   Past Surgical History:   Procedure Laterality Date    ABDOMINAL SURGERY      CARDIAC CATHETERIZATION N/A 12/08/2023    Procedure: Left Heart Cath;  Surgeon: Ramiro Olivera MD;  Location: [facility] CATH INVASIVE LOCATION;  Service: Cardiovascular;  Laterality: N/A;    COLON SURGERY  Colon resection    CYST REMOVAL  1998    cyst removed from [facility] in 1998    HERNIA REPAIR  incisional    PORTACATH PLACEMENT  2017    VENOUS ACCESS DEVICE (PORT) INSERTION Left 01/12/2021    Procedure: INSERTION VENOUS ACCESS DEVICE;  Surgeon: Jay Yeh MD;  Location: [facility] MAIN OR;  Service: General;  Laterality: Left;    VENOUS ACCESS DEVICE (PORT) REMOVAL Right 08/27/2020    Procedure: REMOVAL VENOUS ACCESS DEVICE;  Surgeon: Jay Yeh MD;  Location: [facility] MAIN OR;  Service: General;  Laterality: Right;    VENTRAL/INCISIONAL HERNIA REPAIR N/A 03/02/2023    Procedure: VENTRAL/INCISIONAL HERNIA REPAIR WITH MESH;  Surgeon: Jay Yeh MD;  Location: [facility] MAIN OR;  Service: General;   Laterality: N/A;   ,   Family History   Problem Relation Age of Onset    Heart disease Mother     Lung cancer Father 70        Associated to cigarette smoking    Heart disease Father     Hyperlipidemia Father     Cancer Father     Cancer Sister         Bladder cancer   ,   Social History     Tobacco Use    Smoking status: Former     Current packs/day: 0.00     Average packs/day: 1 pack/day for 31.0 years (31.0 ttl pk-yrs)     Types: Cigarettes     Start date: 1981     Quit date: 2012     Years since quittin.6     Passive exposure: Past    Smokeless tobacco: Never   Vaping Use    Vaping status: Never Used   Substance Use Topics    Alcohol use: Yes     Comment: 2 or 3 a month    Drug use: Never   ,   Prior to Admission medications    Medication Sig Start Date End Date Taking? Authorizing Provider   aspirin 81 MG EC tablet Take 1 tablet by mouth Daily.    ProviderRaffaele MD   atorvastatin (LIPITOR) 80 MG tablet Take 1 tablet by mouth Daily for 360 days. 1/3/24 12/28/24  Ramiro Olivera MD   Cyanocobalamin (Vitamin B-12) 5000 MCG sublingual tablet Place 1 tablet under the tongue Every Morning.    ProviderRaffaele MD   dexAMETHasone (DECADRON) 4 MG tablet Take 1 tablet by mouth 4 (Four) Times a Day. 24   Ravin Guy MD   empagliflozin (JARDIANCE) 10 MG tablet tablet Take 1 tablet by mouth Daily for 360 days. 1/3/24 12/28/24  Ramiro Olivera MD   HYDROcodone-acetaminophen (NORCO) 5-325 MG per tablet Take 1 tablet by mouth Every 6 (Six) Hours As Needed for Moderate Pain. 24   Diana Spencer MD   levETIRAcetam (Keppra) 1000 MG tablet Take 1 tablet by mouth 2 (Two) Times a Day. 24   Ravni Guy MD   Lysine 500 MG capsule Take 1 tablet by mouth 2 (Two) Times a Day.    ProviderRaffaele MD   memantine (NAMENDA XR) 28 MG capsule sustained-release 24 hr extended release capsule Take 1 capsule by mouth Daily. 24   Sj Burgos MD   metoprolol  succinate XL (TOPROL-XL) 25 MG 24 hr tablet Take 1 tablet by mouth Daily for 360 days. 1/3/24 12/28/24  Ramiro Olivera MD   oxyCODONE (Roxicodone) 5 MG immediate release tablet Take 1 tablet by mouth Every 4 (Four) Hours As Needed for Moderate Pain. 7/25/24   Sean Adams MD   traMADol (ULTRAM) 50 MG tablet Take 1 tablet by mouth Every 6 (Six) Hours As Needed for Moderate Pain. 7/15/24   Sj Burgos MD    Allergies:  Hydrocodone, Iodinated contrast media, Latex, and Penicillins    Current Facility-Administered Medications   Medication Dose Route Frequency Provider Last Rate Last Admin    acetaminophen (TYLENOL) tablet 650 mg  650 mg Oral Q4H PRN Uli Maki MD        Or    acetaminophen (TYLENOL) 160 MG/5ML oral solution 650 mg  650 mg Oral Q4H PRN Uli Maki MD        Or    acetaminophen (TYLENOL) suppository 650 mg  650 mg Rectal Q4H PRN Uli Maki MD        aluminum-magnesium hydroxide-simethicone (MAALOX MAX) 400-400-40 MG/5ML suspension 15 mL  15 mL Oral Q6H PRN Uli Maki MD        amLODIPine (NORVASC) tablet 10 mg  10 mg Oral Q24H Uil Maki MD   10 mg at 08/10/24 0845    sennosides-docusate (PERICOLACE) 8.6-50 MG per tablet 2 tablet  2 tablet Oral BID PRN Uli Maki MD        And    polyethylene glycol (MIRALAX) packet 17 g  17 g Oral Daily PRN Uli Maki MD        And    bisacodyl (DULCOLAX) EC tablet 5 mg  5 mg Oral Daily PRN Uli Maki MD   5 mg at 08/10/24 0851    And    bisacodyl (DULCOLAX) suppository 10 mg  10 mg Rectal Daily PRUli Adam MD        Calcium Replacement - Follow Nurse / BPA Driven Protocol   Does not apply Uli Delaney MD        [START ON 8/11/2024] cefTRIAXone (ROCEPHIN) 1,000 mg in sodium chloride 0.9 % 100 mL MBP  1,000 mg Intravenous Q24H Uli Maki MD        dexAMETHasone (DECADRON) tablet 4 mg  4 mg Oral Q6H Uli Maki MD   4 mg at 08/10/24 0844    HYDROcodone-acetaminophen (NORCO) 5-325 MG per tablet 1 tablet  1 tablet Oral Q6H PRN Uli Maki,  MD   1 tablet at 08/10/24 0844    labetalol (NORMODYNE,TRANDATE) injection 10 mg  10 mg Intravenous Q4H PRN Uli Maki MD        levETIRAcetam (KEPPRA) injection 1,000 mg  1,000 mg Intravenous Q12H Uli Maki MD        Magnesium Cardiology Dose Replacement - Follow Nurse / BPA Driven Protocol   Does not apply Uli Delaney MD        metoprolol succinate XL (TOPROL-XL) 24 hr tablet 25 mg  25 mg Oral Q24H Uli Maki MD   25 mg at 08/10/24 0844    metoprolol tartrate (LOPRESSOR) injection 5 mg  5 mg Intravenous Q4H PRN Uli Maki MD        nitroglycerin (NITROSTAT) SL tablet 0.4 mg  0.4 mg Sublingual Q5 Min PRN Uli Maki MD        ondansetron ODT (ZOFRAN-ODT) disintegrating tablet 4 mg  4 mg Oral Q6H PRN Uli Maki MD   4 mg at 08/10/24 0851    Or    ondansetron (ZOFRAN) injection 4 mg  4 mg Intravenous Q6H PRN Uli Maki MD        oxyCODONE (ROXICODONE) immediate release tablet 5 mg  5 mg Oral Q4H PRN Uli Maki MD   5 mg at 08/10/24 0844    Phosphorus Replacement - Follow Nurse / BPA Driven Protocol   Does not apply Uli Delaney MD        Potassium Replacement - Follow Nurse / BPA Driven Protocol   Does not apply Uli Delaney MD        sodium chloride 0.9 % flush 10 mL  10 mL Intravenous PRN Jay Morin MD        sodium chloride 0.9 % flush 10 mL  10 mL Intravenous Q12H lUi Maki MD        sodium chloride 0.9 % flush 10 mL  10 mL Intravenous PRN Uli Maki MD        sodium chloride 0.9 % infusion 40 mL  40 mL Intravenous PRN Uli Maki MD         Current Outpatient Medications   Medication Sig Dispense Refill    aspirin 81 MG EC tablet Take 1 tablet by mouth Daily.      atorvastatin (LIPITOR) 80 MG tablet Take 1 tablet by mouth Daily for 360 days. 90 tablet 3    Cyanocobalamin (Vitamin B-12) 5000 MCG sublingual tablet Place 1 tablet under the tongue Every Morning.      dexAMETHasone (DECADRON) 4 MG tablet Take 1 tablet by mouth 4 (Four) Times a Day. 60 tablet 0    empagliflozin (JARDIANCE) 10  MG tablet tablet Take 1 tablet by mouth Daily for 360 days. 90 tablet 3    HYDROcodone-acetaminophen (NORCO) 5-325 MG per tablet Take 1 tablet by mouth Every 6 (Six) Hours As Needed for Moderate Pain. 30 tablet 0    levETIRAcetam (Keppra) 1000 MG tablet Take 1 tablet by mouth 2 (Two) Times a Day. 60 tablet 0    Lysine 500 MG capsule Take 1 tablet by mouth 2 (Two) Times a Day.      memantine (NAMENDA XR) 28 MG capsule sustained-release 24 hr extended release capsule Take 1 capsule by mouth Daily. 30 capsule 5    metoprolol succinate XL (TOPROL-XL) 25 MG 24 hr tablet Take 1 tablet by mouth Daily for 360 days. 90 tablet 3    oxyCODONE (Roxicodone) 5 MG immediate release tablet Take 1 tablet by mouth Every 4 (Four) Hours As Needed for Moderate Pain. 120 tablet 0    traMADol (ULTRAM) 50 MG tablet Take 1 tablet by mouth Every 6 (Six) Hours As Needed for Moderate Pain. 50 tablet 1        ________________________________________________________        OBJECTIVE:    PHYSICAL EXAM:    Constitutional: The patient is in no apparent distress, lethargic    Head: Normocephalic, atraumatic.     Chest: No respiratory distress.    Cardiac: Regular rate and rhythm.     Extremities:  No clubbing, cyanosis, or edema.     NEUROLOGICAL:    Cognition:   Lethargic   Opens eyes to voice  Follows simple commands  Squeezed hand on the right  Exam limited given mental status      Cranial nerves;  Left facial asymmetry   Pupils equal and reactive  Tracking   Exam limited given mental status    Sensory:  Moans and withdraws to painful stimuli    Motor: Weakness noted to LUE. Pt able to squeeze hand RUE. Wiggles toes bilaterally     Cerebellar and gait not tested given patient's mental status    Physical exam performed by MAYA Cuevas.     ________________________________________________________   RESULTS REVIEW:    VITAL SIGNS:   Temp:  [98.4 °F (36.9 °C)-101.7 °F (38.7 °C)] 98.4 °F (36.9 °C)  Heart Rate:  [118-153] 119  Resp:  [20-28] 21  BP:  (162-203)/() 162/88     LABS:      Lab 08/10/24  0531 08/10/24  0527 08/08/24  0820   WBC  --  4.97 7.04   HEMOGLOBIN  --  9.9* 10.2*   HEMATOCRIT  --  31.1* 31.8*   PLATELETS  --  54* 70*   NEUTROS ABS  --  4.04 5.71   IMMATURE GRANS (ABS)  --  0.09*  --    LYMPHS ABS  --  0.30* 0.56*   MONOS ABS  --  0.50 0.71   EOS ABS  --  0.03 0.04   MCV  --  96.3 100.6*   PROCALCITONIN  --  0.28*  --    LACTATE 1.3  --   --          Lab 08/10/24  0527   SODIUM 137   POTASSIUM 3.5   CHLORIDE 104   CO2 18.2*   ANION GAP 14.8   BUN 24*   CREATININE 0.51*   EGFR 105.7   GLUCOSE 94   CALCIUM 8.8         Lab 08/10/24  0527   TOTAL PROTEIN 6.2   ALBUMIN 3.6   GLOBULIN 2.6   ALT (SGPT) 31   AST (SGOT) 38*   BILIRUBIN 1.1   ALK PHOS 89                     UA          5/8/2024    14:59 6/28/2024    10:31 8/10/2024    06:03   Urinalysis   Squamous Epithelial Cells, UA   0-2    Specific Gravity, UA  1.006  1.018    Ketones, UA Negative  Negative  15 mg/dL (1+)    Blood, UA  Negative  Negative    Leukocytes, UA Negative  Negative  Trace    Nitrite, UA  Negative  Positive    RBC, UA   0-2    WBC, UA   0-2    Bacteria, UA   4+        Lab Results   Component Value Date    TSH 2.260 05/08/2024     (H) 06/26/2024    HGBA1C 5.21 06/26/2024    [id number] >2,000 (H) 12/11/2019       IMAGING STUDIES:  CT Head Without Contrast    Result Date: 8/10/2024  Impression: Multiple intracranial masses consistent with known metastases. There is extensive vasogenic edema surrounding the masses. There is no convincing acute hemorrhage. Electronically Signed: Hernandez Espino MD  8/10/2024 6:51 AM EDT  Workstation ID: [id number]    XR Chest 1 View    Result Date: 8/10/2024  Impression: Innumerable bilateral pulmonary nodules consistent with metastatic disease. Size and number of the nodules has increased in the interval. There is mild right basilar atelectasis. Electronically Signed: Hernandez Espino MD  8/10/2024 6:04 AM EDT  Workstation ID: [id number]     I  reviewed the patient's new clinical results.    ________________________________________________________     PROBLEM LIST:    UTI (urinary tract infection) due to urinary indwelling catheter    Seizure            ASSESSMENT/PLAN:    Seizure 2/2 brain metastasis   Primary adenocarcinoma of the colon dx in 2012   - CT head reviewed  -Patient was seen by neurosurgery in June 2024-no surgical invention recommended  -- Patient previously saw her oncologist August 8 and decided to stop treatment and be referred to hospice  -Commending to continue Keppra 1 g twice daily-discussed with family that even if patient goes hospice typically seizure medications are continued for the family's comfort as well as the patient's  -Will continue to monitor the patient while she is in the hospital-please call with any additional seizures.           I discussed the patient's findings and my recommendations with family, nursing staff, and primary care team    ARNOLDO Holt  08/10/24  13:26 EDT

## 2024-08-10 NOTE — H&P
History and Physical   Samantha Massey : 1960 MRN:[id number] LOS:0     Reason for admission: Breakthrough seizure     Assessment / Plan     #New onset Seizure 2/2 underlying brain metastasis/ complicated UTI   -pt presented with tonic clonic seizure and post ictal confusion   -she has been lethargic x 1 day and seems like there is foaming from her mouth on Friday -? Unwitnessed seizure  and she got seizure witnessed by family 3 AM today   -she has HTN urgency and tachycardia, fever in the ED   -this is her first time seizure   -labs with increased procal, and UTI, NAGMA   -RVP negative   -EKG with sinus tachycardia  -CXR with Innumerable bilateral pulmonary nodules consistent with metastatic disease. Size and number of the nodules has increased in the interval. There is mild right basilar atelectasis.   -CT head with Multiple intracranial masses consistent with known metastases. There is extensive vasogenic edema surrounding the masses. There is no convincing acute hemorrhage.  ( known case - admitted recently for weakness and Dx with brain metastasis and neuroSx neurology seen at that time and no plan for surgery and oncologist decided for stereotactic radiosurgery. )   -on dexamethasone and keppra at home - will continue   -got one dose of Keppra 1000 mg in ED - keppra 1000 mg IV bid for now   -rocephin started   -BCX and UCX to follow up   -fall, seizure precaution  -PT to see   -neuro to see for med adjustment     #HTN urgency   #Sinus tachycardia   -SBP  > 180  -amlodipine and meto succinate   -Echo with EF of 51%   -labetalol prn added   -tele     # Metastatic colon cancer   -plan for hospice   -Dr Santillan as oncologist      #[facility]  -hospice care plan   -DNR DNI   -ok with Abx and medication for seizure     Medical Intervention Limits: No intubation (DNI)  Level Of Support Discussed With: Health Care Surrogate  Code Status (Patient has no pulse and is not breathing): No CPR (Do Not Attempt to  Resuscitate)  Medical Interventions (Patient has pulse or is breathing): Limited Support       Nutrition: Diet: Regular/House; Fluid Consistency: Thin (IDDSI 0)     DVT Prophylaxis: Active VTE Prophylaxis  Mechanical:        Start        08/10/24 0741  Maintain Sequential Compression Device  Continuous                          Select Pharmacologic VTE Prophylaxis if Desired & Appropriate      History of Present illness       Samantha Massey is a 63 y.o. female with a past medical history of hyperlipidemia, coronary artery disease, hypertension, grade 1 diastolic heart dysfunction, and colon cancer status postchemotherapy and metastasis to the lung and brain ( no plan for surgery ) presented with 1 min of tonic clonic seizure at home and confused after. This is her first time seizure. She got the radiation 3 weeks ago to brain and since then decadron has been tapered down and Keppra plan for taper down as well to alternate day. She has been lethargic x 1 day and seems like there is foaming from her mouth on Friday -? Unwitnessed seizure  and she got seizure witnessed by family 3 AM today  In the ED, CT head showing brain metastasis with vasogenic oedema. She has UTI and increased procal as well. CXR with masses. Rocephin started. Keppra 1000 mg IV bid started. Neuro consulted for the further seizure med adjustment. Decadron resumed.     Of note, She was admitted recently for weakness and found to have brain metastasis and dc with plan for stereotactic radiosurgery. Now plan for hospice care.     She will be admitted for seizure and UTI.       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    History of colon cancer, stage IV     Stage SHELL with progression    Hypertension     NSTEMI (non-ST elevated myocardial infarction) 2023    PONV (postoperative nausea and vomiting)     Pulmonary arterial hypertension     Radiculopathy 2012    Right L5/S1      Past Surgical History:   Procedure Laterality Date    ABDOMINAL SURGERY      CARDIAC CATHETERIZATION N/A 2023    Procedure: Left Heart Cath;  Surgeon: Ramiro Olivera MD;  Location: [facility] CATH INVASIVE LOCATION;  Service: Cardiovascular;  Laterality: N/A;    COLON SURGERY  Colon resection    CYST REMOVAL      cyst removed from [facility] in     HERNIA REPAIR  incisional    PORTACATH PLACEMENT  2017    VENOUS ACCESS DEVICE (PORT) INSERTION Left 2021    Procedure: INSERTION VENOUS ACCESS DEVICE;  Surgeon: Jay Yeh MD;  Location: [facility] MAIN OR;  Service: General;  Laterality: Left;    VENOUS ACCESS DEVICE (PORT) REMOVAL Right 2020    Procedure: REMOVAL VENOUS ACCESS DEVICE;  Surgeon: Jay Yeh MD;  Location: [facility] MAIN OR;  Service: General;  Laterality: Right;    VENTRAL/INCISIONAL HERNIA REPAIR N/A 2023    Procedure: VENTRAL/INCISIONAL HERNIA REPAIR WITH MESH;  Surgeon: Jay Yeh MD;  Location: [facility] MAIN OR;  Service: General;  Laterality: N/A;      Social History     Socioeconomic History    Marital status: Single   Tobacco Use    Smoking status: Former     Current packs/day: 0.00     Average packs/day: 1 pack/day for 31.0 years (31.0 ttl pk-yrs)     Types: Cigarettes     Start date: 1981     Quit date: 2012     Years since quittin.6     Passive exposure: Past    Smokeless tobacco: Never   Vaping Use    Vaping status: Never Used   Substance and Sexual Activity    Alcohol use: Yes     Comment: 2 or 3 a month    Drug use: Never     Sexual activity: Not Currently     Partners: Female     Birth control/protection: Same-sex partner      Family History   Problem Relation Age of Onset    Heart disease Mother     Lung cancer Father 70        Associated to cigarette smoking    Heart disease Father     Hyperlipidemia Father     Cancer Father     Cancer Sister         Bladder cancer      Allergies   Allergen Reactions    Hydrocodone Nausea And Vomiting    Iodinated Contrast Media Hives and Itching     PT HAD SOME HIVES DURING SCAN.  PRIOR TO SCAN 8-3-2013.    Latex Rash     Blisters     Penicillins Hives        Home Medications     Prior to Admission medications    Medication Sig Start Date End Date Taking? Authorizing Provider   aspirin 81 MG EC tablet Take 1 tablet by mouth Daily.    ProviderRaffaele MD   atorvastatin (LIPITOR) 80 MG tablet Take 1 tablet by mouth Daily for 360 days. 1/3/24 12/28/24  Ramiro Olivera MD   Cyanocobalamin (Vitamin B-12) 5000 MCG sublingual tablet Place 1 tablet under the tongue Every Morning.    ProviderRaffaele MD   dexAMETHasone (DECADRON) 4 MG tablet Take 1 tablet by mouth 4 (Four) Times a Day. 6/28/24   Ravin Guy MD   empagliflozin (JARDIANCE) 10 MG tablet tablet Take 1 tablet by mouth Daily for 360 days. 1/3/24 12/28/24  Ramiro Olivera MD   HYDROcodone-acetaminophen (NORCO) 5-325 MG per tablet Take 1 tablet by mouth Every 6 (Six) Hours As Needed for Moderate Pain. 7/8/24   Diana Spencer MD   levETIRAcetam (Keppra) 1000 MG tablet Take 1 tablet by mouth 2 (Two) Times a Day. 6/28/24   Ravin Guy MD   Lysine 500 MG capsule Take 1 tablet by mouth 2 (Two) Times a Day.    ProviderRaffaele MD   memantine (NAMENDA XR) 28 MG capsule sustained-release 24 hr extended release capsule Take 1 capsule by mouth Daily. 6/30/24   Sj Burgos MD   metoprolol succinate XL (TOPROL-XL) 25 MG 24 hr tablet Take 1 tablet by mouth Daily for 360 days. 1/3/24 12/28/24  Jarod  Ramiro DARLING MD   oxyCODONE (Roxicodone) 5 MG immediate release tablet Take 1 tablet by mouth Every 4 (Four) Hours As Needed for Moderate Pain. 7/25/24   Sean Adams MD   traMADol (ULTRAM) 50 MG tablet Take 1 tablet by mouth Every 6 (Six) Hours As Needed for Moderate Pain. 7/15/24   Sj Burgos MD      Objective / Physical Exam   Vital signs:  Temp: (!) 101.7 °F (38.7 °C)  BP: (!) 195/114  Heart Rate: (!) 153  Resp: 20  SpO2: 93 %  Weight: 50.7 kg (111 lb 12.4 oz)    Admission Weight: Weight: 50.7 kg (111 lb 12.4 oz)    Physical Exam       Physical Exam  HENT:      Head: Normocephalic and atraumatic.      Nose: Nose normal.   Eyes:      Extraocular Movements: Extraocular movements intact.      Conjunctivae/sclera: Conjunctivae normal.   Cardiovascular:      Rate and Rhythm: normal       Pulses: Normal pulses.      Heart sounds: Normal heart sounds.   Pulmonary:      Reduced BS    Abdominal:      General: Abdomen is flat. Bowel sounds are normal.      Palpations: Abdomen is soft.   Skin:     General: Skin is dry.   Neurological:       Lethargic        Labs     Results from last 7 days   Lab Units 08/10/24  0527 08/08/24  0820   WBC 10*3/mm3 4.97 7.04   HEMATOCRIT % 31.1* 31.8*   PLATELETS 10*3/mm3 54* 70*      Results from last 7 days   Lab Units 08/10/24  0527   SODIUM mmol/L 137   POTASSIUM mmol/L 3.5   CHLORIDE mmol/L 104   CO2 mmol/L 18.2*   BUN mg/dL 24*   CREATININE mg/dL 0.51*        Current Medications   Scheduled Meds:sodium chloride, 10 mL, Intravenous, Q12H         Continuous Infusions:      Uli Maki MD  [facility]   08/10/24   07:43 EDT

## 2024-08-10 NOTE — ED NOTES
Pt placed on hospital bed , sheets changed , CHG bath provided , placed mepilex to sacral area and observed scattered bruises. PT A&Ox3 , perrla , changed gown .

## 2024-08-10 NOTE — LETTER
EMS Transport Request  For use at [facility], [place], [place], [place], and [place] only   Patient Name: Samantha Massey : 1960   Weight:50.7 kg (111 lb 12.4 oz) Pick-up Location: [facility] BLS/ALS: BLS/ALS: BLS   Insurance: [employer] BLUE CROSS Auth End Date: 24   Pre-Cert #: D/C Summary complete:    Destination: Home How many stairs 5, Will the patient be on the main level yes, Is there a ramp available no, Can the patient stand and pivot yes, Address [address] [place] [address] [place] [facility], and Name/contact number for who will be present Molly Butcher [phone]   Contact Precautions: None   Equipment (O2, Fluids, etc.): None   Arrive By Date/Time: 24 Stretcher/WC: Wheelchair   CM Requesting: Zena Chandler RN Ext: [phone]   Notes/Medical Necessity: a/o x3, going home for hospice care.     ______________________________________________________________________    *Only 2 patient bags OR 1 carry-on size bag are permitted.  Wheelchairs and walkers CANNOT transported with the patient. Acknowledge: Yes

## 2024-08-10 NOTE — ED PROVIDER NOTES
Subjective   History of Present Illness  63-year-old female who has terminal colon cancer with brain metastases had a seizure this evening.  History is primarily per patient's partner who states patient had been fatigued yesterday but then this evening on the couch where she was watching TV patient had a generalized tonic-clonic seizure for about 1 minute and was postictal and weak thereafter.  Patient is alert but confused at bedside.  Patient has increased left-sided weakness since the seizure.  Patient had some left-sided weakness for which she was seen for at this facility in June and found to have brain metastases that was thought to be the etiology for this.  Patient is due to see hospice on Monday.      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  PRIOR TO SCAN 8-3-2013.    Latex Rash     Blisters     Penicillins Hives       Past Surgical History:   Procedure Laterality Date    ABDOMINAL SURGERY      CARDIAC CATHETERIZATION N/A 12/08/2023    Procedure: Left Heart Cath;  Surgeon: Ramiro Olivera MD;  Location: [facility] CATH INVASIVE LOCATION;  Service: Cardiovascular;  Laterality: N/A;    COLON  SURGERY  Colon resection    CYST REMOVAL      cyst removed from [facility] in     HERNIA REPAIR  incisional    PORTACATH PLACEMENT  2017    VENOUS ACCESS DEVICE (PORT) INSERTION Left 2021    Procedure: INSERTION VENOUS ACCESS DEVICE;  Surgeon: Jay Yeh MD;  Location: [facility] MAIN OR;  Service: General;  Laterality: Left;    VENOUS ACCESS DEVICE (PORT) REMOVAL Right 2020    Procedure: REMOVAL VENOUS ACCESS DEVICE;  Surgeon: Jay Yeh MD;  Location: [facility] MAIN OR;  Service: General;  Laterality: Right;    VENTRAL/INCISIONAL HERNIA REPAIR N/A 2023    Procedure: VENTRAL/INCISIONAL HERNIA REPAIR WITH MESH;  Surgeon: Jay Yeh MD;  Location: [facility] MAIN OR;  Service: General;  Laterality: N/A;       Family History   Problem Relation Age of Onset    Heart disease Mother     Lung cancer Father 70        Associated to cigarette smoking    Heart disease Father     Hyperlipidemia Father     Cancer Father     Cancer Sister         Bladder cancer       Social History     Socioeconomic History    Marital status: Single   Tobacco Use    Smoking status: Former     Current packs/day: 0.00     Average packs/day: 1 pack/day for 31.0 years (31.0 ttl pk-yrs)     Types: Cigarettes     Start date: 1981     Quit date: 2012     Years since quittin.6     Passive exposure: Past    Smokeless tobacco: Never   Vaping Use    Vaping status: Never Used   Substance and Sexual Activity    Alcohol use: Yes     Comment: 2 or 3 a month    Drug use: Never    Sexual activity: Not Currently     Partners: Female     Birth control/protection: Partner of same sex           Objective   Physical Exam  Constitutional:       Comments: Alert 63-year-old female in no acute distress   HENT:      Head: Normocephalic and atraumatic.      Mouth/Throat:      Mouth: Mucous membranes are moist.      Pharynx: Oropharynx is clear.   Eyes:      Extraocular Movements: Extraocular movements intact.       Conjunctiva/sclera: Conjunctivae normal.      Pupils: Pupils are equal, round, and reactive to light.   Cardiovascular:      Rate and Rhythm: Tachycardia present.   Pulmonary:      Effort: Pulmonary effort is normal.      Breath sounds: Normal breath sounds.   Abdominal:      General: Bowel sounds are normal. There is no distension.      Palpations: Abdomen is soft.      Tenderness: There is no abdominal tenderness.   Skin:     General: Skin is warm and dry.      Capillary Refill: Capillary refill takes less than 2 seconds.   Neurological:      Comments: Disoriented to time and place, left facial weakness, left upper extremity weakness, left lower extremity weakness         Procedures           ED Course                                             Medical Decision Making  63-year-old with terminal colon cancer with known brain metastases that had been managed on Keppra but that had been decreased over the past several days and attempt to minimize her medicine as patient is transitioning to hospice care Monday per her partner at bedside.  Her partner clarifies that she is a DNR, her CODE STATUS will be updated to reflect this.  I did discuss goals of care with her partner who clarified that she was okay with IV antibiotics and workup for her fever but did not want any aggressive measures or procedures done otherwise.  Patient has been tachycardic and hypertensive throughout her ED course, CT of the head was obtained which did not demonstrate any hemorrhage.  Patient appears comfortable and is asking for p.o. liquids.  Given patient's drowsiness seen on repeat evaluation, will hold for now, give 1 dose of Lopressor, observe in the hospital.  Only possible bacterial source was UTI thus far.    Problems Addressed:  Acute UTI: complicated acute illness or injury  Seizure: complicated acute illness or injury  Sepsis, due to unspecified organism, unspecified whether acute organ dysfunction present: complicated acute  illness or injury    Amount and/or Complexity of Data Reviewed  External Data Reviewed: notes.     Details: Ravin Guy MD   Physician  Hospitalist     Discharge Summary     Signed     Date of Service: 24  Creation Time: 24    Signed     Expand All Collapse All                                                                                      [facility]                                                                           DISCHARGE SUMMARY     Patient Name: Samantha Massey  : 1960  MRN: [id number]     Date of Admission: 2024  Date of Discharge:  2024  Primary Care Physician: Diana Spencer MD       Consults          Date and Time Order Name Status Description    2024  7:15 AM Inpatient Radiation Oncology Consult        2024 11:06 AM Inpatient Neurosurgery Consult Completed      2024 11:02 PM Hematology & Oncology Inpatient Consult Completed      2024 11:00 PM Inpatient Neurosurgery Consult Completed      2024 10:17 PM Hospitalist (on-call MD unless specified)        2024 10:17 PM Neurosurgery (on-call MD unless specified) Completed                  Presenting Problem:   Metastasis to brain [C79.31]  Left-sided weakness [R53.1]     Active and Resolved Hospital Problems:    Active Hospital Problems    Diagnosis POA  · **Metastasis to brain [C79.31] Yes  · Left hand weakness [R29.898] Yes  · Hyperlipidemia LDL goal <100 [E78.5] Yes  · Primary hypertension [I10] Yes     Resolved Hospital Problems  No resolved problems to display.             Hospital Course     Hospital Course:   Samantha Massey is a 63 y.o. female with a past medical history of hyperlipidemia, coronary artery disease, hypertension, grade 1 diastolic heart dysfunction, and colon cancer status postchemotherapy and metastasis to the lung.  She has had 2 treatments of radiation to the chest.  She was her baseline until yesterday whenever she had left hand and left  arm weakness.  She had difficulty controlling her left hand.  She was hoping that it would get better but today her sister came over and insisted that she come to the emergency room.  A stroke workup ensued and it appears that she has metastatic cancer to the brain.  Neurosurgery and neurology were consulted by the ER practitioner.  They requested an MRI and continue the workup.  Neurosurgery requested placing the patient on dexamethasone and Keppra.  Patient will be admitted PT OT evaluated evaluated by neurology neurosurgery and hematology oncology.  Patient states that she is able to ambulate without any difficulties.  She also reports no loss of consciousness     Patient was evaluated by neurosurgery and oncology, also she was seen by radiation oncology, patient was started on IV Keppra and dexamethasone IV.  Patient will get stereotactic radiosurgery.  She will follow-up with radiation oncology in the office.            Labs: ordered.  Radiology: ordered.  ECG/medicine tests: ordered.    Risk  OTC drugs.  Prescription drug management.  Decision regarding hospitalization.        Final diagnoses:   Seizure   Sepsis, due to unspecified organism, unspecified whether acute organ dysfunction present   Acute UTI       ED Disposition  ED Disposition       ED Disposition   Decision to Admit    Condition   --    Comment   Level of Care: Telemetry [5]   Diagnosis: Breakthrough seizure [128091]   Admitting Physician: LOKI HERNANDEZ [[id number]]   Attending Physician: LOKI HERNANDEZ [[id number]]   Certification: I Certify That Inpatient Hospital Services Are Medically Necessary For Greater Than 2 Midnights                 Diana Spencer MD  [address] [place] [address] DR MORATAYA  [address] [place] [facility]  [phone]               Medication List        New Prescriptions      LORazepam 1 MG tablet  Commonly known as: Ativan  Take 1 tablet by mouth Every 8 (Eight) Hours As Needed for Anxiety.     ondansetron 4 MG tablet  Commonly known as: Zofran  Take 1  tablet by mouth Every 8 (Eight) Hours As Needed for Nausea or Vomiting.            Changed      dexAMETHasone 4 MG tablet  Commonly known as: DECADRON  Take 1 tablet by mouth Daily With Breakfast.  What changed: when to take this            Stop      aspirin 81 MG EC tablet     atorvastatin 80 MG tablet  Commonly known as: LIPITOR     empagliflozin 10 MG tablet tablet  Commonly known as: JARDIANCE     HYDROcodone-acetaminophen 5-325 MG per tablet  Commonly known as: NORCO     Lysine 500 MG capsule     memantine 28 MG capsule sustained-release 24 hr extended release capsule  Commonly known as: NAMENDA XR     metoprolol succinate XL 25 MG 24 hr tablet  Commonly known as: TOPROL-XL     Vitamin B-12 5000 MCG sublingual tablet            ASK your doctor about these medications      cefdinir 300 MG capsule  Commonly known as: OMNICEF  Take 1 capsule by mouth 2 (Two) Times a Day for 3 days.  Ask about: Should I take this medication?               Where to Get Your Medications        These medications were sent to [facility]/pharmacy #[id number] - [place], [place] - [address] - [phone]  - [phone] FX  [address] [address]      Phone: [phone]   cefdinir 300 MG capsule  dexAMETHasone 4 MG tablet  levETIRAcetam 1000 MG tablet  LORazepam 1 MG tablet  ondansetron 4 MG tablet  oxyCODONE 5 MG immediate release tablet            Jay Morin MD  08/18/24 9168

## 2024-08-11 ENCOUNTER — READMISSION MANAGEMENT (OUTPATIENT)
Dept: CALL CENTER | Facility: HOSPITAL | Age: 64
End: 2024-08-11
Payer: COMMERCIAL

## 2024-08-11 VITALS
BODY MASS INDEX: 20.57 KG/M2 | WEIGHT: 111.77 LBS | HEIGHT: 62 IN | HEART RATE: 83 BPM | DIASTOLIC BLOOD PRESSURE: 67 MMHG | TEMPERATURE: 98 F | RESPIRATION RATE: 13 BRPM | OXYGEN SATURATION: 99 % | SYSTOLIC BLOOD PRESSURE: 129 MMHG

## 2024-08-11 LAB
ANION GAP SERPL CALCULATED.3IONS-SCNC: 12.2 MMOL/L (ref 5–15)
BASOPHILS # BLD AUTO: 0.01 10*3/MM3 (ref 0–0.2)
BASOPHILS NFR BLD AUTO: 0.2 % (ref 0–1.5)
BUN SERPL-MCNC: 31 MG/DL (ref 8–23)
BUN/CREAT SERPL: 54.4 (ref 7–25)
CALCIUM SPEC-SCNC: 8.6 MG/DL (ref 8.6–10.5)
CHLORIDE SERPL-SCNC: 105 MMOL/L (ref 98–107)
CO2 SERPL-SCNC: 20.8 MMOL/L (ref 22–29)
CREAT SERPL-MCNC: 0.57 MG/DL (ref 0.57–1)
DEPRECATED RDW RBC AUTO: 59.8 FL (ref 37–54)
EGFRCR SERPLBLD CKD-EPI 2021: 102.9 ML/MIN/1.73
EOSINOPHIL # BLD AUTO: 0 10*3/MM3 (ref 0–0.4)
EOSINOPHIL NFR BLD AUTO: 0 % (ref 0.3–6.2)
ERYTHROCYTE [DISTWIDTH] IN BLOOD BY AUTOMATED COUNT: 18 % (ref 12.3–15.4)
GLUCOSE SERPL-MCNC: 180 MG/DL (ref 65–99)
HCT VFR BLD AUTO: 27.5 % (ref 34–46.6)
HGB BLD-MCNC: 8.8 G/DL (ref 12–15.9)
IMM GRANULOCYTES # BLD AUTO: 0.06 10*3/MM3 (ref 0–0.05)
IMM GRANULOCYTES NFR BLD AUTO: 1.3 % (ref 0–0.5)
LYMPHOCYTES # BLD AUTO: 0.31 10*3/MM3 (ref 0.7–3.1)
LYMPHOCYTES NFR BLD AUTO: 7 % (ref 19.6–45.3)
MAGNESIUM SERPL-MCNC: 2.3 MG/DL (ref 1.6–2.4)
MCH RBC QN AUTO: 30.4 PG (ref 26.6–33)
MCHC RBC AUTO-ENTMCNC: 32 G/DL (ref 31.5–35.7)
MCV RBC AUTO: 95.2 FL (ref 79–97)
MONOCYTES # BLD AUTO: 0.25 10*3/MM3 (ref 0.1–0.9)
MONOCYTES NFR BLD AUTO: 5.6 % (ref 5–12)
NEUTROPHILS NFR BLD AUTO: 3.83 10*3/MM3 (ref 1.7–7)
NEUTROPHILS NFR BLD AUTO: 85.9 % (ref 42.7–76)
NRBC BLD AUTO-RTO: 0 /100 WBC (ref 0–0.2)
PHOSPHATE SERPL-MCNC: 2.5 MG/DL (ref 2.5–4.5)
PLATELET # BLD AUTO: 62 10*3/MM3 (ref 140–450)
PMV BLD AUTO: 9.7 FL (ref 6–12)
POTASSIUM SERPL-SCNC: 3.4 MMOL/L (ref 3.5–5.2)
QT INTERVAL: 280 MS
QTC INTERVAL: 430 MS
RBC # BLD AUTO: 2.89 10*6/MM3 (ref 3.77–5.28)
SODIUM SERPL-SCNC: 138 MMOL/L (ref 136–145)
WBC NRBC COR # BLD AUTO: 4.46 10*3/MM3 (ref 3.4–10.8)

## 2024-08-11 PROCEDURE — 97162 PT EVAL MOD COMPLEX 30 MIN: CPT

## 2024-08-11 PROCEDURE — 84100 ASSAY OF PHOSPHORUS: CPT | Performed by: INTERNAL MEDICINE

## 2024-08-11 PROCEDURE — 83735 ASSAY OF MAGNESIUM: CPT | Performed by: INTERNAL MEDICINE

## 2024-08-11 PROCEDURE — 25010000002 LEVETRIRACETAM PER 10 MG: Performed by: INTERNAL MEDICINE

## 2024-08-11 PROCEDURE — 80048 BASIC METABOLIC PNL TOTAL CA: CPT | Performed by: INTERNAL MEDICINE

## 2024-08-11 PROCEDURE — 85025 COMPLETE CBC W/AUTO DIFF WBC: CPT | Performed by: INTERNAL MEDICINE

## 2024-08-11 PROCEDURE — 25010000002 CEFTRIAXONE PER 250 MG: Performed by: INTERNAL MEDICINE

## 2024-08-11 PROCEDURE — 63710000001 DEXAMETHASONE PER 0.25 MG: Performed by: INTERNAL MEDICINE

## 2024-08-11 PROCEDURE — 25010000002 HEPARIN LOCK FLUSH PER 10 UNITS: Performed by: INTERNAL MEDICINE

## 2024-08-11 RX ORDER — CEFDINIR 300 MG/1
300 CAPSULE ORAL 2 TIMES DAILY
Qty: 6 CAPSULE | Refills: 0 | Status: SHIPPED | OUTPATIENT
Start: 2024-08-11 | End: 2024-08-14

## 2024-08-11 RX ORDER — POTASSIUM CHLORIDE 1.5 G/1.58G
40 POWDER, FOR SOLUTION ORAL EVERY 4 HOURS
Status: COMPLETED | OUTPATIENT
Start: 2024-08-11 | End: 2024-08-11

## 2024-08-11 RX ORDER — OXYCODONE HYDROCHLORIDE 5 MG/1
5 TABLET ORAL EVERY 4 HOURS PRN
Qty: 60 TABLET | Refills: 0 | Status: SHIPPED | OUTPATIENT
Start: 2024-08-11

## 2024-08-11 RX ORDER — LORAZEPAM 1 MG/1
1 TABLET ORAL EVERY 8 HOURS PRN
Qty: 60 TABLET | Refills: 0 | Status: SHIPPED | OUTPATIENT
Start: 2024-08-11

## 2024-08-11 RX ORDER — LEVETIRACETAM 1000 MG/1
1000 TABLET ORAL 2 TIMES DAILY
Qty: 60 TABLET | Refills: 2 | Status: SHIPPED | OUTPATIENT
Start: 2024-08-11

## 2024-08-11 RX ORDER — ONDANSETRON 4 MG/1
4 TABLET, FILM COATED ORAL EVERY 8 HOURS PRN
Qty: 30 TABLET | Refills: 0 | Status: SHIPPED | OUTPATIENT
Start: 2024-08-11

## 2024-08-11 RX ORDER — DEXAMETHASONE 4 MG/1
4 TABLET ORAL
Qty: 30 TABLET | Refills: 2 | Status: SHIPPED | OUTPATIENT
Start: 2024-08-11

## 2024-08-11 RX ORDER — HEPARIN SODIUM (PORCINE) LOCK FLUSH IV SOLN 100 UNIT/ML 100 UNIT/ML
500 SOLUTION INTRAVENOUS ONCE
Status: COMPLETED | OUTPATIENT
Start: 2024-08-11 | End: 2024-08-11

## 2024-08-11 RX ADMIN — AMLODIPINE BESYLATE 5 MG: 5 TABLET ORAL at 08:28

## 2024-08-11 RX ADMIN — POTASSIUM CHLORIDE 20 MEQ: 1.5 POWDER, FOR SOLUTION ORAL at 11:26

## 2024-08-11 RX ADMIN — Medication 10 ML: at 08:27

## 2024-08-11 RX ADMIN — POTASSIUM CHLORIDE 40 MEQ: 1.5 POWDER, FOR SOLUTION ORAL at 06:14

## 2024-08-11 RX ADMIN — CEFTRIAXONE SODIUM 1000 MG: 1 INJECTION, POWDER, FOR SOLUTION INTRAMUSCULAR; INTRAVENOUS at 06:14

## 2024-08-11 RX ADMIN — METOPROLOL SUCCINATE 50 MG: 50 TABLET, EXTENDED RELEASE ORAL at 08:28

## 2024-08-11 RX ADMIN — DEXAMETHASONE 4 MG: 4 TABLET ORAL at 06:14

## 2024-08-11 RX ADMIN — DEXAMETHASONE 4 MG: 4 TABLET ORAL at 11:30

## 2024-08-11 RX ADMIN — OXYCODONE HYDROCHLORIDE 5 MG: 5 TABLET ORAL at 14:10

## 2024-08-11 RX ADMIN — Medication 500 UNITS: at 12:31

## 2024-08-11 RX ADMIN — OXYCODONE HYDROCHLORIDE 5 MG: 5 TABLET ORAL at 08:55

## 2024-08-11 RX ADMIN — LEVETIRACETAM 1000 MG: 100 INJECTION INTRAVENOUS at 08:27

## 2024-08-11 NOTE — DISCHARGE PLACEMENT REQUEST
"GarrettAndreas \"NORBERT\" (63 y.o. Female)       Date of Birth   1960    Social Security Number       Address   [address] [place] [address] [place] [address]    Home Phone   [phone]    MRN   [id number]       Restorationist   Non-Sikh    Marital Status   Single                            Admission Date   8/10/24    Admission Type   Emergency    Admitting Provider   Uli Maki MD    Attending Provider   Darnell Dorantes MD    Department, Room/Bed   [facility], 241/1       Discharge Date       Discharge Disposition   Home or Self Care    Discharge Destination                                 Attending Provider: Darnell Dorantes MD    Allergies: Hydrocodone, Iodinated Contrast Media, Latex, Penicillins    Isolation: None   Infection: None   Code Status: No CPR    Ht: 157.5 cm (62\")   Wt: 50.7 kg (111 lb 12.4 oz)    Admission Cmt: None   Principal Problem: None                  Active Insurance as of 8/10/2024       Primary Coverage       Payor Plan Insurance Group Employer/Plan Group    [facility] BLUE [facility] PPO [id number]       Payor Plan Address Payor Plan Phone Number Payor Plan Fax Number Effective Dates    [address] [phone]  11/1/2020 - None Entered    [facility] [id number]         Subscriber Name Subscriber Birth Date Member ID       ANDREAS SIMENTAL ARETHA 1960 [id number]                     Emergency Contacts        (Rel.) Home Phone Work Phone Mobile Phone    BRENT FINE (Other) [phone] -- [phone]          "

## 2024-08-11 NOTE — NURSING NOTE
CHG cardenas offered to pt. Pt. Stated she would prefer to wait at this time, as she is tired and would like to sleep.

## 2024-08-11 NOTE — CASE MANAGEMENT/SOCIAL WORK
Case Management Discharge Note      Final Note: Home         Selected Continued Care - Discharged on 8/11/2024 Admission date: 8/10/2024 - Discharge disposition: Home or Self Care       Transportation Services  W/C Jeancarlos: Brady Arshad    Final Discharge Disposition Code: 01 - home or self-care

## 2024-08-11 NOTE — THERAPY EVALUATION
Patient Name: Samantha Massey  : 1960    MRN: [id number]                              Today's Date: 2024       Admit Date: 8/10/2024    Visit Dx:     ICD-10-CM ICD-9-CM   1. Seizure  R56.9 780.39   2. Sepsis, due to unspecified organism, unspecified whether acute organ dysfunction present  A41.9 038.9     995.91   3. Acute UTI  N39.0 599.0   4. Adenocarcinoma of colon metastatic to liver  C18.9 153.9    C78.7 197.7   5. Cancer associated pain  G89.3 338.3     Patient Active Problem List   Diagnosis    Adenocarcinoma of colon metastatic to liver    Allergic reaction to contrast dye    Anemia    Encounter for chemotherapy management    Malignant neoplasm of colon metastatic to bone    Primary hypertension    History of non-ST elevation myocardial infarction (NSTEMI)    CAD (coronary artery disease)    Stress-induced cardiomyopathy    Hyperlipidemia LDL goal <100    Chest wall pain, chronic    Metastatic adenocarcinoma to brain    Left hand weakness    UTI (urinary tract infection) due to urinary indwelling catheter    Seizure     Past Medical History:   Diagnosis Date    Acute systolic heart failure 2023    Allergic laytex,peniciline,contrast dye    Breakthrough seizure 8/10/2024    CAD (coronary artery disease) 2023    CHF (congestive heart failure) 23    Chronic kidney disease     colon cancer     Metastatic to liver and bones    Colon cancer     Congenital heart disease 23    History of colon cancer, stage IV     Stage SHELL with progression    Hypertension     NSTEMI (non-ST elevated myocardial infarction) 2023    PONV (postoperative nausea and vomiting)     Pulmonary arterial hypertension     Radiculopathy 2012    Right L5/S1     Past Surgical History:   Procedure Laterality Date    ABDOMINAL SURGERY      CARDIAC CATHETERIZATION N/A 2023    Procedure: Left Heart Cath;  Surgeon: Ramiro Olivera MD;  Location: [facility] CATH INVASIVE LOCATION;  Service:  Cardiovascular;  Laterality: N/A;    COLON SURGERY  Colon resection    CYST REMOVAL  1998    cyst removed from [place] in 1998    HERNIA REPAIR  incisional    PORTACATH PLACEMENT  2017    VENOUS ACCESS DEVICE (PORT) INSERTION Left 01/12/2021    Procedure: INSERTION VENOUS ACCESS DEVICE;  Surgeon: Jay Yeh MD;  Location: [facility] MAIN OR;  Service: General;  Laterality: Left;    VENOUS ACCESS DEVICE (PORT) REMOVAL Right 08/27/2020    Procedure: REMOVAL VENOUS ACCESS DEVICE;  Surgeon: Jay Yeh MD;  Location: [facility] MAIN OR;  Service: General;  Laterality: Right;    VENTRAL/INCISIONAL HERNIA REPAIR N/A 03/02/2023    Procedure: VENTRAL/INCISIONAL HERNIA REPAIR WITH MESH;  Surgeon: Jay Yeh MD;  Location: [facility] MAIN OR;  Service: General;  Laterality: N/A;      General Information       Row Name 08/11/24 1635          Physical Therapy Time and Intention    Document Type evaluation  -CR     Mode of Treatment physical therapy  -CR       Row Name 08/11/24 1635          General Information    Patient Profile Reviewed yes  -CR     Prior Level of Function min assist:;transfer;gait;w/c or scooter  mostly holds on to family members  -CR     Existing Precautions/Restrictions seizures  -CR     Barriers to Rehab medically complex  -CR       Row Name 08/11/24 1635          Living Environment    People in Home significant other  -CR       Row Name 08/11/24 1635          Home Main Entrance    Number of Stairs, Main Entrance five  family is trying to get ramp  -CR       Row Name 08/11/24 1635          Cognition    Orientation Status (Cognition) oriented x 4  -CR       Row Name 08/11/24 1635          Safety Issues, Functional Mobility    Impairments Affecting Function (Mobility) endurance/activity tolerance;strength  -CR               User Key  (r) = Recorded By, (t) = Taken By, (c) = Cosigned By      Initials Name Provider Type    CR Reyes, Carmela, PT Physical Therapist                    Mobility       Row Name 08/11/24 1637          Bed Mobility    Bed Mobility supine-sit  -CR     Supine-Sit [place] (Bed Mobility) minimum assist (75% patient effort);verbal cues  -CR     Assistive Device (Bed Mobility) head of bed elevated  -CR       Row Name 08/11/24 1637          Bed-Chair Transfer    Bed-Chair [place] (Transfers) contact guard  -CR     Assistive Device (Bed-Chair Transfers) --  HHA  -CR       Row Name 08/11/24 1637          Sit-Stand Transfer    Sit-Stand [place] (Transfers) contact guard;minimum assist (75% patient effort)  -CR       Row Name 08/11/24 1637          Gait/Stairs (Locomotion)    [place] Level (Gait) minimum assist (75% patient effort)  -CR     Assistive Device (Gait) --  HHa  -CR     Distance in Feet (Gait) 3  -CR     Deviations/Abnormal Patterns (Gait) gait speed decreased  -CR               User Key  (r) = Recorded By, (t) = Taken By, (c) = Cosigned By      Initials Name Provider Type    CR Reyes, Carmela, PT Physical Therapist                   Obj/Interventions       Row Name 08/11/24 1638          Range of Motion Comprehensive    Comment, General Range of Motion AROM RUE/LE wfl. min limit LLE  -CR       Row Name 08/11/24 1638          Strength Comprehensive (MMT)    Comment, General Manual Muscle Testing (MMT) Assessment RUE/LE wfl. LLE grossly 3-/5  -CR       Row Name 08/11/24 1638          Balance    Balance Assessment sitting static balance;standing static balance;standing dynamic balance  -CR     Static Sitting Balance standby assist  -CR     Position, Sitting Balance sitting edge of bed  -CR     Static Standing Balance minimal assist  -CR     Dynamic Standing Balance minimal assist  -CR       Row Name 08/11/24 1638          Sensory Assessment (Somatosensory)    Sensory Assessment (Somatosensory) sensation intact  -CR               User Key  (r) = Recorded By, (t) = Taken By, (c) = Cosigned By      Initials Name Provider Type    CR Reyes, Carmela, PT  Physical Therapist                   Goals/Plan    No documentation.                  Clinical Impression       Row Name 08/11/24 1647          Plan of Care Review    Plan of Care Reviewed With patient;significant other  -CR     Outcome Evaluation 62 y/o female with known colon Ca and brain mets admitted on 8/10 following unwitnessed seizure. Patient is no longer pursuing Ca treatment. Patient lives with sig other and currently under hospice care. At time of eval, patient needed min A for supine to sit. Min/CGA for transfers and to ambulate 3 ft. Patient mostly transfers at home with sig other assistance thus at her baseline. No further skilled rehab needs at this time.  -CR       Row Name 08/11/24 8092          Therapy Assessment/Plan (PT)    Patient/Family Therapy Goals Statement (PT) home  -CR     Criteria for Skilled Interventions Met (PT) no;does not meet criteria for skilled intervention  -CR     Therapy Frequency (PT) evaluation only  -CR     Predicted Duration of Therapy Intervention (PT) home  -CR               User Key  (r) = Recorded By, (t) = Taken By, (c) = Cosigned By      Initials Name Provider Type    CR Reyes, Carmela, PT Physical Therapist                   Outcome Measures       Row Name 08/11/24 1650 08/11/24 1100       How much help from another person do you currently need...    Turning from your back to your side while in flat bed without using bedrails? 3  -CR 3  -DH    Moving from lying on back to sitting on the side of a flat bed without bedrails? 3  -CR 2  -DH    Moving to and from a bed to a chair (including a wheelchair)? 3  -CR 2  -DH    Standing up from a chair using your arms (e.g., wheelchair, bedside chair)? 3  -CR 2  -DH    Climbing 3-5 steps with a railing? 3  -CR 2  -DH    To walk in hospital room? 3  -CR 2  -DH    AM-PAC 6 Clicks Score (PT) 18  -CR 13  -DH    Highest Level of Mobility Goal 6 --> Walk 10 steps or more  -CR 4 --> Transfer to chair/commode  -DH              User  Key  (r) = Recorded By, (t) = Taken By, (c) = Cosigned By      Initials Name Provider Type    CR Reyes, Carmela, PT Physical Therapist    Catalina Sen RN Registered Nurse                                 Physical Therapy Education       Title: PT OT SLP Therapies (Done)       Topic: Physical Therapy (Done)       Point: Mobility training (Done)       Learning Progress Summary             Patient Acceptance, E, VU by CR at 8/11/2024 1650   Significant Other Acceptance, E, VU by CR at 8/11/2024 1650                         Point: Precautions (Resolved)       Learner Progress:  Not documented in this visit.                              User Key       Initials Effective Dates Name Provider Type Discipline    CR 06/16/21 -  Reyes, Carmela, PT Physical Therapist PT                  PT Recommendation and Plan     Plan of Care Reviewed With: patient, significant other  Outcome Evaluation: 62 y/o female with known colon Ca and brain mets admitted on 8/10 following unwitnessed seizure. Patient is no longer pursuing Ca treatment. Patient lives with sig other and currently under hospice care. At time of eval, patient needed min A for supine to sit. Min/CGA for transfers and to ambulate 3 ft. Patient mostly transfers at home with sig other assistance thus at her baseline. No further skilled rehab needs at this time.     Time Calculation:         PT Charges       Row Name 08/11/24 1650             Time Calculation    Start Time 1200  -CR      Stop Time 1215  -CR      Time Calculation (min) 15 min  -CR      PT Received On 08/11/24  -CR         Time Calculation- PT    Total Timed Code Minutes- PT 0 minute(s)  -CR                User Key  (r) = Recorded By, (t) = Taken By, (c) = Cosigned By      Initials Name Provider Type    CR Reyes, Carmela, PT Physical Therapist                  Therapy Charges for Today       Code Description Service Date Service Provider Modifiers Qty    [id number] HC PT EVAL MOD COMPLEXITY 2 8/11/2024  Reyes, Carmela, PT GP 1            PT G-Codes  AM-PAC 6 Clicks Score (PT): 18  PT Discharge Summary  Anticipated Discharge Disposition (PT): home with assist    Carmela Reyes, PT  8/11/2024

## 2024-08-11 NOTE — PLAN OF CARE
Problem: Skin Injury Risk Increased  Goal: Skin Health and Integrity  Outcome: Ongoing, Progressing  Intervention: Optimize Skin Protection  Recent Flowsheet Documentation  Taken 8/10/2024 2000 by Sharmila Goldstein LPN  Pressure Reduction Techniques:   frequent weight shift encouraged   weight shift assistance provided  Head of Bed (HOB) Positioning: HOB elevated  Pressure Reduction Devices:   specialty bed utilized   pressure-redistributing mattress utilized  Skin Protection:   adhesive use limited   tubing/devices free from skin contact     Problem: Adult Inpatient Plan of Care  Goal: Plan of Care Review  Outcome: Ongoing, Progressing  Goal: Patient-Specific Goal (Individualized)  Outcome: Ongoing, Progressing  Goal: Absence of Hospital-Acquired Illness or Injury  Outcome: Ongoing, Progressing  Intervention: Identify and Manage Fall Risk  Recent Flowsheet Documentation  Taken 8/10/2024 2330 by Sharmila Goldstein LPN  Safety Promotion/Fall Prevention:   activity supervised   assistive device/personal items within reach  Taken 8/10/2024 2200 by Sharmila Goldstein LPN  Safety Promotion/Fall Prevention:   activity supervised   assistive device/personal items within reach  Taken 8/10/2024 2000 by Sharmila Goldstein LPN  Safety Promotion/Fall Prevention:   activity supervised   assistive device/personal items within reach  Intervention: Prevent Skin Injury  Recent Flowsheet Documentation  Taken 8/10/2024 2000 by Sharmila Goldstein LPN  Body Position:   turned   right  Skin Protection:   adhesive use limited   tubing/devices free from skin contact  Intervention: Prevent and Manage VTE (Venous Thromboembolism) Risk  Recent Flowsheet Documentation  Taken 8/10/2024 2000 by Sharmila Goldstein LPN  Activity Management: activity encouraged  VTE Prevention/Management:   bilateral   sequential compression devices off   patient refused intervention  Range of Motion: active ROM (range of motion) encouraged  Intervention: Prevent  Infection  Recent Flowsheet Documentation  Taken 8/10/2024 2330 by Sharmila Goldstein LPN  Infection Prevention:   environmental surveillance performed   equipment surfaces disinfected  Taken 8/10/2024 2200 by Sharmila Goldstein LPN  Infection Prevention:   environmental surveillance performed   equipment surfaces disinfected  Taken 8/10/2024 2000 by Sharmila Goldstein LPN  Infection Prevention:   environmental surveillance performed   equipment surfaces disinfected  Goal: Optimal Comfort and Wellbeing  Outcome: Ongoing, Progressing  Intervention: Monitor Pain and Promote Comfort  Recent Flowsheet Documentation  Taken 8/10/2024 2330 by Sharmila Goldstein LPN  Pain Management Interventions:   see MAR   quiet environment facilitated   position adjusted  Taken 8/10/2024 2300 by Sharmila Goldstein LPN  Pain Management Interventions:   see MAR   quiet environment facilitated   position adjusted  Intervention: Provide Person-Centered Care  Recent Flowsheet Documentation  Taken 8/10/2024 2000 by Sharmila Goldstein LPN  Trust Relationship/Rapport:   thoughts/feelings acknowledged   reassurance provided   questions answered   care explained  Goal: Readiness for Transition of Care  Outcome: Ongoing, Progressing     Problem: Fall Injury Risk  Goal: Absence of Fall and Fall-Related Injury  Outcome: Ongoing, Progressing  Intervention: Identify and Manage Contributors  Recent Flowsheet Documentation  Taken 8/10/2024 2330 by Sharmila Goldstein LPN  Medication Review/Management: medications reviewed  Taken 8/10/2024 2200 by Sharmila Goldstein LPN  Medication Review/Management: medications reviewed  Taken 8/10/2024 2000 by Sharmila Goldstein LPN  Medication Review/Management: medications reviewed  Intervention: Promote Injury-Free Environment  Recent Flowsheet Documentation  Taken 8/10/2024 2330 by Sharmila Goldstein LPN  Safety Promotion/Fall Prevention:   activity supervised   assistive device/personal items within reach  Taken 8/10/2024 2200 by  Angelita, Sharmila, LPN  Safety Promotion/Fall Prevention:   activity supervised   assistive device/personal items within reach  Taken 8/10/2024 2000 by Sharmila Goldstein LPN  Safety Promotion/Fall Prevention:   activity supervised   assistive device/personal items within reach     Problem: Seizure, Active Management  Goal: Absence of Seizure/Seizure-Related Injury  Outcome: Ongoing, Progressing   Goal Outcome Evaluation:

## 2024-08-11 NOTE — PLAN OF CARE
Goal Outcome Evaluation:  Plan of Care Reviewed With: patient, significant other           Outcome Evaluation: 62 y/o female with known colon Ca and brain mets admitted on 8/10 following unwitnessed seizure. Patient is no longer pursuing Ca treatment. Patient lives with sig other and currently under hospice care. At time of eval, patient needed min A for supine to sit. Min/CGA for transfers and to ambulate 3 ft. Patient mostly transfers at home with sig other assistance thus at her baseline. No further skilled rehab needs at this time.      Anticipated Discharge Disposition (PT): home with assist

## 2024-08-11 NOTE — DISCHARGE SUMMARY
[facility] Medicine Services  Discharge Summary    Date of Service: 2024  Patient Name: Samantha Massey  : 1960  MRN: [id number]    Date of Admission: 8/10/2024  Discharge Diagnosis:   Acute seizure  Acute UTI  Hypertensive urgency  Metastatic colon cancer with brain metastasis  Cerebral edema with brain compression secondary to brain metastasis      Date of Discharge: 2024  Primary Care Physician: iDana Spencer MD      Presenting Problem:   Seizure [R56.9]  Acute UTI [N39.0]  Breakthrough seizure [G40.919]  Sepsis, due to unspecified organism, unspecified whether acute organ dysfunction present [A41.9]    Active and Resolved Hospital Problems:  Active Hospital Problems    Diagnosis POA    UTI (urinary tract infection) due to urinary indwelling catheter [T83.511A, N39.0] Yes    Seizure [R56.9] Yes      Resolved Hospital Problems   No resolved problems to display.         Hospital Course     HPI:  Patient is a 63-year-old female who presented to the hospital after a witnessed seizure.  Please see H&P for details.    Hospital Course:  The patient has a known history of colon cancer with brain metastasis and has stopped all chemotherapy with a plan to transition to hospice care.  She saw her oncologist on , at which point all of her medications were stopped, including Keppra that she was on for seizure prophylaxis as well as Decadron for vasogenic edema.  However the next day she had a witnessed tonic-clonic seizure.  She was admitted and started back on her Keppra here.  She had no further seizure activity during her hospital stay.  I will discharge her on Keppra as well as oral Decadron daily to prevent any further seizures.  However the patient will continue on comfort care on discharge.  I will also prescribe her comfort care medications including oxycodone and Ativan.  She is planning to meet with hospice in the morning of  at home.  She was also found to have a  probable acute UTI and will be discharged on a short course of oral antibiotics.  She is stable for discharge.        DISCHARGE Follow Up Recommendations for labs and diagnostics: Follow-up with hospice on 8/12 as scheduled.      Reasons For Change In Medications and Indications for New Medications:      Day of Discharge     Vital Signs:  Temp:  [97.8 °F (36.6 °C)-98.7 °F (37.1 °C)] 98 °F (36.7 °C)  Heart Rate:  [] 83  Resp:  [13-18] 13  BP: (111-136)/(61-71) 129/67    Physical Exam:  Physical Exam   General Appearance:  Alert, cooperative, no distress, appears stated age  Head:  Normocephalic, without obvious abnormality, atraumatic  Eyes:  PERRL, conjunctiva/corneas clear, EOM's intact, fundi benign, both eyes  Ears:  Normal TM's and external ear canals, both ears  Nose: Nares normal, septum midline, mucosa normal, no drainage or sinus tenderness  Throat: Lips, mucosa, and tongue normal; teeth and gums normal  Neck: Supple, symmetrical, trachea midline, no adenopathy, thyroid: not enlarged, symmetric, no tenderness/mass/nodules, no carotid bruit or JVD  Lungs:   Clear to auscultation bilaterally, respirations unlabored  Heart:  Regular rate and rhythm, S1, S2 normal, no murmur, rub or gallop  Abdomen:  Soft, non-tender, bowel sounds active all four quadrants,  no masses, no organomegaly  Extremities: Extremities normal, atraumatic, no cyanosis or edema  Pulses: 2+ and symmetric  Skin: Skin color, texture, turgor normal, no rashes or lesions  Neurologic: Normal        Pertinent  and/or Most Recent Results     LAB RESULTS:      Lab 08/11/24  0427 08/10/24  0531 08/10/24  0527 08/08/24  0820   WBC 4.46  --  4.97 7.04   HEMOGLOBIN 8.8*  --  9.9* 10.2*   HEMATOCRIT 27.5*  --  31.1* 31.8*   PLATELETS 62*  --  54* 70*   NEUTROS ABS 3.83  --  4.04 5.71   IMMATURE GRANS (ABS) 0.06*  --  0.09*  --    LYMPHS ABS 0.31*  --  0.30* 0.56*   MONOS ABS 0.25  --  0.50 0.71   EOS ABS 0.00  --  0.03 0.04   MCV 95.2  --  96.3  100.6*   PROCALCITONIN  --   --  0.28*  --    LACTATE  --  1.3  --   --          Lab 08/11/24  0427 08/10/24  0527   SODIUM 138 137   POTASSIUM 3.4* 3.5   CHLORIDE 105 104   CO2 20.8* 18.2*   ANION GAP 12.2 14.8   BUN 31* 24*   CREATININE 0.57 0.51*   EGFR 102.9 105.7   GLUCOSE 180* 94   CALCIUM 8.6 8.8   MAGNESIUM 2.3  --    PHOSPHORUS 2.5  --          Lab 08/10/24  0527   TOTAL PROTEIN 6.2   ALBUMIN 3.6   GLOBULIN 2.6   ALT (SGPT) 31   AST (SGOT) 38*   BILIRUBIN 1.1   ALK PHOS 89                     Brief Urine Lab Results  (Last result in the past 365 days)        Color   Clarity   Blood   Leuk Est   Nitrite   Protein   CREAT   Urine HCG        08/10/24 0603 Yellow   Clear   Negative   Trace   Positive   Trace                 Microbiology Results (last 10 days)       Procedure Component Value - Date/Time    Blood Culture - Blood, Chest, Left [[id number]]  (Normal) Collected: 08/10/24 0601    Lab Status: Preliminary result Specimen: Blood from Chest, Left Updated: 08/11/24 0615     Blood Culture No growth at 24 hours    Blood Culture - Blood, Chest, Left [[id number]]  (Normal) Collected: 08/10/24 0601    Lab Status: Preliminary result Specimen: Blood from Chest, Left Updated: 08/11/24 0615     Blood Culture No growth at 24 hours    Respiratory Panel PCR w/COVID-19(SARS-CoV-2) [facility] In-House, NP Swab in UTM/VTM, 2 HR TAT - Swab, Nasopharynx [[id number]]  (Normal) Collected: 08/10/24 0533    Lab Status: Final result Specimen: Swab from Nasopharynx Updated: 08/10/24 0628     ADENOVIRUS, PCR Not Detected     Coronavirus 229E Not Detected     Coronavirus HKU1 Not Detected     Coronavirus NL63 Not Detected     Coronavirus OC43 Not Detected     COVID19 Not Detected     Human Metapneumovirus Not Detected     Human Rhinovirus/Enterovirus Not Detected     Influenza A PCR Not Detected     Influenza B PCR Not Detected     Parainfluenza Virus 1 Not Detected     Parainfluenza Virus 2 Not Detected      Parainfluenza Virus 3 Not Detected     Parainfluenza Virus 4 Not Detected     RSV, PCR Not Detected     Bordetella pertussis pcr Not Detected     Bordetella parapertussis PCR Not Detected     Chlamydophila pneumoniae PCR Not Detected     Mycoplasma pneumo by PCR Not Detected    Narrative:      In the setting of a positive respiratory panel with a viral infection PLUS a negative procalcitonin without other underlying concern for bacterial infection, consider observing off antibiotics or discontinuation of antibiotics and continue supportive care. If the respiratory panel is positive for atypical bacterial infection (Bordetella pertussis, Chlamydophila pneumoniae, or Mycoplasma pneumoniae), consider antibiotic de-escalation to target atypical bacterial infection.            CT Head Without Contrast    Result Date: 8/10/2024  Impression: Impression: Multiple intracranial masses consistent with known metastases. There is extensive vasogenic edema surrounding the masses. There is no convincing acute hemorrhage. Electronically Signed: Hernandez Espino MD  8/10/2024 6:51 AM EDT  Workstation ID: [id number]    XR Chest 1 View    Result Date: 8/10/2024  Impression: Impression: Innumerable bilateral pulmonary nodules consistent with metastatic disease. Size and number of the nodules has increased in the interval. There is mild right basilar atelectasis. Electronically Signed: Hernandez Espino MD  8/10/2024 6:04 AM EDT  Workstation ID: [id number]             Results for orders placed during the hospital encounter of 01/23/24    Adult Transthoracic Echo Complete W/ Cont if Necessary Per Protocol    Interpretation Summary    Left ventricular systolic function is normal. Calculated left ventricular EF = 55% Left ventricular ejection fraction appears to be 51 - 55%.    Left ventricular diastolic function is consistent with (grade I) impaired relaxation.    The left atrial cavity is mildly dilated.    Estimated right ventricular systolic  pressure from tricuspid regurgitation is mildly elevated (35-45 mmHg).      Labs Pending at Discharge:  Pending Labs       Order Current Status    Blood Culture - Blood, Chest, Left Preliminary result    Blood Culture - Blood, Chest, Left Preliminary result            Procedures Performed           Consults:   Consults       Date and Time Order Name Status Description    8/10/2024  5:01 PM Inpatient Palliative Care MD Consult      8/10/2024  8:19 AM Inpatient Neurology Consult General Completed     8/10/2024  7:01 AM Inpatient Hospitalist Consult                Discharge Details        Discharge Medications        New Medications        Instructions Start Date   cefdinir 300 MG capsule  Commonly known as: OMNICEF   300 mg, Oral, 2 Times Daily      LORazepam 1 MG tablet  Commonly known as: Ativan   1 mg, Oral, Every 8 Hours PRN      ondansetron 4 MG tablet  Commonly known as: Zofran   4 mg, Oral, Every 8 Hours PRN             Changes to Medications        Instructions Start Date   dexAMETHasone 4 MG tablet  Commonly known as: DECADRON  What changed: when to take this   4 mg, Oral, Daily With Breakfast             Continue These Medications        Instructions Start Date   levETIRAcetam 1000 MG tablet  Commonly known as: Keppra   1,000 mg, Oral, 2 Times Daily      oxyCODONE 5 MG immediate release tablet  Commonly known as: Roxicodone   5 mg, Oral, Every 4 Hours PRN      traMADol 50 MG tablet  Commonly known as: ULTRAM   50 mg, Oral, Every 6 Hours PRN             Stop These Medications      aspirin 81 MG EC tablet     atorvastatin 80 MG tablet  Commonly known as: LIPITOR     empagliflozin 10 MG tablet tablet  Commonly known as: JARDIANCE     HYDROcodone-acetaminophen 5-325 MG per tablet  Commonly known as: NORCO     Lysine 500 MG capsule     memantine 28 MG capsule sustained-release 24 hr extended release capsule  Commonly known as: NAMENDA XR     metoprolol succinate XL 25 MG 24 hr tablet  Commonly known as:  TOPROL-XL     Vitamin B-12 5000 MCG sublingual tablet              Allergies   Allergen Reactions    Hydrocodone Nausea And Vomiting    Iodinated Contrast Media Hives and Itching     PT HAD SOME HIVES DURING SCAN.  PRIOR TO SCAN 8-3-2013.    Latex Rash     Blisters     Penicillins Hives         Discharge Disposition:     Home or Self Care    Diet:  Hospital:  Diet Order   Procedures    Diet: Regular/House; Fluid Consistency: Thin (IDDSI 0)         Discharge Activity:         CODE STATUS:  Code Status and Medical Interventions: No CPR (Do Not Attempt to Resuscitate); Limited Support; No intubation (DNI)   Ordered at: 08/10/24 0705     Medical Intervention Limits:    No intubation (DNI)     Level Of Support Discussed With:    Health Care Surrogate     Code Status (Patient has no pulse and is not breathing):    No CPR (Do Not Attempt to Resuscitate)     Medical Interventions (Patient has pulse or is breathing):    Limited Support         Future Appointments   Date Time Provider Department Center   8/16/2024 11:30 AM Sj Burgos MD [facility] None   9/13/2024  2:35 PM [facility] PORT CHAIR [facility]   9/13/2024  2:45 PM Sean Adams MD [facility] ONC NA [facility]   10/23/2024  2:00 PM Ramiro Olivera MD [facility]       Additional Instructions for the Follow-ups that You Need to Schedule       Discharge Follow-up with Specialty: Follow-up with hospice on 8/12; 1 Day   As directed      Specialty: Follow-up with hospice on 8/12   Follow Up: 1 Day                Time spent on Discharge including face to face service:  >30 minutes    Signature: Electronically signed by Darnell Dorantes MD, 08/11/24, 12:00 EDT.  [facility] Hospitalist Team

## 2024-08-11 NOTE — CASE MANAGEMENT/SOCIAL WORK
Continued Stay Note   [facility]     Patient Name: Samantha Massey  MRN: [id number]  Today's Date: 8/11/2024    Admit Date: 8/10/2024    Plan: Return home w/SO and home hospice.   Discharge Plan       Row Name 08/11/24 1592       Plan    Plan Return home w/SO and home hospice.    Plan Comments CM notified by Dr. Dorantes and ALETA Arnold that pt will need BSC and transport set up for d/c. Referral to [facility] placed in epic basket. BSC delivered to pt room. W/c van requested, dispatch notified.             Expected Discharge Date and Time       Expected Discharge Date Expected Discharge Time    Aug 11, 2024           Zena Chandler RN      Office Phone: [phone]  Office Cell: [phone]

## 2024-08-11 NOTE — OUTREACH NOTE
Prep Survey      Flowsheet Row Responses   [facility] patient discharged from? [facility]   Is LACE score < 7 ? No   Eligibility [facility]   Date of Admission 08/10/24   Date of Discharge 08/11/24   Discharge Disposition Home or Self Care   Discharge diagnosis UTI (urinary tract infection) due to urinary indwelling catheter   Does the patient have one of the following disease processes/diagnoses(primary or secondary)? Other   Does the patient have Home health ordered? Yes   What is the Home health agency?  family has meeting with hospice on 8/12   Is there a DME ordered? Yes   What DME was ordered? BSC   Prep survey completed? Yes            Chrissy ABDI - Registered Nurse

## 2024-08-12 ENCOUNTER — TRANSITIONAL CARE MANAGEMENT TELEPHONE ENCOUNTER (OUTPATIENT)
Dept: CALL CENTER | Facility: HOSPITAL | Age: 64
End: 2024-08-12
Payer: COMMERCIAL

## 2024-08-12 NOTE — PAYOR COMM NOTE
"Inpatient order 8/10/24      ER admit   ---------------------------  AUTHORIZATION PENDING:   PLEASE FAX OR CALL DETERMINATION TO CONTACT BELOW:       THANK YOU,    ODALYS EmmanuelN, RN  Utilization Review  [facility]  Phone: [phone]  Fax: [phone]      NPI: [id number]  Tax ID: [id number]        Andreas Massey \"NORBERT\" (63 y.o. Female)       Date of Birth   1960    Social Security Number       [place]   [address] [place] [address] [facility] [place] [address]    Home Phone   [phone]    MRN   [id number]       Mormon   Non-Amish    Marital Status   Single                            Admission Date   8/10/24    Admission Type   Emergency    Admitting Provider   Uli Maki MD    Attending Provider       Department, Room/Bed   [facility] NEURO,        Discharge Date   2024    Discharge Disposition   Home or Self Care    Discharge Destination                                 Attending Provider: (none)   Allergies: Hydrocodone, Iodinated Contrast Media, Latex, Penicillins    Isolation: None   Infection: None   Code Status: Prior    Ht: 157.5 cm (62\")   Wt: 50.7 kg (111 lb 12.4 oz)    Admission Cmt: None   Principal Problem: None                  Active Insurance as of 8/10/2024       Primary Coverage       Payor Plan Insurance Group Employer/Plan Group    ANTH BLUE CROSS ANTH BLUE CROSS BLUE SHIELD PPO [id number]       Payor Plan Address Payor Plan Phone Number Payor Plan Fax Number Effective Dates    [address] [phone]  2020 - None Entered    [facility] [id number]         Subscriber Name Subscriber Birth Date Member ID       ANDREAS MASSEY 1960 [id number]                     Emergency Contacts        (Rel.) Home Phone Work Phone Mobile Phone    BRENT FINE (Other) [phone] -- [phone]                 History & Physical        Uli Maki MD at 08/10/24 0743            History and Physical   Andreas Massey : 1960 " MRN:[id number] LOS:0     Reason for admission: Breakthrough seizure     Assessment / Plan     #New onset Seizure 2/2 underlying brain metastasis/ complicated UTI   -pt presented with tonic clonic seizure and post ictal confusion   -she has been lethargic x 1 day and seems like there is foaming from her mouth on Friday -? Unwitnessed seizure  and she got seizure witnessed by family 3 AM today   -she has HTN urgency and tachycardia, fever in the ED   -this is her first time seizure   -labs with increased procal, and UTI, NAGMA   -RVP negative   -EKG with sinus tachycardia  -CXR with Innumerable bilateral pulmonary nodules consistent with metastatic disease. Size and number of the nodules has increased in the interval. There is mild right basilar atelectasis.   -CT head with Multiple intracranial masses consistent with known metastases. There is extensive vasogenic edema surrounding the masses. There is no convincing acute hemorrhage.  ( known case - admitted recently for weakness and Dx with brain metastasis and neuroSx neurology seen at that time and no plan for surgery and oncologist decided for stereotactic radiosurgery. )   -on dexamethasone and keppra at home - will continue   -got one dose of Keppra 1000 mg in ED - keppra 1000 mg IV bid for now   -rocephin started   -BCX and UCX to follow up   -fall, seizure precaution  -PT to see   -neuro to see for med adjustment     #HTN urgency   #Sinus tachycardia   -SBP  > 180  -amlodipine and meto succinate   -Echo with EF of 51%   -labetalol prn added   -tele     # Metastatic colon cancer   -plan for hospice   -Dr Santillan as oncologist      #[facility]  -hospice care plan   -DNR DNI   -ok with Abx and medication for seizure     Medical Intervention Limits: No intubation (DNI)  Level Of Support Discussed With: Health Care Surrogate  Code Status (Patient has no pulse and is not breathing): No CPR (Do Not Attempt to Resuscitate)  Medical Interventions (Patient has pulse or is  breathing): Limited Support       Nutrition: Diet: Regular/House; Fluid Consistency: Thin (IDDSI 0)     DVT Prophylaxis: Active VTE Prophylaxis  Mechanical:        Start        08/10/24 0741  Maintain Sequential Compression Device  Continuous                          Select Pharmacologic VTE Prophylaxis if Desired & Appropriate      History of Present illness       Samantha Massey is a 63 y.o. female with a past medical history of hyperlipidemia, coronary artery disease, hypertension, grade 1 diastolic heart dysfunction, and colon cancer status postchemotherapy and metastasis to the lung and brain ( no plan for surgery ) presented with 1 min of tonic clonic seizure at home and confused after. This is her first time seizure. She got the radiation 3 weeks ago to brain and since then decadron has been tapered down and Keppra plan for taper down as well to alternate day. She has been lethargic x 1 day and seems like there is foaming from her mouth on Friday -? Unwitnessed seizure  and she got seizure witnessed by family 3 AM today  In the ED, CT head showing brain metastasis with vasogenic oedema. She has UTI and increased procal as well. CXR with masses. Rocephin started. Keppra 1000 mg IV bid started. Neuro consulted for the further seizure med adjustment. Decadron resumed.     Of note, She was admitted recently for weakness and found to have brain metastasis and dc with plan for stereotactic radiosurgery. Now plan for hospice care.     She will be admitted for seizure and UTI.       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    History of colon cancer, stage IV     Stage SHELL with progression    Hypertension     NSTEMI (non-ST elevated myocardial infarction) 2023    PONV (postoperative nausea and vomiting)     Pulmonary arterial hypertension     Radiculopathy 2012    Right L5/S1      Past Surgical History:   Procedure Laterality Date    ABDOMINAL SURGERY      CARDIAC CATHETERIZATION N/A 2023    Procedure: Left Heart Cath;  Surgeon: Ramiro Olivera MD;  Location: [facility] CATH INVASIVE LOCATION;  Service: Cardiovascular;  Laterality: N/A;    COLON SURGERY  Colon resection    CYST REMOVAL      cyst removed from [facility] in     HERNIA REPAIR  incisional    PORTACATH PLACEMENT  2017    VENOUS ACCESS DEVICE (PORT) INSERTION Left 2021    Procedure: INSERTION VENOUS ACCESS DEVICE;  Surgeon: Jay Yeh MD;  Location: [facility] MAIN OR;  Service: General;  Laterality: Left;    VENOUS ACCESS DEVICE (PORT) REMOVAL Right 2020    Procedure: REMOVAL VENOUS ACCESS DEVICE;  Surgeon: Jay Yeh MD;  Location: [facility] MAIN OR;  Service: General;  Laterality: Right;    VENTRAL/INCISIONAL HERNIA REPAIR N/A 2023    Procedure: VENTRAL/INCISIONAL HERNIA REPAIR WITH MESH;  Surgeon: Jay Yeh MD;  Location: [facility] MAIN OR;  Service: General;  Laterality: N/A;      Social History     Socioeconomic History    Marital status: Single   Tobacco Use    Smoking status: Former     Current packs/day: 0.00     Average packs/day: 1 pack/day for 31.0 years (31.0 ttl pk-yrs)     Types: Cigarettes     Start date: 1981     Quit date: 2012     Years since quittin.6     Passive exposure: Past    Smokeless tobacco: Never   Vaping Use    Vaping status: Never Used   Substance and Sexual Activity    Alcohol use: Yes     Comment: 2 or 3 a month    Drug use: Never    Sexual activity: Not Currently     Partners: Female     Birth  control/protection: Same-sex partner      Family History   Problem Relation Age of Onset    Heart disease Mother     Lung cancer Father 70        Associated to cigarette smoking    Heart disease Father     Hyperlipidemia Father     Cancer Father     Cancer Sister         Bladder cancer      Allergies   Allergen Reactions    Hydrocodone Nausea And Vomiting    Iodinated Contrast Media Hives and Itching     PT HAD SOME HIVES DURING SCAN.  PRIOR TO SCAN 8-3-2013.    Latex Rash     Blisters     Penicillins Hives        Home Medications     Prior to Admission medications    Medication Sig Start Date End Date Taking? Authorizing Provider   aspirin 81 MG EC tablet Take 1 tablet by mouth Daily.    ProviderRaffaele MD   atorvastatin (LIPITOR) 80 MG tablet Take 1 tablet by mouth Daily for 360 days. 1/3/24 12/28/24  Ramiro Olivera MD   Cyanocobalamin (Vitamin B-12) 5000 MCG sublingual tablet Place 1 tablet under the tongue Every Morning.    Raffaele Ortiz MD   dexAMETHasone (DECADRON) 4 MG tablet Take 1 tablet by mouth 4 (Four) Times a Day. 6/28/24   Ravin Guy MD   empagliflozin (JARDIANCE) 10 MG tablet tablet Take 1 tablet by mouth Daily for 360 days. 1/3/24 12/28/24  Ramiro Olivera MD   HYDROcodone-acetaminophen (NORCO) 5-325 MG per tablet Take 1 tablet by mouth Every 6 (Six) Hours As Needed for Moderate Pain. 7/8/24   Diana Spencer MD   levETIRAcetam (Keppra) 1000 MG tablet Take 1 tablet by mouth 2 (Two) Times a Day. 6/28/24   Ravin Guy MD   Lysine 500 MG capsule Take 1 tablet by mouth 2 (Two) Times a Day.    ProviderRaffaele MD   memantine (NAMENDA XR) 28 MG capsule sustained-release 24 hr extended release capsule Take 1 capsule by mouth Daily. 6/30/24   Sj Burgos MD   metoprolol succinate XL (TOPROL-XL) 25 MG 24 hr tablet Take 1 tablet by mouth Daily for 360 days. 1/3/24 12/28/24  Ramiro Olivera MD   oxyCODONE (Roxicodone) 5 MG immediate release tablet  Take 1 tablet by mouth Every 4 (Four) Hours As Needed for Moderate Pain. 7/25/24   Sean Adams MD   traMADol (ULTRAM) 50 MG tablet Take 1 tablet by mouth Every 6 (Six) Hours As Needed for Moderate Pain. 7/15/24   Sj Burgos MD      Objective / Physical Exam   Vital signs:  Temp: (!) 101.7 °F (38.7 °C)  BP: (!) 195/114  Heart Rate: (!) 153  Resp: 20  SpO2: 93 %  Weight: 50.7 kg (111 lb 12.4 oz)    Admission Weight: Weight: 50.7 kg (111 lb 12.4 oz)    Physical Exam       Physical Exam  HENT:      Head: Normocephalic and atraumatic.      Nose: Nose normal.   Eyes:      Extraocular Movements: Extraocular movements intact.      Conjunctivae/sclera: Conjunctivae normal.   Cardiovascular:      Rate and Rhythm: normal       Pulses: Normal pulses.      Heart sounds: Normal heart sounds.   Pulmonary:      Reduced BS    Abdominal:      General: Abdomen is flat. Bowel sounds are normal.      Palpations: Abdomen is soft.   Skin:     General: Skin is dry.   Neurological:       Lethargic        Labs     Results from last 7 days   Lab Units 08/10/24  0527 08/08/24  0820   WBC 10*3/mm3 4.97 7.04   HEMATOCRIT % 31.1* 31.8*   PLATELETS 10*3/mm3 54* 70*      Results from last 7 days   Lab Units 08/10/24  0527   SODIUM mmol/L 137   POTASSIUM mmol/L 3.5   CHLORIDE mmol/L 104   CO2 mmol/L 18.2*   BUN mg/dL 24*   CREATININE mg/dL 0.51*        Current Medications   Scheduled Meds:sodium chloride, 10 mL, Intravenous, Q12H         Continuous Infusions:      Uli Mkai MD  [facility]   08/10/24   07:43 EDT         Electronically signed by Uli Maki MD at 08/10/24 1216          Emergency Department Notes        Donato Amaral RN at 08/10/24 1537          Report given to nurse Tamra taking pt has been unavailable.     Electronically signed by Donato Amaral, ALETA at 08/10/24 1537       Donato Amaral RN at 08/10/24 1531          Attempted to call report and was told nurse would call back and have been unsuccessful with receiving  that call for approximently 15 mins. Attempted to call again and on hold.     Electronically signed by Donato Amaral RN at 08/10/24 1532       Donato Amaral RN at 08/10/24 1254          Pt placed on hospital bed , sheets changed , CHG bath provided , placed mepilex to sacral area and observed scattered bruises. PT A&Ox3 , perrla , changed gown .     Electronically signed by Donato Amaral RN at 08/10/24 1256       Donato Amaral RN at 08/10/24 0722          Assumed Care of PT. MD bedside.    Electronically signed by Donato Amaral RN at 08/10/24 0722       Vital Signs (last 3 days) before discharge       Date/Time Temp Temp src Pulse Resp BP Patient Position SpO2    08/11/24 1145 98 (36.7) Oral 83 13 129/67 Lying 99    08/11/24 0700 97.8 (36.6) -- 98 -- 136/71 -- 100    08/11/24 0416 97.8 (36.6) Oral 92 14 125/65 Lying 97    08/10/24 2330 98.7 (37.1) Oral -- 18 -- Lying --    08/10/24 2016 98.3 (36.8) Oral 112 16 120/67 Lying 98    08/10/24 1700 98.3 (36.8) Oral 93 -- 111/61 -- 99    08/10/24 1636 -- -- 95 16 132/65 Lying 98    08/10/24 1529 -- -- 105 -- 131/67 -- --    08/10/24 0919 98.4 (36.9) Oral 119 21 162/88 Lying 97    08/10/24 0850 -- -- 120 -- -- -- 97    08/10/24 0844 -- -- 118 -- 172/96 -- 96    08/10/24 06:10:55 -- -- -- 20 -- -- --    08/10/24 0601 -- -- 153 -- 195/114 -- 93    08/10/24 0546 -- -- 139 -- 187/107 -- 94    08/10/24 0531 -- -- 151 -- 203/112 -- 95    08/10/24 0527 101.7 (38.7) Rectal -- -- -- -- --    08/10/24 0521 -- -- 140 -- 176/100 -- 94    08/10/24 0501 100.2 (37.9) -- -- -- -- -- --    08/10/24 0459 -- -- 152 28 190/103 -- 97          Facility-Administered Medications as of 8/11/2024   Medication Dose Route Frequency Provider Last Rate Last Admin    [COMPLETED] acetaminophen (TYLENOL) suppository 650 mg  650 mg Rectal Once Mcallen, Jay DANIELS MD   650 mg at 08/10/24 0537    [COMPLETED] cefTRIAXone (ROCEPHIN) 2,000 mg in sodium chloride 0.9 % 100 mL MBP  2,000 mg Intravenous Once Mcallen,  Jay DANIELS MD   Stopped at 08/10/24 0853    [COMPLETED] heparin injection 500 Units  500 Units Intracatheter Once Darnell Dorantes MD   500 Units at 08/11/24 1231    [COMPLETED] levETIRAcetam (KEPPRA) injection 1,000 mg  1,000 mg Intravenous Once Jay Morin MD   1,000 mg at 08/10/24 0537    [COMPLETED] metoprolol tartrate (LOPRESSOR) injection 5 mg  5 mg Intravenous Once Jay Morin MD   5 mg at 08/10/24 0720    [COMPLETED] potassium chloride (KLOR-CON) packet 40 mEq  40 mEq Oral Q4H Darnell Dorantes MD   20 mEq at 08/11/24 1126     Physician Progress Notes (all)    No notes of this type exist for this encounter.          Consult Notes (all)        Courtney Dominguez APRN at 08/10/24 1326        Consult Orders    1. Inpatient Neurology Consult General [[id number]] ordered by Uli Maki MD at 08/10/24 0819              Attestation signed by Ana Purvis MD at 08/10/24 1402    I have reviewed this documentation and agree.    Per family, there is a possibility that the patient had stopped taking her LEV.   Continue with current dose of 1000 mg BID. If she has another episode, will increase to 1250 BID.   LEV level                  Primary Care Provider: Provider, No Known     Consult requested by: Dr. Maki    Reason for Consultation: Neurological evaluation, seizure     History taken from: patient chart    Chief complaint: Seizure    Subjective   SUBJECTIVE:    History of present illness: Background per H&P: Samantha Massey is a 63 y.o. year old female with a past medical history of hyperlipidemia, coronary artery disease, hypertension, grade 1 diastolic heart dysfunction, and colon cancer status postchemotherapy and metastasis to the lung and brain ( no plan for surgery ) presented with 1 min of tonic clonic seizure at home and confused after. This is her first time seizure. She got the radiation 3 weeks ago to brain and since then decadron has been tapered down and Keppra plan for taper down as well to  alternate day. She has been lethargic x 1 day and seems like there is foaming from her mouth on Friday -? Unwitnessed seizure  and she got seizure witnessed by family 3 AM today  In the ED, CT head showing brain metastasis with vasogenic oedema. She has UTI and increased procal as well. CXR with masses. Rocephin started. Keppra 1000 mg IV bid started. Neuro consulted for the further seizure med adjustment. Decadron resumed.     Of note, She was admitted recently for weakness and found to have brain metastasis and dc with plan for stereotactic radiosurgery. Now plan for hospice care.     - Portions of the above HPI were copied from previous encounters and edited as appropriate. PMH as detailed below.     Chart reviewed    Patient's sister and brother at bedside.  Patient had 1 witnessed seizure and since then has been sleeping.  She does wake up to voice and able to tell me name and location.  She does follow simple commands.  Family states that patient has decided to go hospice care and stop all treatment.  Family feels that both the steroids and the Keppra was stopped within the past week after she made that decision although they are not for sure.  We discussed that the brain metastasis and UTI definitely put her at risk for additional seizures and would recommend continue at least the 1 g of Keppra twice daily during hospice.     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    History of colon cancer, stage IV     Stage SHELL with progression    Hypertension     NSTEMI (non-ST elevated myocardial  infarction) 2023    PONV (postoperative nausea and vomiting)     Pulmonary arterial hypertension     Radiculopathy 2012    Right L5/S1   ,   Past Surgical History:   Procedure Laterality Date    ABDOMINAL SURGERY      CARDIAC CATHETERIZATION N/A 2023    Procedure: Left Heart Cath;  Surgeon: Ramiro Olivera MD;  Location: [facility] CATH INVASIVE LOCATION;  Service: Cardiovascular;  Laterality: N/A;    COLON SURGERY  Colon resection    CYST REMOVAL      cyst removed from [facility] in     HERNIA REPAIR  incisional    PORTACATH PLACEMENT  2017    VENOUS ACCESS DEVICE (PORT) INSERTION Left 2021    Procedure: INSERTION VENOUS ACCESS DEVICE;  Surgeon: Jay Yeh MD;  Location: [facility] MAIN OR;  Service: General;  Laterality: Left;    VENOUS ACCESS DEVICE (PORT) REMOVAL Right 2020    Procedure: REMOVAL VENOUS ACCESS DEVICE;  Surgeon: Jay Yeh MD;  Location: [facility] MAIN OR;  Service: General;  Laterality: Right;    VENTRAL/INCISIONAL HERNIA REPAIR N/A 2023    Procedure: VENTRAL/INCISIONAL HERNIA REPAIR WITH MESH;  Surgeon: Jay Yeh MD;  Location: [facility] MAIN OR;  Service: General;  Laterality: N/A;   ,   Family History   Problem Relation Age of Onset    Heart disease Mother     Lung cancer Father 70        Associated to cigarette smoking    Heart disease Father     Hyperlipidemia Father     Cancer Father     Cancer Sister         Bladder cancer   ,   Social History     Tobacco Use    Smoking status: Former     Current packs/day: 0.00     Average packs/day: 1 pack/day for 31.0 years (31.0 ttl pk-yrs)     Types: Cigarettes     Start date: 1981     Quit date: 2012     Years since quittin.6     Passive exposure: Past    Smokeless tobacco: Never   Vaping Use    Vaping status: Never Used   Substance Use Topics    Alcohol use: Yes     Comment: 2 or 3 a month    Drug use: Never   ,   Prior to Admission medications    Medication Sig  Start Date End Date Taking? Authorizing Provider   aspirin 81 MG EC tablet Take 1 tablet by mouth Daily.    Raffaele Ortiz MD   atorvastatin (LIPITOR) 80 MG tablet Take 1 tablet by mouth Daily for 360 days. 1/3/24 12/28/24  Ramiro Olivera MD   Cyanocobalamin (Vitamin B-12) 5000 MCG sublingual tablet Place 1 tablet under the tongue Every Morning.    Raffaele Ortiz MD   dexAMETHasone (DECADRON) 4 MG tablet Take 1 tablet by mouth 4 (Four) Times a Day. 6/28/24   Ravin Guy MD   empagliflozin (JARDIANCE) 10 MG tablet tablet Take 1 tablet by mouth Daily for 360 days. 1/3/24 12/28/24  Ramiro Olivera MD   HYDROcodone-acetaminophen (NORCO) 5-325 MG per tablet Take 1 tablet by mouth Every 6 (Six) Hours As Needed for Moderate Pain. 7/8/24   Diana Spencer MD   levETIRAcetam (Keppra) 1000 MG tablet Take 1 tablet by mouth 2 (Two) Times a Day. 6/28/24   Ravin Guy MD   Lysine 500 MG capsule Take 1 tablet by mouth 2 (Two) Times a Day.    ProviderRaffaele MD   memantine (NAMENDA XR) 28 MG capsule sustained-release 24 hr extended release capsule Take 1 capsule by mouth Daily. 6/30/24   Sj Burgos MD   metoprolol succinate XL (TOPROL-XL) 25 MG 24 hr tablet Take 1 tablet by mouth Daily for 360 days. 1/3/24 12/28/24  Ramiro Olivera MD   oxyCODONE (Roxicodone) 5 MG immediate release tablet Take 1 tablet by mouth Every 4 (Four) Hours As Needed for Moderate Pain. 7/25/24   Sean Adams MD   traMADol (ULTRAM) 50 MG tablet Take 1 tablet by mouth Every 6 (Six) Hours As Needed for Moderate Pain. 7/15/24   Sj Burgos MD    Allergies:  Hydrocodone, Iodinated contrast media, Latex, and Penicillins    Current Facility-Administered Medications   Medication Dose Route Frequency Provider Last Rate Last Admin    acetaminophen (TYLENOL) tablet 650 mg  650 mg Oral Q4H PRN Uli Maki MD        Or    acetaminophen (TYLENOL) 160 MG/5ML oral solution 650 mg  650 mg Oral Q4H  Uli Delaney MD        Or    acetaminophen (TYLENOL) suppository 650 mg  650 mg Rectal Q4H PRN Uli Maki MD        aluminum-magnesium hydroxide-simethicone (MAALOX MAX) 400-400-40 MG/5ML suspension 15 mL  15 mL Oral Q6H PRUli Adam MD        amLODIPine (NORVASC) tablet 10 mg  10 mg Oral Q24H Uli Maki MD   10 mg at 08/10/24 0845    sennosides-docusate (PERICOLACE) 8.6-50 MG per tablet 2 tablet  2 tablet Oral BID PRN Uli Maki MD        And    polyethylene glycol (MIRALAX) packet 17 g  17 g Oral Daily PRUli Adam MD        And    bisacodyl (DULCOLAX) EC tablet 5 mg  5 mg Oral Daily PRN Uli Maki MD   5 mg at 08/10/24 0851    And    bisacodyl (DULCOLAX) suppository 10 mg  10 mg Rectal Daily PRN Uli Maki MD        Calcium Replacement - Follow Nurse / BPA Driven Protocol   Does not apply Uli Delaney MD        [START ON 8/11/2024] cefTRIAXone (ROCEPHIN) 1,000 mg in sodium chloride 0.9 % 100 mL MBP  1,000 mg Intravenous Q24H Uli Maki MD        dexAMETHasone (DECADRON) tablet 4 mg  4 mg Oral Q6H Uli Maki MD   4 mg at 08/10/24 0844    HYDROcodone-acetaminophen (NORCO) 5-325 MG per tablet 1 tablet  1 tablet Oral Q6H PRN Uli Maki MD   1 tablet at 08/10/24 0844    labetalol (NORMODYNE,TRANDATE) injection 10 mg  10 mg Intravenous Q4H PRN Uli Maki MD        levETIRAcetam (KEPPRA) injection 1,000 mg  1,000 mg Intravenous Q12H Uli Maki MD        Magnesium Cardiology Dose Replacement - Follow Nurse / BPA Driven Protocol   Does not apply Uli Delaney MD        metoprolol succinate XL (TOPROL-XL) 24 hr tablet 25 mg  25 mg Oral Q24H Uli Maki MD   25 mg at 08/10/24 0844    metoprolol tartrate (LOPRESSOR) injection 5 mg  5 mg Intravenous Q4H PRN Uli Maki MD        nitroglycerin (NITROSTAT) SL tablet 0.4 mg  0.4 mg Sublingual Q5 Min PRN Uli Maki MD        ondansetron ODT (ZOFRAN-ODT) disintegrating tablet 4 mg  4 mg Oral Q6H PRN Uli Maki MD   4 mg at 08/10/24 0851    Or     ondansetron (ZOFRAN) injection 4 mg  4 mg Intravenous Q6H PRN Uli Maki MD        oxyCODONE (ROXICODONE) immediate release tablet 5 mg  5 mg Oral Q4H PRN Uli Maki MD   5 mg at 08/10/24 0844    Phosphorus Replacement - Follow Nurse / BPA Driven Protocol   Does not apply Uli Delaney MD        Potassium Replacement - Follow Nurse / BPA Driven Protocol   Does not apply Uli Delaney MD        sodium chloride 0.9 % flush 10 mL  10 mL Intravenous PRN Jay Morin MD        sodium chloride 0.9 % flush 10 mL  10 mL Intravenous Q12H Uli Maki MD        sodium chloride 0.9 % flush 10 mL  10 mL Intravenous PRN Uli Maki MD        sodium chloride 0.9 % infusion 40 mL  40 mL Intravenous PRN Uli Maki MD         Current Outpatient Medications   Medication Sig Dispense Refill    aspirin 81 MG EC tablet Take 1 tablet by mouth Daily.      atorvastatin (LIPITOR) 80 MG tablet Take 1 tablet by mouth Daily for 360 days. 90 tablet 3    Cyanocobalamin (Vitamin B-12) 5000 MCG sublingual tablet Place 1 tablet under the tongue Every Morning.      dexAMETHasone (DECADRON) 4 MG tablet Take 1 tablet by mouth 4 (Four) Times a Day. 60 tablet 0    empagliflozin (JARDIANCE) 10 MG tablet tablet Take 1 tablet by mouth Daily for 360 days. 90 tablet 3    HYDROcodone-acetaminophen (NORCO) 5-325 MG per tablet Take 1 tablet by mouth Every 6 (Six) Hours As Needed for Moderate Pain. 30 tablet 0    levETIRAcetam (Keppra) 1000 MG tablet Take 1 tablet by mouth 2 (Two) Times a Day. 60 tablet 0    Lysine 500 MG capsule Take 1 tablet by mouth 2 (Two) Times a Day.      memantine (NAMENDA XR) 28 MG capsule sustained-release 24 hr extended release capsule Take 1 capsule by mouth Daily. 30 capsule 5    metoprolol succinate XL (TOPROL-XL) 25 MG 24 hr tablet Take 1 tablet by mouth Daily for 360 days. 90 tablet 3    oxyCODONE (Roxicodone) 5 MG immediate release tablet Take 1 tablet by mouth Every 4 (Four) Hours As Needed for Moderate Pain. 120 tablet  0    traMADol (ULTRAM) 50 MG tablet Take 1 tablet by mouth Every 6 (Six) Hours As Needed for Moderate Pain. 50 tablet 1        ________________________________________________________     Objective   OBJECTIVE:    PHYSICAL EXAM:    Constitutional: The patient is in no apparent distress, lethargic    Head: Normocephalic, atraumatic.     Chest: No respiratory distress.    Cardiac: Regular rate and rhythm.     Extremities:  No clubbing, cyanosis, or edema.     NEUROLOGICAL:    Cognition:   Lethargic   Opens eyes to voice  Follows simple commands  Squeezed hand on the right  Exam limited given mental status      Cranial nerves;  Left facial asymmetry   Pupils equal and reactive  Tracking   Exam limited given mental status    Sensory:  Moans and withdraws to painful stimuli    Motor: Weakness noted to LUE. Pt able to squeeze hand RUE. Wiggles toes bilaterally     Cerebellar and gait not tested given patient's mental status    Physical exam performed by MAYA Cuevas.     ________________________________________________________   RESULTS REVIEW:    VITAL SIGNS:   Temp:  [98.4 °F (36.9 °C)-101.7 °F (38.7 °C)] 98.4 °F (36.9 °C)  Heart Rate:  [118-153] 119  Resp:  [20-28] 21  BP: (162-203)/() 162/88     LABS:      Lab 08/10/24  0531 08/10/24  0527 08/08/24  0820   WBC  --  4.97 7.04   HEMOGLOBIN  --  9.9* 10.2*   HEMATOCRIT  --  31.1* 31.8*   PLATELETS  --  54* 70*   NEUTROS ABS  --  4.04 5.71   IMMATURE GRANS (ABS)  --  0.09*  --    LYMPHS ABS  --  0.30* 0.56*   MONOS ABS  --  0.50 0.71   EOS ABS  --  0.03 0.04   MCV  --  96.3 100.6*   PROCALCITONIN  --  0.28*  --    LACTATE 1.3  --   --          Lab 08/10/24  0527   SODIUM 137   POTASSIUM 3.5   CHLORIDE 104   CO2 18.2*   ANION GAP 14.8   BUN 24*   CREATININE 0.51*   EGFR 105.7   GLUCOSE 94   CALCIUM 8.8         Lab 08/10/24  0527   TOTAL PROTEIN 6.2   ALBUMIN 3.6   GLOBULIN 2.6   ALT (SGPT) 31   AST (SGOT) 38*   BILIRUBIN 1.1   ALK PHOS 89                     UA           5/8/2024    14:59 6/28/2024    10:31 8/10/2024    06:03   Urinalysis   Squamous Epithelial Cells, UA   0-2    Specific Gravity, UA  1.006  1.018    Ketones, UA Negative  Negative  15 mg/dL (1+)    Blood, UA  Negative  Negative    Leukocytes, UA Negative  Negative  Trace    Nitrite, UA  Negative  Positive    RBC, UA   0-2    WBC, UA   0-2    Bacteria, UA   4+        Lab Results   Component Value Date    TSH 2.260 05/08/2024     (H) 06/26/2024    HGBA1C 5.21 06/26/2024    [id number] >2,000 (H) 12/11/2019       IMAGING STUDIES:  CT Head Without Contrast    Result Date: 8/10/2024  Impression: Multiple intracranial masses consistent with known metastases. There is extensive vasogenic edema surrounding the masses. There is no convincing acute hemorrhage. Electronically Signed: Hernandez Espino MD  8/10/2024 6:51 AM EDT  Workstation ID: [id number]    XR Chest 1 View    Result Date: 8/10/2024  Impression: Innumerable bilateral pulmonary nodules consistent with metastatic disease. Size and number of the nodules has increased in the interval. There is mild right basilar atelectasis. Electronically Signed: Hernandez Espino MD  8/10/2024 6:04 AM EDT  Workstation ID: [id number]     I reviewed the patient's new clinical results.    ________________________________________________________     PROBLEM LIST:    UTI (urinary tract infection) due to urinary indwelling catheter    Seizure            ASSESSMENT/PLAN:    Seizure 2/2 brain metastasis   Primary adenocarcinoma of the colon dx in 2012   - CT head reviewed  -Patient was seen by neurosurgery in June 2024-no surgical invention recommended  -- Patient previously saw her oncologist August 8 and decided to stop treatment and be referred to hospice  -Commending to continue Keppra 1 g twice daily-discussed with family that even if patient goes hospice typically seizure medications are continued for the family's comfort as well as the patient's  -Will continue to monitor the  patient while she is in the hospital-please call with any additional seizures.           I discussed the patient's findings and my recommendations with family, nursing staff, and primary care team    Courtney Dominguez, ARNOLDO  08/10/24  13:26 EDT          Electronically signed by Ana Purvis MD at 08/10/24 1402          Discharge Summary        Darnell Dorantes MD at 24 1200                       [facility]  Discharge Summary    Date of Service: 2024  Patient Name: Samantha Massey  : 1960  MRN: [id number]    Date of Admission: 8/10/2024  Discharge Diagnosis:   Acute seizure  Acute UTI  Hypertensive urgency  Metastatic colon cancer with brain metastasis  Cerebral edema with brain compression secondary to brain metastasis      Date of Discharge: 2024  Primary Care Physician: Diana Spencer MD      Presenting Problem:   Seizure [R56.9]  Acute UTI [N39.0]  Breakthrough seizure [G40.919]  Sepsis, due to unspecified organism, unspecified whether acute organ dysfunction present [A41.9]    Active and Resolved Hospital Problems:  Active Hospital Problems    Diagnosis POA    UTI (urinary tract infection) due to urinary indwelling catheter [T83.511A, N39.0] Yes    Seizure [R56.9] Yes      Resolved Hospital Problems   No resolved problems to display.         Hospital Course     HPI:  Patient is a 63-year-old female who presented to the hospital after a witnessed seizure.  Please see H&P for details.    Hospital Course:  The patient has a known history of colon cancer with brain metastasis and has stopped all chemotherapy with a plan to transition to hospice care.  She saw her oncologist on , at which point all of her medications were stopped, including Keppra that she was on for seizure prophylaxis as well as Decadron for vasogenic edema.  However the next day she had a witnessed tonic-clonic seizure.  She was admitted and started back on her Keppra here.  She had no  further seizure activity during her hospital stay.  I will discharge her on Keppra as well as oral Decadron daily to prevent any further seizures.  However the patient will continue on comfort care on discharge.  I will also prescribe her comfort care medications including oxycodone and Ativan.  She is planning to meet with hospice in the morning of 8/12 at home.  She was also found to have a probable acute UTI and will be discharged on a short course of oral antibiotics.  She is stable for discharge.        DISCHARGE Follow Up Recommendations for labs and diagnostics: Follow-up with hospice on 8/12 as scheduled.      Reasons For Change In Medications and Indications for New Medications:      Day of Discharge     Vital Signs:  Temp:  [97.8 °F (36.6 °C)-98.7 °F (37.1 °C)] 98 °F (36.7 °C)  Heart Rate:  [] 83  Resp:  [13-18] 13  BP: (111-136)/(61-71) 129/67    Physical Exam:  Physical Exam   General Appearance:  Alert, cooperative, no distress, appears stated age  Head:  Normocephalic, without obvious abnormality, atraumatic  Eyes:  PERRL, conjunctiva/corneas clear, EOM's intact, fundi benign, both eyes  Ears:  Normal TM's and external ear canals, both ears  Nose: Nares normal, septum midline, mucosa normal, no drainage or sinus tenderness  Throat: Lips, mucosa, and tongue normal; teeth and gums normal  Neck: Supple, symmetrical, trachea midline, no adenopathy, thyroid: not enlarged, symmetric, no tenderness/mass/nodules, no carotid bruit or JVD  Lungs:   Clear to auscultation bilaterally, respirations unlabored  Heart:  Regular rate and rhythm, S1, S2 normal, no murmur, rub or gallop  Abdomen:  Soft, non-tender, bowel sounds active all four quadrants,  no masses, no organomegaly  Extremities: Extremities normal, atraumatic, no cyanosis or edema  Pulses: 2+ and symmetric  Skin: Skin color, texture, turgor normal, no rashes or lesions  Neurologic: Normal        Pertinent  and/or Most Recent Results     LAB  RESULTS:      Lab 08/11/24  0427 08/10/24  0531 08/10/24  0527 08/08/24  0820   WBC 4.46  --  4.97 7.04   HEMOGLOBIN 8.8*  --  9.9* 10.2*   HEMATOCRIT 27.5*  --  31.1* 31.8*   PLATELETS 62*  --  54* 70*   NEUTROS ABS 3.83  --  4.04 5.71   IMMATURE GRANS (ABS) 0.06*  --  0.09*  --    LYMPHS ABS 0.31*  --  0.30* 0.56*   MONOS ABS 0.25  --  0.50 0.71   EOS ABS 0.00  --  0.03 0.04   MCV 95.2  --  96.3 100.6*   PROCALCITONIN  --   --  0.28*  --    LACTATE  --  1.3  --   --          Lab 08/11/24  0427 08/10/24  0527   SODIUM 138 137   POTASSIUM 3.4* 3.5   CHLORIDE 105 104   CO2 20.8* 18.2*   ANION GAP 12.2 14.8   BUN 31* 24*   CREATININE 0.57 0.51*   EGFR 102.9 105.7   GLUCOSE 180* 94   CALCIUM 8.6 8.8   MAGNESIUM 2.3  --    PHOSPHORUS 2.5  --          Lab 08/10/24  0527   TOTAL PROTEIN 6.2   ALBUMIN 3.6   GLOBULIN 2.6   ALT (SGPT) 31   AST (SGOT) 38*   BILIRUBIN 1.1   ALK PHOS 89                     Brief Urine Lab Results  (Last result in the past 365 days)        Color   Clarity   Blood   Leuk Est   Nitrite   Protein   CREAT   Urine HCG        08/10/24 0603 Yellow   Clear   Negative   Trace   Positive   Trace                 Microbiology Results (last 10 days)       Procedure Component Value - Date/Time    Blood Culture - Blood, Chest, Left [[id number]]  (Normal) Collected: 08/10/24 0601    Lab Status: Preliminary result Specimen: Blood from Chest, Left Updated: 08/11/24 0615     Blood Culture No growth at 24 hours    Blood Culture - Blood, Chest, Left [[id number]]  (Normal) Collected: 08/10/24 0601    Lab Status: Preliminary result Specimen: Blood from Chest, Left Updated: 08/11/24 0615     Blood Culture No growth at 24 hours    Respiratory Panel PCR w/COVID-19(SARS-CoV-2) [facility]ANDA/[facility] In-House, NP Swab in UTM/VTM, 2 HR TAT - Swab, Nasopharynx [[id number]]  (Normal) Collected: 08/10/24 0533    Lab Status: Final result Specimen: Swab from Nasopharynx Updated: 08/10/24 0628     ADENOVIRUS, PCR Not Detected      Coronavirus 229E Not Detected     Coronavirus HKU1 Not Detected     Coronavirus NL63 Not Detected     Coronavirus OC43 Not Detected     COVID19 Not Detected     Human Metapneumovirus Not Detected     Human Rhinovirus/Enterovirus Not Detected     Influenza A PCR Not Detected     Influenza B PCR Not Detected     Parainfluenza Virus 1 Not Detected     Parainfluenza Virus 2 Not Detected     Parainfluenza Virus 3 Not Detected     Parainfluenza Virus 4 Not Detected     RSV, PCR Not Detected     Bordetella pertussis pcr Not Detected     Bordetella parapertussis PCR Not Detected     Chlamydophila pneumoniae PCR Not Detected     Mycoplasma pneumo by PCR Not Detected    Narrative:      In the setting of a positive respiratory panel with a viral infection PLUS a negative procalcitonin without other underlying concern for bacterial infection, consider observing off antibiotics or discontinuation of antibiotics and continue supportive care. If the respiratory panel is positive for atypical bacterial infection (Bordetella pertussis, Chlamydophila pneumoniae, or Mycoplasma pneumoniae), consider antibiotic de-escalation to target atypical bacterial infection.            CT Head Without Contrast    Result Date: 8/10/2024  Impression: Impression: Multiple intracranial masses consistent with known metastases. There is extensive vasogenic edema surrounding the masses. There is no convincing acute hemorrhage. Electronically Signed: Hernandez Espino MD  8/10/2024 6:51 AM EDT  Workstation ID: [id number]    XR Chest 1 View    Result Date: 8/10/2024  Impression: Impression: Innumerable bilateral pulmonary nodules consistent with metastatic disease. Size and number of the nodules has increased in the interval. There is mild right basilar atelectasis. Electronically Signed: Hernandez Espino MD  8/10/2024 6:04 AM EDT  Workstation ID: [id number]             Results for orders placed during the hospital encounter of 01/23/24    Adult Transthoracic Echo  Complete W/ Cont if Necessary Per Protocol    Interpretation Summary    Left ventricular systolic function is normal. Calculated left ventricular EF = 55% Left ventricular ejection fraction appears to be 51 - 55%.    Left ventricular diastolic function is consistent with (grade I) impaired relaxation.    The left atrial cavity is mildly dilated.    Estimated right ventricular systolic pressure from tricuspid regurgitation is mildly elevated (35-45 mmHg).      Labs Pending at Discharge:  Pending Labs       Order Current Status    Blood Culture - Blood, Chest, Left Preliminary result    Blood Culture - Blood, Chest, Left Preliminary result            Procedures Performed           Consults:   Consults       Date and Time Order Name Status Description    8/10/2024  5:01 PM Inpatient Palliative Care MD Consult      8/10/2024  8:19 AM Inpatient Neurology Consult General Completed     8/10/2024  7:01 AM Inpatient Hospitalist Consult                Discharge Details        Discharge Medications        New Medications        Instructions Start Date   cefdinir 300 MG capsule  Commonly known as: OMNICEF   300 mg, Oral, 2 Times Daily      LORazepam 1 MG tablet  Commonly known as: Ativan   1 mg, Oral, Every 8 Hours PRN      ondansetron 4 MG tablet  Commonly known as: Zofran   4 mg, Oral, Every 8 Hours PRN             Changes to Medications        Instructions Start Date   dexAMETHasone 4 MG tablet  Commonly known as: DECADRON  What changed: when to take this   4 mg, Oral, Daily With Breakfast             Continue These Medications        Instructions Start Date   levETIRAcetam 1000 MG tablet  Commonly known as: Keppra   1,000 mg, Oral, 2 Times Daily      oxyCODONE 5 MG immediate release tablet  Commonly known as: Roxicodone   5 mg, Oral, Every 4 Hours PRN      traMADol 50 MG tablet  Commonly known as: ULTRAM   50 mg, Oral, Every 6 Hours PRN             Stop These Medications      aspirin 81 MG EC tablet     atorvastatin 80 MG  tablet  Commonly known as: LIPITOR     empagliflozin 10 MG tablet tablet  Commonly known as: JARDIANCE     HYDROcodone-acetaminophen 5-325 MG per tablet  Commonly known as: NORCO     Lysine 500 MG capsule     memantine 28 MG capsule sustained-release 24 hr extended release capsule  Commonly known as: NAMENDA XR     metoprolol succinate XL 25 MG 24 hr tablet  Commonly known as: TOPROL-XL     Vitamin B-12 5000 MCG sublingual tablet              Allergies   Allergen Reactions    Hydrocodone Nausea And Vomiting    Iodinated Contrast Media Hives and Itching     PT HAD SOME HIVES DURING SCAN.  PRIOR TO SCAN 8-3-2013.    Latex Rash     Blisters     Penicillins Hives         Discharge Disposition:     Home or Self Care    Diet:  Hospital:  Diet Order   Procedures    Diet: Regular/House; Fluid Consistency: Thin (IDDSI 0)         Discharge Activity:         CODE STATUS:  Code Status and Medical Interventions: No CPR (Do Not Attempt to Resuscitate); Limited Support; No intubation (DNI)   Ordered at: 08/10/24 0705     Medical Intervention Limits:    No intubation (DNI)     Level Of Support Discussed With:    Health Care Surrogate     Code Status (Patient has no pulse and is not breathing):    No CPR (Do Not Attempt to Resuscitate)     Medical Interventions (Patient has pulse or is breathing):    Limited Support         Future Appointments   Date Time Provider Department Center   8/16/2024 11:30 AM Sj Burgos MD [facility] None   9/13/2024  2:35 PM [facility] CC NA [facility]   9/13/2024  2:45 PM Sean Adams MD [facility] ONC NA [facility]   10/23/2024  2:00 PM Ramiro Olivera MD [facility]       Additional Instructions for the Follow-ups that You Need to Schedule       Discharge Follow-up with Specialty: Follow-up with hospice on 8/12; 1 Day   As directed      Specialty: Follow-up with hospice on 8/12   Follow Up: 1 Day                Time spent on Discharge including face to face service:  >30  minutes    Signature: Electronically signed by Darnell Dorantes MD, 08/11/24, 12:00 EDT.  [facility] Hospitalist Team    Electronically signed by Darnell Dorantes MD at 08/11/24 1203       Discharge Order (From admission, onward)       Start     Ordered    08/11/24 1155  Discharge patient  Once        Expected Discharge Date: 08/11/24   Discharge Disposition: Home or Self Care   Physician of Record for Attribution - Please select from Treatment Team: DARNELL DORANTES [[id number]]   Review needed by CMO to determine Physician of Record: No      Question Answer Comment   Physician of Record for Attribution - Please select from Treatment Team DARNELL DORANTES    Review needed by CMO to determine Physician of Record No        08/11/24 8152

## 2024-08-12 NOTE — OUTREACH NOTE
Call Center TCM Note      Flowsheet Row Responses   [facility] facility patient discharged from? [facility]   Does the patient have one of the following disease processes/diagnoses(primary or secondary)? Other   TCM attempt successful? No   Unsuccessful attempts Attempt 1            Bruna Vera LPN    8/12/2024, 15:51 EDT        
yes

## 2024-08-12 NOTE — OUTREACH NOTE
Call Center TCM Note      Flowsheet Row Responses   [facility] facility patient discharged from? [facility]   Does the patient have one of the following disease processes/diagnoses(primary or secondary)? Other   TCM attempt successful? No   Unsuccessful attempts Attempt 2            Bruna Vera LPN    8/12/2024, 15:55 EDT

## 2024-08-13 ENCOUNTER — TRANSITIONAL CARE MANAGEMENT TELEPHONE ENCOUNTER (OUTPATIENT)
Dept: CALL CENTER | Facility: HOSPITAL | Age: 64
End: 2024-08-13
Payer: COMMERCIAL

## 2024-08-13 NOTE — OUTREACH NOTE
Call Center TCM Note      Flowsheet Row Responses   [facility] patient discharged from? Rad   Does the patient have one of the following disease processes/diagnoses(primary or secondary)? Other   TCM attempt successful? No   Unsuccessful attempts Attempt 3   Wrap up additional comments D/C DX: Seizure in setting of metastatic colon cancer,  UTI due to urinary indwelling catheter - Unable to reacn pt x 3 attempts for TCM call. Pt is not scheduled for TCM APPT with PCP Dr Spencer. Per notes HOSPICE is in place now.            Molly Boyer MA    8/13/2024, 11:14 EDT

## 2024-08-15 LAB
BACTERIA SPEC AEROBE CULT: NORMAL
BACTERIA SPEC AEROBE CULT: NORMAL

## 2024-08-19 DIAGNOSIS — C79.31 METASTASIS TO BRAIN: Primary | ICD-10-CM

## 2024-09-04 ENCOUNTER — TELEPHONE (OUTPATIENT)
Dept: ONCOLOGY | Facility: CLINIC | Age: 64
End: 2024-09-04
Payer: COMMERCIAL

## (undated) DEVICE — ELECTRD DEFIB M/FUNC PROPADZ RADIOL 2PK

## (undated) DEVICE — GW PTFE EMERALD HEPCOAT FC J TIP STD .035 3MM 150CM

## (undated) DEVICE — SYR LUERLOK 5CC

## (undated) DEVICE — KT SURG TURNOVER 050

## (undated) DEVICE — CVR PROB ULTRASND CIVFLEX GEN/PURP TELESCP/FOLD 5.5X96IN LF

## (undated) DEVICE — PK MINOR LAPAROTOMY 50

## (undated) DEVICE — PENCL EVAC ULTRAVAC SMOKE W/BLD

## (undated) DEVICE — SPNG LAP PREWSH SFTPK 18X18IN STRL PK/5

## (undated) DEVICE — GLV SURG DERMASSURE GRN LF PF 8.5

## (undated) DEVICE — SUT VIC 3/0 SH CR8 18IN J864D

## (undated) DEVICE — GLV SURG SIGNATURE ESSENTIAL PF LTX SZ8

## (undated) DEVICE — SOL IRRIG H2O 1000ML STRL

## (undated) DEVICE — GLV SURG SENSICARE SLT PF LF 8 STRL

## (undated) DEVICE — SUT PROLN 1 CT1 30IN 8425H

## (undated) DEVICE — C-ARM: Brand: UNBRANDED

## (undated) DEVICE — SUT ETHIB 0/0 MO6 I8IN CX45D

## (undated) DEVICE — 3M™ PATIENT PLATE, CORDED, SPLIT, LARGE, 40 PER CASE, 1179: Brand: 3M™

## (undated) DEVICE — TBG PENCL TELESCP MEGADYNE SMOKE EVAC 15FT

## (undated) DEVICE — CATH DIAG IMPULSE PIG .056 6F 110CM

## (undated) DEVICE — GOWN,REINFRCE,POLY,SIRUS,BREATH SLV,XXLG: Brand: MEDLINE

## (undated) DEVICE — PK TRY HEART CATH 50

## (undated) DEVICE — ST ACC MICROPUNCTURE STFF/CANN PLAT/TP 4F 21G 40CM

## (undated) DEVICE — CATH DIAG IMPULSE FL4 6F 100CM

## (undated) DEVICE — CATH F4 INF JL 3.5 100CM: Brand: INFINITI

## (undated) DEVICE — SKIN AFFIX SURG ADHESIVE 72/CS 0.55ML: Brand: MEDLINE

## (undated) DEVICE — ANTIBACTERIAL UNDYED BRAIDED (POLYGLACTIN 910), SYNTHETIC ABSORBABLE SUTURE: Brand: COATED VICRYL

## (undated) DEVICE — SUT MONOCRYL 4/0 PS2 27IN Y426H ETY426H

## (undated) DEVICE — PROVE COVER: Brand: UNBRANDED

## (undated) DEVICE — CONTAINER,SPECIMEN,OR STERILE,4OZ: Brand: MEDLINE

## (undated) DEVICE — SOLUTION,WATER,IRRIGATION,1000ML,STERILE: Brand: MEDLINE

## (undated) DEVICE — CVR HNDL LT SURG ACCSSRY BLU STRL

## (undated) DEVICE — CATH DIAG IMPULSE FR4 6F 100CM

## (undated) DEVICE — SUT MNCRYL 4/0 P3 18IN Y494G

## (undated) DEVICE — CATH DIAG IMPULSE FL3.5 6F 100CM

## (undated) DEVICE — UNDYED BRAIDED (POLYGLACTIN 910), SYNTHETIC ABSORBABLE SUTURE: Brand: COATED VICRYL

## (undated) DEVICE — GAUZE,PACKING STRIP,IODOFORM,1/2"X5YD,ST: Brand: CURAD

## (undated) DEVICE — CATH F6INF TL 3DRC 100CM: Brand: INFINITI

## (undated) DEVICE — ADHS SKIN PREMIERPRO EXOFIN TOPICAL HI/VISC .5ML

## (undated) DEVICE — Device

## (undated) DEVICE — UNDERGLV SURG BIOGEL/PI PF SYNTH SURG SZ8.5 BLU 50/BX

## (undated) DEVICE — SLV SCD CALF HEMOFORCE DVT THERP REPROC MD

## (undated) DEVICE — PINNACLE INTRODUCER SHEATH: Brand: PINNACLE

## (undated) DEVICE — DEV STBL STATLOCK IAB